# Patient Record
Sex: FEMALE | Race: WHITE | NOT HISPANIC OR LATINO | Employment: PART TIME | URBAN - NONMETROPOLITAN AREA
[De-identification: names, ages, dates, MRNs, and addresses within clinical notes are randomized per-mention and may not be internally consistent; named-entity substitution may affect disease eponyms.]

---
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
## 2018-01-02 ENCOUNTER — HOSPITAL ENCOUNTER (OUTPATIENT)
Dept: ULTRASOUND IMAGING | Facility: CLINIC | Age: 18
Discharge: HOME | End: 2018-01-02
Attending: NURSE PRACTITIONER | Admitting: NURSE PRACTITIONER
Payer: COMMERCIAL

## 2018-01-02 DIAGNOSIS — N92.6 IRREGULAR MENSES: ICD-10-CM

## 2018-01-02 PROCEDURE — 76856 US EXAM PELVIC COMPLETE: CPT

## 2018-01-02 NOTE — LETTER
January 3, 2018      Batoolcurtis Louis  [address] [address] [address]        Dear ,    We are writing to inform you of your test results.    Your test results fall within the expected range(s).    Resulted Orders   US Pelvis Complete without Transvaginal    Narrative    PELVIC ULTRASOUND TRANSABDOMINAL IMAGING 1/2/2018 10:48 AM    HISTORY: Irregular menses.    COMPARISON: None.    TECHNIQUE: Transabdominal imaging was performed.    FINDING: The uterus measures 7.8 x 5.2 x 3.1 cm. It demonstrates  normal echogenicity with no myometrial abnormality seen. The  endometrium is normal measuring 0.6 cm. The right ovary is normal. The  left ovary is normal. Normal blood flow is seen in both ovaries. No  adnexal pathology is seen. There is no free fluid in the cul-de-sac.      Impression    IMPRESSION: Unremarkable pelvic ultrasound.    ROBSON BUENO MD       If you have any questions or concerns, please call the clinic at the number listed above.       Sincerely,      [facility] ULTRASOUND ROOM 2

## 2018-01-03 NOTE — PROGRESS NOTES
Letter sent to patient informing of NL pelvic u/s.  ................Wilbert Qureshi LPN,   January 3, 2018,      1:10 PM,   [facility]

## 2018-01-05 ENCOUNTER — OFFICE VISIT (OUTPATIENT)
Dept: FAMILY MEDICINE | Facility: OTHER | Age: 18
End: 2018-01-05
Payer: COMMERCIAL

## 2018-01-05 VITALS
DIASTOLIC BLOOD PRESSURE: 78 MMHG | SYSTOLIC BLOOD PRESSURE: 108 MMHG | RESPIRATION RATE: 14 BRPM | WEIGHT: 218.7 LBS | HEART RATE: 114 BPM | HEIGHT: 69 IN | BODY MASS INDEX: 32.39 KG/M2 | OXYGEN SATURATION: 97 % | TEMPERATURE: 97.7 F

## 2018-01-05 DIAGNOSIS — N91.1 SECONDARY AMENORRHEA: Primary | ICD-10-CM

## 2018-01-05 DIAGNOSIS — R19.7 DIARRHEA, UNSPECIFIED TYPE: ICD-10-CM

## 2018-01-05 LAB
B-HCG SERPL-ACNC: <1 IU/L (ref 0–5)
TSH SERPL DL<=0.005 MIU/L-ACNC: 1.37 MU/L (ref 0.4–4)

## 2018-01-05 PROCEDURE — 84702 CHORIONIC GONADOTROPIN TEST: CPT | Performed by: NURSE PRACTITIONER

## 2018-01-05 PROCEDURE — 83001 ASSAY OF GONADOTROPIN (FSH): CPT | Performed by: NURSE PRACTITIONER

## 2018-01-05 PROCEDURE — 99214 OFFICE O/P EST MOD 30 MIN: CPT | Performed by: NURSE PRACTITIONER

## 2018-01-05 PROCEDURE — 84443 ASSAY THYROID STIM HORMONE: CPT | Performed by: NURSE PRACTITIONER

## 2018-01-05 PROCEDURE — 84403 ASSAY OF TOTAL TESTOSTERONE: CPT | Performed by: NURSE PRACTITIONER

## 2018-01-05 PROCEDURE — 84146 ASSAY OF PROLACTIN: CPT | Performed by: NURSE PRACTITIONER

## 2018-01-05 PROCEDURE — 36415 COLL VENOUS BLD VENIPUNCTURE: CPT | Performed by: NURSE PRACTITIONER

## 2018-01-05 PROCEDURE — 82670 ASSAY OF TOTAL ESTRADIOL: CPT | Performed by: NURSE PRACTITIONER

## 2018-01-05 ASSESSMENT — PAIN SCALES - GENERAL: PAINLEVEL: NO PAIN (0)

## 2018-01-05 NOTE — PATIENT INSTRUCTIONS
Labs will be done today.  I will call or send a letter with results within the next 1-2 weeks.      Bring back the stool samples.

## 2018-01-05 NOTE — NURSING NOTE
"Chief Complaint   Patient presents with     Results       Initial /78 (BP Location: Left arm, Patient Position: Chair, Cuff Size: Adult Large)  Pulse 114  Temp 97.7  F (36.5  C) (Tympanic)  Resp 14  Ht 5' 9\" (1.753 m)  Wt 218 lb 11.2 oz (99.2 kg)  LMP 10/26/2017  SpO2 97%  BMI 32.3 kg/m2 Estimated body mass index is 32.3 kg/(m^2) as calculated from the following:    Height as of this encounter: 5' 9\" (1.753 m).    Weight as of this encounter: 218 lb 11.2 oz (99.2 kg).  Medication Reconciliation: complete    "

## 2018-01-05 NOTE — PROGRESS NOTES
SUBJECTIVE:   Batool Louis is a 17 year old female who presents to clinic today for the following health issues:      Follow up ultrasound results.     Secondary amenorrhea - Normal, monthly menses until last June.  She has only had two menses since that time, both very light, LMP October of last year.  She had a pelvic ultrasound done 1/2/2018 that was normal.  No new stress.  She has never been sexually active.      No abdominal pain/nausea.  Is also having diarrhea since September.  She was on a road trip for 3 months in [place].  Mostly made her own meals, but occasionally ate at restaurants.  Developed diarrhea during the trip and it has persisted.  Her travel  did not get ill.  No fevers/chills or vomiting.  No blood in her stools.      Problem list and histories reviewed & adjusted, as indicated.  Additional history: none    There is no problem list on file for this patient.    History reviewed. No pertinent surgical history.    Social History   Substance Use Topics     Smoking status: Never Smoker     Smokeless tobacco: Never Used     Alcohol use No     History reviewed. No pertinent family history.      Current Outpatient Prescriptions   Medication Sig Dispense Refill     cetirizine (ZYRTEC) 10 MG tablet Take 10 mg by mouth Reported on 3/7/2017       Cholecalciferol (D 5000) 5000 UNITS TABS Reported on 3/7/2017       ibuprofen (ADVIL/MOTRIN) 200 MG tablet Take 200 mg by mouth Reported on 3/7/2017       Multiple Vitamin (MULTI-VITAMINS) TABS Take 1 tablet by mouth       propranolol (INDERAL) 20 MG tablet Take 20 mg by mouth Reported on 3/7/2017       Allergies   Allergen Reactions     Sulfamethoxazole-Trimethoprim Hives     BP Readings from Last 3 Encounters:   01/05/18 108/78   12/13/17 110/70    Wt Readings from Last 3 Encounters:   01/05/18 218 lb 11.2 oz (99.2 kg) (99 %)*   12/13/17 217 lb (98.4 kg) (99 %)*   03/16/17 210 lb (95.3 kg) (98 %)*     * Growth percentiles are based on CDC  "2-20 Years data.                        Reviewed and updated as needed this visit by clinical staffTobacco  Allergies  Meds  Med Hx  Surg Hx  Fam Hx  Soc Hx      Reviewed and updated as needed this visit by Provider         ROS:  Constitutional, HEENT, cardiovascular, pulmonary, gi and gu systems are negative, except as otherwise noted.      OBJECTIVE:   /78 (BP Location: Left arm, Patient Position: Chair, Cuff Size: Adult Large)  Pulse 114  Temp 97.7  F (36.5  C) (Tympanic)  Resp 14  Ht 5' 9\" (1.753 m)  Wt 218 lb 11.2 oz (99.2 kg)  LMP 10/26/2017  SpO2 97%  BMI 32.3 kg/m2  Body mass index is 32.3 kg/(m^2).  GENERAL: alert, no distress and obese  NECK: no adenopathy, no asymmetry, masses, or scars and thyroid normal to palpation  RESP: lungs clear to auscultation - no rales, rhonchi or wheezes  CV: regular rate and rhythm, normal S1 S2, no S3 or S4, no murmur, click or rub, no peripheral edema and peripheral pulses strong  ABDOMEN: soft, nontender, no hepatosplenomegaly, no masses and bowel sounds normal  MS: no gross musculoskeletal defects noted, no edema  SKIN: no suspicious lesions or rashes, has mild upper lip hair    Diagnostic Test Results:  Pending     ASSESSMENT/PLAN:         1. Secondary amenorrhea  Will check labs.  She has some mild hirsutism.   - Testosterone total  - TSH with free T4 reflex  - Follicle stimulating hormone  - Estradiol  - Prolactin  - HCG quantitative pregnancy    2. Diarrhea, unspecified type  Will check for infectious etiology.  She is currently working on trials off certain food groups.     - Enteric Bacteria and Virus Panel by [facility] Stool; Future  - Ova and Parasite Exam Routine; Future  - Giardia antigen; Future  - Clostridium difficile Toxin B PCR; Future    See Patient Instructions    RONNY Vaz [facility]    "

## 2018-01-05 NOTE — MR AVS SNAPSHOT
After Visit Summary   1/5/2018    Batool Louis    MRN: [id number]           Patient Information     Date Of Birth          2000        Visit Information        Provider Department      1/5/2018 4:00 PM Mary Alice Reagan APRN CNP [facility]        Today's Diagnoses     Secondary amenorrhea    -  1    Diarrhea, unspecified type          Care Instructions    Labs will be done today.  I will call or send a letter with results within the next 1-2 weeks.      Bring back the stool samples.               Follow-ups after your visit        Your next 10 appointments already scheduled     Feb 05, 2018 10:30 AM CST   New Visit with [facility]a [facility] ([facility])    [address] [facility] [address]   [phone]            Feb 12, 2018 10:30 AM CST   Return Visit with Celsa [facility] ([facility])    [address] [address] [facility] [address]   [phone]              Future tests that were ordered for you today     Open Future Orders        Priority Expected Expires Ordered    Enteric Bacteria and Virus Panel by [facility] Stool Routine  1/5/2019 1/5/2018    Ova and Parasite Exam Routine Routine  1/5/2019 1/5/2018    Giardia antigen Routine  1/5/2019 1/5/2018    Clostridium difficile Toxin B PCR Routine  2/4/2018 1/5/2018            Who to contact     If you have questions or need follow up information about today's clinic visit or your schedule please contact [facility] directly at [phone].  Normal or non-critical lab and imaging results will be communicated to you by MyChart, letter or phone within 4 business days after the clinic has received the results. If you do not hear from us within 7 days, please contact the clinic through MyChart or phone. If you have a critical or abnormal lab result, we will notify you by phone as soon as possible.  Submit  "refill requests through [employer] or call your pharmacy and they will forward the refill request to us. Please allow 3 business days for your refill to be completed.          Additional Information About Your Visit        "[employer] Information     [employer] lets you send messages to your doctor, view your test results, renew your prescriptions, schedule appointments and more. To sign up, go to [email or web address] . Click on \"Log in\" on the left side of the screen, which will take you to the Welcome page. Then click on \"Sign up Now\" on the right side of the page.     You will be asked to enter the access code listed below, as well as some personal information. Please follow the directions to create your username and password.     Your access code is: [email or web address]  Expires: 3/13/2018 10:12 AM     Your access code will  in 90 days. If you need help or a new code, please call your [facility] clinic or [phone].        Care EveryWhere ID     This is your Care EveryWhere ID. This could be used by other organizations to access your [facility] medical records  [id number]        Your Vitals Were     Pulse Temperature Respirations Height Last Period Pulse Oximetry    114 97.7  F (36.5  C) (Tympanic) 14 5' 9\" (1.753 m) 10/26/2017 97%    BMI (Body Mass Index)                   32.3 kg/m2            Blood Pressure from Last 3 Encounters:   18 108/78   17 110/70    Weight from Last 3 Encounters:   18 218 lb 11.2 oz (99.2 kg) (99 %)*   17 217 lb (98.4 kg) (99 %)*   17 210 lb (95.3 kg) (98 %)*     * Growth percentiles are based on CDC 2-20 Years data.              We Performed the Following     Estradiol     Follicle stimulating hormone     HCG quantitative pregnancy     Prolactin     Testosterone total     TSH with free T4 reflex        Primary Care Provider Office Phone # Fax #    RONNY Vaz [phone]       [address] [facility] [address]        Equal Access to " Services     [facility]: Hadii aad ku hadkyawmisha Tillman, waaxda luqadaha, qaybta kaalmablair alvarado, romeo vallejo. So [facility] [phone].    ATENCIÓN: Si habla español, tiene a david disposición servicios gratuitos de asistencia lingüística. Llame al [phone].    We comply with applicable federal civil rights laws and [place] laws. We do not discriminate on the basis of race, color, national origin, age, disability, sex, sexual orientation, or gender identity.            Thank you!     Thank you for choosing [facility]  for your care. Our goal is always to provide you with excellent care. Hearing back from our patients is one way we can continue to improve our services. Please take a few minutes to complete the written survey that you may receive in the mail after your visit with us. Thank you!             Your Updated Medication List - Protect others around you: Learn how to safely use, store and throw away your medicines at www.disposemymeds.org.          This list is accurate as of: 1/5/18  4:18 PM.  Always use your most recent med list.                   Brand Name Dispense Instructions for use Diagnosis    cetirizine 10 MG tablet    zyrTEC     Take 10 mg by mouth Reported on 3/7/2017        D 5000 5000 UNITS Tabs   Generic drug:  Cholecalciferol      Reported on 3/7/2017        ibuprofen 200 MG tablet    ADVIL/MOTRIN     Take 200 mg by mouth Reported on 3/7/2017        MULTI-VITAMINS Tabs      Take 1 tablet by mouth        propranolol 20 MG tablet    INDERAL     Take 20 mg by mouth Reported on 3/7/2017

## 2018-01-06 ENCOUNTER — HEALTH MAINTENANCE LETTER (OUTPATIENT)
Age: 18
End: 2018-01-06

## 2018-01-06 LAB
ESTRADIOL SERPL-MCNC: 33 PG/ML
FSH SERPL-ACNC: 6.9 IU/L
PROLACTIN SERPL-MCNC: 12 UG/L (ref 3–27)

## 2018-01-09 DIAGNOSIS — N91.1 SECONDARY AMENORRHEA: Primary | ICD-10-CM

## 2018-01-09 LAB — TESTOST SERPL-MCNC: 39 NG/DL (ref 8–60)

## 2018-01-10 ENCOUNTER — TELEPHONE (OUTPATIENT)
Dept: FAMILY MEDICINE | Facility: OTHER | Age: 18
End: 2018-01-10

## 2018-01-10 NOTE — TELEPHONE ENCOUNTER
Patient was given her results and told she has a referral to see an ob/gyn. Patient declined to schedule at this time, states she will be calling back to set up her appointment.

## 2018-01-10 NOTE — TELEPHONE ENCOUNTER
----- Message from RONNY Vaz CNP sent at 1/9/2018  9:33 PM CST -----  Please notify patient, call or letter    All your labs are normal.  They do not explain your symptoms.  I would like you to see the OB/GYN doctor for further evaluation.  I have put in a referral for this.     Electronically signed by Mary Alice Reagan CNP.

## 2018-01-18 ENCOUNTER — OFFICE VISIT (OUTPATIENT)
Dept: OBGYN | Facility: OTHER | Age: 18
End: 2018-01-18
Payer: COMMERCIAL

## 2018-01-18 VITALS
HEART RATE: 60 BPM | DIASTOLIC BLOOD PRESSURE: 80 MMHG | BODY MASS INDEX: 33.15 KG/M2 | SYSTOLIC BLOOD PRESSURE: 116 MMHG | WEIGHT: 224.5 LBS

## 2018-01-18 DIAGNOSIS — N91.1 SECONDARY AMENORRHEA: Primary | ICD-10-CM

## 2018-01-18 PROCEDURE — 99203 OFFICE O/P NEW LOW 30 MIN: CPT | Performed by: OBSTETRICS & GYNECOLOGY

## 2018-01-18 RX ORDER — MEDROXYPROGESTERONE ACETATE 10 MG
10 TABLET ORAL DAILY
Qty: 10 TABLET | Refills: 0 | Status: SHIPPED | OUTPATIENT
Start: 2018-01-18 | End: 2020-03-12

## 2018-01-18 NOTE — PATIENT INSTRUCTIONS
You will take Provera (medroxyprogesterone acetate) 10 mg daily for 10 days. A period should start within the next 5 days after stopping the Provera. If you do not have a period, please call my office and let me know. Your period may be heavier than usual. You may try Aleve (Naproxen) over the counter, 2 tablets twice daily for 7 days during your cycle. Alternatively you may try ibuprofen. Please review the precautions on the bottle and stop the medicine if it is hard on your stomach. If you have any questions or concerns, please let us know.

## 2018-01-18 NOTE — MR AVS SNAPSHOT
After Visit Summary   1/18/2018    Batool Louis    MRN: [id number]           Patient Information     Date Of Birth          2000        Visit Information        Provider Department      1/18/2018 8:30 AM Adelaide Ace MD [facility]        Today's Diagnoses     Secondary amenorrhea    -  1      Care Instructions    You will take Provera (medroxyprogesterone acetate) 10 mg daily for 10 days. A period should start within the next 5 days after stopping the Provera. If you do not have a period, please call my office and let me know. Your period may be heavier than usual. You may try Aleve (Naproxen) over the counter, 2 tablets twice daily for 7 days during your cycle. Alternatively you may try ibuprofen. Please review the precautions on the bottle and stop the medicine if it is hard on your stomach. If you have any questions or concerns, please let us know.           Follow-ups after your visit        Your next 10 appointments already scheduled     Feb 05, 2018 10:30 AM CST   New Visit with Celsa Linda LPC   [facility]    [address] [facility] [address] [phone]            Feb 12, 2018 10:30 AM CST   Return Visit with Celsa Linda LPC   [facility]    [address] [facility] [address] [phone]              Who to contact     If you have questions or need follow up information about today's clinic visit or your schedule please contact [facility] directly at [phone].  Normal or non-critical lab and imaging results will be communicated to you by MyChart, letter or phone within 4 business days after the clinic has received the results. If you do not hear from us within 7 days, please contact the clinic through MyChart or phone. If you have a critical or abnormal lab result, we will notify you by phone as soon as  "possible.  Submit refill requests through Rapid7 or call your pharmacy and they will forward the refill request to us. Please allow 3 business days for your refill to be completed.          Additional Information About Your Visit        [employer] Software Information     Rapid7 lets you send messages to your doctor, view your test results, renew your prescriptions, schedule appointments and more. To sign up, go to [email or web address] . Click on \"Log in\" on the left side of the screen, which will take you to the Welcome page. Then click on \"Sign up Now\" on the right side of the page.     You will be asked to enter the access code listed below, as well as some personal information. Please follow the directions to create your username and password.     Your access code is: [email or web address]  Expires: 3/13/2018 10:12 AM     Your access code will  in 90 days. If you need help or a new code, please call your [facility] clinic or [phone].        Care EveryWhere ID     This is your Care EveryWhere ID. This could be used by other organizations to access your [facility] medical records  [id number]        Your Vitals Were     Pulse Last Period BMI (Body Mass Index)             60 10/26/2017 33.15 kg/m2          Blood Pressure from Last 3 Encounters:   18 116/80   18 108/78   17 110/70    Weight from Last 3 Encounters:   18 224 lb 8 oz (101.8 kg) (99 %)*   18 218 lb 11.2 oz (99.2 kg) (99 %)*   17 217 lb (98.4 kg) (99 %)*     * Growth percentiles are based on [facility] 2-20 Years data.              Today, you had the following     No orders found for display         Today's Medication Changes          These changes are accurate as of: 18  8:58 AM.  If you have any questions, ask your nurse or doctor.               Start taking these medicines.        Dose/Directions    medroxyPROGESTERone 10 MG tablet   Commonly known as:  PROVERA   Used for:  Secondary amenorrhea   Started by:  Adelaide Ace " MD BHARTI        Dose:  10 mg   Take 1 tablet (10 mg) by mouth daily for 10 days   Quantity:  10 tablet   Refills:  0            Where to get your medicines      These medications were sent to [id number] - [place], [place] - [address] [place] [address]  [address] [place] [id number]    Hours:  M-F 8:30-6:30; Sat 9-4; closed Sunday Phone:  [phone]     medroxyPROGESTERone 10 MG tablet                Primary Care Provider Office Phone # Fax #    Mary Alice Reagan, APRN [phone]       [address] [facility] [address]        Equal Access to Services     MICHELLE AGUDELO : Hadii geni ku hadasho Soaidaali, waaxda luqadaha, qaybta kaalmada adeegyada, waxsusan valenzuela . So [facility] [phone].    ATENCIÓN: Si habla español, tiene a david disposición servicios gratuitos de asistencia lingüística. Llame al [phone].    We comply with applicable federal civil rights laws and [place] laws. We do not discriminate on the basis of race, color, national origin, age, disability, sex, sexual orientation, or gender identity.            Thank you!     Thank you for choosing [facility]  for your care. Our goal is always to provide you with excellent care. Hearing back from our patients is one way we can continue to improve our services. Please take a few minutes to complete the written survey that you may receive in the mail after your visit with us. Thank you!             Your Updated Medication List - Protect others around you: Learn how to safely use, store and throw away your medicines at www.disposemymeds.org.          This list is accurate as of: 1/18/18  8:58 AM.  Always use your most recent med list.                   Brand Name Dispense Instructions for use Diagnosis    cetirizine 10 MG tablet    zyrTEC     Take 10 mg by mouth Reported on 3/7/2017        D 5000 5000 UNITS Tabs   Generic drug:  Cholecalciferol      Reported on 3/7/2017        ibuprofen 200 MG tablet     ADVIL/MOTRIN     Take 200 mg by mouth Reported on 3/7/2017        medroxyPROGESTERone 10 MG tablet    PROVERA    10 tablet    Take 1 tablet (10 mg) by mouth daily for 10 days    Secondary amenorrhea       MULTI-VITAMINS Tabs      Take 1 tablet by mouth        propranolol 20 MG tablet    INDERAL     Take 20 mg by mouth Reported on 3/7/2017

## 2018-01-18 NOTE — NURSING NOTE
"Chief Complaint   Patient presents with     Abnormal Uterine Bleeding     consult irregular periods       Initial /80 (BP Location: Left arm, Patient Position: Chair, Cuff Size: Adult Regular)  Pulse 60  Wt 224 lb 8 oz (101.8 kg)  LMP 10/26/2017  BMI 33.15 kg/m2 Estimated body mass index is 33.15 kg/(m^2) as calculated from the following:    Height as of 1/5/18: 5' 9\" (1.753 m).    Weight as of this encounter: 224 lb 8 oz (101.8 kg).  BP completed using cuff size: regular    No obstetric history on file.    The following HM Due: NONE      The following patient reported/Care Every where data was sent to:  P ABSTRACT QUALITY INITIATIVES [[id number]]       N/a    Arelis Seay CMA  January 18, 2018           "

## 2018-01-18 NOTE — PROGRESS NOTES
OB/GYN New Consult  1/18/2018      NAME:  Batool Louis  PCP:  Mary Alice Reagan  MRN:  [id number]    Reason for Consult:  Irregular bleeding  Referring Provider: Mary Alice Reagan    Impression / Plan     17 year old G0 with:      ICD-10-CM    1. Secondary amenorrhea N91.1 medroxyPROGESTERone (PROVERA) 10 MG tablet       Reviewed history, labs, and imaging.  Discussed possible etiologies for missed periods.  Labs and US are normal.  E2 is on the low side of normal.   We will plan progesterone withdrawal. If no bleed, then consider E2/P withdrawal.  Instructions and precautions given. Patient and her mother are comfortable with plan.     Chief Complaint     Chief Complaint   Patient presents with     Abnormal Uterine Bleeding     consult irregular periods       HPI     Batool Louis is a 17 year old female who is seen for irregular bleeding.     Patient saw Mary Alice Reagan on 1/5/2018 for secondary amenorrhea. Patient was having normal, monthly menses until June.  Labs and ultrasound were done. See below.  She was referred here for further evaluation and management.    Patient's last menstrual period was 10/26/2017.   Missed July.  Light in August, 3 days.  Light in October, 3-4 days.  No bleeding since.      Menarche age 12.  She was regular fairly soon thereafter.  Monthly period.  Last 5-6 days.  Moderate flow, heavy for one day.  No clots.  No pain.  Some cramps.    She has never used contraception.      Patient is not sexually active.      She gained about 30 lbs in the year before her periods changes.    Patient denies  dietary habits, excessive exercise. No nipple discharge.  No headache or vision changes.  No hot flashes  No problems with acne, abnormal hair growth.      No recent uterine surgery  No significant illnesses.  She is getting a work up for abdominal pain, concerns.        Problem List     There are no active problems to display for this patient.      Medications     Current Outpatient  Prescriptions   Medication     cetirizine (ZYRTEC) 10 MG tablet     Cholecalciferol (D 5000) 5000 UNITS TABS     ibuprofen (ADVIL/MOTRIN) 200 MG tablet     Multiple Vitamin (MULTI-VITAMINS) TABS     propranolol (INDERAL) 20 MG tablet     No current facility-administered medications for this visit.         Allergies     Allergies   Allergen Reactions     Sulfamethoxazole-Trimethoprim Hives       Past Medical/Surgical History     History reviewed. No pertinent past medical history.    History reviewed. No pertinent surgical history.     Social History     Social History     Social History     Marital status: Single     Spouse name: N/A     Number of children: N/A     Years of education: N/A     Occupational History     Not on file.     Social History Main Topics     Smoking status: Never Smoker     Smokeless tobacco: Never Used     Alcohol use No     Drug use: No     Sexual activity: No     Other Topics Concern     Not on file     Social History Narrative       Family History      History reviewed. No pertinent family history.    ROS     A 10 organ review of systems was asked and the pertinent positives and negatives are listed in the HPI. All other organ systems can be considered negative.     Physical Exam   Vitals: /80 (BP Location: Left arm, Patient Position: Chair, Cuff Size: Adult Regular)  Pulse 60  Wt 224 lb 8 oz (101.8 kg)  LMP 10/26/2017  BMI 33.15 kg/m2    General: Comfortable, no obvious distress, obese body habitus  Psych: Alert and orientated x 3. Appropriate affect, good insight.   : deferred  Extremities: No peripheral edema, nontender       Labs/Imaging       Labs were reviewed in Epic     Component      Latest Ref Rng & Units 1/5/2018   Testosterone Total      8 - 60 ng/dL 39   TSH      0.40 - 4.00 mU/L 1.37   FSH      IU/L 6.9   Estradiol      pg/mL 33   Prolactin      3 - 27 ug/L 12   HCG Quantitative Serum      0 - 5 IU/L <1       Imaging was reviewed in Epic.     Ultrasound  1/2/2018    Uterus 7.8 x 5.2 x 3.1 cm    Endometrium 6 mm    Normal ovaries, no free fluid    30 minutes was spent with patient, more than 50% counseling and coordinating care    Adelaide Ace MD

## 2018-02-01 ENCOUNTER — TELEPHONE (OUTPATIENT)
Dept: OBGYN | Facility: OTHER | Age: 18
End: 2018-02-01

## 2018-02-01 NOTE — TELEPHONE ENCOUNTER
Reason for Call:  Other prescription    Detailed comments: patient is taking a medication and she started her period and the directions state that she shouldn't take it while having period.  She is wondering if she should continue to take medication.  Please advise    Phone Number Patient can be reached at: Home number on file [phone] (home)    Best Time: mornings or mid days    Can we leave a detailed message on this number? YES    Call taken on 2/1/2018 at 3:40 PM by Sarah Lobato

## 2018-02-01 NOTE — TELEPHONE ENCOUNTER
MM Please review and advise if you would like the patient to start the Progesterone prescription at this time. Originally prescribed 2018, picked up a few days ago has not started. 2018 had light spotting for 1 day.     Batool Louis is a 17 year old female who calls with concerns of continuing progesterone since she spotted for 1 day.    NURSING ASSESSMENT:  Description:  Prescribed Progesterone 2018 to take for 10 days to encourage a period due to not having a period for 4 months. And once she picked up the medication (2018) had a period for 1 day that was light. Has not had the medication at this time.   Onset/duration:  2018  Precip. factors:  Secondary amenorrhea  Associated symptoms:  none  Improves/worsens symptoms:  Same  Pain scale (0-10)   0/10  LMP/preg/breast feedin2018  Last exam/Treatment:  2018  Allergies:   Allergies   Allergen Reactions     Sulfamethoxazole-Trimethoprim Hives     NURSING PLAN: Routed to provider Yes    RECOMMENDED DISPOSITION:  TBD based on providers recommendations  Will comply with recommendation: Yes  If further questions/concerns or if symptoms do not improve, worsen or new symptoms develop, call your PCP or [facility] as soon as possible.    NOTES:  Disposition was determined by the first positive assessment question, therefore all previous assessment questions were negative    Guideline used:  Nursing Judgment  Routing to MM     Mary Samaniego, RN, BSN

## 2018-02-02 NOTE — TELEPHONE ENCOUNTER
Contacted patient and relayed Dr Jo's message below.  She stated she only had spotting for one day so she will take the progesterone as advised.

## 2018-02-02 NOTE — TELEPHONE ENCOUNTER
If she only had spotting for the one day, she can still take the progesterone.  If she has a normal period, she can hold off on the progesterone.

## 2018-02-05 ENCOUNTER — OFFICE VISIT (OUTPATIENT)
Dept: PSYCHOLOGY | Facility: CLINIC | Age: 18
End: 2018-02-05
Payer: COMMERCIAL

## 2018-02-05 DIAGNOSIS — F43.22 ADJUSTMENT DISORDER WITH ANXIETY: Primary | ICD-10-CM

## 2018-02-05 PROCEDURE — 90791 PSYCH DIAGNOSTIC EVALUATION: CPT | Performed by: COUNSELOR

## 2018-02-05 ASSESSMENT — ANXIETY QUESTIONNAIRES
2. NOT BEING ABLE TO STOP OR CONTROL WORRYING: SEVERAL DAYS
7. FEELING AFRAID AS IF SOMETHING AWFUL MIGHT HAPPEN: NOT AT ALL
6. BECOMING EASILY ANNOYED OR IRRITABLE: MORE THAN HALF THE DAYS
1. FEELING NERVOUS, ANXIOUS, OR ON EDGE: SEVERAL DAYS
3. WORRYING TOO MUCH ABOUT DIFFERENT THINGS: SEVERAL DAYS
GAD7 TOTAL SCORE: 6
IF YOU CHECKED OFF ANY PROBLEMS ON THIS QUESTIONNAIRE, HOW DIFFICULT HAVE THESE PROBLEMS MADE IT FOR YOU TO DO YOUR WORK, TAKE CARE OF THINGS AT HOME, OR GET ALONG WITH OTHER PEOPLE: NOT DIFFICULT AT ALL
5. BEING SO RESTLESS THAT IT IS HARD TO SIT STILL: NOT AT ALL

## 2018-02-05 ASSESSMENT — PATIENT HEALTH QUESTIONNAIRE - PHQ9: 5. POOR APPETITE OR OVEREATING: SEVERAL DAYS

## 2018-02-05 NOTE — MR AVS SNAPSHOT
"                  MRN:[id number]                      After Visit Summary   2018    Batool oLuis    MRN: [id number]           Visit Information        Provider Department      2018 10:30 AM Celsa Linda, [facility] Generic      Your next 10 appointments already scheduled     2018 10:30 AM CST   Return Visit with Celsa Linda, [facility]    [address] [facility] [address]   [phone]            Mar 01, 2018  7:00 AM CST   Return Visit with Celsa Linda [facility]    [address] [facility] [address]   [phone]            Mar 12, 2018  9:30 AM CDT   Return Visit with Celsa Linda [facility]    [address] [facility] [address]   [phone]            Mar 26, 2018  9:30 AM CDT   Return Visit with Celsa Linda, [facility] ([facility])    [address] [facility] [address]   [phone]              MyChart Information     VisConPro lets you send messages to your doctor, view your test results, renew your prescriptions, schedule appointments and more. To sign up, go to [email or web address] . Click on \"Log in\" on the left side of the screen, which will take you to the Welcome page. Then click on \"Sign up Now\" on the right side of the page.     You will be asked to enter the access code listed below, as well as some personal information. Please follow the directions to create your username and password.     Your access code is: [email or web address]  Expires: 3/13/2018 10:12 AM     Your access code will  in 90 days. If you need help or a new code, please call your [place] clinic or [phone].        Care EveryWhere ID     This is your Care EveryWhere ID. This could be used by " other organizations to access your [place] medical records  [id number]        Equal Access to Services     MICHELLE AGUDELO : Maggie Tillman, dilcia palma, romeo patterson. [facility] [phone].    ATENCIÓN: Si habla español, tiene a david disposición servicios gratuitos de asistencia lingüística. Llame al [phone].    We comply with applicable federal civil rights laws and [place] laws. We do not discriminate on the basis of race, color, national origin, age, disability, sex, sexual orientation, or gender identity.
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
## 2018-02-05 NOTE — Clinical Note
Hakeem Amador, I got to see your patient, Batool, today.  We will be working on anxiety and coping skills in therapy. Please let me know if you have any questions in the future! Thanks, Celsa Linda MA, [facility]

## 2018-02-06 ASSESSMENT — PATIENT HEALTH QUESTIONNAIRE - PHQ9: SUM OF ALL RESPONSES TO PHQ QUESTIONS 1-9: 6

## 2018-02-06 ASSESSMENT — ANXIETY QUESTIONNAIRES: GAD7 TOTAL SCORE: 6

## 2018-02-12 ENCOUNTER — OFFICE VISIT (OUTPATIENT)
Dept: PSYCHOLOGY | Facility: CLINIC | Age: 18
End: 2018-02-12
Payer: COMMERCIAL

## 2018-02-12 DIAGNOSIS — F43.22 ADJUSTMENT DISORDER WITH ANXIETY: Primary | ICD-10-CM

## 2018-02-12 PROCEDURE — 90837 PSYTX W PT 60 MINUTES: CPT | Performed by: COUNSELOR
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
## 2018-02-12 NOTE — MR AVS SNAPSHOT
"                  MRN:[id number]                      After Visit Summary   2018    Batool Louis    MRN: [id number]           Visit Information        Provider Department      2018 10:30 AM Celsa Linda, [facility] Generic      Your next 10 appointments already scheduled     Mar 01, 2018  7:00 AM CST   Return Visit with Celsa Sy English [facility]    [address] [facility] [address]   [phone]            Mar 12, 2018  9:30 AM CDT   Return Visit with Celsa Linda [facility]    [address] [facility] [address]   [phone]            Mar 26, 2018  9:30 AM CDT   Return Visit with Celsa Sy English [facility]    [address] [facility] [address]   [phone]              MyChart Information     [employer] lets you send messages to your doctor, view your test results, renew your prescriptions, schedule appointments and more. To sign up, go to [email or web address] . Click on \"Log in\" on the left side of the screen, which will take you to the Welcome page. Then click on \"Sign up Now\" on the right side of the page.     You will be asked to enter the access code listed below, as well as some personal information. Please follow the directions to create your username and password.     Your access code is: [email or web address]  Expires: 3/13/2018 10:12 AM     Your access code will  in 90 days. If you need help or a new code, please call your [facility] clinic or [phone].        Care EveryWhere ID     This is your Care EveryWhere ID. This could be used by other organizations to access your [facility] medical records  [id number]        Equal Access to Services     MICHELLE AGUDELO AH: Maggie Tillman, dilcia palma, dagmar alvarado, " romeo burroughs'aan ah. So [facility] [phone].    ATENCIÓN: Si habla español, tiene a david disposición servicios gratuitos de asistencia lingüística. Llame al [phone].    We comply with applicable federal civil rights laws and [place] laws. We do not discriminate on the basis of race, color, national origin, age, disability, sex, sexual orientation, or gender identity.

## 2018-03-01 ENCOUNTER — OFFICE VISIT (OUTPATIENT)
Dept: PSYCHOLOGY | Facility: CLINIC | Age: 18
End: 2018-03-01
Payer: COMMERCIAL

## 2018-03-01 DIAGNOSIS — F43.22 ADJUSTMENT DISORDER WITH ANXIETY: Primary | ICD-10-CM

## 2018-03-01 PROCEDURE — 90834 PSYTX W PT 45 MINUTES: CPT | Performed by: COUNSELOR

## 2018-03-01 ASSESSMENT — PATIENT HEALTH QUESTIONNAIRE - PHQ9: 5. POOR APPETITE OR OVEREATING: SEVERAL DAYS

## 2018-03-01 ASSESSMENT — ANXIETY QUESTIONNAIRES
5. BEING SO RESTLESS THAT IT IS HARD TO SIT STILL: NOT AT ALL
1. FEELING NERVOUS, ANXIOUS, OR ON EDGE: SEVERAL DAYS
3. WORRYING TOO MUCH ABOUT DIFFERENT THINGS: SEVERAL DAYS
GAD7 TOTAL SCORE: 5
7. FEELING AFRAID AS IF SOMETHING AWFUL MIGHT HAPPEN: NOT AT ALL
IF YOU CHECKED OFF ANY PROBLEMS ON THIS QUESTIONNAIRE, HOW DIFFICULT HAVE THESE PROBLEMS MADE IT FOR YOU TO DO YOUR WORK, TAKE CARE OF THINGS AT HOME, OR GET ALONG WITH OTHER PEOPLE: SOMEWHAT DIFFICULT
2. NOT BEING ABLE TO STOP OR CONTROL WORRYING: SEVERAL DAYS
6. BECOMING EASILY ANNOYED OR IRRITABLE: SEVERAL DAYS

## 2018-03-01 NOTE — MR AVS SNAPSHOT
"                  MRN:[id number]                      After Visit Summary   3/1/2018    Batool Louis    MRN: [id number]           Visit Information        Provider Department      3/1/2018 7:00 AM Celsa Linda LPC [facility] Generic      Your next 10 appointments already scheduled     Mar 12, 2018  9:30 AM CDT   Return Visit with Celsa Linda [facility] ([facility])    [address] [facility] [address]   [phone]            Mar 26, 2018  9:30 AM CDT   Return Visit with Celsa Linda ALBERTO   [facility] ([facility])    [address] [facility] [address] [phone]              MyChart Information     Rankerhart lets you send messages to your doctor, view your test results, renew your prescriptions, schedule appointments and more. To sign up, go to [email or web address] . Click on \"Log in\" on the left side of the screen, which will take you to the Welcome page. Then click on \"Sign up Now\" on the right side of the page.     You will be asked to enter the access code listed below, as well as some personal information. Please follow the directions to create your username and password.     Your access code is: [email or web address]  Expires: 3/13/2018 10:12 AM     Your access code will  in 90 days. If you need help or a new code, please call your [place] clinic or [phone].        Care EveryWhere ID     This is your Care EveryWhere ID. This could be used by other organizations to access your [place] medical records  [id number]        Equal Access to Services     [facility] AGUDELO : Hadii geni Tillman, wafrederickda luhenriqueadaha, qaybta kaalmada christiano, romeo vallejo. So [facility] [phone].    ATENCIÓN: Si habla español, tiene a david disposición servicios gratuitos de asistencia lingüística. Llame al [phone].    We comply with " applicable federal civil rights laws and [place] laws. We do not discriminate on the basis of race, color, national origin, age, disability, sex, sexual orientation, or gender identity.
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
## 2018-03-02 ASSESSMENT — PATIENT HEALTH QUESTIONNAIRE - PHQ9: SUM OF ALL RESPONSES TO PHQ QUESTIONS 1-9: 3

## 2018-03-02 ASSESSMENT — ANXIETY QUESTIONNAIRES: GAD7 TOTAL SCORE: 5

## 2018-03-26 ENCOUNTER — OFFICE VISIT (OUTPATIENT)
Dept: PSYCHOLOGY | Facility: CLINIC | Age: 18
End: 2018-03-26
Payer: COMMERCIAL

## 2018-03-26 DIAGNOSIS — F43.22 ADJUSTMENT DISORDER WITH ANXIETY: Primary | ICD-10-CM

## 2018-03-26 PROCEDURE — 90834 PSYTX W PT 45 MINUTES: CPT | Performed by: COUNSELOR

## 2018-03-26 NOTE — MR AVS SNAPSHOT
"                  MRN:[id number]                      After Visit Summary   3/26/2018    Batool Louis    MRN: [id number]           Visit Information        Provider Department      3/26/2018 9:30 AM Celsa Linda LPC [facility] Generic      Your next 10 appointments already scheduled     2018 10:00 AM CDT   Return Visit with Celsa Linda LPC   [facility] ([facility])    [address]  [facility] [address]   [phone]            2018 10:00 AM CDT   Return Visit with Celsa Linda LPC   [facility] ([facility])    [address]  [facility] [address]   [phone]            May 07, 2018 10:30 AM CDT   Return Visit with Celsa Linda LPC   [facility] ([facility])    [address]  [facility] [address]   [phone]            May 21, 2018 10:30 AM CDT   Return Visit with Celsa Linda LPC   [facility] ([facility])    [address] [facility] [address]   [phone]              MyChart Information     [employer] lets you send messages to your doctor, view your test results, renew your prescriptions, schedule appointments and more. To sign up, go to [email or web address] . Click on \"Log in\" on the left side of the screen, which will take you to the Welcome page. Then click on \"Sign up Now\" on the right side of the page.     You will be asked to enter the access code listed below, as well as some personal information. Please follow the directions to create your username and password.     Your access code is: [email or web address]  Expires: 2018 11:22 AM     Your access code will  in 90 days. If you need help or a new code, please call your [place] clinic or [phone].        Care EveryWhere ID     This is your Care EveryWhere ID. This " could be used by other organizations to access your [place] medical records  [id number]        Equal Access to Services     MICHELLE AGUDELO : Maggie Tillman, dilcia palma, dagmar alvarado, romeo vallejo. So [facility] [phone].    ATENCIÓN: Si habla español, tiene a david disposición servicios gratuitos de asistencia lingüística. Llame al [phone].    We comply with applicable federal civil rights laws and [place] laws. We do not discriminate on the basis of race, color, national origin, age, disability, sex, sexual orientation, or gender identity.
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
## 2018-04-04 ENCOUNTER — OFFICE VISIT (OUTPATIENT)
Dept: PSYCHOLOGY | Facility: CLINIC | Age: 18
End: 2018-04-04
Payer: COMMERCIAL

## 2018-04-04 DIAGNOSIS — F43.22 ADJUSTMENT DISORDER WITH ANXIETY: Primary | ICD-10-CM

## 2018-04-04 PROCEDURE — 90837 PSYTX W PT 60 MINUTES: CPT | Performed by: COUNSELOR

## 2018-04-04 NOTE — MR AVS SNAPSHOT
MRN:[id number]                      After Visit Summary   4/4/2018    Batool Louis    MRN: [id number]           Visit Information        Provider Department      4/4/2018 10:00 AM Celsa Linda LPC [facility] Generic      Your next 10 appointments already scheduled     Apr 18, 2018 10:00 AM CDT   Return Visit with Celsa Linda [facility] ([facility])    [address]  [facility] [address]   [phone]            May 07, 2018 10:30 AM CDT   Return Visit with Celsa Linda [facility] ([facility] [place])    [address]  [place] [address]   [phone]            May 21, 2018 10:30 AM CDT   Return Visit with Celsa Linda LPC   [facility] ([facility] [place])    [address]  [place] [address]   [phone]            Jun 11, 2018  9:30 AM CDT   Return Visit with Celsa Linda LPC   [facility] ([facility] [place])    [address]  [place] [address]   [phone]            Jun 25, 2018  9:30 AM CDT   Return Visit with Celsa Linda LPC   [facility] [place] ([facility] [place])    [address]  [place] [address]   [phone]            Jul 16, 2018  9:30 AM CDT   Return Visit with Celsa Linda LPC   [facility] [place] ([facility] [place])    [address]  [place] [address]   [phone]            Aug 13, 2018  9:30 AM CDT   Return Visit with Celsa Linda LPC   [facility] [place] ([facility] [place])    [address]  [place] [address]   [phone]            Aug 27, 2018  9:30 AM CDT   Return Visit with Celsa Linda LPC   [facility] ([facility] [place])    [address]  [facility]  "[id number] [phone]              SetMeUp Information     SetMeUp lets you send messages to your doctor, view your test results, renew your prescriptions, schedule appointments and more. To sign up, go to [email or web address] . Click on \"Log in\" on the left side of the screen, which will take you to the Welcome page. Then click on \"Sign up Now\" on the right side of the page.     You will be asked to enter the access code listed below, as well as some personal information. Please follow the directions to create your username and password.     Your access code is: [email or web address]  Expires: 2018 11:22 AM     Your access code will  in 90 days. If you need help or a new code, please call your [place] clinic or [phone].        Care EveryWhere ID     This is your Care EveryWhere ID. This could be used by other organizations to access your [place] medical records  [id number]        Equal Access to Services     [facility] : Hadii geni palmao Soconor, waaxda luqadaha, qaybta kaalmada adejohn, romeo valenzuela . So [facility] [phone].    ATENCIÓN: Si habla español, tiene a david disposición servicios gratuitos de asistencia lingüística. Llame al [phone].    We comply with applicable federal civil rights laws and [place] laws. We do not discriminate on the basis of race, color, national origin, age, disability, sex, sexual orientation, or gender identity.            "

## 2018-04-18 ENCOUNTER — OFFICE VISIT (OUTPATIENT)
Dept: PSYCHOLOGY | Facility: CLINIC | Age: 18
End: 2018-04-18
Payer: COMMERCIAL

## 2018-04-18 DIAGNOSIS — F43.22 ADJUSTMENT DISORDER WITH ANXIETY: Primary | ICD-10-CM

## 2018-04-18 PROCEDURE — 90837 PSYTX W PT 60 MINUTES: CPT | Performed by: COUNSELOR

## 2018-04-18 NOTE — MR AVS SNAPSHOT
MRN:[id number]                      After Visit Summary   4/18/2018    Batool Louis    MRN: [id number]           Visit Information        Provider Department      4/18/2018 10:00 AM Celsa Linda, [facility] Generic      Your next 10 appointments already scheduled     May 07, 2018 10:30 AM CDT   Return Visit with Celsa Linda [facility] ([facility] [place])    [address]  [place] [address]   [phone]            May 21, 2018 10:30 AM CDT   Return Visit with Celsa Linda [facility] ([facility] [place])    [address]  [place] [address]   [phone]            Jun 11, 2018  9:30 AM CDT   Return Visit with Celsa Linda [facility] ([facility] [place])    [address]  [place] [address]   [phone]            Jun 25, 2018  9:30 AM CDT   Return Visit with Celsa Linda [facility] ([facility] [place])    [address]  [place] [address]   [phone]            Jul 16, 2018  9:30 AM CDT   Return Visit with Celsa Linda [facility] [place] ([facility] [place])    [address]  [place] [address]   [phone]            Aug 13, 2018  9:30 AM CDT   Return Visit with Celsa Linda [facility] ([facility] [place])    [address]  [place] [address]   [phone]            Aug 27, 2018  9:30 AM CDT   Return Visit with Celsa Linda [facility] ([facility] [place])    [address]  [place] [address]   [phone]              MyChart Information     MyChart lets you send messages to your doctor, view your test results, renew your prescriptions, schedule appointments and more. To sign up, go to  [email or web address] . Click on \"Log in\" on the left side of the screen, which will take you to the Welcome page. Then click on \"Sign up Now\" on the right side of the page.     You will be asked to enter the access code listed below, as well as some personal information. Please follow the directions to create your username and password.     Your access code is: [email or web address]  Expires: 2018 11:22 AM     Your access code will  in 90 days. If you need help or a new code, please call your [place] clinic or [phone].        Care EveryWhere ID     This is your Care EveryWhere ID. This could be used by other organizations to access your [place] medical records  [id number]        Equal Access to Services     MICHELLE AGUDELO : Maggie Tillman, dilcia palma, dagmar alvarado, romeo vallejo. So [facility] [phone].    ATENCIÓN: Si habla español, tiene a david disposición servicios gratuitos de asistencia lingüística. Llame al [phone].    We comply with applicable federal civil rights laws and [place] laws. We do not discriminate on the basis of race, color, national origin, age, disability, sex, sexual orientation, or gender identity.            "
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
## 2018-05-11 ENCOUNTER — TELEPHONE (OUTPATIENT)
Dept: FAMILY MEDICINE | Facility: OTHER | Age: 18
End: 2018-05-11

## 2018-05-11 ENCOUNTER — OFFICE VISIT (OUTPATIENT)
Dept: FAMILY MEDICINE | Facility: OTHER | Age: 18
End: 2018-05-11
Payer: COMMERCIAL

## 2018-05-11 VITALS
TEMPERATURE: 99 F | RESPIRATION RATE: 20 BRPM | BODY MASS INDEX: 30.85 KG/M2 | HEART RATE: 76 BPM | WEIGHT: 208.9 LBS | OXYGEN SATURATION: 96 % | SYSTOLIC BLOOD PRESSURE: 110 MMHG | DIASTOLIC BLOOD PRESSURE: 60 MMHG

## 2018-05-11 DIAGNOSIS — R07.0 THROAT PAIN: Primary | ICD-10-CM

## 2018-05-11 LAB
DEPRECATED S PYO AG THROAT QL EIA: NORMAL
HETEROPH AB SER QL: NEGATIVE
SPECIMEN SOURCE: NORMAL

## 2018-05-11 PROCEDURE — 87880 STREP A ASSAY W/OPTIC: CPT | Performed by: PHYSICIAN ASSISTANT

## 2018-05-11 PROCEDURE — 86308 HETEROPHILE ANTIBODY SCREEN: CPT | Performed by: PHYSICIAN ASSISTANT

## 2018-05-11 PROCEDURE — 87081 CULTURE SCREEN ONLY: CPT | Performed by: PHYSICIAN ASSISTANT

## 2018-05-11 PROCEDURE — 36415 COLL VENOUS BLD VENIPUNCTURE: CPT | Performed by: PHYSICIAN ASSISTANT

## 2018-05-11 PROCEDURE — 99213 OFFICE O/P EST LOW 20 MIN: CPT | Performed by: PHYSICIAN ASSISTANT

## 2018-05-11 RX ORDER — ACETAMINOPHEN AND CODEINE PHOSPHATE 120; 12 MG/5ML; MG/5ML
5 SOLUTION ORAL EVERY 6 HOURS PRN
Qty: 90 ML | Refills: 0 | Status: SHIPPED | OUTPATIENT
Start: 2018-05-11 | End: 2018-08-27

## 2018-05-11 ASSESSMENT — PAIN SCALES - GENERAL: PAINLEVEL: SEVERE PAIN (7)

## 2018-05-11 NOTE — TELEPHONE ENCOUNTER
----- Message from Vipul Haley PA-C sent at 5/11/2018  1:49 PM CDT -----  Please patient and inform her that the initial monospot test returned negative. She should continue with supportive therapy:  1. Tylenol or ibuprofen every 4-6 hours as needed for pain.  2. If she chooses to use dayquil she should not use tylenol as well as this medication is included in the dayquil.  3. Warm fluids to help reduce throat discomfort. Monitor for signs of worsening symptoms such as difficulty swallowing, drooling, increased pain in throat or sensation of something stuck in throat.   4. Increase rest and water intake as well as maintain adequate food intake.       The monospot test is not very sensitive early on in mono infections. If she continues to have symptoms after 2 weeks she should return for a lab only repeat monospot.      Vipul Haley PA-C

## 2018-05-11 NOTE — TELEPHONE ENCOUNTER
I called the patient and informed her of the following per Zaid Haley PA-C: the initial monospot test returned negative. She should continue with supportive therapy:   1. Tylenol or ibuprofen every 4-6 hours as needed for pain.   2. If she chooses to use dayquil she should not use tylenol as well as this medication is included in the dayquil.   3. Warm fluids to help reduce throat discomfort. Monitor for signs of worsening symptoms such as difficulty swallowing, drooling, increased pain in throat or sensation of something stuck in throat.   4. Increase rest and water intake as well as maintain adequate food intake.       The monospot test is not very sensitive early on in mono infections. If she continues to have symptoms after 2 weeks she should return for a lab only repeat monospot.

## 2018-05-11 NOTE — PATIENT INSTRUCTIONS
Self-Care for Sore Throats    Sore throats happen for many reasons, such as colds, allergies, and infections caused by viruses or bacteria. In any case, your throat becomes red and sore. Your goal for self-care is to reduce your discomfort while giving your throat a chance to heal.  Moisten and soothe your throat  Tips include the following:    Try a sip of water first thing after waking up.    Keep your throat moist by drinking 6 or more glasses of clear liquids every day.    Run a cool-air humidifier in your room overnight.    Avoid cigarette smoke.     Suck on throat lozenges, cough drops, hard candy, ice chips, or frozen fruit-juice bars. Use the sugar-free versions if your diet or medical condition requires them.  Gargle to ease irritation  Gargling every hour or 2 can ease irritation. Try gargling with 1 of these solutions:    1/4 teaspoon of salt in 1/2 cup of warm water    An over-the-counter anesthetic gargle  Use medicine for more relief  Over-the-counter medicine can reduce sore throat symptoms. Ask your pharmacist if you have questions about which medicine to use:    Ease pain with anesthetic sprays. Aspirin or an aspirin substitute also helps. Remember, never give aspirin to anyone 18 or younger, or if you are already taking blood thinners.     For sore throats caused by allergies, try antihistamines to block the allergic reaction.    Remember: unless a sore throat is caused by a bacterial infection, antibiotics won t help you.  Prevent future sore throats  Prevention tips include the following:    Stop smoking or reduce contact with secondhand smoke. Smoke irritates the tender throat lining.    Limit contact with pets and with allergy-causing substances, such as pollen and mold.    When you re around someone with a sore throat or cold, wash your hands often to keep viruses or bacteria from spreading.    Don t strain your vocal cords.  Call your healthcare provider  Contact your healthcare provider if  you have:    A temperature over 101 F (38.3 C)    White spots on the throat    Great difficulty swallowing    Trouble breathing    A skin rash    Recent exposure to someone else with strep bacteria    Severe hoarseness and swollen glands in the neck or jaw   Date Last Reviewed: 8/1/2016 2000-2017 [employer]. [address] [place], [place] [id number]. All rights reserved. This information is not intended as a substitute for professional medical care. Always follow your healthcare professional's instructions.

## 2018-05-11 NOTE — PROGRESS NOTES
SUBJECTIVE:   Batool Louis is a 17 year old female who presents to clinic today for the following health issues:      Acute Illness   Acute illness concerns: sore throat  Onset: 6 days    Fever: YES    Chills/Sweats: YES    Headache (location?): YES    Sinus Pressure:no    Conjunctivitis:  no    Ear Pain: YES: bilateral    Rhinorrhea: no     Congestion: no    Sore Throat: YES     Cough: no    Wheeze: no    Decreased Appetite: YES    Nausea: YES    Vomiting: YES    Diarrhea:  YES    Dysuria/Freq.: no    Fatigue/Achiness: YES    Sick/Strep Exposure: YES     Therapies Tried and outcome: Dayquil; slight relief,Nyquil,slight relief, Allergy medication, no relief,Tylenol;slight relief     Patient is pleasant 17 year old female who is in clinic today with 6 days of URI symptoms. 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 Over this past week she has developed sore throat, bilateral ear pain, frontal headache, upset stomach and feeling generalized achiness. She has been using tylenol, Dayquil and Nyquil with some moderate relief, but feels that her symptoms are getting worse. Last night she was unable to maintain sleep despite nyquil use and today she has only been able to drink tea due to the pain in her throat. She has a mild fever today which is consistent with self reported mild fever over the past week. Denies globus sensation in throat, drainage, cough, SOB, changes in vision or hearing, weakness or drooling.       Problem list and histories reviewed & adjusted, as indicated.  Additional history: as documented    There is no problem list on file for this patient.    History reviewed. No pertinent surgical history.    Social History   Substance Use Topics     Smoking status: Never Smoker     Smokeless tobacco: Never Used     Alcohol use No     History reviewed. No pertinent family history.      Current  Outpatient Prescriptions   Medication Sig Dispense Refill     acetaminophen-codeine 120-12 MG/5ML solution Take 5 mLs by mouth every 6 hours as needed for moderate pain 90 mL 0     Cholecalciferol (D 5000) 5000 UNITS TABS Reported on 3/7/2017       cetirizine (ZYRTEC) 10 MG tablet Take 10 mg by mouth Reported on 3/7/2017       ibuprofen (ADVIL/MOTRIN) 200 MG tablet Take 200 mg by mouth Reported on 3/7/2017       Multiple Vitamin (MULTI-VITAMINS) TABS Take 1 tablet by mouth       propranolol (INDERAL) 20 MG tablet Take 20 mg by mouth Reported on 3/7/2017       Allergies   Allergen Reactions     Sulfamethoxazole-Trimethoprim Hives       Reviewed and updated as needed this visit by clinical staff  Tobacco  Allergies  Meds  Med Hx  Surg Hx  Fam Hx  Soc Hx      Reviewed and updated as needed this visit by Provider         ROS:  Constitutional, HEENT, cardiovascular, pulmonary, gi and gu systems are negative, except as otherwise noted.    OBJECTIVE:     /60 (BP Location: Left arm, Patient Position: Chair, Cuff Size: Adult Regular)  Pulse 76  Temp 99  F (37.2  C) (Oral)  Resp 20  Wt 208 lb 14.4 oz (94.8 kg)  SpO2 96%  BMI 30.85 kg/m2  Body mass index is 30.85 kg/(m^2).  GENERAL: healthy, alert and no distress  EYES: Eyes grossly normal to inspection, PERRL and conjunctivae and sclerae normal  HENT: ear canals and TM's normal, nose and mouth without ulcers or lesions, no facial tenderness  NECK: no adenopathy, no asymmetry, masses, or scars and thyroid normal to palpation. Submandibular glands mildly swollen.   RESP: lungs clear to auscultation - no rales, rhonchi or wheezes  CV: regular rate and rhythm, normal S1 S2, no S3 or S4, no murmur, click or rub, no peripheral edema and peripheral pulses strong  ABDOMEN: no masses and bowel sounds normal  NEURO: Normal strength and tone, mentation intact and speech normal  PSYCH: mentation appears normal, affect normal/bright    Diagnostic Test Results:  Results  for orders placed or performed in visit on 05/11/18 (from the past 24 hour(s))   Strep, Rapid Screen   Result Value Ref Range    Specimen Description Throat     Rapid Strep A Screen       NEGATIVE: No Group A streptococcal antigen detected by immunoassay, await culture report.   Mononucleosis screen   Result Value Ref Range    Mononucleosis Screen Negative NEG^Negative       ASSESSMENT/PLAN:     1. Throat pain  Patient has 6 day history of URI symptoms. Labs were negative for strep and mononucleosis. Discussed supportive cares and provided patient with educational materials.   - Strep, Rapid Screen  - Beta strep group A culture  - Mononucleosis screen  - acetaminophen-codeine 120-12 MG/5ML solution; Take 5 mLs by mouth every 6 hours as needed for moderate pain  Dispense: 90 mL; Refill: 0    Follow up with clinic as needed or sooner if conditions change, worsen or fail to improve as expected.      Vipul Haley PA-C  [facility]

## 2018-05-11 NOTE — MR AVS SNAPSHOT
After Visit Summary   5/11/2018    Batool Louis    MRN: [id number]           Patient Information     Date Of Birth          2000        Visit Information        Provider Department      5/11/2018 1:00 PM Vipul Haley PA-C [facility]        Today's Diagnoses     Throat pain    -  1      Care Instructions      Self-Care for Sore Throats    Sore throats happen for many reasons, such as colds, allergies, and infections caused by viruses or bacteria. In any case, your throat becomes red and sore. Your goal for self-care is to reduce your discomfort while giving your throat a chance to heal.  Moisten and soothe your throat  Tips include the following:    Try a sip of water first thing after waking up.    Keep your throat moist by drinking 6 or more glasses of clear liquids every day.    Run a cool-air humidifier in your room overnight.    Avoid cigarette smoke.     Suck on throat lozenges, cough drops, hard candy, ice chips, or frozen fruit-juice bars. Use the sugar-free versions if your diet or medical condition requires them.  Gargle to ease irritation  Gargling every hour or 2 can ease irritation. Try gargling with 1 of these solutions:    1/4 teaspoon of salt in 1/2 cup of warm water    An over-the-counter anesthetic gargle  Use medicine for more relief  Over-the-counter medicine can reduce sore throat symptoms. Ask your pharmacist if you have questions about which medicine to use:    Ease pain with anesthetic sprays. Aspirin or an aspirin substitute also helps. Remember, never give aspirin to anyone 18 or younger, or if you are already taking blood thinners.     For sore throats caused by allergies, try antihistamines to block the allergic reaction.    Remember: unless a sore throat is caused by a bacterial infection, antibiotics won t help you.  Prevent future sore throats  Prevention tips include the following:    Stop smoking or reduce contact with secondhand smoke.  Smoke irritates the tender throat lining.    Limit contact with pets and with allergy-causing substances, such as pollen and mold.    When you re around someone with a sore throat or cold, wash your hands often to keep viruses or bacteria from spreading.    Don t strain your vocal cords.  Call your healthcare provider  Contact your healthcare provider if you have:    A temperature over 101 F (38.3 C)    White spots on the throat    Great difficulty swallowing    Trouble breathing    A skin rash    Recent exposure to someone else with strep bacteria    Severe hoarseness and swollen glands in the neck or jaw   Date Last Reviewed: 8/1/2016 2000-2017 [employer]. [address] [place] [address]. All rights reserved. This information is not intended as a substitute for professional medical care. Always follow your healthcare professional's instructions.                Follow-ups after your visit        Your next 10 appointments already scheduled     May 21, 2018 10:30 AM CDT   Return Visit with Celsa Linda [facility] ([facility]    [address] [address]   [phone]            Jun 11, 2018  9:30 AM CDT   Return Visit with Celsa Linda [facility] ([facility])    [address] [address]   [phone]            Jun 25, 2018  9:30 AM CDT   Return Visit with Celsa Linda LPC   [facility] ([facility])    [address] [address]   [phone]            Jul 16, 2018  9:30 AM CDT   Return Visit with Celsa Linda [facility] ([facility])    [address] [address]   [phone]            Aug 13, 2018  9:30 AM CDT   Return Visit with Celsa Linda [facility] ([facility])    [address] [address]   [phone]            Aug 27, 2018  9:30 AM CDT   Return Visit with Celsa Linda LPC   [facility] ([facility])    [address] [place] [address] [phone]              Who to contact     If you have questions or need follow up information about today's clinic visit or your schedule please contact [facility] directly at [phone].  Normal or non-critical lab and imaging results will be communicated to you by MyChart, letter or phone within 4 business days after the clinic has received the results. If you do not hear from us within 7 days, please contact the clinic through [employer] or phone. If you have a critical or abnormal lab result, we will notify you by phone as soon as possible.  Submit refill requests through [employer] or call your pharmacy and they will forward the refill request to us. Please allow 3 business days for your refill to be completed.          Additional Information About Your Visit        [employer] Information     [employer] lets you send messages to your doctor, view your test results, renew your prescriptions, schedule appointments and more. To sign up, go to [email or web address] . Click on \"Log in\" on the left side of the screen, which will take you to the Welcome page. Then click on \"Sign up Now\" on the right side of the page.     You will be asked to enter the access code listed below, as well as some personal information. Please follow the directions to create your username and password.     Your access code is: [email or web address]  Expires: 2018 11:22 AM     Your access code will  in 90 days. If you need help or a new code, please call your [place] clinic or [phone].        Care EveryWhere ID     This is your Care EveryWhere ID. This could be used by other organizations to access your [place] medical records  [id number]        Your Vitals Were     Pulse Temperature " Respirations Pulse Oximetry BMI (Body Mass Index)       76 99  F (37.2  C) (Oral) 20 96% 30.85 kg/m2        Blood Pressure from Last 3 Encounters:   05/11/18 110/60   01/18/18 116/80   01/05/18 108/78    Weight from Last 3 Encounters:   05/11/18 208 lb 14.4 oz (94.8 kg) (98 %)*   01/18/18 224 lb 8 oz (101.8 kg) (99 %)*   01/05/18 218 lb 11.2 oz (99.2 kg) (99 %)*     * Growth percentiles are based on [facility] 2-20 Years data.              We Performed the Following     Beta strep group A culture     Mononucleosis screen     Strep, Rapid Screen          Today's Medication Changes          These changes are accurate as of 5/11/18  1:28 PM.  If you have any questions, ask your nurse or doctor.               Start taking these medicines.        Dose/Directions    acetaminophen-codeine 120-12 MG/5ML solution   Used for:  Throat pain   Started by:  Vipul Haley PA-C        Dose:  5 mL   Take 5 mLs by mouth every 6 hours as needed for moderate pain   Quantity:  90 mL   Refills:  0            Where to get your medicines      Some of these will need a paper prescription and others can be bought over the counter.  Ask your nurse if you have questions.     Bring a paper prescription for each of these medications     acetaminophen-codeine 120-12 MG/5ML solution               Information about OPIOIDS     PRESCRIPTION OPIOIDS: WHAT YOU NEED TO KNOW   You have a prescription for an opioid (narcotic) pain medicine. Opioids can cause addiction. If you have a history of chemical dependency of any type, you are at a higher risk of becoming addicted to opioids. Only take this medicine after all other options have been tried. Take it for as short a time and as few doses as possible.     Do not:    Drive. If you drive while taking these medicines, you could be arrested for driving under the influence (DUI).    Operate heavy machinery    Do any other dangerous activities while taking these medicines.     Drink any alcohol while taking  these medicines.      Take with any other medicines that contain acetaminophen. Read all labels carefully. Look for the word  acetaminophen  or  Tylenol.  Ask your pharmacist if you have questions or are unsure.    Store your pills in a secure place, locked if possible. We will not replace any lost or stolen medicine. If you don t finish your medicine, please throw away (dispose) as directed by your pharmacist. The [place] Pollution Control Agency has more information about safe disposal: https://www.pca.[email or web address].[place].us/living-green/managing-unwanted-medications    All opioids tend to cause constipation. Drink plenty of water and eat foods that have a lot of fiber, such as fruits, vegetables, prune juice, apple juice and high-fiber cereal. Take a laxative (Miralax, milk of magnesia, Colace, Senna) if you don t move your bowels at least every other day.          Primary Care Provider Office Phone # Fax #    RONNY Vaz [phone]       [address] [facility] [address]        Equal Access to Services     MICHELLE AGUDELO : Hadii geni ku hadasho Soomaali, waaxda luqadaha, qaybta kaalmada adeegyablair, romeo valenzuela . So [facility] [phone].    ATENCIÓN: Si habla español, tiene a david disposición servicios gratuitos de asistencia lingüística. Llame al [phone].    We comply with applicable federal civil rights laws and [place] laws. We do not discriminate on the basis of race, color, national origin, age, disability, sex, sexual orientation, or gender identity.            Thank you!     Thank you for choosing [facility]  for your care. Our goal is always to provide you with excellent care. Hearing back from our patients is one way we can continue to improve our services. Please take a few minutes to complete the written survey that you may receive in the mail after your visit with us. Thank you!             Your Updated Medication List - Protect others around  you: Learn how to safely use, store and throw away your medicines at www.disposemymeds.org.          This list is accurate as of 5/11/18  1:28 PM.  Always use your most recent med list.                   Brand Name Dispense Instructions for use Diagnosis    acetaminophen-codeine 120-12 MG/5ML solution     90 mL    Take 5 mLs by mouth every 6 hours as needed for moderate pain    Throat pain       cetirizine 10 MG tablet    zyrTEC     Take 10 mg by mouth Reported on 3/7/2017        D 5000 5000 units Tabs   Generic drug:  Cholecalciferol      Reported on 3/7/2017        ibuprofen 200 MG tablet    ADVIL/MOTRIN     Take 200 mg by mouth Reported on 3/7/2017        MULTI-VITAMINS Tabs      Take 1 tablet by mouth        propranolol 20 MG tablet    INDERAL     Take 20 mg by mouth Reported on 3/7/2017

## 2018-05-13 LAB
BACTERIA SPEC CULT: NORMAL
SPECIMEN SOURCE: NORMAL

## 2018-05-21 ENCOUNTER — OFFICE VISIT (OUTPATIENT)
Dept: PSYCHOLOGY | Facility: CLINIC | Age: 18
End: 2018-05-21
Payer: COMMERCIAL

## 2018-05-21 DIAGNOSIS — F43.22 ADJUSTMENT DISORDER WITH ANXIETY: Primary | ICD-10-CM

## 2018-05-21 PROCEDURE — 90837 PSYTX W PT 60 MINUTES: CPT | Performed by: COUNSELOR
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
## 2018-05-21 NOTE — MR AVS SNAPSHOT
MRN:[id number]                      After Visit Summary   5/21/2018    Batool Louis    MRN: [id number]           Visit Information        Provider Department      5/21/2018 10:30 AM Celsa Linda, [facility]      Your next 10 appointments already scheduled     Jun 11, 2018  9:30 AM CDT   Return Visit with Celsa Linda [facility] ([facility])    [address]  [facility] [address]   [phone]            Jun 25, 2018  9:30 AM CDT   Return Visit with Celsa Linda [facility] ([facility])    [address]  [place] [address]   [phone]            Jul 16, 2018  9:30 AM CDT   Return Visit with Celsa Linda [facility] ([facility])    [address]  [facility] [address]   [phone]            Aug 06, 2018 10:30 AM CDT   Return Visit with Celsa Linda [facility] ([facility])    [address]  [place] [address]   [phone]            Aug 13, 2018  9:30 AM CDT   Return Visit with Celsa Linda [facility] ([facility] [place])    [address]  [facility] [address]   [phone]            Aug 20, 2018 10:30 AM CDT   Return Visit with Celsa Linda [facility] ([facility] [place])    [address]  [facility] [address]   [phone]            Aug 27, 2018  9:30 AM CDT   Return Visit with Celsa Linda [facility] ([facility] [place])    [address]  [facility] [address]   [phone]              MyChart Information     MyChart lets you send messages to your doctor, view your test results, renew your prescriptions, schedule appointments and more. To sign up, go to  "[email or web address] . Click on \"Log in\" on the left side of the screen, which will take you to the Welcome page. Then click on \"Sign up Now\" on the right side of the page.     You will be asked to enter the access code listed below, as well as some personal information. Please follow the directions to create your username and password.     Your access code is: [email or web address]  Expires: 2018 11:22 AM     Your access code will  in 90 days. If you need help or a new code, please call your [place] clinic or [phone].        Care EveryWhere ID     This is your Care EveryWhere ID. This could be used by other organizations to access your [place] medical records  [id number]        Equal Access to Services     MICHELLE AGUDELO : Maggie Tillman, dilcia palma, dagmar alvarado, romeo vallejo. So [facility] [phone].    ATENCIÓN: Si habla español, tiene a david disposición servicios gratuitos de asistencia lingüística. Llame al [phone].    We comply with applicable federal civil rights laws and [place] laws. We do not discriminate on the basis of race, color, national origin, age, disability, sex, sexual orientation, or gender identity.            "

## 2018-06-07 ENCOUNTER — OFFICE VISIT (OUTPATIENT)
Dept: FAMILY MEDICINE | Facility: CLINIC | Age: 18
End: 2018-06-07
Payer: COMMERCIAL

## 2018-06-07 VITALS
TEMPERATURE: 97.6 F | WEIGHT: 208.38 LBS | DIASTOLIC BLOOD PRESSURE: 82 MMHG | RESPIRATION RATE: 16 BRPM | HEART RATE: 129 BPM | BODY MASS INDEX: 30.77 KG/M2 | OXYGEN SATURATION: 100 % | SYSTOLIC BLOOD PRESSURE: 118 MMHG

## 2018-06-07 DIAGNOSIS — R07.0 THROAT PAIN: ICD-10-CM

## 2018-06-07 DIAGNOSIS — J02.0 STREP THROAT: ICD-10-CM

## 2018-06-07 LAB
DEPRECATED S PYO AG THROAT QL EIA: ABNORMAL
SPECIMEN SOURCE: ABNORMAL

## 2018-06-07 PROCEDURE — 99213 OFFICE O/P EST LOW 20 MIN: CPT | Performed by: FAMILY MEDICINE

## 2018-06-07 PROCEDURE — 87880 STREP A ASSAY W/OPTIC: CPT | Performed by: FAMILY MEDICINE

## 2018-06-07 RX ORDER — PENICILLIN V POTASSIUM 500 MG/1
500 TABLET, FILM COATED ORAL 2 TIMES DAILY
Qty: 20 TABLET | Refills: 0 | Status: SHIPPED | OUTPATIENT
Start: 2018-06-07 | End: 2018-08-27

## 2018-06-07 ASSESSMENT — PAIN SCALES - GENERAL: PAINLEVEL: SEVERE PAIN (6)

## 2018-06-07 NOTE — PROGRESS NOTES
SUBJECTIVE:   Batool Louis is a 17 year old female who presents to clinic today for the following health issues:      RESPIRATORY SYMPTOMS      Duration: 3 days     Description  sore throat, fever, chills, ear pain right, nausea, stomach ache and diarrhea.  Hurts to swallow    Severity: moderate    Accompanying signs and symptoms: None    History (predisposing factors):  strep exposure    Precipitating or alleviating factors: None    Therapies tried and outcome:  rest and fluids oral decongestant acetaminophen      PROBLEMS TO ADD ON...  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  He just finished his last dose of antibiotics yesterday so was probably contagious about 7-10 days ago.  She started developing sore throat and fever 2-3 days ago and had fever up to 102.  This morning her fever broke but she is complaining of right ear pain nausea and had some diarrhea and vomiting this morning.  She feels miserable.  She has not had any cough congestion or runny nose.    Problem list and histories reviewed & adjusted, as indicated.  Additional history: as documented    There is no problem list on file for this patient.    No past surgical history on file.    Social History   Substance Use Topics     Smoking status: Never Smoker     Smokeless tobacco: Never Used     Alcohol use No     No family history on file.      Allergies   Allergen Reactions     Sulfamethoxazole-Trimethoprim Hives     Recent Labs   Lab Test  01/05/18   1618   TSH  1.37      BP Readings from Last 3 Encounters:   06/07/18 118/82   05/11/18 110/60   01/18/18 116/80    Wt Readings from Last 3 Encounters:   06/07/18 208 lb 6 oz (94.5 kg) (98 %)*   05/11/18 208 lb 14.4 oz (94.8 kg) (98 %)*   01/18/18 224 lb 8 oz (101.8 kg) (99 %)*     * Growth percentiles are based on CDC 2-20 Years data.                    Reviewed and updated as needed this visit by clinical staff  Allergies  Meds       Reviewed and updated as needed this  visit by Provider         ROS:  Constitutional, HEENT, cardiovascular, pulmonary, gi and gu systems are negative, except as otherwise noted.    OBJECTIVE:     /82 (BP Location: Right arm, Patient Position: Sitting, Cuff Size: Adult Regular)  Pulse 129  Temp 97.6  F (36.4  C) (Temporal)  Resp 16  Wt 208 lb 6 oz (94.5 kg)  SpO2 100%  Breastfeeding? No  BMI 30.77 kg/m2  Body mass index is 30.77 kg/(m^2).  GENERAL: healthy, alert and no distress  EYES: Eyes grossly normal to inspection, PERRL and conjunctivae and sclerae normal  HENT: normal cephalic/atraumatic, ear canals and TM's normal and there is no discharge from her nose.  Her oropharynx is bright red with beefy red tonsils and 3+ tonsillar hypertrophy without exudate.  NECK:  1-2 cm submandibular adenopathy that is tender bilaterally  RESP: lungs clear to auscultation - no rales, rhonchi or wheezes  CV: regular rate and rhythm, normal S1 S2, no S3 or S4, no murmur, click or rub, no peripheral edema and peripheral pulses strong  ABDOMEN: soft, nontender, no hepatosplenomegaly, no masses and bowel sounds normal    Diagnostic Test Results:  Results for orders placed or performed in visit on 06/07/18 (from the past 24 hour(s))   Strep, Rapid Screen   Result Value Ref Range    Specimen Description Throat     Rapid Strep A Screen (A)      POSITIVE: Group A Streptococcal antigen detected by immunoassay.       ASSESSMENT/PLAN:   (J02.0) Strep throat  (R07.0) Throat pain  Comment: Classic symptoms and presentation for strep pharyngitis.  The lab had not run her strep test yet so I treated her as a presumed strep pharyngitis.  Her rapid strep test did come back positive eventually.  Plan: Strep, Rapid Screen, penicillin V potassium         (VEETID) 500 MG tablet        I started on Pen-Vee K 500 mg twice daily for 10 days.  She is aware that she will be contagious until on antibiotics for 24 hours and will avoid sharing anything that she puts in her mouth  "with others to avoid spreading this to somebody else.  I did warn her that strep intubation is 7-10 days of other people around her start developing sore throat they need to be tested themselves.  She can use ibuprofen 1600 mg p.o. 3 times daily with food for throat discomfort.  She will follow-up if not improving.    BP Screening:   Last 3 BP Readings:    BP Readings from Last 3 Encounters:   06/07/18 118/82   05/11/18 110/60   01/18/18 116/80       The following was recommended to the patient:  Re-screen BP within a year and recommended lifestyle modifications  Tobacco Cessation:   reports that she has never smoked. She has never used smokeless tobacco.      BMI:   Estimated body mass index is 30.77 kg/(m^2) as calculated from the following:    Height as of 1/5/18: 5' 9\" (1.753 m).    Weight as of this encounter: 208 lb 6 oz (94.5 kg).   Weight management plan: Not discussed today     Electronically signed by:  Harry Lu M.D.  6/7/2018    "

## 2018-06-07 NOTE — MR AVS SNAPSHOT
After Visit Summary   6/7/2018    Batool Louis    MRN: [id number]           Patient Information     Date Of Birth          2000        Visit Information        Provider Department      6/7/2018 11:50 AM Harry Lu MD [facility]        Today's Diagnoses     Strep throat        Throat pain           Follow-ups after your visit        Your next 10 appointments already scheduled     Jun 11, 2018  9:30 AM CDT   Return Visit with Celsa Linda [facility] ([facility])    [address]  [place] [address]   [phone]            Jun 25, 2018  9:30 AM CDT   Return Visit with Celsa Linda [facility] ([facility] [place])    [address]  [place] [address]   [phone]            Jul 16, 2018  9:30 AM CDT   Return Visit with Celsa Linda [facility] ([facility] [place])    [address]  [place] [address]   [phone]            Aug 06, 2018 10:30 AM CDT   Return Visit with Celsa Linda [facility] [place] ([facility] [place])    [address]  [place] [address]   [phone]            Aug 13, 2018  9:30 AM CDT   Return Visit with Celsa Linda [facility] [place] ([facility] [place])    [address]  [place] [address]   [phone]            Aug 20, 2018 10:30 AM CDT   Return Visit with Celsa Linda [facility] ([facility] [place])    [address]  [place] [address]   [phone]            Aug 27, 2018  9:30 AM CDT   Return Visit with Celsa Linda [facility] ([facility] [place])    [address]  [place] [address] [phone]              Who to contact     If you have questions or need follow up information about today's  clinic visit or your schedule please contact [facility] directly at [phone].  Normal or non-critical lab and imaging results will be communicated to you by MyChart, letter or phone within 4 business days after the clinic has received the results. If you do not hear from us within 7 days, please contact the clinic through MyChart or phone. If you have a critical or abnormal lab result, we will notify you by phone as soon as possible.  Submit refill requests through [employer] or call your pharmacy and they will forward the refill request to us. Please allow 3 business days for your refill to be completed.          Additional Information About Your Visit        Care EveryWhere ID     This is your Care EveryWhere ID. This could be used by other organizations to access your [facility] medical records  [id number]        Your Vitals Were     Pulse Temperature Respirations Pulse Oximetry Breastfeeding? BMI (Body Mass Index)    129 97.6  F (36.4  C) (Temporal) 16 100% No 30.77 kg/m2       Blood Pressure from Last 3 Encounters:   06/07/18 118/82   05/11/18 110/60   01/18/18 116/80    Weight from Last 3 Encounters:   06/07/18 208 lb 6 oz (94.5 kg) (98 %)*   05/11/18 208 lb 14.4 oz (94.8 kg) (98 %)*   01/18/18 224 lb 8 oz (101.8 kg) (99 %)*     * Growth percentiles are based on CDC 2-20 Years data.              We Performed the Following     Strep, Rapid Screen          Today's Medication Changes          These changes are accurate as of 6/7/18  2:14 PM.  If you have any questions, ask your nurse or doctor.               Start taking these medicines.        Dose/Directions    penicillin V potassium 500 MG tablet   Commonly known as:  VEETID   Used for:  Throat pain, Strep throat        Dose:  500 mg   Take 1 tablet (500 mg) by mouth 2 times daily   Quantity:  20 tablet   Refills:  0            Where to get your medicines      These medications were sent to [id number] - [address]   [address] [facility] [address]    Hours:  M-F 8:30-6:30; Sat 9-4; closed Sunday Phone:  [phone]     penicillin V potassium 500 MG tablet                Primary Care Provider Office Phone # Fax #    RONNY Vaz [phone]       [address] [facility], located within [facility] [address]        Equal Access to Services     MICHELLE AGUDELO AH: Hadii aad ku hadasho Soomaali, waaxda luqadaha, qaybta kaalmada adeegyada, waxay idiin hayaan adeeg kharash la'aan ah. So [facility] [phone].    ATENCIÓN: Si diana webb, tiene a david disposición servicios gratuitos de asistencia lingüística. Llame al [phone].    We comply with applicable federal civil rights laws and [place] laws. We do not discriminate on the basis of race, color, national origin, age, disability, sex, sexual orientation, or gender identity.            Thank you!     Thank you for choosing [facility]  for your care. Our goal is always to provide you with excellent care. Hearing back from our patients is one way we can continue to improve our services. Please take a few minutes to complete the written survey that you may receive in the mail after your visit with us. Thank you!             Your Updated Medication List - Protect others around you: Learn how to safely use, store and throw away your medicines at www.disposemymeds.org.          This list is accurate as of 6/7/18  2:14 PM.  Always use your most recent med list.                   Brand Name Dispense Instructions for use Diagnosis    acetaminophen-codeine 120-12 MG/5ML solution     90 mL    Take 5 mLs by mouth every 6 hours as needed for moderate pain    Throat pain       cetirizine 10 MG tablet    zyrTEC     Take 10 mg by mouth Reported on 3/7/2017        D 5000 5000 units Tabs   Generic drug:  Cholecalciferol      Reported on 3/7/2017        ibuprofen 200 MG tablet    ADVIL/MOTRIN     Take 200 mg by mouth Reported on 3/7/2017        MULTI-VITAMINS Tabs      Take 1 tablet by  mouth        penicillin V potassium 500 MG tablet    VEETID    20 tablet    Take 1 tablet (500 mg) by mouth 2 times daily    Throat pain, Strep throat       propranolol 20 MG tablet    INDERAL     Take 20 mg by mouth Reported on 3/7/2017

## 2018-06-11 ENCOUNTER — OFFICE VISIT (OUTPATIENT)
Dept: PSYCHOLOGY | Facility: CLINIC | Age: 18
End: 2018-06-11
Payer: COMMERCIAL

## 2018-06-11 DIAGNOSIS — F43.22 ADJUSTMENT DISORDER WITH ANXIETY: Primary | ICD-10-CM

## 2018-06-11 PROCEDURE — 90837 PSYTX W PT 60 MINUTES: CPT | Performed by: COUNSELOR

## 2018-06-11 ASSESSMENT — ANXIETY QUESTIONNAIRES
5. BEING SO RESTLESS THAT IT IS HARD TO SIT STILL: NOT AT ALL
7. FEELING AFRAID AS IF SOMETHING AWFUL MIGHT HAPPEN: NOT AT ALL
3. WORRYING TOO MUCH ABOUT DIFFERENT THINGS: NOT AT ALL
6. BECOMING EASILY ANNOYED OR IRRITABLE: SEVERAL DAYS
1. FEELING NERVOUS, ANXIOUS, OR ON EDGE: SEVERAL DAYS
2. NOT BEING ABLE TO STOP OR CONTROL WORRYING: SEVERAL DAYS
IF YOU CHECKED OFF ANY PROBLEMS ON THIS QUESTIONNAIRE, HOW DIFFICULT HAVE THESE PROBLEMS MADE IT FOR YOU TO DO YOUR WORK, TAKE CARE OF THINGS AT HOME, OR GET ALONG WITH OTHER PEOPLE: SOMEWHAT DIFFICULT
GAD7 TOTAL SCORE: 4

## 2018-06-11 ASSESSMENT — PATIENT HEALTH QUESTIONNAIRE - PHQ9: 5. POOR APPETITE OR OVEREATING: SEVERAL DAYS

## 2018-06-11 NOTE — MR AVS SNAPSHOT
MRN:[id number]                      After Visit Summary   6/11/2018    Batool Louis    MRN: [id number]           Visit Information        Provider Department      6/11/2018 9:30 AM Celsa Linda, [facility] Generic      Your next 10 appointments already scheduled     Jun 25, 2018  9:30 AM CDT   Return Visit with Celsa Linda [facility] ([facility])    [address]  [facility] [address]   [phone]            Jul 16, 2018  9:30 AM CDT   Return Visit with Celsa Linda [facility] ([facility])    [address]  [facility] [address]   [phone]            Aug 06, 2018 10:30 AM CDT   Return Visit with Celsa Linda [facility] ([facility])    [address]  [facility] [address]   [phone]            Aug 13, 2018  9:30 AM CDT   Return Visit with Celsa Linda [facility] ([facility])    [address]  [facility] [address]   [phone]            Aug 20, 2018 10:30 AM CDT   Return Visit with Celsa Linda [facility] ([facility])    [address]  [facility] [address]   [phone]            Aug 27, 2018  9:30 AM CDT   Return Visit with Celsa Linda [facility] ([facility])    [address]  [facility] [address]   [phone]              Care EveryWhere ID     This is your Care EveryWhere ID. This could be used by other organizations to access your [facility] medical records  [id number]        Equal Access to Services     MICHELLE AGUDELO AH: Maggie hagen Soconor, waaxda luqadaha, qaybta kaalmada adejohn, romeo vallejo. [facility] [phone].    ATENCIÓN: Si blake reyes  david disposición servicios gratuitos de asistencia lingüística. Llame al [phone].    We comply with applicable federal civil rights laws and [place] laws. We do not discriminate on the basis of race, color, national origin, age, disability, sex, sexual orientation, or gender identity.
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
## 2018-06-12 ASSESSMENT — ANXIETY QUESTIONNAIRES: GAD7 TOTAL SCORE: 4

## 2018-06-12 ASSESSMENT — PATIENT HEALTH QUESTIONNAIRE - PHQ9: SUM OF ALL RESPONSES TO PHQ QUESTIONS 1-9: 9

## 2018-06-25 ENCOUNTER — OFFICE VISIT (OUTPATIENT)
Dept: PSYCHOLOGY | Facility: CLINIC | Age: 18
End: 2018-06-25
Payer: COMMERCIAL

## 2018-06-25 DIAGNOSIS — F43.22 ADJUSTMENT DISORDER WITH ANXIETY: Primary | ICD-10-CM

## 2018-06-25 PROCEDURE — 90834 PSYTX W PT 45 MINUTES: CPT | Performed by: COUNSELOR

## 2018-06-25 NOTE — MR AVS SNAPSHOT
MRN:[id number]                      After Visit Summary   6/25/2018    Batool Louis    MRN: [id number]           Visit Information        Provider Department      6/25/2018 9:30 AM Celsa Linda [facility] Generic      Your next 10 appointments already scheduled     Jul 16, 2018  9:30 AM CDT   Return Visit with Celsa Linda [facility] ([facility])    [address]  [facility] [address]   [phone]            Aug 13, 2018  9:30 AM CDT   Return Visit with Celsa Linda [facility] ([facility])    [address]  [facility] [address]   [phone]            Aug 20, 2018 10:30 AM CDT   Return Visit with Celsa Linda [facility] ([facility])    [address] [facility] [address]   [phone]            Aug 27, 2018  9:30 AM CDT   Return Visit with Celsa Linda [facility] ([facility])    [address]  [facility] [address]   [phone]              Care EveryWhere ID     This is your Care EveryWhere ID. This could be used by other organizations to access your [facility] medical records  [id number]        Equal Access to Services     MICHELLE AGUDELO AH: Hadii geni nazario hadkyawo Soconor, waaxda luqadaha, qaybta kaalmada christiano, waxsusan niharika valenzuela . So [facility] [place] [facility] [phone].    ATENCIÓN: Si habla español, tiene a david disposición servicios gratuitos de asistencia lingüística. Malissa al [phone].    We comply with applicable federal civil rights laws and [place] laws. We do not discriminate on the basis of race, color, national origin, age, disability, sex, sexual orientation, or gender identity.
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
## 2018-07-16 ENCOUNTER — OFFICE VISIT (OUTPATIENT)
Dept: PSYCHOLOGY | Facility: CLINIC | Age: 18
End: 2018-07-16
Payer: COMMERCIAL

## 2018-07-16 DIAGNOSIS — F43.22 ADJUSTMENT DISORDER WITH ANXIETY: Primary | ICD-10-CM

## 2018-07-16 PROCEDURE — 90832 PSYTX W PT 30 MINUTES: CPT | Performed by: COUNSELOR

## 2018-07-16 NOTE — MR AVS SNAPSHOT
MRN:[id number]                      After Visit Summary   7/16/2018    Batool Louis    MRN: [id number]           Visit Information        Provider Department      7/16/2018 9:30 AM Celsa Linda, [facility] Generic      Your next 10 appointments already scheduled     Aug 13, 2018  9:30 AM CDT   Return Visit with Celsa Linda [facility] ([facility])    [address]  [facility] [address]   [phone]            Aug 20, 2018 10:30 AM CDT   Return Visit with Celsa Kerrieadele Linda [facility] ([facility])    [address]  [facility] [address]   [phone]            Aug 27, 2018  9:30 AM CDT   Return Visit with Celsa Linda [facility] ([facility])    [address] [facility] [address]   [phone]              Care EveryWhere ID     This is your Care EveryWhere ID. This could be used by other organizations to access your [facility] medical records  [id number]        Equal Access to Services     MICHELLE AGUDELO AH: Hadii geni nazario hadkyawo Soconor, waaxda luqadaha, qaybta kaalmada adeegyada, romeo vallejo. So [facility] [phone].    ATENCIÓN: Si habla español, tiene a david disposición servicios gratdivinaos de asistencia lingüística. Malissa al [phone].    We comply with applicable federal civil rights laws and [place] laws. We do not discriminate on the basis of race, color, national origin, age, disability, sex, sexual orientation, or gender identity.
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
## 2018-08-13 ENCOUNTER — OFFICE VISIT (OUTPATIENT)
Dept: PSYCHOLOGY | Facility: CLINIC | Age: 18
End: 2018-08-13
Payer: COMMERCIAL

## 2018-08-13 DIAGNOSIS — F43.22 ADJUSTMENT DISORDER WITH ANXIETY: Primary | ICD-10-CM

## 2018-08-13 PROCEDURE — 90837 PSYTX W PT 60 MINUTES: CPT | Performed by: COUNSELOR

## 2018-08-13 ASSESSMENT — PATIENT HEALTH QUESTIONNAIRE - PHQ9
10. IF YOU CHECKED OFF ANY PROBLEMS, HOW DIFFICULT HAVE THESE PROBLEMS MADE IT FOR YOU TO DO YOUR WORK, TAKE CARE OF THINGS AT HOME, OR GET ALONG WITH OTHER PEOPLE: SOMEWHAT DIFFICULT
SUM OF ALL RESPONSES TO PHQ QUESTIONS 1-9: 7
SUM OF ALL RESPONSES TO PHQ QUESTIONS 1-9: 7

## 2018-08-13 ASSESSMENT — ANXIETY QUESTIONNAIRES
6. BECOMING EASILY ANNOYED OR IRRITABLE: SEVERAL DAYS
GAD7 TOTAL SCORE: 6
GAD7 TOTAL SCORE: 6
1. FEELING NERVOUS, ANXIOUS, OR ON EDGE: SEVERAL DAYS
7. FEELING AFRAID AS IF SOMETHING AWFUL MIGHT HAPPEN: SEVERAL DAYS
2. NOT BEING ABLE TO STOP OR CONTROL WORRYING: SEVERAL DAYS
7. FEELING AFRAID AS IF SOMETHING AWFUL MIGHT HAPPEN: SEVERAL DAYS
3. WORRYING TOO MUCH ABOUT DIFFERENT THINGS: SEVERAL DAYS
GAD7 TOTAL SCORE: 6
4. TROUBLE RELAXING: SEVERAL DAYS
5. BEING SO RESTLESS THAT IT IS HARD TO SIT STILL: NOT AT ALL

## 2018-08-13 NOTE — MR AVS SNAPSHOT
MRN:[id number]                      After Visit Summary   8/13/2018    Batool Louis    MRN: [id number]           Visit Information        Provider Department      8/13/2018 9:30 AM Celsa Linda LPC [facility] Generic      Your next 10 appointments already scheduled     Aug 20, 2018 10:30 AM CDT   Return Visit with Celsa Sy English [facility] ([facility])    [address]  [facility] [address]   [phone]            Aug 27, 2018  9:30 AM CDT   Return Visit with Celsa Sy  [facility] ([facility])    [address]  [facility] [address] [phone]              Care EveryWhere ID     This is your Care EveryWhere ID. This could be used by other organizations to access your [facility] medical records  [id number]        Equal Access to Services     MICHELLE AGUDELO AH: Hadii geni palmao Soconor, waaxda luqadaha, qaybta kaalmada adeegyada, romeo vallejo. So [facility] [phone].    ATENCIÓN: Si habla español, tiene a david disposición servicios gratuitos de asistencia lingüística. Llame al [phone].    We comply with applicable federal civil rights laws and [place] laws. We do not discriminate on the basis of race, color, national origin, age, disability, sex, sexual orientation, or gender identity.
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
## 2018-08-14 ASSESSMENT — PATIENT HEALTH QUESTIONNAIRE - PHQ9: SUM OF ALL RESPONSES TO PHQ QUESTIONS 1-9: 7

## 2018-08-14 ASSESSMENT — ANXIETY QUESTIONNAIRES: GAD7 TOTAL SCORE: 6

## 2018-08-20 ENCOUNTER — OFFICE VISIT (OUTPATIENT)
Dept: PSYCHOLOGY | Facility: CLINIC | Age: 18
End: 2018-08-20
Payer: COMMERCIAL

## 2018-08-20 DIAGNOSIS — F43.22 ADJUSTMENT DISORDER WITH ANXIETY: Primary | ICD-10-CM

## 2018-08-20 PROCEDURE — 90837 PSYTX W PT 60 MINUTES: CPT | Performed by: COUNSELOR

## 2018-08-20 NOTE — MR AVS SNAPSHOT
MRN:[id number]                      After Visit Summary   8/20/2018    Batool Louis    MRN: [id number]           Visit Information        Provider Department      8/20/2018 10:30 AM Celsa Linda LPC [facility] Generic      Your next 10 appointments already scheduled     Aug 27, 2018  9:30 AM CDT   Return Visit with Celsa Linda LPC   [facility] ([facility])    [address]  [place] [address] [phone]            Aug 27, 2018 11:20 AM CDT   Office Visit with RONNY Vaz CNP   [facility]    [address] [facility] [address] [phone]           Bring a current list of meds and any records pertaining to this visit. For Physicals, please bring immunization records and any forms needing to be filled out. Please arrive 10 minutes early to complete paperwork.              Care EveryWhere ID     This is your Care EveryWhere ID. This could be used by other organizations to access your [facility] medical records  [id number]        Equal Access to Services     MICHELLE AGUDELO AH: Hadjorge Tillman, wabridgett palma, qaamrik alvarado, romeo vallejo. So [facility] [phone].    ATENCIÓN: Si habla español, tiene a david disposición servicios gratdivinaos de asistencia lingüística. Malissa al [phone].    We comply with applicable federal civil rights laws and [place] laws. We do not discriminate on the basis of race, color, national origin, age, disability, sex, sexual orientation, or gender identity.
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
## 2018-08-27 ENCOUNTER — OFFICE VISIT (OUTPATIENT)
Dept: PSYCHOLOGY | Facility: CLINIC | Age: 18
End: 2018-08-27
Payer: COMMERCIAL

## 2018-08-27 ENCOUNTER — OFFICE VISIT (OUTPATIENT)
Dept: FAMILY MEDICINE | Facility: OTHER | Age: 18
End: 2018-08-27
Payer: COMMERCIAL

## 2018-08-27 VITALS
OXYGEN SATURATION: 100 % | HEART RATE: 85 BPM | RESPIRATION RATE: 20 BRPM | TEMPERATURE: 98 F | WEIGHT: 219 LBS | BODY MASS INDEX: 32.34 KG/M2 | SYSTOLIC BLOOD PRESSURE: 114 MMHG | DIASTOLIC BLOOD PRESSURE: 70 MMHG

## 2018-08-27 DIAGNOSIS — R06.83 SNORING: ICD-10-CM

## 2018-08-27 DIAGNOSIS — N92.6 IRREGULAR MENSES: Primary | ICD-10-CM

## 2018-08-27 DIAGNOSIS — J35.8 TONSIL STONE: ICD-10-CM

## 2018-08-27 DIAGNOSIS — F43.22 ADJUSTMENT DISORDER WITH ANXIETY: Primary | ICD-10-CM

## 2018-08-27 DIAGNOSIS — E55.9 VITAMIN D DEFICIENCY DISEASE: ICD-10-CM

## 2018-08-27 DIAGNOSIS — J35.8 TONSILLAR MASS: ICD-10-CM

## 2018-08-27 DIAGNOSIS — R19.7 DIARRHEA, UNSPECIFIED TYPE: ICD-10-CM

## 2018-08-27 LAB
C DIFF TOX B STL QL: NEGATIVE
SPECIMEN SOURCE: NORMAL

## 2018-08-27 PROCEDURE — 99214 OFFICE O/P EST MOD 30 MIN: CPT | Performed by: NURSE PRACTITIONER

## 2018-08-27 PROCEDURE — 87506 IADNA-DNA/RNA PROBE TQ 6-11: CPT | Performed by: NURSE PRACTITIONER

## 2018-08-27 PROCEDURE — 87329 GIARDIA AG IA: CPT | Performed by: NURSE PRACTITIONER

## 2018-08-27 PROCEDURE — 82306 VITAMIN D 25 HYDROXY: CPT | Performed by: NURSE PRACTITIONER

## 2018-08-27 PROCEDURE — 87493 C DIFF AMPLIFIED PROBE: CPT | Performed by: NURSE PRACTITIONER

## 2018-08-27 PROCEDURE — 87209 SMEAR COMPLEX STAIN: CPT | Performed by: NURSE PRACTITIONER

## 2018-08-27 PROCEDURE — 36415 COLL VENOUS BLD VENIPUNCTURE: CPT | Performed by: NURSE PRACTITIONER

## 2018-08-27 PROCEDURE — 87177 OVA AND PARASITES SMEARS: CPT | Performed by: NURSE PRACTITIONER

## 2018-08-27 PROCEDURE — 90837 PSYTX W PT 60 MINUTES: CPT | Performed by: COUNSELOR

## 2018-08-27 RX ORDER — NORGESTIMATE AND ETHINYL ESTRADIOL 0.25-0.035
1 KIT ORAL DAILY
Qty: 84 TABLET | Refills: 0 | Status: SHIPPED | OUTPATIENT
Start: 2018-08-27 | End: 2019-04-03

## 2018-08-27 NOTE — PATIENT INSTRUCTIONS
Labs will be done today.   For normal results, you will receive a letter with the results in about 2 weeks.  If anything is abnormal or unexpected, someone from the clinic will call you.      Start the birth control pills either today or tomorrow.     See the ENT doctor, Dr. Bragg in [place].

## 2018-08-27 NOTE — MR AVS SNAPSHOT
After Visit Summary   8/27/2018    Batool Louis    MRN: [id number]           Patient Information     Date Of Birth          2000        Visit Information        Provider Department      8/27/2018 11:20 AM Mary Alice Reagan APRN CNP [facility]        Today's Diagnoses     Snoring    -  1    Tonsil stone        Tonsillar mass        Vitamin D deficiency disease        Irregular menses          Care Instructions    Labs will be done today.   For normal results, you will receive a letter with the results in about 2 weeks.  If anything is abnormal or unexpected, someone from the clinic will call you.      Start the birth control pills either today or tomorrow.     See the ENT doctor, Dr. Bragg in [place].               Follow-ups after your visit        Additional Services     OTOLARYNGOLOGY REFERRAL       Your provider has referred you to: [facility] [facility] [phone]   [email or web address]    Please be aware that coverage of these services is subject to the terms and limitations of your health insurance plan.  Call member services at your health plan with any benefit or coverage questions.      Please bring the following with you to your appointment:    (1) Any X-Rays, CTs or MRIs which have been performed.  Contact the facility where they were done to arrange for  prior to your scheduled appointment.   (2) List of current medications  (3) This referral request   (4) Any documents/labs given to you for this referral                  Who to contact     If you have questions or need follow up information about today's clinic visit or your schedule please contact [facility] directly at [phone].  Normal or non-critical lab and imaging results will be communicated to you by MyChart, letter or phone within 4 business days after the clinic has received the results. If you do not hear from us  "within 7 days, please contact the clinic through [employer] or phone. If you have a critical or abnormal lab result, we will notify you by phone as soon as possible.  Submit refill requests through [employer] or call your pharmacy and they will forward the refill request to us. Please allow 3 business days for your refill to be completed.          Additional Information About Your Visit        [employer] Information     [employer] lets you send messages to your doctor, view your test results, renew your prescriptions, schedule appointments and more. To sign up, go to [email or web address] . Click on \"Log in\" on the left side of the screen, which will take you to the Welcome page. Then click on \"Sign up Now\" on the right side of the page.     You will be asked to enter the access code listed below, as well as some personal information. Please follow the directions to create your username and password.     Your access code is: [email or web address]  Expires: 2018 11:35 AM     Your access code will  in 90 days. If you need help or a new code, please call your [place] clinic or [phone].        Care EveryWhere ID     This is your Care EveryWhere ID. This could be used by other organizations to access your [place] medical records  [id number]        Your Vitals Were     Pulse Temperature Respirations Last Period Pulse Oximetry Breastfeeding?    85 98  F (36.7  C) (Tympanic) 20 2018 100% No    BMI (Body Mass Index)                   32.34 kg/m2            Blood Pressure from Last 3 Encounters:   18 114/70   18 118/82   18 110/60    Weight from Last 3 Encounters:   18 219 lb (99.3 kg)   18 208 lb 6 oz (94.5 kg) (98 %)*   18 208 lb 14.4 oz (94.8 kg) (98 %)*     * Growth percentiles are based on CDC 2-20 Years data.              We Performed the Following     OTOLARYNGOLOGY REFERRAL     Vitamin D Deficiency          Today's Medication Changes          These changes are accurate as of " 8/27/18 11:53 AM.  If you have any questions, ask your nurse or doctor.               Start taking these medicines.        Dose/Directions    norgestimate-ethinyl estradiol 0.25-35 MG-MCG per tablet   Commonly known as:  ORTHO-CYCLEN, SPRINTEC   Used for:  Irregular menses   Started by:  Mary Alice Reagan APRN CNP        Dose:  1 tablet   Take 1 tablet by mouth daily   Quantity:  84 tablet   Refills:  0            Where to get your medicines      These medications were sent to [id number] - [place], [place] - [address] [place] [address]  [address] [place] [id number]    Hours:  M-F 8:30-6:30; Sat 9-4; closed Sunday Phone:  [phone]     norgestimate-ethinyl estradiol 0.25-35 MG-MCG per tablet                Primary Care Provider Office Phone # Fax #    RONNY Vaz [phone]       [address] [facility] [address]        Equal Access to Services     MERCY AGUDELO : Hadii aad ku hadasho Soomaali, waaxda luqadaha, qaybta kaalmada adeegyada, waxay idiin hayaabrittany valenzuela . So [facility] [phone].    ATENCIÓN: Si habla español, tiene a david disposición servicios gratuitos de asistencia lingüística. Ll[facility] [phone].    We comply with applicable federal civil rights laws and [place] laws. We do not discriminate on the basis of race, color, national origin, age, disability, sex, sexual orientation, or gender identity.            Thank you!     Thank you for choosing [facility]  for your care. Our goal is always to provide you with excellent care. Hearing back from our patients is one way we can continue to improve our services. Please take a few minutes to complete the written survey that you may receive in the mail after your visit with us. Thank you!             Your Updated Medication List - Protect others around you: Learn how to safely use, store and throw away your medicines at www.disposemymeds.org.          This list is accurate as of 8/27/18 11:53 AM.  Always  use your most recent med list.                   Brand Name Dispense Instructions for use Diagnosis    cetirizine 10 MG tablet    zyrTEC     Take 10 mg by mouth Reported on 3/7/2017        D 5000 5000 units Tabs   Generic drug:  Cholecalciferol      Reported on 3/7/2017        ibuprofen 200 MG tablet    ADVIL/MOTRIN     Take 200 mg by mouth Reported on 3/7/2017        MULTI-VITAMINS Tabs      Take 1 tablet by mouth        norgestimate-ethinyl estradiol 0.25-35 MG-MCG per tablet    ORTHO-CYCLEN, SPRINTEC    84 tablet    Take 1 tablet by mouth daily    Irregular menses

## 2018-08-27 NOTE — MR AVS SNAPSHOT
"                  MRN:[id number]                      After Visit Summary   2018    Batool Louis    MRN: [id number]           Visit Information        Provider Department      2018 9:30 AM Celsa Linda LPC [facility]      MyChart Information     MyChart lets you send messages to your doctor, view your test results, renew your prescriptions, schedule appointments and more. To sign up, go to [email or web address] . Click on \"Log in\" on the left side of the screen, which will take you to the Welcome page. Then click on \"Sign up Now\" on the right side of the page.     You will be asked to enter the access code listed below, as well as some personal information. Please follow the directions to create your username and password.     Your access code is: [email or web address]  Expires: 2018 11:35 AM     Your access code will  in 90 days. If you need help or a new code, please call your [place] clinic or [phone].        Care EveryWhere ID     This is your Care EveryWhere ID. This could be used by other organizations to access your [place] medical records  [id number]        Equal Access to Services     MICHELLE AGUDELO AH: Maggie palmao Soconor, waaxda luqadaha, qaybta kaalmada adeegyada, romeo vallejo. So [facility] [phone].    ATENCIÓN: Si habla español, tiene a david disposición servicios gratuitos de asistencia lingüística. Llame al [phone].    We comply with applicable federal civil rights laws and [place] laws. We do not discriminate on the basis of race, color, national origin, age, disability, sex, sexual orientation, or gender identity.            "
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
## 2018-08-27 NOTE — PROGRESS NOTES
SUBJECTIVE:   Batool Louis is a 18 year old female who presents to clinic today for the following health issues:      SNORING  -hx of tonsill stones, snoring and tonsillar polyps  -Repeated infections and tonsillitis.   Saw ENT last in 2015 through [facility].  They offered tonsillectomy, but at that time she declined.  She would like to pursue that now.     MENSES  -irregular in past  Saw OB/GYN, had labs and US done.  Diagnosed with secondary amenorrhea and put on Provera, which did restart her menses.  They are now every 28-75 days and very irregular.  Would like something that will regulate them better.      LAB  -wants Vitamin D checked, has been low in the past.  Last checked through [facility] in 2015.          Problem list and histories reviewed & adjusted, as indicated.  Additional history: as documented    Patient Active Problem List   Diagnosis     Strep throat     History reviewed. No pertinent surgical history.    Social History   Substance Use Topics     Smoking status: Never Smoker     Smokeless tobacco: Never Used     Alcohol use No     History reviewed. No pertinent family history.      Current Outpatient Prescriptions   Medication Sig Dispense Refill     cetirizine (ZYRTEC) 10 MG tablet Take 10 mg by mouth Reported on 3/7/2017       Cholecalciferol (D 5000) 5000 UNITS TABS Reported on 3/7/2017       ibuprofen (ADVIL/MOTRIN) 200 MG tablet Take 200 mg by mouth Reported on 3/7/2017       Multiple Vitamin (MULTI-VITAMINS) TABS Take 1 tablet by mouth       norgestimate-ethinyl estradiol (ORTHO-CYCLEN, SPRINTEC) 0.25-35 MG-MCG per tablet Take 1 tablet by mouth daily 84 tablet 0     Allergies   Allergen Reactions     Sulfamethoxazole-Trimethoprim Hives     BP Readings from Last 3 Encounters:   08/27/18 114/70   06/07/18 118/82   05/11/18 110/60    Wt Readings from Last 3 Encounters:   08/27/18 219 lb (99.3 kg)   06/07/18 208 lb 6 oz (94.5 kg) (98 %)*   05/11/18 208 lb 14.4 oz (94.8 kg) (98 %)*     *  Growth percentiles are based on CDC 2-20 Years data.                    Reviewed and updated as needed this visit by clinical staff  Tobacco  Allergies  Meds  Med Hx  Surg Hx  Fam Hx  Soc Hx      Reviewed and updated as needed this visit by Provider         ROS:  Constitutional, HEENT, cardiovascular, pulmonary, gi and gu systems are negative, except as otherwise noted.    OBJECTIVE:     /70  Pulse 85  Temp 98  F (36.7  C) (Tympanic)  Resp 20  Wt 219 lb (99.3 kg)  LMP 08/23/2018  SpO2 100%  Breastfeeding? No  BMI 32.34 kg/m2  Body mass index is 32.34 kg/(m^2).  GENERAL: alert, no distress and obese  HENT: ear canals and TM's normal, nose and mouth without ulcers or lesions  NECK: no adenopathy, no asymmetry, masses, or scars and thyroid normal to palpation  RESP: lungs clear to auscultation - no rales, rhonchi or wheezes  CV: regular rate and rhythm, normal S1 S2, no S3 or S4, no murmur, click or rub, no peripheral edema and peripheral pulses strong  ABDOMEN: soft, nontender, no hepatosplenomegaly, no masses and bowel sounds normal  MS: no gross musculoskeletal defects noted, no edema    Diagnostic Test Results:  Pending     ASSESSMENT/PLAN:         1. Irregular menses  Discussed trial of COCs to regulate cycle.  No personal or family history of clotting disorders or migraines with aura.  She does not smoke.  Risks/benefits discussed.  She verbalized understanding and wishes to start these today.  She is not sexually active.   - norgestimate-ethinyl estradiol (ORTHO-CYCLEN, SPRINTEC) 0.25-35 MG-MCG per tablet; Take 1 tablet by mouth daily  Dispense: 84 tablet; Refill: 0    2. Snoring  Will refer back to ENT to discuss possible surgical intervention.   - OTOLARYNGOLOGY REFERRAL    3. Tonsil stone  - OTOLARYNGOLOGY REFERRAL    4. Tonsillar mass  - OTOLARYNGOLOGY REFERRAL    5. Vitamin D deficiency disease  Will recheck today.   - Vitamin D Deficiency    See Patient Instructions    Mary Alice Reagan,  APRN CNP  [facility]

## 2018-08-27 NOTE — LETTER
August 29, 2018      Batool Louis  [address] [place] [address]        Dear MsJacquelineLisandrobret,    We are writing to inform you of your test results.    Your test results fall within the expected range(s).    Resulted Orders   Vitamin D Deficiency   Result Value Ref Range    Vitamin D Deficiency screening 26 20 - 75 ug/L      Comment:      Season, race, dietary intake, and treatment affect the concentration of   25-hydroxy-Vitamin D. Values may decrease during winter months and increase   during summer months. Values 20-29 ug/L may indicate Vitamin D insufficiency   and values <20 ug/L may indicate Vitamin D deficiency.  Vitamin D determination is routinely performed by an immunoassay specific for   25 hydroxyvitamin D3.  If an individual is on vitamin D2 (ergocalciferol)   supplementation, please specify 25 OH vitamin D2 and D3 level determination by   LCMSMS test [id number].     Enteric Bacteria and Virus Panel by TONEY Stool   Result Value Ref Range    Campylobacter group by TONEY Not Detected NDET^Not Detected    Salmonella species by TONEY Not Detected NDET^Not Detected    Shigella species by TONEY Not Detected NDET^Not Detected    Vibrio group by TONEY Not Detected NDET^Not Detected    Rotavirus A by TONEY Not Detected NDET^Not Detected    Shiga toxin 1 gene by TONEY Not Detected NDET^Not Detected    Shiga toxin 2 gene by TONEY Not Detected NDET^Not Detected    Norovirus I and II by TONEY Not Detected NDET^Not Detected    Yersinia enterocolitica by TONEY Not Detected NDET^Not Detected    Enteric pathogen comment       Testing performed by multiplexed, qualitative PCR using the [employer] Enteric   Pathogens Nucleic Acid Test. Results should not be used as the sole basis for diagnosis,   treatment, or other patient management decisions.        Comment:      Positive results do not rule out co-infection with other organisms that are   not detected by this test, and may not be the sole or definitive cause  of   patient illness.   Negative results in the setting of clinical illness compatible with   gastroenteritis may be due to infection by pathogens that are not detected by   this test or non-infectious causes such as ulcerative colitis, irritable bowel   syndrome, or Crohn's disease.   Note: Shiga toxin producing E. coli (STEC) typically harbor one or both genes   that encode for Shiga toxins 1 and 2.     Ova and Parasite Exam Routine   Result Value Ref Range    Specimen Description Feces     Ova and Parasite Exam Routine parasitology exam negative     Ova and Parasite Exam       Cryptosporidium, Cyclospora, and Microsporidia are not readily detected by this method. A   single negative specimen does not rule out parasitic infection.     Giardia antigen   Result Value Ref Range    Specimen Description Feces     Giardia Antigen Test       Negative for Giardia lamblia specific antigen by immunoassay.   Clostridium difficile Toxin B PCR   Result Value Ref Range    Specimen Description Feces     C Diff Toxin B PCR Negative NEG^Negative      Comment:      Negative: Clostridium difficile target DNA sequences NOT detected, presumed   negative for Clostridium difficile toxin B or the number of bacteria present   may be below the limit of detection for the test.  FDA approved assay performed using [employer] GeneXpert real-time PCR.  A negative result does not exclude actual disease due to Clostridium difficile   and may be due to improper collection, handling and storage of the specimen   or the number of organisms in the specimen is below the detection limit of the   assay.         If you have any questions or concerns, please call the clinic at the number listed above.       Sincerely,        RONNY Vaz CNP

## 2018-08-28 LAB
C COLI+JEJUNI+LARI FUSA STL QL NAA+PROBE: NOT DETECTED
DEPRECATED CALCIDIOL+CALCIFEROL SERPL-MC: 26 UG/L (ref 20–75)
EC STX1 GENE STL QL NAA+PROBE: NOT DETECTED
EC STX2 GENE STL QL NAA+PROBE: NOT DETECTED
ENTERIC PATHOGEN COMMENT: NORMAL
G LAMBLIA AG STL QL IA: NORMAL
NOROV GI+II ORF1-ORF2 JNC STL QL NAA+PR: NOT DETECTED
O+P STL MICRO: NORMAL
O+P STL MICRO: NORMAL
RVA NSP5 STL QL NAA+PROBE: NOT DETECTED
SALMONELLA SP RPOD STL QL NAA+PROBE: NOT DETECTED
SHIGELLA SP+EIEC IPAH STL QL NAA+PROBE: NOT DETECTED
SPECIMEN SOURCE: NORMAL
SPECIMEN SOURCE: NORMAL
V CHOL+PARA RFBL+TRKH+TNAA STL QL NAA+PR: NOT DETECTED
Y ENTERO RECN STL QL NAA+PROBE: NOT DETECTED

## 2018-08-29 NOTE — PROGRESS NOTES
Letter sent to patient informing of labs.  ................Wilbert Qureshi LPN,   August 29, 2018,      11:55 AM,   [facility]

## 2019-04-03 ENCOUNTER — OFFICE VISIT (OUTPATIENT)
Dept: FAMILY MEDICINE | Facility: OTHER | Age: 19
End: 2019-04-03
Payer: COMMERCIAL

## 2019-04-03 ENCOUNTER — TELEPHONE (OUTPATIENT)
Dept: FAMILY MEDICINE | Facility: OTHER | Age: 19
End: 2019-04-03

## 2019-04-03 VITALS
SYSTOLIC BLOOD PRESSURE: 110 MMHG | HEIGHT: 69 IN | RESPIRATION RATE: 18 BRPM | WEIGHT: 229.19 LBS | OXYGEN SATURATION: 98 % | BODY MASS INDEX: 33.95 KG/M2 | HEART RATE: 110 BPM | DIASTOLIC BLOOD PRESSURE: 80 MMHG | TEMPERATURE: 96.8 F

## 2019-04-03 DIAGNOSIS — N93.9 ABNORMAL UTERINE BLEEDING (AUB): ICD-10-CM

## 2019-04-03 DIAGNOSIS — E66.09 CLASS 1 OBESITY DUE TO EXCESS CALORIES WITHOUT SERIOUS COMORBIDITY WITH BODY MASS INDEX (BMI) OF 33.0 TO 33.9 IN ADULT: ICD-10-CM

## 2019-04-03 DIAGNOSIS — F41.1 GENERALIZED ANXIETY DISORDER: ICD-10-CM

## 2019-04-03 DIAGNOSIS — J35.8 CALCULUS OF TONSIL: ICD-10-CM

## 2019-04-03 DIAGNOSIS — E66.811 CLASS 1 OBESITY DUE TO EXCESS CALORIES WITHOUT SERIOUS COMORBIDITY WITH BODY MASS INDEX (BMI) OF 33.0 TO 33.9 IN ADULT: ICD-10-CM

## 2019-04-03 DIAGNOSIS — R11.15 NON-INTRACTABLE CYCLICAL VOMITING WITH NAUSEA: Primary | ICD-10-CM

## 2019-04-03 PROBLEM — J02.0 STREP THROAT: Status: RESOLVED | Noted: 2018-06-07 | Resolved: 2019-04-03

## 2019-04-03 LAB
ALBUMIN SERPL-MCNC: 4.1 G/DL (ref 3.4–5)
ALP SERPL-CCNC: 82 U/L (ref 40–150)
ALT SERPL W P-5'-P-CCNC: 35 U/L (ref 0–50)
ANION GAP SERPL CALCULATED.3IONS-SCNC: 6 MMOL/L (ref 3–14)
AST SERPL W P-5'-P-CCNC: 18 U/L (ref 0–45)
BASOPHILS # BLD AUTO: 0 10E9/L (ref 0–0.2)
BASOPHILS NFR BLD AUTO: 0.3 %
BILIRUB SERPL-MCNC: 0.4 MG/DL (ref 0.2–1.3)
BUN SERPL-MCNC: 14 MG/DL (ref 7–30)
CALCIUM SERPL-MCNC: 9 MG/DL (ref 8.5–10.1)
CHLORIDE SERPL-SCNC: 108 MMOL/L (ref 94–109)
CO2 SERPL-SCNC: 27 MMOL/L (ref 20–32)
CREAT SERPL-MCNC: 0.74 MG/DL (ref 0.52–1.04)
DIFFERENTIAL METHOD BLD: NORMAL
EOSINOPHIL # BLD AUTO: 0.2 10E9/L (ref 0–0.7)
EOSINOPHIL NFR BLD AUTO: 2.2 %
ERYTHROCYTE [DISTWIDTH] IN BLOOD BY AUTOMATED COUNT: 13 % (ref 10–15)
GFR SERPL CREATININE-BSD FRML MDRD: >90 ML/MIN/{1.73_M2}
GLUCOSE SERPL-MCNC: 88 MG/DL (ref 70–99)
HCT VFR BLD AUTO: 40.1 % (ref 35–47)
HGB BLD-MCNC: 13 G/DL (ref 11.7–15.7)
LYMPHOCYTES # BLD AUTO: 3.2 10E9/L (ref 0.8–5.3)
LYMPHOCYTES NFR BLD AUTO: 46.1 %
MCH RBC QN AUTO: 27.9 PG (ref 26.5–33)
MCHC RBC AUTO-ENTMCNC: 32.4 G/DL (ref 31.5–36.5)
MCV RBC AUTO: 86 FL (ref 78–100)
MONOCYTES # BLD AUTO: 0.5 10E9/L (ref 0–1.3)
MONOCYTES NFR BLD AUTO: 7.3 %
NEUTROPHILS # BLD AUTO: 3 10E9/L (ref 1.6–8.3)
NEUTROPHILS NFR BLD AUTO: 44.1 %
PLATELET # BLD AUTO: 278 10E9/L (ref 150–450)
POTASSIUM SERPL-SCNC: 4.1 MMOL/L (ref 3.4–5.3)
PROT SERPL-MCNC: 7.8 G/DL (ref 6.8–8.8)
RBC # BLD AUTO: 4.66 10E12/L (ref 3.8–5.2)
SODIUM SERPL-SCNC: 141 MMOL/L (ref 133–144)
TSH SERPL DL<=0.005 MIU/L-ACNC: 1.79 MU/L (ref 0.4–4)
WBC # BLD AUTO: 6.8 10E9/L (ref 4–11)

## 2019-04-03 PROCEDURE — 36415 COLL VENOUS BLD VENIPUNCTURE: CPT | Performed by: FAMILY MEDICINE

## 2019-04-03 PROCEDURE — 80053 COMPREHEN METABOLIC PANEL: CPT | Performed by: FAMILY MEDICINE

## 2019-04-03 PROCEDURE — 85025 COMPLETE CBC W/AUTO DIFF WBC: CPT | Performed by: FAMILY MEDICINE

## 2019-04-03 PROCEDURE — 99214 OFFICE O/P EST MOD 30 MIN: CPT | Performed by: FAMILY MEDICINE

## 2019-04-03 PROCEDURE — 84443 ASSAY THYROID STIM HORMONE: CPT | Performed by: FAMILY MEDICINE

## 2019-04-03 ASSESSMENT — PAIN SCALES - GENERAL: PAINLEVEL: NO PAIN (0)

## 2019-04-03 ASSESSMENT — MIFFLIN-ST. JEOR: SCORE: 1873.97
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
## 2019-04-03 NOTE — TELEPHONE ENCOUNTER
Prior Authorization Retail Medication Request    Medication/Dose: raNITIdine HCl 150MG tablets  // Key: [id number] - Rx #: [id number]    ICD code (if different than what is on RX):    Previously Tried and Failed:    Rationale:      Insurance Name: [employer]      Insurance ID:  [id number]  Group: [id number]    Pharmacy Information (if different than what is on RX)  Name:    Phone:

## 2019-04-03 NOTE — LETTER
[place] [address] [facility] [address] [phone]        April 3, 2019    Regarding:  Batool Louis  [address] [place] [address]              To Whom It May Concern;    Batool was seen in my office on 04/03/2019. Please excuse her from school from 04/02-04/03/2019. Patient may return on 04/04/2019.     If you have any questions or concerns please call the clinic at [phone].           Sincerely,        Nancy Mcdowell Mai, MD

## 2019-04-09 NOTE — TELEPHONE ENCOUNTER
PA Initiation    Medication: raNITIdine HCl 150MG tablets - INITIATED  Insurance Company: [employer] [place] - Phone [phone] Fax [phone]  [id number] [place] [facility] [address] [place] [address]  Filling Pharmacy Phone: [phone]  Filling Pharmacy Fax:    Start Date: 4/9/2019

## 2019-04-11 NOTE — TELEPHONE ENCOUNTER
Spoke with Talha from [facility] who stated the pharmacy is trying to run the ranitidine for tablets but the Rx in the patients file is for capsules. Can I get a verification of which is requiring the prior authorization and needing to be dispensed to the patient?

## 2019-04-12 NOTE — TELEPHONE ENCOUNTER
PA Initiation - RESUBMITTED    Medication: raNITIdine HCl 150MG tablets - INITIATED  Insurance Company: [employer] [place] - Phone [phone] Fax [phone]  Pharmacy Filling the Rx: [id number] - [place] - [address] [place] [address]  Filling Pharmacy Phone: [phone]  Filling Pharmacy Fax:    Start Date: 4/9/2019

## 2019-04-12 NOTE — TELEPHONE ENCOUNTER
She can have either one.  If one is easier to get covered, do that one.     Electronically signed by Mary Alice Reagan CNP.

## 2019-04-15 ENCOUNTER — TELEPHONE (OUTPATIENT)
Dept: FAMILY MEDICINE | Facility: OTHER | Age: 19
End: 2019-04-15

## 2019-04-15 DIAGNOSIS — J35.8 TONSIL STONE: Primary | ICD-10-CM

## 2019-04-15 NOTE — TELEPHONE ENCOUNTER
Nancy Donnelly MD P [facility]             Please refer her to ENT for tonsillar stone.

## 2019-04-16 NOTE — TELEPHONE ENCOUNTER
Spoke with insurance who stated a determination will be made by the end of the business day tomorrow 04/17/2019.

## 2019-04-17 NOTE — TELEPHONE ENCOUNTER
BOTH TABLET AND CAPSULE DENIED    PRIOR AUTHORIZATION DENIED    Medication: raNITIdine HCl 150MG tablets - DENIED    Denial Date: 4/17/2019    Denial Rational: Patient does not have an FDA approved condition for this drug.      Appeal Information:

## 2019-09-17 ENCOUNTER — OFFICE VISIT (OUTPATIENT)
Dept: OBGYN | Facility: CLINIC | Age: 19
End: 2019-09-17
Payer: COMMERCIAL

## 2019-09-17 VITALS
SYSTOLIC BLOOD PRESSURE: 130 MMHG | DIASTOLIC BLOOD PRESSURE: 70 MMHG | BODY MASS INDEX: 31.85 KG/M2 | HEART RATE: 80 BPM | WEIGHT: 215.7 LBS

## 2019-09-17 DIAGNOSIS — L29.3 ITCHY SKIN OF ANUS AND GENITALS: Primary | ICD-10-CM

## 2019-09-17 DIAGNOSIS — Z12.4 CERVICAL CANCER SCREENING: ICD-10-CM

## 2019-09-17 LAB
SPECIMEN SOURCE: ABNORMAL
WET PREP SPEC: ABNORMAL

## 2019-09-17 PROCEDURE — 99214 OFFICE O/P EST MOD 30 MIN: CPT | Performed by: OBSTETRICS & GYNECOLOGY

## 2019-09-17 PROCEDURE — 87210 SMEAR WET MOUNT SALINE/INK: CPT | Performed by: OBSTETRICS & GYNECOLOGY

## 2019-09-17 RX ORDER — FLUCONAZOLE 150 MG/1
150 TABLET ORAL ONCE
Qty: 1 TABLET | Refills: 0 | Status: SHIPPED | OUTPATIENT
Start: 2019-09-17 | End: 2020-03-12

## 2019-09-17 RX ORDER — TRIAMCINOLONE ACETONIDE 1 MG/G
CREAM TOPICAL 2 TIMES DAILY
Qty: 30 G | Refills: 3 | Status: SHIPPED | OUTPATIENT
Start: 2019-09-17 | End: 2020-06-22

## 2019-09-17 SDOH — HEALTH STABILITY: MENTAL HEALTH: HOW MANY STANDARD DRINKS CONTAINING ALCOHOL DO YOU HAVE ON A TYPICAL DAY?: 1 OR 2

## 2019-09-17 SDOH — HEALTH STABILITY: PHYSICAL HEALTH: ON AVERAGE, HOW MANY MINUTES DO YOU ENGAGE IN EXERCISE AT THIS LEVEL?: 30 MIN

## 2019-09-17 SDOH — HEALTH STABILITY: PHYSICAL HEALTH: ON AVERAGE, HOW MANY DAYS PER WEEK DO YOU ENGAGE IN MODERATE TO STRENUOUS EXERCISE (LIKE A BRISK WALK)?: 7 DAYS

## 2019-09-17 SDOH — HEALTH STABILITY: MENTAL HEALTH: HOW OFTEN DO YOU HAVE 6 OR MORE DRINKS ON ONE OCCASION?: NEVER

## 2019-09-17 SDOH — HEALTH STABILITY: MENTAL HEALTH: HOW OFTEN DO YOU HAVE A DRINK CONTAINING ALCOHOL?: MONTHLY OR LESS

## 2019-09-17 NOTE — PROGRESS NOTES
HPI:   Batool is a 19 year old who presents for her annual exam and to establish care.   Pt also reporting vaginal discomfort for approximately 1 month.  She reports it is external vaginal itching.  She tried monistat 7 day about 2 weeks ago which she thinks helped a little never went away. She does not feel internal vaginal itching.  She also has been feeling faint intermittently and wonders if it is related to her vaginal symptoms.  Pt is a .  She had had 2 sexual partners, last being a female which ended approx 1.5 months ago.   She has not used any form of birth control.  She reports she was given some in 2018 but never started it.  Menarche started at age 12.  Regular periods until appox 16.  Since then her menses are every 35-60 days, lasting about 5-6 days, 2-3 pads per day.  Some mild cramping.  LMP: 19.   Pt denies pelvic pain, other vaginal symptoms.  BM normal, no urinary symptoms.  ROS:   Rest of review of systems negative. See HPI.    Allergies:  Allergies   Allergen Reactions     Sulfamethoxazole-Trimethoprim Hives     Medications  Current Outpatient Medications   Medication     cetirizine (ZYRTEC) 10 MG tablet     Homeopathic Products (ZICAM ALLERGY RELIEF NA)     Cholecalciferol (D 5000) 5000 UNITS TABS     ranitidine (ZANTAC) 150 MG capsule     sertraline (ZOLOFT) 50 MG tablet     Family History  Family History   Problem Relation Age of Onset     Anemia Mother         unknown reason     Asthma Sister      Back Pain Maternal Grandmother      Alzheimer Disease Maternal Grandfather      Skin Cancer Paternal Great-Grandfather      Social History  Social History     Socioeconomic History     Marital status: Single     Spouse name: Not on file     Number of children: Not on file     Years of education: Not on file     Highest education level: Not on file   Occupational History     Occupation: student     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     Occupation:    Social Needs      Financial resource strain: Not on file     Food insecurity:     Worry: Not on file     Inability: Not on file     Transportation needs:     Medical: Not on file     Non-medical: Not on file   Tobacco Use     Smoking status: Never Smoker     Smokeless tobacco: Never Used   Substance and Sexual Activity     Alcohol use: Yes     Frequency: Monthly or less     Drinks per session: 1 or 2     Binge frequency: Never     Drug use: No     Sexual activity: Not Currently     Partners: Male     Birth control/protection: Condom     Comment: has had both sex partners, used condoms with last partner   Lifestyle     Physical activity:     Days per week: 7 days     Minutes per session: 30 min     Physical Exam:  VSS- 130/70, HR 80, weight 97.8 kg. BMI 31.85  General:  Batool is a alert and oriented, appearing stated age and in no apparent distress. Noted male pattern hair growth  CV: RRR, 2+ peripheral pulses  Resp: CTAB, good effort without distress  Abdoment:  Soft, obese, non tender, no masses, no guarding. No hepatosplenomegaly.   Perineum: no lesions, normal appearing external genitalia. No tenderness.  Vaginal: normal vaginal mucosa, no lesions, no discharge, normal cervix   MSK: normal gait, symmetric movements UE & LE  Lower extremities:  Non-tender, no edema.     Assessment and Plan:    Batool is a 19 year old who presents for her annual exam. .    1. Vaginal itching  - Normal appearing external genitalia, she described an itch scratch cycle mostly at night  - Wet prep collected and patient will be updated with results.  -Topical steroid cream advised for skin due to continued nighttime itching.     2. Irregular menses, suspect PCOS given AUB, male pattern hair, and obesity  -Educated on menstrual suppression option and patient would like to proceed with Merena IUD but would like to set up an appt in a couple weeks as she has a play she is in and would like to wait till after that time.   -Educated on possible  PCOS- weight loss counseling given   -Non-smoker, no migraine with aura, no heart disease or blood blot history.     3. Preventative Care  -Educated on Pap smear every 3 years now that she is 19.  She will do that when she comes back for her IUD placement.   STD testing- pt reported that she had this done in August and it was negative.  Advised that recommendation is yearly.     4. Faint feeling  Advised to follow up with her PCP.  Take slow movements when moving from lying to sitting to standing.  Encouraged to drink water.     Tez Mahajan MD,MD  OB/GYN  September 17, 2019, 3:47 PM

## 2019-09-17 NOTE — NURSING NOTE
"Chief Complaint   Patient presents with     Vaginal Problem       Initial /70 (BP Location: Right arm, Patient Position: Chair, Cuff Size: Adult Large)   Pulse 80   Wt 97.8 kg (215 lb 11.2 oz)   LMP 2019   BMI 31.85 kg/m   Estimated body mass index is 31.85 kg/m  as calculated from the following:    Height as of 4/3/19: 1.753 m (5' 9\").    Weight as of this encounter: 97.8 kg (215 lb 11.2 oz).  BP completed using cuff size: large        The following HM Due: pap smear  Chalmydinicole (13-24)      The following patient reported/Care Every where data was sent to:  P ABSTRACT QUALITY INITIATIVES [55303]       Arelis Seay [facility]  2019           "

## 2019-11-13 ENCOUNTER — TELEPHONE (OUTPATIENT)
Dept: OTOLARYNGOLOGY | Facility: CLINIC | Age: 19
End: 2019-11-13

## 2019-11-13 NOTE — TELEPHONE ENCOUNTER
Patient's mother called and left a VM.    She stated that her daughter is currently taking voice lessons with Easton Villatoro and Barbie who suggested that she is scoped and evaluated in our clinic.    Stated that best times are Thursday and Friday between 11 and 2, but patient would likely be happy with next available.    Stated she can be reached at [phone].

## 2019-11-14 NOTE — TELEPHONE ENCOUNTER
*PATIENT CAN BE SCHEDULED FOR NEXT AVAILABLE [facility] NEW SLP VOICE APPOINTMENT*    Called patient back (mother is not in chart) and left a VM with scheduling instructions, and the ENT call center number for patient to call back and schedule voice therapy appointment.

## 2019-11-15 ENCOUNTER — PRE VISIT (OUTPATIENT)
Dept: OTOLARYNGOLOGY | Facility: CLINIC | Age: 19
End: 2019-11-15

## 2019-11-15 NOTE — TELEPHONE ENCOUNTER
FUTURE VISIT INFORMATION      FUTURE VISIT INFORMATION:    Date: 1/9/20    Time: 3:00pm    [place] [facility] [place]FORMATION:    Reason for visit/diagnosis taking voice lessons with Easton Villatoro and Barbie who suggested that she is scoped and evaluated in our clinic    RECORDS REQUESTED FROM:       No recs to collect

## 2019-11-25 ENCOUNTER — TELEPHONE (OUTPATIENT)
Dept: OTOLARYNGOLOGY | Facility: CLINIC | Age: 19
End: 2019-11-25

## 2019-11-25 NOTE — TELEPHONE ENCOUNTER
LVM letting pt know that 1/9 appt was erroneously scheduled. 2hr appt was scheduled in a 1hr slot. Appt was rsc to 1/21. Left call center number for scheduling concerns.

## 2019-12-12 ENCOUNTER — TELEPHONE (OUTPATIENT)
Dept: FAMILY MEDICINE | Facility: CLINIC | Age: 19
End: 2019-12-12

## 2019-12-12 NOTE — LETTER
[address] [address]  [phone]        December 12, 2019    Batool Louis  [address] [place] [address]          Dear Batool,    We were looking through you chart and noticed that you haven't been seen in a while, we would like to schedule an office visit for an annual physical when you have time. Please give the clinic a call at [phone] and schedule at your earliest convienence.  Thank you so much, and we look forward to seeing you soon.             Sincerely,        Nancy Donnelly MD

## 2019-12-12 NOTE — TELEPHONE ENCOUNTER
Panel Management Review      Patient has the following on her problem list: None      Composite cancer screening  Chart review shows that this patient is due/due soon for the following None  Summary:    Patient is due/failing the following:   PAP and PHYSICAL    Action needed:   Patient needs office visit for physical.    Type of outreach:    Sent letter.    Questions for provider review:    None                                                                                                                                    Digna Porter CMA      Chart routed to Care Team .

## 2020-01-20 NOTE — PROGRESS NOTES
"[facility]  Evaluation report    Clinician: SANDIP Logan (kendal), M.A., CFY-SLP  Referring provider:  Self-referred  Patient: Batool Louis  Date of Visit: 1/21/2020    HISTORY  Chief complaint: Batool Louis is a 19 year old female presenting today for evaluation of voice and throat discomfort.    Onset: Gradually beginning in July 2019   Inciting incident: None identified; questions whether increased vocal demand played a role  Course: Worsening  Salient history: Ms. 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 She studies with Easton Villatoro. She reports voice difficulties began as early as 7th grade recalling difficulty navigating through her passaggio. She had not taken voice lessons prior to beginning collegiate study this past fall. Difficulties then worsened this past July. She was playing Cheryle in a production of \"[employer]\" and was also routinely performing with a cover band. She reports her voice became very hoarse during this period of increased vocal demand. 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 Her  suggested she establish care with our clinic for further evaluation and treatment. She is accompanied today by her mother and sister.     CURRENT SYMPTOMS INCLUDE  VOICE    Increased effort to talk/sing    Poor voice quality    Voice tires easily    Reduced loudness    Voice is never normal    Raspy/Scratchy/Gravelly voice    Worse with use    Monona with stress    Voice is inconsistent    Issues in passaggio    In school for music and teaches drama classes; extraordinary vocal demand    THROAT/THROAT CLEARING    Throat irritation    Pain/ache in throat    Sore throat after long days of teaching    Mucus in throat    Frequent throat clearing     her throat clearing triggers include:  o Voice quality    Patient denies significant dyspnea, dysphagia and cough.     OTHER PERTINENT HISTORY    Otherwise unremarkable. " "    Past Medical History:   Diagnosis Date     Amenorrhea, secondary      Past Surgical History:   Procedure Laterality Date     NO HISTORY OF SURGERY         OBJECTIVE  PATIENT REPORTED MEASURES    Effort to talk: 4 / 10 (0-10 in which 10 represents maximal effort)    Effort to sin / 10 (0-10 in which 10 represents maximal effort)    Voice quality: 3 / 10 (0-10 in which 10 represents best possible voice)     Patient Supplied Answers To VHI Questionnaire  Voice Handicap Index (VHI-10) 2020   My voice makes it difficult for people to hear me 1   People have difficulty understanding me in a noisy room 0   My voice difficulties restrict my personal and social life.  3   I feel left out of conversations because of my voice 1   My voice problem causes me to lose income 2   I feel as though I have to strain to produce voice 2   The clarity of my voice is unpredictable 2   My voice problem upsets me 4   My voice makes me feel handicapped 4   People ask, \"What's wrong with your voice?\" 3   VHI-10 22     Patient Specific Goal Metrics:  Dysponia SLP Goals 2020   How much does your voice problem bother you? Very Much       PERCEPTUAL EVALUATION (CPT 66604)  POSTURE / TENSION:     upper body    neck and shoulders    BREATHING:     inspirations are inadequate in volume and frequency    shallow    phonation is not coordinated with respiration    LARYNGEAL PALPATION:     reduced thyrohyoid space    additional closure of the thyrohyoid space on phonation    no significant tenderness    VOICE:    Roughness: Mild Consistent    Breathiness: WNL    Strain: Minimal Consistent    Loudness    Conversational speech:  WNL    Projected speech:  WNL    Pitch:    Conversational speech:  WNL    Pitch glide: Good access to upper register; some pitch instability in passaggio area    Resonance:    Conversational speech:  laryngeal pharyngeal resonance; frequent use of glottal marie    Singing vs. Speech: Better coordination of " respiration and phonation observed in singing    CAPE-V Overall Severity:  9/100    COUGH/THROAT CLEARING:    Occasional    Dry      LARYNGEAL EXAMINATION  Procedure: Flexible endoscopy with chip-tip technology with stroboscopy, left nostril; topical anesthesia with 3% Lidocaine and 0.25% phenylephrine was applied.   Performed by: Nadiya Tate MA, CFY-SLP   The laryngeal and pharyngeal structures were evaluated for gross appearance, mobility, function, and focal lesions / abnormalities of the associated mucosa.  Stroboscopy was warranted to evaluate closure, symmetry, and vibratory characteristics of the vocal folds.  All findings were within normal limits with the exception of the following salient features:     Frequent collection of thickened secretions on the true vocal folds that clear easily, but reform quickly    Subtle swelling and thickening in the mid-membranous portion of the true vocal folds bilaterally     Mild anterior-posterior supraglottic constriction with connected speech    Non-optimal coordination of respiration and phonation    THERAPY PROBES: Improvement was elicited with coordination of respiration and phonation    The addition of stroboscopy allowed evaluation of the mucosal wave:    Amplitude: right: minimally to mildly decreased; left: mildly decreased.     Symmetry: good symmetry.    Closure pattern: normal.     Closure plane: at glottic level.     Phase distribution: normal.       Subtle swelling and thickening in the mid-membranous portion of the true vocal folds bilaterally. Patent airway.     The laryngeal exam was reviewed with Ms. Louis, and I provided pertinent explanations, as well as written and oral information.    ASSESSMENT / PLAN  IMPRESSIONS: Batool Louis is presenting today with R49.0 (Dysphonia) in the context of subtle mid-membranous swelling and stiffness of the true vocal folds bilaterally.  Laryngeal examination reveals frequent collection of thickened  secretions on the true vocal folds that clear easily, but reform quickly. Subtle swelling and thickening is observed in the mid-membranous portion of the true vocal folds bilaterally. Functionally, mild anterior-posterior supraglottic constriction and non-optimal coordination of respiration and phonation is observed with connected speech. Coordination is improved in singing. Stroboscopy reveals a mildly decreased mucosal wave and mild stiffness; this correlates with perceptual findings of consistent, mild roughness. Current voice quality is likely due to a combination of subtle swelling, stiffness and non-optimal coordination of respiration and phonation.     STIMULABILITY: results of therapy probes during perceptual and laryngeal evaluation demonstrate improvement with coordination of respiration and phonation     RECOMMENDATIONS:     A course of speech therapy is recommended to optimize vocal technique, improve voice quality, promote reduced discomfort, effort and fatigue and help reduce mucosal irritation.    She demonstrates a Good prognosis for improvement given adherence to therapeutic recommendations.     Positive indicators: positive response to therapy probes diagnosis is known to respond to treatment high level of comittment good awareness of patterns of use    Negative indicators: None    DURATION / FREQUENCY: 4 one-hour sessions with 3-4 weeks in between. A total of 5-6 sessions may be necessary.     GOALS:  Patient goal:     To improve and maintain a healthy voice quality    To understand the problem and fix it as much as possible    To have a normal and acceptable voice quality    Short-term goal(s): Within the first 4 sessions, Ms. Louis:  1. will be able to independently list key factors in maintenance of good vocal hygiene with 80% accuracy, and report on their use outside the therapy room.  2. will utilize silent inhalation with good low-respiratory engagement 75% of the time during therapy  tasks with minimal clinician support  3. will demonstrate semi-occluded vocal tract (SOVT) exercises with at least 80% accuracy with no clinician support  4. will demonstrate the ability to alternate between target and habitual voice quality given clinician cue 75% of the time during therapy tasks  5. will initiate Resonant Voice Therapy (RVT)    Long-term goal(s): In 6 months, Ms. Louis will:  1. In a singing task, demonstrate ability to vocalize five times in 10 minutes, with breathiness, delayed onset, and strain that does not exceed a level of 3 out of 10, 80% of the time, by therapist judgment  2. Report resolution of symptoms, and use of optimal voice quality and comfort to meet personal, social, and professional needs, 90% of the time during a typical week of vocal activities      This treatment plan was developed with the patient who agreed with the recommendations.  _______________________________________________________________________  THERAPY NOTE (CPT 02298)  Date of Service: 1/21/2020    SUBJECTIVE / OBJECTIVE:  Please refer to my evaluation report from today's encounter for full details regarding subjective data, patient reported measures, and diagnostic findings.    THERAPEUTIC ACTIVITIES  Counseling and Education    Asked many questions about the nature of her symptoms, and I answered all of these thoroughly.    Exercises and techniques for optimal vocal hygiene including:    Systemic hydration, including strategies for increasing daily water intake    Topical hydration - Gargling (saline and plain water), saline nasal irrigation, humidification, steam, guaifenesin to reduce the thickened secretions / laryngeal irritation.    Environmental barriers to healthy voicing - noise, inhaled irritants, room acoustics    Awareness and reduction of phonotraumatic behaviors    Moderating voice use    Substituting non-voice alternative behaviors    Avoiding cough and throat clearing    Semi-Occluded Vocal  Tract (SOVT) exercises instructed to reduce laryngeal tension, promote vocal fold pliability, and coordinate respiration and phonation    Straw phonation with water resistance was found to be most facilitating     Sustained phonation, and voice vs. voiceless productions used to promote easy voicing and raise awareness of laryngeal tension    Ascending and descending glides utilized to promote vocal fold pliability    Instructed on the benefits of using these exercises for improved coordination of breath flow with phonation and tissue mobilization.    Instructed on the importance of using these exercises as a warm-up / cool down,  and to re-calibrate the voice throughout the day.    75% accuracy with mild to moderate clinician support      Concepts of an optimal regimen for practice were instructed.  o She should use an interval schedule of practice, with brief periods of practice frequently throughout each day  o Gisela concepts of volitional practice to facilitate motor learning.    A revised regimen for home practice was instructed.    I provided handouts of today's therapeutic activities to facilitate practice.    ASSESSMENT/PLAN  PROGRESS TOWARD LONG TERM GOALS:   Minimal at this point, as this is first session, but good learning today    IMPRESSIONS: R49.0 (Dysphonia) in the context of subtle mid-membranous swelling and stiffness of the true vocal folds bilaterally. Ms. Louis demonstrated good learning today and stated she found today's session helpful. We outlined a regimen for practice and plan to complete monthly voice therapy sessions. I provided her with pictures of her laryngeal examination to take to her .    PLAN: I will see Ms. Louis in 3 weeks, at which time we will discuss respiratory mechanics and begin flow phonation.       TOTAL SERVICE TIME: 120 minutes  EVALUATION OF VOICE AND RESONANCE (39124)  TREATMENT (94990)  ENDOSCOPIC LARYNGEAL EXAMINATION WITH STROBOSCOPY  (41648)  NO CHARGE FACILITY FEE (35207)    SANDIP Logan (music), M.A., CFY-SLP  Speech Language Pathology Clinical Fellow  [facility] Trained Vocologist   [facility]   [phone]  [email or web address]    ---I attest that the services performed were provided by a clinical fellow with my indirect supervision. ---    Sanaz King M.M. (voice) MIRIS., CCC/SLP  Speech-Language Pathologist  [facility] Trained Vocologist  [facility]  [phone]  [email or web address]  Prounouns: she/her

## 2020-01-21 ENCOUNTER — OFFICE VISIT (OUTPATIENT)
Dept: OTOLARYNGOLOGY | Facility: CLINIC | Age: 20
End: 2020-01-21
Payer: COMMERCIAL

## 2020-01-21 DIAGNOSIS — R49.0 DYSPHONIA: Primary | ICD-10-CM

## 2020-01-21 NOTE — PROGRESS NOTES
"After Visit Summary    Patient: Batool Louis  Date of Visit: 1/21/2020    Today on your laryngeal examination with stroboscopy, we saw that overall the structures are healthy. There is slight swelling in the mid-membranous portion of the true vocal folds bilaterally. This is accompanied by slight stiffness observed on stroboscopy. There is a mild collection of thickened mucus on the true vocal folds that clears easily, but reforms quickly. No nodules, polyps, or other structural concerns.                     Hygiene:     Systemic Hydration: internal hydration of the entire body  o Keep sipping water throughout the day  o Make sure you are having the same amount of water as you do dehydrating beverages      Topical Hydration: hydration for the surface mucosa of the larynx and vocal folds.    Please follow strategies learned on the \"Tips for Topical Hydration\" handout      Nasal Irrigation (Jeovanny Med Sinus Rinse Bottle)/Nasal Spray  o Sinus rinse bottle (nasal irrigation) - Morning   o Saline spray: 3 puffs in each nostril; sniff and then blow your nose      Gargling  o 2x/day (AM/PM and as needed throughout the day)  o Gargle with a  Voice  o Gargle with a voice and tilt head side to side  o Gargle alternating with [place]      Voice    Semi-occluded vocal tract exercise:   o Bubbles (straw in 1 to 1.5  of water) 5x/day for 2-3 minutes:  o 3x: blow 10-15 seconds with no voice and keep bubbles consistent. Paying attention to how relaxed/open/easy it feels in your throat.  o 3x: blow bubbles and add a sustained  who  or an  oo  (G3 ) think about your voice as a laser pointer sending it out to where your lips touch the straw  o Work with the bubbles in intervals   o 3x: blow bubbles and glide from high to low  o 3x: blow bubbles and vary  who  gliding up and down             Up and down like a sine wave    These exercises are great for:    *Instructed on the importance of using these exercises as a warm-up / " cool down,  and to re-calibrate the voice throughout the day.    *tissue mobilization exercise - Improving the condition and pliability of the vocal folds.    *Abdominal breathing and applying optimal breath flow to speech/singing.      SANDIP Logan (music), M.A., CFY-SLP  Speech Language Pathology Clinical Fellow  [facility] Trained Vocologist   [facility]   [phone]  [email or web address]    ---I attest that the services performed were provided by a clinical fellow with my indirect supervision. ---    Sanaz King M.M. (voice), MJacquelineA., CCC/SLP  Speech-Language Pathologist  [facility] Trained Vocologist  [facility]  [phone]  [email or web address]  Prounouns: she/her

## 2020-01-21 NOTE — LETTER
"1/21/2020       RE: Batool Louis  [address] [place] [address]     Dear Colleague,    Thank you for referring your patient, Batool Louis, to the [facility] VOICE at [facility]. Please see a copy of my visit note below.    [facility] VOICE CLINIC  Evaluation report    Clinician: SANDIP Logan (kendal), M.A., CFY-SLP  Referring provider:  Self-referred  Patient: Batool Louis  Date of Visit: 1/21/2020    HISTORY  Chief complaint: Batool Louis is a 19 year old female presenting today for evaluation of voice and throat discomfort.    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 She studies with Easton Villatoro. She reports voice difficulties began as early as 7th grade recalling difficulty navigating through her passaggio. She had not taken voice lessons prior to beginning collegiate study this past fall. Difficulties then worsened this past July. She was playing [place] in a production of \"[employer]\" and was also routinely performing with a cover band. She reports her voice became very hoarse during this period of increased vocal demand. 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 Her  suggested she establish care with our clinic for further evaluation and treatment. She is accompanied today by her mother and sister.     CURRENT SYMPTOMS INCLUDE  VOICE    Increased effort to talk/sing    Poor voice quality    Voice tires easily    Reduced loudness    Voice is never normal    Raspy/Scratchy/Gravelly voice    Worse with use    Marion with stress    Voice is inconsistent    Issues in passaggio    In school for music and teaches drama classes; extraordinary vocal demand    THROAT/THROAT CLEARING    Throat " "irritation    Pain/ache in throat    Sore throat after long days of teaching    Mucus in throat    Frequent throat clearing     her throat clearing triggers include:  o Voice quality    Patient denies significant dyspnea, dysphagia and cough.     OTHER PERTINENT HISTORY    Otherwise unremarkable.     Past Medical History:   Diagnosis Date     Amenorrhea, secondary      Past Surgical History:   Procedure Laterality Date     NO HISTORY OF SURGERY         OBJECTIVE  PATIENT REPORTED MEASURES    Effort to talk: 4 / 10 (0-10 in which 10 represents maximal effort)    Effort to sin / 10 (0-10 in which 10 represents maximal effort)    Voice quality: 3 / 10 (0-10 in which 10 represents best possible voice)     Patient Supplied Answers To VHI Questionnaire  Voice Handicap Index (VHI-10) 2020   My voice makes it difficult for people to hear me 1   People have difficulty understanding me in a noisy room 0   My voice difficulties restrict my personal and social life.  3   I feel left out of conversations because of my voice 1   My voice problem causes me to lose income 2   I feel as though I have to strain to produce voice 2   The clarity of my voice is unpredictable 2   My voice problem upsets me 4   My voice makes me feel handicapped 4   People ask, \"What's wrong with your voice?\" 3   VHI-10 22     Patient Specific Goal Metrics:  Dysponia SLP Goals 2020   How much does your voice problem bother you? Very Much       PERCEPTUAL EVALUATION (CPT 59995)  POSTURE / TENSION:     upper body    neck and shoulders    BREATHING:     inspirations are inadequate in volume and frequency    shallow    phonation is not coordinated with respiration    LARYNGEAL PALPATION:     reduced thyrohyoid space    additional closure of the thyrohyoid space on phonation    no significant tenderness    VOICE:    Roughness: Mild Consistent    Breathiness: WNL    Strain: Minimal Consistent    Loudness    Conversational speech:  WNL    Projected " speech:  WNL    Pitch:    Conversational speech:  WNL    Pitch glide: Good access to upper register; some pitch instability in passaggio area    Resonance:    Conversational speech:  laryngeal pharyngeal resonance; frequent use of glottal marie    Singing vs. Speech: Better coordination of respiration and phonation observed in singing    CAPE-V Overall Severity:  9/100    COUGH/THROAT CLEARING:    Occasional    Dry      LARYNGEAL EXAMINATION  Procedure: Flexible endoscopy with chip-tip technology with stroboscopy, left nostril; topical anesthesia with 3% Lidocaine and 0.25% phenylephrine was applied.   Performed by: Nadiya Tate MA, CFY-SLP   The laryngeal and pharyngeal structures were evaluated for gross appearance, mobility, function, and focal lesions / abnormalities of the associated mucosa.  Stroboscopy was warranted to evaluate closure, symmetry, and vibratory characteristics of the vocal folds.  All findings were within normal limits with the exception of the following salient features:     Frequent collection of thickened secretions on the true vocal folds that clear easily, but reform quickly    Subtle swelling and thickening in the mid-membranous portion of the true vocal folds bilaterally     Mild anterior-posterior supraglottic constriction with connected speech    Non-optimal coordination of respiration and phonation    THERAPY PROBES: Improvement was elicited with coordination of respiration and phonation    The addition of stroboscopy allowed evaluation of the mucosal wave:    Amplitude: right: minimally to mildly decreased; left: mildly decreased.     Symmetry: good symmetry.    Closure pattern: normal.     Closure plane: at glottic level.     Phase distribution: normal.       Subtle swelling and thickening in the mid-membranous portion of the true vocal folds bilaterally. Patent airway.     The laryngeal exam was reviewed with Ms. Louis, and I provided pertinent explanations, as well as  written and oral information.    ASSESSMENT / PLAN  IMPRESSIONS: Batool Louis is presenting today with R49.0 (Dysphonia) in the context of subtle mid-membranous swelling and stiffness of the true vocal folds bilaterally.  Laryngeal examination reveals frequent collection of thickened secretions on the true vocal folds that clear easily, but reform quickly. Subtle swelling and thickening is observed in the mid-membranous portion of the true vocal folds bilaterally. Functionally, mild anterior-posterior supraglottic constriction and non-optimal coordination of respiration and phonation is observed with connected speech. Coordination is improved in singing. Stroboscopy reveals a mildly decreased mucosal wave and mild stiffness; this correlates with perceptual findings of consistent, mild roughness. Current voice quality is likely due to a combination of subtle swelling, stiffness and non-optimal coordination of respiration and phonation.     STIMULABILITY: results of therapy probes during perceptual and laryngeal evaluation demonstrate improvement with coordination of respiration and phonation     RECOMMENDATIONS:     A course of speech therapy is recommended to optimize vocal technique, improve voice quality, promote reduced discomfort, effort and fatigue and help reduce mucosal irritation.    She demonstrates a Good prognosis for improvement given adherence to therapeutic recommendations.     Positive indicators: positive response to therapy probes diagnosis is known to respond to treatment high level of comittment good awareness of patterns of use    Negative indicators: None    DURATION / FREQUENCY: 4 one-hour sessions with 3-4 weeks in between. A total of 5-6 sessions may be necessary.     GOALS:  Patient goal:     To improve and maintain a healthy voice quality    To understand the problem and fix it as much as possible    To have a normal and acceptable voice quality    Short-term goal(s): Within the  first 4 sessions, Ms. Louis:  1. will be able to independently list key factors in maintenance of good vocal hygiene with 80% accuracy, and report on their use outside the therapy room.  2. will utilize silent inhalation with good low-respiratory engagement 75% of the time during therapy tasks with minimal clinician support  3. will demonstrate semi-occluded vocal tract (SOVT) exercises with at least 80% accuracy with no clinician support  4. will demonstrate the ability to alternate between target and habitual voice quality given clinician cue 75% of the time during therapy tasks  5. will initiate Resonant Voice Therapy (RVT)    Long-term goal(s): In 6 months, Ms. Louis will:  1. In a singing task, demonstrate ability to vocalize five times in 10 minutes, with breathiness, delayed onset, and strain that does not exceed a level of 3 out of 10, 80% of the time, by therapist judgment  2. Report resolution of symptoms, and use of optimal voice quality and comfort to meet personal, social, and professional needs, 90% of the time during a typical week of vocal activities      This treatment plan was developed with the patient who agreed with the recommendations.  _______________________________________________________________________  THERAPY NOTE (CPT 57819)  Date of Service: 1/21/2020    SUBJECTIVE / OBJECTIVE:  Please refer to my evaluation report from today's encounter for full details regarding subjective data, patient reported measures, and diagnostic findings.    THERAPEUTIC ACTIVITIES  Counseling and Education    Asked many questions about the nature of her symptoms, and I answered all of these thoroughly.    Exercises and techniques for optimal vocal hygiene including:    Systemic hydration, including strategies for increasing daily water intake    Topical hydration - Gargling (saline and plain water), saline nasal irrigation, humidification, steam, guaifenesin to reduce the thickened secretions /  laryngeal irritation.    Environmental barriers to healthy voicing - noise, inhaled irritants, room acoustics    Awareness and reduction of phonotraumatic behaviors    Moderating voice use    Substituting non-voice alternative behaviors    Avoiding cough and throat clearing    Semi-Occluded Vocal Tract (SOVT) exercises instructed to reduce laryngeal tension, promote vocal fold pliability, and coordinate respiration and phonation    Straw phonation with water resistance was found to be most facilitating     Sustained phonation, and voice vs. voiceless productions used to promote easy voicing and raise awareness of laryngeal tension    Ascending and descending glides utilized to promote vocal fold pliability    Instructed on the benefits of using these exercises for improved coordination of breath flow with phonation and tissue mobilization.    Instructed on the importance of using these exercises as a warm-up / cool down,  and to re-calibrate the voice throughout the day.    75% accuracy with mild to moderate clinician support      Concepts of an optimal regimen for practice were instructed.  o She should use an interval schedule of practice, with brief periods of practice frequently throughout each day  o [place] concepts of volitional practice to facilitate motor learning.    A revised regimen for home practice was instructed.    I provided handouts of today's therapeutic activities to facilitate practice.    ASSESSMENT/PLAN  PROGRESS TOWARD LONG TERM GOALS:   Minimal at this point, as this is first session, but good learning today    IMPRESSIONS: R49.0 (Dysphonia) in the context of subtle mid-membranous swelling and stiffness of the true vocal folds bilaterally. Ms. Louis demonstrated good learning today and stated she found today's session helpful. We outlined a regimen for practice and plan to complete monthly voice therapy sessions. I provided her with pictures of her laryngeal examination to take to her  ".    PLAN: I will see Ms. Louis in 3 weeks, at which time we will discuss respiratory mechanics and begin flow phonation.       TOTAL SERVICE TIME: 120 minutes  EVALUATION OF VOICE AND RESONANCE (17556)  TREATMENT (91015)  ENDOSCOPIC LARYNGEAL EXAMINATION WITH STROBOSCOPY (37872)  NO CHARGE FACILITY FEE (02596)    SANDIP Logan ([place]), M.A., CFY-SLP  Speech Language Pathology Clinical Fellow  [place] Trained Vocologist   [facility]   [phone]  [email or web address]      After Visit Summary    Patient: Batool Louis  Date of Visit: 1/21/2020    Today on your laryngeal examination with stroboscopy, we saw that overall the structures are healthy. There is slight swelling in the mid-membranous portion of the true vocal folds bilaterally. This is accompanied by slight stiffness observed on stroboscopy. There is a mild collection of thickened mucus on the true vocal folds that clears easily, but reforms quickly. No nodules, polyps, or other structural concerns.                     Hygiene:     Systemic Hydration: internal hydration of the entire body  o Keep sipping water throughout the day  o Make sure you are having the same amount of water as you do dehydrating beverages      Topical Hydration: hydration for the surface mucosa of the larynx and vocal folds.    Please follow strategies learned on the \"Tips for Topical Hydration\" handout      Nasal Irrigation (Jeovanny Med Sinus Rinse Bottle)/Nasal Spray  o Sinus rinse bottle (nasal irrigation) - Morning   o Saline spray: 3 puffs in each nostril; sniff and then blow your nose      Gargling  o 2x/day (AM/PM and as needed throughout the day)  o Gargle with a  Voice  o Gargle with a voice and tilt head side to side  o Gargle alternating with [place]      Voice    Semi-occluded vocal tract exercise:   o Bubbles (straw in 1 to 1.5  of water) 5x/day for 2-3 minutes:  o 3x: blow 10-15 seconds with no voice and keep bubbles " consistent. Paying attention to how relaxed/open/easy it feels in your throat.  o 3x: blow bubbles and add a sustained  who  or an  oo  (G3 ) think about your voice as a laser pointer sending it out to where your lips touch the straw  o Work with the bubbles in intervals   o 3x: blow bubbles and glide from high to low  o 3x: blow bubbles and vary  who  gliding up and down             Up and down like a sine wave    These exercises are great for:    *Instructed on the importance of using these exercises as a warm-up / cool down,  and to re-calibrate the voice throughout the day.    *tissue mobilization exercise - Improving the condition and pliability of the vocal folds.    *Abdominal breathing and applying optimal breath flow to speech/singing.    SANDIP Logan (music), M.A., CFY-SLP  Speech Language Pathology Clinical Fellow  [facility] Trained Vocologist   [facility]   [phone]  [email or web address]

## 2020-01-22 ENCOUNTER — TELEPHONE (OUTPATIENT)
Dept: OTOLARYNGOLOGY | Facility: CLINIC | Age: 20
End: 2020-01-22

## 2020-01-22 NOTE — TELEPHONE ENCOUNTER
Called patient's mother and left a VM confirming Return SLP voice appointments w/ Nadiya Tate on...     Tuesday Feb.11 at 11am   Thursday Mar. 19 at 1pm   Tuesday April 21 at 10am     Provided ENT call center number to call back with questions.

## 2020-02-10 NOTE — PROGRESS NOTES
"[facility] VOICE CLINIC  THERAPY NOTE (CPT 04533)    Patient: Batool Louis  Date of Service: 2/11/2020  Referring physician: Self referred  Impressions from most recent evaluation:(1/21/2020)  \"IMPRESSIONS: Batool Louis is presenting today with R49.0 (Dysphonia) in the context of subtle mid-membranous swelling and stiffness of the true vocal folds bilaterally.  Laryngeal examination reveals frequent collection of thickened secretions on the true vocal folds that clear easily, but reform quickly. Subtle swelling and thickening is observed in the mid-membranous portion of the true vocal folds bilaterally. Functionally, mild anterior-posterior supraglottic constriction and non-optimal coordination of respiration and phonation is observed with connected speech. Coordination is improved in singing. Stroboscopy reveals a mildly decreased mucosal wave and mild stiffness; this correlates with perceptual findings of consistent, mild roughness. Current voice quality is likely due to a combination of subtle swelling, stiffness and non-optimal coordination of respiration and phonation. \"    SUBJECTIVE:  Since the patient's last session, they report the following:     Overall symptoms are a little better    SOVTs are helpful as a warm up and a cool down     Experiences what she describes as \"mini throw ups\" and inquires about a GI consult    Asks for clarifications about alternatives to throat clearing    OBJECTIVE:  PATIENT REPORTED MEASURES:  Patient Supplied Answers To SLP QOL Questionnaire  Therapy Quality of Life 2/11/2020   Since my l ast session, I used the speech therapy exercises and strategies as recommended by my speech pathologist. Agree   I feel that using my therapy techniques has become a habit Agree   I feel confident in my ability to manage my current and future symptoms. Neither agree nor disagree   Since my last session I feel my symptoms have --------. Improved   Overall, since starting therapy I " "feel my symptoms are --------. Better   Overall, how much better? A little better     Patient Specific Goal Metrics:  Dysponia SLP Goals 1/21/2020 2/11/2020   How much does your voice problem bother you? Very Much Somewhat       THERAPEUTIC ACTIVITIES    Counseling and Education:    Asked many questions about the nature of her symptoms, and I answered all of these thoroughly.    Exercises in techniques for improved airflow during phonation    Progressed to easy onset/ flow, and blending phrases    Progress to a counting exercise with initial fricative consonants.     Instructed with a descending 5th; this was helpful.    [place] techniques to reduce glottal monroy and improve breath flow; negative practice improved awareness today.    Conversational Training Therapy     Clear Speech principles targeted    Patient was able to demonstrate \"clear speech\" with mild to moderate clinician support    Negative practice targeting awareness of forward locus of resonance, through alternation between target voice quality and habitual voice quality    Patient was able to articulate the differences between two voice qualities, ultimately labeling them to promote patient ownership over therapy targets. Patient referred to them as:    Target voice - Breath Motivated Voice    Feels - better    Sounds - clearer, slightly higher    Habitual voice - [place] Voice    Feels - normal, familiar    Sounds - low     Patient was able to alternate effectively between these two voices with 75% accuracy and mild to moderate clinician support.    Chronic cough / throat clearing reduction therapy    Suppression and substitution strategies were instructed including    Swallowing substitution techniques    Breathing suppression techniques to reduce laryngeal tension    Low impact glottic coup and soft cough    Techniques to raise awareness of habitual throat clearing    Additionally she was instructed to keep a log of what circumstances are eliciting cough " "/ throat clear    Manual laryngeal massage was performed:    Significant tenderness of the thyrohyoid space with corresponding reduction of space     Thyrohyoid space and base of tongue were targeted with gentle circular massage    Gentle lateralization of the larynx and sternocleidomastoid massage was also performed.    Patient was trained to focus on intentional relaxation of jaw and tongue in addition to area of massage during these maneuvers.    Comfortably quiet forward resonant /m/ on descending glides was utilized throughout massage    Self-massage was instructed and patient was able to demonstrate this with acceptable accuracy  Exercises and techniques for optimal vocal hygiene including:    Management of Laryngopharyngeal Reflux disease (LPRD)    Dietary alterations which may reduce liklihood of reflux events    Avoiding eating or drinking within 2-3 hours of bedtime    Raising the headboard of the bed by 3-4 inches    Avoiding eating and drinking immediately prior to rigorous activity        A regimen for home practice was instructed.    I provided handouts of today's therapeutic activities to facilitate practice.    ASSESSMENT/PLAN  PROGRESS TOWARD LONG TERM GOALS:   Adequate progress; too early for objective measures    IMPRESSIONS: R49.0 (Dysphonia) in the context of subtle mid-membranous swelling and stiffness of the true vocal folds bilaterally. Ms. Louis demonstrated good progress today. She reported feeling many \"mini throw ups\" that make her want to throat clear. We discuss LPRD and lifestyle changes that can be made to manage this. I suggested she reach out to her PCP about seeing a GI specialist.     PLAN: I will see Ms. Louis in 1 month at which point we will continue working with CTT and flow phonation concepts.   For practice goals see AVS.       TOTAL SERVICE TIME: 60 minutes   TREATMENT (58784): 60 minutes  NO CHARGE FACILITY FEE (22562)      SANDIP Logan (music), M.A., " ISAI-SLP  Speech Language Pathology Clinical Fellow  [facility] Trained Vocologist   [facility]   [phone]  [email or web address]    ---I attest that the services performed were provided by a clinical fellow with my indirect supervision. ---    Sanaz King M.M. (voice), M.A., CCC/SLP  Speech-Language Pathologist  [facility] Trained Vocologist  [facility]  [phone]  [email or web address]  Prounouns: she/her

## 2020-02-11 ENCOUNTER — OFFICE VISIT (OUTPATIENT)
Dept: OTOLARYNGOLOGY | Facility: CLINIC | Age: 20
End: 2020-02-11
Payer: COMMERCIAL

## 2020-02-11 DIAGNOSIS — R49.0 DYSPHONIA: Primary | ICD-10-CM

## 2020-02-11 NOTE — LETTER
"2/11/2020       RE: Batool Louis  [address] [place] [address]     Dear Colleague,    Thank you for referring your patient, Batool Louis, to the [facility] at [facility]. Please see a copy of my visit note below.    [facility] VOICE CLINIC  THERAPY NOTE (CPT 50169)    Patient: Batool Louis  Date of Service: 2/11/2020  Referring physician: Self referred  Impressions from most recent evaluation:(1/21/2020)  \"IMPRESSIONS: Batool Louis is presenting today with R49.0 (Dysphonia) in the context of subtle mid-membranous swelling and stiffness of the true vocal folds bilaterally.  Laryngeal examination reveals frequent collection of thickened secretions on the true vocal folds that clear easily, but reform quickly. Subtle swelling and thickening is observed in the mid-membranous portion of the true vocal folds bilaterally. Functionally, mild anterior-posterior supraglottic constriction and non-optimal coordination of respiration and phonation is observed with connected speech. Coordination is improved in singing. Stroboscopy reveals a mildly decreased mucosal wave and mild stiffness; this correlates with perceptual findings of consistent, mild roughness. Current voice quality is likely due to a combination of subtle swelling, stiffness and non-optimal coordination of respiration and phonation. \"    SUBJECTIVE:  Since the patient's last session, they report the following:     Overall symptoms are a little better    SOVTs are helpful as a warm up and a cool down     Experiences what she describes as \"mini throw ups\" and inquires about a GI consult    Asks for clarifications about alternatives to throat clearing    OBJECTIVE:  PATIENT REPORTED MEASURES:  Patient Supplied Answers To SLP QOL Questionnaire  Therapy Quality of Life 2/11/2020   Since my l ast session, I used the speech therapy exercises and strategies as recommended by my speech " "pathologist. Agree   I feel that using my therapy techniques has become a habit Agree   I feel confident in my ability to manage my current and future symptoms. Neither agree nor disagree   Since my last session I feel my symptoms have --------. Improved   Overall, since starting therapy I feel my symptoms are --------. Better   Overall, how much better? A little better     Patient Specific Goal Metrics:  Dysponia SLP Goals 1/21/2020 2/11/2020   How much does your voice problem bother you? Very Much Somewhat       THERAPEUTIC ACTIVITIES    Counseling and Education:    Asked many questions about the nature of her symptoms, and I answered all of these thoroughly.    Exercises in techniques for improved airflow during phonation    Progressed to easy onset/ flow, and blending phrases    Progress to a counting exercise with initial fricative consonants.     Instructed with a descending 5th; this was helpful.    [place] techniques to reduce glottal monroy and improve breath flow; negative practice improved awareness today.    Conversational Training Therapy     Clear Speech principles targeted    Patient was able to demonstrate \"clear speech\" with mild to moderate clinician support    Negative practice targeting awareness of forward locus of resonance, through alternation between target voice quality and habitual voice quality    Patient was able to articulate the differences between two voice qualities, ultimately labeling them to promote patient ownership over therapy targets. Patient referred to them as:    Target voice - Breath Motivated Voice    Feels - better    Sounds - clearer, slightly higher    Habitual voice - Monroy Voice    Feels - normal, familiar    Sounds - low     Patient was able to alternate effectively between these two voices with 75% accuracy and mild to moderate clinician support.    Chronic cough / throat clearing reduction therapy    Suppression and substitution strategies were instructed " "including    Swallowing substitution techniques    Breathing suppression techniques to reduce laryngeal tension    Low impact glottic coup and soft cough    Techniques to raise awareness of habitual throat clearing    Additionally she was instructed to keep a log of what circumstances are eliciting cough / throat clear    Manual laryngeal massage was performed:    Significant tenderness of the thyrohyoid space with corresponding reduction of space     Thyrohyoid space and base of tongue were targeted with gentle circular massage    Gentle lateralization of the larynx and sternocleidomastoid massage was also performed.    Patient was trained to focus on intentional relaxation of jaw and tongue in addition to area of massage during these maneuvers.    Comfortably quiet forward resonant /m/ on descending glides was utilized throughout massage    Self-massage was instructed and patient was able to demonstrate this with acceptable accuracy  Exercises and techniques for optimal vocal hygiene including:    Management of Laryngopharyngeal Reflux disease (LPRD)    Dietary alterations which may reduce liklihood of reflux events    Avoiding eating or drinking within 2-3 hours of bedtime    Raising the headboard of the bed by 3-4 inches    Avoiding eating and drinking immediately prior to rigorous activity        A regimen for home practice was instructed.    I provided handouts of today's therapeutic activities to facilitate practice.    ASSESSMENT/PLAN  PROGRESS TOWARD LONG TERM GOALS:   Adequate progress; too early for objective measures    IMPRESSIONS: R49.0 (Dysphonia) in the context of subtle mid-membranous swelling and stiffness of the true vocal folds bilaterally. Ms. Louis demonstrated good progress today. She reported feeling many \"mini throw ups\" that make her want to throat clear. We discuss LPRD and lifestyle changes that can be made to manage this. I suggested she reach out to her PCP about seeing a GI " "specialist.     PLAN: I will see Ms. Louis in 1 month at which point we will continue working with CTT and flow phonation concepts.   For practice goals see AVS.       TOTAL SERVICE TIME: 60 minutes   TREATMENT (31048): 60 minutes  NO CHARGE FACILITY FEE (94135)      SANDIP Logan (music), M.A., CFY-SLP  Speech Language Pathology Clinical Fellow  [facility] Trained Vocologist   [facility]   [phone]  [email or web address]      After Visit Summary    Patient: Batool Louis  Date of Visit: 2/11/2020  Hygiene:     Systemic Hydration: internal hydration of the entire body  ? Keep sipping water throughout the day  ? Make sure you are having the same amount of water as you do dehydrating beverages       Topical Hydration: hydration for the surface mucosa of the larynx and vocal folds.    Please follow strategies learned on the \"Tips for Topical Hydration\" handout       Nasal Irrigation (Jeovanny Med Sinus Rinse Bottle)/Nasal Spray  ? Sinus rinse bottle (nasal irrigation) - Morning   ? Saline spray: 3 puffs in each nostril; sniff and then blow your nose       Gargling  ? 2x/day (AM/PM and as needed throughout the day)  ? Gargle with a  Voice  ? Gargle with a voice and tilt head side to side  ? Gargle alternating with [place]        Voice    Semi-occluded vocal tract exercise:   ? Bubbles (straw in 1 to 1.5  of water) 5x/day for 2-3 minutes:  ? 3x: blow 10-15 seconds with no voice and keep bubbles consistent. Paying attention to how relaxed/open/easy it feels in your throat.  ? 3x: blow bubbles and add a sustained  who  or an  oo  (G3 ) think about your voice as a laser pointer sending it out to where your lips touch the straw  ? Work with the bubbles in intervals   ? 3x: blow bubbles and glide from high to low  ? 3x: blow bubbles and vary  who  gliding up and down             Up and down like a sine wave     These exercises are great for:                                *Instructed on the " "importance of using these exercises as a warm-up / cool down,  and to re-calibrate the voice throughout the day.                                *tissue mobilization exercise - Improving the condition and pliability of the vocal folds.                                *Abdominal breathing and applying optimal breath flow to speech/singing.       Massage: as needed throughout the day. Recommend doing in full 1x/day and following up with a warm compress     Voice:  Non Optimal \"Monroy voice\"  Optimal: \"Breath Motivated\"    Think about practicing with these two voices with negative practice (doing it the wrong way then the right way)  -Focusing on how these two voices SOUND and FEEl different  -\"I take a breath and then I speak on that air\"      SANDIP Logan (kendal), M.A., CFY-SLP  Speech Language Pathology Clinical Fellow  [facility] Trained Vocologist   [facility]   [phone]  [email or web address]        Again, thank you for allowing me to participate in the care of your patient.      Sincerely,    Nadiya Tate SLP      "

## 2020-02-11 NOTE — PROGRESS NOTES
"After Visit Summary    Patient: Batool Louis  Date of Visit: 2/11/2020  Hygiene:     Systemic Hydration: internal hydration of the entire body  ? Keep sipping water throughout the day  ? Make sure you are having the same amount of water as you do dehydrating beverages       Topical Hydration: hydration for the surface mucosa of the larynx and vocal folds.    Please follow strategies learned on the \"Tips for Topical Hydration\" handout       Nasal Irrigation (Jeovanny Med Sinus Rinse Bottle)/Nasal Spray  ? Sinus rinse bottle (nasal irrigation) - Morning   ? Saline spray: 3 puffs in each nostril; sniff and then blow your nose       Gargling  ? 2x/day (AM/PM and as needed throughout the day)  ? Gargle with a  Voice  ? Gargle with a voice and tilt head side to side  ? Gargle alternating with [place]        Voice    Semi-occluded vocal tract exercise:   ? Bubbles (straw in 1 to 1.5  of water) 5x/day for 2-3 minutes:  ? 3x: blow 10-15 seconds with no voice and keep bubbles consistent. Paying attention to how relaxed/open/easy it feels in your throat.  ? 3x: blow bubbles and add a sustained  who  or an  oo  (G3 ) think about your voice as a laser pointer sending it out to where your lips touch the straw  ? Work with the bubbles in intervals   ? 3x: blow bubbles and glide from high to low  ? 3x: blow bubbles and vary  who  gliding up and down             Up and down like a sine wave     These exercises are great for:                                *Instructed on the importance of using these exercises as a warm-up / cool down,  and to re-calibrate the voice throughout the day.                                *tissue mobilization exercise - Improving the condition and pliability of the vocal folds.                                *Abdominal breathing and applying optimal breath flow to speech/singing.       Massage: as needed throughout the day. Recommend doing in full 1x/day and following up with a warm compress " "    Voice:  Non Optimal \"Monroy voice\"  Optimal: \"Breath Motivated\"    Think about practicing with these two voices with negative practice (doing it the wrong way then the right way)  -Focusing on how these two voices SOUND and FEEl different  -\"I take a breath and then I speak on that air\"      SANDIP Logan (kendal), M.A., CFY-SLP  Speech Language Pathology Clinical Fellow  [facility] Trained Vocologist   [facility] Voice [facility]   [phone]  [email or web address]      "

## 2020-02-27 NOTE — TELEPHONE ENCOUNTER
FUTURE VISIT INFORMATION      FUTURE VISIT INFORMATION:    Date: 3/19/20    Time: 11 AM    Location: [facility]  REFERRAL INFORMATION:    Referring provider:  Dr. Nancy Donnelly    Referring providers clinic:  [facility]    Reason for visit/diagnosis: Tonsil Stones    RECORDS REQUESTED FROM:       Clinic name Comments Records Status Imaging Status   [facility] 4/3/19 - OV with Dr. Donnelly (4/15/19 telephone referral)  12/13/17, 8/27/18 - OV with Mary Alice Reagan NP [facility] 2/11/20 - SPEECH OV with Nadiya Thomas    [facility] 6/7/18 - OV with Dr. Tabitha Thomas

## 2020-03-12 ENCOUNTER — OFFICE VISIT (OUTPATIENT)
Dept: FAMILY MEDICINE | Facility: OTHER | Age: 20
End: 2020-03-12
Payer: COMMERCIAL

## 2020-03-12 VITALS
OXYGEN SATURATION: 97 % | RESPIRATION RATE: 16 BRPM | SYSTOLIC BLOOD PRESSURE: 128 MMHG | HEART RATE: 90 BPM | WEIGHT: 230 LBS | HEIGHT: 69 IN | BODY MASS INDEX: 34.07 KG/M2 | TEMPERATURE: 98.6 F | DIASTOLIC BLOOD PRESSURE: 76 MMHG

## 2020-03-12 DIAGNOSIS — Z23 NEED FOR PROPHYLACTIC VACCINATION AND INOCULATION AGAINST INFLUENZA: ICD-10-CM

## 2020-03-12 DIAGNOSIS — N89.8 VAGINAL ITCHING: ICD-10-CM

## 2020-03-12 DIAGNOSIS — N91.1 SECONDARY AMENORRHEA: ICD-10-CM

## 2020-03-12 DIAGNOSIS — F41.9 ANXIETY: ICD-10-CM

## 2020-03-12 DIAGNOSIS — R11.0 NAUSEA: Primary | ICD-10-CM

## 2020-03-12 LAB
SPECIMEN SOURCE: NORMAL
WET PREP SPEC: NORMAL

## 2020-03-12 PROCEDURE — 99214 OFFICE O/P EST MOD 30 MIN: CPT | Mod: 25 | Performed by: NURSE PRACTITIONER

## 2020-03-12 PROCEDURE — 90686 IIV4 VACC NO PRSV 0.5 ML IM: CPT | Performed by: NURSE PRACTITIONER

## 2020-03-12 PROCEDURE — 90471 IMMUNIZATION ADMIN: CPT | Performed by: NURSE PRACTITIONER

## 2020-03-12 PROCEDURE — 87210 SMEAR WET MOUNT SALINE/INK: CPT | Performed by: NURSE PRACTITIONER

## 2020-03-12 RX ORDER — MICONAZOLE NITRATE 2 %
1 CREAM WITH APPLICATOR VAGINAL AT BEDTIME
Qty: 45 G | Refills: 0 | Status: SHIPPED | OUTPATIENT
Start: 2020-03-12 | End: 2020-04-30

## 2020-03-12 RX ORDER — MEDROXYPROGESTERONE ACETATE 10 MG
10 TABLET ORAL DAILY
Qty: 10 TABLET | Refills: 0 | Status: SHIPPED | OUTPATIENT
Start: 2020-03-12 | End: 2020-04-30

## 2020-03-12 ASSESSMENT — ANXIETY QUESTIONNAIRES
1. FEELING NERVOUS, ANXIOUS, OR ON EDGE: MORE THAN HALF THE DAYS
IF YOU CHECKED OFF ANY PROBLEMS ON THIS QUESTIONNAIRE, HOW DIFFICULT HAVE THESE PROBLEMS MADE IT FOR YOU TO DO YOUR WORK, TAKE CARE OF THINGS AT HOME, OR GET ALONG WITH OTHER PEOPLE: SOMEWHAT DIFFICULT
2. NOT BEING ABLE TO STOP OR CONTROL WORRYING: SEVERAL DAYS
7. FEELING AFRAID AS IF SOMETHING AWFUL MIGHT HAPPEN: SEVERAL DAYS
3. WORRYING TOO MUCH ABOUT DIFFERENT THINGS: SEVERAL DAYS
GAD7 TOTAL SCORE: 7
5. BEING SO RESTLESS THAT IT IS HARD TO SIT STILL: NOT AT ALL
6. BECOMING EASILY ANNOYED OR IRRITABLE: SEVERAL DAYS

## 2020-03-12 ASSESSMENT — PAIN SCALES - GENERAL: PAINLEVEL: NO PAIN (0)

## 2020-03-12 ASSESSMENT — PATIENT HEALTH QUESTIONNAIRE - PHQ9
SUM OF ALL RESPONSES TO PHQ QUESTIONS 1-9: 10
5. POOR APPETITE OR OVEREATING: SEVERAL DAYS

## 2020-03-12 ASSESSMENT — MIFFLIN-ST. JEOR: SCORE: 1872.65

## 2020-03-12 NOTE — PROGRESS NOTES
"Subjective     Batool Louis is a 19 year old female who presents to clinic today for the following health issues:  Chief Complaint   Patient presents with     Vaginal Problem     Cough     with vomiting off and on- Wondering if she has GERD       HPI       Vaginal Symptoms  Onset:     Description:  Vaginal Discharge: none   Itching (Pruritis): YES  Burning sensation:  no   Odor: YES    Accompanying Signs & Symptoms:  Pain with Urination: no   Abdominal Pain: no   Fever: no     History:   Sexually active: no   New Partner: no   Possibility of Pregnancy:  No    Precipitating factors:   Recent Antibiotic Use: no     Alleviating factors:  NA     Vaginal symptoms x 4 weeks.   Itching.  No STD concerns.  Is not currently sexually active and negative testing last summer.  Did start a new body wash.      Gastrointestinal symptoms      Duration: 1 year     Description:           Nausea and vomiting.  Intermittent     Intensity:  moderate    Accompanying signs and symptoms:  none    History  Previous {similar problem: YES  Previous evaluation:  She was seen for this in clinic 1 year ago.  Labs were unremarkable, she was started on Ranitidine.  She can't recall if this helped or not. She isn't sure why she stopped this.      Aggravating factors: certain foods - spicy foods, citrus makes this worse     Alleviating factors: nothing    Other Therapies tried: None     No pain or reflux symptoms.  No bleeding.  Doesn't seem to notice if certain foods make her nausea worse, but does report \"upset stomach\" with spicy foods and citrus.  No fevers/chills.  She is seeing a speech language pathologist for chronic hoarseness and they suggested she may have GERD.       She would also like a refill of her Povera.  This was prescribed by OB/GYN last year for secondary amenorrhea.  She was instructed to take if she failed to get her menses for 90 days or longer.  That has only happened once since last year, but she needs a refill " "now.      Also requesting a new referral for counseling due to increased anxiety symptoms.     Review of Systems   ROS COMP: Constitutional, HEENT, cardiovascular, pulmonary, gi and gu systems are negative, except as otherwise noted.      Objective    /76 (BP Location: Left arm, Patient Position: Sitting, Cuff Size: Adult Regular)   Pulse 90   Temp 98.6  F (37  C) (Temporal)   Resp 16   Ht 1.753 m (5' 9\")   Wt 104.3 kg (230 lb)   LMP 12/23/2019   SpO2 97%   Breastfeeding No   BMI 33.97 kg/m    Body mass index is 33.97 kg/m .  Physical Exam   GENERAL: alert, no distress and obese  NECK: no adenopathy, no asymmetry, masses, or scars   RESP: lungs clear to auscultation - no rales, rhonchi or wheezes  CV: regular rate and rhythm, normal S1 S2, no S3 or S4, no murmur, click or rub,   ABDOMEN: soft, nontender, no hepatosplenomegaly, no masses and bowel sounds normal  MS: no gross musculoskeletal defects noted, no edema  PSYCH: mentation appears normal, anxious, judgement and insight intact and appearance well groomed    Diagnostic Test Results:  Office Visit on 03/12/2020   Component Date Value Ref Range Status     Specimen Description 03/12/2020 Vagina   Final     Wet Prep 03/12/2020 No Trichomonas seen   Final     Wet Prep 03/12/2020 No clue cells seen   Final     Wet Prep 03/12/2020 No yeast seen   Final     Wet Prep 03/12/2020    Final                    Value:WBC'S seen  Few             Assessment & Plan     1. Nausea  Will try Omeprazole.  If symptoms fail to improve, may need further evaluation with upper endoscopy.  Follow up in 2-3 months for recheck.   - omeprazole (PRILOSEC) 20 MG DR capsule; Take 1 capsule (20 mg) by mouth daily  Dispense: 30 capsule; Refill: 1    2. Vaginal itching  I advised she stop using the new body wash.  Try of yeast medication.  If symptoms fail to resolve, will need further evaluation.   - Wet prep  - miconazole (MICATIN) 2 % cream; Place 1 applicator vaginally At " Bedtime for 7 days  Dispense: 45 g; Refill: 0    3. Secondary amenorrhea  - medroxyPROGESTERone (PROVERA) 10 MG tablet; Take 1 tablet (10 mg) by mouth daily  Dispense: 10 tablet; Refill: 0    4. Anxiety  - MENTAL HEALTH REFERRAL  - Adult; Outpatient Treatment; Individual/Couples/Family/Group Therapy/Health Psychology; [facility]: [facility] [phone]; We will contact you to schedule the appointment or please call with any questions    5. Need for prophylactic vaccination and inoculation against influenza  - INFLUENZA VACCINE IM > 6 MONTHS VALENT IIV4 [78438]       See Patient Instructions    Return in about 4 weeks (around 4/9/2020) for Recheck only if not improving.    RONNY Vaz [facility]

## 2020-03-12 NOTE — NURSING NOTE
Health Maintenance Due   Topic Date Due     CHLAMYDIA SCREENING  1998     HIV SCREENING  08/04/2013     PREVENTIVE CARE VISIT  12/13/2018     PAP  08/04/2019     INFLUENZA VACCINE (1) 09/01/2019     PHQ-2  01/01/2020      Health Maintenance reviewed at today's visit patient asked to schedule/complete:   Routine Health Visit:  Patient agrees to schedule  Cervical Cancer:  Patient agrees to schedule  Immunizations:  Patient agrees to schedule     Tiana Velazquez LPN........3/12/2020 2:30 PM

## 2020-03-12 NOTE — PATIENT INSTRUCTIONS
Take Omeprazole once a day on an empty stomach.  Take for 4-6 weeks and then wean off this if you can.     You are due for a pap smear.      I refilled the Provera.    Use the vaginal ointment for your itching and see if that helps.

## 2020-03-13 ASSESSMENT — ANXIETY QUESTIONNAIRES: GAD7 TOTAL SCORE: 7

## 2020-03-19 ENCOUNTER — VIRTUAL VISIT (OUTPATIENT)
Dept: OTOLARYNGOLOGY | Facility: CLINIC | Age: 20
End: 2020-03-19
Payer: COMMERCIAL

## 2020-03-19 ENCOUNTER — PRE VISIT (OUTPATIENT)
Dept: OTOLARYNGOLOGY | Facility: CLINIC | Age: 20
End: 2020-03-19

## 2020-03-19 DIAGNOSIS — R49.0 DYSPHONIA: Primary | ICD-10-CM

## 2020-03-19 NOTE — PROGRESS NOTES
"After Visit Summary    Patient: Batool Louis  Date of Visit: 3/19/2020    Hygiene:     Gargling  o Continuing gargling 2x/day (once in the AM and once in PM)  o Using salt water solution for gargling (spit it don't swallow)      Relieving Extrinsic Muscle Discomfort:      Massage  o Please follow instructions as learned today and provided on handout  o As needed       Voice     Semi-occluded vocal tract exercise:   ? Bubbles (straw in 1 to 1.5  of water) 5x/day for 2-3 minutes:  ? First thing in AM and then every ~2 hours for just a few minutes  ? 3x: blow 10-15 seconds with no voice and keep bubbles consistent. Paying attention to how relaxed/open/easy it feels in your throat.  ? 3x: blow bubbles and add a sustained  who  or an  oo  (G3 ) think about your voice as a laser pointer sending it out to where your lips touch the straw  ? Work with the bubbles in intervals   ? 3x: blow bubbles and glide from high to low  ? 3x: blow bubbles and vary  who  gliding up and down             Up and down like a sine wave  -Can practice rep with bubbling. Bubble a phrase then sing a phrase. Try to keep feeling of ease in both.      These exercises are great for:                              *Instructed on the importance of using these exercises as a warm-up / cool down,  and to re-calibrate the voice throughout the day.                                *tissue mobilization exercise - Improving the condition and pliability of the vocal folds.                                *Abdominal breathing and applying optimal breath flow to speech/singing.     Voice:  Non Optimal \"Mon[place] voice\"  Optimal: \"Breath Motivated\"     Think about practicing with these two voices with negative practice (doing it the wrong way then the right way)  -Focusing on how these two voices SOUND and FEEl different  -\"I take a breath and then I speak on that air\"      SANDIP Logan (music), M.A., CFY-SLP  Speech Language Pathology Clinical Fellow  [facility] " Trained Vocologist   [facility] Voice [facility]   [phone]  [email or web address]

## 2020-03-19 NOTE — PROGRESS NOTES
"[facility] VOICE CLINIC  TELEPHONE VISIT ([id number])    Patient: Batool Louis  Date of Service: 3/19/2020  Referring physician: Self referred  Impressions from most recent evaluation:(1/21/2020)  \"IMPRESSIONS: Batool Louis is presenting today with R49.0 (Dysphonia) in the context of subtle mid-membranous swelling and stiffness of the true vocal folds bilaterally.  Laryngeal examination reveals frequent collection of thickened secretions on the true vocal folds that clear easily, but reform quickly. Subtle swelling and thickening is observed in the mid-membranous portion of the true vocal folds bilaterally. Functionally, mild anterior-posterior supraglottic constriction and non-optimal coordination of respiration and phonation is observed with connected speech. Coordination is improved in singing. Stroboscopy reveals a mildly decreased mucosal wave and mild stiffness; this correlates with perceptual findings of consistent, mild roughness. Current voice quality is likely due to a combination of subtle swelling, stiffness and non-optimal coordination of respiration and phonation. \"    Today's return visit was conducted via telephone given COVID-19 concerns. I only spoke with Ms. Louis on the phone today. Phone call contact time: 13:00-13:25.       SUBJECTIVE:  Since the patient's last session, they report the following:     Overall symptoms are about the same    Had an URI twice in the last few weeks    After doing SOVT exercises she has more of an awareness of how her voice feels     Warming up before voice lessons has helped immensely     THERAPEUTIC ACTIVITIES    Counseling and Education:    Asked many questions about the nature of her symptoms, and I answered all of these thoroughly.    Asked to send another electronic version of massage handout. I emailed this to her per her request.     Reviewed previous exercises:  Semi-Occluded Vocal Tract (SOVT) exercises instructed to reduce laryngeal tension, " "promote vocal fold pliability, and coordinate respiration and phonation    Straw phonation with water resistance was found to be most facilitating     Sustained phonation, and voice vs. voiceless productions used to promote easy voicing and raise awareness of laryngeal tension    Ascending and descending glides utilized to promote vocal fold pliability    Instructed on the benefits of using these exercises for improved coordination of breath flow with phonation and tissue mobilization.    Instructed on the importance of using these exercises as a warm-up / cool down,  and to re-calibrate the voice throughout the day.      Conversational Training Therapy   ? Clear Speech principles targeted    Patient was able to demonstrate \"clear speech\" with mild to moderate clinician support  ? Negative practice targeting awareness of forward locus of resonance, through alternation between target voice quality and habitual voice quality    Patient was able to articulate the differences between two voice qualities, ultimately labeling them to promote patient ownership over therapy targets. Patient referred to them as:    Target voice - Breath Motivated Voice    Feels - better    Sounds - clearer, slightly higher    Habitual voice - [place]roy Voice    Feels - normal, familiar    Sounds - low     Patient was able to alternate effectively between these two voices with 80% accuracy and mild to moderate clinician support in sentences of increasing length.           A regimen for home practice was instructed.    I provided handouts (via email) of today's therapeutic activities to facilitate practice.    ASSESSMENT/PLAN  PROGRESS TOWARD LONG TERM GOALS:   Adequate but incomplete progress; please see above    IMPRESSIONS: R49.0 (Dysphonia) in the context of subtle mid-membranous swelling and stiffness of the true vocal folds bilaterally. Ms. Louis reported increased awareness since her last sessions. Stated she notices when she slips into " "the \"marie voice\" and is able to recalibrate to the \"breath motivated\" voice. A plan for continued independent practice was created.     PLAN: I will see Ms. Louis in 4 weeks, at which point we will reassess her progress.   For practice goals see AVS.       TOTAL SERVICE TIME: 25 minutes  TELEPHONE ([id number])  Phone Call Contact Time: 13:00-13:25  NO CHARGE FACILITY FEE ([id number])      SANDIP Logan (music), M.A., CFY-SLP  Speech Language Pathology Clinical Fellow  [facility] Trained Vocologist   [facility]   [phone]  [email or web address]    ---I attest that the services performed were provided by a clinical fellow with my indirect supervision. ---    Sanaz King M.M. (voice) MJacquelineA., CCC/SLP  Speech-Language Pathologist  [facility] Trained Vocologist  [facility]  [phone]  [email or web address]  Prounouns: she/her      "

## 2020-03-19 NOTE — PROGRESS NOTES
"After Visit Summary    Patient:   Date of Visit:     ***Hygiene:     Systemic Hydration: internal hydration of the entire body  o ***  o       Topical Hydration: hydration for the surface mucosa of the larynx and vocal folds.    Please follow strategies learned on the \"Tips for Topical Hydration\" handout  o ***  o     Nasal Irrigation/Nasal Spray  o ***  o Sinus rinse bottle (nasal irrigation) - Morning and night  o Saline spray: 3 puffs in each nostril; sniff and then blow your nose    Gargling  o ***  o       Reflux  o ***      ***Relieving Extrinsic Muscle Discomfort:    Stretches  o Please follow instructions as learned today and provided on handout  o ***  o     Massage  o Please follow instructions as learned today and provided on handout  o ***  o Base of tongue stretches: try covering with your hands to practice outside of home.    ***Throat irritation/Cough and Throat clearing suppression:   Trigger: ***       Sip of water     Gargle  o With a voice  o Tilt your head side to side  o Derma chirp -  kakakaaa kakakaaa     Swallow    Hum + swallow    Breathe in through rounded lips + out with a repeated  sh   + swallow    Soft throat clear,  kakaka , or \"eh, eh, eh\"  +  swallow    Suck on a lozenge with Pectin (avoid mint or menthol) or a sugar-free candy, gum, \"wet\" snacks (apples, pineapple, grapes, etc).  Also consider Xylitol products, like the Spry brand of lozenges, gum, spray    Puppy Sniffs - 2-3 small quick sniffs through the nose and exhale with  sh     massage    Wait \"urge surf\"    ***Breathing:    In the morning and evening (twice daily) for 2-5 minutes:   o Breathe while lying on your back with your face and knees up. Hands on tummy and chest.  Take a breath in with rounded lips and exhale with a  ***    o Inhale  = Inflate; exhale = deflate  o 3x each: try breathin in/8 out, 5/10, 3/4  o Throughout the day (2-3x/day for just a couple minutes) check breathing while keeping shoulders relaxed " "(riding to and from school, etc.)  o Pulsed breathing Exercise (2-3x/day):   3x each  - Sh, Sh, Sh...  - S, S, S...  - F, F, F.....  - Now, all three combined Sh, Sh, Sh, S, S, S, F, F, F (tummy IN for each sound)  o ***  Rescue Breathing Techniques:  1. Breathing in through rounded lips and out with a \"sh\"  2. Breathing in through the nose and out with \"sh\"  3. Repeated sniffs/inhales through rounded lips and out with a \"sh\"       Breathing Tips:  ? ***  ? Tongue behind the lower teeth  ? Tongue up when exposed to cold air  ? Keep shoulders down and chest relaxed  ? Don t overextend your neck    Lead from your chest    Keeping head upright   ? Feel the weight in your elbows  ? Keep breathing when you stop an activity    Finding recovery pose    Hands behind the back    Hands on the knees standing    Elbows on the knees seated  ? Thera-band use during practices  ? Match your breathing rate with the rate of step   ? Inhale/ hold 1-2 seconds/ exhale  ? Breathe through turns   ? ***     ***Voice (2-3x/day unless otherwise noted):    Semi-occluded vocal tract exercise:   o Bubbles (straw in 1 to 1.5  of water) ***x/day for 30-60 seconds:  o ***x: blow 10-15 seconds with no voice and keep bubbles consistent.  o 3x: blow bubbles and add a sustained  who  or an  oo  (*** )  o 3x: blow bubbles and vary  who  gliding up and down             Up and down like a sine wave  o 3x: blow bubbles on a sustained/ varied pitch soft to loud to soft (messa di voce)    o 1-2x: Happy birthday bubbles (keep connected)  These exercises are great for:    *Instructed on the importance of using these exercises as a warm-up / cool down,  and to re-calibrate the voice throughout the day.    *tissue mobilization exercise - Improving the condition and pliability of the vocal folds.    *Abdominal breathing and applying optimal breath flow to speech/singing.     To improve coordination between breath flow and sound production:     u  words (2-3 x " per day)  o 5 words with use of arm or finger  o Breathe first and add a  yawn & sigh  shape to sound  o ***     Spacious speech phrases  (2-3x per day)  o Second column - H+ vowel combinations   o First column  o ***    H phrases  o Lots of breath in the  h   o Use arms to help with  yawn &sigh *** rainbow  shape to sound  o ***    Forrest Phrases  o ***    Blending Phrases  o ***    To improve resonance/ forward focus    N+ vowels     n  words  o hold onto the  n  at the beginning of the word  o 5-1  o ***  o ***     n  sentences  o ***  o ***    M+ vowels     m  words  o ***     m  sentences  o ***    Kenia mariner Passage  o ***    Mobile Passage  o Take breaths at punctuation  o Use an arc pattern  o ***    Grandfather Passage  o ***    Instructions for Life  o ***    I know an old lady  o ***    SANDIP Logan (music) MIRIS., CFY-SLP  Speech Language Pathology Clinical Fellow  [facility] Trained Vocologist   [facility]   [phone]  [email or web address]

## 2020-03-23 ENCOUNTER — MYC MEDICAL ADVICE (OUTPATIENT)
Dept: FAMILY MEDICINE | Facility: OTHER | Age: 20
End: 2020-03-23

## 2020-04-15 ENCOUNTER — TELEPHONE (OUTPATIENT)
Dept: OTOLARYNGOLOGY | Facility: CLINIC | Age: 20
End: 2020-04-15

## 2020-04-15 NOTE — TELEPHONE ENCOUNTER
Called patient and left a VM. Informed them that 4 /21 appointment with Dr. Lima will be a video visit, due to COVID-19. Explained that they can expect a call from clinic prior to appointment to help get technology set up.     Provided ENT call center number for patient to call back if questions arise.

## 2020-04-17 ENCOUNTER — TELEPHONE (OUTPATIENT)
Dept: OTOLARYNGOLOGY | Facility: CLINIC | Age: 20
End: 2020-04-17

## 2020-04-17 NOTE — TELEPHONE ENCOUNTER
LVM regarding patient's appointment on 4/21/20 at 1:00pm. Requested a call back from patient to discuss set up for virtual visit. If patient returns call, please verify when they will be available to go through this process.    Ina Lundberg EMT

## 2020-04-20 ENCOUNTER — MYC MEDICAL ADVICE (OUTPATIENT)
Dept: OTOLARYNGOLOGY | Facility: CLINIC | Age: 20
End: 2020-04-20

## 2020-04-20 NOTE — TELEPHONE ENCOUNTER
LVM regarding patient's appointment on 4/21/20 at 1:00pm. Requested a call back from patient to discuss set up for virtual visit. If patient returns call, please verify when they will be available to go through this process. Will also send [employer] message.    Ina Lundberg, EMT

## 2020-04-21 ENCOUNTER — VIRTUAL VISIT (OUTPATIENT)
Dept: OTOLARYNGOLOGY | Facility: CLINIC | Age: 20
End: 2020-04-21
Payer: COMMERCIAL

## 2020-04-21 VITALS — BODY MASS INDEX: 35.55 KG/M2 | HEIGHT: 69 IN | WEIGHT: 240 LBS

## 2020-04-21 DIAGNOSIS — J35.8 TONSIL STONE: ICD-10-CM

## 2020-04-21 DIAGNOSIS — R49.0 DYSPHONIA: Primary | ICD-10-CM

## 2020-04-21 DIAGNOSIS — J35.01 CHRONIC TONSILLITIS: Primary | ICD-10-CM

## 2020-04-21 ASSESSMENT — PAIN SCALES - GENERAL: PAINLEVEL: NO PAIN (0)

## 2020-04-21 ASSESSMENT — MIFFLIN-ST. JEOR: SCORE: 1918.01

## 2020-04-21 NOTE — PROGRESS NOTES
"Batool Louis is a 19 year old female who is being evaluated via a billable telephone visit.      The patient has been notified and verbally consented to the following:     \"This telephone visit will be conducted between you and your provider.\"     \"Patient has opted to conduct today's telephone visit vs an in-person appointment, and is not able to attend due to possible exposure to COVID-19\"    If during the course of the call the provider feels a telephone visit is not appropriate, you will not be charged for this service.\"     Call initiated at: 10:05am        [facility] VOICE CLINIC  THERAPY NOTE ([id number])    Patient: Batool Louis  Date of Service: 4/21/2020  Referring physician: Self referred  Impressions from most recent evaluation:(1/21/2020)  \"IMPRESSIONS: Batool Louis is presenting today with R49.0 (Dysphonia) in the context of subtle mid-membranous swelling and stiffness of the true vocal folds bilaterally.  Laryngeal examination reveals frequent collection of thickened secretions on the true vocal folds that clear easily, but reform quickly. Subtle swelling and thickening is observed in the mid-membranous portion of the true vocal folds bilaterally. Functionally, mild anterior-posterior supraglottic constriction and non-optimal coordination of respiration and phonation is observed with connected speech. Coordination is improved in singing. Stroboscopy reveals a mildly decreased mucosal wave and mild stiffness; this correlates with perceptual findings of consistent, mild roughness. Current voice quality is likely due to a combination of subtle swelling, stiffness and non-optimal coordination of respiration and phonation. \"      SUBJECTIVE:  Since the patient's last session, they report the following:   Overall symptoms are better  Moved to a new house and allergies acting up; this making the voice/throat feel scratchy and dry  Not working as much  Still experiencing vocal fatigue when " "singing in head voice  Difficulty transitioning from chest to head voice; working a little bit on this with       OBJECTIVE:  PATIENT REPORTED MEASURES:  Patient Supplied Answers To Last 2 VHI Questionnaires  Voice Handicap Index (VHI-10) 1/21/2020 4/21/2020   My voice makes it difficult for people to hear me 1 0   People have difficulty understanding me in a noisy room 0 0   My voice difficulties restrict my personal and social life.  3 0   I feel left out of conversations because of my voice 1 0   My voice problem causes me to lose income 2 0   I feel as though I have to strain to produce voice 2 3   The clarity of my voice is unpredictable 2 3   My voice problem upsets me 4 3   My voice makes me feel handicapped 4 2   People ask, \"What's wrong with your voice?\" 3 3   VHI-10 22 14     Effort to Produce Singing Voice (10 most effortful)  Chest Voice- 0  Head Voice-8 or 9     THERAPEUTIC ACTIVITIES    Counseling and Education:    Asked many questions about the nature of her symptoms, and I answered all of these thoroughly.      Exercises and techniques for optimal vocal hygiene including:    Systemic hydration, including strategies for increasing daily water intake    Topical hydration - Gargling (saline and plain water), saline nasal irrigation, humidification, steam, guaifenesin to reduce the thickened secretions / laryngeal irritation.    Environmental barriers to healthy voicing - noise, inhaled irritants, room acoustics    Use of electronic amplification to reduce vocal fold impact    Awareness and reduction of phonotraumatic behaviors    Moderating voice use    Substituting non-voice alternative behaviors    Avoiding cough and throat clearing      A regimen for home practice was instructed.  I provided handouts (via email) of today's therapeutic activities to facilitate practice.      ASSESSMENT/PLAN  PROGRESS TOWARD LONG TERM GOALS:   Adequate but incomplete progress; please see " above    IMPRESSIONS: R49.0 (Dysphonia) in the context of subtle mid-membranous swelling and stiffness of the true vocal folds bilaterally.    PLAN: I will see Ms. Louis in 2 weeks, at which point we will complete exercises for the singing voice, specifically those working on finding a balance between the CT and TA muscles.   For practice goals see AVS.       TOTAL SERVICE TIME: 38 minutes  Call Initiated at: 10:05am  Call Ended at: 10:43am    CPT Billing Codes:   TREATMENT (44018)  NO CHARGE FACILITY FEE ([id number])        SANDIP Logan (music), M.A., CFY-SLP  Speech Language Pathology Clinical Fellow  [facility] Trained Vocologist   [facility]   [phone]  [email or web address]    ---I attest that the services performed were provided by a clinical fellow with my indirect supervision. ---    Sanaz King M.M. (voice) MIRIS., CCC/SLP  Speech-Language Pathologist  [facility] Trained Vocologist  [facility]  [phone]  [email or web address]  Prounouns: she/her      *This report was created in part through the use of computerized dictation software, and though reviewed following completion, some typographic errors may persist.  If there is confusion regarding any of this notes contents, please contact me for clarification.

## 2020-04-21 NOTE — TELEPHONE ENCOUNTER
[facility] Call Center    Phone Message    May a detailed message be left on voicemail: no     Reason for Call: Other: Patients mother left a separate number for contact, #[phone] sisters number. She stated the patietns current phone is having some tech issues. Patients # [id number]. Please follow up if needed, Thanks     Action Taken: Message routed to:  [facility] ENT    Travel Screening: Not Applicable

## 2020-04-21 NOTE — PROGRESS NOTES
"Batool Louis is a 19 year old female who is being evaluated via a billable video visit.      The patient has been notified of following:     \"This video visit will be conducted via a call between you and your physician/provider. We have found that certain health care needs can be provided without the need for an in-person physical exam.  This service lets us provide the care you need with a video conversation.  If a prescription is necessary we can send it directly to your pharmacy.  If lab work is needed we can place an order for that and you can then stop by our lab to have the test done at a later time.    Video visits are billed at different rates depending on your insurance coverage.  Please reach out to your insurance provider with any questions.    If during the course of the call the physician/provider feels a video visit is not appropriate, you will not be charged for this service.\"    Patient has given verbal consent for Video visit? Yes    How would you like to obtain your AVS? Todd    Patient would like the video invitation sent by: Send to e-mail at: [email or web address] or [phone]    Will anyone else be joining your video visit? No      Video Start Time: 1:05 pm      Video-Visit Details    Type of service:  Video Visit    Video End Time (time video stopped): 1:35 pm    Originating Location (pt. Location): Home    Distant Location (provider location):  [facility]     Mode of Communication:  Video Conference via Doximity Video Call    Selam Lima MD    Otolaryngology Adult Consultation    Patient: Batool Louis  : 2000    HPI:  Batool Louis is a 19 year old female seen today in the Otolaryngology Clinic for tonsils stones and tonsillar hypertrophy.  Patient reports that she has had issues with tonsil stones since she was 4 years old.  She has seen ENT before when she was younger.  The ENT did clean out quite a few tonsil stones.  They discussed " tonsillectomy at that time as well but patient and her parents decided not to proceed.  Since then patient reports that she has been self-cleaning her tonsils.  She typically cleans about once a month.  Tonsil stones do cause her significant halitosis.  They are quite irritating.  Additionally she is wondering if they are contributing to her snoring.  She does think she might have sleep apnea.  Both of her parents have sleep apnea and she is been told that she stops breathing in her sleep.  She has never had a sleep study.      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  She does have questions about tonsillectomy and affecct that has on voice.    Medications:  Current Outpatient Rx   Medication Sig Dispense Refill     cetirizine (ZYRTEC) 10 MG tablet Take 10 mg by mouth Reported on 3/7/2017       Cholecalciferol (D 5000) 5000 UNITS TABS Reported on 3/7/2017       Homeopathic Products (ZICAM ALLERGY RELIEF NA)        omeprazole (PRILOSEC) 20 MG DR capsule Take 1 capsule (20 mg) by mouth daily 30 capsule 1     medroxyPROGESTERone (PROVERA) 10 MG tablet Take 1 tablet (10 mg) by mouth daily 10 tablet 0     ranitidine (ZANTAC) 150 MG capsule Take 1 capsule (150 mg) by mouth 2 times daily (Patient not taking: Reported on 3/12/2020) 30 capsule 0     sertraline (ZOLOFT) 50 MG tablet Take 1 tablet (50 mg) by mouth daily (Patient not taking: Reported on 3/12/2020) 30 tablet 0     triamcinolone (KENALOG) 0.1 % external cream Apply topically 2 times daily External genitalia (Patient not taking: Reported on 3/12/2020) 30 g 3       Allergies: Sulfamethoxazole-trimethoprim     PMH:  Past Medical History:   Diagnosis Date     Amenorrhea, secondary        PSH:  Past Surgical History:   Procedure Laterality Date     NO HISTORY OF SURGERY         FH:  Family History   Problem Relation Age of Onset     Anemia Mother         unknown reason     Asthma Sister      Back Pain Maternal Grandmother      Alzheimer Disease Maternal  Grandfather      Skin Cancer Paternal Great-Grandfather         SH:  Social History     Tobacco Use     Smoking status: Never Smoker     Smokeless tobacco: Never Used   Substance Use Topics     Alcohol use: Yes     Frequency: Monthly or less     Drinks per session: 1 or 2     Binge frequency: Never     Drug use: No       Review of Systems  [facility] ROS 4/21/2020   Constitutional Problems with sleep   Neurology Headache   Psychology Frequently feeling anxious   Ears, Nose, Throat Nasal congestion or drainage, Sore throat, Hoarseness   Gastrointestinal/Genitourinary Constipation, Diarrhea   Allergy/Immunology Allergies or hay fever       Physical Exam:    GEN:  The patient is alert, oriented and in no acute distress.  HEAD:  Head, face scalp is grossly normal.  ORAL:  Oral cavity shows healthy mucosa with out ulceration, masses or other lesions                involving the tongue, palate, buccal mucosa, floor of mouth or gingiva.  Tonsils appear to be 2-1/2-3+.  They are quite cryptic.    Assessment/Plan: Patient presents with chronic tonsil stones as well as tonsillar hypertrophy.  We discussed the pathophysiology of tonsil stones.  I discussed with her options for management of the tonsil stones.  This includes observation and self-cleaning versus tonsillectomy.  Pros and cons of each option were discussed with the patient.Risks of surgery were discussed with the patient ,which include sever pain, bleeding 7-10 days after surgery (~8% risk), tongue numbness, tongue swelling, taste change, VPI, and nasopharyngeal stenosis. Expected post-operative recovery was also discussed and includes soft diet, pain control, and time off of work (7-14 days).    I discussed with the patient that there may be some slight voice/resonance change with a tonsillectomy in terms of proceeding.  Surgery is not being done on her vocal cords so I do not expect that there would be a significant change.    Her other complaint today is possible  obstructive sleep apnea.  Typically in order to evaluate this I would recommend a sleep study.  If we decide to proceed with tonsillectomy and the patient is still snoring significantly afterwards and I would want to set her up for a sleep study for formal evaluation.    I am going to go ahead and place surgical orders for tonsillectomy and possible adenoidectomy.  Patient is going to talk to her parents and make her decision.  At this time she is leaning towards tonsillectomy.  She understands that at this time we are not scheduling elective procedures but will give her a call once we are able to.

## 2020-04-30 ENCOUNTER — VIRTUAL VISIT (OUTPATIENT)
Dept: FAMILY MEDICINE | Facility: CLINIC | Age: 20
End: 2020-04-30
Payer: COMMERCIAL

## 2020-04-30 ENCOUNTER — MYC MEDICAL ADVICE (OUTPATIENT)
Dept: FAMILY MEDICINE | Facility: OTHER | Age: 20
End: 2020-04-30

## 2020-04-30 DIAGNOSIS — R11.0 NAUSEA: ICD-10-CM

## 2020-04-30 DIAGNOSIS — R21 RASH AND NONSPECIFIC SKIN ERUPTION: Primary | ICD-10-CM

## 2020-04-30 PROCEDURE — 99213 OFFICE O/P EST LOW 20 MIN: CPT | Mod: 95 | Performed by: NURSE PRACTITIONER

## 2020-04-30 RX ORDER — MUPIROCIN 20 MG/G
OINTMENT TOPICAL 2 TIMES DAILY
Qty: 15 G | Refills: 0 | Status: SHIPPED | OUTPATIENT
Start: 2020-04-30 | End: 2020-06-22

## 2020-04-30 RX ORDER — TRIAMCINOLONE ACETONIDE 1 MG/G
OINTMENT TOPICAL 2 TIMES DAILY
Qty: 15 G | Refills: 0 | Status: SHIPPED | OUTPATIENT
Start: 2020-04-30 | End: 2020-06-22

## 2020-04-30 ASSESSMENT — PATIENT HEALTH QUESTIONNAIRE - PHQ9: SUM OF ALL RESPONSES TO PHQ QUESTIONS 1-9: 6

## 2020-04-30 ASSESSMENT — PAIN SCALES - GENERAL: PAINLEVEL: NO PAIN (0)

## 2020-04-30 NOTE — PROGRESS NOTES
"Batool Louis is a 19 year old female who is being evaluated via a billable telephone visit.      The patient has been notified of following:     \"This telephone visit will be conducted via a call between you and your physician/provider. We have found that certain health care needs can be provided without the need for a physical exam.  This service lets us provide the care you need with a short phone conversation.  If a prescription is necessary we can send it directly to your pharmacy.  If lab work is needed we can place an order for that and you can then stop by our lab to have the test done at a later time.    Telephone visits are billed at different rates depending on your insurance coverage. During this emergency period, for some insurers they may be billed the same as an in-person visit.  Please reach out to your insurance provider with any questions.    If during the course of the call the physician/provider feels a telephone visit is not appropriate, you will not be charged for this service.\"    Patient has given verbal consent for Telephone visit?  Yes    How would you like to obtain your AVS? Mychart    Subjective     Batool Louis is a 19 year old female who presents to clinic today for the following health issues:    HPI    Chief Complaint   Patient presents with     Mass     Lump in left groin/vaginal area      Recheck Medication     Omeprazole       Concern - Lump in groin area   Onset: About a month     Description:   Lump under skin in left groin vaginal area     Intensity: mild    Progression of Symptoms:  Worsening: Increased in size and skin is slightly red     Accompanying Signs & Symptoms:  Denied pain     Previous history of similar problem:   No     Precipitating factors:   Worsened by: NA     Alleviating factors:  Improved by: NA     Therapies Tried and outcome: None     Lump in the left groin/upper thigh area.  It has been there about a month.  She can't really say if it is " getting worse at all, but it is not improving.  Slightly red, but not significant.  No pain or drainage.  No fevers and she has not been ill recently.      Needs a refill of her Omeprazole.  Takes this for nausea associated with reflux.  It works well.            Review of Systems   ROS COMP: Constitutional, HEENT, cardiovascular, pulmonary, gi and gu systems are negative, except as otherwise noted.       Objective   Reported vitals:  There were no vitals taken for this visit.   healthy, alert and no distress  PSYCH: Alert and oriented times 3; coherent speech, normal   rate and volume, able to articulate logical thoughts, able   to abstract reason, no tangential thoughts, no hallucinations   or delusions  Her affect is normal  RESP: No cough, no audible wheezing, able to talk in full sentences  Remainder of exam unable to be completed due to telephone visits    Diagnostic Test Results:  none         Assessment/Plan:  1. Rash and nonspecific skin eruption  Unclear etiology without an in clinic examination. These are being limited due to COVID.  It doesn't sound like an abscess based on the description she is giving.  It is not painful.  Will try a topical antibiotic and a topical steroid and reassess in 2 weeks.  If still not improving, may need to come into clinic for further evaluation.   - mupirocin (BACTROBAN) 2 % external ointment; Apply topically 2 times daily  Dispense: 15 g; Refill: 0  - triamcinolone (KENALOG) 0.1 % external ointment; Apply topically 2 times daily  Dispense: 15 g; Refill: 0    2. Nausea  Improved on Omeprazole.  Will continue.   - omeprazole (PRILOSEC) 20 MG DR capsule; Take 1 capsule (20 mg) by mouth daily  Dispense: 90 capsule; Refill: 1    No follow-ups on file.      Phone call duration:  11 minutes    RONNY Vaz CNP

## 2020-04-30 NOTE — NURSING NOTE
Health Maintenance Due   Topic Date Due     CHLAMYDIA SCREENING  1998     HIV SCREENING  08/04/2013     PREVENTIVE CARE VISIT  12/13/2018     PAP  08/04/2019      Tiana Velazquez LPN........4/30/2020 11:49 AM

## 2020-05-05 NOTE — PROGRESS NOTES
"Batool Louis is a 19 year old female who is being evaluated via a billable video  visit.      The patient has been notified and verbally consented to the following:     \"This video  visit will be conducted between you and your provider.\"     \"Patient has opted to conduct today's video visit vs an in-person appointment, and is not able to attend due to possible exposure to COVID-19\"    If during the course of the call the provider feels a video visit is not appropriate, you will not be charged for this service.\"     Call initiated at: 3:02pm  Type of Video Platform Used: [facility] VOICE CLINIC  THERAPY NOTE (25911)    Patient: Batool Louis  Date of Service: 5/7/2020  Referring physician: Self referred  Impressions from most recent evaluation:(1/21/2020)  \"IMPRESSIONS: Batool Louis is presenting today with R49.0 (Dysphonia) in the context of subtle mid-membranous swelling and stiffness of the true vocal folds bilaterally.  Laryngeal examination reveals frequent collection of thickened secretions on the true vocal folds that clear easily, but reform quickly. Subtle swelling and thickening is observed in the mid-membranous portion of the true vocal folds bilaterally. Functionally, mild anterior-posterior supraglottic constriction and non-optimal coordination of respiration and phonation is observed with connected speech. Coordination is improved in singing. Stroboscopy reveals a mildly decreased mucosal wave and mild stiffness; this correlates with perceptual findings of consistent, mild roughness. Current voice quality is likely due to a combination of subtle swelling, stiffness and non-optimal coordination of respiration and phonation. \"      SUBJECTIVE:  Since the patient's last session, they report the following:     Overall symptoms are better    Steam and gargling is helpful    Setting phone alarms for frequent gargling is helpful for consistent practice    Negative practice " "is very helpful; she finds this helpful to increase awareness      OBJECTIVE:  PATIENT REPORTED MEASURES:  Patient Specific Goal Metrics:  Dysponia SLP Goals 1/21/2020 2/11/2020 5/7/2020   How much does your voice problem bother you? Very Much Somewhat A little bit  The patient rates this in terms of her speaking voice. Her singing voice (specifically the transition between her lower and upper ranges) bothers her very much.       THERAPEUTIC ACTIVITIES    Counseling and Education:    Asked many questions about the nature of her symptoms, and I answered all of these thoroughly.    Exercises to maintain the improved airflow and forward focus in singing  o did a variety of glides, scales, and arpeggio patterns on a variety of neutral syllables including:  - 1-5-1  - 1-3-5-8-5-3-1  - 1-3-1-5-1  o Alternating with SOVT exercises was facilitating today to improve safe, easy access in the upper register, as well as  promote stability in the middle register and reduce hyperfunction and minimize passaggio breaks  o Used SOVT exercises (in the form of a lip trill) in increasing intervals, beginning with a 2nd and expanding to an octave.   o Patient benefited from using a \"baseball arm motion\" during the exercise as a kinesthetic cue to keep breath energy throughout interval  o Educated the patient on the physiologic purpose of the CT and TA muscles and how these muscles help with changing pitch   o \"who\" and \"whoop\" (/wh/ glides), were utilized to help improve balance between CT and TA muscles with 1-8-1, 1-3-5-8-5-3-1      A regimen for home practice was instructed.  I provided handouts (via email) of today's therapeutic activities to facilitate practice.      ASSESSMENT/PLAN  PROGRESS TOWARD LONG TERM GOALS:   Adequate progress; please see above    IMPRESSIONS: R49.0 (Dysphonia) in the context of subtle mid-membranous swelling and stiffness of the true vocal folds bilaterally. Ms. Louis demonstrated good learning " today. She did have difficulty finding a balance between the CT and TA muscles, which resulted in frequent pitch breaks in the area between A4-D5.     PLAN: I will see Ms. Louis in 3 weeks, at which point we will continue with exercises to help improve balance between the CT and TA muscle.   For practice goals see AVS.       TOTAL SERVICE TIME: 42 minutes  Call Initiated at: 3:02pm  Call Ended at: 3:44pm    CPT Billing Codes:   TREATMENT (26729)  NO CHARGE FACILITY FEE (20969)      SANDIP Logan (music), M.A., CFY-SLP  Speech Language Pathology Clinical Fellow  [facility] Trained Vocologist   [facility]   [phone]  [email or web address]    ---I attest that the services performed were provided by a clinical fellow with my indirect supervision. ---    Sanaz King M.M. (voice), M.A., CCC/SLP  Speech-Language Pathologist  [place] Trained Vocologist  [facility]  [phone]  [email or web address]  Prounouns: she/her      *This report was created in part through the use of computerized dictation software, and though reviewed following completion, some typographic errors may persist.  If there is confusion regarding any of this notes contents, please contact me for clarification.

## 2020-05-07 ENCOUNTER — VIRTUAL VISIT (OUTPATIENT)
Dept: OTOLARYNGOLOGY | Facility: CLINIC | Age: 20
End: 2020-05-07
Payer: COMMERCIAL

## 2020-05-07 DIAGNOSIS — R49.0 DYSPHONIA: Primary | ICD-10-CM

## 2020-05-13 ENCOUNTER — HOSPITAL ENCOUNTER (OUTPATIENT)
Facility: AMBULATORY SURGERY CENTER | Age: 20
End: 2020-05-13
Attending: OTOLARYNGOLOGY
Payer: COMMERCIAL

## 2020-05-13 ENCOUNTER — TELEPHONE (OUTPATIENT)
Dept: OTOLARYNGOLOGY | Facility: CLINIC | Age: 20
End: 2020-05-13

## 2020-05-13 DIAGNOSIS — J35.01 CHRONIC TONSILLITIS: ICD-10-CM

## 2020-05-13 DIAGNOSIS — Z11.59 ENCOUNTER FOR SCREENING FOR OTHER VIRAL DISEASES: Primary | ICD-10-CM

## 2020-05-13 NOTE — TELEPHONE ENCOUNTER
Talked with Dodie and she stated that she has not yet talked with her parent regarding moving forward with the tonsillectomy. Patient would like to discuss with patients and then call back. Asked patient to contact us once she is ready to move forward.      Marielena Lundberg   Perioperative Coordinator  Department of Otolaryngology    Office: [phone]

## 2020-05-13 NOTE — TELEPHONE ENCOUNTER
Called patient to schedule surgery with Dr. Lima.   Date of Surgery: 06/03/2020  Location: [facility] OR     PAC or PCP:  PCP - [facility]    Post op: 3 weeks post op     Imaging: N/A     Surgery packet given: Mailed out 5/13/2020    Surgery teaching completed: CHRIS Kingston will complete     Sent to Prior Authorization Team: 04/22/2020      Patient verbalized understanding to the below...   You will be receiving a phone call to set up COVID-19 curbside testing at a [facility] location.  Pre-op testing is required and needs to be completed 48 - 72 hours prior to surgery.  If this is not completed, your surgery will be canceled.  After testing is completed, you are required to isolate yourself until the morning of surgery.  If you have questions about testing, please contact your RN or surgery coordinator.       Marielena Lundberg   Perioperative Coordinator   Department of Otolaryngology  P: [phone]

## 2020-05-14 ENCOUNTER — MYC MEDICAL ADVICE (OUTPATIENT)
Dept: FAMILY MEDICINE | Facility: OTHER | Age: 20
End: 2020-05-14

## 2020-05-15 NOTE — TELEPHONE ENCOUNTER
Reason for Call:  Other appointment    Detailed comments: please advise if appointment should be a face to face.  Was seen 4/30/2020.  Tried medication for a lump in her groin, and there has been no change.     Phone Number Patient can be reached at: Cell number on file:    Telephone Information:   Mobile [phone]       Best Time: as soon as possible,    Can we leave a detailed message on this number? YES    Call taken on 5/15/2020 at 8:40 AM by Vane Bryan

## 2020-05-15 NOTE — TELEPHONE ENCOUNTER
Sent a [employer] msg informing to set up a face to face appt.  ................Wilbert Qureshi LPN,   May 15, 2020,      4:54 PM,   [facility]

## 2020-05-19 DIAGNOSIS — R06.83 SNORING: Primary | ICD-10-CM

## 2020-05-26 ENCOUNTER — VIRTUAL VISIT (OUTPATIENT)
Dept: PSYCHOLOGY | Facility: CLINIC | Age: 20
End: 2020-05-26
Payer: COMMERCIAL

## 2020-05-26 DIAGNOSIS — F41.1 GENERALIZED ANXIETY DISORDER: Primary | ICD-10-CM

## 2020-05-26 PROCEDURE — 90791 PSYCH DIAGNOSTIC EVALUATION: CPT | Mod: 95 | Performed by: COUNSELOR
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
## 2020-05-29 ENCOUNTER — VIRTUAL VISIT (OUTPATIENT)
Dept: OTOLARYNGOLOGY | Facility: CLINIC | Age: 20
End: 2020-05-29
Payer: COMMERCIAL

## 2020-05-29 DIAGNOSIS — R49.0 DYSPHONIA: Primary | ICD-10-CM

## 2020-05-29 NOTE — LETTER
"5/29/2020       RE: Batool Louis  [address] [place] [address]     Dear Colleague,    Thank you for referring your patient, Batool Louis, to the [facility] VOICE at [facility]. Please see a copy of my visit note below.    Batool Louis is a 19 year old female who is being evaluated via a billable video  visit.      The patient has been notified and verbally consented to the following:     \"This video  visit will be conducted between you and your provider.\"     \"Patient has opted to conduct today's video visit vs an in-person appointment, and is not able to attend due to possible exposure to COVID-19\"    If during the course of the call the provider feels a video visit is not appropriate, you will not be charged for this service.\"     Call initiated at: 2:00pm  Type of Video Platform Used: [facility] VOICE CLINIC  THERAPY NOTE ([id number])    Patient: Batool Louis  Date of Service: 5/29/2020  Referring physician: Self referred  Impressions from most recent evaluation:(1/21/2020)  \"IMPRESSIONS: Batool Louis is presenting today with R49.0 (Dysphonia) in the context of subtle mid-membranous swelling and stiffness of the true vocal folds bilaterally.  Laryngeal examination reveals frequent collection of thickened secretions on the true vocal folds that clear easily, but reform quickly. Subtle swelling and thickening is observed in the mid-membranous portion of the true vocal folds bilaterally. Functionally, mild anterior-posterior supraglottic constriction and non-optimal coordination of respiration and phonation is observed with connected speech. Coordination is improved in singing. Stroboscopy reveals a mildly decreased mucosal wave and mild stiffness; this correlates with perceptual findings of consistent, mild roughness. Current voice quality is likely due to a combination of subtle swelling, stiffness and non-optimal " "coordination of respiration and phonation. \"      SUBJECTIVE:  Since the patient's last session, they report the following:     Overall symptoms are a little better    Feeling and hearing more stability in her first passaggio    Unfortunately, her keyboard broke so she has not been able to practice with this    Occasional feeling of soreness that she describes as a muscular soreness when practicing a lot     Has decided not to have the tonsillectomy at this time    OBJECTIVE:  PATIENT REPORTED MEASURES:  Voice Quality:    Intermittent, minimal to mild roughness    Intermittent to frequent pitch breaks and instability in her first passaggio around Ab4-C5    Patient Specific Goal Metrics:  Dysponia SLP Goals 2020   How much does your voice problem bother you? Very Much Somewhat A little bit       THERAPEUTIC ACTIVITIES    Counseling and Education:    Asked many questions about the nature of her symptoms, and I answered all of these thoroughly.      Manual laryngeal massage was performed:    Significant tenderness of the thyrohyoid space with corresponding reduction of space     Thyrohyoid space and base of tongue were targeted with gentle circular massage    Gentle lateralization of the larynx and sternocleidomastoid massage was also performed.    Patient was trained to focus on intentional relaxation of jaw and tongue in addition to area of massage during these maneuvers.    Comfortably quiet forward resonant /m/ on descending glides was utilized throughout massage    Self-massage was instructed and patient was able to demonstrate this with acceptable accuracy    Exercises to maintain the improved airflow and forward focus in singing  ? did a variety of glides, scales, and arpeggio patterns on a variety of neutral syllables includin-5-1    1-3-5-8-5-3-1    1-3-1-5-1  ? Alternating with SOVT exercises was facilitating today to improve safe, easy access in the upper register, as well as  " "promote stability in the middle register and reduce hyperfunction and minimize passaggio breaks  ? Used SOVT exercises (in the form of a lip trill) in with an interval of a 3rd moving into and out of her passaggio  ? Patient benefited from using a \"baseball arm motion\" during the exercise as a kinesthetic cue to keep breath energy throughout interval  ? She also benefited from thinking about the sound being more \"focused\" as she ascending into her first passaggio  ? Educated the patient on the physiologic purpose of the CT and TA muscles and how these muscles help with changing pitch   ? \"who\" and \"whoop\" (/wh/ glides), were utilized to help improve balance between CT and TA muscles with 1-8-1, 1-3-5-8-5-3-1      A regimen for home practice was instructed.  I provided handouts (via email) of today's therapeutic activities to facilitate practice.      ASSESSMENT/PLAN  PROGRESS TOWARD LONG TERM GOALS:   Adequate progress; too early for objective measures    IMPRESSIONS: R49.0 (Dysphonia) in the context of subtle mid-membranous swelling and stiffness of the true vocal folds bilaterally. Ms. Louis demonstrated good learning. She was able to intermittently obtain a better balance in her singing voice, however, her awareness was modest. A plan for independent practice was created.     PLAN: I will see Ms. Louis in 2-3 weeks, at which point we will continue with exercises to help improve balance between the CT and TA muscles in her first passaggio of her singing voice.   For practice goals see AVS.       TOTAL SERVICE TIME: 45 minutes  Call Initiated at: 2:00pm  Call Ended at: 2:45pm    CPT Billing Codes:   TREATMENT (92948)  NO CHARGE FACILITY FEE ([id number])     SANDIP Logan (music), M.A., CCC-SLP  Speech Language Pathology Clinical Fellow  [facility] Trained Vocologist   [facility]   [phone]  [email or web address]      *This report was created in part through the use of computerized " dictation software, and though reviewed following completion, some typographic errors may persist.  If there is confusion regarding any of this notes contents, please contact me for clarification.            Again, thank you for allowing me to participate in the care of your patient.      Sincerely,    Nadiya Tate, SLP

## 2020-05-29 NOTE — PROGRESS NOTES
"Batool Louis is a 19 year old female who is being evaluated via a billable video  visit.      The patient has been notified and verbally consented to the following:     \"This video  visit will be conducted between you and your provider.\"     \"Patient has opted to conduct today's video visit vs an in-person appointment, and is not able to attend due to possible exposure to COVID-19\"    If during the course of the call the provider feels a video visit is not appropriate, you will not be charged for this service.\"     Call initiated at: 2:00pm  Type of Video Platform Used: [facility] VOICE CLINIC  THERAPY NOTE (52129)    Patient: Batool Louis  Date of Service: 5/29/2020  Referring physician: Self referred  Impressions from most recent evaluation:(1/21/2020)  \"IMPRESSIONS: Batool Louis is presenting today with R49.0 (Dysphonia) in the context of subtle mid-membranous swelling and stiffness of the true vocal folds bilaterally.  Laryngeal examination reveals frequent collection of thickened secretions on the true vocal folds that clear easily, but reform quickly. Subtle swelling and thickening is observed in the mid-membranous portion of the true vocal folds bilaterally. Functionally, mild anterior-posterior supraglottic constriction and non-optimal coordination of respiration and phonation is observed with connected speech. Coordination is improved in singing. Stroboscopy reveals a mildly decreased mucosal wave and mild stiffness; this correlates with perceptual findings of consistent, mild roughness. Current voice quality is likely due to a combination of subtle swelling, stiffness and non-optimal coordination of respiration and phonation. \"      SUBJECTIVE:  Since the patient's last session, they report the following:     Overall symptoms are a little better    Feeling and hearing more stability in her first passaggio    Unfortunately, her keyboard broke so she has not been able to " "practice with this    Occasional feeling of soreness that she describes as a muscular soreness when practicing a lot     Has decided not to have the tonsillectomy at this time    OBJECTIVE:  PATIENT REPORTED MEASURES:  Voice Quality:    Intermittent, minimal to mild roughness    Intermittent to frequent pitch breaks and instability in her first passaggio around Ab4-C5    Patient Specific Goal Metrics:  Dysponia SLP Goals 2020   How much does your voice problem bother you? Very Much Somewhat A little bit       THERAPEUTIC ACTIVITIES    Counseling and Education:    Asked many questions about the nature of her symptoms, and I answered all of these thoroughly.      Manual laryngeal massage was performed:    Significant tenderness of the thyrohyoid space with corresponding reduction of space     Thyrohyoid space and base of tongue were targeted with gentle circular massage    Gentle lateralization of the larynx and sternocleidomastoid massage was also performed.    Patient was trained to focus on intentional relaxation of jaw and tongue in addition to area of massage during these maneuvers.    Comfortably quiet forward resonant /m/ on descending glides was utilized throughout massage    Self-massage was instructed and patient was able to demonstrate this with acceptable accuracy    Exercises to maintain the improved airflow and forward focus in singing  ? did a variety of glides, scales, and arpeggio patterns on a variety of neutral syllables includin-5-1    1-3-5-8-5-3-1    1-3-1-5-1  ? Alternating with SOVT exercises was facilitating today to improve safe, easy access in the upper register, as well as  promote stability in the middle register and reduce hyperfunction and minimize passaggio breaks  ? Used SOVT exercises (in the form of a lip trill) in with an interval of a 3rd moving into and out of her passaggio  ? Patient benefited from using a \"baseball arm motion\" during the exercise " "as a kinesthetic cue to keep breath energy throughout interval  ? She also benefited from thinking about the sound being more \"focused\" as she ascending into her first passaggio  ? Educated the patient on the physiologic purpose of the CT and TA muscles and how these muscles help with changing pitch   ? \"who\" and \"whoop\" (/wh/ glides), were utilized to help improve balance between CT and TA muscles with 1-8-1, 1-3-5-8-5-3-1      A regimen for home practice was instructed.  I provided handouts (via email) of today's therapeutic activities to facilitate practice.      ASSESSMENT/PLAN  PROGRESS TOWARD LONG TERM GOALS:   Adequate progress; too early for objective measures    IMPRESSIONS: R49.0 (Dysphonia) in the context of subtle mid-membranous swelling and stiffness of the true vocal folds bilaterally. Ms. Louis demonstrated good learning. She was able to intermittently obtain a better balance in her singing voice, however, her awareness was modest. A plan for independent practice was created.     PLAN: I will see Ms. Louis in 2-3 weeks, at which point we will continue with exercises to help improve balance between the CT and TA muscles in her first passaggio of her singing voice.   For practice goals see AVS.       TOTAL SERVICE TIME: 45 minutes  Call Initiated at: 2:00pm  Call Ended at: 2:45pm    CPT Billing Codes:   TREATMENT (56682)  NO CHARGE FACILITY FEE (49207)     SANDIP Logan (music), M.A., CCC-SLP  Speech Language Pathology Clinical Fellow  [facility] Trained Vocologist   [facility]   [phone]  [email or web address]    ---I attest that the services performed were provided by a clinical fellow with my indirect supervision. ---    Sanaz King M.M. (voice), MIRIS., CCC/SLP  Speech-Language Pathologist  [facility] Trained Vocologist  [facility]  [phone]  [email or web address]  Prounouns: she/her    *This report was created in part through the use of computerized " dictation software, and though reviewed following completion, some typographic errors may persist.  If there is confusion regarding any of this notes contents, please contact me for clarification.

## 2020-06-03 ENCOUNTER — TELEPHONE (OUTPATIENT)
Dept: SLEEP MEDICINE | Facility: CLINIC | Age: 20
End: 2020-06-03

## 2020-06-03 NOTE — TELEPHONE ENCOUNTER
Left a voice mail to schedule a video visit consult. The order is in the patient's account. Please schedule a VIDEO VISIT.    Alisha Delgadillo

## 2020-06-12 ENCOUNTER — VIRTUAL VISIT (OUTPATIENT)
Dept: OTOLARYNGOLOGY | Facility: CLINIC | Age: 20
End: 2020-06-12
Payer: COMMERCIAL

## 2020-06-12 ENCOUNTER — VIRTUAL VISIT (OUTPATIENT)
Dept: PSYCHOLOGY | Facility: CLINIC | Age: 20
End: 2020-06-12
Payer: COMMERCIAL

## 2020-06-12 DIAGNOSIS — F41.1 GENERALIZED ANXIETY DISORDER: Primary | ICD-10-CM

## 2020-06-12 DIAGNOSIS — R49.0 DYSPHONIA: Primary | ICD-10-CM

## 2020-06-12 PROCEDURE — 90837 PSYTX W PT 60 MINUTES: CPT | Mod: 95 | Performed by: COUNSELOR

## 2020-06-12 NOTE — PROGRESS NOTES
Progress Note    Client Name: Batool Louis  Date: 6/12/20         Service Type: Individual      Session Start Time: 9:30am   Session End Time:  10:30am      Session Length:   60mins     Session #: 13     Attendees: Client    Treatment Plan Last Reviewed:  6/12/20  PHQ-9 / SHELBY-7 : see chart     DATA      Progress Since Last Session (Related to Symptoms / Goals / Homework):   Symptoms: somewhat improved    Homework: Completed in session      Episode of Care Goals: Satisfactory progress - PREPARATION (Decided to change - considering how); Intervened by negotiating a change plan and determining options / strategies for behavior change, identifying triggers, exploring social supports, and working towards setting a date to begin behavior change     Current / Ongoing Stressors and Concerns:   Dodie stated she tried worry time and struggled because she found out her paternal grandpa has three types of cancer this week. Dodie talked about how she's been struggling with if she should tell her grandpa about her sexual orientation or not. She talked about how her immediate family members reacted when she told them. She also gave examples of certain family members reactions to things from the news regarding sexual identity or orientation stating her extended family members are mostly conservative.      Treatment Objective(s) Addressed in This Session:   use at least 2 coping skills for anxiety management in the next 2 weeks  identify 5 traits or charcteristics you like about yourself       Intervention:   Processed through events from the week. Educated client about healthy boundaries, communication skills, and continued to affirm the client's ability to be as skillful as she can even if her family doesn't respond the way she hopes they will.           ASSESSMENT: Current Emotional / Mental Status (status of significant symptoms):   Risk status (Self / Other harm or suicidal  ideation)   Client denies current fears or concerns for personal safety.   Client denies current or recent suicidal ideation or behaviors.   Client denies current or recent homicidal ideation or behaviors.   Client denies current or recent self injurious behavior or ideation.   Client denies other safety concerns.   A safety and risk management plan has not been developed at this time, however client was given the after-hours number / 911 should there be a change in any of these risk factors.     Appearance:   Appropriate    Eye Contact:   Good    Psychomotor Behavior: Normal    Attitude:   Cooperative    Orientation:   All   Speech    Rate / Production: Normal     Volume:  Normal    Mood:    Normal   Affect:    Appropriate    Thought Content:  Clear    Thought Form:  Coherent  Logical    Insight:    Good      Medication Review:   No changes to current psychiatric medication(s)     Medication Compliance:   Yes     Changes in Health Issues:   None reported     Chemical Use Review:   Substance Use: Chemical use reviewed, no active concerns identified      Tobacco Use: No current tobacco use.       Collateral Reports Completed:   Not Applicable    PLAN: (Client Tasks / Therapist Tasks / Other)  Client to continue to work on challenging negative thoughts and beliefs.         Celsa Linda, [facility]                                                         ________________________________________________________________________    Treatment Plan    Client's Name: Batool Louis  YOB: 2000    Date: 6/12/20    DSM-V Diagnoses: Adjustment Disorders  309.28 (F43.23) With mixed anxiety and depressed mood  Psychosocial / Contextual Factors: COVID, living back at home, deaths of loved ones within the past few years. Sexual identity issues.     WHODAS: 12    Referral / Collaboration:  Referral to another professional/service is not indicated at this time.    Anticipated number of session or this episode of  "care: 5+      MeasurableTreatment Goal(s) related to diagnosis / functional impairment(s)  Goal 1: Client will increase emotion regulation skills/decrease panic attacks    Objective #A (Client Action)    Client will identify 2-4 fears / thoughts that contribute to feeling anxious  use positive self-affirmations daily.  Status: New - Date: 6/12/20      Intervention(s)  Therapist will teach emotional regulation skills. distress tolerance skills, interpersonal effectiveness skills, emotion regluation skills, mindfulness skills, radical acceptance. Therapist will teach client how to ID body cues for anxiety, anxiety reduction techniques, how to ID triggers for depression and anxiety- decrease reactivity/eliminate, lifestyle changes to reduce depression and anxiety, communication skills, explore cognitive beliefs and help client to develop healthy cognitive patterns and beliefs.      Objective #B  Client will use thought-stopping strategy daily to reduce intrusive thoughts.  Status: New - Date: 6/12/20      Intervention(s)  Therapist will teach emotional regulation skills. distress tolerance skills, interpersonal effectiveness skills, emotion regluation skills, mindfulness skills, radical acceptance. Therapist will teach client how to ID body cues for anxiety, anxiety reduction techniques, how to ID triggers for depression and anxiety- decrease reactivity/eliminate, lifestyle changes to reduce depression and anxiety, communication skills, explore cognitive beliefs and help client to develop healthy cognitive patterns and beliefs.    Objective #C (Client Action)    Status: New - Date: 6/12/20     Client will use \"worry time\" each day for 15 minutes of scheduled worry and then defer obsessive or anxious thinking until the next structured worry time.    Intervention(s)  Therapist will teach emotional regulation skills. work through trauma using somatic experiencing techniques to discharge the anxiety.    Goal 2: Client will " work on increasing self esteem and self worth     Objective #A (Client Action)    Status: New - Date: 6/12/20     Client will identify two areas of life that you would like to have improved functioning.    Intervention(s)  Therapist will teach emotional regulation skills. work through trauma using somatic experiencing techniques to discharge the anxiety.      Client has reviewed and agreed to the above plan.      Celsa Linda, [facility]

## 2020-06-12 NOTE — PROGRESS NOTES
"Batool Louis is a 19 year old female who is being evaluated via a billable video  visit.      The patient has been notified and verbally consented to the following:     \"This video  visit will be conducted between you and your provider.\"     \"Patient has opted to conduct today's video visit vs an in-person appointment, and is not able to attend due to possible exposure to COVID-19\"    If during the course of the call the provider feels a video visit is not appropriate, you will not be charged for this service.\"     Call initiated at: 2:00pm  Type of Video Platform Used: [facility] VOICE CLINIC  THERAPY NOTE (CPT 89893)    Patient: Batool Louis  Date of Service: 6/12/2020  Referring physician: Self referred  Impressions from most recent evaluation:(1/21/2020)  \"IMPRESSIONS: Batool Louis is presenting today with R49.0 (Dysphonia) in the context of subtle mid-membranous swelling and stiffness of the true vocal folds bilaterally.  Laryngeal examination reveals frequent collection of thickened secretions on the true vocal folds that clear easily, but reform quickly. Subtle swelling and thickening is observed in the mid-membranous portion of the true vocal folds bilaterally. Functionally, mild anterior-posterior supraglottic constriction and non-optimal coordination of respiration and phonation is observed with connected speech. Coordination is improved in singing. Stroboscopy reveals a mildly decreased mucosal wave and mild stiffness; this correlates with perceptual findings of consistent, mild roughness. Current voice quality is likely due to a combination of subtle swelling, stiffness and non-optimal coordination of respiration and phonation. \"      SUBJECTIVE:  Since the patient's last session, they report the following:     Overall symptoms are better     Allergies have significantly improved     Got a new  and will be starting lessons soon    Finding more balance " "and less pitch instability while practicing in her first passaggio      OBJECTIVE:  PATIENT REPORTED MEASURES:  Singing Voice Quality: 7/10 (10 as the best)      THERAPEUTIC ACTIVITIES    Counseling and Education:    Asked many questions about the nature of her symptoms, and I answered all of these thoroughly.    Exercises to maintain the improved airflow and forward focus in singing  ? did a variety of glides, scales, and arpeggio patterns on a variety of neutral syllables includin-5-1    1-3-5-8-5-3-1    1-3-1-5-1  ? Alternating with SOVT exercises was facilitating today to improve safe, easy access in the upper register, as well as  promote stability in the middle register and reduce hyperfunction and minimize passaggio breaks  ? Used SOVT exercises (in the form of a lip trill) in with an interval of a 3rd and 5th moving into and out of her passaggio  ? Patient benefited from using a \"baseball arm motion\" during the exercise as a kinesthetic cue to keep breath energy throughout interval  ? She also benefited from thinking about the sound being more \"focused\" as she ascending into her first passaggio  ? Educated the patient on the physiologic purpose of the CT and TA muscles and how these muscles help with changing pitch   ? \"who\" and \"whoop\" (/wh/ glides), were utilized to help improve balance between CT and TA muscles with 1-8-1, 1-3-5-8-5-3-1  ? \"bitty bitty bitty\" exercises on 1-3-5-3-1 were also utilized to help improve balance between CT and TA muscles  ? She was able to vocalize up to a Ab4 with no presence of pitch instability today    A regimen for home practice was instructed.  I provided handouts (via email) of today's therapeutic activities to facilitate practice.      ASSESSMENT/PLAN  PROGRESS TOWARD LONG TERM GOALS:   Adequate progress; please see above    IMPRESSIONS: R49.0 (Dysphonia) in the context of subtle mid-membranous swelling and stiffness of the true vocal folds bilaterally. Ms. " Heriberto demonstrated excellent progress today. She reported thinking about the sound as being more focused or narrow as she ascending in pitch was helpful.     PLAN: I will see Ms. Louis in 4 weeks, at which point we will continue working with singing exercises to work on her first passaggio and finding better balance between the CT and TA muscles.   For practice goals see AVS.       TOTAL SERVICE TIME: 50 minutes  Call Initiated at: 2:00pm  Call Ended at: 2:50pm    CPT Billing Codes:   TREATMENT (78615)  NO CHARGE FACILITY FEE (70541)      SANDIP Logan (music), M.A., CCC-SLP  Speech Language Pathology Clinical Fellow  [facility] Trained Vocologist   [facility]   [phone]  [email or web address]    ---I attest that the services performed were provided by a clinical fellow with my indirect supervision. ---    Sanaz King M.M. (voice), M.A., CCC/SLP  Speech-Language Pathologist  [facility] Trained Vocologist  [facility]  [phone]  [email or web address]  Prounouns: she/her      *This report was created in part through the use of computerized dictation software, and though reviewed following completion, some typographic errors may persist.  If there is confusion regarding any of this notes contents, please contact me for clarification.

## 2020-06-12 NOTE — LETTER
"6/12/2020       RE: Batool Louis  [id number] [place] [address]     Dear Colleague,    Thank you for referring your patient, Batool Louis, to the [facility] VOICE at [facility]. Please see a copy of my visit note below.    Batool Louis is a 19 year old female who is being evaluated via a billable video  visit.      The patient has been notified and verbally consented to the following:     \"This video  visit will be conducted between you and your provider.\"     \"Patient has opted to conduct today's video visit vs an in-person appointment, and is not able to attend due to possible exposure to COVID-19\"    If during the course of the call the provider feels a video visit is not appropriate, you will not be charged for this service.\"     Call initiated at: 2:00pm  Type of Video Platform Used: [facility] VOICE CLINIC  THERAPY NOTE (CPT 07146)    Patient: Batool Louis  Date of Service: 6/12/2020  Referring physician: Self referred  Impressions from most recent evaluation:(1/21/2020)  \"IMPRESSIONS: Batool Louis is presenting today with R49.0 (Dysphonia) in the context of subtle mid-membranous swelling and stiffness of the true vocal folds bilaterally.  Laryngeal examination reveals frequent collection of thickened secretions on the true vocal folds that clear easily, but reform quickly. Subtle swelling and thickening is observed in the mid-membranous portion of the true vocal folds bilaterally. Functionally, mild anterior-posterior supraglottic constriction and non-optimal coordination of respiration and phonation is observed with connected speech. Coordination is improved in singing. Stroboscopy reveals a mildly decreased mucosal wave and mild stiffness; this correlates with perceptual findings of consistent, mild roughness. Current voice quality is likely due to a combination of subtle swelling, stiffness and " "non-optimal coordination of respiration and phonation. \"      SUBJECTIVE:  Since the patient's last session, they report the following:     Overall symptoms are better     Allergies have significantly improved     Got a new  and will be starting lessons soon    Finding more balance and less pitch instability while practicing in her first passaggio      OBJECTIVE:  PATIENT REPORTED MEASURES:  Singing Voice Quality: 7/10 (10 as the best)      THERAPEUTIC ACTIVITIES    Counseling and Education:    Asked many questions about the nature of her symptoms, and I answered all of these thoroughly.    Exercises to maintain the improved airflow and forward focus in singing  ? did a variety of glides, scales, and arpeggio patterns on a variety of neutral syllables includin-5-1    1-3-5-8-5-3-1    1-3-1-5-1  ? Alternating with SOVT exercises was facilitating today to improve safe, easy access in the upper register, as well as  promote stability in the middle register and reduce hyperfunction and minimize passaggio breaks  ? Used SOVT exercises (in the form of a lip trill) in with an interval of a 3rd and 5th moving into and out of her passaggio  ? Patient benefited from using a \"baseball arm motion\" during the exercise as a kinesthetic cue to keep breath energy throughout interval  ? She also benefited from thinking about the sound being more \"focused\" as she ascending into her first passaggio  ? Educated the patient on the physiologic purpose of the CT and TA muscles and how these muscles help with changing pitch   ? \"who\" and \"whoop\" (/wh/ glides), were utilized to help improve balance between CT and TA muscles with 1-8-1, 1-3-5-8-5-3-1  ? \"bitty bitty bitty\" exercises on -3-5-3-1 were also utilized to help improve balance between CT and TA muscles  ? She was able to vocalize up to a Ab4 with no presence of pitch instability today    A regimen for home practice was instructed.  I provided handouts (via " email) of today's therapeutic activities to facilitate practice.      ASSESSMENT/PLAN  PROGRESS TOWARD LONG TERM GOALS:   Adequate progress; please see above    IMPRESSIONS: R49.0 (Dysphonia) in the context of subtle mid-membranous swelling and stiffness of the true vocal folds bilaterally. Ms. Louis demonstrated excellent progress today. She reported thinking about the sound as being more focused or narrow as she ascending in pitch was helpful.     PLAN: I will see Ms. Louis in 4 weeks, at which point we will continue working with singing exercises to work on her first passaggio and finding better balance between the CT and TA muscles.   For practice goals see AVS.       TOTAL SERVICE TIME: 50 minutes  Call Initiated at: 2:00pm  Call Ended at: 2:50pm    CPT Billing Codes:   TREATMENT (58491)  NO CHARGE FACILITY FEE (54957)      SANDIP Logan (kendal), M.A., CCC-SLP  Speech Language Pathology Clinical Fellow  [place] Trained Vocologist   [facility]   [phone]  [email or web address]      *This report was created in part through the use of computerized dictation software, and though reviewed following completion, some typographic errors may persist.  If there is confusion regarding any of this notes contents, please contact me for clarification.              Again, thank you for allowing me to participate in the care of your patient.      Sincerely,    NILTON Logan

## 2020-06-22 ENCOUNTER — OFFICE VISIT (OUTPATIENT)
Dept: OBGYN | Facility: CLINIC | Age: 20
End: 2020-06-22
Payer: COMMERCIAL

## 2020-06-22 VITALS
BODY MASS INDEX: 35.72 KG/M2 | SYSTOLIC BLOOD PRESSURE: 127 MMHG | WEIGHT: 241.9 LBS | DIASTOLIC BLOOD PRESSURE: 86 MMHG | HEART RATE: 85 BPM

## 2020-06-22 DIAGNOSIS — L29.2 VULVAR PRURITUS: Primary | ICD-10-CM

## 2020-06-22 DIAGNOSIS — Z12.4 ENCOUNTER FOR SCREENING FOR CERVICAL CANCER: ICD-10-CM

## 2020-06-22 LAB
SPECIMEN SOURCE: NORMAL
WET PREP SPEC: NORMAL

## 2020-06-22 PROCEDURE — G0145 SCR C/V CYTO,THINLAYER,RESCR: HCPCS | Performed by: OBSTETRICS & GYNECOLOGY

## 2020-06-22 PROCEDURE — 99214 OFFICE O/P EST MOD 30 MIN: CPT | Performed by: OBSTETRICS & GYNECOLOGY

## 2020-06-22 PROCEDURE — 87210 SMEAR WET MOUNT SALINE/INK: CPT | Performed by: OBSTETRICS & GYNECOLOGY

## 2020-06-22 RX ORDER — FLUCONAZOLE 150 MG/1
150 TABLET ORAL ONCE
Qty: 1 TABLET | Refills: 1 | Status: SHIPPED | OUTPATIENT
Start: 2020-06-22 | End: 2020-07-10

## 2020-06-22 NOTE — NURSING NOTE
"Chief Complaint   Patient presents with     Vaginal Problem       Initial /86 (BP Location: Right arm, Cuff Size: Adult Large)   Pulse 85   Wt 109.7 kg (241 lb 14.4 oz)   LMP 2020   BMI 35.72 kg/m   Estimated body mass index is 35.72 kg/m  as calculated from the following:    Height as of 20: 1.753 m (5' 9\").    Weight as of this encounter: 109.7 kg (241 lb 14.4 oz).  BP completed using cuff size: regular        The following HM Due: pap smear  [place]al[place] (13-24)      The following patient reported/Care Every where data was sent to:  P ABSTRACT QUALITY INITIATIVES [[id number]]       Adelaide Le MA on 2020 at 11:30 AM           "

## 2020-06-22 NOTE — PROGRESS NOTES
OB/GYN      NAME:  Batool Louis  PCP:  Sandovalleigh annMary Alice  MRN:  [id number]    Impression / Plan     19 year old  with:      ICD-10-CM    1. Vulvar pruritus  L29.2 Wet prep     fluconazole (DIFLUCAN) 150 MG tablet   2. Encounter for screening for cervical cancer   Z12.4 Pap imaged thin layer screen only - recommended age 21 - 24 years         Discussed symptoms.  Wet prep done.  Clinically she may have a slight yeast infection but findings are non specific.    If wet prep negative, then she can use the one diflucan, but may need to restart the steroid ointment as prescribed by Dr. Mahajan and consider a night time benadryl or other antihistamine.      Discussed options contraception due to PCOS and oligomenorrhea.  She would like to try the IUD.  Plan to return for Kyle[facility] placement.  Will do Pap at that time.     Chief Complaint     Chief Complaint   Patient presents with     Vaginal Problem       HPI     Batool Louis is a  19 year old female who is seen for possible yeast infection.  I last saw the patient in 2018 for secondary amenorrhea.      Itching externally on the vulva.    Symptoms have been going on for a couple of months. She was tested and negative for yeast when the symptoms started.  She has no abnormal discharge and feels a little dryer than usual.     She has used OTC yeast medications.  They helped for a day or so but never made the symptoms go away.      She last used the yeast medication 4-5 days ago.  No creams since then.        Patient's last menstrual period was 2020.    She continues to have irregular periods. Labs and US were normal in 2018.  E2 was on the low side of normal.  She was given a progesterone withdrawal test and was able to menstruate.   She has had 9 periods in the last year, but some were only a couple of days and light.  Vary in length and flow.      Pimples on her mons that she would like me to look at.      Dr. Mahajan's note from 2020  reviewed.  He recommended topical steroid cream due to nighttime itching. He also suspected PCOS due to abnormal uterine bleeding, male pattern hair, and obesity.   She states he recommended the IUD.      Lump in groin area - this has essentially resolved but would like me to take a look at it.      Has not been sexually active since last summer.  Last partner was female. She thinks her last chlamydia screen was August, but not sure. I do not see one in her records.       Problem List     Patient Active Problem List    Diagnosis Date Noted     Chronic tonsillitis 05/13/2020     Priority: Medium     Added automatically from request for surgery [id number]       Allergic rhinitis 01/10/2012     Priority: Medium       Medications     Current Outpatient Medications   Medication     cetirizine (ZYRTEC) 10 MG tablet     Cholecalciferol (D 5000) 5000 UNITS TABS     Homeopathic Products (ZICAM ALLERGY RELIEF NA)     No current facility-administered medications for this visit.         Allergies     Allergies   Allergen Reactions     Sulfamethoxazole-Trimethoprim Hives       ROS     A 10 organ review of systems was asked and the pertinent positives and negatives are listed in the HPI. All other organ systems can be considered negative.     Physical Exam   Vitals: /86 (BP Location: Right arm, Cuff Size: Adult Large)   Pulse 85   Wt 109.7 kg (241 lb 14.4 oz)   LMP 05/11/2020   BMI 35.72 kg/m      General: Comfortable, no obvious distress, obese body habitus  Skin: No rashes, lesions, or subcutaneous nodules. Normal skin temperature.   Psych: Alert and orientated x 3. Appropriate affect, good insight.   : Normal female external genitalia. Some white discharge adherent to external labia bilaterally, between the minor and majum.  No lesions.  Urethral meatus normal.  Speculum exam reveals a normal vaginal vault, normal cervix.  Small amount of white discharge.  Bimanual exam reveals a normal, mobile, nontender uterus.  No  cervical motion tenderness.  Adnexa nontender with no palpable masses.     Extremities: No peripheral edema, nontender       Labs/Imaging       Labs were reviewed in Epic     Imaging was reviewed in Epic.       25 minutes was spent with patient, more than 50% counseling and coordinating care as noted above in the A/P    Nursing notes read and reviewed    Adelaide Ace MD

## 2020-06-23 ENCOUNTER — VIRTUAL VISIT (OUTPATIENT)
Dept: PSYCHOLOGY | Facility: CLINIC | Age: 20
End: 2020-06-23
Payer: COMMERCIAL

## 2020-06-23 DIAGNOSIS — F41.1 GENERALIZED ANXIETY DISORDER: Primary | ICD-10-CM

## 2020-06-23 PROCEDURE — 90837 PSYTX W PT 60 MINUTES: CPT | Mod: 95 | Performed by: COUNSELOR

## 2020-06-23 NOTE — PROGRESS NOTES
"                                             Progress Note    Client Name: Batool Louis  Date: 6/23/20         Service Type: Individual      Session Start Time: 2:05pm   Session End Time:  3:00pm      Session Length:   55 mins     Session #: 14     Attendees: Client    Treatment Plan Last Reviewed:  6/12/20  PHQ-9 / SHELBY-7 : see chart     DATA      Progress Since Last Session (Related to Symptoms / Goals / Homework):   Symptoms: somewhat improved    Homework: Completed in session      Episode of Care Goals: Satisfactory progress - PREPARATION (Decided to change - considering how); Intervened by negotiating a change plan and determining options / strategies for behavior change, identifying triggers, exploring social supports, and working towards setting a date to begin behavior change     Current / Ongoing Stressors and Concerns:   Dodie stated she is up at her cabin. It was a little bit of a rough time just before they went up. They are up there to help her grandpa and grandma with the doctors appointments. The family will have the talk about how many months about that he has to live on Thursday.  She stated there was a time when the family had mentioned politics and then later on got into a big argument. Ddoie said her family often will say things like \"read the room!\" or comment that she's being offensive to others.        Treatment Objective(s) Addressed in This Session:   use at least 2 coping skills for anxiety management in the next 2 weeks  identify 5 traits or charcteristics you like about yourself       Intervention:   Talked with client about interpersonal effectiveness skills and discussed parts of the conversations in depth. Talked about how what her family members say is not true and discussed  that everyone has a choice on how they are going to interpret things and what they will do with their emotions.  Reinforced for the client that she didn't do anything wrong and processed if she might talk " with her family members about how their attacks make her feel.           ASSESSMENT: Current Emotional / Mental Status (status of significant symptoms):   Risk status (Self / Other harm or suicidal ideation)   Client denies current fears or concerns for personal safety.   Client denies current or recent suicidal ideation or behaviors.   Client denies current or recent homicidal ideation or behaviors.   Client denies current or recent self injurious behavior or ideation.   Client denies other safety concerns.   A safety and risk management plan has not been developed at this time, however client was given the after-hours number / 911 should there be a change in any of these risk factors.     Appearance:   Appropriate    Eye Contact:   Good    Psychomotor Behavior: Normal    Attitude:   Cooperative    Orientation:   All   Speech    Rate / Production: Normal     Volume:  Normal    Mood:    Normal Sad    Affect:    Appropriate    Thought Content:  Clear    Thought Form:  Coherent  Logical    Insight:    Good      Medication Review:   No changes to current psychiatric medication(s)     Medication Compliance:   Yes     Changes in Health Issues:   None reported     Chemical Use Review:   Substance Use: Chemical use reviewed, no active concerns identified      Tobacco Use: No current tobacco use.       Collateral Reports Completed:   Not Applicable    PLAN: (Client Tasks / Therapist Tasks / Other)  Client to continue to work on challenging negative thoughts and beliefs.         Celsa Linda, [facility]                                                         ________________________________________________________________________    Treatment Plan    Client's Name: Batool Louis  YOB: 2000    Date: 6/12/20    DSM-V Diagnoses: Adjustment Disorders  309.28 (F43.23) With mixed anxiety and depressed mood  Psychosocial / Contextual Factors: COVID, living back at home, deaths of loved ones within the past  "few years. Sexual identity issues.     WHODAS: 12    Referral / Collaboration:  Referral to another professional/service is not indicated at this time.    Anticipated number of session or this episode of care: 5+      MeasurableTreatment Goal(s) related to diagnosis / functional impairment(s)  Goal 1: Client will increase emotion regulation skills/decrease panic attacks    Objective #A (Client Action)    Client will identify 2-4 fears / thoughts that contribute to feeling anxious  use positive self-affirmations daily.  Status: New - Date: 6/12/20      Intervention(s)  Therapist will teach emotional regulation skills. distress tolerance skills, interpersonal effectiveness skills, emotion regluation skills, mindfulness skills, radical acceptance. Therapist will teach client how to ID body cues for anxiety, anxiety reduction techniques, how to ID triggers for depression and anxiety- decrease reactivity/eliminate, lifestyle changes to reduce depression and anxiety, communication skills, explore cognitive beliefs and help client to develop healthy cognitive patterns and beliefs.      Objective #B  Client will use thought-stopping strategy daily to reduce intrusive thoughts.  Status: New - Date: 6/12/20      Intervention(s)  Therapist will teach emotional regulation skills. distress tolerance skills, interpersonal effectiveness skills, emotion regluation skills, mindfulness skills, radical acceptance. Therapist will teach client how to ID body cues for anxiety, anxiety reduction techniques, how to ID triggers for depression and anxiety- decrease reactivity/eliminate, lifestyle changes to reduce depression and anxiety, communication skills, explore cognitive beliefs and help client to develop healthy cognitive patterns and beliefs.    Objective #C (Client Action)    Status: New - Date: 6/12/20     Client will use \"worry time\" each day for 15 minutes of scheduled worry and then defer obsessive or anxious thinking until the " next structured worry time.    Intervention(s)  Therapist will teach emotional regulation skills. work through trauma using somatic experiencing techniques to discharge the anxiety.    Goal 2: Client will work on increasing self esteem and self worth     Objective #A (Client Action)    Status: New - Date: 6/12/20     Client will identify two areas of life that you would like to have improved functioning.    Intervention(s)  Therapist will teach emotional regulation skills. work through trauma using somatic experiencing techniques to discharge the anxiety.      Client has reviewed and agreed to the above plan.      Celsa Linda [facility]

## 2020-06-24 LAB
COPATH REPORT: NORMAL
PAP: NORMAL

## 2020-07-05 ENCOUNTER — MYC MEDICAL ADVICE (OUTPATIENT)
Dept: FAMILY MEDICINE | Facility: CLINIC | Age: 20
End: 2020-07-05

## 2020-07-10 ENCOUNTER — OFFICE VISIT (OUTPATIENT)
Dept: FAMILY MEDICINE | Facility: CLINIC | Age: 20
End: 2020-07-10
Payer: COMMERCIAL

## 2020-07-10 ENCOUNTER — VIRTUAL VISIT (OUTPATIENT)
Dept: SLEEP MEDICINE | Facility: CLINIC | Age: 20
End: 2020-07-10
Payer: COMMERCIAL

## 2020-07-10 VITALS
HEART RATE: 99 BPM | SYSTOLIC BLOOD PRESSURE: 116 MMHG | DIASTOLIC BLOOD PRESSURE: 80 MMHG | OXYGEN SATURATION: 98 % | RESPIRATION RATE: 14 BRPM | HEIGHT: 69 IN | TEMPERATURE: 98.1 F | WEIGHT: 243.38 LBS | BODY MASS INDEX: 36.05 KG/M2

## 2020-07-10 VITALS — WEIGHT: 243 LBS | HEIGHT: 69 IN | BODY MASS INDEX: 35.99 KG/M2

## 2020-07-10 DIAGNOSIS — N88.8 NABOTHIAN CYST: ICD-10-CM

## 2020-07-10 DIAGNOSIS — R06.83 SNORING: ICD-10-CM

## 2020-07-10 DIAGNOSIS — G47.9 SLEEP DISTURBANCE: Primary | ICD-10-CM

## 2020-07-10 DIAGNOSIS — Z00.00 ROUTINE GENERAL MEDICAL EXAMINATION AT A HEALTH CARE FACILITY: Primary | ICD-10-CM

## 2020-07-10 DIAGNOSIS — K62.5 RECTAL BLEEDING: ICD-10-CM

## 2020-07-10 DIAGNOSIS — Z13.1 SCREENING FOR DIABETES MELLITUS: ICD-10-CM

## 2020-07-10 DIAGNOSIS — N92.6 IRREGULAR MENSES: ICD-10-CM

## 2020-07-10 DIAGNOSIS — Z23 NEED FOR VACCINATION: ICD-10-CM

## 2020-07-10 DIAGNOSIS — Z11.3 SCREENING FOR STDS (SEXUALLY TRANSMITTED DISEASES): ICD-10-CM

## 2020-07-10 DIAGNOSIS — Z13.6 CARDIOVASCULAR SCREENING; LDL GOAL LESS THAN 160: ICD-10-CM

## 2020-07-10 LAB
CHOLEST SERPL-MCNC: 196 MG/DL
ESTRADIOL SERPL-MCNC: 40 PG/ML
FSH SERPL-ACNC: 5.6 IU/L
GLUCOSE SERPL-MCNC: 92 MG/DL (ref 70–99)
HDLC SERPL-MCNC: 39 MG/DL
LDLC SERPL CALC-MCNC: 135 MG/DL
NONHDLC SERPL-MCNC: 157 MG/DL
PROLACTIN SERPL-MCNC: 11 UG/L (ref 3–27)
TRIGL SERPL-MCNC: 111 MG/DL
TSH SERPL DL<=0.005 MIU/L-ACNC: 2.37 MU/L (ref 0.4–4)

## 2020-07-10 PROCEDURE — 83001 ASSAY OF GONADOTROPIN (FSH): CPT | Performed by: NURSE PRACTITIONER

## 2020-07-10 PROCEDURE — 84443 ASSAY THYROID STIM HORMONE: CPT | Performed by: NURSE PRACTITIONER

## 2020-07-10 PROCEDURE — 82670 ASSAY OF TOTAL ESTRADIOL: CPT | Performed by: NURSE PRACTITIONER

## 2020-07-10 PROCEDURE — 99395 PREV VISIT EST AGE 18-39: CPT | Mod: 25 | Performed by: NURSE PRACTITIONER

## 2020-07-10 PROCEDURE — 87389 HIV-1 AG W/HIV-1&-2 AB AG IA: CPT | Performed by: NURSE PRACTITIONER

## 2020-07-10 PROCEDURE — 90715 TDAP VACCINE 7 YRS/> IM: CPT | Performed by: NURSE PRACTITIONER

## 2020-07-10 PROCEDURE — 87491 CHLMYD TRACH DNA AMP PROBE: CPT | Performed by: NURSE PRACTITIONER

## 2020-07-10 PROCEDURE — 87591 N.GONORRHOEAE DNA AMP PROB: CPT | Performed by: NURSE PRACTITIONER

## 2020-07-10 PROCEDURE — 84146 ASSAY OF PROLACTIN: CPT | Performed by: NURSE PRACTITIONER

## 2020-07-10 PROCEDURE — 82947 ASSAY GLUCOSE BLOOD QUANT: CPT | Performed by: NURSE PRACTITIONER

## 2020-07-10 PROCEDURE — 36415 COLL VENOUS BLD VENIPUNCTURE: CPT | Performed by: NURSE PRACTITIONER

## 2020-07-10 PROCEDURE — 99214 OFFICE O/P EST MOD 30 MIN: CPT | Mod: 25 | Performed by: NURSE PRACTITIONER

## 2020-07-10 PROCEDURE — 80061 LIPID PANEL: CPT | Performed by: NURSE PRACTITIONER

## 2020-07-10 PROCEDURE — 84403 ASSAY OF TOTAL TESTOSTERONE: CPT | Performed by: NURSE PRACTITIONER

## 2020-07-10 PROCEDURE — 90471 IMMUNIZATION ADMIN: CPT | Performed by: NURSE PRACTITIONER

## 2020-07-10 PROCEDURE — 99204 OFFICE O/P NEW MOD 45 MIN: CPT | Mod: 95 | Performed by: INTERNAL MEDICINE

## 2020-07-10 SDOH — HEALTH STABILITY: MENTAL HEALTH: HOW OFTEN DO YOU HAVE 6 OR MORE DRINKS ON ONE OCCASION?: MONTHLY

## 2020-07-10 ASSESSMENT — PATIENT HEALTH QUESTIONNAIRE - PHQ9: SUM OF ALL RESPONSES TO PHQ QUESTIONS 1-9: 14

## 2020-07-10 ASSESSMENT — PAIN SCALES - GENERAL: PAINLEVEL: NO PAIN (0)

## 2020-07-10 ASSESSMENT — MIFFLIN-ST. JEOR
SCORE: 1929.35
SCORE: 1927.65

## 2020-07-10 NOTE — PROGRESS NOTES
"Btaool Louis is a 19 year old female who is being evaluated via a billable video visit.      The patient has been notified of following:     \"This video visit will be conducted via a call between you and your physician/provider. We have found that certain health care needs can be provided without the need for an in-person physical exam.  This service lets us provide the care you need with a video conversation.  If a prescription is necessary we can send it directly to your pharmacy.  If lab work is needed we can place an order for that and you can then stop by our lab to have the test done at a later time.    Video visits are billed at different rates depending on your insurance coverage.  Please reach out to your insurance provider with any questions.    If during the course of the call the physician/provider feels a video visit is not appropriate, you will not be charged for this service.\"    Patient has given verbal consent for Video visit? Yes  How would you like to obtain your AVS? MyChart  Will anyone else be joining your video visit? No      MALKA RAMIRES MA    Video-Visit Details    Type of service:  Video Visit    Video Start Time: 2:12 PM  Video End Time: 2:52 PM    Originating Location (pt. Location): Home    Distant Location (provider location):  [place] [facility]     Platform used for Video Visit: Tim Snider MD  [facility]   Floor [address], [address]   [address] [place], [place] [address]. [place] [address]   Appointments: [phone]      Sleep Consultation Note:    Date on this visit: 7/10/2020    Batool Louis is sent by Selam Lima for a sleep consultation regarding evaluation for possible sleep apnea    Primary Physician: Mary Alice Reagan     Chief complaint: snoring    Batool Louis 19 year old with PMH of chronic tonsillitis who presents for virtual visit today to " obtain evaluation for possible sleep apnea.    She was recently seen by sleep ENTsurgeon,   for chronic tonsil stones as well as tonsillar hypertrophy.  According to the visit notes, during the visit, she reported snoring.  She thought she might have sleep apnea.  Both of her parents have sleep apnea(both use CPAP) and she has been told that she stops breathing in her sleep.  She has never had a sleep study.      Sleep Disordered Breathing  Batool reports snoring(sleeps better and does not snore as much when she sleeps on stomach), snort arousals,  witnessed apneas, occasional awakenings due to gasping for air, occasional dry mouth.  She denies morning headaches.    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  During workdays, Batool would go to bed at 11 PM and  fall  asleep between 12 [place]-2AM and was waking up at 7 AM.  Since pandemic,  her sleep schedule  changed :bed time 2-3 AM  and wake  up between 9 AM-noon.  It usually takes few hours to fall asleep.  She has been under stress since the pandemic.  On weekends, She falls asleep at 2-3AM and gets up noon-1PM.  She wakes up 4-5 times throughout the night.  Night time awakenings occurs due to need to change position or thinking.  She does not usually leave her bed.  After awakening, She is able to fall back asleep in 10-20 minutes.  Patient is a night person.  She reports non-refreshing sleep,fatigue and  daytime sleepiness(though this does not correlate with the ESS that she endorses at 2)  Patient  denies drowsiness while driving   Batool doesn't nap.  Patient does use electronics in bed.     SLEEP SCALES:  Patient's [place] Sleepiness score 2/24   Insomnia severity index:16    Restless Legs symptoms  Batool does not complain of restlessness feelings in the legs.    Sleep Behaviors  She denies any cataplexy, sleep paralysis, sleep hallucinations.  She  denies sleep walking,  sleep eating.  Occasionally sleep talks,murmurs  She does not complain acting out dreams.      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 She drinks herbal tea in the morning,  not sure if it is caffeinated.  She drinks alcohol very very sparingly.  Does not use alcohol as a sleep aid.  Patient is a never smoker.  Patient does not use drugs.    Allergies:    Allergies   Allergen Reactions     Sulfamethoxazole-Trimethoprim Hives       Medications:    Current Outpatient Medications   Medication Sig Dispense Refill     cetirizine (ZYRTEC) 10 MG tablet Take 10 mg by mouth Reported on 3/7/2017       Cholecalciferol (D 5000) 5000 UNITS TABS Reported on 3/7/2017       Homeopathic Products (ZICAM ALLERGY RELIEF NA)          Problem List:  Patient Active Problem List    Diagnosis Date Noted     Chronic tonsillitis 05/13/2020     Priority: Medium     Added automatically from request for surgery [id number]       Allergic rhinitis 01/10/2012     Priority: Medium        Past Medical/Surgical History:  Past Medical History:   Diagnosis Date     Amenorrhea, secondary      Past Surgical History:   Procedure Laterality Date     NO HISTORY OF SURGERY         Social History:  Social History     Socioeconomic History     Marital status: Single     Spouse name: Not on file     Number of children: Not on file     Years of education: Not on file     Highest education level: Not on file   Occupational History     Occupation: student     Comment: [employer]- theater/music     Occupation:    Social Needs     Financial resource strain: Not on file     Food insecurity     Worry: Not on file     Inability: Not on file     Transportation needs     Medical: Not on file     Non-medical: Not on file   Tobacco Use     Smoking status: Never Smoker     Smokeless tobacco: Never Used   Substance and Sexual Activity     Alcohol use: Yes     Frequency: Monthly or less     Drinks per session: 1 or 2     Binge  frequency: Never     Drug use: No     Sexual activity: Not Currently     Partners: Male     Birth control/protection: Condom     Comment: has had both sex partners, used condoms with last partner   Lifestyle     Physical activity     Days per week: 7 days     Minutes per session: 30 min     Stress: Not on file   Relationships     Social connections     Talks on phone: Not on file     Gets together: Not on file     Attends Denominational service: Not on file     Active member of club or organization: Not on file     Attends meetings of clubs or organizations: Not on file     Relationship status: Not on file     Intimate partner violence     Fear of current or ex partner: Not on file     Emotionally abused: Not on file     Physically abused: Not on file     Forced sexual activity: Not on file   Other Topics Concern     Not on file   Social History Narrative     Not on file       Family History:  Family history pertinent to sleep disorders: Both parents have THOR and use CPAP  Family History   Problem Relation Age of Onset     Anemia Mother         unknown reason     Asthma Sister      Back Pain Maternal Grandmother      Alzheimer Disease Maternal Grandfather      Skin Cancer Paternal Great-Grandfather        Review of Systems:  A complete review of systems reviewed by me is negative with the exeption of what has been mentioned in the history of present illness,  and symptoms listed below, highlighted in red:  CONSTITUTIONAL: weight gain, NEGATIVE for   fever, chills, sweats or night sweats   EYES: NEGATIVE for changes in vision, blind spots, double vision.  ENT: NEGATIVE for ear pain, sore throat, sinus pain, post-nasal drip, runny nose, bloody nose  CARDIAC: NEGATIVE for fast heartbeats or fluttering in chest, chest pain or pressure, breathlessness when lying flat, swollen legs or swollen feet.  NEUROLOGIC: headaches middle of day; NEGATIVE weakness or numbness in the arms or legs.  DERMATOLOGIC: NEGATIVE for rashes, new  "moles or change in mole(s)  PULMONARY: NEGATIVE SOB at rest, SOB with activity, dry cough, productive cough, coughing up blood, wheezing or whistling when breathing.    GASTROINTESTINAL:acid reflux, occasional  loose or watery stools/constipation, occasional blood(due to hemarrhoids) noted. NEGATIVE for nausea or vomitting, , fat or grease in stools, , abdominal pain.  GENITOURINARY: NEGATIVE for pain during urination, blood in urine, urinating more frequently than usual  MUSCULOSKELETAL: NEGATIVE for muscle pain, bone or joint pain, swollen joints.  ENDOCRINE: NEGATIVE for increased thirst or urination, diabetes.  LYMPHATIC: NEGATIVE for swollen lymph nodes, lumps or bumps in the breasts or nipple discharge.  PSYCHE:  depression, anxiety. Sees therapist regularly. She denies thoughts of harming self or others.    Physical Examination:  Vitals:   Ht 1.746 m (5' 8.75\")   Wt 110.2 kg (243 lb)   BMI 36.15 kg/m    General: No apparent distress, appropriately groomed  Head: Normocephalic, atraumatic  Eyes: no icterus, PERRL  Chest: No cough, no audible wheezing, able to talk in full sentences  Skin: no obvious rash  Psych: coherent speech, normal rate and volume, able to articulate logical thoughts, able   to abstract reason, no tangential thoughts, no hallucinations   or delusions. Her affect is normal  Neuro:  Mental status: Alert and  Oriented X 3  Speech: normal   Gait: Normal    No focal neurological deficit    Impression/Report/ Plan:    Snoring, observed apneas,  non restorative sleep, fatigue. Possible Obstructive sleep apnea  STOP BANG score is 4/8. Patient likely has sleep apnea based on clinical history, body habitus, positive family history for THOR.  We discussed HST and supervised polysomnography.  Patient was interested in supervised polysomnography(considering intermediate probability for THOR based on the stop bang score, insomnia, circadian rhythm sleep disorder, orders were generated for PSG)  If the " insurance does not cover the PSG, we will consider HST she was agreeable with the plan.    HST/ Polysomnography reviewed.  Obstructive sleep apnea reviewed.  Complications of untreated sleep apnea were reviewed .   Treatment for THOR have been discussed.       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    We discussed regularizing sleep schedule.  We arrived at a goal bedtime of  2 AM and a goal wake up time of 11 AM.  Once she is able to arrive at this,  she was instructed to slowly and gradually advance her bedtime further since she wants to be waking up at 7 AM as she used to before the pandemic.  We discussed minimizing exposure to bright light past a couple hours before the goal bedtime and to  avoid mind stimulating activities before bed.  Encouraged to practice relaxation techniques before bed. Recommended  Melatonin 1/2 -1 tab of 1 mg strength at 10 PM (since her habitual sleep time is 3 AM).  We also discussed exposure to bright light(natural outdoors) for 30 to 60 minutes soon after awakening to help with sleep phase advancement.  We discussed optimizing sleep hygiene measures.    Patient reports symptoms of anxiety/depression: We discussed the association of THOR with anxiety and depression and also the association of mood disorders and insomnia.  She will continue follow-up with her therapist and follow-up with her primary care provider for optimizing the management of the mood disorders.    Obesity: We discussed weight management with healthy diet, and exercise.    Patient was strongly advised to avoid driving, operating any heavy machinery or other hazardous situations while drowsy or sleepy.  Patient was counseled on the importance of driving while alert, to pull over if drowsy, or nap before getting into the vehicle if sleepy.      Plan is to communicate the results of the sleep study with the patient via  virtual visit in 8 to 10 days after the test is completed.    CC: Selam Lima      The above note was dictated using voice recognition software. Although reviewed after completion, some word and grammatical error may remain . Please contact the author for any clarifications.      Shahab Snider MD  [facility]   Floor 1, [address]   [address] [place] [address]   Appointments: [phone]

## 2020-07-10 NOTE — NURSING NOTE
Prior to injection verified patient identity using patient's name and date of birth.   Patient instructed to remain in clinic for 20 minutes afterwards and to report any adverse reaction to me immediately.  Marjorie Beach, [facility]     Never

## 2020-07-10 NOTE — PATIENT INSTRUCTIONS
I think you have an internal hemorrhoid.  These cause painless rectal bleeding.     Let me know if this gets worse.  Then we can order a colonoscopy to further evaluate this.     Okay to stop Omeprazole if your symptoms have resolved.    Labs will be done today.   For normal results, you will receive a letter with the results in about 2 weeks.  If anything is abnormal or unexpected, someone from the clinic will call you.      I think that lump is a cyst and not harmful.  If it gets larger or starts to look infected, let me know.       Patient Education     Hemorrhoids    Hemorrhoids are swollen and inflamed veins inside the rectum and near the anus. The rectum is the last several inches of the colon. The anus is the passage between the rectum and the outside of the body.  Causes  The veins can become swollen due to increased pressure in them. This is most often caused by:    Chronic constipation or diarrhea    Straining when having a bowel movement    Sitting too long on the toilet    A low-fiber diet    Pregnancy  Symptoms    Bleeding from the rectum (this may be noticeable after bowel movements)    Lump near the anus    Itching around the anus    Pain around the anus  There are different types of hemorrhoids. Depending on the type you have and the severity, you may be able to treat yourself at home. In some cases, a procedure may be the best treatment option. Your healthcare provider can tell you more about this, if needed.  Home care  General care    To get relief from pain or itching, try:  ? Medicines. Your healthcare provider may recommend stool softeners, suppositories, or laxatives to help manage constipation. Use these exactly as directed.  ? Sitz baths. A sitz bath involves sitting in a few inches of warm bath water. Be careful not to make the water so hot that you burn yourself--test it before sitting in it. Soak for about 10 to 15 minutes a few times a day. This may help relieve pain.  ? Topical  products. Your healthcare provider may prescribe or recommend creams, ointments, or pads that can be applied to the hemorrhoid. Use these exactly as directed.  Tips to help prevent hemorrhoids    Eat more fiber. Fiber adds bulk to stool and absorbs water as it moves through your colon. This makes stool softer and easier to pass.  ? Increase the fiber in your diet with more fiber-rich foods. These include fresh fruit, vegetables, and whole grains.  ? Take a fiber supplement or bulking agent, if advised by your healthcare provider. These include products such as psyllium or methylcellulose.    Drink more water. Your healthcare provider may direct you to drink plenty of water. This can help keep stool soft.    Be more active. Frequent exercise aids digestion and helps prevent constipation. It may also help make bowel movements more regular.    Don t strain during bowel movements. This can make hemorrhoids more likely. Also, don t sit on the toilet for long periods of time.  Follow-up care  Follow up with your healthcare provider as advised. If a culture or imaging tests were done, someone will let you know the results when they are ready. This may take a few days or longer. If your healthcare provider recommends a procedure for your hemorrhoids, these options can be discussed. Options may include surgery and outpatient office treatments.  When to seek medical advice  Call your healthcare provider right away if any of these occur:    Increased bleeding from the rectum    Increased pain around the rectum or anus    Weakness or dizziness  Call 911  Call 911 if any of these occur:    Trouble breathing or swallowing    Fainting or loss of consciousness    Unusually fast heart rate    Vomiting blood    Large amounts of blood in stool or black, tarry stools  Date Last Reviewed: 9/1/2017 2000-2019 [employer]. [address] [place] [address], [place] [address]. All rights reserved. This information is not intended as a  substitute for professional medical care. Always follow your healthcare professional's instructions.             Preventive Health Recommendations  Female Ages 21 to 25     Yearly exam:     See your health care provider every year in order to  o Review health changes.   o Discuss preventive care.    o Review your medicines if your doctor has prescribed any.      You should be tested each year for STDs (sexually transmitted diseases).       Talk to your provider about how often you should have cholesterol testing.      Get a Pap test every three years. If you have an abnormal result, your doctor may have you test more often.      If you are at risk for diabetes, you should have a diabetes test (fasting glucose).     Shots:     Get a flu shot each year.     Get a tetanus shot every 10 years.     Consider getting the shot (vaccine) that prevents cervical cancer (Gardasil).    Nutrition:     Eat at least 5 servings of fruits and vegetables each day.    Eat whole-grain bread, whole-wheat pasta and brown rice instead of white grains and rice.    Get adequate Calcium and Vitamin D.     Lifestyle    Exercise at least 150 minutes a week each week (30 minutes a day, 5 days a week). This will help you control your weight and prevent disease.    Limit alcohol to one drink per day.    No smoking.     Wear sunscreen to prevent skin cancer.    See your dentist every six months for an exam and cleaning.

## 2020-07-10 NOTE — PATIENT INSTRUCTIONS
Your BMI is Body mass index is 36.15 kg/m .  Weight management is a personal decision.  If you are interested in exploring weight loss strategies, the following discussion covers the approaches that may be successful. Body mass index (BMI) is one way to tell whether you are at a healthy weight, overweight, or obese. It measures your weight in relation to your height.  A BMI of 18.5 to 24.9 is in the healthy range. A person with a BMI of 25 to 29.9 is considered overweight, and someone with a BMI of 30 or greater is considered obese. More than two-thirds of American adults are considered overweight or obese.  Being overweight or obese increases the risk for further weight gain. Excess weight may lead to heart disease and diabetes.  Creating and following plans for healthy eating and physical activity may help you improve your health.  Weight control is part of healthy lifestyle and includes exercise, emotional health, and healthy eating habits. Careful eating habits lifelong are the mainstay of weight control. Though there are significant health benefits from weight loss, long-term weight loss with diet alone may be very difficult to achieve- studies show long-term success with dietary management in less than 10% of people. Attaining a healthy weight may be especially difficult to achieve in those with severe obesity. In some cases, medications, devices and surgical management might be considered.  What can you do?  If you are overweight or obese and are interested in methods for weight loss, you should discuss this with your provider.     Consider reducing daily calorie intake by 500 calories.     Keep a food journal.     Avoiding skipping meals, consider cutting portions instead.    Diet combined with exercise helps maintain muscle while optimizing fat loss. Strength training is particularly important for building and maintaining muscle mass. Exercise helps reduce stress, increase energy, and improves fitness.  "Increasing exercise without diet control, however, may not burn enough calories to loose weight.       Start walking three days a week 10-20 minutes at a time    Work towards walking thirty minutes five days a week     Eventually, increase the speed of your walking for 1-2 minutes at time    In addition, we recommend that you review healthy lifestyles and methods for weight loss available through the National Institutes of Health patient information sites:  http://win.niddk.nih.gov/publications/index.htm    And look into health and wellness programs that may be available through your health insurance provider, employer, local community center, or huy club.    Weight management plan: Patient was referred to their PCP to discuss a diet and exercise plan.  MY TREATMENT INFORMATION FOR SLEEP APNEA-  Batool Louis    DOCTOR : Shahab Snider MD  SLEEP CENTER :      MY CONTACT NUMBER:     Am I having a sleep study at a sleep center?  Make sure you have an appointment for the study before you leave!    Am I having a home sleep study?  Watch this video:  /drop off device-   Https://www.[employer]ela.com/watch?v=yGGFBdELGhk  Disposable device sent out require phone/computer application-   https://www.[employer]ela.com/watch?v=BCce_vbiwxE  Please verify your insurance coverage with your insurance carrier    Frequently asked questions:  1. What is Obstructive Sleep Apnea (THOR)? THOR is the most common type of sleep apnea. Apnea means, \"without breath.\"  Apnea is most often caused by narrowing or collapse of the upper airway as muscles relax during sleep.   Almost everyone has occasional apneas. Most people with sleep apnea have had brief interruptions at night frequently for many years.  The severity of sleep apnea is related to how frequent and severe the events are.   2. What are the consequences of THOR? Symptoms include: feeling sleepy during the day, snoring loudly, gasping or stopping of " breathing, trouble sleeping, and occasionally morning headaches or heartburn at night.  Sleepiness can be serious and even increase the risk of falling asleep while driving. Other health consequences may include development of high blood pressure and other cardiovascular disease in persons who are susceptible. Untreated THOR  can contribute to heart disease, stroke and diabetes.   3. What are the treatment options? In most situations, sleep apnea is a lifelong disease that must be managed with daily therapy. Medications are not effective for sleep apnea and surgery is generally not considered until other therapies have been tried. Your treatment is your choice . Continuous Positive Airway (CPAP) works right away and is the therapy that is effective in nearly everyone. An oral device to hold your jaw forward is usually the next most reliable option. Other options include postioning devices (to keep you off your back), weight loss, and surgery including a tongue pacing device. There is more detail about some of these options below.    Important tips for using CPAP and similar devices   Know your equipment:  CPAP is continuous positive airway pressure that prevents obstructive sleep apnea by keeping the throat from collapsing while you are sleeping. In most cases, the device is  smart  and can slowly self-adjusts if your throat collapses and keeps a record every day of how well you are treated-this information is available to you and your care team.  BPAP is bilevel positive airway pressure that keeps your throat open and also assists each breath with a pressure boost to maintain adequate breathing.  Special kinds of BPAP are used in patients who have inadequate breathing from lung or heart disease. In most cases, the device is  smart  and can slowly self-adjusts to assist breathing. Like CPAP, the device keeps a record of how well you are treated.  Your mask is your connection to the device. You get to choose what  feels most comfortable and the staff will help to make sure if fits. Here: are some examples of the different masks that are available:       Key points to remember on your journey with sleep apnea:  1. Sleep study.  PAP devices often need to be adjusted during a sleep study to show that they are effective and adjusted right.  2. Good tips to remember: Try wearing just the mask during a quiet time during the day so your body adapts to wearing it. A humidifier is recommended for comfort in most cases to prevent drying of your nose and throat. Allergy medication from your provider may help you if you are having nasal congestion.  3. Getting settled-in. It takes more than one night for most of us to get used to wearing a mask. Try wearing just the mask during a quiet time during the day so your body adapts to wearing it. A humidifier is recommended for comfort in most cases. Our team will work with you carefully on the first day and will be in contact within 4 days and again at 2 and 4 weeks for advice and remote device adjustments. Your therapy is evaluated by the device each day.   4. Use it every night. The more you are able to sleep naturally for 7-8 hours, the more likely you will have good sleep and to prevent health risks or symptoms from sleep apnea. Even if you use it 4 hours it helps. Occasionally all of us are unable to use a medical therapy, in sleep apnea, it is not dangerous to miss one night.   5. Communicate. Call our skilled team on the number provided on the first day if your visit for problems that make it difficult to wear the device. Over 2 out of 3 patients can learn to wear the device long-term with help from our team. Remember to call our team or your sleep providers if you are unable to wear the device as we may have other solutions for those who cannot adapt to mask CPAP therapy. It is recommended that you sleep your sleep provider within the first 3 months and yearly after that if you are not  having problems.   6. Use it for your health. We encourage use of CPAP masks during daytime quiet periods to allow your face and brain to adapt to the sensation of CPAP so that it will be a more natural sensation to awaken to at night or during naps. This can be very useful during the first few weeks or months of adapting to CPAP though it does not help medically to wear CPAP during wakefulness and  should not be used as a strategy just to meet guidelines.  7. Take care of your equipment. Make sure you clean your mask and tubing using directions every day and that your filter and mask are replaced as recommended or if they are not working.     BESIDES CPAP, WHAT OTHER THERAPIES ARE THERE?    Positioning Device  Positioning devices are generally used when sleep apnea is mild and only occurs on your back.This example shows a pillow that straps around the waist. It may be appropriate for those whose sleep study shows milder sleep apnea that occurs primarily when lying flat on one's back. Preliminary studies have shown benefit but effectiveness at home may need to be verified by a home sleep test. These devices are generally not covered by medical insurance.  Examples of devices that maintain sleeping on the back to prevent snoring and mild sleep apnea.    Belt type body positioner  Http://[place] [facility]/    Electronic reminder  Http://nightshifttherapy.com/  Http://www.[employer].com.au/      Oral Appliance  What is oral appliance therapy?  An oral appliance device fits on your teeth at night like a retainer used after having braces. The device is made by a specialized dentist and requires several visits over 1-2 months before a manufactured device is made to fit your teeth and is adjusted to prevent your sleep apnea. Once an oral device is working properly, snoring should be improved. A home sleep test may be recommended at that time if to determine whether the sleep apnea is adequately treated.       Some things to  remember:  -Oral devices are often, but not always, covered by your medical insurance. Be sure to check with your insurance provider.   -If you are referred for oral therapy, you will be given a list of specialized dentists to consider or you may choose to visit the Web site of the American Academy of Dental Sleep Medicine  -Oral devices are less likely to work if you have severe sleep apnea or are extremely overweight.     More detailed information  An oral appliance is a small acrylic device that fits over the upper and lower teeth  (similar to a retainer or a mouth guard). This device slightly moves jaw forward, which moves the base of the tongue forward, opens the airway, improves breathing for effective treat snoring and obstructive sleep apnea in perhaps 7 out of 10 people .  The best working devices are custom-made by a dental device  after a mold is made of the teeth 1, 2, 3.  When is an oral appliance indicated?  Oral appliance therapy is recommended as a first-line treatment for patients with primary snoring, mild sleep apnea, and for patients with moderate sleep apnea who prefer appliance therapy to use of CPAP4, 5. Severity of sleep apnea is determined by sleep testing and is based on the number of respiratory events per hour of sleep.   How successful is oral appliance therapy?  The success rate of oral appliance therapy in patients with mild sleep apnea is 75-80% while in patients with moderate sleep apnea it is 50-70%. The chance of success in patients with severe sleep apnea is 40-50%. The research also shows that oral appliances have a beneficial effect on the cardiovascular health of THOR patients at the same magnitude as CPAP therapy7.  Oral appliances should be a second-line treatment in cases of severe sleep apnea, but if not completely successful then a combination therapy utilizing CPAP plus oral appliance therapy may be effective. Oral appliances tend to be effective in a broad  range of patients although studies show that the patients who have the highest success are females, younger patients, those with milder disease, and less severe obesity. 3, 6.   Finding a dentist that practices dental sleep medicine  Specific training is available through the American Academy of Dental Sleep Medicine for dentists interested in working in the field of sleep. To find a dentist who is educated in the field of sleep and the use of oral appliances, near you, visit the Web site of the American Academy of Dental Sleep Medicine.    References  1. Michaelle et al. Objectively measured vs self-reported compliance during oral appliance therapy for sleep-disordered breathing. Chest 2013; 144(5): 8512-6420.  2. Bryan, et al. Objective measurement of compliance during oral appliance therapy for sleep-disordered breathing. Thorax 2013; 68(1): 91-96.  3. Sourav et al. Mandibular advancement devices in 620 men and women with THOR and snoring: tolerability and predictors of treatment success. Chest 2004; 125: 0351-2209.  4. Miguel, et al. Oral appliances for snoring and THOR: a review. Sleep 2006; 29: 244-262.  5. Marciano et al. Oral appliance treatment for THOR: an update. J Clin Sleep Med 2014; 10(2): 215-227.  6. Xochilt et al. Predictors of OSAH treatment outcome. J Dent Res 2007; 86: 3770-0592.      Weight Loss:    Weight loss is a long-term strategy that may improve sleep apnea in some patients.    Weight management is a personal decision and the decision should be based on your interest and the potential benefits.  If you are interested in exploring weight loss strategies, the following discussion covers the impact on weight loss on sleep apnea and the approaches that may be successful.    Being overweight does not necessarily mean you will have health consequences.  Those who have BMI over 35 or over 27 with existing medical conditions carries greater risk.   Weight loss decreases severity of  sleep apnea in most people with obesity. For those with mild obesity who have developed snoring with weight gain, even 15-30 pound weight loss can improve and occasionally eliminate sleep apnea.  Structured and life-long dietary and health habits are necessary to lose weight and keep healthier weight levels.     Though there may be significant health benefits from weight loss, long-term weight loss is very difficult to achieve- studies show success with dietary management in less than 10% of people. In addition, substantial weight loss may require years of dietary control and may be difficult if patients have severe obesity. In these cases, surgical management may be considered.  Finally, older individuals who have tolerated obesity without health complications may be less likely to benefit from weight loss strategies.        Your BMI is Body mass index is 36.15 kg/m .  Weight management is a personal decision.  If you are interested in exploring weight loss strategies, the following discussion covers the approaches that may be successful. Body mass index (BMI) is one way to tell whether you are at a healthy weight, overweight, or obese. It measures your weight in relation to your height.  A BMI of 18.5 to 24.9 is in the healthy range. A person with a BMI of 25 to 29.9 is considered overweight, and someone with a BMI of 30 or greater is considered obese. More than two-thirds of American adults are considered overweight or obese.  Being overweight or obese increases the risk for further weight gain. Excess weight may lead to heart disease and diabetes.  Creating and following plans for healthy eating and physical activity may help you improve your health.  Weight control is part of healthy lifestyle and includes exercise, emotional health, and healthy eating habits. Careful eating habits lifelong are the mainstay of weight control. Though there are significant health benefits from weight loss, long-term weight loss  with diet alone may be very difficult to achieve- studies show long-term success with dietary management in less than 10% of people. Attaining a healthy weight may be especially difficult to achieve in those with severe obesity. In some cases, medications, devices and surgical management might be considered.  What can you do?  If you are overweight or obese and are interested in methods for weight loss, you should discuss this with your provider.     Consider reducing daily calorie intake by 500 calories.     Keep a food journal.     Avoiding skipping meals, consider cutting portions instead.    Diet combined with exercise helps maintain muscle while optimizing fat loss. Strength training is particularly important for building and maintaining muscle mass. Exercise helps reduce stress, increase energy, and improves fitness. Increasing exercise without diet control, however, may not burn enough calories to loose weight.       Start walking three days a week 10-20 minutes at a time    Work towards walking thirty minutes five days a week     Eventually, increase the speed of your walking for 1-2 minutes at time    In addition, we recommend that you review healthy lifestyles and methods for weight loss available through the National Institutes of Health patient information sites:  http://win.niddk.nih.gov/publications/index.htm    And look into health and wellness programs that may be available through your health insurance provider, employer, local community center, or huy club.      Surgery:    Surgery for obstructive sleep apnea is considered generally only when other therapies fail to work. Surgery may be discussed with you if you are having a difficult time tolerating CPAP and or when there is an abnormal structure that requires surgical correction.  Nose and throat surgeries often enlarge the airway to prevent collapse.  Most of these surgeries create pain for 1-2 weeks and up to half of the most common surgeries  are not effective throughout life.  You should carefully discuss the benefits and drawbacks to surgery with your sleep provider and surgeon to determine if it is the best solution for you.   More information  Surgery for THOR is directed at areas that are responsible for narrowing or complete obstruction of the airway during sleep.  There are a wide range of procedures available to enlarge and/or stabilize the airway to prevent blockage of breathing in the three major areas where it can occur: the palate, tongue, and nasal regions.  Successful surgical treatment depends on the accurate identification of the factors responsible for obstructive sleep apnea in each person.  A personalized approach is required because there is no single treatment that works well for everyone.  Because of anatomic variation, consultation with an examination by a sleep surgeon is a critical first step in determining what surgical options are best for each patient.  In some cases, examination during sedation may be recommended in order to guide the selection of procedures.  Patients will be counseled about risks and benefits as well as the typical recovery course after surgery. Surgery is typically not a cure for a person s THOR.  However, surgery will often significantly improve one s THOR severity (termed  success rate ).  Even in the absence of a cure, surgery will decrease the cardiovascular risk associated with OSA7; improve overall quality of life8 (sleepiness, functionality, sleep quality, etc).      Palate Procedures:  Patients with THOR often have narrowing of their airway in the region of their tonsils and uvula.  The goals of palate procedures are to widen the airway in this region as well as to help the tissues resist collapse.  Modern palate procedure techniques focus on tissue conservation and soft tissue rearrangement, rather than tissue removal.  Often the uvula is preserved in this procedure. Residual sleep apnea is common in  patient after pharyngoplasty with an average reduction in sleep apnea events of 33%2.      Tongue Procedures:  ExamWhile patients are awake, the muscles that surround the throat are active and keep this region open for breathing. These muscles relax during sleep, allowing the tongue and other structures to collapse and block breathing.  There are several different tongue procedures available.  Selection of a tongue base procedure depends on characteristics seen on physical exam.  Generally, procedures are aimed at removing bulky tissues in this area or preventing the back of the tongue from falling back during sleep.  Success rates for tongue surgery range from 50-62%3.    Hypoglossal Nerve Stimulation:  Hypoglossal nerve stimulation has recently received approval from the [place] Food and Drug Administration for the treatment of obstructive sleep apnea.  This is based on research showing that the system was safe and effective in treating sleep apnea6.  Results showed that the median AHI score decreased 68%, from 29.3 to 9.0. This therapy uses an implant system that senses breathing patterns and delivers mild stimulation to airway muscles, which keeps the airway open during sleep.  The system consists of three fully implanted components: a small generator (similar in size to a pacemaker), a breathing sensor, and a stimulation lead.  Using a small handheld remote, a patient turns the therapy on before bed and off upon awakening.    Candidates for this device must be greater than 22 years of age, have moderate to severe THOR (AHI between 20-65), BMI less than 32, have tried CPAP/oral appliance without success, and have appropriate upper airway anatomy (determined by a sleep endoscopy performed by Dr. Lima).    Hypoglossal Nerve Stimulation Pathway:    The sleep surgeon s office will work with the patient through the insurance prior-authorization process (including communications and appeals).    Nasal  Procedures:  Nasal obstruction can interfere with nasal breathing during the day and night.  Studies have shown that relief of nasal obstruction can improve the ability of some patients to tolerate positive airway pressure therapy for obstructive sleep apnea1.  Treatment options include medications such as nasal saline, topical corticosteroid and antihistamine sprays, and oral medications such as antihistamines or decongestants. Non-surgical treatments can include external nasal dilators for selected patients. If these are not successful by themselves, surgery can improve the nasal airway either alone or in combination with these other options.      Combination Procedures:  Combination of surgical procedures and other treatments may be recommended, particularly if patients have more than one area of narrowing or persistent positional disease.  The success rate of combination surgery ranges from 66-80%2,3.    References  1. Quintin JERRY. The Role of the Nose in Snoring and Obstructive Sleep Apnoea: An Update.  Eur Arch Otorhinolaryngol. 2011; 268: 1365-73.  2.  Gricel SM; Steve JA; Marsha JR; Pallanch JF; Ira MB; Henry SG; Jenni MCCONNELL. Surgical modifications of the upper airway for obstructive sleep apnea in adults: a systematic review and meta-analysis. SLEEP 2010;33(10):7754-8874. Ramónrialex MCKEON. Hypopharyngeal surgery in obstructive sleep apnea: an evidence-based medicine review.  Arch Otolaryngol Head Neck Surg. 2006 Feb;132(2):206-13.  3. Fred YH1, Harley Y, Adam FRANCIS. The efficacy of anatomically based multilevel surgery for obstructive sleep apnea. Otolaryngol Head Neck Surg. 2003 Oct;129(4):327-35.  4. Mitchell MCKEON, Goldberg A. Hypopharyngeal Surgery in Obstructive Sleep Apnea: An Evidence-Based Medicine Review. Arch Otolaryngol Head Neck Surg. 2006 Feb;132(2):206-13.  5. Derrick YORK et al. Upper-Airway Stimulation for Obstructive Sleep Apnea.  N Engl J Med. 2014 Jan 9;370(2):139-49.  6. Huy FERNANDEZ et al. Increased  "Incidence of Cardiovascular Disease in Middle-aged Men with Obstructive Sleep Apnea. Am J Respir Crit Care Med; 2002 166: 159-165  7. Argelia MELENDREZ et al. Studying Life Effects and Effectiveness of Palatopharyngoplasty (SLEEP) study: Subjective Outcomes of Isolated Uvulopalatopharyngoplasty. Otolaryngol Head Neck Surg. 2011; 144: 623-631.  STRESS AND SLEEP    Its no surprise that stress can affect sleep. It is also true that if you sleep better you can also handle stress better.  If left untreated, sleep disorders are also associated with increased risk of onset of depression and anxiety and other health risks.    During this disruptive and uncertain time in our lives due the coronavirus (COVID-19), it is especially important to tend to your emotional and physical wellbeing.   Here are a few tips and resources to consider:    1. Stay active    It s pretty well known that exercise is really good for both our physical and mental health. There s heaps of different types of exercise you can do from home, thanks to [employer] and apps.  Do what fits your needs and health circumstances.    2. Practice good sleep habits      Keep a consistent sleep schedule and allow 7-8 hours for sleep.    Only use your bed for sleep and sex.    Wind down before bed.    Avoid using electronics and mobile devices an hour before bedtime or in bed.    Avoid caffeine use within six hours of your bedtime.     Avoid alcohol use within three hours of your bedtime.    2. Take 5 to chill.    When we re stressed about something (such as coronavirus), our thoughts, heart-rate and breathing tend to speed up. Taking even 5 minutes or so to practice mindfulness can help produce a sense of calmness. What the heck is \"mindfulness\" you ask?  Basically it is any activity, be it meditation, sitting in a relaxed position and taking in the experience and sensations of the present moment, or enjoying relaxed breathing.    If you are experiencing insomnia, the free " "[employer]  mobile pat has great relaxation tools in their Tools section including:      Breathing Tool    Progressive Muscle Relaxation    Body Scan    Mindfulness Exercise    Guided Imagery ([place], [place] or [place])    Insight Timer and Calm apps have thousands of free guided meditations and exercise on a variety of topics that may be of interest such as managing stress, sleep, and meditation.    3. Chat with your friends and family.    Even if an in-person meet-up is off the table, try to stay in touch via text, Skype, Messenger, WhatsApp, Status Overloadime, or phone call. Ask them how they re feeling and share your own experience if you feel safe to do so.    5. Take a break from the news    Between the news and social media, we are all feeling saturated by news updates and \"breaking news.\"  It s important to stay informed, but try to limit your media intake to a couple of times a day and use trusted news sources. If you catch yourself turning to social media because you re feeling isolated, take a break and spend time on another activity.    6. Listen to Music    Music can make us feel so much better. If you don't have a music playlist on your phone, consider making one.     7. Watch or read something uplifting    Distraction can be a good thing. Watch something that you find uplifting and allow yourself to zone out from what s going on in the world. [employer] is a great option too.     8. Learn something new    Have you wanted to get into drawing or learning a musical instrument? Now s a great time to make a start. [employer] has great free online tutorials for pretty much everything.    Helpful Advice from the Sleep Foundation    Copy/paste or type into your browser:    www.sleepfoundation.org/sleep-guidelines-covid-19-isolation      Stress Management and Relaxation Books      The Relaxation and Stress Reduction Workbook by Shruthi Atkinson, La Fritz and Ruel Downs (2008)    Stress Management " Workbook: Techniques and Self-Assessment Procedures by nSow Brownlee and Raúl Ackerman (1997)    A Mindfulness-Based Stress Reduction Workbook by Scott Castillo and Sherry Galloway (2010)    The Complete Stress Management Workbook by Ulices Kaba, Antonio Ruffin and Carlitos Wall (1996)      Treatment for Delayed sleep phase involves the following:  Good sleep habits. please do everything they can to develop and maintain good sleep habits and a steady sleep schedule. Habits should include going to bed and waking up at the same times; avoiding caffeinated products (eg, coffees, teas, mukehs, some non-cola pops, energy drinks, chocolates) avoiding other stimulants and products that can disrupt sleep (eg, alcohol, sleeping pills, nicotine); maintaining a cool, quiet and comfortable bedroom; and avoiding activities before bedtime that are stimulating (eg, computer games and television).   Shifting the bedtime schedule / Advancing the internal clock. This method simply moves the bedtime a bit earlier on each successive night until the desired bedtime is reached. For example, setting the bedtime at midnight on one night, 11:45 p.m. on the next night, 11:30 on the following night and so on.  We arrived at a goal bedtime : 2 AM and a goal wake up time of 11 AM.  Once you are able to arrive at this,  you can slowly and gradually advance the schedule further since you wanted to wake up at 7 AM as you used to before the pandemic.  We discussed minimizing exposure to bright light past a couple hours before the goal bedtime and to  avoid mind stimulating activities before bed.    Recommended  Melatonin 1/2 -1 tab of 1 mg strength at 10 PM.  We also discussed exposure to bright light(natural outdoors) for 30 to 60 minutes soon after awakening to help with sleep phase advancement.    [facility]      Treating Insomnia  Good sleeping habits are a key part of treatment. If needed, some medications may help you  sleep better at first. Making healthy lifestyle changes and learning to relax can improve your sleep. Treating insomnia takes commitment, but trust that your efforts will pay off. Talk to your doctor before taking any medication.    Healthy Lifestyle  Your lifestyle affects your health and your sleep. Here are some healthy habits:    Keep a regular sleep schedule. Go to bed and get up at the same time each day.    Exercise regularly. It may help you reduce stress. Avoid strenuous exercise for two to four hours before bedtime.    Avoid or limit naps.    Use your bed only for sleep and sex.    Don t spend too much time in bed trying to fall asleep. If you can t fall asleep, get up and do something until you become tired and drowsy.    Avoid or limit caffeine and nicotine. They can keep you awake at night. Also avoid alcohol. It may help you fall asleep at first, but your sleep will not be restful.    Before Bedtime  To sleep better every night, try these tips:    Have a bedtime routine to let your body and mind know when it s time to sleep.    Going to bed should be relaxing so try to do only relaxing things around bedtime. Sleep will come sooner.    If your worries don t let you sleep, write them down in a diary. Then close it, and go to bed.    Make sure the room is not too hot or too cold. If it s not dark enough, an eye mask can help. If it s noisy, try using earplugs.    Learn to Relax  Stress, anxiety, and body tension may keep you awake at night. To unwind before bedtime, try reading a book, meditation, or yoga. Also, try the following:    Deep breathing. Sit or lie back in a chair. Take a slow, deep breath. Hold it for 5 counts. Then breathe out slowly through your mouth. Keep doing this until you feel relaxed.    Imagery. Think of the last fun trip you took. In your mind, walk through the trip from start to finish. Put as much detail into the memory as you can remember. It will help you relax.    Cognitive  Behavioral Treatment (CBT)  CBT is the most effective treatment for long-term insomnia. It tries to address the underlying causes of your sleep problems, including your habits and how you think about sleep.    Suggested Resources  Insomnia Treatment Books     Overcoming Insomnia by Logan Whitfield and Jeannie Smith (2008)    No More Sleepless Nights by Roni Ayon and Hannah Mathews (1996)    Say Kristan to Insomnia by Guero Salmeron (2009)    The Insomnia Workbook by Maddy Estrada and Anthony Charles (2009)    The Insomnia Answer by Jordan Gaytan and Marciano Navarro (2006)      Stress Management and Relaxation Books    The Relaxation and Stress Reduction Workbook by Shruthi Atkinson, La Fritz and Ruel Downs (2008)    Stress Management Workbook: Techniques and Self-Assessment Procedures by Snow Brownlee and Raúl Ackerman (1997)    A Mindfulness-Based Stress Reduction Workbook by Scott Castillo and Sherry Galloway (2010)    The Complete Stress Management Workbook by Ulices Kaba, Antonio Ruffin and Carlitos Wall (1996)    Assert Yourself by Debbie Morales and Bogdan Morales (1977)    Relaxation Resources for Computer Download   These websites offer resources to help you relax. This list is for information only. [facility] is not responsible for the quality of services or the actions of any person or organization.  Progressive Muscle Relaxation (PMR):     http://www.Telepo/progressive-muscle-relaxation-exercise.html     http://studentsupport.[facility]/counseling/resources/self-help/relaxation-and-stress-management/   Deep Breathing Exercises:    http://www.Telepo/breathing-awareness.html     Meditation:     www.WorkThink    www.theKidzVuzguided-meditation-site.com You may have to pay for some of these resources.    Guided Imagery:    http://www.Telepo/guided-imagery-scripts.html      http://Equ[employer].com/library/segjdyvdkh-erytcj-ksifkvg/     Please continue follow-up with her therapist and discuss with your primary care provider to obtain evaluation and management of the anxiety and depression that you reported.

## 2020-07-10 NOTE — PROGRESS NOTES
SUBJECTIVE:   CC: Batool Louis is an 19 year old woman who presents for preventive health visit.     Healthy Habits:    Do you get at least three servings of calcium containing foods daily (dairy, green leafy vegetables, etc.)? yes    Amount of exercise or daily activities, outside of work: 3 day(s) per week, 45 min per day    Problems taking medications regularly No    Medication side effects: No    Have you had an eye exam in the past two years? yes    Do you see a dentist twice per year? yes    Do you have sleep apnea, excessive snoring or daytime drowsiness?yes- apnea, but she is seeing the sleep dr about this today.       She has the following additional concerns:     1.  Vaginal area lump - intermittent for several months now.  Comes and goes in different locations.  Not painful.  No redness or drainage.    2.  Intermittent painless rectal bleeding - has happened twice, for 1-2 days at a time.  Only with bowel movements.  Both times she has been slightly constipated.  No outer bulges or lumps noted.  No diarrhea, nausea, vomiting or abdominal pain. No weight loss.    3.  Irregular menses - ongoing issue.  Seeing OB/GYN for this.  Question of PCOS, would like her labs rechecked today.       Today's PHQ-2 Score:   PHQ-2 ( 1999 Pfizer) 12/13/2017 12/13/2017   Q1: Little interest or pleasure in doing things 0 0   Q2: Feeling down, depressed or hopeless 0 0   PHQ-2 Score 0 0   Q1: Little interest or pleasure in doing things Not at all -   Q2: Feeling down, depressed or hopeless Not at all -   PHQ-2 Score 0 -       Abuse: Current or Past(Physical, Sexual or Emotional)- No  Do you feel safe in your environment? Yes        Social History     Tobacco Use     Smoking status: Never Smoker     Smokeless tobacco: Never Used   Substance Use Topics     Alcohol use: Yes     Frequency: Monthly or less     Drinks per session: 1 or 2     Binge frequency: Monthly     If you drink alcohol do you typically have >3  "drinks per day or >7 drinks per week? No                     Reviewed orders with patient.  Reviewed health maintenance and updated orders accordingly - Yes  Lab work is in process    Mammogram not appropriate for this patient based on age.    Pertinent mammograms are reviewed under the imaging tab.  History of abnormal Pap smear: NO - age 21-29 PAP every 3 years recommended  PAP / HPV 6/22/2020   PAP NIL     Reviewed and updated as needed this visit by clinical staff  Tobacco  Allergies  Meds  Med Hx  Surg Hx  Fam Hx  Soc Hx        Reviewed and updated as needed this visit by Provider            ROS:  CONSTITUTIONAL: NEGATIVE for fever, chills, change in weight  INTEGUMENTARY/SKIN: as above  EYES: NEGATIVE for vision changes or irritation  ENT: NEGATIVE for ear, mouth and throat problems  RESP: NEGATIVE for significant cough or SOB  BREAST: NEGATIVE for masses, tenderness or discharge  CV: NEGATIVE for chest pain, palpitations or peripheral edema  GI: as above   : NEGATIVE for unusual urinary or vaginal symptoms. Periods are regular.  MUSCULOSKELETAL: NEGATIVE for significant arthralgias or myalgia  NEURO: NEGATIVE for weakness, dizziness or paresthesias  PSYCHIATRIC: NEGATIVE for changes in mood or affect    OBJECTIVE:   /80   Pulse 99   Temp 98.1  F (36.7  C) (Tympanic)   Resp 14   Ht 1.746 m (5' 8.75\")   Wt 110.4 kg (243 lb 6 oz)   LMP 05/11/2020 (Exact Date)   SpO2 98%   BMI 36.20 kg/m    EXAM:  GENERAL: alert, no distress and obese  EYES: Eyes grossly normal to inspection, PERRL and conjunctivae and sclerae normal  HENT: ear canals and TM's normal, nose and mouth without ulcers or lesions  NECK: no adenopathy, no asymmetry, masses, or scars and thyroid normal to palpation  RESP: lungs clear to auscultation - no rales, rhonchi or wheezes  BREAST: normal without masses, tenderness or nipple discharge and no palpable axillary masses or adenopathy  CV: regular rate and rhythm, normal S1 S2, " no S3 or S4, no murmur, click or rub, no peripheral edema and peripheral pulses strong  ABDOMEN: soft, nontender, no hepatosplenomegaly, no masses and bowel sounds normal   (female): normal female external genitalia, normal urethral meatus, vaginal mucosa pink, moist, well rugated, and normal cervix/adnexa/uterus without masses or discharge   (female): normal female external genitalia, normal urethral meatus , vaginal mucosa pink, moist, well rugated and nabothian cyst(s) present left labia.  No erythema or pain.  It is not infected.   RECTAL: normal sphincter tone, no rectal masses  MS: no gross musculoskeletal defects noted, no edema  SKIN: no suspicious lesions or rashes  NEURO: Normal strength and tone, mentation intact and speech normal  PSYCH: mentation appears normal, affect normal/bright    Diagnostic Test Results:  Pending     ASSESSMENT/PLAN:   1. Routine general medical examination at a health care facility    2. Rectal bleeding  Has happened twice, both times during episodes of constipation.  No pain. Normal external exam today.  Likely internal hemorrhoid.  Discussed monitoring this, avoidance of constipation.  If this worsens, consider colonoscopy.      3. Nabothian cyst  Not infected.  I advised she could just monitor this for now as it is not causing her any issues.      4. Irregular menses  Following with OB/GYN.  Will recheck labs today.   - Follicle stimulating hormone  - Prolactin  - Estradiol  - TSH with free T4 reflex  - Testosterone, total    5. Need for vaccination    6. CARDIOVASCULAR SCREENING; LDL GOAL LESS THAN 160  - Lipid panel reflex to direct LDL Fasting    7. Screening for diabetes mellitus  - Glucose    8. Screening for STDs (sexually transmitted diseases)  - NEISSERIA GONORRHOEA PCR  - CHLAMYDIA TRACHOMATIS PCR  - HIV Antigen Antibody Combo    COUNSELING:   Reviewed preventive health counseling, as reflected in patient instructions    Estimated body mass index is 36.2 kg/m  as  "calculated from the following:    Height as of this encounter: 1.746 m (5' 8.75\").    Weight as of this encounter: 110.4 kg (243 lb 6 oz).    Weight management plan: Discussed healthy diet and exercise guidelines     reports that she has never smoked. She has never used smokeless tobacco.      Counseling Resources:  ATP IV Guidelines  Pooled Cohorts Equation Calculator  Breast Cancer Risk Calculator  FRAX Risk Assessment  ICSI Preventive Guidelines  Dietary Guidelines for Americans, 2010  USDA's MyPlate  ASA Prophylaxis  Lung CA Screening    In addition to the preventive visit, 25  minutes of the appointment were spent evaluating and developing a treatment plan for her additional concern(s).          RONNY Vaz [facility]  "

## 2020-07-12 LAB
C TRACH DNA SPEC QL NAA+PROBE: NEGATIVE
N GONORRHOEA DNA SPEC QL NAA+PROBE: NEGATIVE
SPECIMEN SOURCE: NORMAL
SPECIMEN SOURCE: NORMAL

## 2020-07-13 LAB — HIV 1+2 AB+HIV1 P24 AG SERPL QL IA: NONREACTIVE

## 2020-07-14 LAB — TESTOST SERPL-MCNC: 52 NG/DL (ref 8–60)

## 2020-07-20 ENCOUNTER — VIRTUAL VISIT (OUTPATIENT)
Dept: SPEECH THERAPY | Facility: CLINIC | Age: 20
End: 2020-07-20

## 2020-07-20 DIAGNOSIS — R49.0 DYSPHONIA: Primary | ICD-10-CM

## 2020-07-20 NOTE — PROGRESS NOTES
Batool Louis is a 19 year old female who is being seen via a billable video visit.      Patient has given verbal consent for Video visit? Yes    Video Start Time: 10:05am    Telehealth Visit Details    Type of Service:  Telehealth    Video End Time (time video stopped): 10:45am    Originating Location (pt. location): Home    Additional Participants in Telehealth Visit: None    Distant Location (provider location):  [facility]     Mode of Communication (Audio Visual or Audio Only):  Audio Visual via Gabriela Tate, NILTON  July 20, 2020

## 2020-07-21 ENCOUNTER — TELEPHONE (OUTPATIENT)
Dept: SLEEP MEDICINE | Facility: CLINIC | Age: 20
End: 2020-07-21

## 2020-07-21 NOTE — TELEPHONE ENCOUNTER
Pt called and message was left for pt to call back to schedule either PSG or HST (pt was going to call insurance to see which is covered) Pt asked to call back to schedule.    Pt also given price line number.    CARLYN Nick

## 2020-07-22 ENCOUNTER — FCC EXTENDED DOCUMENTATION (OUTPATIENT)
Dept: PSYCHOLOGY | Facility: CLINIC | Age: 20
End: 2020-07-22

## 2020-07-22 NOTE — PROGRESS NOTES
Provider attempted to reach the patient for their appointment. The patient did not answer phone calls and the provider did not have the correct number.  The provider called the mobile number on file and a male voice answered saying he wasn't with Dodie and gave the provider mom's cell phone number, which also was unanswered. The provider LVM after calling the home phone number instructing the client to call intake if/when she got the message to notify the provider if she was still interested in a session today.

## 2020-08-10 ENCOUNTER — VIRTUAL VISIT (OUTPATIENT)
Dept: SPEECH THERAPY | Facility: CLINIC | Age: 20
End: 2020-08-10

## 2020-08-10 DIAGNOSIS — R49.0 DYSPHONIA: Primary | ICD-10-CM

## 2020-08-10 NOTE — PROGRESS NOTES
Batool Louis is a 20 year old female who is being seen via a billable video visit.      Patient has given verbal consent for Video visit? Yes    Video Start Time: 1:15pm    Telehealth Visit Details    Type of Service:  Telehealth    Video End Time (time video stopped): 1:43pm    Originating Location (pt. location): Home    Additional Participants in Telehealth Visit: None    Distant Location (provider location):  [facility]     Mode of Communication (Audio Visual or Audio Only):  Audio Visual via [employer] Video Call    Nadiya Tate, SLP  August 10, 2020

## 2020-08-11 ENCOUNTER — VIRTUAL VISIT (OUTPATIENT)
Dept: PSYCHOLOGY | Facility: CLINIC | Age: 20
End: 2020-08-11
Payer: COMMERCIAL

## 2020-08-11 DIAGNOSIS — F41.1 GENERALIZED ANXIETY DISORDER: Primary | ICD-10-CM

## 2020-08-11 PROCEDURE — 90837 PSYTX W PT 60 MINUTES: CPT | Mod: GT | Performed by: COUNSELOR

## 2020-08-11 NOTE — PROGRESS NOTES
Progress Note    Client Name: Batool Louis  Date: 8/11/20         Service Type: Individual      Session Start Time: 4:00pm   Session End Time: 5:00 pm      Session Length: 60 mins     Session #: 15     Attendees: Client    Treatment Plan Last Reviewed:  6/12/20  PHQ-9 / SHELBY-7 : see chart    Telemedicine Visit: The patient's condition can be safely assessed and treated via synchronous audio and visual telemedicine encounter.      Reason for Telemedicine Visit: Services only offered telehealth    Originating Site (Patient Location): Patient's home    Distant Site (Provider Location): Provider Remote Setting    Consent:  The patient/guardian has verbally consented to: the potential risks and benefits of telemedicine (video visit) versus in person care; bill my insurance or make self-payment for services provided; and responsibility for payment of non-covered services.     Mode of Communication:  Video Conference via [email or web address]    As the provider I attest to compliance with applicable laws and regulations related to telemedicine.     DATA      Progress Since Last Session (Related to Symptoms / Goals / Homework):   Symptoms: somewhat improved    Homework: Completed in session      Episode of Care Goals: Satisfactory progress - PREPARATION (Decided to change - considering how); Intervened by negotiating a change plan and determining options / strategies for behavior change, identifying triggers, exploring social supports, and working towards setting a date to begin behavior change     Current / Ongoing Stressors and Concerns:   Dodie stated her grandpa went into hospice and she was coming from visiting him and requested a phone session instead. The session started with video and transitioned to telephone. 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   She talked about the sleeping  issues she is experiencing currently.      Treatment Objective(s) Addressed in This Session:   use at least 2 coping skills for anxiety management in the next 2 weeks  identify 5 traits or charcteristics you like about yourself       Intervention:   Processed the client's feelings about her grandpa dying.   Talked about the sleeping issues the client is dealing with. She stated she has to sleep with a nightlight because there are times she will become extremely anxious either before bed or she will wake up in the middle of the night in a panic attack. She will become more anxious if she can't see in her room. She stated she took one of her childhood stuffed animals to sleep with which helped to comfort her. Worked with client to be non-judgmental about this and about herself.       ASSESSMENT: Current Emotional / Mental Status (status of significant symptoms):   Risk status (Self / Other harm or suicidal ideation)   Client denies current fears or concerns for personal safety.   Client denies current or recent suicidal ideation or behaviors.   Client denies current or recent homicidal ideation or behaviors.   Client denies current or recent self injurious behavior or ideation.   Client denies other safety concerns.   A safety and risk management plan has not been developed at this time, however client was given the after-hours number / 911 should there be a change in any of these risk factors.     Appearance:   Appropriate    Eye Contact:   Good    Psychomotor Behavior: Normal    Attitude:   Cooperative    Orientation:   All   Speech    Rate / Production: Normal     Volume:  Normal    Mood:    Normal Sad    Affect:    Appropriate    Thought Content:  Clear    Thought Form:  Coherent  Logical    Insight:    Good      Medication Review:   No changes to current psychiatric medication(s)     Medication Compliance:   Yes     Changes in Health Issues:   None reported     Chemical Use Review:   Substance Use: Chemical use  reviewed, no active concerns identified      Tobacco Use: No current tobacco use.       Collateral Reports Completed:   Not Applicable     DSM-V Diagnoses: 300.02 (F41.1) Generalized Anxiety Disorder    PLAN: (Client Tasks / Therapist Tasks / Other)  Client to continue to work on accepting herself and self care.         Celsa Linda, [facility]                                                         ________________________________________________________________________    Treatment Plan    Client's Name: Batool Louis  YOB: 2000    Date: 6/12/20    DSM-V Diagnoses: 300.02 (F41.1) Generalized Anxiety Disorder  Psychosocial / Contextual Factors: COVID, living back at home, deaths of loved ones within the past few years. Sexual identity issues.     WHODAS: 12    Referral / Collaboration:  Referral to another professional/service is not indicated at this time.    Anticipated number of session or this episode of care: 5+      MeasurableTreatment Goal(s) related to diagnosis / functional impairment(s)  Goal 1: Client will increase emotion regulation skills/decrease panic attacks    Objective #A (Client Action)    Client will identify 2-4 fears / thoughts that contribute to feeling anxious  use positive self-affirmations daily.  Status: New - Date: 6/12/20      Intervention(s)  Therapist will teach emotional regulation skills. distress tolerance skills, interpersonal effectiveness skills, emotion regluation skills, mindfulness skills, radical acceptance. Therapist will teach client how to ID body cues for anxiety, anxiety reduction techniques, how to ID triggers for depression and anxiety- decrease reactivity/eliminate, lifestyle changes to reduce depression and anxiety, communication skills, explore cognitive beliefs and help client to develop healthy cognitive patterns and beliefs.      Objective #B  Client will use thought-stopping strategy daily to reduce intrusive thoughts.  Status: New -  "Date: 6/12/20      Intervention(s)  Therapist will teach emotional regulation skills. distress tolerance skills, interpersonal effectiveness skills, emotion regluation skills, mindfulness skills, radical acceptance. Therapist will teach client how to ID body cues for anxiety, anxiety reduction techniques, how to ID triggers for depression and anxiety- decrease reactivity/eliminate, lifestyle changes to reduce depression and anxiety, communication skills, explore cognitive beliefs and help client to develop healthy cognitive patterns and beliefs.    Objective #C (Client Action)    Status: New - Date: 6/12/20     Client will use \"worry time\" each day for 15 minutes of scheduled worry and then defer obsessive or anxious thinking until the next structured worry time.    Intervention(s)  Therapist will teach emotional regulation skills. work through trauma using somatic experiencing techniques to discharge the anxiety.    Goal 2: Client will work on increasing self esteem and self worth     Objective #A (Client Action)    Status: New - Date: 6/12/20     Client will identify two areas of life that you would like to have improved functioning.    Intervention(s)  Therapist will teach emotional regulation skills. work through trauma using somatic experiencing techniques to discharge the anxiety.      Client has reviewed and agreed to the above plan.      Celsa Linda [facility]    "

## 2020-08-12 ENCOUNTER — OFFICE VISIT (OUTPATIENT)
Dept: OBGYN | Facility: CLINIC | Age: 20
End: 2020-08-12
Payer: COMMERCIAL

## 2020-08-12 VITALS
BODY MASS INDEX: 36.44 KG/M2 | SYSTOLIC BLOOD PRESSURE: 116 MMHG | DIASTOLIC BLOOD PRESSURE: 79 MMHG | WEIGHT: 245 LBS | OXYGEN SATURATION: 99 % | HEART RATE: 94 BPM

## 2020-08-12 DIAGNOSIS — Z30.430 ENCOUNTER FOR IUD INSERTION: Primary | ICD-10-CM

## 2020-08-12 DIAGNOSIS — L29.2 VULVAR ITCHING: ICD-10-CM

## 2020-08-12 DIAGNOSIS — N91.5 OLIGOMENORRHEA, UNSPECIFIED TYPE: ICD-10-CM

## 2020-08-12 PROBLEM — Z97.5 IUD (INTRAUTERINE DEVICE) IN PLACE: Status: ACTIVE | Noted: 2020-08-12

## 2020-08-12 LAB
SPECIMEN SOURCE: NORMAL
WET PREP SPEC: NORMAL

## 2020-08-12 PROCEDURE — 87210 SMEAR WET MOUNT SALINE/INK: CPT | Performed by: OBSTETRICS & GYNECOLOGY

## 2020-08-12 PROCEDURE — 99213 OFFICE O/P EST LOW 20 MIN: CPT | Mod: 25 | Performed by: OBSTETRICS & GYNECOLOGY

## 2020-08-12 PROCEDURE — 58300 INSERT INTRAUTERINE DEVICE: CPT | Performed by: OBSTETRICS & GYNECOLOGY

## 2020-08-12 NOTE — PATIENT INSTRUCTIONS
You had a Kyleena intrauterine device (IUD) placed today.  You should abstain from intercourse for one week after insertion.  You should use condoms to prevent sexually transmitted infection, especially in the first 20 days.  The IUD will be effective for 5 years and then will need removal or replacement.    Call if:     You have bad pain in your lower belly     You have excessive bleeding    You cannot feel the string of the IUD or if the string seems shorter than usual    You think your IUD might have moved or fallen out    You had sex with someone who has or might have an STD, or you think you have an STD    You have foul-smelling discharge    You have an unexplained fever    If you think you might be pregnant          Healthy vulval hygiene practices    Avoid  Substitute    Pantyhose  Stockings with a garter belt    Thigh-high or knee-high stockings   Synthetic underwear Cotton underwear or no underwear   Jeans and other tight pants Loose pants, skirts, dresses   Swimsuits, leotards, thongs, lycra garments Loose-fitting cotton garments   Panty liners Tampons or cotton pads   Scented soaps or shampoos Fragrance-free pH neutral soap (eg, Neutrogena fragrance free, pure olive oil soap, Cetaphil gentle skin cleanser or equivalent ingredients)   Bubble bath Tub baths in the morning and at night without additives and at a comfortable temperature   Scented detergents Unscented detergents   Washcloths Use fingertips for washing; pat dry, don't rub dry   Baby wipes or flushable wipes Rinse with water using sports water bottle or perineal irrigation bottle    Feminine sprays, douches, powders These are not necessary products and can be omitted from personal practices   Dyed toilet articles Toilet articles without dyes   Hair dryers to dry vulva skin without contact Dry vulva by gentle patting

## 2020-08-12 NOTE — PROGRESS NOTES
Batool Louis is a  20 year old  Women who presents today requesting placement of a Kyleena iud.    She also complains of itching on the upper labia and vulva.  Prior wet preps were negative for yeast. She was evaluated by Dr. Mahajan in the past, please see his note.     The patient meets and is agreeable to the following conditions:    There is no previous history of pelvic inflammatory disease.    There is no previous history of ectopic pregnancy.    She is willing to check monthly for the IUD string.    She denies the possibility of pregnancy.       We discussed risks, benefits, and alternatives including but not limited to:     Possibility of pregnancy and ectopic pregnancy.    Possibility of pelvic inflammatory disease, particularly in the first 20 days and with new partners.    Risk of uterine perforation or IUD expulsion.    Possibility of difficult removal.    Spotting or heavy bleeding.    Cramping, pain or infection during or after insertion.    The patient was given patient information on the IUD and the patient education brochure from the .  She understands the main indication for removal is abnormal bleeding.  The patient has given consent to proceed with placement of the IUD.  She wishes to proceed.  All questions answered.    PROCEDURE:    Type of IUD: kyleena    She is placed in a dorsal lithotomy potion and a pelvic exam is performed to determine the position of the uterus.  The cervix is identified and cleaned with betadine.  A single tooth tenaculum is applied to the anterior lip of the cervix for stabilization.   The uterus sounded to 7.5 cm. (Target sound depth is 6.5 cm to 8.5 cm.)  The IUD is inserted into the uterus under sterile conditions in the usual fashion.    Lot number [id number] and expiration date 2021.  NDC#  5041-424-01 The IUD string is then cut to 3.5 cm.    The patient tolerated this procedure without immediate complication.  The patient is to return  or call immediately for any unexplained fever, abdominal or pelvic pain, excessive bleeding, possibility of pregnancy, foul-smelling discharge, sense that the IUD has been expelled.  All questions were answered.  Patient is aware that the IUD will be effective for 5 years and then will need removal or replacement.  Barrier methods for the first week advised.    Wet prep also collected.  Vulvar hygiene discussed. She can try benadryl before bed to help break the itch/scratch cycle.  Likely lichen simplex.  May consider triamcinolone ointment as previously prescribed by Dr. Mahajan.      Return to clinic as needed    Adelaide Ace MD

## 2020-08-14 NOTE — TELEPHONE ENCOUNTER
Appt made for 7/10/2020 opening. [employer] message sent to patient to see if this appointment works for her.     Shazia English MA    Please inform patient that all of the labs are normal if he/she does not have [facility] activated. If there are any questions please let me know.

## 2020-08-18 ENCOUNTER — MYC MEDICAL ADVICE (OUTPATIENT)
Dept: OBGYN | Facility: CLINIC | Age: 20
End: 2020-08-18

## 2020-08-19 NOTE — TELEPHONE ENCOUNTER
Pt was last seen in office with Dr. Ace on 8/12/2020 for an IUD. Per OV plan:  Wet prep also collected.  Vulvar hygiene discussed. She can try benadryl before bed to help break the itch/scratch cycle.  Likely lichen simplex.  May consider triamcinolone ointment as previously prescribed by Dr. Mahajan.

## 2020-08-25 ENCOUNTER — VIRTUAL VISIT (OUTPATIENT)
Dept: PSYCHOLOGY | Facility: CLINIC | Age: 20
End: 2020-08-25
Payer: COMMERCIAL

## 2020-08-25 DIAGNOSIS — F41.1 GENERALIZED ANXIETY DISORDER: Primary | ICD-10-CM

## 2020-08-25 PROCEDURE — 90834 PSYTX W PT 45 MINUTES: CPT | Mod: GT | Performed by: COUNSELOR

## 2020-08-25 NOTE — PROGRESS NOTES
Progress Note    Client Name: Batool Louis  Date: 20         Service Type: Individual      Session Start Time: 4:10pm   Session End Time: 5:00 pm      Session Length: 50 mins     Session #: 16     Attendees: Client    Treatment Plan Last Reviewed:  20  PHQ-9 / SHELBY-7 : see chart    Telemedicine Visit: The patient's condition can be safely assessed and treated via synchronous audio and visual telemedicine encounter.      Reason for Telemedicine Visit: Services only offered telehealth    Originating Site (Patient Location): Patient's home    Distant Site (Provider Location): Provider Remote Setting    Consent:  The patient/guardian has verbally consented to: the potential risks and benefits of telemedicine (video visit) versus in person care; bill my insurance or make self-payment for services provided; and responsibility for payment of non-covered services.     Mode of Communication:  Video Conference via [employer]/telephone    As the provider I attest to compliance with applicable laws and regulations related to telemedicine.     DATA      Progress Since Last Session (Related to Symptoms / Goals / Homework):   Symptoms: somewhat improved    Homework: Completed in session      Episode of Care Goals: Satisfactory progress - PREPARATION (Decided to change - considering how); Intervened by negotiating a change plan and determining options / strategies for behavior change, identifying triggers, exploring social supports, and working towards setting a date to begin behavior change     Current / Ongoing Stressors and Concerns:   Dodie stated her grandpa did pass away. She stated she is coping alright with it. She stated her grandma and her dad are struggling. She stated the day he  she had a severe panic attack that lasted a few hours. It was nighttime, she was already tired, there was a thunderstorm, every hour she was waking up and feeling very sick.  She was also having body pains, thought she had a fever, and other physical symptoms. She stated when she woke up the next day she felt fine. Client stated she's worried about her mom and dad. They have not been getting along for the past two years and it's gotten worse since her mom lost her dad and now that he has lost his. Client stated she feels like her dad is being mean lately and that isn't like him.      Treatment Objective(s) Addressed in This Session:   use at least 2 coping skills for anxiety management in the next 2 weeks  identify 5 traits or charcteristics you like about yourself       Intervention:   Processed the client's feelings about her grandpa dying. Normalized how the client is feeling and reminded her there is no right or wrong way to feel in any given situation. Talked with client about how she can separate herself from the issues between her parents but also from trying to be a buffer for her youngest sister. Talked about her definition of sun and self care. Her sisters have a different viewpoint of what fun is. The client stated work for her is very fun and her sisters have told her that she's working and that isn't what fun is. Helped to validate the client's passions and feelings about work.         ASSESSMENT: Current Emotional / Mental Status (status of significant symptoms):   Risk status (Self / Other harm or suicidal ideation)   Client denies current fears or concerns for personal safety.   Client denies current or recent suicidal ideation or behaviors.   Client denies current or recent homicidal ideation or behaviors.   Client denies current or recent self injurious behavior or ideation.   Client denies other safety concerns.   A safety and risk management plan has not been developed at this time, however client was given the after-hours number / 911 should there be a change in any of these risk factors.     Appearance:   Appropriate    Eye Contact:   Good    Psychomotor  Behavior: Normal    Attitude:   Cooperative    Orientation:   All   Speech    Rate / Production: Normal     Volume:  Normal    Mood:    Normal   Affect:    Appropriate    Thought Content:  Clear    Thought Form:  Coherent  Logical    Insight:    Good      Medication Review:   No changes to current psychiatric medication(s)     Medication Compliance:   Yes     Changes in Health Issues:   None reported : client is on birth control again     Chemical Use Review:   Substance Use: Chemical use reviewed, no active concerns identified      Tobacco Use: No current tobacco use.       Collateral Reports Completed:   Not Applicable    Diagnoses: Generalized Anxiety Disorder      PLAN: (Client Tasks / Therapist Tasks / Other)  Client to continue to work on accepting herself and self care.         Celsa Linda, [facility]                                                         ________________________________________________________________________    Treatment Plan    Client's Name: Batool Louis  YOB: 2000    Date: 6/12/20    DSM-V Diagnoses: Adjustment Disorders  309.28 (F43.23) With mixed anxiety and depressed mood  Psychosocial / Contextual Factors: COVID, living back at home, deaths of loved ones within the past few years. Sexual identity issues.     WHODAS: 12    Referral / Collaboration:  Referral to another professional/service is not indicated at this time.    Anticipated number of session or this episode of care: 5+      MeasurableTreatment Goal(s) related to diagnosis / functional impairment(s)  Goal 1: Client will increase emotion regulation skills/decrease panic attacks    Objective #A (Client Action)    Client will identify 2-4 fears / thoughts that contribute to feeling anxious  use positive self-affirmations daily.  Status: New - Date: 6/12/20      Intervention(s)  Therapist will teach emotional regulation skills. distress tolerance skills, interpersonal effectiveness skills, emotion  "regluation skills, mindfulness skills, radical acceptance. Therapist will teach client how to ID body cues for anxiety, anxiety reduction techniques, how to ID triggers for depression and anxiety- decrease reactivity/eliminate, lifestyle changes to reduce depression and anxiety, communication skills, explore cognitive beliefs and help client to develop healthy cognitive patterns and beliefs.      Objective #B  Client will use thought-stopping strategy daily to reduce intrusive thoughts.  Status: New - Date: 6/12/20      Intervention(s)  Therapist will teach emotional regulation skills. distress tolerance skills, interpersonal effectiveness skills, emotion regluation skills, mindfulness skills, radical acceptance. Therapist will teach client how to ID body cues for anxiety, anxiety reduction techniques, how to ID triggers for depression and anxiety- decrease reactivity/eliminate, lifestyle changes to reduce depression and anxiety, communication skills, explore cognitive beliefs and help client to develop healthy cognitive patterns and beliefs.    Objective #C (Client Action)    Status: New - Date: 6/12/20     Client will use \"worry time\" each day for 15 minutes of scheduled worry and then defer obsessive or anxious thinking until the next structured worry time.    Intervention(s)  Therapist will teach emotional regulation skills. work through trauma using somatic experiencing techniques to discharge the anxiety.    Goal 2: Client will work on increasing self esteem and self worth     Objective #A (Client Action)    Status: New - Date: 6/12/20     Client will identify two areas of life that you would like to have improved functioning.    Intervention(s)  Therapist will teach emotional regulation skills. work through trauma using somatic experiencing techniques to discharge the anxiety.      Client has reviewed and agreed to the above plan.      Celsa Linda [facility]    "

## 2020-09-09 ENCOUNTER — VIRTUAL VISIT (OUTPATIENT)
Dept: PSYCHOLOGY | Facility: CLINIC | Age: 20
End: 2020-09-09
Payer: COMMERCIAL

## 2020-09-09 DIAGNOSIS — F41.1 GENERALIZED ANXIETY DISORDER: Primary | ICD-10-CM

## 2020-09-09 PROCEDURE — 90834 PSYTX W PT 45 MINUTES: CPT | Mod: GT | Performed by: COUNSELOR

## 2020-09-23 ENCOUNTER — VIRTUAL VISIT (OUTPATIENT)
Dept: PSYCHOLOGY | Facility: CLINIC | Age: 20
End: 2020-09-23
Payer: COMMERCIAL

## 2020-09-23 DIAGNOSIS — F41.1 GENERALIZED ANXIETY DISORDER: Primary | ICD-10-CM

## 2020-09-23 PROCEDURE — 90837 PSYTX W PT 60 MINUTES: CPT | Mod: GT | Performed by: COUNSELOR

## 2020-09-23 NOTE — PROGRESS NOTES
Progress Note    Client Name: Batool Louis  Date: 9/23/20          Service Type: Individual      Session Start Time: 10:05am   Session End Time: 11:00am      Session Length:    55mins     Session #: 18     Attendees: Client    Treatment Plan Last Reviewed: 9/9/20   PHQ-9 / SHELBY-7 : see chart    Telemedicine Visit: The patient's condition can be safely assessed and treated via synchronous audio and visual telemedicine encounter.      Reason for Telemedicine Visit: Services only offered telehealth    Originating Site (Patient Location): Patient's home    Distant Site (Provider Location): Provider Remote Setting    Consent:  The patient/guardian has verbally consented to: the potential risks and benefits of telemedicine (video visit) versus in person care; bill my insurance or make self-payment for services provided; and responsibility for payment of non-covered services.     Mode of Communication:  Video Conference via [employer]/telephone    As the provider I attest to compliance with applicable laws and regulations related to telemedicine.     DATA      Progress Since Last Session (Related to Symptoms / Goals / Homework):   Symptoms: No change struggling with diet    Homework: Completed in session      Episode of Care Goals: Satisfactory progress - PREPARATION (Decided to change - considering how); Intervened by negotiating a change plan and determining options / strategies for behavior change, identifying triggers, exploring social supports, and working towards setting a date to begin behavior change     Current / Ongoing Stressors and Concerns:   Dodie stated she's starting to struggle some with the diet challenge she's doing with her family. She said her dad is falling off of the challenge and her mom has already stopped.  She stated too she is having some relationship difficulties with her youngest (15) sister and her mom. She said her sister has been just  "mean to her. For example, her sister asked her to drive her to the store. Dodie agreed and even lent her some money. Afterwards, when they got back into the car, her sister began to criticize her car and her. Dodie said she felt attacked and really hurt. She tries to give her sister the benefit of the doubt. Dodie also talked about how she feels like she is annoying or \"too intense\" because her family will say these things to her. They will say \"you're being too intense right now and no one wants to be around you.\"       Treatment Objective(s) Addressed in This Session:   learn & utilize at least 2 assertive communication skills weekly       Intervention:   Worked with client on communication skills and how she can be more assertive with her sister. Explored different communication patterns she's learned in her family and how she can revert to being explosive. Talked about how the client will either become passive during a family argument or she will become aggressive and explosive. She stated she tries to help end the argument or solve the problem. She believes that the arguments' function might be to avoid whatever the thing is that needed to get done (such as working on getting the house organized). Talked about different things she can do to help interrupt these argument patterns to protect herself.       ASSESSMENT: Current Emotional / Mental Status (status of significant symptoms):   Risk status (Self / Other harm or suicidal ideation)   Client denies current fears or concerns for personal safety.   Client denies current or recent suicidal ideation or behaviors.   Client denies current or recent homicidal ideation or behaviors.   Client denies current or recent self injurious behavior or ideation.   Client denies other safety concerns.   A safety and risk management plan has not been developed at this time, however client was given the after-hours number / 911 should there be a change in any of these risk " factors.     Appearance:   Appropriate    Eye Contact:   Good    Psychomotor Behavior: Normal    Attitude:   Cooperative    Orientation:   All   Speech    Rate / Production: Normal     Volume:  Normal    Mood:    Normal Sad    Affect:    Appropriate    Thought Content:  Clear    Thought Form:  Coherent  Logical    Insight:    Good      Medication Review:   No changes to current psychiatric medication(s)     Medication Compliance:   Yes     Changes in Health Issues:   None reported : client is on birth control again     Chemical Use Review:   Substance Use: Chemical use reviewed, no active concerns identified      Tobacco Use: No current tobacco use.       Collateral Reports Completed:   Not Applicable    Diagnoses: Generalized Anxiety Disorder      PLAN: (Client Tasks / Therapist Tasks / Other)  Client will practice self care and working to not participate in the family arguments.          Celsa Linda, [facility]                                                         ________________________________________________________________________    Treatment Plan    Client's Name: Batool Louis  YOB: 2000    Date: 9/9/20     DSM-V Diagnoses: Adjustment Disorders  309.28 (F43.23) With mixed anxiety and depressed mood  Psychosocial / Contextual Factors: COVID, living back at home, deaths of loved ones within the past few years. Sexual identity issues.     WHODAS: 12    Referral / Collaboration:  Referral to another professional/service is not indicated at this time.    Anticipated number of session or this episode of care: 5+      MeasurableTreatment Goal(s) related to diagnosis / functional impairment(s)  Goal 1: Client will increase emotion regulation skills/decrease panic attacks    Objective #A (Client Action)    Client will identify 2-4 fears / thoughts that contribute to feeling anxious  use positive self-affirmations daily.  Status: Continued - Date(s): 9/9/20    "    Intervention(s)  Therapist will teach emotional regulation skills. distress tolerance skills, interpersonal effectiveness skills, emotion regluation skills, mindfulness skills, radical acceptance. Therapist will teach client how to ID body cues for anxiety, anxiety reduction techniques, how to ID triggers for depression and anxiety- decrease reactivity/eliminate, lifestyle changes to reduce depression and anxiety, communication skills, explore cognitive beliefs and help client to develop healthy cognitive patterns and beliefs.      Objective #B  Client will use thought-stopping strategy daily to reduce intrusive thoughts.  Status: Continued - Date(s): 9/9/20       Intervention(s)  Therapist will teach emotional regulation skills. distress tolerance skills, interpersonal effectiveness skills, emotion regluation skills, mindfulness skills, radical acceptance. Therapist will teach client how to ID body cues for anxiety, anxiety reduction techniques, how to ID triggers for depression and anxiety- decrease reactivity/eliminate, lifestyle changes to reduce depression and anxiety, communication skills, explore cognitive beliefs and help client to develop healthy cognitive patterns and beliefs.    Objective #C (Client Action)    Status: New - Date: 9/9/20     Client will use \"worry time\" each day for 15 minutes of scheduled worry and then defer obsessive or anxious thinking until the next structured worry time.    Intervention(s)  Therapist will teach emotional regulation skills. work through trauma using somatic experiencing techniques to discharge the anxiety.    Goal 2: Client will work on increasing self esteem and self worth     Objective #A (Client Action)    Status: Continued - Date(s):9/9/20     Client will identify two areas of life that you would like to have improved functioning.    Intervention(s)  Therapist will teach emotional regulation skills. work through trauma using somatic experiencing techniques to " discharge the anxiety.      Client has reviewed and agreed to the above plan.      Celsa Linda, [facility]

## 2020-10-07 ENCOUNTER — VIRTUAL VISIT (OUTPATIENT)
Dept: PSYCHOLOGY | Facility: OTHER | Age: 20
End: 2020-10-07
Payer: COMMERCIAL

## 2020-10-07 DIAGNOSIS — F41.1 GENERALIZED ANXIETY DISORDER: Primary | ICD-10-CM

## 2020-10-07 PROCEDURE — 90837 PSYTX W PT 60 MINUTES: CPT | Mod: GT | Performed by: COUNSELOR

## 2020-10-07 NOTE — PROGRESS NOTES
Progress Note    Client Name: Batool Louis  Date: 10/7/20          Service Type: Individual      Session Start Time: 10:00am   Session End Time: 11:00am      Session Length:  60  mins     Session #: 19     Attendees: Client    Treatment Plan Last Reviewed: 9/9/20   PHQ-9 / SHELBY-7 : see chart    Telemedicine Visit: The patient's condition can be safely assessed and treated via synchronous audio and visual telemedicine encounter.      Reason for Telemedicine Visit: Services only offered telehealth    Originating Site (Patient Location): Patient's home    Distant Site (Provider Location): Provider Remote Setting    Consent:  The patient/guardian has verbally consented to: the potential risks and benefits of telemedicine (video visit) versus in person care; bill my insurance or make self-payment for services provided; and responsibility for payment of non-covered services.     Mode of Communication:  Video Conference via [employer]/telephone    As the provider I attest to compliance with applicable laws and regulations related to telemedicine.     DATA      Progress Since Last Session (Related to Symptoms / Goals / Homework):   Symptoms: Improving .    Homework: Completed in session      Episode of Care Goals: Satisfactory progress - PREPARATION (Decided to change - considering how); Intervened by negotiating a change plan and determining options / strategies for behavior change, identifying triggers, exploring social supports, and working towards setting a date to begin behavior change     Current / Ongoing Stressors and Concerns:   Dodie stated she's in her 6th week of her diet change. She's noticing she tends to mindlessly eat and is working hard to change this. Client talked about the different things she's learned over this COVID quarantine such as: how she's spent a lot of time hating herself. She's been able to have time to self reflect and said she doesn't  "want to take things for granted anymore.    Client stated she has a date tomorrow and she's very nervous. She's trying not to put too much thought into it.      Treatment Objective(s) Addressed in This Session:   Identify negative self-talk and behaviors: challenge core beliefs, myths, and actions  learn & utilize at least 2 assertive communication skills weekly       Intervention:   Client stated she made a chart to put on her wall of things she can do instead of eating and other coping skills when she is feeling emotionally stressed. or when she's bored. The client stated she's been doing a lot of research on mindful eating as well and this has been helping a lot. She is also working on self affirmations but stated they \"feel fake and not good.\"  Educated the client about the model of emotions and educated her on how to slow herself down to identify feelings in her body.       ASSESSMENT: Current Emotional / Mental Status (status of significant symptoms):   Risk status (Self / Other harm or suicidal ideation)   Client denies current fears or concerns for personal safety.   Client denies current or recent suicidal ideation or behaviors.   Client denies current or recent homicidal ideation or behaviors.   Client denies current or recent self injurious behavior or ideation.   Client denies other safety concerns.   A safety and risk management plan has not been developed at this time, however client was given the after-hours number / 911 should there be a change in any of these risk factors.     Appearance:   Appropriate    Eye Contact:   Good    Psychomotor Behavior: Normal    Attitude:   Cooperative    Orientation:   All   Speech    Rate / Production: Normal     Volume:  Normal    Mood:    Normal   Affect:    Appropriate    Thought Content:  Clear    Thought Form:  Coherent  Logical    Insight:    Good      Medication Review:   No changes to current psychiatric medication(s)     Medication " Compliance:   Yes     Changes in Health Issues:   None reported : client is on birth control again     Chemical Use Review:   Substance Use: Chemical use reviewed, no active concerns identified      Tobacco Use: No current tobacco use.       Collateral Reports Completed:   Not Applicable    Diagnoses: Generalized Anxiety Disorder      PLAN: (Client Tasks / Therapist Tasks / Other)  Client will work on slowing to pay attention to body sensations.         Celsa Linda, [facility]                                                         ________________________________________________________________________    Treatment Plan    Client's Name: Batool Louis  YOB: 2000    Date: 9/9/20     DSM-V Diagnoses: Adjustment Disorders  309.28 (F43.23) With mixed anxiety and depressed mood  Psychosocial / Contextual Factors: COVID, living back at home, deaths of loved ones within the past few years. Sexual identity issues.     WHODAS: 12    Referral / Collaboration:  Referral to another professional/service is not indicated at this time.    Anticipated number of session or this episode of care: 5+      MeasurableTreatment Goal(s) related to diagnosis / functional impairment(s)  Goal 1: Client will increase emotion regulation skills/decrease panic attacks    Objective #A (Client Action)    Client will identify 2-4 fears / thoughts that contribute to feeling anxious  use positive self-affirmations daily.  Status: Continued - Date(s): 9/9/20       Intervention(s)  Therapist will teach emotional regulation skills. distress tolerance skills, interpersonal effectiveness skills, emotion regluation skills, mindfulness skills, radical acceptance. Therapist will teach client how to ID body cues for anxiety, anxiety reduction techniques, how to ID triggers for depression and anxiety- decrease reactivity/eliminate, lifestyle changes to reduce depression and anxiety, communication skills, explore cognitive beliefs and  "help client to develop healthy cognitive patterns and beliefs.      Objective #B  Client will use thought-stopping strategy daily to reduce intrusive thoughts.  Status: Continued - Date(s): 9/9/20       Intervention(s)  Therapist will teach emotional regulation skills. distress tolerance skills, interpersonal effectiveness skills, emotion regluation skills, mindfulness skills, radical acceptance. Therapist will teach client how to ID body cues for anxiety, anxiety reduction techniques, how to ID triggers for depression and anxiety- decrease reactivity/eliminate, lifestyle changes to reduce depression and anxiety, communication skills, explore cognitive beliefs and help client to develop healthy cognitive patterns and beliefs.    Objective #C (Client Action)    Status: New - Date: 9/9/20     Client will use \"worry time\" each day for 15 minutes of scheduled worry and then defer obsessive or anxious thinking until the next structured worry time.    Intervention(s)  Therapist will teach emotional regulation skills. work through trauma using somatic experiencing techniques to discharge the anxiety.    Goal 2: Client will work on increasing self esteem and self worth     Objective #A (Client Action)    Status: Continued - Date(s):9/9/20     Client will identify two areas of life that you would like to have improved functioning.    Intervention(s)  Therapist will teach emotional regulation skills. work through trauma using somatic experiencing techniques to discharge the anxiety.      Client has reviewed and agreed to the above plan.      Celsa Linda [facility]    "

## 2020-10-09 ENCOUNTER — MYC MEDICAL ADVICE (OUTPATIENT)
Dept: OBGYN | Facility: CLINIC | Age: 20
End: 2020-10-09

## 2020-10-12 ENCOUNTER — MYC MEDICAL ADVICE (OUTPATIENT)
Dept: OBGYN | Facility: CLINIC | Age: 20
End: 2020-10-12

## 2020-10-19 ENCOUNTER — OFFICE VISIT (OUTPATIENT)
Dept: OBGYN | Facility: CLINIC | Age: 20
End: 2020-10-19
Payer: COMMERCIAL

## 2020-10-19 VITALS
OXYGEN SATURATION: 97 % | WEIGHT: 231 LBS | DIASTOLIC BLOOD PRESSURE: 71 MMHG | HEART RATE: 77 BPM | SYSTOLIC BLOOD PRESSURE: 120 MMHG | BODY MASS INDEX: 34.36 KG/M2

## 2020-10-19 DIAGNOSIS — R10.2 PELVIC PAIN IN FEMALE: Primary | ICD-10-CM

## 2020-10-19 DIAGNOSIS — Z97.5 IUD (INTRAUTERINE DEVICE) IN PLACE: ICD-10-CM

## 2020-10-19 PROCEDURE — 99213 OFFICE O/P EST LOW 20 MIN: CPT | Performed by: OBSTETRICS & GYNECOLOGY

## 2020-10-19 RX ORDER — LEVONORGESTREL 19.5 MG/1
1 INTRAUTERINE DEVICE INTRAUTERINE CONTINUOUS
COMMUNITY
End: 2024-01-22

## 2020-10-19 NOTE — PROGRESS NOTES
OB/GYN      NAME:  Batool Louis  PCP:  Mary Alice Reagan  MRN:  [id number]    Impression / Plan     20 year old  with:      ICD-10-CM    1. Pelvic pain in female  R10.2 US Pelvic Complete with Transvaginal   2. IUD (intrauterine device) in place  Z97.5 US Pelvic Complete with Transvaginal       Exam findings - normal string length. She can have an ultrasound at any time due to the pain to make sure the IUD is correctly placed and assess for cysts.        Chief Complaint     Chief Complaint   Patient presents with     Abdominal Cramping     Can't feel IUD sting and cramping and spotting since IUD put in      Breast Exam       HPI     Batool Louis is a  20 year old female who is seen for IUD check, cramping, unable to feel strings. She could initially feel the strings.      Is also requesting a breast exam.  No concerns - was told she should get a screening clinical breast exam.       I placed the Kyleena 2020 for management of oligomenorrhea, protection of uterine lining.      She had a long stretch of bleeding initially.  Now it is spotting.  Overall she likes the IUD.  She gets some stabbing twinges of lower abdominal pain. When this happens it lasts about a minute or two.  This occurs once a day, 3-4 times per week.  Not getting worse over the last two weeks.  .    Increased acne, but this is not too bothersome for her.     Patient's last menstrual period was 10/01/2020 (approximate).     Date of Last Pap Smear:   Lab Results   Component Value Date    PAP NIL 2020       Problem List     Patient Active Problem List    Diagnosis Date Noted     IUD (intrauterine device) in place 2020     Priority: Medium     Kyleena placed 2020 for oligomenorrhea.  Due for removal Aug 2025       Chronic tonsillitis 2020     Priority: Medium     Added automatically from request for surgery [id number]       Allergic rhinitis 01/10/2012     Priority: Medium       Medications      Current Outpatient Medications   Medication     cetirizine (ZYRTEC) 10 MG tablet     Cholecalciferol (D 5000) 5000 UNITS TABS     levonorgestrel (KYLEENA) 19.5 MG IUD     Homeopathic Products (ZICAM ALLERGY RELIEF NA)     omeprazole (PRILOSEC) 20 MG DR capsule     Probiotic Product (PROBIOTIC PO)     No current facility-administered medications for this visit.         Allergies     Allergies   Allergen Reactions     Sulfamethoxazole-Trimethoprim Hives       ROS     A 6 organ review of systems was asked and the pertinent positives and negatives are listed in the HPI. All other organ systems can be considered negative.     Physical Exam   Vitals: /71 (BP Location: Right arm, Cuff Size: Adult Large)   Pulse 77   Wt 104.8 kg (231 lb)   LMP 10/01/2020 (Approximate)   SpO2 97%   Breastfeeding No   BMI 34.36 kg/m      General: Comfortable, no obvious distress, obese habitus  Breasts - symmetrical.  No discrete masses, dimpling, or discharge.  No lymphadenopathy.    Psych: Alert and orientated x 3. Appropriate affect, good insight.   : Normal female external genitalia.  No lesions.  Urethral meatus normal.  Speculum exam reveals a normal vaginal vault, normal cervix.  Normal IUD strings.           15 minutes was spent with patient, more than 50% counseling and coordinating care as noted above in the A/P    Nursing notes read and reviewed    Adelaide Ace MD

## 2020-10-29 ENCOUNTER — VIRTUAL VISIT (OUTPATIENT)
Dept: PSYCHOLOGY | Facility: OTHER | Age: 20
End: 2020-10-29
Payer: COMMERCIAL

## 2020-10-29 DIAGNOSIS — F41.1 GENERALIZED ANXIETY DISORDER: Primary | ICD-10-CM

## 2020-10-29 PROCEDURE — 90837 PSYTX W PT 60 MINUTES: CPT | Mod: GT | Performed by: COUNSELOR

## 2020-10-29 NOTE — PROGRESS NOTES
Progress Note    Client Name: Batool Louis  Date: 10/29/20          Service Type: Individual      Session Start Time: 12:00pm   Session End Time: 12:55pm      Session Length:  55  mins     Session #: 20     Attendees: Client    Treatment Plan Last Reviewed: 9/9/20   PHQ-9 / SHELBY-7 : see chart    Telemedicine Visit: The patient's condition can be safely assessed and treated via synchronous audio and visual telemedicine encounter.      Reason for Telemedicine Visit: Services only offered telehealth    Originating Site (Patient Location): Patient's home    Distant Site (Provider Location): Provider Remote Setting    Consent:  The patient/guardian has verbally consented to: the potential risks and benefits of telemedicine (video visit) versus in person care; bill my insurance or make self-payment for services provided; and responsibility for payment of non-covered services.     Mode of Communication:  Video Conference via [employer]/telephone    As the provider I attest to compliance with applicable laws and regulations related to telemedicine.     DATA      Progress Since Last Session (Related to Symptoms / Goals / Homework):   Symptoms: Improving .    Homework: Completed in session      Episode of Care Goals: Satisfactory progress - PREPARATION (Decided to change - considering how); Intervened by negotiating a change plan and determining options / strategies for behavior change, identifying triggers, exploring social supports, and working towards setting a date to begin behavior change     Current / Ongoing Stressors and Concerns:   Dodie stated she's still doing her diet and she's lost 22 pounds. She said she wants to keep going with it even after the competition is done. Her family has had 2 COVID scares in her family which has been very worrisome. She's trying to dicern between COVID symptoms and panic attack symptoms. She stated her sister's best friend's  parents might have COVID and the client teaches dance to them. She's also been having trouble falling asleep and staying asleep, therefore, she started to implement yoga and stretching along with other changes to help calm her body.   She is feeling much better from the new routines she is implementing.   She talked about the date she went on from last session. Dodie stated it went well but they didn't click.       Treatment Objective(s) Addressed in This Session:   use cognitive strategies identified in therapy to challenge anxious thoughts  Implement relaxation techniques     Intervention:   Celebrated with the client on her success around her dieting, her new routines, and how her self confidence is coming back for her.  She talked about how she recently looked in the mirror and was able to say to herself that she looked pretty. This was very impactful for her and emotional.          ASSESSMENT: Current Emotional / Mental Status (status of significant symptoms):   Risk status (Self / Other harm or suicidal ideation)   Client denies current fears or concerns for personal safety.   Client denies current or recent suicidal ideation or behaviors.   Client denies current or recent homicidal ideation or behaviors.   Client denies current or recent self injurious behavior or ideation.   Client denies other safety concerns.   A safety and risk management plan has not been developed at this time, however client was given the after-hours number / 911 should there be a change in any of these risk factors.     Appearance:   Appropriate    Eye Contact:   Good    Psychomotor Behavior: Normal    Attitude:   Cooperative    Orientation:   All   Speech    Rate / Production: Normal     Volume:  Normal    Mood:    Normal hopeful and excited   Affect:    Appropriate    Thought Content:  Clear    Thought Form:  Coherent  Logical    Insight:    Good      Medication Review:   No changes to current psychiatric medication(s)     Medication  Compliance:   Yes     Changes in Health Issues:   None reported : client is on birth control again     Chemical Use Review:   Substance Use: Chemical use reviewed, no active concerns identified      Tobacco Use: No current tobacco use.       Collateral Reports Completed:   Not Applicable    Diagnoses:   Generalized Anxiety Disorder      PLAN: (Client Tasks / Therapist Tasks / Other)  Continue her routines and work on not being judgmental.         Celsa Sy , [facility]                                                         ________________________________________________________________________    Treatment Plan    Client's Name: Batool Louis  YOB: 2000    Date: 9/9/20     DSM-V Diagnoses: Adjustment Disorders  309.28 (F43.23) With mixed anxiety and depressed mood  Psychosocial / Contextual Factors: COVID, living back at home, deaths of loved ones within the past few years. Sexual identity issues.     WHODAS: 12    Referral / Collaboration:  Referral to another professional/service is not indicated at this time.    Anticipated number of session or this episode of care: 5+      MeasurableTreatment Goal(s) related to diagnosis / functional impairment(s)  Goal 1: Client will increase emotion regulation skills/decrease panic attacks    Objective #A (Client Action)    Client will identify 2-4 fears / thoughts that contribute to feeling anxious  use positive self-affirmations daily.  Status: Continued - Date(s): 9/9/20       Intervention(s)  Therapist will teach emotional regulation skills. distress tolerance skills, interpersonal effectiveness skills, emotion regluation skills, mindfulness skills, radical acceptance. Therapist will teach client how to ID body cues for anxiety, anxiety reduction techniques, how to ID triggers for depression and anxiety- decrease reactivity/eliminate, lifestyle changes to reduce depression and anxiety, communication skills, explore cognitive beliefs and help  "client to develop healthy cognitive patterns and beliefs.      Objective #B  Client will use thought-stopping strategy daily to reduce intrusive thoughts.  Status: Continued - Date(s): 9/9/20       Intervention(s)  Therapist will teach emotional regulation skills. distress tolerance skills, interpersonal effectiveness skills, emotion regluation skills, mindfulness skills, radical acceptance. Therapist will teach client how to ID body cues for anxiety, anxiety reduction techniques, how to ID triggers for depression and anxiety- decrease reactivity/eliminate, lifestyle changes to reduce depression and anxiety, communication skills, explore cognitive beliefs and help client to develop healthy cognitive patterns and beliefs.    Objective #C (Client Action)    Status: New - Date: 9/9/20     Client will use \"worry time\" each day for 15 minutes of scheduled worry and then defer obsessive or anxious thinking until the next structured worry time.    Intervention(s)  Therapist will teach emotional regulation skills. work through trauma using somatic experiencing techniques to discharge the anxiety.    Goal 2: Client will work on increasing self esteem and self worth     Objective #A (Client Action)    Status: Continued - Date(s):9/9/20     Client will identify two areas of life that you would like to have improved functioning.    Intervention(s)  Therapist will teach emotional regulation skills. work through trauma using somatic experiencing techniques to discharge the anxiety.      Client has reviewed and agreed to the above plan.      Celsa Linda [facility]    "

## 2020-11-05 ENCOUNTER — ANCILLARY PROCEDURE (OUTPATIENT)
Dept: ULTRASOUND IMAGING | Facility: CLINIC | Age: 20
End: 2020-11-05
Attending: OBSTETRICS & GYNECOLOGY

## 2020-11-05 DIAGNOSIS — Z97.5 IUD (INTRAUTERINE DEVICE) IN PLACE: ICD-10-CM

## 2020-11-05 DIAGNOSIS — R10.2 PELVIC PAIN IN FEMALE: ICD-10-CM

## 2020-11-11 ENCOUNTER — VIRTUAL VISIT (OUTPATIENT)
Dept: PSYCHOLOGY | Facility: OTHER | Age: 20
End: 2020-11-11
Payer: COMMERCIAL

## 2020-11-11 DIAGNOSIS — F41.1 GENERALIZED ANXIETY DISORDER: Primary | ICD-10-CM

## 2020-11-11 PROCEDURE — 90834 PSYTX W PT 45 MINUTES: CPT | Mod: GT | Performed by: COUNSELOR

## 2020-11-11 NOTE — PROGRESS NOTES
Progress Note    Client Name: Batool Louis  Date: 11/11/20          Service Type: Individual      Session Start Time: 1:00pm   Session End Time: 1:50pm      Session Length:   50 mins     Session #: 21     Attendees: Client    Treatment Plan Last Reviewed: 9/9/20   PHQ-9 / SHELBY-7 : see chart    Telemedicine Visit: The patient's condition can be safely assessed and treated via synchronous audio and visual telemedicine encounter.      Reason for Telemedicine Visit: Services only offered telehealth    Originating Site (Patient Location): Patient's home    Distant Site (Provider Location): Provider Remote Setting    Consent:  The patient/guardian has verbally consented to: the potential risks and benefits of telemedicine (video visit) versus in person care; bill my insurance or make self-payment for services provided; and responsibility for payment of non-covered services.     Mode of Communication:  Video Conference via [employer]    As the provider I attest to compliance with applicable laws and regulations related to telemedicine.     DATA      Progress Since Last Session (Related to Symptoms / Goals / Homework):   Symptoms: Improving .    Homework: Completed in session      Episode of Care Goals: Satisfactory progress - PREPARATION (Decided to change - considering how); Intervened by negotiating a change plan and determining options / strategies for behavior change, identifying triggers, exploring social supports, and working towards setting a date to begin behavior change     Current / Ongoing Stressors and Concerns:   Dodie stated her neighbors have gotten COVID and are recovering well. The client stated the last session her mom had been living at her grandmother's house for a week. She thought it was because mom was helping her move out. Now, the client said her mom is still there. She's come back to the house to grab some things and goes back to grandmas.   "There is something going on with mom and dad.      Treatment Objective(s) Addressed in This Session:   use cognitive strategies identified in therapy to challenge anxious thoughts  Implement relaxation techniques     Intervention:   Processed the client's feelings about her mom and dad's relationship. Discussed how she can be more effective with communication with family members about her feelings when they say \"you're just like your dad (or mom).\" Worked on reframing some of the negative things she's been told by family members. Discussed boundaries she needs to have with family members when they might come to her to talk about other people.          ASSESSMENT: Current Emotional / Mental Status (status of significant symptoms):   Risk status (Self / Other harm or suicidal ideation)   Client denies current fears or concerns for personal safety.   Client denies current or recent suicidal ideation or behaviors.   Client denies current or recent homicidal ideation or behaviors.   Client denies current or recent self injurious behavior or ideation.   Client denies other safety concerns.   A safety and risk management plan has not been developed at this time, however client was given the after-hours number / 911 should there be a change in any of these risk factors.     Appearance:   Appropriate    Eye Contact:   Good    Psychomotor Behavior: Normal    Attitude:   Cooperative    Orientation:   All   Speech    Rate / Production: Normal     Volume:  Normal    Mood:    Normal    Affect:    Appropriate    Thought Content:  Clear    Thought Form:  Coherent  Logical    Insight:    Good      Medication Review:   No changes to current psychiatric medication(s)     Medication Compliance:   Yes     Changes in Health Issues:   None reported : client is on birth control again     Chemical Use Review:   Substance Use: Chemical use reviewed, no active concerns identified      Tobacco Use: No current tobacco use.       Collateral " Reports Completed:   Not Applicable    Diagnoses:   Generalized Anxiety Disorder      PLAN: (Client Tasks / Therapist Tasks / Other)  Continue her routines and work on not being judgmental.         Celsa Sy , [facility]                                                         ________________________________________________________________________    Treatment Plan    Client's Name: Batool Louis  YOB: 2000    Date: 9/9/20     DSM-V Diagnoses: Adjustment Disorders  309.28 (F43.23) With mixed anxiety and depressed mood  Psychosocial / Contextual Factors: COVID, living back at home, deaths of loved ones within the past few years. Sexual identity issues.     WHODAS: 12    Referral / Collaboration:  Referral to another professional/service is not indicated at this time.    Anticipated number of session or this episode of care: 5+      MeasurableTreatment Goal(s) related to diagnosis / functional impairment(s)  Goal 1: Client will increase emotion regulation skills/decrease panic attacks    Objective #A (Client Action)    Client will identify 2-4 fears / thoughts that contribute to feeling anxious  use positive self-affirmations daily.  Status: Continued - Date(s): 9/9/20       Intervention(s)  Therapist will teach emotional regulation skills. distress tolerance skills, interpersonal effectiveness skills, emotion regluation skills, mindfulness skills, radical acceptance. Therapist will teach client how to ID body cues for anxiety, anxiety reduction techniques, how to ID triggers for depression and anxiety- decrease reactivity/eliminate, lifestyle changes to reduce depression and anxiety, communication skills, explore cognitive beliefs and help client to develop healthy cognitive patterns and beliefs.      Objective #B  Client will use thought-stopping strategy daily to reduce intrusive thoughts.  Status: Continued - Date(s): 9/9/20       Intervention(s)  Therapist will teach emotional  "regulation skills. distress tolerance skills, interpersonal effectiveness skills, emotion regluation skills, mindfulness skills, radical acceptance. Therapist will teach client how to ID body cues for anxiety, anxiety reduction techniques, how to ID triggers for depression and anxiety- decrease reactivity/eliminate, lifestyle changes to reduce depression and anxiety, communication skills, explore cognitive beliefs and help client to develop healthy cognitive patterns and beliefs.    Objective #C (Client Action)    Status: New - Date: 9/9/20     Client will use \"worry time\" each day for 15 minutes of scheduled worry and then defer obsessive or anxious thinking until the next structured worry time.    Intervention(s)  Therapist will teach emotional regulation skills. work through trauma using somatic experiencing techniques to discharge the anxiety.    Goal 2: Client will work on increasing self esteem and self worth     Objective #A (Client Action)    Status: Continued - Date(s):9/9/20     Client will identify two areas of life that you would like to have improved functioning.    Intervention(s)  Therapist will teach emotional regulation skills. work through trauma using somatic experiencing techniques to discharge the anxiety.      Client has reviewed and agreed to the above plan.      [facility]elsa Linda [facility]    "

## 2020-11-25 ENCOUNTER — VIRTUAL VISIT (OUTPATIENT)
Dept: PSYCHOLOGY | Facility: OTHER | Age: 20
End: 2020-11-25
Payer: COMMERCIAL

## 2020-11-25 DIAGNOSIS — F41.1 GENERALIZED ANXIETY DISORDER: Primary | ICD-10-CM

## 2020-11-25 PROCEDURE — 90834 PSYTX W PT 45 MINUTES: CPT | Mod: GT | Performed by: COUNSELOR

## 2020-11-25 NOTE — PROGRESS NOTES
Progress Note    Client Name: Batool Louis  Date: 11/25/20          Service Type: Individual      Session Start Time: 9:05am   Session End Time: 9:55am      Session Length:   50  mins     Session #: 22     Attendees: Client    Treatment Plan Last Reviewed: 9/9/20   PHQ-9 / SHELBY-7 : see chart    Telemedicine Visit: The patient's condition can be safely assessed and treated via synchronous audio and visual telemedicine encounter.      Reason for Telemedicine Visit: Services only offered telehealth    Originating Site (Patient Location): Patient's home    Distant Site (Provider Location): Provider Remote Setting    Consent:  The patient/guardian has verbally consented to: the potential risks and benefits of telemedicine (video visit) versus in person care; bill my insurance or make self-payment for services provided; and responsibility for payment of non-covered services.     Mode of Communication:  Video Conference via [employer]/telephone    As the provider I attest to compliance with applicable laws and regulations related to telemedicine.     DATA      Progress Since Last Session (Related to Symptoms / Goals / Homework):   Symptoms: Improving .    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   The client stated she hasn't had  "much conflict with her mom right now and believes this is because she's trying to effectively avoid conflict with her mom. She stated there has been some conflict though with her dad and feels he's said some \"cruel\" things to her.      Treatment Objective(s) Addressed in This Session:   use cognitive strategies identified in therapy to challenge anxious thoughts  Implement relaxation techniques     Intervention:   Worked with the client on confidence building. She would like to ask a lyndon out at work and tell him she likes him. She is very nervous about if she might get made fun of by coworkers if it gets around.     Talked with the client about her views of a \"normal\" relationship progression and how her first relationship wasn't really dating. Worked with the client to dispell what \"normal\" dating or a \"normal\" progression of relationships.   Worked with the client on \"I\" statements she can continue to utilize with her parents.       ASSESSMENT: Current Emotional / Mental Status (status of significant symptoms):   Risk status (Self / Other harm or suicidal ideation)   Client denies current fears or concerns for personal safety.   Client denies current or recent suicidal ideation or behaviors.   Client denies current or recent homicidal ideation or behaviors.   Client denies current or recent self injurious behavior or ideation.   Client denies other safety concerns.   A safety and risk management plan has not been developed at this time, however client was given the after-hours number / 911 should there be a change in any of these risk factors.     Appearance:   Appropriate    Eye Contact:   Good    Psychomotor Behavior: Normal    Attitude:   Cooperative    Orientation:   All   Speech    Rate / Production: Normal     Volume:  Normal    Mood:    Normal    Affect:    Appropriate    Thought Content:  Clear    Thought Form:  Coherent  Logical    Insight:    Good      Medication Review:   No changes to current " psychiatric medication(s)     Medication Compliance:   Yes     Changes in Health Issues:   None reported : client is on birth control again     Chemical Use Review:   Substance Use: Chemical use reviewed, no active concerns identified      Tobacco Use: No current tobacco use.       Collateral Reports Completed:   Not Applicable    Diagnoses:   Generalized Anxiety Disorder      PLAN: (Client Tasks / Therapist Tasks / Other)  Continue her routines and work on I statements.         Celsa Linda, [facility]                                                         ________________________________________________________________________    Treatment Plan    Client's Name: Batool Louis  YOB: 2000    Date: 9/9/20     DSM-V Diagnoses: Adjustment Disorders  309.28 (F43.23) With mixed anxiety and depressed mood  Psychosocial / Contextual Factors: COVID, living back at home, deaths of loved ones within the past few years. Sexual identity issues.     WHODAS: 12    Referral / Collaboration:  Referral to another professional/service is not indicated at this time.    Anticipated number of session or this episode of care: 5+      MeasurableTreatment Goal(s) related to diagnosis / functional impairment(s)  Goal 1: Client will increase emotion regulation skills/decrease panic attacks    Objective #A (Client Action)    Client will identify 2-4 fears / thoughts that contribute to feeling anxious  use positive self-affirmations daily.  Status: Continued - Date(s): 9/9/20       Intervention(s)  Therapist will teach emotional regulation skills. distress tolerance skills, interpersonal effectiveness skills, emotion regluation skills, mindfulness skills, radical acceptance. Therapist will teach client how to ID body cues for anxiety, anxiety reduction techniques, how to ID triggers for depression and anxiety- decrease reactivity/eliminate, lifestyle changes to reduce depression and anxiety, communication skills,  "explore cognitive beliefs and help client to develop healthy cognitive patterns and beliefs.      Objective #B  Client will use thought-stopping strategy daily to reduce intrusive thoughts.  Status: Continued - Date(s): 9/9/20       Intervention(s)  Therapist will teach emotional regulation skills. distress tolerance skills, interpersonal effectiveness skills, emotion regluation skills, mindfulness skills, radical acceptance. Therapist will teach client how to ID body cues for anxiety, anxiety reduction techniques, how to ID triggers for depression and anxiety- decrease reactivity/eliminate, lifestyle changes to reduce depression and anxiety, communication skills, explore cognitive beliefs and help client to develop healthy cognitive patterns and beliefs.    Objective #C (Client Action)    Status: New - Date: 9/9/20     Client will use \"worry time\" each day for 15 minutes of scheduled worry and then defer obsessive or anxious thinking until the next structured worry time.    Intervention(s)  Therapist will teach emotional regulation skills. work through trauma using somatic experiencing techniques to discharge the anxiety.    Goal 2: Client will work on increasing self esteem and self worth     Objective #A (Client Action)    Status: Continued - Date(s):9/9/20     Client will identify two areas of life that you would like to have improved functioning.    Intervention(s)  Therapist will teach emotional regulation skills. work through trauma using somatic experiencing techniques to discharge the anxiety.      Client has reviewed and agreed to the above plan.      Celsa Linda [facility]    "

## 2020-12-05 ENCOUNTER — MYC MEDICAL ADVICE (OUTPATIENT)
Dept: FAMILY MEDICINE | Facility: CLINIC | Age: 20
End: 2020-12-05

## 2020-12-07 NOTE — TELEPHONE ENCOUNTER
Please advise if you would like patient to have a clinic visit or if there is anything she can do to help relieve her symptoms.  Mary Alice Vega, CMA

## 2020-12-07 NOTE — TELEPHONE ENCOUNTER
Called and spoke to the patient. She said she thinks she has hemorrhoids that are acting up. She had issues with this before and now it seems to be acting up again. Has some blood with BMs. Has had small amount of blood twice in the last week. No urgent symptoms at this time. Patient is scheduled for a virtual visit with Mary Alice on Friday.      KELIN Posadas, RN  [facility]

## 2020-12-09 ENCOUNTER — VIRTUAL VISIT (OUTPATIENT)
Dept: PSYCHOLOGY | Facility: OTHER | Age: 20
End: 2020-12-09
Payer: COMMERCIAL

## 2020-12-09 DIAGNOSIS — F41.1 GENERALIZED ANXIETY DISORDER: Primary | ICD-10-CM

## 2020-12-09 PROCEDURE — 90834 PSYTX W PT 45 MINUTES: CPT | Mod: GT | Performed by: COUNSELOR

## 2020-12-09 NOTE — PROGRESS NOTES
Progress Note    Client Name: Batool Louis  Date: 12/9/20          Service Type: Individual      Session Start Time: 1:00pm   Session End Time: 1:50pm      Session Length:  50   mins     Session #: 23     Attendees: Client    Treatment Plan Last Reviewed: 9/9/20   PHQ-9 / SHELBY-7 : see chart    Telemedicine Visit: The patient's condition can be safely assessed and treated via synchronous audio and visual telemedicine encounter.      Reason for Telemedicine Visit: Services only offered telehealth    Originating Site (Patient Location): Patient's home    Distant Site (Provider Location): Provider Remote Setting    Consent:  The patient/guardian has verbally consented to: the potential risks and benefits of telemedicine (video visit) versus in person care; bill my insurance or make self-payment for services provided; and responsibility for payment of non-covered services.     Mode of Communication:  Video Conference via [employer]/telephone    As the provider I attest to compliance with applicable laws and regulations related to telemedicine.     DATA      Progress Since Last Session (Related to Symptoms / Goals / Homework):   Symptoms: Improving .    Homework: Completed in session      Episode of Care Goals: Satisfactory progress - PREPARATION (Decided to change - considering how); Intervened by negotiating a change plan and determining options / strategies for behavior change, identifying triggers, exploring social supports, and working towards setting a date to begin behavior change     Current / Ongoing Stressors and Concerns:   Dodie stated she didn't talk to the lyndon she likes at work yet.  She stated she is scared of the vulnerability.   She stated she feels like she is doing well but is noticing she is slaking a little bit, such as she isn't doing yoga regularly and her nighttime routine is off. She said she doesn't feel as determined now and isn't sure why  entirely. She has been feeling more anxious lately and recognizing more that other people's energy really influences her.      Treatment Objective(s) Addressed in This Session:   use cognitive strategies identified in therapy to challenge anxious thoughts       Intervention:   Talked with the client about how she feels like she is regaining confidence. She is able to notice when she's insecureor uncomfortable.     Suggested the client rehearse what she might like to say to the lyndon she likes, especially to get his phone number.   Helped the client to identify how she is losing steam and how she can get refueled to continue her healthy skills she was implementing. Encouraged the client to surround herself with more positive people ho will lift her up instead of bringing her down.       ASSESSMENT: Current Emotional / Mental Status (status of significant symptoms):   Risk status (Self / Other harm or suicidal ideation)   Client denies current fears or concerns for personal safety.   Client denies current or recent suicidal ideation or behaviors.   Client denies current or recent homicidal ideation or behaviors.   Client denies current or recent self injurious behavior or ideation.   Client denies other safety concerns.   A safety and risk management plan has not been developed at this time, however client was given the after-hours number / 911 should there be a change in any of these risk factors.     Appearance:   Appropriate    Eye Contact:   Good    Psychomotor Behavior: Normal    Attitude:   Cooperative    Orientation:   All   Speech    Rate / Production: Normal     Volume:  Normal    Mood:    Normal    Affect:    Appropriate    Thought Content:  Clear    Thought Form:  Coherent  Logical    Insight:    Good      Medication Review:   No changes to current psychiatric medication(s)     Medication Compliance:   Yes     Changes in Health Issues:   None reported : client is on birth control again     Chemical Use  Review:   Substance Use: Chemical use reviewed, no active concerns identified      Tobacco Use: No current tobacco use.       Collateral Reports Completed:   Not Applicable    Diagnoses:   Generalized Anxiety Disorder      PLAN: (Client Tasks / Therapist Tasks / Other)  Client to work on her self care routines.         Celsa Sy , [facility]                                                         ________________________________________________________________________    Treatment Plan    Client's Name: Batool Louis  YOB: 2000    Date: 9/9/20     DSM-V Diagnoses: Adjustment Disorders  309.28 (F43.23) With mixed anxiety and depressed mood  Psychosocial / Contextual Factors: COVID, living back at home, deaths of loved ones within the past few years. Sexual identity issues.     WHODAS: 12    Referral / Collaboration:  Referral to another professional/service is not indicated at this time.    Anticipated number of session or this episode of care: 5+      MeasurableTreatment Goal(s) related to diagnosis / functional impairment(s)  Goal 1: Client will increase emotion regulation skills/decrease panic attacks    Objective #A (Client Action)    Client will identify 2-4 fears / thoughts that contribute to feeling anxious  use positive self-affirmations daily.  Status: Continued - Date(s): 9/9/20       Intervention(s)  Therapist will teach emotional regulation skills. distress tolerance skills, interpersonal effectiveness skills, emotion regluation skills, mindfulness skills, radical acceptance. Therapist will teach client how to ID body cues for anxiety, anxiety reduction techniques, how to ID triggers for depression and anxiety- decrease reactivity/eliminate, lifestyle changes to reduce depression and anxiety, communication skills, explore cognitive beliefs and help client to develop healthy cognitive patterns and beliefs.      Objective #B  Client will use thought-stopping strategy daily to  "reduce intrusive thoughts.  Status: Continued - Date(s): 9/9/20       Intervention(s)  Therapist will teach emotional regulation skills. distress tolerance skills, interpersonal effectiveness skills, emotion regluation skills, mindfulness skills, radical acceptance. Therapist will teach client how to ID body cues for anxiety, anxiety reduction techniques, how to ID triggers for depression and anxiety- decrease reactivity/eliminate, lifestyle changes to reduce depression and anxiety, communication skills, explore cognitive beliefs and help client to develop healthy cognitive patterns and beliefs.    Objective #C (Client Action)    Status: New - Date: 9/9/20     Client will use \"worry time\" each day for 15 minutes of scheduled worry and then defer obsessive or anxious thinking until the next structured worry time.    Intervention(s)  Therapist will teach emotional regulation skills. work through trauma using somatic experiencing techniques to discharge the anxiety.    Goal 2: Client will work on increasing self esteem and self worth     Objective #A (Client Action)    Status: Continued - Date(s):9/9/20     Client will identify two areas of life that you would like to have improved functioning.    Intervention(s)  Therapist will teach emotional regulation skills. work through trauma using somatic experiencing techniques to discharge the anxiety.      Client has reviewed and agreed to the above plan.      Celsa Linda [facility]    "

## 2020-12-11 ENCOUNTER — VIRTUAL VISIT (OUTPATIENT)
Dept: FAMILY MEDICINE | Facility: CLINIC | Age: 20
End: 2020-12-11
Payer: COMMERCIAL

## 2020-12-11 DIAGNOSIS — K92.1 BLOOD IN STOOL: Primary | ICD-10-CM

## 2020-12-11 PROCEDURE — 99213 OFFICE O/P EST LOW 20 MIN: CPT | Mod: TEL | Performed by: NURSE PRACTITIONER

## 2020-12-11 NOTE — PATIENT INSTRUCTIONS
Continue to monitor this. If it starts again or new symptoms develop, we should consider a colonoscopy.

## 2020-12-11 NOTE — PROGRESS NOTES
"Batool Louis is a 20 year old female who is being evaluated via a billable telephone visit.      The patient has been notified of following:     \"This telephone visit will be conducted via a call between you and your physician/provider. We have found that certain health care needs can be provided without the need for a physical exam.  This service lets us provide the care you need with a short phone conversation.  If a prescription is necessary we can send it directly to your pharmacy.  If lab work is needed we can place an order for that and you can then stop by our lab to have the test done at a later time.    Telephone visits are billed at different rates depending on your insurance coverage. During this emergency period, for some insurers they may be billed the same as an in-person visit.  Please reach out to your insurance provider with any questions.    If during the course of the call the physician/provider feels a telephone visit is not appropriate, you will not be charged for this service.\"    Patient has given verbal consent for Telephone visit?  Yes    What phone number would you like to be contacted at? [phone]    How would you like to obtain your AVS? Todd Snell     Batool Louis is a 20 year old female who presents via phone visit today for the following health issues:    HPI     BLOOD IN STOOL  - x6 months off/on  - some constipation   -no pain    Intermittent symptoms x 6 months  Has tried more fiber and increased exercise and that helped for a while  Now symptoms returned over the past month   Bright red blood with bowel movements x 2 times this month. Last time was on 12/7/2020  No fevers/chills.  Hasn't been ill recently.   No abdominal pain.   Stools have been normal.      Review of Systems   Constitutional, HEENT, cardiovascular, pulmonary, gi and gu systems are negative, except as otherwise noted.       Objective   Vitals - Patient Reported  Weight (Patient " Reported): 94.6 kg (208 lb 9.6 oz)  Pain Score: No Pain (0)        healthy, alert and no distress  PSYCH: Alert and oriented times 3; coherent speech, normal   rate and volume, able to articulate logical thoughts, able   to abstract reason, no tangential thoughts, no hallucinations   or delusions  Her affect is normal  RESP: No cough, no audible wheezing, able to talk in full sentences  Remainder of exam unable to be completed due to telephone visits            Assessment/Plan:    Assessment & Plan     Blood in stool  Improved with increased fiber and diet changes.  Suspect internal hemorrhoid as she has no pain.  Discussed further evaluation with colonoscopy, but I think we can monitor this for a few more weeks first.  She has no family history of colon cancer and would be at low risk given her age.  She will let me know if this recurs or new symptoms develop.           See Patient Instructions    Return in about 4 weeks (around 1/8/2021) for Recheck only if not improving.    RONNY Vaz [facility]    Phone call duration:  8 minutes

## 2021-01-06 ENCOUNTER — VIRTUAL VISIT (OUTPATIENT)
Dept: PSYCHOLOGY | Facility: CLINIC | Age: 21
End: 2021-01-06
Payer: COMMERCIAL

## 2021-01-06 DIAGNOSIS — F41.1 GENERALIZED ANXIETY DISORDER: Primary | ICD-10-CM

## 2021-01-06 PROCEDURE — 90834 PSYTX W PT 45 MINUTES: CPT | Mod: GT | Performed by: COUNSELOR

## 2021-01-06 NOTE — PROGRESS NOTES
Progress Note    Client Name: Batool Louis  Date: 1/6/21          Service Type: Individual      Session Start Time: 1:00pm   Session End Time: 1:50pm      Session Length:  50   mins     Session #: 24     Attendees: Client    Treatment Plan Last Reviewed: 9/9/20   PHQ-9 / SHELBY-7 : see chart    Telemedicine Visit: The patient's condition can be safely assessed and treated via synchronous audio and visual telemedicine encounter.      Reason for Telemedicine Visit: Services only offered telehealth    Originating Site (Patient Location): Patient's home    Distant Site (Provider Location): Provider Remote Setting    Consent:  The patient/guardian has verbally consented to: the potential risks and benefits of telemedicine (video visit) versus in person care; bill my insurance or make self-payment for services provided; and responsibility for payment of non-covered services.     Mode of Communication:  Video Conference via [employer]/telephone    As the provider I attest to compliance with applicable laws and regulations related to telemedicine.     DATA      Progress Since Last Session (Related to Symptoms / Goals / Homework):   Symptoms: Improving .    Homework: Completed in session      Episode of Care Goals: Satisfactory progress - PREPARATION (Decided to change - considering how); Intervened by negotiating a change plan and determining options / strategies for behavior change, identifying triggers, exploring social supports, and working towards setting a date to begin behavior change     Current / Ongoing Stressors and Concerns:   Dodie stated she during Dec. She feels like she fell off the wagon with her routines and weight loss. She has started to get herself back on her goals.  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      Treatment Objective(s) Addressed in This Session:   use cognitive strategies identified in therapy to challenge anxious  thoughts       Intervention:   Processed her thoughts regarding some family interactions from the holidays.  Identified the feelings she was experiencing at the time and helped her to reframe some of the judgmental thoughts around these interactions. Processed interpersonal interactions with her mom and techniques she can utilize to communicate more effectively. 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         ASSESSMENT: Current Emotional / Mental Status (status of significant symptoms):   Risk status (Self / Other harm or suicidal ideation)   Client denies current fears or concerns for personal safety.   Client denies current or recent suicidal ideation or behaviors.   Client denies current or recent homicidal ideation or behaviors.   Client denies current or recent self injurious behavior or ideation.   Client denies other safety concerns.   A safety and risk management plan has not been developed at this time, however client was given the after-hours number / 911 should there be a change in any of these risk factors.     Appearance:   Appropriate    Eye Contact:   Good    Psychomotor Behavior: Normal    Attitude:   Cooperative    Orientation:   All   Speech    Rate / Production: Normal     Volume:  Normal    Mood:    Normal    Affect:    Appropriate    Thought Content:  Clear    Thought Form:  Coherent  Logical    Insight:    Good      Medication Review:   No changes to current psychiatric medication(s)     Medication Compliance:   Yes     Changes in Health Issues:   None reported : client is on birth control again     Chemical Use Review:   Substance Use: Chemical use reviewed, no active concerns identified      Tobacco Use: No current tobacco use.       Collateral Reports Completed:   Not Applicable    Diagnoses:   Generalized Anxiety Disorder      PLAN: (Client Tasks / Therapist Tasks / Other)  Client to  continue to focus on her routines.         Celsa Linda, [facility]                                                         ________________________________________________________________________    Treatment Plan    Client's Name: Batool Louis  YOB: 2000    Date: 1/6/21    DSM-V Diagnoses: Adjustment Disorders  309.28 (F43.23) With mixed anxiety and depressed mood  Psychosocial / Contextual Factors: COVID, living back at home, deaths of loved ones within the past few years. Sexual identity issues.     WHODAS: 12    Referral / Collaboration:  Referral to another professional/service is not indicated at this time.    Anticipated number of session or this episode of care: 5+      MeasurableTreatment Goal(s) related to diagnosis / functional impairment(s)  Goal 1: Client will increase emotion regulation skills/decrease panic attacks    Objective #A (Client Action)    Client will identify 2-4 fears / thoughts that contribute to feeling anxious  use positive self-affirmations daily.  Status: Continued - Date(s): 1/6/21      Intervention(s)  Therapist will teach emotional regulation skills. distress tolerance skills, interpersonal effectiveness skills, emotion regluation skills, mindfulness skills, radical acceptance. Therapist will teach client how to ID body cues for anxiety, anxiety reduction techniques, how to ID triggers for depression and anxiety- decrease reactivity/eliminate, lifestyle changes to reduce depression and anxiety, communication skills, explore cognitive beliefs and help client to develop healthy cognitive patterns and beliefs.      Objective #B  Client will use thought-stopping strategy daily to reduce intrusive thoughts.  Status: Continued - Date(s): 1/6/21       Intervention(s)  Therapist will teach emotional regulation skills. distress tolerance skills, interpersonal effectiveness skills, emotion regluation skills, mindfulness skills, radical acceptance. Therapist will  "teach client how to ID body cues for anxiety, anxiety reduction techniques, how to ID triggers for depression and anxiety- decrease reactivity/eliminate, lifestyle changes to reduce depression and anxiety, communication skills, explore cognitive beliefs and help client to develop healthy cognitive patterns and beliefs.    Objective #C (Client Action)    Status: New - Date: 1/6/21    Client will use \"worry time\" each day for 15 minutes of scheduled worry and then defer obsessive or anxious thinking until the next structured worry time.    Intervention(s)  Therapist will teach emotional regulation skills. work through trauma using somatic experiencing techniques to discharge the anxiety.    Goal 2: Client will work on increasing self esteem and self worth     Objective #A (Client Action)    Status: Continued - Date(s): 1/6/21    Client will identify two areas of life that you would like to have improved functioning.    Intervention(s)  Therapist will teach emotional regulation skills. work through trauma using somatic experiencing techniques to discharge the anxiety.      Client has reviewed and agreed to the above plan.      [facility] Linda [facility]    "

## 2021-01-11 ENCOUNTER — MYC MEDICAL ADVICE (OUTPATIENT)
Dept: FAMILY MEDICINE | Facility: CLINIC | Age: 21
End: 2021-01-11

## 2021-01-15 ENCOUNTER — HEALTH MAINTENANCE LETTER (OUTPATIENT)
Age: 21
End: 2021-01-15

## 2021-01-20 ENCOUNTER — VIRTUAL VISIT (OUTPATIENT)
Dept: PSYCHOLOGY | Facility: CLINIC | Age: 21
End: 2021-01-20
Payer: COMMERCIAL

## 2021-01-20 DIAGNOSIS — F41.1 GENERALIZED ANXIETY DISORDER: Primary | ICD-10-CM

## 2021-01-20 PROCEDURE — 90837 PSYTX W PT 60 MINUTES: CPT | Mod: GT | Performed by: COUNSELOR

## 2021-01-20 NOTE — PROGRESS NOTES
"                                             Progress Note    Client Name: Batool Louis  Date: 1/20/21          Service Type: Individual      Session Start Time: 1:05pm   Session End Time: 2:00pm      Session Length:   55 mins     Session #: 25     Attendees: Client    Treatment Plan Last Reviewed: 1/6/21  PHQ-9 / SHELBY-7 : see chart    Telemedicine Visit: The patient's condition can be safely assessed and treated via synchronous audio and visual telemedicine encounter.      Reason for Telemedicine Visit: Services only offered telehealth    Originating Site (Patient Location): Patient's home    Distant Site (Provider Location): Provider Remote Setting    Consent:  The patient/guardian has verbally consented to: the potential risks and benefits of telemedicine (video visit) versus in person care; bill my insurance or make self-payment for services provided; and responsibility for payment of non-covered services.     Mode of Communication:  Video Conference via [employer]/telephone    As the provider I attest to compliance with applicable laws and regulations related to telemedicine.     DATA      Progress Since Last Session (Related to Symptoms / Goals / Homework):   Symptoms: Improving .    Homework: Completed in session      Episode of Care Goals: Satisfactory progress - PREPARATION (Decided to change - considering how); Intervened by negotiating a change plan and determining options / strategies for behavior change, identifying triggers, exploring social supports, and working towards setting a date to begin behavior change     Current / Ongoing Stressors and Concerns:   Dodie stated she asked for the lyndon's number from work and he gave it to her. She stated she spoke with a handful of people about him and they've said he's \"kind of an asshole.\" She stated she is going to try to get to know him more on her own and form her own opinion of him and if they are a good fit or not.   She also stated she still " feels like she is falling off the wagon with her diet and exercise routines. She said her motivations aren't working anymore. She stated school has started up again and she's been busy with that.      Treatment Objective(s) Addressed in This Session:   use cognitive strategies identified in therapy to challenge anxious thoughts       Intervention:   Processed the client's thoughts around the lyndon she likes and what she can do to balance her friend's opinions and her own. Worked with the client on how to hold herself accountable for her routines and self care. Helped the client to realize the client sometimes lives in the extremes. Talked about strategies to work on getting more balanced.         ASSESSMENT: Current Emotional / Mental Status (status of significant symptoms):   Risk status (Self / Other harm or suicidal ideation)   Client denies current fears or concerns for personal safety.   Client denies current or recent suicidal ideation or behaviors.   Client denies current or recent homicidal ideation or behaviors.   Client denies current or recent self injurious behavior or ideation.   Client denies other safety concerns.   A safety and risk management plan has not been developed at this time, however client was given the after-hours number / 911 should there be a change in any of these risk factors.     Appearance:   Appropriate    Eye Contact:   Good    Psychomotor Behavior: Normal    Attitude:   Cooperative    Orientation:   All   Speech    Rate / Production: Normal     Volume:  Normal    Mood:    Normal    Affect:    Appropriate    Thought Content:  Clear    Thought Form:  Coherent  Logical    Insight:    Good      Medication Review:   No changes to current psychiatric medication(s)     Medication Compliance:   Yes     Changes in Health Issues:   None reported : client is on birth control again     Chemical Use Review:   Substance Use: Chemical use reviewed, no active concerns identified      Tobacco Use:  No current tobacco use.       Collateral Reports Completed:   Not Applicable    Diagnoses:   Generalized Anxiety Disorder      PLAN: (Client Tasks / Therapist Tasks / Other)  Client to find new things that help to inspire her and propel her towards her goals.          Celsa Sy , [facility]                                                         ________________________________________________________________________    Treatment Plan    Client's Name: Batool Louis  YOB: 2000    Date: 1/6/21    DSM-V Diagnoses: Adjustment Disorders  309.28 (F43.23) With mixed anxiety and depressed mood  Psychosocial / Contextual Factors: COVID, living back at home, deaths of loved ones within the past few years. Sexual identity issues.     WHODAS: 12    Referral / Collaboration:  Referral to another professional/service is not indicated at this time.    Anticipated number of session or this episode of care: 5+      MeasurableTreatment Goal(s) related to diagnosis / functional impairment(s)  Goal 1: Client will increase emotion regulation skills/decrease panic attacks    Objective #A (Client Action)    Client will identify 2-4 fears / thoughts that contribute to feeling anxious  use positive self-affirmations daily.  Status: Continued - Date(s): 1/6/21      Intervention(s)  Therapist will teach emotional regulation skills. distress tolerance skills, interpersonal effectiveness skills, emotion regluation skills, mindfulness skills, radical acceptance. Therapist will teach client how to ID body cues for anxiety, anxiety reduction techniques, how to ID triggers for depression and anxiety- decrease reactivity/eliminate, lifestyle changes to reduce depression and anxiety, communication skills, explore cognitive beliefs and help client to develop healthy cognitive patterns and beliefs.      Objective #B  Client will use thought-stopping strategy daily to reduce intrusive thoughts.  Status: Continued - Date(s):  "1/6/21       Intervention(s)  Therapist will teach emotional regulation skills. distress tolerance skills, interpersonal effectiveness skills, emotion regluation skills, mindfulness skills, radical acceptance. Therapist will teach client how to ID body cues for anxiety, anxiety reduction techniques, how to ID triggers for depression and anxiety- decrease reactivity/eliminate, lifestyle changes to reduce depression and anxiety, communication skills, explore cognitive beliefs and help client to develop healthy cognitive patterns and beliefs.    Objective #C (Client Action)    Status: New - Date: 1/6/21    Client will use \"worry time\" each day for 15 minutes of scheduled worry and then defer obsessive or anxious thinking until the next structured worry time.    Intervention(s)  Therapist will teach emotional regulation skills. work through trauma using somatic experiencing techniques to discharge the anxiety.    Goal 2: Client will work on increasing self esteem and self worth     Objective #A (Client Action)    Status: Continued - Date(s): 1/6/21    Client will identify two areas of life that you would like to have improved functioning.    Intervention(s)  Therapist will teach emotional regulation skills. work through trauma using somatic experiencing techniques to discharge the anxiety.      Client has reviewed and agreed to the above plan.      Celsa Linda [facility]    "

## 2021-02-01 ENCOUNTER — HOSPITAL ENCOUNTER (OUTPATIENT)
Dept: GENERAL RADIOLOGY | Facility: CLINIC | Age: 21
Discharge: HOME | End: 2021-02-01
Attending: FAMILY MEDICINE | Admitting: FAMILY MEDICINE
Payer: COMMERCIAL

## 2021-02-01 ENCOUNTER — OFFICE VISIT (OUTPATIENT)
Dept: FAMILY MEDICINE | Facility: CLINIC | Age: 21
End: 2021-02-01
Payer: COMMERCIAL

## 2021-02-01 VITALS
DIASTOLIC BLOOD PRESSURE: 70 MMHG | TEMPERATURE: 97.5 F | HEIGHT: 69 IN | HEART RATE: 88 BPM | SYSTOLIC BLOOD PRESSURE: 112 MMHG | RESPIRATION RATE: 16 BRPM | BODY MASS INDEX: 30.87 KG/M2 | OXYGEN SATURATION: 99 % | WEIGHT: 208.4 LBS

## 2021-02-01 DIAGNOSIS — L85.8 KP (KERATOSIS PILARIS): ICD-10-CM

## 2021-02-01 DIAGNOSIS — M25.562 CHRONIC PAIN OF LEFT KNEE: ICD-10-CM

## 2021-02-01 DIAGNOSIS — L70.0 ACNE VULGARIS: Primary | ICD-10-CM

## 2021-02-01 DIAGNOSIS — G89.29 CHRONIC PAIN OF LEFT KNEE: ICD-10-CM

## 2021-02-01 PROCEDURE — 99214 OFFICE O/P EST MOD 30 MIN: CPT | Performed by: FAMILY MEDICINE

## 2021-02-01 PROCEDURE — 73562 X-RAY EXAM OF KNEE 3: CPT | Mod: LT

## 2021-02-01 RX ORDER — ADAPALENE GEL USP, 0.3% 3 MG/G
GEL TOPICAL AT BEDTIME
Qty: 45 G | Refills: 3 | Status: SHIPPED | OUTPATIENT
Start: 2021-02-01 | End: 2021-02-18

## 2021-02-01 RX ORDER — CLINDAMYCIN PHOSPHATE 10 MG/G
GEL TOPICAL 2 TIMES DAILY
Qty: 60 G | Refills: 1 | Status: SHIPPED | OUTPATIENT
Start: 2021-02-01 | End: 2021-02-18

## 2021-02-01 ASSESSMENT — ANXIETY QUESTIONNAIRES
6. BECOMING EASILY ANNOYED OR IRRITABLE: SEVERAL DAYS
7. FEELING AFRAID AS IF SOMETHING AWFUL MIGHT HAPPEN: SEVERAL DAYS
2. NOT BEING ABLE TO STOP OR CONTROL WORRYING: MORE THAN HALF THE DAYS
7. FEELING AFRAID AS IF SOMETHING AWFUL MIGHT HAPPEN: SEVERAL DAYS
5. BEING SO RESTLESS THAT IT IS HARD TO SIT STILL: NOT AT ALL
GAD7 TOTAL SCORE: 10
1. FEELING NERVOUS, ANXIOUS, OR ON EDGE: MORE THAN HALF THE DAYS
GAD7 TOTAL SCORE: 10
4. TROUBLE RELAXING: MORE THAN HALF THE DAYS
GAD7 TOTAL SCORE: 10
3. WORRYING TOO MUCH ABOUT DIFFERENT THINGS: MORE THAN HALF THE DAYS

## 2021-02-01 ASSESSMENT — PAIN SCALES - GENERAL: PAINLEVEL: SEVERE PAIN (6)

## 2021-02-01 ASSESSMENT — PATIENT HEALTH QUESTIONNAIRE - PHQ9
SUM OF ALL RESPONSES TO PHQ QUESTIONS 1-9: 4
SUM OF ALL RESPONSES TO PHQ QUESTIONS 1-9: 4

## 2021-02-01 ASSESSMENT — MIFFLIN-ST. JEOR: SCORE: 1765.71

## 2021-02-01 NOTE — PROGRESS NOTES
"  Assessment & Plan     Acne vulgaris  Discussed this with her about the nature of the condition.  Skin and facial hygiene discussed.  Start her on Differin and clindamycin ointment/cream for her acne.  Hold off on oral antibiotic for now as there is no active inflamed lesions nor signs of active infection.  She also has keratosis pilaris.  Discussed with her about its nature as well.  Recommended to be generous with moisturizer.  As per her request, she was referred to dermatology for further evaluation and management.    - adapalene (DIFFERIN) 0.3 % external gel; Apply topically At Bedtime  - clindamycin (CLINDAMAX) 1 % external gel; Apply topically 2 times daily    KP (keratosis pilaris)  Please see #1 above for further details.    Chronic pain of left knee  Been having the pain on both knees in the last couple months.  No known triggers and is getting worse overall.  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  X-ray today as expected was normal.  Referral for physical therapy.  Tylenol or Motrin as needed.  Emphasized on the importance of good shoe support as well as stretching before and after dancing lesions.     - XR Knee Left 3 Views; Future       BMI:   Estimated body mass index is 31 kg/m  as calculated from the following:    Height as of this encounter: 1.746 m (5' 8.75\").    Weight as of this encounter: 94.5 kg (208 lb 6.4 oz).   Weight management plan: Discussed healthy diet and exercise guidelines      Return if symptoms worsen or fail to improve.    Nancy Mcdowell Mai, MD  [facility] HUMBERTO Stoll is a 20 year old who presents to clinic today for the following health issues     HPI       Musculoskeletal problem/pain  Onset/Duration: December  Description  Location: knee - Both left is worse  Joint Swelling: no  Redness: no  Pain: YES  Warmth: YES  Intensity:  moderate  Progression of Symptoms:  same  Accompanying signs and symptoms:   Fevers: " "no  Numbness/tingling/weakness: no  History  Trauma to the area: Patient teaches dance  Recent illness:  no  Previous similar problem: no  Previous evaluation:  no  Precipitating or alleviating factors:  Aggravating factors include: Patient teaches dance   Therapies tried and outcome: rest/inactivity    Patient would like a referral to dermatology for acne that has gotten worse since she had the IUD placed and red bumps on arms and legs.       Batool is here today for couple concerns.  First is about the acne that she has been having in the last several months.  Noted soon after starting the Kyleena IUD but not sure if it is caused by wearing mask. Never had it before.  Mainly on her face and her neck.  It is really bothersome to her.  No itching or pain.  OTC creams have not helped.  She also has the red bumps on her arms and thighs - been there for years.  Lotion and OTC creams also have not helped.  Not itchy.  No change in lotion, detergent or shampoo.  She teaches dancing and it is bothersome to her.    Second concern is about the knee pain.  If both knees, worse on the left.  Been there for couple months that is getting worse.  The sore achy pain that comes and go, worse with weightbearing activity.  It is bothersome as is triggered and worsen when she teaches her dancing classes. Also worse with knee bending or long drive; better with straightened of the knee.  No unusual activities or recent history of trauma.  No wobbling or instability.  No redness or swelling.  No hip or back pain.  No other concern.    Review of Systems   Constitutional, HEENT, cardiovascular, pulmonary, gi and gu systems are negative, except as otherwise noted.      Objective    /70   Pulse 88   Temp 97.5  F (36.4  C) (Temporal)   Resp 16   Ht 1.746 m (5' 8.75\")   Wt 94.5 kg (208 lb 6.4 oz)   LMP  (LMP Unknown)   SpO2 99%   BMI 31.00 kg/m    Body mass index is 31 kg/m .  Physical Exam   GENERAL: healthy, alert and no " distress  RESP: lungs clear to auscultation - no rales, rhonchi or wheezes  CV: regular rate and rhythm, no murmur.  MS: no gross musculoskeletal defects noted, no edema.  Walk without limping.  Both legs equally in strength.  Hips and lower back exam was normal.  Ankle exam was also normal.  Knees with no effusion or redness.  No tender with palpation to the knee joints.  Negative anterior/posterior drawer test as well as valgus/varus stress test.  No tender with palpation to the back of the knee.  SKIN: Acne noticed on the face and on upper neck.  No active inflamed lesions, white comedones or blackhead.  Papular rash on the arms and legs consistent with pilaris noted.    Results for orders placed or performed during the hospital encounter of 02/01/21   XR Knee Left 3 Views     Status: None    Narrative    XR KNEE LT 3 VW 2/1/2021 9:35 AM     HISTORY: knee pain, dancer; Chronic pain of left knee; Chronic pain of  left knee    COMPARISON: None.       Impression    IMPRESSION: Normal joint spaces and alignment. No fracture or joint  effusion.    JO ANN CONTE MD               Answers for HPI/ROS submitted by the patient on 2/1/2021   PHQ9 TOTAL SCORE: 4  SHELBY 7 TOTAL SCORE: 10

## 2021-02-02 ASSESSMENT — ANXIETY QUESTIONNAIRES: GAD7 TOTAL SCORE: 10

## 2021-02-03 ENCOUNTER — VIRTUAL VISIT (OUTPATIENT)
Dept: PSYCHOLOGY | Facility: CLINIC | Age: 21
End: 2021-02-03
Payer: COMMERCIAL

## 2021-02-03 DIAGNOSIS — F41.1 GENERALIZED ANXIETY DISORDER: Primary | ICD-10-CM

## 2021-02-03 PROCEDURE — 90834 PSYTX W PT 45 MINUTES: CPT | Mod: TEL | Performed by: COUNSELOR

## 2021-02-03 NOTE — PROGRESS NOTES
"                                             Progress Note    Client Name: Batool Louis  Date: 2/3/21          Service Type: Individual      Session Start Time: 1:05pm   Session End Time: 1:50pm      Session Length:  45  mins     Session #: 26     Attendees: Client    Treatment Plan Last Reviewed: 1/6/21  PHQ-9 / SHELBY-7 : see chart    Telemedicine Visit: The patient's condition can be safely assessed and treated via synchronous audio and visual telemedicine encounter.      Reason for Telemedicine Visit: Services only offered telehealth    Originating Site (Patient Location): Patient's home    Distant Site (Provider Location): Provider Remote Setting    Consent:  The patient/guardian has verbally consented to: the potential risks and benefits of telemedicine (video visit) versus in person care; bill my insurance or make self-payment for services provided; and responsibility for payment of non-covered services.     Mode of Communication:  Video Conference via [employer]/telephone    As the provider I attest to compliance with applicable laws and regulations related to telemedicine.     DATA      Progress Since Last Session (Related to Symptoms / Goals / Homework):   Symptoms: Improving .    Homework: Completed in session      Episode of Care Goals: Satisfactory progress - PREPARATION (Decided to change - considering how); Intervened by negotiating a change plan and determining options / strategies for behavior change, identifying triggers, exploring social supports, and working towards setting a date to begin behavior change     Current / Ongoing Stressors and Concerns:   Dodie stated she's been very stressed. 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 The client stated she feels like she is \"just done with school\" and doesn't know if she should take a break or if she can continue.      Treatment Objective(s) Addressed " in This Session:   use cognitive strategies identified in therapy to challenge anxious thoughts       Intervention:   Worked with the client on slowing down and refocusing her attentions onto things that matter. Helped her think about what is the most effective for her in terms of continuing school or taking a break.   Looked at the emotional, logical, and wise states of mind to help make this decision.       ASSESSMENT: Current Emotional / Mental Status (status of significant symptoms):   Risk status (Self / Other harm or suicidal ideation)   Client denies current fears or concerns for personal safety.   Client denies current or recent suicidal ideation or behaviors.   Client denies current or recent homicidal ideation or behaviors.   Client denies current or recent self injurious behavior or ideation.   Client denies other safety concerns.   A safety and risk management plan has not been developed at this time, however client was given the after-hours number / 911 should there be a change in any of these risk factors.     Appearance:   Appropriate    Eye Contact:   Good    Psychomotor Behavior: Normal    Attitude:   Cooperative    Orientation:   All   Speech    Rate / Production: Normal     Volume:  Normal    Mood:    Normal    Affect:    Appropriate    Thought Content:  Clear    Thought Form:  Coherent  Logical    Insight:    Good      Medication Review:   No changes to current psychiatric medication(s)     Medication Compliance:   Yes     Changes in Health Issues:   None reported : client is on birth control again     Chemical Use Review:   Substance Use: Chemical use reviewed, no active concerns identified      Tobacco Use: No current tobacco use.       Collateral Reports Completed:   Not Applicable    Diagnoses:   Generalized Anxiety Disorder      PLAN: (Client Tasks / Therapist Tasks / Other)  Client to focus on one thing at a time and slow down.           Celsa Linda, [facility]                                                          ________________________________________________________________________    Treatment Plan    Client's Name: Batool Louis  YOB: 2000    Date: 1/6/21    DSM-V Diagnoses: Adjustment Disorders  309.28 (F43.23) With mixed anxiety and depressed mood  Psychosocial / Contextual Factors: COVID, living back at home, deaths of loved ones within the past few years. Sexual identity issues.     WHODAS: 12    Referral / Collaboration:  Referral to another professional/service is not indicated at this time.    Anticipated number of session or this episode of care: 5+      MeasurableTreatment Goal(s) related to diagnosis / functional impairment(s)  Goal 1: Client will increase emotion regulation skills/decrease panic attacks    Objective #A (Client Action)    Client will identify 2-4 fears / thoughts that contribute to feeling anxious  use positive self-affirmations daily.  Status: Continued - Date(s): 1/6/21      Intervention(s)  Therapist will teach emotional regulation skills. distress tolerance skills, interpersonal effectiveness skills, emotion regluation skills, mindfulness skills, radical acceptance. Therapist will teach client how to ID body cues for anxiety, anxiety reduction techniques, how to ID triggers for depression and anxiety- decrease reactivity/eliminate, lifestyle changes to reduce depression and anxiety, communication skills, explore cognitive beliefs and help client to develop healthy cognitive patterns and beliefs.      Objective #B  Client will use thought-stopping strategy daily to reduce intrusive thoughts.  Status: Continued - Date(s): 1/6/21       Intervention(s)  Therapist will teach emotional regulation skills. distress tolerance skills, interpersonal effectiveness skills, emotion regluation skills, mindfulness skills, radical acceptance. Therapist will teach client how to ID body cues for anxiety, anxiety reduction techniques, how to ID triggers for  "depression and anxiety- decrease reactivity/eliminate, lifestyle changes to reduce depression and anxiety, communication skills, explore cognitive beliefs and help client to develop healthy cognitive patterns and beliefs.    Objective #C (Client Action)    Status: New - Date: 1/6/21    Client will use \"worry time\" each day for 15 minutes of scheduled worry and then defer obsessive or anxious thinking until the next structured worry time.    Intervention(s)  Therapist will teach emotional regulation skills. work through trauma using somatic experiencing techniques to discharge the anxiety.    Goal 2: Client will work on increasing self esteem and self worth     Objective #A (Client Action)    Status: Continued - Date(s): 1/6/21    Client will identify two areas of life that you would like to have improved functioning.    Intervention(s)  Therapist will teach emotional regulation skills. work through trauma using somatic experiencing techniques to discharge the anxiety.      Client has reviewed and agreed to the above plan.      Celsa Linda [facility]    "

## 2021-02-11 ENCOUNTER — HOSPITAL ENCOUNTER (OUTPATIENT)
Dept: PHYSICAL THERAPY | Facility: CLINIC | Age: 21
Setting detail: THERAPIES SERIES
End: 2021-02-11
Attending: FAMILY MEDICINE
Payer: COMMERCIAL

## 2021-02-11 DIAGNOSIS — M25.562 CHRONIC PAIN OF LEFT KNEE: ICD-10-CM

## 2021-02-11 DIAGNOSIS — G89.29 CHRONIC PAIN OF LEFT KNEE: ICD-10-CM

## 2021-02-11 PROCEDURE — 97110 THERAPEUTIC EXERCISES: CPT | Mod: GP | Performed by: PHYSICAL THERAPIST

## 2021-02-11 PROCEDURE — 97161 PT EVAL LOW COMPLEX 20 MIN: CPT | Mod: GP | Performed by: PHYSICAL THERAPIST

## 2021-02-12 ENCOUNTER — MYC MEDICAL ADVICE (OUTPATIENT)
Dept: DERMATOLOGY | Facility: CLINIC | Age: 21
End: 2021-02-12

## 2021-02-16 ENCOUNTER — MYC MEDICAL ADVICE (OUTPATIENT)
Dept: DERMATOLOGY | Facility: CLINIC | Age: 21
End: 2021-02-16

## 2021-02-17 ENCOUNTER — VIRTUAL VISIT (OUTPATIENT)
Dept: DERMATOLOGY | Facility: CLINIC | Age: 21
End: 2021-02-17
Payer: COMMERCIAL

## 2021-02-17 DIAGNOSIS — L70.0 ACNE VULGARIS: ICD-10-CM

## 2021-02-17 PROCEDURE — 99204 OFFICE O/P NEW MOD 45 MIN: CPT | Mod: TEL | Performed by: DERMATOLOGY

## 2021-02-17 RX ORDER — SPIRONOLACTONE 50 MG/1
100 TABLET, FILM COATED ORAL DAILY
Qty: 60 TABLET | Refills: 2 | Status: SHIPPED | OUTPATIENT
Start: 2021-02-17 | End: 2021-09-16

## 2021-02-17 NOTE — PATIENT INSTRUCTIONS
[facility] Dermatology Visit    Thank you for allowing us to participate in your care. Your findings, instructions and follow-up plan are as follows:     Acne:     IN THE MORNING:  Wash with a cleanser that contains benzoyl peroxide (I like Neutrogena Clear Pore, Cerave Acne Foaming Cream Cleanser). If your skin is significantly dry, you can use a cream based moisturizer after washing your face. I like either CeraVe or Vanicream cream moisturizers.     AT NIGHT:   Wash face with a gentle cleanser (something that is nonmedicated, --- no benzoyl peroxide or salicylic acid. One that I recommend is Cerave/Cetaphil/Vanicream gentle facial cleanser). Then use a cream based moisturizer as needed for dry skin last, such as the CeraVe or Vanicream as mentioned above.    PILLS:  Start spironolactone 50 mg (1 pill) daily for a week, if tolerated without side effects will then increase to 100mg a day. This can be taken either as 50mg in the morning, 50 mg in the evening or 100mg at one time during per day - whichever you prefer.     Recheck in 3 months.      When should I call my doctor?    If you are worsening or not improving, please, contact us or seek urgent care as noted below.     Who should I call with questions (adults)?    [facility] (adult and pediatric): [phone]     [facility] (adult): [phone]    For urgent needs outside of business hours call the [facility] at [phone] and ask for the dermatology resident on call    If this is a medical emergency and you are unable to reach an ER, Call [phone]      Who should I call with questions (pediatric)?  [facility]- Pediatric Dermatology  Dr. Gi Madera, Dr. Sara Arambula, Dr. Sharron Fletcher, Negin Mai, PA  Dr. Celsa Selby, Dr. Katherine Barrios & Dr. Khoi Al  Non Urgent  Nurse Triage Line; [phone]- Socorro and Analilia PEREZ Care  Coordinators   Kathy (/Complex ) [phone]    If you need a prescription refill, please contact your pharmacy. Refills are approved or denied by our Physicians during normal business hours, Monday through Fridays  Per office policy, refills will not be granted if you have not been seen within the past year (or sooner depending on your child's condition)    Scheduling Information:  Pediatric Appointment Scheduling and Call Center [phone]  Radiology Scheduling- [phone]  Sedation Unit Scheduling- [phone]  [place] Scheduling- [facility] [phone]; Pediatric Dermatology [phone]  Main  Services: [phone]  Faroese: [phone]  Kittitian: [phone]  Hmong/Tamazight/Lao: [phone]  Preadmission Nursing Department Fax Number: [phone] (Fax all pre-operative paperwork to this number)    For urgent matters arising during evenings, weekends, or holidays that cannot wait for normal business hours please call [phone] and ask for the Dermatology Resident On-Call to be paged.

## 2021-02-17 NOTE — LETTER
2/17/2021         RE: Batool Murrayach  [address] [place] [address]        Dear Colleague,    Thank you for referring your patient, Batool Louis, to the [facility]. Please see a copy of my visit note below.    Teledermatology Nurse Call Patients:     Are you  in the [facility] at the time of the encounter? yes    Today's visit will be billed to you and your insurance.    A teledermatology visit is not as thorough as an in-person visit and the quality of the photograph sent may not be of the same quality as that taken by the dermatology clinic.      Patient summary of issue :Acne  Location of problem on body:face and neck  How long has area/symptoms been present:Had acne put in in August, feel acne has gotten much worse.   What makes it better?:No  What makes it worse?:No  Other symptoms include the following:painful and red. Patient states she is getting regularly getting new spots  Which medications have been tried, for how long, and did they make it better or worse (ex. Triamcinolone, used twice daily for 2 weeks, not improved):No,has only used OTC products  The patient has not seen a dermatologist.   The patient hasno past medical history of skin cancer  ROS: The patient is generally feeling well.     Patient using over the counter CereVe gentle skin cleanser, exfoliating washcloth, Eucerin and ponds moisturizers.     Filomena Le LPN        [facility] Dermatology Note  Encounter Date: Feb 17, 2021  Store-and-Forward and Telephone ([phone]). Location of teledermatologist: [facility].  Start time: 8:48. End time: 9:02.    Dermatology Problem List:  1. Acne  - BPO wash morning, gentle cleanser evening  - Spironolactone 100mg daily (started 2/2021)    Social history: Teaches dance, works at a restaurant, and is in school studying theater and holistic health. Has  "IUD.    ____________________________________________    Assessment & Plan:     # Acne vulgaris: Chronic (likely to be present over a year), flared. Likely hormonal given lower face involvement and flare with IUD.   - Reviewed referral note from 2/1/21.  - Prescribed spironolactone 50 mg daily for a week, if tolerated without side effects will then increase to 50 mg BID. Discussed the potential side effects of this medication, including headaches, dizziness with standing, muscle cramps, breast tenderness, spotting in between menstrual cycles, and potential tetragenic effects. Discussed that patient should not get pregnant while on this medication. She has IUD and is not planning for pregnancy in near future.  - AM: BPO wash, PM: gentle cleanser. Hold all other topicals for now.    Procedures Performed:    None    Follow-up: 3 month(s) virtually (telephone with photos), or earlier for new or changing lesions    Staff:     Jennifer Peraza MD    Department of Dermatology  [facility]: Phone: [phone], Fax:[phone]  [facility]: Phone: [phone], Fax: [phone]    ____________________________________________    CC: Derm Problem (Acne)    HPI:  Ms. Batool Louis is a(n) 20 year old female who presents today as a new patient for acne.    Referred to dermatology on 2/1/21 for \"acne vulgaris\" and \"keratosis pilaris\" by Dr. Donnelly. Note reviewed. Acne prescriptions started include: Adapalene 0.3% gel, clindamycin 1% gel. Patient has kyleena IUD. Acne seemed to flare after getting this.    Dodie has not started adapalene or clindamycin due to cost.  Had some acne before IUD, got worse with getting IUD. Gets periods irregularly with the IUD. Has not noticed flares of acne occurring around the time of cycles.   Currently using Cerave gentle cleanser, occasional salicyclic acid wash (makes " skin dry, cannot use regularly). Using differin gel sometimes. Using a [employer] brand lotion and ponds dry skin cream as moisturizers.     Patient is otherwise feeling well, without additional concerns.    Labs:  NA    Physical Exam:  Vitals: LMP  (LMP Unknown)   SKIN: Teledermatology photos were reviewed; image quality and interpretability: acceptable. Image date: 2/16/21 see upload date.  - Comedonal acne scattered on face  - Inflammatory acne and PIH on the lower face including cheeks and chin and neck  - No other lesions of concern on areas examined.     Medications:  Current Outpatient Medications   Medication     adapalene (DIFFERIN) 0.3 % external gel     Cholecalciferol (D 5000) 5000 UNITS TABS     clindamycin (CLINDAMAX) 1 % external gel     Homeopathic Products (ZICAM ALLERGY RELIEF NA)     levonorgestrel (KYLEENA) 19.5 MG IUD     No current facility-administered medications for this visit.       Past Medical/Surgical History:   Patient Active Problem List   Diagnosis     Allergic rhinitis     Chronic tonsillitis     IUD (intrauterine device) in place     Past Medical History:   Diagnosis Date     Amenorrhea, secondary        CC Nancy Mcdowell Mai, MD  [address] [facility] [place] [address] on close of this encounter.        Again, thank you for allowing me to participate in the care of your patient.        Sincerely,        Jennifer Peraza MD

## 2021-02-17 NOTE — PROGRESS NOTES
Teledermatology Nurse Call Patients:     Are you  in the [facility] at the time of the encounter? yes    Today's visit will be billed to you and your insurance.    A teledermatology visit is not as thorough as an in-person visit and the quality of the photograph sent may not be of the same quality as that taken by the dermatology clinic.      Patient summary of issue :Acne  Location of problem on body:face and neck  How long has area/symptoms been present:Had acne put in in August, feel acne has gotten much worse.   What makes it better?:No  What makes it worse?:No  Other symptoms include the following:painful and red. Patient states she is getting regularly getting new spots  Which medications have been tried, for how long, and did they make it better or worse (ex. Triamcinolone, used twice daily for 2 weeks, not improved):No,has only used OTC products  The patient has not seen a dermatologist.   The patient hasno past medical history of skin cancer  ROS: The patient is generally feeling well.     Patient using over the counter CereVe gentle skin cleanser, exfoliating washcloth, Eucerin and ponds moisturizers.     Filomena Le LPN        [facility] Dermatology Note  Encounter Date: Feb 17, 2021  Store-and-Forward and Telephone ([phone]). Location of teledermatologist: [facility].  Start time: 8:48. End time: 9:02.    Dermatology Problem List:  1. Acne  - BPO wash morning, gentle cleanser evening  - Spironolactone 100mg daily (started 2/2021)    Social history: Teaches dance, works at a restaurant, and is in school studying theater and holistic health. Has IUD.    ____________________________________________    Assessment & Plan:     # Acne vulgaris: Chronic (likely to be present over a year), flared. Likely hormonal given lower face involvement and flare with IUD.   - Reviewed referral note from 2/1/21.  - Prescribed spironolactone 50 mg daily for a  "week, if tolerated without side effects will then increase to 50 mg BID. Discussed the potential side effects of this medication, including headaches, dizziness with standing, muscle cramps, breast tenderness, spotting in between menstrual cycles, and potential tetragenic effects. Discussed that patient should not get pregnant while on this medication. She has IUD and is not planning for pregnancy in near future.  - AM: BPO wash, PM: gentle cleanser. Hold all other topicals for now.    Procedures Performed:    None    Follow-up: 3 month(s) virtually (telephone with photos), or earlier for new or changing lesions    Staff:     Jennifer Peraza MD    Department of Dermatology  [facility]: Phone: [phone], Fax:[phone]  [facility]: Phone: [phone], Fax: [phone]    ____________________________________________    CC: Derm Problem (Acne)    HPI:  Ms. Batool Louis is a(n) 20 year old female who presents today as a new patient for acne.    Referred to dermatology on 2/1/21 for \"acne vulgaris\" and \"keratosis pilaris\" by Dr. Donnelly. Note reviewed. Acne prescriptions started include: Adapalene 0.3% gel, clindamycin 1% gel. Patient has kyleena IUD. Acne seemed to flare after getting this.    Dodie has not started adapalene or clindamycin due to cost.  Had some acne before IUD, got worse with getting IUD. Gets periods irregularly with the IUD. Has not noticed flares of acne occurring around the time of cycles.   Currently using Cerave gentle cleanser, occasional salicyclic acid wash (makes skin dry, cannot use regularly). Using differin gel sometimes. Using a [employer] brand lotion and ponds dry skin cream as moisturizers.     Patient is otherwise feeling well, without additional concerns.    Labs:  NA    Physical Exam:  Vitals: LMP  (LMP Unknown)   SKIN: Teledermatology photos were " reviewed; image quality and interpretability: acceptable. Image date: 2/16/21 see upload date.  - Comedonal acne scattered on face  - Inflammatory acne and PIH on the lower face including cheeks and chin and neck  - No other lesions of concern on areas examined.     Medications:  Current Outpatient Medications   Medication     adapalene (DIFFERIN) 0.3 % external gel     Cholecalciferol (D 5000) 5000 UNITS TABS     clindamycin (CLINDAMAX) 1 % external gel     Homeopathic Products (ZICAM ALLERGY RELIEF NA)     levonorgestrel (KYLEENA) 19.5 MG IUD     No current facility-administered medications for this visit.       Past Medical/Surgical History:   Patient Active Problem List   Diagnosis     Allergic rhinitis     Chronic tonsillitis     IUD (intrauterine device) in place     Past Medical History:   Diagnosis Date     Amenorrhea, secondary        CC Nancy Mcdowell Mai, MD  [address] [facility] [place] [address] on close of this encounter.

## 2021-02-18 ENCOUNTER — OFFICE VISIT (OUTPATIENT)
Dept: OBGYN | Facility: OTHER | Age: 21
End: 2021-02-18
Payer: COMMERCIAL

## 2021-02-18 VITALS
SYSTOLIC BLOOD PRESSURE: 118 MMHG | DIASTOLIC BLOOD PRESSURE: 76 MMHG | BODY MASS INDEX: 31.01 KG/M2 | HEART RATE: 84 BPM | OXYGEN SATURATION: 100 % | WEIGHT: 208.5 LBS

## 2021-02-18 DIAGNOSIS — Z30.431 IUD CHECK UP: Primary | ICD-10-CM

## 2021-02-18 PROCEDURE — 99213 OFFICE O/P EST LOW 20 MIN: CPT | Performed by: OBSTETRICS & GYNECOLOGY

## 2021-02-18 NOTE — PROGRESS NOTES
OB/GYN      NAME:  Batool Louis  PCP:  Mary Alice Reagan  MRN:  [id number]    Impression / Plan     20 year old  with:      ICD-10-CM    1. IUD check up  Z30.431        IUD strings normal length on exam.  She just had a period and has some spotting.  She will monitor her symptoms and then reach out if she continues to have brown spotting.  We will consider treatment with doxy x7 days.    She will continue to work with her dermatologist for her acne.    She was congratulated on her weight loss.        Chief Complaint     Chief Complaint   Patient presents with     IUD     Follow Up       HPI     Batool Louis is a  20 year old female who is seen for follow up.      I placed the IUD 2020 for management of oligomenorrhea, protection of the uterine lining.  She was seen for follow-up 10/19/2020.  Normal IUD strings at that time.  She reported some cramping at that visit.  Patient sent a thesixtyone message on 2021 reporting brown chunky discharge.  Also sore nipples.    She had her period last week and symptoms are better.  She has some brown discharge, but feels like it is normal after-period discharge.  No concerns otherwise.    She yesterday for acne concerns.  Acne has gotten much worse.  She has been on spironolactone for a couple weeks.  Acne seemed to flare after the IUD was placed.    Patient's last menstrual period was 2021.     Date of Last Pap Smear:   Lab Results   Component Value Date    PAP NIL 2020         Problem List     Patient Active Problem List    Diagnosis Date Noted     IUD (intrauterine device) in place 2020     Priority: Medium     Kyleena placed 2020 for oligomenorrhea.  Due for removal Aug 2025       Chronic tonsillitis 2020     Priority: Medium     Added automatically from request for surgery [id number]       Allergic rhinitis 01/10/2012     Priority: Medium       Medications     Current Outpatient Medications   Medication      Cholecalciferol (D 5000) 5000 UNITS TABS     Homeopathic Products (ZICAM ALLERGY RELIEF NA)     levonorgestrel (KYLEENA) 19.5 MG IUD     spironolactone (ALDACTONE) 50 MG tablet     No current facility-administered medications for this visit.         Allergies     Allergies   Allergen Reactions     Sulfamethoxazole-Trimethoprim Hives       ROS     A , skin, general organ review of systems was asked and the pertinent positives and negatives are listed in the HPI.     Physical Exam   Vitals: /76 (BP Location: Right arm, Cuff Size: Adult Regular)   Pulse 84   Wt 94.6 kg (208 lb 8 oz)   LMP 02/05/2021   SpO2 100%   BMI 31.01 kg/m      General: Comfortable, no obvious distress  : Normal female external genitalia.  No lesions.  Urethral meatus normal.  Speculum exam reveals a normal vaginal vault, normal cervix.  Normal strings.  Small amount of brownish discharge.     Labs/Imaging       I have personally reviewed the labs/imaging and the findings were:    US 11/5/2020:  IUD appears in appropriate position.  Endometrial stripe 6 mm.      Reviewed derm notes.     18 min spent on the date of the encounter in chart review, patient visit, review of tests, documentation and/or discussion with other providers about the issues documented above.     Adelaide Ace MD

## 2021-02-26 ENCOUNTER — HOSPITAL ENCOUNTER (OUTPATIENT)
Dept: PHYSICAL THERAPY | Facility: CLINIC | Age: 21
Setting detail: THERAPIES SERIES
End: 2021-02-26
Attending: FAMILY MEDICINE
Payer: COMMERCIAL

## 2021-02-26 PROCEDURE — 97110 THERAPEUTIC EXERCISES: CPT | Mod: GP | Performed by: PHYSICAL THERAPIST

## 2021-03-17 ENCOUNTER — VIRTUAL VISIT (OUTPATIENT)
Dept: PSYCHOLOGY | Facility: CLINIC | Age: 21
End: 2021-03-17
Payer: COMMERCIAL

## 2021-03-17 DIAGNOSIS — F41.1 GENERALIZED ANXIETY DISORDER: Primary | ICD-10-CM

## 2021-03-17 PROCEDURE — 90834 PSYTX W PT 45 MINUTES: CPT | Mod: GT | Performed by: COUNSELOR

## 2021-03-17 NOTE — PROGRESS NOTES
Progress Note    Client Name: Batool Louis  Date: 3/17/21          Service Type: Individual      Session Start Time: 12:00pm   Session End Time: 1:00pm      Session Length:    mins     Session #: 27     Attendees: Client    Treatment Plan Last Reviewed: 1/6/21  PHQ-9 / SHELBY-7 : see chart    Telemedicine Visit: The patient's condition can be safely assessed and treated via synchronous audio and visual telemedicine encounter.      Reason for Telemedicine Visit: Services only offered telehealth    Originating Site (Patient Location): Patient's home    Distant Site (Provider Location): Provider Remote Setting    Consent:  The patient/guardian has verbally consented to: the potential risks and benefits of telemedicine (video visit) versus in person care; bill my insurance or make self-payment for services provided; and responsibility for payment of non-covered services.     Mode of Communication:  Video Conference via [employer]/telephone    As the provider I attest to compliance with applicable laws and regulations related to telemedicine.     DATA      Progress Since Last Session (Related to Symptoms / Goals / Homework):   Symptoms: Improving .    Homework: Completed in session      Episode of Care Goals: Satisfactory progress - PREPARATION (Decided to change - considering how); Intervened by negotiating a change plan and determining options / strategies for behavior change, identifying triggers, exploring social supports, and working towards setting a date to begin behavior change     Current / Ongoing Stressors and Concerns:   Dodie stated she's decided she's going to change her focus in school. 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 She also wants to do schooling for audio producing. She stated she doesn't want to keep living at home.      Treatment Objective(s) Addressed in This Session:   use cognitive strategies identified in therapy to  challenge anxious thoughts       Intervention:   Worked with the client on slowing down and refocusing her attentions onto things that matter. Helped her think about what is the most effective for her in terms of continuing school or taking a break.   Looked at the emotional, logical, and wise states of mind to help make this decision.       ASSESSMENT: Current Emotional / Mental Status (status of significant symptoms):   Risk status (Self / Other harm or suicidal ideation)   Client denies current fears or concerns for personal safety.   Client denies current or recent suicidal ideation or behaviors.   Client denies current or recent homicidal ideation or behaviors.   Client denies current or recent self injurious behavior or ideation.   Client denies other safety concerns.   A safety and risk management plan has not been developed at this time, however client was given the after-hours number / 911 should there be a change in any of these risk factors.     Appearance:   Appropriate    Eye Contact:   Good    Psychomotor Behavior: Normal    Attitude:   Cooperative    Orientation:   All   Speech    Rate / Production: Normal     Volume:  Normal    Mood:    Normal    Affect:    Appropriate    Thought Content:  Clear    Thought Form:  Coherent  Logical    Insight:    Good      Medication Review:   No changes to current psychiatric medication(s)     Medication Compliance:   Yes     Changes in Health Issues:   None reported : client is on birth control again     Chemical Use Review:   Substance Use: Chemical use reviewed, no active concerns identified      Tobacco Use: No current tobacco use.       Collateral Reports Completed:   Not Applicable    Diagnoses:   Generalized Anxiety Disorder      PLAN: (Client Tasks / Therapist Tasks / Other)  Client to focus on one thing at a time and slow down.           [facility]                                                          ________________________________________________________________________    Treatment Plan    Client's Name: Batool Louis  YOB: 2000    Date: 1/6/21    DSM-V Diagnoses: Adjustment Disorders  309.28 (F43.23) With mixed anxiety and depressed mood  Psychosocial / Contextual Factors: COVID, living back at home, deaths of loved ones within the past few years. Sexual identity issues.     WHODAS: 12    Referral / Collaboration:  Referral to another professional/service is not indicated at this time.    Anticipated number of session or this episode of care: 5+      MeasurableTreatment Goal(s) related to diagnosis / functional impairment(s)  Goal 1: Client will increase emotion regulation skills/decrease panic attacks    Objective #A (Client Action)    Client will identify 2-4 fears / thoughts that contribute to feeling anxious  use positive self-affirmations daily.  Status: Continued - Date(s): 1/6/21      Intervention(s)  Therapist will teach emotional regulation skills. distress tolerance skills, interpersonal effectiveness skills, emotion regluation skills, mindfulness skills, radical acceptance. Therapist will teach client how to ID body cues for anxiety, anxiety reduction techniques, how to ID triggers for depression and anxiety- decrease reactivity/eliminate, lifestyle changes to reduce depression and anxiety, communication skills, explore cognitive beliefs and help client to develop healthy cognitive patterns and beliefs.      Objective #B  Client will use thought-stopping strategy daily to reduce intrusive thoughts.  Status: Continued - Date(s): 1/6/21       Intervention(s)  Therapist will teach emotional regulation skills. distress tolerance skills, interpersonal effectiveness skills, emotion regluation skills, mindfulness skills, radical acceptance. Therapist will teach client how to ID body cues for anxiety, anxiety reduction techniques, how to ID triggers for depression and anxiety-  "decrease reactivity/eliminate, lifestyle changes to reduce depression and anxiety, communication skills, explore cognitive beliefs and help client to develop healthy cognitive patterns and beliefs.    Objective #C (Client Action)    Status: New - Date: 1/6/21    Client will use \"worry time\" each day for 15 minutes of scheduled worry and then defer obsessive or anxious thinking until the next structured worry time.    Intervention(s)  Therapist will teach emotional regulation skills. work through trauma using somatic experiencing techniques to discharge the anxiety.    Goal 2: Client will work on increasing self esteem and self worth     Objective #A (Client Action)    Status: Continued - Date(s): 1/6/21    Client will identify two areas of life that you would like to have improved functioning.    Intervention(s)  Therapist will teach emotional regulation skills. work through trauma using somatic experiencing techniques to discharge the anxiety.      Client has reviewed and agreed to the above plan.      [facility]    "

## 2021-03-17 NOTE — PROGRESS NOTES
Progress Note    Client Name: Batool Louis  Date: 3/17/21          Service Type: Individual      Session Start Time: 12:00pm   Session End Time: 12:50pm      Session Length:  50  mins     Session #: 27     Attendees: Client    Treatment Plan Last Reviewed: 1/6/21  PHQ-9 / SHELBY-7 : see chart    Telemedicine Visit: The patient's condition can be safely assessed and treated via synchronous audio and visual telemedicine encounter.      Reason for Telemedicine Visit: Services only offered telehealth    Originating Site (Patient Location): Patient's home    Distant Site (Provider Location): Provider Remote Setting    Consent:  The patient/guardian has verbally consented to: the potential risks and benefits of telemedicine (video visit) versus in person care; bill my insurance or make self-payment for services provided; and responsibility for payment of non-covered services.     Mode of Communication:  Video Conference via [employer]/telephone    As the provider I attest to compliance with applicable laws and regulations related to telemedicine.     DATA      Progress Since Last Session (Related to Symptoms / Goals / Homework):   Symptoms: Improving .    Homework: Completed in session      Episode of Care Goals: Satisfactory progress - PREPARATION (Decided to change - considering how); Intervened by negotiating a change plan and determining options / strategies for behavior change, identifying triggers, exploring social supports, and working towards setting a date to begin behavior change     Current / Ongoing Stressors and Concerns:   Dodie stated she's decided she's going to change her focus in school. 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 She also wants to do schooling for audio producing. She stated she doesn't want to keep living at home.  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      Treatment Objective(s) Addressed in This  Session:   use cognitive strategies identified in therapy to challenge anxious thoughts       Intervention:   The client talked about how she's been dealing with the people in the play she's having to do for school right now. She stated she feels like an outsider when she goes to rehearsals because it feels like a lot of the other students and teachers are judging her.    Continued to work with the client on assertive communication skills and reinforced how the client doesn't back away from conflict;she tries to problem solve with others.       ASSESSMENT: Current Emotional / Mental Status (status of significant symptoms):   Risk status (Self / Other harm or suicidal ideation)   Client denies current fears or concerns for personal safety.   Client denies current or recent suicidal ideation or behaviors.   Client denies current or recent homicidal ideation or behaviors.   Client denies current or recent self injurious behavior or ideation.   Client denies other safety concerns.   A safety and risk management plan has not been developed at this time, however client was given the after-hours number / 911 should there be a change in any of these risk factors.     Appearance:   Appropriate    Eye Contact:   Good    Psychomotor Behavior: Normal    Attitude:   Cooperative    Orientation:   All   Speech    Rate / Production: Normal     Volume:  Normal    Mood:    Normal    Affect:    Appropriate    Thought Content:  Clear    Thought Form:  Coherent  Logical    Insight:    Good      Medication Review:   No changes to current psychiatric medication(s)     Medication Compliance:   Yes     Changes in Health Issues:   None reported : client is on birth control again     Chemical Use Review:   Substance Use: Chemical use reviewed, no active concerns identified      Tobacco Use: No current tobacco use.       Collateral Reports Completed:   Not Applicable    Diagnoses:   Generalized Anxiety Disorder      PLAN: (Client Tasks /  Therapist Tasks / Other)  Client to focus on one thing at a time and slow down.     Continue to practice communication skills.       Celsa Sy , [facility]                                                         ________________________________________________________________________    Treatment Plan    Client's Name: Batool Louis  YOB: 2000    Date: 1/6/21    DSM-V Diagnoses: Adjustment Disorders  309.28 (F43.23) With mixed anxiety and depressed mood  Psychosocial / Contextual Factors: COVID, living back at home, deaths of loved ones within the past few years. Sexual identity issues.     WHODAS: 12    Referral / Collaboration:  Referral to another professional/service is not indicated at this time.    Anticipated number of session or this episode of care: 5+      MeasurableTreatment Goal(s) related to diagnosis / functional impairment(s)  Goal 1: Client will increase emotion regulation skills/decrease panic attacks    Objective #A (Client Action)    Client will identify 2-4 fears / thoughts that contribute to feeling anxious  use positive self-affirmations daily.  Status: Continued - Date(s): 1/6/21      Intervention(s)  Therapist will teach emotional regulation skills. distress tolerance skills, interpersonal effectiveness skills, emotion regluation skills, mindfulness skills, radical acceptance. Therapist will teach client how to ID body cues for anxiety, anxiety reduction techniques, how to ID triggers for depression and anxiety- decrease reactivity/eliminate, lifestyle changes to reduce depression and anxiety, communication skills, explore cognitive beliefs and help client to develop healthy cognitive patterns and beliefs.      Objective #B  Client will use thought-stopping strategy daily to reduce intrusive thoughts.  Status: Continued - Date(s): 1/6/21       Intervention(s)  Therapist will teach emotional regulation skills. distress tolerance skills, interpersonal effectiveness  "skills, emotion regluation skills, mindfulness skills, radical acceptance. Therapist will teach client how to ID body cues for anxiety, anxiety reduction techniques, how to ID triggers for depression and anxiety- decrease reactivity/eliminate, lifestyle changes to reduce depression and anxiety, communication skills, explore cognitive beliefs and help client to develop healthy cognitive patterns and beliefs.    Objective #C (Client Action)    Status: New - Date: 1/6/21    Client will use \"worry time\" each day for 15 minutes of scheduled worry and then defer obsessive or anxious thinking until the next structured worry time.    Intervention(s)  Therapist will teach emotional regulation skills. work through trauma using somatic experiencing techniques to discharge the anxiety.    Goal 2: Client will work on increasing self esteem and self worth     Objective #A (Client Action)    Status: Continued - Date(s): 1/6/21    Client will identify two areas of life that you would like to have improved functioning.    Intervention(s)  Therapist will teach emotional regulation skills. work through trauma using somatic experiencing techniques to discharge the anxiety.      Client has reviewed and agreed to the above plan.      [facility]a [facility]    "

## 2021-04-06 ENCOUNTER — VIRTUAL VISIT (OUTPATIENT)
Dept: PSYCHOLOGY | Facility: CLINIC | Age: 21
End: 2021-04-06
Payer: COMMERCIAL

## 2021-04-06 DIAGNOSIS — F41.1 GENERALIZED ANXIETY DISORDER: Primary | ICD-10-CM

## 2021-04-06 PROCEDURE — 90834 PSYTX W PT 45 MINUTES: CPT | Mod: GT | Performed by: COUNSELOR

## 2021-04-06 NOTE — PROGRESS NOTES
Progress Note    Client Name: Batool Louis  Date: 4/6/21          Service Type: Individual      Session Start Time: 9:05am   Session End Time: 9:55am      Session Length:  50  mins     Session #: 28     Attendees: Client    Treatment Plan Last Reviewed: 4/6/21  PHQ-9 / SHELBY-7 : see chart    Telemedicine Visit: The patient's condition can be safely assessed and treated via synchronous audio and visual telemedicine encounter.      Reason for Telemedicine Visit: Services only offered telehealth    Originating Site (Patient Location): Patient's home    Distant Site (Provider Location): Provider Remote Setting    Consent:  The patient/guardian has verbally consented to: the potential risks and benefits of telemedicine (video visit) versus in person care; bill my insurance or make self-payment for services provided; and responsibility for payment of non-covered services.     Mode of Communication:  Video Conference via [employer]/telephone    As the provider I attest to compliance with applicable laws and regulations related to telemedicine.     DATA      Progress Since Last Session (Related to Symptoms / Goals / Homework):   Symptoms: Improving .    Homework: Completed in session      Episode of Care Goals: Satisfactory progress - PREPARATION (Decided to change - considering how); Intervened by negotiating a change plan and determining options / strategies for behavior change, identifying triggers, exploring social supports, and working towards setting a date to begin behavior change     Current / Ongoing Stressors and Concerns:   Dodie stated she had a stressful day at work yesterday and had just woken up for the session right before signing on.   She stated she's struggling with maintaining her diet change and motivation to track her food. She said she's been having days when she will binge on something because she's really stressed.       Treatment Objective(s)  Addressed in This Session:   use cognitive strategies identified in therapy to challenge anxious thoughts       Intervention:   Worked with the client on how to change cognitions related to food and eating behaviors.   Talked about how the client stated she doesn't see the weight that she's lost even though other people are noticing. The client can acknowledge how hard she is on herself and is working on this. Reviewed the things she's done that have worked for her in the past and how she can keep doing these things but also tweeking them a little bit to be more effective now. Talked about the pros and cons list she can do to help her when she has urges to be ineffective.      ASSESSMENT: Current Emotional / Mental Status (status of significant symptoms):   Risk status (Self / Other harm or suicidal ideation)   Client denies current fears or concerns for personal safety.   Client denies current or recent suicidal ideation or behaviors.   Client denies current or recent homicidal ideation or behaviors.   Client denies current or recent self injurious behavior or ideation.   Client denies other safety concerns.   A safety and risk management plan has not been developed at this time, however client was given the after-hours number / 911 should there be a change in any of these risk factors.     Appearance:   Appropriate    Eye Contact:   Good    Psychomotor Behavior: Normal    Attitude:   Cooperative    Orientation:   All   Speech    Rate / Production: Normal     Volume:  Normal    Mood:    Normal    Affect:    Appropriate    Thought Content:  Clear    Thought Form:  Coherent  Logical    Insight:    Good      Medication Review:   No changes to current psychiatric medication(s)     Medication Compliance:   Yes     Changes in Health Issues:   None reported : client is on birth control again     Chemical Use Review:   Substance Use: Chemical use reviewed, no active concerns identified      Tobacco Use: No current tobacco  use.       Collateral Reports Completed:   Not Applicable    Diagnoses:   Generalized Anxiety Disorder      PLAN: (Client Tasks / Therapist Tasks / Other)  Client to focus on one thing at a time and slow down.     Work on changing negative self talk.       Celsa Linda, [facility]                                                         ________________________________________________________________________    Treatment Plan    Client's Name: Batool Louis  YOB: 2000    Date: 4/6/21    DSM-V Diagnoses: Adjustment Disorders  309.28 (F43.23) With mixed anxiety and depressed mood  Psychosocial / Contextual Factors: COVID, living back at home, deaths of loved ones within the past few years. Sexual identity issues.     WHODAS: 12    Referral / Collaboration:  Referral to another professional/service is not indicated at this time.    Anticipated number of session or this episode of care: 5+      MeasurableTreatment Goal(s) related to diagnosis / functional impairment(s)  Goal 1: Client will increase emotion regulation skills/decrease panic attacks    Objective #A (Client Action)    Client will identify 2-4 fears / thoughts that contribute to feeling anxious  use positive self-affirmations daily.  Status: Continued - Date(s): 4/6/21     Intervention(s)  Therapist will teach emotional regulation skills. distress tolerance skills, interpersonal effectiveness skills, emotion regluation skills, mindfulness skills, radical acceptance. Therapist will teach client how to ID body cues for anxiety, anxiety reduction techniques, how to ID triggers for depression and anxiety- decrease reactivity/eliminate, lifestyle changes to reduce depression and anxiety, communication skills, explore cognitive beliefs and help client to develop healthy cognitive patterns and beliefs.      Objective #B  Client will use thought-stopping strategy daily to reduce intrusive thoughts.  Status: Continued - Date(s): 4/6/21  "    Intervention(s)  Therapist will teach emotional regulation skills. distress tolerance skills, interpersonal effectiveness skills, emotion regluation skills, mindfulness skills, radical acceptance. Therapist will teach client how to ID body cues for anxiety, anxiety reduction techniques, how to ID triggers for depression and anxiety- decrease reactivity/eliminate, lifestyle changes to reduce depression and anxiety, communication skills, explore cognitive beliefs and help client to develop healthy cognitive patterns and beliefs.    Objective #C (Client Action)    Status: New - Date: 4/6/21    Client will use \"worry time\" each day for 15 minutes of scheduled worry and then defer obsessive or anxious thinking until the next structured worry time.    Intervention(s)  Therapist will teach emotional regulation skills. work through trauma using somatic experiencing techniques to discharge the anxiety.    Goal 2: Client will work on increasing self esteem and self worth     Objective #A (Client Action)    Status: Continued - Date(s): 4/6/21    Client will identify two areas of life that you would like to have improved functioning.    Intervention(s)  Therapist will teach emotional regulation skills. work through trauma using somatic experiencing techniques to discharge the anxiety.      Client has reviewed and agreed to the above plan.      Celsa Linda, [facility]    "

## 2021-05-04 ENCOUNTER — VIRTUAL VISIT (OUTPATIENT)
Dept: PSYCHOLOGY | Facility: CLINIC | Age: 21
End: 2021-05-04
Payer: COMMERCIAL

## 2021-05-04 DIAGNOSIS — F41.1 GENERALIZED ANXIETY DISORDER: Primary | ICD-10-CM

## 2021-05-04 PROCEDURE — 90837 PSYTX W PT 60 MINUTES: CPT | Mod: GT | Performed by: COUNSELOR

## 2021-05-18 ENCOUNTER — VIRTUAL VISIT (OUTPATIENT)
Dept: PSYCHOLOGY | Facility: CLINIC | Age: 21
End: 2021-05-18
Payer: COMMERCIAL

## 2021-05-18 DIAGNOSIS — F41.1 GENERALIZED ANXIETY DISORDER: Primary | ICD-10-CM

## 2021-05-18 PROCEDURE — 90834 PSYTX W PT 45 MINUTES: CPT | Mod: GT | Performed by: COUNSELOR

## 2021-05-18 NOTE — PROGRESS NOTES
Teledermatology Nurse Call Patients:     Are you  in the [facility] at the time of the encounter? yes    Today's visit will be billed to you and your insurance.    A teledermatology visit is not as thorough as an in-person visit and the quality of the photograph sent may not be of the same quality as that taken by the dermatology clinic.           Patient using spironolactone, has had a few breakouts, possibly hormonal from menses. Cystic and painful.                                                                                                                                                                                                   Filomena Le LPN        [facility] Dermatology Note  Encounter Date: May 19, 2021  [employer] and Telephone ([phone]). Location of teledermatologist: [facility].  Start time: 8:55. End time: 9:08.    Dermatology Problem List:  1. Acne  - BPO wash morning, gentle cleanser evening  - Spironolactone 150mg daily (started 2/2021, increased 5/2021)     Social history: Teaches dance, works at a restaurant, and is in school studying theater and holistic health. Has IUD.    ____________________________________________    Assessment & Plan:     # Acne vulgaris: Chronic (likely to be present over a year), not yet at goal. Likely hormonal given lower face involvement and flare with IUD. Will increase spironolactone and see if we can get to goal. If not, would then consider isotretinoin.  - Increase spironolactone to 50mg in the morning, 100mg in the evening. Discussed the potential side effects of this medication, including headaches, dizziness with standing, muscle cramps, breast tenderness, spotting in between menstrual cycles, and potential tetragenic effects. Discussed that patient should not get pregnant while on this medication. She has IUD and is not planning for pregnancy in near future.  - AM: BPO wash, PM:  gentle cleanser. Acne spot treatments (hydrocolloid) as needed.     Procedures Performed:    None    Follow-up: 3 months phone visit    Staff:     Jennifer Peraza MD    Department of Dermatology  [facility]: Phone: [phone], Fax:[phone]  [facility]: Phone: [phone], Fax: [phone]    ____________________________________________    CC: No chief complaint on file.    HPI:  Ms. Batool Louis is a(n) 20 year old female who presents today as a return patient for acne.    Last visit 2/17/21. At that time, BPO wash once daily and spironolactone orally were initiated.    Today, Dodie notes acne was doing really well, then had flare up with deeper acne on the cheeks when she got her period. No having regular periods due to IUD. Using Cerave Acne Foaming Cream Cleanser. Not too dry.     Patient is otherwise feeling well, without additional skin concerns.    Labs Reviewed:  N/A    Physical Exam:  Vitals: There were no vitals taken for this visit.  SKIN: Teledermatology photos were reviewed; image quality and interpretability: acceptable. Image date: 5/19/21.  - 5 cystic/deep inflammatory lesions on the bilatearl lateral cheeks.  - No other lesions of concern on areas examined.     Medications:  Current Outpatient Medications   Medication     Cholecalciferol (D 5000) 5000 UNITS TABS     Homeopathic Products (ZICAM ALLERGY RELIEF NA)     levonorgestrel (KYLEENA) 19.5 MG IUD     spironolactone (ALDACTONE) 50 MG tablet     No current facility-administered medications for this visit.       Past Medical/Surgical History:   Patient Active Problem List   Diagnosis     Allergic rhinitis     Chronic tonsillitis     IUD (intrauterine device) in place     Past Medical History:   Diagnosis Date     Amenorrhea, secondary        CC No referring provider defined for this encounter. on close of  this encounter.

## 2021-05-18 NOTE — PROGRESS NOTES
Progress Note    Client Name: Batool Louis  Date: 5/18/21         Service Type: Individual      Session Start Time: 9:10am   Session End Time: 10:00am      Session Length:  50  mins     Session #: 30     Attendees: Client    Treatment Plan Last Reviewed: 4/6/21  PHQ-9 / SHELBY-7 : see chart    Telemedicine Visit: The patient's condition can be safely assessed and treated via synchronous audio and visual telemedicine encounter.      Reason for Telemedicine Visit: Services only offered telehealth    Originating Site (Patient Location): Patient's home    Distant Site (Provider Location): Provider Remote Setting    Consent:  The patient/guardian has verbally consented to: the potential risks and benefits of telemedicine (video visit) versus in person care; bill my insurance or make self-payment for services provided; and responsibility for payment of non-covered services.     Mode of Communication:  Video Conference via [employer]/telephone    As the provider I attest to compliance with applicable laws and regulations related to telemedicine.     DATA      Progress Since Last Session (Related to Symptoms / Goals / Homework):   Symptoms: No change .    Homework: Completed in session      Episode of Care Goals: Satisfactory progress - ACTION (Actively working towards change); Intervened by reinforcing change plan / affirming steps taken     Current / Ongoing Stressors and Concerns:   The session started late due to connection issues.   The client stated school is now over and she's focusing on working this summer so she can get out of debt with her parents.      Treatment Objective(s) Addressed in This Session:   use cognitive strategies identified in therapy to challenge anxious thoughts       Intervention:   Suggested the client create a budget to help keep her on track.   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   Worked with the client on the pros and cons of keeping her job and not keeping her job.       ASSESSMENT: Current Emotional / Mental Status (status of significant symptoms):   Risk status (Self / Other harm or suicidal ideation)   Client denies current fears or concerns for personal safety.   Client denies current or recent suicidal ideation or behaviors.   Client denies current or recent homicidal ideation or behaviors.   Client denies current or recent self injurious behavior or ideation.   Client denies other safety concerns.   A safety and risk management plan has not been developed at this time, however client was given the after-hours number / 911 should there be a change in any of these risk factors.     Appearance:   Appropriate    Eye Contact:   Good    Psychomotor Behavior: Normal    Attitude:   Cooperative    Orientation:   All   Speech    Rate / Production: Normal     Volume:  Normal    Mood:    Normal    Affect:    Appropriate    Thought Content:  Clear    Thought Form:  Coherent  Logical    Insight:    Good      Medication Review:   No changes to current psychiatric medication(s)     Medication Compliance:   Yes     Changes in Health Issues:   None reported : client is on birth control again     Chemical Use Review:   Substance Use: Chemical use reviewed, no active concerns identified      Tobacco Use: No current tobacco use.       Collateral Reports Completed:   Not Applicable    Diagnoses:   Generalized Anxiety Disorder      PLAN: (Client Tasks / Therapist Tasks / Other)  Client to think more about the pros and cons list she made and make a decision about her job to be effective with her choice from [employer].      Celsa Linda [facility]                                                         ________________________________________________________________________    Treatment Plan    Client's Name: Batool Louis  YOB: 2000    Date: 4/6/21    DSM-V  Diagnoses: Adjustment Disorders  309.28 (F43.23) With mixed anxiety and depressed mood  Psychosocial / Contextual Factors: COVID, living back at home, deaths of loved ones within the past few years. Sexual identity issues.     WHODAS: 12    Referral / Collaboration:  Referral to another professional/service is not indicated at this time.    Anticipated number of session or this episode of care: 5+      MeasurableTreatment Goal(s) related to diagnosis / functional impairment(s)  Goal 1: Client will increase emotion regulation skills/decrease panic attacks    Objective #A (Client Action)    Client will identify 2-4 fears / thoughts that contribute to feeling anxious  use positive self-affirmations daily.  Status: Continued - Date(s): 4/6/21     Intervention(s)  Therapist will teach emotional regulation skills. distress tolerance skills, interpersonal effectiveness skills, emotion regluation skills, mindfulness skills, radical acceptance. Therapist will teach client how to ID body cues for anxiety, anxiety reduction techniques, how to ID triggers for depression and anxiety- decrease reactivity/eliminate, lifestyle changes to reduce depression and anxiety, communication skills, explore cognitive beliefs and help client to develop healthy cognitive patterns and beliefs.      Objective #B  Client will use thought-stopping strategy daily to reduce intrusive thoughts.  Status: Continued - Date(s): 4/6/21     Intervention(s)  Therapist will teach emotional regulation skills. distress tolerance skills, interpersonal effectiveness skills, emotion regluation skills, mindfulness skills, radical acceptance. Therapist will teach client how to ID body cues for anxiety, anxiety reduction techniques, how to ID triggers for depression and anxiety- decrease reactivity/eliminate, lifestyle changes to reduce depression and anxiety, communication skills, explore cognitive beliefs and help client to develop healthy cognitive patterns and  "beliefs.    Objective #C (Client Action)    Status: New - Date: 4/6/21    Client will use \"worry time\" each day for 15 minutes of scheduled worry and then defer obsessive or anxious thinking until the next structured worry time.    Intervention(s)  Therapist will teach emotional regulation skills. work through trauma using somatic experiencing techniques to discharge the anxiety.    Goal 2: Client will work on increasing self esteem and self worth     Objective #A (Client Action)    Status: Continued - Date(s): 4/6/21    Client will identify two areas of life that you would like to have improved functioning.    Intervention(s)  Therapist will teach emotional regulation skills. work through trauma using somatic experiencing techniques to discharge the anxiety.      Client has reviewed and agreed to the above plan.      [facility]a Linda [facility]    "

## 2021-05-19 ENCOUNTER — VIRTUAL VISIT (OUTPATIENT)
Dept: DERMATOLOGY | Facility: CLINIC | Age: 21
End: 2021-05-19
Payer: COMMERCIAL

## 2021-05-19 DIAGNOSIS — Z79.899 MEDICATION MANAGEMENT: ICD-10-CM

## 2021-05-19 DIAGNOSIS — L70.0 ACNE VULGARIS: Primary | ICD-10-CM

## 2021-05-19 PROCEDURE — 99214 OFFICE O/P EST MOD 30 MIN: CPT | Mod: TEL | Performed by: DERMATOLOGY

## 2021-05-19 RX ORDER — SPIRONOLACTONE 50 MG/1
TABLET, FILM COATED ORAL
Qty: 270 TABLET | Refills: 1 | Status: SHIPPED | OUTPATIENT
Start: 2021-05-19 | End: 2021-06-16

## 2021-05-19 NOTE — LETTER
5/19/2021         RE: Batool Murrayach  [address] [place] [address]        Dear Colleague,    Thank you for referring your patient, Batool Louis, to the [facility]. Please see a copy of my visit note below.    Teledermatology Nurse Call Patients:     Are you  in the [facility] at the time of the encounter? yes    Today's visit will be billed to you and your insurance.    A teledermatology visit is not as thorough as an in-person visit and the quality of the photograph sent may not be of the same quality as that taken by the dermatology clinic.           Patient using spironolactone, has had a few breakouts, possibly hormonal from menses. Cystic and painful.                                                                                                                                                                                                   Filomena Le LPN        [facility] Dermatology Note  Encounter Date: May 19, 2021  [employer] and Telephone ([phone]). Location of teledermatologist: [facility].  Start time: 8:55. End time: 9:08.    Dermatology Problem List:  1. Acne  - BPO wash morning, gentle cleanser evening  - Spironolactone 150mg daily (started 2/2021, increased 5/2021)     Social history: Teaches dance, works at a restaurant, and is in school studying theater and holistic health. Has IUD.    ____________________________________________    Assessment & Plan:     # Acne vulgaris: Chronic (likely to be present over a year), not yet at goal. Likely hormonal given lower face involvement and flare with IUD. Will increase spironolactone and see if we can get to goal. If not, would then consider isotretinoin.  - Increase spironolactone to 50mg in the morning, 100mg in the evening. Discussed the potential side effects of this medication, including headaches, dizziness with  standing, muscle cramps, breast tenderness, spotting in between menstrual cycles, and potential tetragenic effects. Discussed that patient should not get pregnant while on this medication. She has IUD and is not planning for pregnancy in near future.  - AM: BPO wash, PM: gentle cleanser. Acne spot treatments (hydrocolloid) as needed.     Procedures Performed:    None    Follow-up: 3 months phone visit    Staff:     Jennifer Peraza MD    Department of Dermatology  [facility]: Phone: [phone], Fax:[phone]  [facility]: Phone: [phone], Fax: [phone]    ____________________________________________    CC: No chief complaint on file.    HPI:  Ms. Batool Louis is a(n) 20 year old female who presents today as a return patient for acne.    Last visit 2/17/21. At that time, BPO wash once daily and spironolactone orally were initiated.    Today, Dodie notes acne was doing really well, then had flare up with deeper acne on the cheeks when she got her period. No having regular periods due to IUD. Using Cerave Acne Foaming Cream Cleanser. Not too dry.     Patient is otherwise feeling well, without additional skin concerns.    Labs Reviewed:  N/A    Physical Exam:  Vitals: There were no vitals taken for this visit.  SKIN: Teledermatology photos were reviewed; image quality and interpretability: acceptable. Image date: 5/19/21.  - 5 cystic/deep inflammatory lesions on the bilatearl lateral cheeks.  - No other lesions of concern on areas examined.     Medications:  Current Outpatient Medications   Medication     Cholecalciferol (D 5000) 5000 UNITS TABS     Homeopathic Products (ZICAM ALLERGY RELIEF NA)     levonorgestrel (KYLEENA) 19.5 MG IUD     spironolactone (ALDACTONE) 50 MG tablet     No current facility-administered medications for this visit.       Past Medical/Surgical  History:   Patient Active Problem List   Diagnosis     Allergic rhinitis     Chronic tonsillitis     IUD (intrauterine device) in place     Past Medical History:   Diagnosis Date     Amenorrhea, secondary        CC No referring provider defined for this encounter. on close of this encounter.        Again, thank you for allowing me to participate in the care of your patient.        Sincerely,        Jennifer Peraza MD

## 2021-05-19 NOTE — PATIENT INSTRUCTIONS
[facility] Dermatology Visit    Thank you for allowing us to participate in your care. Your findings, instructions and follow-up plan are as follows:     Acne plan:  - Continue Cerave cleanser once daily  - Increase spironolactone to 50mg in the morning and 100mg in the evening  - Try hydrocolloid dressings (acne spot treatments) to see if you like  - Recheck in 3 months    When should I call my doctor?    If you are worsening or not improving, please, contact us or seek urgent care as noted below.     Who should I call with questions (adults)?    [facility] (adult and pediatric): [phone]     [facility] (adult): [phone]    For urgent needs outside of business hours call the [facility] at [phone] and ask for the dermatology resident on call    If this is a medical emergency and you are unable to reach an ER, Call 911      Who should I call with questions (pediatric)?  [facility]- Pediatric Dermatology  Dr. Gi Madera, Dr. Sara Arambula, Dr. Sharron Fletcher, Negin Condenhcarmen, PA  Dr. Celsa Selby, Dr. Katherine Barrios & Dr. Khoi Al  Non Urgent  Nurse Triage Line; [phone]- Socorro and Analilia PEREZ Care Coordinators   Kathy (/Complex ) [phone]    If you need a prescription refill, please contact your pharmacy. Refills are approved or denied by our Physicians during normal business hours, Monday through Fridays  Per office policy, refills will not be granted if you have not been seen within the past year (or sooner depending on your child's condition)    Scheduling Information:  Pediatric Appointment Scheduling and Call Center [phone]  Radiology Scheduling- [phone]  Sedation Unit Scheduling- [phone]  [place] Scheduling- General [phone]; Pediatric Dermatology [phone]  Main  Services: [phone]  Kiswahili:  [phone]  Honduran: [phone]  Baudilio/Turkish/Morales: [phone]  Preadmission Nursing Department Fax Number: [phone] (Fax all pre-operative paperwork to this number)    For urgent matters arising during evenings, weekends, or holidays that cannot wait for normal business hours please call [phone] and ask for the Dermatology Resident On-Call to be paged.

## 2021-06-08 ENCOUNTER — VIRTUAL VISIT (OUTPATIENT)
Dept: PSYCHOLOGY | Facility: CLINIC | Age: 21
End: 2021-06-08
Payer: COMMERCIAL

## 2021-06-08 ENCOUNTER — APPOINTMENT (OUTPATIENT)
Dept: URGENT CARE | Facility: CLINIC | Age: 21
End: 2021-06-08
Payer: COMMERCIAL

## 2021-06-08 DIAGNOSIS — F41.1 GENERALIZED ANXIETY DISORDER: Primary | ICD-10-CM

## 2021-06-08 PROCEDURE — 90834 PSYTX W PT 45 MINUTES: CPT | Mod: GT | Performed by: COUNSELOR

## 2021-06-08 NOTE — PROGRESS NOTES
Progress Note    Client Name: Batool Louis  Date: 6/8/21         Service Type: Individual      Session Start Time: 1:05pm  Session End Time: 1:55pm      Session Length:   50 mins     Session #: 31     Attendees: Client    Treatment Plan Last Reviewed: 4/6/21  PHQ-9 / SHELBY-7 : see chart    Telemedicine Visit: The patient's condition can be safely assessed and treated via synchronous audio and visual telemedicine encounter.      Reason for Telemedicine Visit: Services only offered telehealth    Originating Site (Patient Location): Patient's home    Distant Site (Provider Location): Provider Remote Setting    Consent:  The patient/guardian has verbally consented to: the potential risks and benefits of telemedicine (video visit) versus in person care; bill my insurance or make self-payment for services provided; and responsibility for payment of non-covered services.     Mode of Communication:  Video Conference via [employer]/telephone    As the provider I attest to compliance with applicable laws and regulations related to telemedicine.     DATA      Progress Since Last Session (Related to Symptoms / Goals / Homework):   Symptoms: Improving .    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   The client talked about a workplace confrontation she had to deal with.   She talked about other family stress that happened within the past several weeks.   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      Treatment Objective(s) Addressed in This Session:   use cognitive strategies identified in therapy to challenge anxious  thoughts       Intervention:   Validated the client's feelings around the work issues she's had. Reinorced the client's use of skills and validated her assertiveness.   Reinforced the client for coaching her mom on what she needs to hear when she talks to her mom about issues. Reframed some of the qualities she brought up that she felt are weaknesses. Helped her to see that these are positive and that she will learn to mold them with more experience.       ASSESSMENT: Current Emotional / Mental Status (status of significant symptoms):   Risk status (Self / Other harm or suicidal ideation)   Client denies current fears or concerns for personal safety.   Client denies current or recent suicidal ideation or behaviors.   Client denies current or recent homicidal ideation or behaviors.   Client denies current or recent self injurious behavior or ideation.   Client denies other safety concerns.   A safety and risk management plan has not been developed at this time, however client was given the after-hours number / 911 should there be a change in any of these risk factors.     Appearance:   Appropriate    Eye Contact:   Good    Psychomotor Behavior: Normal    Attitude:   Cooperative    Orientation:   All   Speech    Rate / Production: Normal     Volume:  Normal    Mood:    Normal    Affect:    Appropriate    Thought Content:  Clear    Thought Form:  Coherent  Logical    Insight:    Good      Medication Review:   No changes to current psychiatric medication(s)     Medication Compliance:   Yes     Changes in Health Issues:   None reported : client is on birth control again     Chemical Use Review:   Substance Use: Chemical use reviewed, no active concerns identified      Tobacco Use: No current tobacco use.       Collateral Reports Completed:   Not Applicable    Diagnoses:   Generalized Anxiety Disorder      PLAN: (Client Tasks / Therapist Tasks / Other)  Client to continue to practice mindfulness and being assertive with  others.       Celsa Sy , [facility]                                                         ________________________________________________________________________    Treatment Plan    Client's Name: Batool Louis  YOB: 2000    Date: 4/6/21    DSM-V Diagnoses: Adjustment Disorders  309.28 (F43.23) With mixed anxiety and depressed mood  Psychosocial / Contextual Factors: COVID, living back at home, deaths of loved ones within the past few years. Sexual identity issues.     WHODAS: 12    Referral / Collaboration:  Referral to another professional/service is not indicated at this time.    Anticipated number of session or this episode of care: 5+      MeasurableTreatment Goal(s) related to diagnosis / functional impairment(s)  Goal 1: Client will increase emotion regulation skills/decrease panic attacks    Objective #A (Client Action)    Client will identify 2-4 fears / thoughts that contribute to feeling anxious  use positive self-affirmations daily.  Status: Continued - Date(s): 4/6/21     Intervention(s)  Therapist will teach emotional regulation skills. distress tolerance skills, interpersonal effectiveness skills, emotion regluation skills, mindfulness skills, radical acceptance. Therapist will teach client how to ID body cues for anxiety, anxiety reduction techniques, how to ID triggers for depression and anxiety- decrease reactivity/eliminate, lifestyle changes to reduce depression and anxiety, communication skills, explore cognitive beliefs and help client to develop healthy cognitive patterns and beliefs.      Objective #B  Client will use thought-stopping strategy daily to reduce intrusive thoughts.  Status: Continued - Date(s): 4/6/21     Intervention(s)  Therapist will teach emotional regulation skills. distress tolerance skills, interpersonal effectiveness skills, emotion regluation skills, mindfulness skills, radical acceptance. Therapist will teach client how to ID body cues  "for anxiety, anxiety reduction techniques, how to ID triggers for depression and anxiety- decrease reactivity/eliminate, lifestyle changes to reduce depression and anxiety, communication skills, explore cognitive beliefs and help client to develop healthy cognitive patterns and beliefs.    Objective #C (Client Action)    Status: New - Date: 4/6/21    Client will use \"worry time\" each day for 15 minutes of scheduled worry and then defer obsessive or anxious thinking until the next structured worry time.    Intervention(s)  Therapist will teach emotional regulation skills. work through trauma using somatic experiencing techniques to discharge the anxiety.    Goal 2: Client will work on increasing self esteem and self worth     Objective #A (Client Action)    Status: Continued - Date(s): 4/6/21    Client will identify two areas of life that you would like to have improved functioning.    Intervention(s)  Therapist will teach emotional regulation skills. work through trauma using somatic experiencing techniques to discharge the anxiety.      Client has reviewed and agreed to the above plan.      Celsa Linda [facility]    "

## 2021-06-11 ENCOUNTER — OFFICE VISIT (OUTPATIENT)
Dept: URGENT CARE | Facility: URGENT CARE | Age: 21
End: 2021-06-11
Payer: COMMERCIAL

## 2021-06-11 VITALS
SYSTOLIC BLOOD PRESSURE: 120 MMHG | DIASTOLIC BLOOD PRESSURE: 80 MMHG | OXYGEN SATURATION: 98 % | HEART RATE: 68 BPM | TEMPERATURE: 98.6 F | RESPIRATION RATE: 16 BRPM

## 2021-06-11 DIAGNOSIS — Z20.822 SUSPECTED COVID-19 VIRUS INFECTION: Primary | ICD-10-CM

## 2021-06-11 DIAGNOSIS — H65.01 RIGHT ACUTE SEROUS OTITIS MEDIA, RECURRENCE NOT SPECIFIED: ICD-10-CM

## 2021-06-11 DIAGNOSIS — J30.2 SEASONAL ALLERGIES: ICD-10-CM

## 2021-06-11 DIAGNOSIS — J20.9 ACUTE BRONCHITIS WITH SYMPTOMS > 10 DAYS: ICD-10-CM

## 2021-06-11 LAB
SARS-COV-2 RNA RESP QL NAA+PROBE: NORMAL
SPECIMEN SOURCE: NORMAL

## 2021-06-11 PROCEDURE — U0005 INFEC AGEN DETEC AMPLI PROBE: HCPCS | Performed by: PHYSICIAN ASSISTANT

## 2021-06-11 PROCEDURE — U0003 INFECTIOUS AGENT DETECTION BY NUCLEIC ACID (DNA OR RNA); SEVERE ACUTE RESPIRATORY SYNDROME CORONAVIRUS 2 (SARS-COV-2) (CORONAVIRUS DISEASE [COVID-19]), AMPLIFIED PROBE TECHNIQUE, MAKING USE OF HIGH THROUGHPUT TECHNOLOGIES AS DESCRIBED BY CMS-2020-01-R: HCPCS | Performed by: PHYSICIAN ASSISTANT

## 2021-06-11 PROCEDURE — 99213 OFFICE O/P EST LOW 20 MIN: CPT | Performed by: PHYSICIAN ASSISTANT

## 2021-06-11 RX ORDER — FLUTICASONE PROPIONATE 50 MCG
2 SPRAY, SUSPENSION (ML) NASAL DAILY
Qty: 15.8 ML | Refills: 1 | Status: SHIPPED | OUTPATIENT
Start: 2021-06-11 | End: 2023-07-03

## 2021-06-11 ASSESSMENT — ENCOUNTER SYMPTOMS
RHINORRHEA: 0
ALLERGIC/IMMUNOLOGIC NEGATIVE: 1
COUGH: 1
WOUND: 0
CARDIOVASCULAR NEGATIVE: 1
SHORTNESS OF BREATH: 0
PALPITATIONS: 0
MYALGIAS: 0
CHILLS: 0
DIZZINESS: 0
ARTHRALGIAS: 0
NECK PAIN: 0
FEVER: 0
EYES NEGATIVE: 1
VOMITING: 0
NECK STIFFNESS: 0
NAUSEA: 0
DIARRHEA: 0
LIGHT-HEADEDNESS: 0
WEAKNESS: 0
SINUS PAIN: 1
SORE THROAT: 0
MUSCULOSKELETAL NEGATIVE: 1
HEADACHES: 0
BACK PAIN: 0
SINUS PRESSURE: 1
JOINT SWELLING: 0
ENDOCRINE NEGATIVE: 1

## 2021-06-11 NOTE — PROGRESS NOTES
Chief Complaint:     Chief Complaint   Patient presents with     Ear Problem     Nose Problem     Cough     Sx since May 24th. Negative covid test on 5/26       No results found for any visits on 06/11/21.    Medical Decision Making:    Differential Diagnosis:  URI Adult/Peds:  Bronchitis-viral, Pneumonia, Sinusitis, Strep pharyngitis, Tonsilitis, Viral pharyngitis, Viral syndrome and Viral upper respiratory illness        ASSESSMENT    1. Suspected COVID-19 virus infection    2. Seasonal allergies    3. Right acute serous otitis media, recurrence not specified    4. Acute bronchitis with symptoms > 10 days        PLAN    Patient is in no acute distress.    Temp is 98.6 in clinic today, lung sounds were clear, and O2 sats at 98% on RA.    Rx for Augmentin and Flonase today.  COVID test ordered and swab collected in clinic today.  Rest, Push fluids, vaporizer, elevation of head of bed.  Ibuprofen and or Tylenol for any fever or body aches.  Over the counter cough suppressant- PRN- as discussed.   If symptoms worsen, recheck immediately otherwise follow up with your PCP in 1 week if symptoms are not improving.  Worrisome symptoms discussed with instructions to go to the ED.  Patient given COVID isolation instructions.  Patient verbalized understanding and agreed with this plan.    Labs:    No results found for any visits on 06/11/21.     Vital signs reviewed by Dong Wilde PA-C  /80   Pulse 68   Temp 98.6  F (37  C) (Oral)   Resp 16   SpO2 98%     Current Meds      Current Outpatient Medications:      amoxicillin-clavulanate (AUGMENTIN) 875-125 MG tablet, Take 1 tablet by mouth 2 times daily for 10 days, Disp: 20 tablet, Rfl: 0     Cholecalciferol (D 5000) 5000 UNITS TABS, Reported on 3/7/2017, Disp: , Rfl:      fluticasone (FLONASE) 50 MCG/ACT nasal spray, Spray 2 sprays into both nostrils daily, Disp: 15.8 mL, Rfl: 1     Homeopathic Products (ZICAM ALLERGY RELIEF NA), , Disp: , Rfl:       levonorgestrel (KYLEENA) 19.5 MG IUD, 1 each by Intrauterine route once Put in 8/12/2020, Disp: , Rfl:      spironolactone (ALDACTONE) 50 MG tablet, Take 2 tablets (100 mg) by mouth daily, Disp: 60 tablet, Rfl: 2     spironolactone (ALDACTONE) 50 MG tablet, Take 50mg (1 pill) in the morning, 100mg (2 pills) in the evening., Disp: 270 tablet, Rfl: 1      Respiratory History    occasional episodes of bronchitis and seasonal allergies      SUBJECTIVE    HPI: Batool Louis is an 20 year old female who presents with chest congestion, cough nonproductive, occasional, ear pain bilateral, facial pain/pressure and nasal congestion.  Symptoms began 2  weeks ago and has unchanged.  There is no shortness of breath, wheezing and chest pain.  Patient is eating and drinking well.  No fever, nausea, vomiting, or diarrhea.    Patient denies any recent travel or exposure to known COVID positive tested individual.      ROS:     Review of Systems   Constitutional: Negative for chills and fever.   HENT: Positive for congestion, ear pain, sinus pressure and sinus pain. Negative for rhinorrhea and sore throat.    Eyes: Negative.    Respiratory: Positive for cough. Negative for shortness of breath.    Cardiovascular: Negative.  Negative for chest pain and palpitations.   Gastrointestinal: Negative for diarrhea, nausea and vomiting.   Endocrine: Negative.    Genitourinary: Negative.    Musculoskeletal: Negative.  Negative for arthralgias, back pain, joint swelling, myalgias, neck pain and neck stiffness.   Skin: Negative.  Negative for rash and wound.   Allergic/Immunologic: Negative.  Negative for immunocompromised state.   Neurological: Negative for dizziness, weakness, light-headedness and headaches.         Family History   Family History   Problem Relation Age of Onset     Anemia Mother         unknown reason     Asthma Sister      Back Pain Maternal Grandmother      Alzheimer Disease Maternal Grandfather      Skin Cancer  Paternal Great-Grandfather      Cancer Paternal Grandfather         Problem history  Patient Active Problem List   Diagnosis     Allergic rhinitis     Chronic tonsillitis     IUD (intrauterine device) in place        Allergies  Allergies   Allergen Reactions     Sulfamethoxazole-Trimethoprim Hives        Social History  Social History     Socioeconomic History     Marital status: Single     Spouse name: Not on file     Number of children: Not on file     Years of education: Not on file     Highest education level: Not on file   Occupational History     Occupation: student     Comment: [employer]     Occupation:    Social Needs     Financial resource strain: Not on file     Food insecurity     Worry: Not on file     Inability: Not on file     Transportation needs     Medical: Not on file     Non-medical: Not on file   Tobacco Use     Smoking status: Never Smoker     Smokeless tobacco: Never Used   Substance and Sexual Activity     Alcohol use: Yes     Frequency: Monthly or less     Drinks per session: 1 or 2     Binge frequency: Monthly     Drug use: No     Sexual activity: Not Currently     Partners: Male     Birth control/protection: Condom     Comment: has had both sex partners, used condoms with last partner   Lifestyle     Physical activity     Days per week: 7 days     Minutes per session: 30 min     Stress: Not on file   Relationships     Social connections     Talks on phone: Not on file     Gets together: Not on file     Attends [employer] service: Not on file     Active member of club or organization: Not on file     Attends meetings of clubs or organizations: Not on file     Relationship status: Not on file     Intimate partner violence     Fear of current or ex partner: Not on file     Emotionally abused: Not on file     Physically abused: Not on file     Forced sexual activity: Not on file   Other Topics Concern     Parent/sibling w/ CABG, MI or angioplasty before 65F 55M?  Not Asked   Social History Narrative     Not on file        OBJECTIVE     Vital signs reviewed by Dong Wilde PA-C  /80   Pulse 68   Temp 98.6  F (37  C) (Oral)   Resp 16   SpO2 98%      Physical Exam  Vitals signs and nursing note reviewed.   Constitutional:       General: She is not in acute distress.     Appearance: She is well-developed. She is not ill-appearing, toxic-appearing or diaphoretic.   HENT:      Head: Normocephalic and atraumatic.      Right Ear: Hearing, ear canal and external ear normal. No drainage, swelling or tenderness. Tympanic membrane is bulging. Tympanic membrane is not perforated, erythematous or retracted.      Left Ear: Hearing, tympanic membrane, ear canal and external ear normal. No drainage, swelling or tenderness. Tympanic membrane is not perforated, erythematous, retracted or bulging.      Nose: Mucosal edema and congestion present. No rhinorrhea.      Right Sinus: No maxillary sinus tenderness or frontal sinus tenderness.      Left Sinus: No maxillary sinus tenderness or frontal sinus tenderness.      Mouth/Throat:      Pharynx: No pharyngeal swelling, oropharyngeal exudate, posterior oropharyngeal erythema or uvula swelling.      Tonsils: No tonsillar exudate or tonsillar abscesses. 0 on the right. 0 on the left.   Eyes:      General:         Right eye: No discharge.         Left eye: No discharge.      Pupils: Pupils are equal, round, and reactive to light.   Neck:      Musculoskeletal: Normal range of motion and neck supple.   Cardiovascular:      Rate and Rhythm: Normal rate and regular rhythm.      Heart sounds: Normal heart sounds. No murmur. No friction rub. No gallop.    Pulmonary:      Effort: Pulmonary effort is normal. No respiratory distress.      Breath sounds: Normal breath sounds. No decreased breath sounds, wheezing, rhonchi or rales.   Chest:      Chest wall: No tenderness.   Abdominal:      General: Bowel sounds are normal. There is no distension.       Palpations: Abdomen is soft. There is no mass.      Tenderness: There is no abdominal tenderness. There is no guarding.   Lymphadenopathy:      Head:      Right side of head: No submental, submandibular, tonsillar, preauricular or posterior auricular adenopathy.      Left side of head: No submental, submandibular, tonsillar, preauricular or posterior auricular adenopathy.      Cervical: No cervical adenopathy.      Right cervical: No superficial or posterior cervical adenopathy.     Left cervical: No superficial or posterior cervical adenopathy.   Skin:     General: Skin is warm and dry.      Findings: No rash.   Neurological:      Mental Status: She is alert and oriented to person, place, and time.      Cranial Nerves: No cranial nerve deficit.      Deep Tendon Reflexes: Reflexes are normal and symmetric.   Psychiatric:         Behavior: Behavior normal. Behavior is cooperative.         Thought Content: Thought content normal.         Judgment: Judgment normal.           Dong Wilde PA-C  6/11/2021, 1:41 PM

## 2021-06-11 NOTE — PROGRESS NOTES
Outpatient Physical Therapy Discharge Note     Patient: Batool Louis  : 2000    Beginning/End Dates of Reporting Period:   to 3/11/2021    Referring Provider: Dr. Donnelly    Therapy Diagnosis: B knee pain     Client Self Report: Pt states things are slightly better.  Pt states no questions with HEP.  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  Not noticing any swelling in the knees, they will sometimes feeling warm.  Has been doing more plia squatting during dance but has been more muscle soreness.    Objective Measurements:  Objective Measure: Strength  Details: Demonstrates L > R hip abd and ext weakness.  Proximal instability L side.                           Goals:  Goal Identifier HEP   Goal Description Pt will be independent with HEP in order to improve LE strength and proprioception.   Target Date 21   Date Met      Progress:     Goal Identifier LEFS   Goal Description Pt will demonstrate 10% improvement per LEFS in order to demonstrate functional improvement of L LE.   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   Target Date 21   Date Met      Progress:     Goal Identifier     Goal Description     Target Date     Date Met      Progress:     Goal Identifier     Goal Description     Target Date     Date Met      Progress:     Goal Identifier     Goal Description     Target Date     Date Met      Progress:     Goal Identifier     Goal Description     Target Date     Date Met      Progress:     Goal Identifier     Goal Description     Target Date     Date Met      Progress:     Progress Toward Goals:   Not assessed this period.          Plan:  Discharge from therapy.    Discharge:    Reason for Discharge: Patient chooses to discontinue therapy.    Equipment Issued:  none    Discharge Plan: Patient to continue home program.

## 2021-06-11 NOTE — PATIENT INSTRUCTIONS
"Discharge Instructions for COVID-19 Patients  You have--or may have--COVID-19. Please follow the instructions listed below.   If you have a weakened immune system, discuss with your doctor any other actions you need to take.  How can I protect others?  If you have symptoms (fever, cough, body aches or trouble breathing):    Stay home and away from others (self-isolate) until:  ? Your other symptoms have resolved (gotten better). And   ? You've had no fever--and no medicine that reduces fever--for 1 full day (24 hours). And   ? At least 10 days have passed since your symptoms started. (You may need to wait 20 days. Follow the advice of your care team.)  If you don't show symptoms, but testing showed that you have COVID-19:    Stay home and away from others (self-isolate) until at least 10 days have passed since the date of your first positive COVID-19 test.  During this time    Stay in your own room, even for meals. Use your own bathroom if you can.    Stay away from others in your home. No hugging, kissing or shaking hands. No visitors.    Don't go to work, school or anywhere else.    Clean \"high touch\" surfaces often (doorknobs, counters, handles). Use household cleaning spray or wipes.    You'll find a full list of  on the EPA website: www.epa.gov/pesticide-registration/list-n-disinfectants-use-against-sars-cov-2.    Cover your mouth and nose with a mask or other face covering to avoid spreading germs.    Wash your hands and face often. Use soap and water.    Caregivers in these groups are at risk for severe illness due to COVID-19:  ? People 65 years and older  ? People who live in a nursing home or long-term care facility  ? People with chronic disease (lung, heart, cancer, diabetes, kidney, liver, immunologic)  ? People who have a weakened immune system, including those who:    Are in cancer treatment    Take medicine that weakens the immune system, such as corticosteroids    Had a bone marrow or organ " transplant    Have an immune deficiency    Have poorly controlled HIV or AIDS    Are obese (body mass index of 40 or higher)    Smoke regularly    Caregivers should wear gloves while washing dishes, handling laundry and cleaning bedrooms and bathrooms.    Use caution when washing and drying laundry: Don't shake dirty laundry and use the warmest water setting that you can.    For more tips on managing your health at home, go to www.cdc.gov/coronavirus/2019-ncov/downloads/10Things.pdf.  How can I take care of myself at home?  1. Get lots of rest. Drink extra fluids (unless a doctor has told you not to).  2. Take Tylenol (acetaminophen) for fever or pain. If you have liver or kidney problems, ask your family doctor if it's okay to take Tylenol.   Adults can take either:   ? 650 mg (two 325 mg pills) every 4 to 6 hours, or   ? 1,000 mg (two 500 mg pills) every 8 hours as needed.  ? Note: Don't take more than 3,000 mg in one day. Acetaminophen is found in many medicines (both prescribed and over-the-counter medicines). Read all labels to be sure you don't take too much.   For children, check the Tylenol bottle for the right dose. The dose is based on the child's age or weight.  3. If you have other health problems (like cancer, heart failure, an organ transplant or severe kidney disease): Call your specialty clinic if you don't feel better in the next 2 days.  4. Know when to call 911. Emergency warning signs include:  ? Trouble breathing or shortness of breath  ? Pain or pressure in the chest that doesn't go away  ? Feeling confused like you haven't felt before, or not being able to wake up  ? Bluish-colored lips or face  5. Your doctor may have prescribed a blood thinner medicine. Follow their instructions.  Where can I get more information?     [facility] - About COVID-19:   [email or web address]    CDC - What to Do If You're Sick:  www.cdc.gov/coronavirus/2019-ncov/about/steps-when-sick.html    CDC - Ending Home Isolation: www.cdc.gov/coronavirus/2019-ncov/hcp/disposition-in-home-patients.html    CDC - Caring for Someone: www.cdc.gov/coronavirus/2019-ncov/if-you-are-sick/care-for-someone.html    [facility] - Interim Guidance for Hospital Discharge to Home: www.health.[facility].[place]./diseases/coronavirus/hcp/hospdischarge.pdf    Below are the COVID-19 hotlines at the [place] Department of Health ([facility]). Interpreters are available.  ? For health questions: Call 988-957-0559 or 1-877.723.2075 (7 a.m. to 7 p.m.)  ? For questions about schools and childcare: Call 516-001-4453 or 1-271.239.4601 (7 a.m. to 7 p.m.)    For informational purposes only. Not to replace the advice of your health care provider. Clinically reviewed by Dr. Reagan Sarmiento.   Copyright   2020 [facility]. All rights reserved. [employer] 431282 - REV 01/05/21.      Patient Education     Acute Bronchitis  Your healthcare provider has told you that you have acute bronchitis. Bronchitis is infection or inflammation of the airways in the lungs (bronchial tubes). Normally, air moves easily in and out of the airways. Bronchitis narrows the airways. This makes it harder for air to flow in and out of the lungs. This causes symptoms such as shortness of breath, coughing up yellow or green mucus, and wheezing.  Bronchitis can be acute or chronic. Acute means it happens quickly and goes away in a short time. Chronic means a condition lasts a long time and often comes back. Most people with acute bronchitis get better in 1 to 2 weeks.     What causes acute bronchitis?  Acute bronchitis is often caused by a virus such as a cold or the flu. In some cases, it may be caused by bacteria. Certain factors make it more likely for a cold or flu to turn into bronchitis. These include being very young, being elderly, having a heart or lung problem, or having a weak immune system. Cigarette smoking  also makes bronchitis more likely.  When bronchitis develops, the airways become swollen. The airways may also become infected with bacteria. This is known as a secondary infection.  Symptoms of acute bronchitis  Symptoms can include:    Coughing with mucus    Wheezing    Feeling short of breath    Chest pain    Fever  Diagnosing acute bronchitis  Your healthcare provider will ask about your symptoms and health history. He or she will give you a physical exam. This will include listening to your lungs while you breathe. You may have a chest X-ray to look for infection in the lungs (pneumonia) if you have had a fever. You may also have blood tests to check for infection.  Treating acute bronchitis  Bronchitis usually goes away in 1 to 2 weeks without treatment. You can help feel better by:    Taking medicine as directed. Talk to your healthcare provider before taking any over-the-counter medicines (OTC). Some OTC medicines help relieve inflammation in your bronchial tubes. They can also thin mucus. This makes it easier to cough up. Your healthcare provider may prescribe an inhaler to help open up the bronchial tubes. Most of the time, acute bronchitis is caused by a viral infection. Antibiotics are usually not prescribed for viral infections.    Drinking plenty of fluids, such as water, juice, or warm soup. Fluids loosen mucus so that you can cough it up. This helps you breathe more easily. Fluids also prevent dehydration.    Using a humidifier. This can help reduce coughing.    Getting plenty of rest    Not smoking. Also, don't let anyone else smoke in your home. In public places, move away from secondhand smoke.  Recovery and follow-up  Follow up with your doctor. You will likely feel better in 1 to 2 weeks. But you may have a dry cough for a longer time. Let your doctor know if you still have symptoms other than a dry cough after 2 weeks. Tell him or her if you get bronchial infections often.  Self-care tips  To  get relief from your symptoms and prevent bronchitis:    Stop smoking. Stopping smoking is the most important step you can take to treat bronchitis. If you need help stopping smoking, talk with your healthcare provider.    Stay away from secondhand smoke and other irritants. Try to stay away from smoke, chemicals, fumes, and dust. Don t let anyone smoke in your home. Stay indoors on smoggy days.    Prevent lung infections. Ask your healthcare provider about the flu and pneumonia vaccines. Take steps to prevent colds and other lung infections.    Wash your hands well. Wash your hands often with soap and water. Use hand  when you can t wash your hands. Stay away from crowds during cold and flu season.  When to call your healthcare provider  Call the healthcare provider if you have any of these:    Fever of 100.4 F ( 38.0 C) or higher, or as directed by your healthcare provider    Symptoms that get worse, or new symptoms    Breathing not getting better with treatments    Symptoms that don t start to get better in 1 week  [employer] last reviewed this educational content on 8/1/2019 2000-2021 [employer]. All rights reserved. This information is not intended as a substitute for professional medical care. Always follow your healthcare professional's instructions.

## 2021-06-12 LAB
LABORATORY COMMENT REPORT: NORMAL
SARS-COV-2 RNA RESP QL NAA+PROBE: NEGATIVE
SPECIMEN SOURCE: NORMAL

## 2021-06-15 ENCOUNTER — MYC MEDICAL ADVICE (OUTPATIENT)
Dept: DERMATOLOGY | Facility: CLINIC | Age: 21
End: 2021-06-15

## 2021-06-15 DIAGNOSIS — Z79.899 MEDICATION MANAGEMENT: ICD-10-CM

## 2021-06-15 DIAGNOSIS — L70.0 ACNE VULGARIS: ICD-10-CM

## 2021-06-16 RX ORDER — SPIRONOLACTONE 50 MG/1
TABLET, FILM COATED ORAL
Qty: 270 TABLET | Refills: 1 | Status: SHIPPED | OUTPATIENT
Start: 2021-06-16 | End: 2021-09-16

## 2021-06-16 NOTE — TELEPHONE ENCOUNTER
spironolactone (ALDACTONE) 50 MG tablet    Last Written Prescription Date:  21  Last Fill Quantity: 270,   # refills: 1    Sent to: [id number] - [place], [place] - [address]  > Hello, I wasn't able to make it to the pharmacy for my acne medication refill in time. Is it possible to have them put that order in again? Also can I change the pharmacy I want it to be picked up at?  . I would prefer to  at [facility] in [place],   Last Office Visit : 21 Stu/ Leann Bocanegra to requested pharmacy as fulfillment order.

## 2021-06-22 ENCOUNTER — VIRTUAL VISIT (OUTPATIENT)
Dept: PSYCHOLOGY | Facility: CLINIC | Age: 21
End: 2021-06-22
Payer: COMMERCIAL

## 2021-06-22 DIAGNOSIS — F41.1 GENERALIZED ANXIETY DISORDER: Primary | ICD-10-CM

## 2021-06-22 PROCEDURE — 90834 PSYTX W PT 45 MINUTES: CPT | Mod: GT | Performed by: COUNSELOR

## 2021-06-22 NOTE — PROGRESS NOTES
Progress Note    Client Name: Batool Louis  Date: 6/22/21         Service Type: Individual      Session Start Time: 1:03pm   Session End Time: 1:55pm      Session Length:   52 mins     Session #: 32     Attendees: Client    Treatment Plan Last Reviewed: 4/6/21  PHQ-9 / SHELBY-7 : see chart    Telemedicine Visit: The patient's condition can be safely assessed and treated via synchronous audio and visual telemedicine encounter.      Reason for Telemedicine Visit: Services only offered telehealth    Originating Site (Patient Location): Patient's home    Distant Site (Provider Location): Provider Remote Setting    Consent:  The patient/guardian has verbally consented to: the potential risks and benefits of telemedicine (video visit) versus in person care; bill my insurance or make self-payment for services provided; and responsibility for payment of non-covered services.     Mode of Communication:  Video Conference via [employer]/telephone    As the provider I attest to compliance with applicable laws and regulations related to telemedicine.     DATA      Progress Since Last Session (Related to Symptoms / Goals / Homework):   Symptoms: Improving .    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 She will be working her three jobs until school starts up again. 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 She stated her boss is going to talk to the board and see if they can get her more hours and a raise.  She stated she was very assertive with her boss. She started the new job yesterday. She's worried about having to bartend and .   She also stated she has an audition for a musical in [place] coming up.      Treatment Objective(s) Addressed in This Session:   use cognitive strategies identified in therapy to challenge anxious thoughts       Intervention:   Talked about the client's goals for this summer- pay off her parents and keep going on her lifestyle change (weightloss).    Talked about how she can make time to prep food for her week. She said she's struggling so much with getting back to that. She want to make sure she can clean her room so she can do yoga again. Talked about how she is going to make time to complete these goals. She stated she's going to put up new encouraging signs in her room to get her back to changing her diet and be more mindful. Worked on changing her thoughts to be encouraging and effective.   Talked about boundaries she needs to have with her male coworker who likes her. She tried to be assertive with him that she doesn't like him like he likes her. She feels like he doesn't get it.  Discussed how she can talk with him about their friendship.        ASSESSMENT: Current Emotional / Mental Status (status of significant symptoms):   Risk status (Self / Other harm or suicidal ideation)   Client denies current fears or concerns for personal safety.   Client denies current or recent suicidal ideation or behaviors.   Client denies current or recent homicidal ideation or behaviors.   Client denies current or recent self injurious behavior or ideation.   Client denies other safety concerns.   A safety and risk management plan has not been developed at this time, however client was given the after-hours number / 911 should there be a change in any of these risk factors.     Appearance:   Appropriate    Eye Contact:   Good    Psychomotor Behavior: Normal    Attitude:   Cooperative    Orientation:   All   Speech    Rate / Production: Normal     Volume:  Normal    Mood:    Normal    Affect:    Appropriate    Thought Content:  Clear    Thought Form:  Coherent  Logical     Insight:    Good      Medication Review:   No changes to current psychiatric medication(s)     Medication Compliance:   Yes     Changes in Health Issues:   None reported : client is on birth control again     Chemical Use Review:   Substance Use: Chemical use reviewed, no active concerns identified      Tobacco Use: No current tobacco use.       Collateral Reports Completed:   Not Applicable    Diagnoses:   Generalized Anxiety Disorder      PLAN: (Client Tasks / Therapist Tasks / Other)  Client to be more assertive with her friend and make time to focus on her health.       Celsa Linda, [facility]                                                         ________________________________________________________________________    Treatment Plan    Client's Name: Batool Louis  YOB: 2000    Date: 4/6/21    DSM-V Diagnoses: Adjustment Disorders  309.28 (F43.23) With mixed anxiety and depressed mood  Psychosocial / Contextual Factors: COVID, living back at home, deaths of loved ones within the past few years. Sexual identity issues.     WHODAS: 12    Referral / Collaboration:  Referral to another professional/service is not indicated at this time.    Anticipated number of session or this episode of care: 5+      MeasurableTreatment Goal(s) related to diagnosis / functional impairment(s)  Goal 1: Client will increase emotion regulation skills/decrease panic attacks    Objective #A (Client Action)    Client will identify 2-4 fears / thoughts that contribute to feeling anxious  use positive self-affirmations daily.  Status: Continued - Date(s): 4/6/21     Intervention(s)  Therapist will teach emotional regulation skills. distress tolerance skills, interpersonal effectiveness skills, emotion regluation skills, mindfulness skills, radical acceptance. Therapist will teach client how to ID body cues for anxiety, anxiety reduction techniques, how to ID triggers for depression and anxiety- decrease  "reactivity/eliminate, lifestyle changes to reduce depression and anxiety, communication skills, explore cognitive beliefs and help client to develop healthy cognitive patterns and beliefs.      Objective #B  Client will use thought-stopping strategy daily to reduce intrusive thoughts.  Status: Continued - Date(s): 4/6/21     Intervention(s)  Therapist will teach emotional regulation skills. distress tolerance skills, interpersonal effectiveness skills, emotion regluation skills, mindfulness skills, radical acceptance. Therapist will teach client how to ID body cues for anxiety, anxiety reduction techniques, how to ID triggers for depression and anxiety- decrease reactivity/eliminate, lifestyle changes to reduce depression and anxiety, communication skills, explore cognitive beliefs and help client to develop healthy cognitive patterns and beliefs.    Objective #C (Client Action)    Status: New - Date: 4/6/21    Client will use \"worry time\" each day for 15 minutes of scheduled worry and then defer obsessive or anxious thinking until the next structured worry time.    Intervention(s)  Therapist will teach emotional regulation skills. work through trauma using somatic experiencing techniques to discharge the anxiety.    Goal 2: Client will work on increasing self esteem and self worth     Objective #A (Client Action)    Status: Continued - Date(s): 4/6/21    Client will identify two areas of life that you would like to have improved functioning.    Intervention(s)  Therapist will teach emotional regulation skills. work through trauma using somatic experiencing techniques to discharge the anxiety.      Client has reviewed and agreed to the above plan.      Celsa Linda [facility]    "

## 2021-07-06 ENCOUNTER — VIRTUAL VISIT (OUTPATIENT)
Dept: PSYCHOLOGY | Facility: CLINIC | Age: 21
End: 2021-07-06
Payer: COMMERCIAL

## 2021-07-06 DIAGNOSIS — F41.1 GENERALIZED ANXIETY DISORDER: Primary | ICD-10-CM

## 2021-07-06 PROCEDURE — 90834 PSYTX W PT 45 MINUTES: CPT | Mod: GT | Performed by: COUNSELOR

## 2021-07-06 NOTE — PROGRESS NOTES
Progress Note    Client Name: Batool Louis  Date: 7/6/21         Service Type: Individual      Session Start Time: 3:10pm   Session End Time: 4:00pm      Session Length:   50 mins     Session #: 33     Attendees: Client    Treatment Plan Last Reviewed: 4/6/21  PHQ-9 / SHELBY-7 : see chart    Telemedicine Visit: The patient's condition can be safely assessed and treated via synchronous audio and visual telemedicine encounter.      Reason for Telemedicine Visit: Services only offered telehealth    Originating Site (Patient Location): Patient's home    Distant Site (Provider Location): Provider Remote Setting    Consent:  The patient/guardian has verbally consented to: the potential risks and benefits of telemedicine (video visit) versus in person care; bill my insurance or make self-payment for services provided; and responsibility for payment of non-covered services.     Mode of Communication:  Video Conference via [employer]/telephone    As the provider I attest to compliance with applicable laws and regulations related to telemedicine.     DATA      Progress Since Last Session (Related to Symptoms / Goals / Homework):   Symptoms: No change .    Homework: Completed in session      Episode of Care Goals: Satisfactory progress - ACTION (Actively working towards change); Intervened by reinforcing change plan / affirming steps taken     Current / Ongoing Stressors and Concerns:  The session started late because the client was experiencing connectivity issues.    The client said she's had a lot happen recently. She started her new job at [employer] and it has been exhausting she stated.   She was told she needed to get a new car battery which would cost her $2500. The family's septic system's alarm went off and they couldn't use the water at the house and had to run around to other places to shower or use the bathroom. 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        Treatment Objective(s) Addressed in This Session:   use cognitive strategies identified in therapy to challenge anxious thoughts       Intervention:   The client also talked about a lyndon she was somewhat seeing when she was going to school in [place]. He will messege her every 3-6 months and she doesn't know what she thinks about it. She said their mom's are good friends which this makes her worry about having some kind of a relationship with him. Talked through her anxiety regarding the audition this week. She stated she's been having high anxiety regarding this and her new job. She's being very hard on herself and she feels like she might have some perfectionistic tendencies.  Talked about how to increase self confidence and how to decrease comparing herself to others. Talked with the client about a growth mindset and how she can continue to cultivate this.        ASSESSMENT: Current Emotional / Mental Status (status of significant symptoms):   Risk status (Self / Other harm or suicidal ideation)   Client denies current fears or concerns for personal safety.   Client denies current or recent suicidal ideation or behaviors.   Client denies current or recent homicidal ideation or behaviors.   Client denies current or recent self injurious behavior or ideation.   Client denies other safety concerns.   A safety and risk management plan has not been developed at this time, however client was given the after-hours number / 911 should there be a change in any of these risk factors.     Appearance:   Appropriate    Eye Contact:   Good    Psychomotor Behavior: Normal    Attitude:   Cooperative    Orientation:   All   Speech    Rate / Production: Normal     Volume:  Normal    Mood:    Normal    Affect:    Appropriate    Thought Content:  Clear    Thought Form:  Coherent  Logical    Insight:    Good      Medication Review:   No changes to current psychiatric medication(s)     Medication Compliance:   Yes     Changes in Health  Issues:   None reported : client is on birth control again     Chemical Use Review:   Substance Use: Chemical use reviewed, no active concerns identified      Tobacco Use: No current tobacco use.       Collateral Reports Completed:   Not Applicable    Diagnoses:   Generalized Anxiety Disorder      PLAN: (Client Tasks / Therapist Tasks / Other)  Client to continue to work on a growth mindset.       Celsa Sy , [facility]                                                         ________________________________________________________________________    Treatment Plan    Client's Name: Batool Louis  YOB: 2000    Date: 4/6/21    DSM-V Diagnoses: Adjustment Disorders  309.28 (F43.23) With mixed anxiety and depressed mood  Psychosocial / Contextual Factors: COVID, living back at home, deaths of loved ones within the past few years. Sexual identity issues.     WHODAS: 12    Referral / Collaboration:  Referral to another professional/service is not indicated at this time.    Anticipated number of session or this episode of care: 5+      MeasurableTreatment Goal(s) related to diagnosis / functional impairment(s)  Goal 1: Client will increase emotion regulation skills/decrease panic attacks    Objective #A (Client Action)    Client will identify 2-4 fears / thoughts that contribute to feeling anxious  use positive self-affirmations daily.  Status: Continued - Date(s): 4/6/21     Intervention(s)  Therapist will teach emotional regulation skills. distress tolerance skills, interpersonal effectiveness skills, emotion regluation skills, mindfulness skills, radical acceptance. Therapist will teach client how to ID body cues for anxiety, anxiety reduction techniques, how to ID triggers for depression and anxiety- decrease reactivity/eliminate, lifestyle changes to reduce depression and anxiety, communication skills, explore cognitive beliefs and help client to develop healthy cognitive patterns and  "beliefs.      Objective #B  Client will use thought-stopping strategy daily to reduce intrusive thoughts.  Status: Continued - Date(s): 4/6/21     Intervention(s)  Therapist will teach emotional regulation skills. distress tolerance skills, interpersonal effectiveness skills, emotion regluation skills, mindfulness skills, radical acceptance. Therapist will teach client how to ID body cues for anxiety, anxiety reduction techniques, how to ID triggers for depression and anxiety- decrease reactivity/eliminate, lifestyle changes to reduce depression and anxiety, communication skills, explore cognitive beliefs and help client to develop healthy cognitive patterns and beliefs.    Objective #C (Client Action)    Status: New - Date: 4/6/21    Client will use \"worry time\" each day for 15 minutes of scheduled worry and then defer obsessive or anxious thinking until the next structured worry time.    Intervention(s)  Therapist will teach emotional regulation skills. work through trauma using somatic experiencing techniques to discharge the anxiety.    Goal 2: Client will work on increasing self esteem and self worth     Objective #A (Client Action)    Status: Continued - Date(s): 4/6/21    Client will identify two areas of life that you would like to have improved functioning.    Intervention(s)  Therapist will teach emotional regulation skills. work through trauma using somatic experiencing techniques to discharge the anxiety.      Client has reviewed and agreed to the above plan.      Celsa Linda [facility]    "

## 2021-07-12 ENCOUNTER — OFFICE VISIT (OUTPATIENT)
Dept: FAMILY MEDICINE | Facility: CLINIC | Age: 21
End: 2021-07-12
Payer: COMMERCIAL

## 2021-07-12 VITALS
DIASTOLIC BLOOD PRESSURE: 74 MMHG | HEART RATE: 63 BPM | RESPIRATION RATE: 14 BRPM | SYSTOLIC BLOOD PRESSURE: 112 MMHG | TEMPERATURE: 97.4 F | OXYGEN SATURATION: 97 %

## 2021-07-12 DIAGNOSIS — J30.9 ALLERGIC RHINITIS, UNSPECIFIED SEASONALITY, UNSPECIFIED TRIGGER: ICD-10-CM

## 2021-07-12 DIAGNOSIS — J40 BRONCHITIS: ICD-10-CM

## 2021-07-12 DIAGNOSIS — R05.9 COUGH: Primary | ICD-10-CM

## 2021-07-12 PROCEDURE — 99213 OFFICE O/P EST LOW 20 MIN: CPT | Performed by: NURSE PRACTITIONER

## 2021-07-12 RX ORDER — ALBUTEROL SULFATE 90 UG/1
2 AEROSOL, METERED RESPIRATORY (INHALATION) EVERY 6 HOURS
Qty: 18 G | Refills: 0 | Status: SHIPPED | OUTPATIENT
Start: 2021-07-12 | End: 2021-08-03

## 2021-07-12 RX ORDER — CETIRIZINE HYDROCHLORIDE 10 MG/1
10 TABLET ORAL DAILY PRN
Status: ON HOLD | COMMUNITY

## 2021-07-12 ASSESSMENT — PAIN SCALES - GENERAL: PAINLEVEL: NO PAIN (0)

## 2021-07-12 ASSESSMENT — ENCOUNTER SYMPTOMS
WHEEZING: 0
ABDOMINAL PAIN: 0
FEVER: 0
ORTHOPNEA: 0
HEMOPTYSIS: 0
NECK PAIN: 0
HEADACHES: 0
SORE THROAT: 0
VOMITING: 0
PND: 0
SYNCOPE: 0
RHINORRHEA: 0
SWOLLEN GLANDS: 0
LEG PAIN: 0
CLAUDICATION: 0
SPUTUM PRODUCTION: 1

## 2021-07-12 NOTE — PROGRESS NOTES
Assessment & Plan     Cough      Bronchitis  Home care instructions were reviewed with the patient. The risks, benefits and treatment options of prescribed medications or other treatments have been discussed with the patient. The patient verbalized their understanding and should call or follow up if no improvement or if they develop further problems.    Recommending voice resting. Use inhaler to help with bronchospasms.     - albuterol (PROAIR HFA/PROVENTIL HFA/VENTOLIN HFA) 108 (90 Base) MCG/ACT inhaler; Inhale 2 puffs into the lungs every 6 hours    Allergic rhinitis, unspecified seasonality, unspecified trigger    - Adult Allergy/Asthma Referral; Future      Recommend further evaluation with specialty for seasonal symptoms.     RONNY Olmedo CNP  [facility] [employer]ALPES[employer] WEAVER Heriberto is a 20 year old female who presents to clinic today for the following health issues accompanied by her mother:    HPI     Patient was dx with an ear infection and bronchitis at an  on 6/11/21. She was placed on an antibiotic and this was finished on 6/22/21. Since then she has noticed that the cough is less often and it is not a productive but she still is coughing. She has a big audition coming up and needs the cough to go away.     Mom is here with her today and states that this is a common problem from when she was a teenager. She is going to get her tonsils out after this upcoming show.     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    Patient states symptoms have considerably improved after antibiotic however now is having a nagging cough that is worse with talking or singing.     She feels she may suffer from seasonal type allergies. She does use zyrtec periodically that is sometimes helpful.     Cough is loose upper airway non productive.     Denies fever, throat pain, congestion, or SOB           Review of Systems   Constitutional:  Negative for fever.   HENT: Negative for ear pain, rhinorrhea and sore throat.    Respiratory: Negative for wheezing.    Cardiovascular: Negative for chest pain.   Gastrointestinal: Negative for abdominal pain and vomiting.   Musculoskeletal: Negative for neck pain.   Skin: Negative for rash.   Neurological: Negative for headaches.      Constitutional, HEENT, cardiovascular, pulmonary, GI, , musculoskeletal, neuro, skin, endocrine and psych systems are negative, except as otherwise noted.      Objective    /74   Pulse 63   Temp 97.4  F (36.3  C) (Temporal)   Resp 14   SpO2 97%   There is no height or weight on file to calculate BMI.  Physical Exam   GENERAL: healthy, alert and no distress  EYES: Eyes grossly normal to inspection, PERRL and conjunctivae and sclerae normal  HENT: ear canals and TM's normal, nose and mouth without ulcers or lesions  NECK: no adenopathy, no asymmetry, masses, or scars and thyroid normal to palpation  RESP: lungs clear to auscultation - no rales, rhonchi or wheezes  BREAST: normal without masses, tenderness or nipple discharge and no palpable axillary masses or adenopathy  CV: regular rate and rhythm, normal S1 S2, no S3 or S4, no murmur, click or rub, no peripheral edema and peripheral pulses strong  MS: no gross musculoskeletal defects noted, no edema  SKIN: no suspicious lesions or rashes  NEURO: Normal strength and tone, mentation intact and speech normal  PSYCH: mentation appears normal, affect normal/bright          Answers for HPI/ROS submitted by the patient on 7/12/2021  claudication: No  coryza: No  hemoptysis: No  leg pain: No  leg swelling: No  orthopnea: No  PND: No  sputum production: Yes  swollen glands: No  syncope: No

## 2021-07-21 ENCOUNTER — OFFICE VISIT (OUTPATIENT)
Dept: ALLERGY | Facility: OTHER | Age: 21
End: 2021-07-21
Payer: COMMERCIAL

## 2021-07-21 VITALS
BODY MASS INDEX: 31.98 KG/M2 | DIASTOLIC BLOOD PRESSURE: 73 MMHG | WEIGHT: 211 LBS | OXYGEN SATURATION: 99 % | SYSTOLIC BLOOD PRESSURE: 107 MMHG | HEIGHT: 68 IN | HEART RATE: 80 BPM

## 2021-07-21 DIAGNOSIS — R05.9 COUGH: ICD-10-CM

## 2021-07-21 DIAGNOSIS — J30.9 ALLERGIC RHINITIS, UNSPECIFIED SEASONALITY, UNSPECIFIED TRIGGER: ICD-10-CM

## 2021-07-21 DIAGNOSIS — Z91.013 SHELLFISH ALLERGY: Primary | ICD-10-CM

## 2021-07-21 PROCEDURE — 36415 COLL VENOUS BLD VENIPUNCTURE: CPT | Performed by: ALLERGY & IMMUNOLOGY

## 2021-07-21 PROCEDURE — 86003 ALLG SPEC IGE CRUDE XTRC EA: CPT | Performed by: ALLERGY & IMMUNOLOGY

## 2021-07-21 PROCEDURE — 99204 OFFICE O/P NEW MOD 45 MIN: CPT | Mod: 25 | Performed by: ALLERGY & IMMUNOLOGY

## 2021-07-21 PROCEDURE — 95004 PERQ TESTS W/ALRGNC XTRCS: CPT | Performed by: ALLERGY & IMMUNOLOGY

## 2021-07-21 ASSESSMENT — PAIN SCALES - GENERAL: PAINLEVEL: NO PAIN (0)

## 2021-07-21 ASSESSMENT — MIFFLIN-ST. JEOR: SCORE: 1765.59

## 2021-07-21 NOTE — PROGRESS NOTES
Batool Louis is a 20 year old White female with previous medical history significant for allergies. Batool Louis is being seen today for evaluation of seasonal allergies and cough.     The patient reports that since middle school she has had fall and spring sneezing, ocular itching, ocular watering, coughing, rhinorrhea, congestion.  Denies postnasal drainage or sinus pressure.  She additionally will have itchy skin.  Increase symptoms around cats.  Zyrtec and Flonase have been used and somewhat beneficial.  Flonase has been used inconsistently.  She has never had allergy testing.  Sense of smell and taste intact.  She denies sinus pressure.  She reports that with upper respiratory tract infection she generally will get a cough that follows for weeks after.  Denies shortness of breath, wheezing or tightness in chest.  Recently treated with albuterol.  Albuterol was somewhat beneficial for the cough.  Never been diagnosed with asthma.    After eating shellfish specifically shrimp she has had immediate vomiting and nausea without other IgE mediated symptoms.  Similar reactions after oyster.  With canned tomatoes and ranch she has had delayed diarrhea.  She is able to tolerate fresh tomatoes.    ENVIRONMENTAL HISTORY: [place] The home is heated with a forced air, gas furnace and wood stove. They do have central air conditioning. The patient's bedroom is furnished with hard olimpia in bedroom.  Pets inside the house include 0 pets. There is history of cockroach or mice infestation. There is/are 0 smokers in the house.  The house do have a damp basement.     Past Medical History:   Diagnosis Date     Amenorrhea, secondary      Family History   Problem Relation Age of Onset     Anemia Mother         unknown reason     Asthma Sister      Back Pain Maternal Grandmother      Alzheimer Disease Maternal Grandfather      Skin Cancer Paternal Great-Grandfather       Cancer Paternal Grandfather      Past Surgical History:   Procedure Laterality Date     NO HISTORY OF SURGERY         REVIEW OF SYSTEMS:  General: negative for weight gain. negative for weight loss. negative for changes in sleep.   Ears: negative for fullness. negative for hearing loss. negative for dizziness.   Nose: negative for snoring.negative for changes in smell. positive  for drainage.   Eyes: negative for eye watering. negative for eye itching. negative for vision changes. negative for eye redness.  Throat: negative for hoarseness. negative for sore throat. negative for trouble swallowing.   Lungs: negative for shortness of breath.negative for wheezing. negative for sputum production.   Cardiovascular: negative for chest pain. negative for swelling of ankles. negative for fast or irregular heartbeat.   Gastrointestinal: negative for nausea. negative for heartburn. negative for acid reflux.   Musculoskeletal: negative for joint pain. negative for joint stiffness. negative for joint swelling.   Neurologic: negative for seizures. negative for fainting. negative for weakness.   Psychiatric: negative for changes in mood. negative for anxiety.   Endocrine: negative for cold intolerance. negative for heat intolerance. negative for tremors.   Lymphatic: negative for lower extremity swelling. negative for lymph node swelling.   Hematologic: negative for easy bruising. negative for easy bleeding.  Integumentary: negative for rash. negative for scaling. negative for nail changes.       Current Outpatient Medications:      albuterol (PROAIR HFA/PROVENTIL HFA/VENTOLIN HFA) 108 (90 Base) MCG/ACT inhaler, Inhale 2 puffs into the lungs every 6 hours, Disp: 18 g, Rfl: 0     cetirizine (ZYRTEC) 10 MG tablet, Take 10 mg by mouth daily, Disp: , Rfl:      Cholecalciferol (D 5000) 5000 UNITS TABS, Reported on 3/7/2017, Disp: , Rfl:      fluticasone (FLONASE) 50 MCG/ACT nasal spray, Spray 2 sprays into both nostrils daily, Disp:  15.8 mL, Rfl: 1     Homeopathic Products (ZICAM ALLERGY RELIEF NA), , Disp: , Rfl:      levonorgestrel (KYLEENA) 19.5 MG IUD, 1 each by Intrauterine route once Put in 8/12/2020, Disp: , Rfl:      spironolactone (ALDACTONE) 50 MG tablet, Take 50mg (1 pill) in the morning, 100mg (2 pills) in the evening., Disp: 270 tablet, Rfl: 1     spironolactone (ALDACTONE) 50 MG tablet, Take 2 tablets (100 mg) by mouth daily, Disp: 60 tablet, Rfl: 2  Immunization History   Administered Date(s) Administered     DTAP (<7y) 1998, 1998, 02/05/1999, 08/02/2004     FLU 6-35 months 10/05/2009, 01/11/2013     A2a5-63 Novel Flu- Nasal 12/15/2009     HPV Quadrivalent 05/05/2011, 08/13/2014     HPV9 09/09/2016     Hep B, Peds or Adolescent 1998, 1998, 02/05/1999     HepA-ped 2 Dose 11/01/2006, 04/03/2009     Hib (PRP-T) 1998, 1998, 02/05/1999     Historical DTP/aP 1998, 1998, 02/05/1999, 11/05/1999, 08/02/2004     Influenza (IIV3) PF 11/01/2006     Influenza Vaccine IM > 6 months Valent IIV4 10/15/2014, 10/28/2015, 11/15/2016, 12/13/2017, 03/12/2020     MMR 02/05/1999, 11/05/1999, 08/02/2004     Meningococcal (Menveo ) 05/05/2011, 08/13/2014     OPV, trivalent, live 11/05/1999     Pedvax-hib 1998, 1998, 02/05/1999     Pneumococcal (PCV 7) 10/17/2001     Poliovirus, inactivated (IPV) 1998, 1998, 11/05/1999, 08/02/2004     Rotavirus, pentavalent 02/05/1999     TDAP Vaccine (Adacel) 05/05/2011, 07/10/2020     TRIHIBIT (DTAP/HIB, <7y) 11/05/1999     Varicella 10/17/2001, 04/03/2009     Allergies   Allergen Reactions     Sulfamethoxazole-Trimethoprim Hives         EXAM:   Constitutional:  Appears well-developed and well-nourished. No distress.   HEENT:   Head: Normocephalic.   Cobblestoning of posterior oropharynx.   Boggy nasal tissue and pale.    Eyes: Conjunctivae are non-erythematous   No maxillary or frontal sinus tenderness to palpation.   Cardiovascular: Normal rate,  regular rhythm and normal heart sounds. Exam reveals no gallop and no friction rub.   No murmur heard.  Respiratory: Effort normal and breath sounds normal. No respiratory distress. No wheezes. No rales.   Musculoskeletal: Normal range of motion.   Neuro: Oriented to person, place, and time.  Skin: Skin is warm and dry. No rash noted.   Psychiatric: Normal mood and affect.     Nursing note and vitals reviewed.      WORKUP:   ENVIRONMENTAL PERCUTANEOUS SKIN TESTING: ADULT  [facility] 7/21/2021   Consent Y   Ordering Physician Dr. Junior   Interpreting Physician Dr. Junior   Testing Technician al   Location Back   Time start:  8:45 AM   Time End:  9:00 AM   Positive Control: Histatrol*ALK 1 mg/ml 5/40   Negative Control: 50% Glycerin 00   Cat Hair*ALK (10,000 BAU/ml) 0   AP Dog Hair/Dander (1:100 w/v) 0   Dust Mite p. 30,000 AU/ml 0   Dust Mite f. (30,000 AU/ml) 0   Toño (W/F in millimeters) 0   Carlos Grass (100,000 BAU/mL) 0   Red Cedar (W/F in millimeters) 0   Maple/Assumption (W/F in millimeters) 0   Hackberry (W/F in millimeters) 0   Frio (W/F in millimeters) 0   Knox *ALK (W/F in millimeters) 0   American Elm (W/F in millimeters) 0   [place] (W/F in millimeters) 0   Black [place] (W/F in millimeters) 0   Birch Mix (W/F in millimeters) 0   [place]ham (W/F in millimeters) 0   Oak (W/F in millimeters) 0   Cocklebur (W/F in millimeters) 0   Gordo (W/F in millimeters) 0   White [place] (W/F in millimeters) 0   Careless (W/F in millimeters) 0   Nettle (W/F in millimeters) 0   English Plantain (W/F in millimeters) 0   Kochia (W/F in millimeters) 0   Lamb's Quarter (W/F in millimeters) 0   Marshelder (W/F in millimeters) 0   Ragweed Mix* ALK (W/F in millimeters) 0   Russian Thistle (W/F in millimeters) 0   Sagebrush/Mugwort (W/F in millimeters) 0   Sheep Sorrel (W/F in millimeters) 0   Feather Mix* ALK (W/F in millimeters) 0   Penicillium Mix (1:10 w/v) 0   Curvularia spicifera (1:10 w/v) 0    Epicoccum (1:10 w/v) 0   Aspergillus fumigatus (1:10 w/v): 0   Alternaria tenius (1:10 w/v) 0   H. Cladosporium (1:10 w/v) 0   Phoma herbarum (1:10 w/v) 0      FOOD ALLERGEN PERCUTANEOUS SKIN TESTING  [facility]  7/21/2021   Shrimp 1:20 (W/F in millimeters) 0   Lobster 1:20 (W/F in millimeters) 0   Crab 1:20 (W/F in millimeters) 0   Clam 1:20 (W/F in millimeters) 0   Oyster 1:20 (W/F in millimeters) 0   Scallops 1:20 (W/F in millimeters) 0          ASSESSMENT/PLAN:  Problem List Items Addressed This Visit        Respiratory    Allergic rhinitis     Historically spring and fall nasal and ocular symptoms.  Increase symptoms around cats.    Skin testing done today negative for environmental allergies.    -Given that she has symptoms suggestive of seasonal and perennial allergies will obtain serum IgE for environmental allergies.  -Flonase 2 sprays per nostril daily.  Use consistently.  -Oral antihistamine as needed.         Relevant Orders    Allergen cottonwood IgE    Allergen elm IgE    Allergen maple box elder IgE    Allergen silver  birch IgE    Allergen oak white IgE    Allergen jazz IgE    Allergen giant ragweed IgE    Allergen ragweed short IgE    Allergen alternaria alternata IgE    Allergen aspergillus fumigatus IgE    Allergen Epicoccum purpurascens IgE    Allergen cat epithellium IgE    Allergen dog epithelium IgE    Allergen D farinae IgE    Allergen D pteronyssinus IgE    ALLERGY SKIN TESTS,ALLERGENS [10161] (Completed)    Cough     Cough without wheezing or shortness of breath that occurs in the spring and fall.  Additionally occurs post viral illnesses.  Albuterol has been somewhat beneficial.    -Albuterol 2-4 puffs inhaled (use a spacer unless using a Proair Respiclick device) every 4 hours as needed for chest tightness, wheezing, shortness of breath and/or coughing.               Other    Shellfish allergy - Primary     Immediate vomiting and nausea after eating shellfish and mollusks..  No  other IgE mediated symptoms.    Skin testing negative for shellfish.  Skin testing negative for mollusks.    -Serum IgE for shellfish and mollusks.  If negative testing would encourage oral food challenge.  Most recent reaction was 1 year ago.  If positive blood testing will provide epinephrine and anaphylaxis action plan.         Relevant Orders    Allergen scallop IgE    Allergen shrimp IgE    Allergen clam IgE    Allergen crab IgE    Allergen lobster IgE    Allergen oyster IgE    ALLERGY SKIN TESTS,ALLERGENS [50030] (Completed)          Chart documentation with Dragon Voice recognition Software. Although reviewed after completion, some words and grammatical errors may remain.    Shelton Junior DO [email or web address]  Medical Director for Allergy/Immunology at [place], [place]

## 2021-07-21 NOTE — ASSESSMENT & PLAN NOTE
Historically spring and fall nasal and ocular symptoms.  Increase symptoms around cats.    Skin testing done today negative for environmental allergies.    -Given that she has symptoms suggestive of seasonal and perennial allergies will obtain serum IgE for environmental allergies.  -Flonase 2 sprays per nostril daily.  Use consistently.  -Oral antihistamine as needed.

## 2021-07-21 NOTE — PATIENT INSTRUCTIONS
Allergy Staff Appt Hours Shot Hours Locations    Physician     Shelton Junior DO       Support Staff     CHRIS Seals CMA  Tuesday:   [place] 7-5 Wednesday:  [place]: 7-5 Thursday:                    [place] 7-6     Friday:  [place]  7-2   [place]        Thursday: 7-5:20        Friday: 7-12:20     [place]        Tuesday: 7- 3:20 Wednesday: 7-4:20 [place] Monday: 7-2:20 Tuesday: 9-5:20         [facility]  [address] [place] [address]  Appt Line: [phone]      [facility]  [address] [place] [address]  Appt Line: [phone]         Important Scheduling Information  Aspirin Desensitization: Appt will last 2 clinic days. Please call the Allergy RN line for your clinic to schedule. Discontinue antihistamines 7 days prior to the appointment.     Food Challenges: Appt will last 3-4 hours. Please call the Allergy RN line for your clinic to schedule. Discontinue antihistamines 7 days prior to the appointment.     Penicillin Testing: Appt will last 2-3 hours. Please call the Allergy RN line for your clinic to schedule. Discontinue antihistamines 7 days prior to the appointment.     Skin Testing: Appt will about 40 minutes. Call the appointment line for your clinic to schedule. Discontinue antihistamines 7 days prior to the appointment.     Venom Testing: Appt will last 2-3 hours. Please call the Allergy RN line for your clinic to schedule. Discontinue antihistamines 7 days prior to the appointment.     Thank you for trusting us with your Allergy, Asthma, and Immunology care. Please feel free to contact us with any questions or concerns you may have.      - Flonase 2 sprays/nostril daily.   - Zyrtec 10mg daily.  - Albuterol 2-4 puffs inhaled (use a spacer unless using a Proair Respiclick device) every 4 hours as needed for chest tightness, wheezing, shortness of breath and/or coughing.   - Blood testing today.

## 2021-07-21 NOTE — ASSESSMENT & PLAN NOTE
Immediate vomiting and nausea after eating shellfish and mollusks..  No other IgE mediated symptoms.    Skin testing negative for shellfish.  Skin testing negative for mollusks.    -Serum IgE for shellfish and mollusks.  If negative testing would encourage oral food challenge.  Most recent reaction was 1 year ago.  If positive blood testing will provide epinephrine and anaphylaxis action plan.

## 2021-07-21 NOTE — LETTER
7/21/2021         RE: Batool Louis  [address] [place] [address]        Dear Colleague,    Thank you for referring your patient, Batool Louis, to the [facility]. Please see a copy of my visit note below.    Batool Louis is a 20 year old White female with previous medical history significant for allergies. Batool Louis is being seen today for evaluation of seasonal allergies and cough.     The patient reports that since middle school she has had fall and spring sneezing, ocular itching, ocular watering, coughing, rhinorrhea, congestion.  Denies postnasal drainage or sinus pressure.  She additionally will have itchy skin.  Increase symptoms around cats.  Zyrtec and Flonase have been used and somewhat beneficial.  Flonase has been used inconsistently.  She has never had allergy testing.  Sense of smell and taste intact.  She denies sinus pressure.  She reports that with upper respiratory tract infection she generally will get a cough that follows for weeks after.  Denies shortness of breath, wheezing or tightness in chest.  Recently treated with albuterol.  Albuterol was somewhat beneficial for the cough.  Never been diagnosed with asthma.    After eating shellfish specifically shrimp she has had immediate vomiting and nausea without other IgE mediated symptoms.  Similar reactions after oyster.  With canned tomatoes and ranch she has had delayed diarrhea.  She is able to tolerate fresh tomatoes.    ENVIRONMENTAL HISTORY: [place] The home is heated with a forced air, gas furnace and wood stove. They do have central air conditioning. The patient's bedroom is furnished with hard olimpia in bedroom.  Pets inside the house include 0 pets. There is history of cockroach or mice infestation. There is/are 0 smokers in the house.  The house do have a damp basement.     Past Medical History:   Diagnosis  Date     Amenorrhea, secondary      Family History   Problem Relation Age of Onset     Anemia Mother         unknown reason     Asthma Sister      Back Pain Maternal Grandmother      Alzheimer Disease Maternal Grandfather      Skin Cancer Paternal Great-Grandfather      Cancer Paternal Grandfather      Past Surgical History:   Procedure Laterality Date     NO HISTORY OF SURGERY         REVIEW OF SYSTEMS:  General: negative for weight gain. negative for weight loss. negative for changes in sleep.   Ears: negative for fullness. negative for hearing loss. negative for dizziness.   Nose: negative for snoring.negative for changes in smell. positive  for drainage.   Eyes: negative for eye watering. negative for eye itching. negative for vision changes. negative for eye redness.  Throat: negative for hoarseness. negative for sore throat. negative for trouble swallowing.   Lungs: negative for shortness of breath.negative for wheezing. negative for sputum production.   Cardiovascular: negative for chest pain. negative for swelling of ankles. negative for fast or irregular heartbeat.   Gastrointestinal: negative for nausea. negative for heartburn. negative for acid reflux.   Musculoskeletal: negative for joint pain. negative for joint stiffness. negative for joint swelling.   Neurologic: negative for seizures. negative for fainting. negative for weakness.   Psychiatric: negative for changes in mood. negative for anxiety.   Endocrine: negative for cold intolerance. negative for heat intolerance. negative for tremors.   Lymphatic: negative for lower extremity swelling. negative for lymph node swelling.   Hematologic: negative for easy bruising. negative for easy bleeding.  Integumentary: negative for rash. negative for scaling. negative for nail changes.       Current Outpatient Medications:      albuterol (PROAIR HFA/PROVENTIL HFA/VENTOLIN HFA) 108 (90 Base) MCG/ACT inhaler, Inhale 2 puffs into the lungs every 6 hours, Disp:  18 g, Rfl: 0     cetirizine (ZYRTEC) 10 MG tablet, Take 10 mg by mouth daily, Disp: , Rfl:      Cholecalciferol (D 5000) 5000 UNITS TABS, Reported on 3/7/2017, Disp: , Rfl:      fluticasone (FLONASE) 50 MCG/ACT nasal spray, Spray 2 sprays into both nostrils daily, Disp: 15.8 mL, Rfl: 1     Homeopathic Products (ZICAM ALLERGY RELIEF NA), , Disp: , Rfl:      levonorgestrel (KYLEENA) 19.5 MG IUD, 1 each by Intrauterine route once Put in 8/12/2020, Disp: , Rfl:      spironolactone (ALDACTONE) 50 MG tablet, Take 50mg (1 pill) in the morning, 100mg (2 pills) in the evening., Disp: 270 tablet, Rfl: 1     spironolactone (ALDACTONE) 50 MG tablet, Take 2 tablets (100 mg) by mouth daily, Disp: 60 tablet, Rfl: 2  Immunization History   Administered Date(s) Administered     DTAP (<7y) 1998, 1998, 02/05/1999, 08/02/2004     FLU 6-35 months 10/05/2009, 01/11/2013     P6g6-51 Novel Flu- Nasal 12/15/2009     HPV Quadrivalent 05/05/2011, 08/13/2014     HPV9 09/09/2016     Hep B, Peds or Adolescent 1998, 1998, 02/05/1999     HepA-ped 2 Dose 11/01/2006, 04/03/2009     Hib (PRP-T) 1998, 1998, 02/05/1999     Historical DTP/aP 1998, 1998, 02/05/1999, 11/05/1999, 08/02/2004     Influenza (IIV3) PF 11/01/2006     Influenza Vaccine IM > 6 months Valent IIV4 10/15/2014, 10/28/2015, 11/15/2016, 12/13/2017, 03/12/2020     MMR 02/05/1999, 11/05/1999, 08/02/2004     Meningococcal (Menveo ) 05/05/2011, 08/13/2014     OPV, trivalent, live 11/05/1999     Pedvax-hib 1998, 1998, 02/05/1999     Pneumococcal (PCV 7) 10/17/2001     Poliovirus, inactivated (IPV) 1998, 1998, 11/05/1999, 08/02/2004     Rotavirus, pentavalent 02/05/1999     TDAP Vaccine (Adacel) 05/05/2011, 07/10/2020     TRIHIBIT (DTAP/HIB, <7y) 11/05/1999     Varicella 10/17/2001, 04/03/2009     Allergies   Allergen Reactions     Sulfamethoxazole-Trimethoprim Hives         EXAM:   Constitutional:  Appears  well-developed and well-nourished. No distress.   HEENT:   Head: Normocephalic.   Cobblestoning of posterior oropharynx.   Boggy nasal tissue and pale.    Eyes: Conjunctivae are non-erythematous   No maxillary or frontal sinus tenderness to palpation.   Cardiovascular: Normal rate, regular rhythm and normal heart sounds. Exam reveals no gallop and no friction rub.   No murmur heard.  Respiratory: Effort normal and breath sounds normal. No respiratory distress. No wheezes. No rales.   Musculoskeletal: Normal range of motion.   Neuro: Oriented to person, place, and time.  Skin: Skin is warm and dry. No rash noted.   Psychiatric: Normal mood and affect.     Nursing note and vitals reviewed.      WORKUP:   ENVIRONMENTAL PERCUTANEOUS SKIN TESTING: ADULT  [facility] 7/21/2021   Consent Y   Ordering Physician Dr. Junior   Interpreting Physician Dr. Junior   Testing Technician al   Location Back   Time start:  8:45 AM   Time End:  9:00 AM   Positive Control: Histatrol*ALK 1 mg/ml 5/40   Negative Control: 50% Glycerin 00   Cat Hair*ALK (10,000 BAU/ml) 0   AP Dog Hair/Dander (1:100 w/v) 0   Dust Mite p. 30,000 AU/ml 0   Dust Mite f. (30,000 AU/ml) 0   Toño (W/F in millimeters) 0   Carlos Grass (100,000 BAU/mL) 0   Red Cedar (W/F in millimeters) 0   Maple/Irons (W/F in millimeters) 0   Hackberry (W/F in millimeters) 0   Merlin (W/F in millimeters) 0   Labette *ALK (W/F in millimeters) 0   American Elm (W/F in millimeters) 0   Austin (W/F in millimeters) 0   Black [place] (W/F in millimeters) 0   Birch Mix (W/F in millimeters) 0   [place] (W/F in millimeters) 0   Oak (W/F in millimeters) 0   Cocklebur (W/F in millimeters) 0   Neches (W/F in millimeters) 0   White [place] (W/F in millimeters) 0   Careless (W/F in millimeters) 0   Nettle (W/F in millimeters) 0   English Plantain (W/F in millimeters) 0   Kochia (W/F in millimeters) 0   Lamb's Quarter (W/F in millimeters) 0   Marshelder (W/F in millimeters) 0    Ragweed Mix* ALK (W/F in millimeters) 0   Russian Thistle (W/F in millimeters) 0   Sagebrush/Mugwort (W/F in millimeters) 0   Sheep Sorrel (W/F in millimeters) 0   Feather Mix* ALK (W/F in millimeters) 0   Penicillium Mix (1:10 w/v) 0   Curvularia spicifera (1:10 w/v) 0   Epicoccum (1:10 w/v) 0   Aspergillus fumigatus (1:10 w/v): 0   Alternaria tenius (1:10 w/v) 0   H. Cladosporium (1:10 w/v) 0   Phoma herbarum (1:10 w/v) 0      FOOD ALLERGEN PERCUTANEOUS SKIN TESTING  [facility] [employer]  7/21/2021   Shrimp 1:20 (W/F in millimeters) 0   Lobster 1:20 (W/F in millimeters) 0   Crab 1:20 (W/F in millimeters) 0   Clam 1:20 (W/F in millimeters) 0   Oyster 1:20 (W/F in millimeters) 0   Scallops 1:20 (W/F in millimeters) 0          ASSESSMENT/PLAN:  Problem List Items Addressed This Visit        Respiratory    Allergic rhinitis     Historically spring and fall nasal and ocular symptoms.  Increase symptoms around cats.    Skin testing done today negative for environmental allergies.    -Given that she has symptoms suggestive of seasonal and perennial allergies will obtain serum IgE for environmental allergies.  -Flonase 2 sprays per nostril daily.  Use consistently.  -Oral antihistamine as needed.         Relevant Orders    Allergen cottonwood IgE    Allergen elm IgE    Allergen maple box elder IgE    Allergen silver  birch IgE    Allergen oak white IgE    Allergen jazz IgE    Allergen giant ragweed IgE    Allergen ragweed short IgE    Allergen alternaria alternata IgE    Allergen aspergillus fumigatus IgE    Allergen Epicoccum purpurascens IgE    Allergen cat epithellium IgE    Allergen dog epithelium IgE    Allergen D farinae IgE    Allergen D pteronyssinus IgE    ALLERGY SKIN TESTS,ALLERGENS [[id number]] (Completed)    Cough     Cough without wheezing or shortness of breath that occurs in the spring and fall.  Additionally occurs post viral illnesses.  Albuterol has been somewhat beneficial.    -Albuterol 2-4 puffs inhaled  (use a spacer unless using a Proair Respiclick device) every 4 hours as needed for chest tightness, wheezing, shortness of breath and/or coughing.               Other    Shellfish allergy - Primary     Immediate vomiting and nausea after eating shellfish and mollusks..  No other IgE mediated symptoms.    Skin testing negative for shellfish.  Skin testing negative for mollusks.    -Serum IgE for shellfish and mollusks.  If negative testing would encourage oral food challenge.  Most recent reaction was 1 year ago.  If positive blood testing will provide epinephrine and anaphylaxis action plan.         Relevant Orders    Allergen scallop IgE    Allergen shrimp IgE    Allergen clam IgE    Allergen crab IgE    Allergen lobster IgE    Allergen oyster IgE    ALLERGY SKIN TESTS,ALLERGENS [[id number]] (Completed)          Chart documentation with Dragon Voice recognition Software. Although reviewed after completion, some words and grammatical errors may remain.    Shelton Junior DO FAAAAI  Medical Director for Allergy/Immunology at [place], [place]        Again, thank you for allowing me to participate in the care of your patient.        Sincerely,        Shelton Junior MD

## 2021-07-21 NOTE — ASSESSMENT & PLAN NOTE
Cough without wheezing or shortness of breath that occurs in the spring and fall.  Additionally occurs post viral illnesses.  Albuterol has been somewhat beneficial.    -Albuterol 2-4 puffs inhaled (use a spacer unless using a Proair Respiclick device) every 4 hours as needed for chest tightness, wheezing, shortness of breath and/or coughing.

## 2021-07-22 LAB
A ALTERNATA IGE QN: <0.1 KU(A)/L
A FUMIGATUS IGE QN: <0.1 KU(A)/L
CAT DANDER IGG QN: <0.1 KU(A)/L
CLAM IGE QN: <0.1 KU(A)/L
COMMON RAGWEED IGE QN: <0.1 KU(A)/L
COTTONWOOD IGE QN: <0.1 KU(A)/L
CRAB IGE QN: <0.1 KU(A)/L
D FARINAE IGE QN: <0.1 KU(A)/L
D PTERONYSS IGE QN: <0.1 KU(A)/L
DOG DANDER+EPITH IGE QN: <0.1 KU(A)/L
E PURPURASCENS IGE QN: <0.1 KU(A)/L
GIANT RAGWEED IGE QN: <0.1 KU(A)/L
LOBSTER IGE QN: <0.1 KU(A)/L
MAPLE IGE QN: <0.1 KU(A)/L
OYSTER IGE QN: <0.1 KU(A)/L
SCALLOP IGE QN: <0.1 KU(A)/L
SHRIMP IGE QN: <0.1 KU(A)/L
SILVER BIRCH IGE QN: <0.1 KU(A)/L
TIMOTHY IGE QN: <0.1 KU(A)/L
WHITE ELM IGE QN: <0.1 KU(A)/L
WHITE OAK IGE QN: <0.1 KU(A)/L

## 2021-07-25 ENCOUNTER — OFFICE VISIT (OUTPATIENT)
Dept: URGENT CARE | Facility: URGENT CARE | Age: 21
End: 2021-07-25
Payer: COMMERCIAL

## 2021-07-25 VITALS
HEART RATE: 86 BPM | HEIGHT: 68 IN | DIASTOLIC BLOOD PRESSURE: 70 MMHG | BODY MASS INDEX: 31.22 KG/M2 | WEIGHT: 206 LBS | OXYGEN SATURATION: 100 % | RESPIRATION RATE: 16 BRPM | TEMPERATURE: 96.3 F | SYSTOLIC BLOOD PRESSURE: 110 MMHG

## 2021-07-25 DIAGNOSIS — R30.0 DYSURIA: Primary | ICD-10-CM

## 2021-07-25 DIAGNOSIS — N30.00 ACUTE CYSTITIS WITHOUT HEMATURIA: ICD-10-CM

## 2021-07-25 LAB
ALBUMIN UR-MCNC: NEGATIVE MG/DL
APPEARANCE UR: CLEAR
BACTERIA #/AREA URNS HPF: ABNORMAL /HPF
BILIRUB UR QL STRIP: NEGATIVE
COLOR UR AUTO: YELLOW
GLUCOSE UR STRIP-MCNC: NEGATIVE MG/DL
HGB UR QL STRIP: ABNORMAL
KETONES UR STRIP-MCNC: NEGATIVE MG/DL
LEUKOCYTE ESTERASE UR QL STRIP: ABNORMAL
NITRATE UR QL: NEGATIVE
PH UR STRIP: 6.5 [PH] (ref 5–7)
RBC #/AREA URNS AUTO: ABNORMAL /HPF
SP GR UR STRIP: <=1.005 (ref 1–1.03)
SQUAMOUS #/AREA URNS AUTO: ABNORMAL /LPF
UROBILINOGEN UR STRIP-ACNC: 0.2 E.U./DL
WBC #/AREA URNS AUTO: ABNORMAL /HPF
WBC CLUMPS #/AREA URNS HPF: PRESENT /HPF

## 2021-07-25 PROCEDURE — 87086 URINE CULTURE/COLONY COUNT: CPT | Performed by: NURSE PRACTITIONER

## 2021-07-25 PROCEDURE — 99213 OFFICE O/P EST LOW 20 MIN: CPT | Performed by: NURSE PRACTITIONER

## 2021-07-25 PROCEDURE — 87186 SC STD MICRODIL/AGAR DIL: CPT | Performed by: NURSE PRACTITIONER

## 2021-07-25 PROCEDURE — 81001 URINALYSIS AUTO W/SCOPE: CPT | Performed by: NURSE PRACTITIONER

## 2021-07-25 RX ORDER — PHENAZOPYRIDINE HYDROCHLORIDE 200 MG/1
200 TABLET, FILM COATED ORAL 3 TIMES DAILY PRN
Qty: 9 TABLET | Refills: 0 | Status: SHIPPED | OUTPATIENT
Start: 2021-07-25 | End: 2021-09-16

## 2021-07-25 RX ORDER — IBUPROFEN 400 MG/1
400 TABLET, FILM COATED ORAL ONCE
Status: COMPLETED | OUTPATIENT
Start: 2021-07-25 | End: 2021-07-25

## 2021-07-25 RX ORDER — NITROFURANTOIN 25; 75 MG/1; MG/1
100 CAPSULE ORAL 2 TIMES DAILY
Qty: 14 CAPSULE | Refills: 0 | Status: SHIPPED | OUTPATIENT
Start: 2021-07-25 | End: 2021-08-01

## 2021-07-25 RX ADMIN — IBUPROFEN 400 MG: 400 TABLET, FILM COATED ORAL at 11:35

## 2021-07-25 ASSESSMENT — MIFFLIN-ST. JEOR: SCORE: 1742.91

## 2021-07-25 NOTE — PATIENT INSTRUCTIONS
Antibiotics as directed   Urine culture is pending, will contact you if there is a change in treatment therapy.   Drink plenty of fluids. Prevention and treatment of UTI's discussed.   Signs and symptoms of pyelonephritis mentioned.   RTC if any worsening symptoms or if not improving as expected.   Can Use Pyridium 200 mg three times a day for 3 days. Stop Taking after 3 days. Take with food to limit stomach upset and note it will change urine a different color and stain underwear.   After completion of your antibiotic return for a repeat urinalysis.   You will just need to call the clinic to make a lab only appointment     Patient Education     Bladder Infection, Female (Adult)     Urine normally doesn't have any germs (bacteria) in it. But bacteria can get into the urinary tract from the skin around the rectum. Or they can travel in the blood from other parts of the body. Once they are in your urinary tract, they can cause infection in these areas:    The urethra (urethritis)    The bladder (cystitis)    The kidneys (pyelonephritis)  The most common place for an infection is in the bladder. This is called a bladder infection. This is one of the most common infections in women. Most bladder infections are easily treated. They are not serious unless the infection spreads to the kidney.  The terms bladder infection, UTI, and cystitis are often used to describe the same thing. But they are not always the same. Cystitis is an inflammation of the bladder. The most common cause of cystitis is an infection.  Symptoms  The infection causes inflammation in the urethra and bladder. This causes many of the symptoms. The most common symptoms of a bladder infection are:    Pain or burning when urinating    Having to urinate more often than normal    Urgent need to urinate    Only a small amount of urine comes out    Blood in urine    Belly (abdominal) discomfort. This is often in the lower belly above the pubic bone.    Cloudy  urine    Strong- or bad-smelling urine    Unable to urinate (urinary retention)    Unable to hold urine in (urinary incontinence)    Fever    Loss of appetite    Confusion (in older adults)  Causes  Bladder infections are not contagious. You can't get one from someone else, from a toilet seat, or from sharing a bath.  The most common cause of bladder infections is bacteria from the bowels. The bacteria get onto the skin around the opening of the urethra. From there, they can get into the urine. Then they travel up to the bladder, causing inflammation and infection. This often happens because of:    Wiping incorrectly after urinating. Always wipe from front to back.    Bowel incontinence    Pregnancy    Procedures such as having a catheter put in    Older age    Not emptying your bladder. This can give bacteria a chance to grow in your urine.    Fluid loss (dehydration)    Constipation    Having sex    Using a diaphragm for birth control   Treatment  Bladder infections are diagnosed by a urine test and urine culture. They are treated with antibiotics. They often clear up quickly without problems. Treatment helps prevent a more serious kidney infection.  Medicines  Medicines can help in the treatment of a bladder infection:    Take antibiotics until they are used up, even if you feel better. It's important to finish them to make sure the infection has cleared.    You can use acetaminophen or ibuprofen for pain, fever, or discomfort, unless another medicine was prescribed. If you have long-term (chronic) liver or kidney disease, talk with your healthcare provider before using these medicines. Also talk with your provider if you've ever had a stomach ulcer or GI (gastrointestinal) bleeding, or are taking blood-thinner medicines.    If you are given phenazopydridine to reduce burning with urination, it will make your urine a bright orange color. This can stain clothing.  Care and prevention  These self-care steps can  help prevent future infections:    Drink plenty of fluids. This helps to prevent dehydration and flush out your bladder. Do this unless you must restrict fluids for other health reasons, or your healthcare provider told you not to.    Clean yourself correctly after going to the bathroom. Wipe from front to back after using the toilet. This helps prevent the spread of bacteria.    Urinate more often. Don't try to hold urine in for a long time.    Wear loose-fitting clothes and cotton underwear. Don't wear tight-fitting pants.    Improve your diet and prevent constipation. Eat more fresh fruits and vegetables, and fiber. Eat less junk foods and fatty foods.    Don't have sex until your symptoms are gone.    Don't have caffeine, alcohol, and spicy foods. These can irritate your bladder.    Urinate right after you have sex to flush out your bladder.    If you use birth control pills and have frequent bladder infections, discuss it with your healthcare provider.  Follow-up care  Call your healthcare provider if all symptoms are not gone after 3 days of treatment. This is especially important if you have repeat infections.  If a culture was done, you will be told if your treatment needs to be changed. If directed, you can call to find out the results.  If X-rays were done, you will be told if the results will affect your treatment.  Call 911  Call 911 if any of the following occur:    Trouble breathing    Hard to wake up or confusion    Fainting (loss of consciousness)    Fast heart rate  When to get medical advice  Call your healthcare provider right away if any of these occur:    Fever of 100.4 F (38.0 C) or higher, or as directed by your healthcare provider    Symptoms are not better after 3 days of treatment    Back or belly pain that gets worse    Repeated vomiting, or unable to keep medicine down    Weakness or dizziness    Vaginal discharge    Pain, redness, or swelling in the outer vaginal area (labia)  [employer]  last reviewed this educational content on 11/1/2019 2000-2021 [employer]. All rights reserved. This information is not intended as a substitute for professional medical care. Always follow your healthcare professional's instructions.           Patient Education     Urinary Tract Infections in Women  Urinary tract infections (UTIs) are most often caused by bacteria. These bacteria enter the urinary tract. The bacteria may come from inside the body. Or they may travel from the skin outside the rectum or vagina into the urethra. Female anatomy makes it easy for bacteria from the bowel to enter a woman s urinary tract, which is the most common source of UTI. This means women develop UTIs more often than men. Pain in or around the urinary tract is a common UTI symptom. But the only way to know for sure if you have a UTI for the healthcare provider to test your urine. The two tests that may be done are the urinalysis and urine culture.     Types of UTIs    Cystitis. A bladder infection (cystitis) is the most common UTI in women. You may have urgent or frequent need to pee. You may also have pain, burning when you pee, and bloody urine.    Urethritis. This is an inflamed urethra, which is the tube that carries urine from the bladder to outside the body. You may have lower stomach or back pain. You may also have urgent or frequent need to pee.    Pyelonephritis. This is a kidney infection. If not treated, it can be serious and damage your kidneys. In severe cases, you may need to stay in the hospital. You may have a fever and lower back pain.    Medicines to treat a UTI  Most UTIs are treated with antibiotics. These kill the bacteria. The length of time you need to take them depends on the type of infection. It may be as short as 3 days. If you have repeated UTIs, you may need a low-dose antibiotic for several months. Take antibiotics exactly as directed. Don t stop taking them until all of the medicine is  gone. If you stop taking the antibiotic too soon, the infection may not go away. You may also develop a resistance to the antibiotic. This can make it much harder to treat.   Lifestyle changes to treat and prevent UTIs   The lifestyle changes below will help get rid of your UTI. They may also help prevent future UTIs.     Drink plenty of fluids. This includes water, juice, or other caffeine-free drinks. Fluids help flush bacteria out of your body.    Empty your bladder. Always empty your bladder when you feel the urge to pee. And always pee before going to sleep. Urine that stays in your bladder can lead to infection. Try to pee before and after sex as well.    Practice good personal hygiene. Wipe yourself from front to back after using the toilet. This helps keep bacteria from getting into the urethra.    Use condoms during sex. These help prevent UTIs caused by sexually transmitted bacteria. Also don't use spermicides during sex. These can increase the risk for UTIs. Choose other forms of birth control instead. For women who tend to get UTIs after sex, a low-dose of a preventive antibiotic may be used. Be sure to discuss this option with your healthcare provider.    Follow up with your healthcare provider as directed. He or she may test to make sure the infection has cleared. If needed, more treatment may be started.  StayWell last reviewed this educational content on 7/1/2019 2000-2021 [employer]. All rights reserved. This information is not intended as a substitute for professional medical care. Always follow your healthcare professional's instructions.

## 2021-07-25 NOTE — PROGRESS NOTES
"    Assessment & Plan   Problem List Items Addressed This Visit     None      Visit Diagnoses     Dysuria    -  Primary    Relevant Medications    ibuprofen (ADVIL/MOTRIN) tablet 400 mg (Completed) (Start on 7/25/2021 12:00 PM)    phenazopyridine (PYRIDIUM) 200 MG tablet    Other Relevant Orders    UA macro with reflex to Microscopic and Culture - Clinc Collect (Completed)    Urine Microscopic Exam (Completed)    Urine Culture    Acute cystitis without hematuria        Relevant Medications    phenazopyridine (PYRIDIUM) 200 MG tablet    nitroFURantoin macrocrystal-monohydrate (MACROBID) 100 MG capsule             15 minutes spent on the date of the encounter doing chart review, history and exam, documentation and further activities per the note       BMI:   Estimated body mass index is 31.32 kg/m  as calculated from the following:    Height as of this encounter: 1.727 m (5' 8\").    Weight as of this encounter: 93.4 kg (206 lb).   Weight management plan: Patient was referred to their PCP to discuss a diet and exercise plan.    Patient Instructions   Antibiotics as directed   Urine culture is pending, will contact you if there is a change in treatment therapy.   Drink plenty of fluids. Prevention and treatment of UTI's discussed.   Signs and symptoms of pyelonephritis mentioned.   RTC if any worsening symptoms or if not improving as expected.   Can Use Pyridium 200 mg three times a day for 3 days. Stop Taking after 3 days. Take with food to limit stomach upset and note it will change urine a different color and stain underwear.   After completion of your antibiotic return for a repeat urinalysis.   You will just need to call the clinic to make a lab only appointment     Patient Education     Bladder Infection, Female (Adult)     Urine normally doesn't have any germs (bacteria) in it. But bacteria can get into the urinary tract from the skin around the rectum. Or they can travel in the blood from other parts of the " body. Once they are in your urinary tract, they can cause infection in these areas:    The urethra (urethritis)    The bladder (cystitis)    The kidneys (pyelonephritis)  The most common place for an infection is in the bladder. This is called a bladder infection. This is one of the most common infections in women. Most bladder infections are easily treated. They are not serious unless the infection spreads to the kidney.  The terms bladder infection, UTI, and cystitis are often used to describe the same thing. But they are not always the same. Cystitis is an inflammation of the bladder. The most common cause of cystitis is an infection.  Symptoms  The infection causes inflammation in the urethra and bladder. This causes many of the symptoms. The most common symptoms of a bladder infection are:    Pain or burning when urinating    Having to urinate more often than normal    Urgent need to urinate    Only a small amount of urine comes out    Blood in urine    Belly (abdominal) discomfort. This is often in the lower belly above the pubic bone.    Cloudy urine    Strong- or bad-smelling urine    Unable to urinate (urinary retention)    Unable to hold urine in (urinary incontinence)    Fever    Loss of appetite    Confusion (in older adults)  Causes  Bladder infections are not contagious. You can't get one from someone else, from a toilet seat, or from sharing a bath.  The most common cause of bladder infections is bacteria from the bowels. The bacteria get onto the skin around the opening of the urethra. From there, they can get into the urine. Then they travel up to the bladder, causing inflammation and infection. This often happens because of:    Wiping incorrectly after urinating. Always wipe from front to back.    Bowel incontinence    Pregnancy    Procedures such as having a catheter put in    Older age    Not emptying your bladder. This can give bacteria a chance to grow in your urine.    Fluid loss  (dehydration)    Constipation    Having sex    Using a diaphragm for birth control   Treatment  Bladder infections are diagnosed by a urine test and urine culture. They are treated with antibiotics. They often clear up quickly without problems. Treatment helps prevent a more serious kidney infection.  Medicines  Medicines can help in the treatment of a bladder infection:    Take antibiotics until they are used up, even if you feel better. It's important to finish them to make sure the infection has cleared.    You can use acetaminophen or ibuprofen for pain, fever, or discomfort, unless another medicine was prescribed. If you have long-term (chronic) liver or kidney disease, talk with your healthcare provider before using these medicines. Also talk with your provider if you've ever had a stomach ulcer or GI (gastrointestinal) bleeding, or are taking blood-thinner medicines.    If you are given phenazopydridine to reduce burning with urination, it will make your urine a bright orange color. This can stain clothing.  Care and prevention  These self-care steps can help prevent future infections:    Drink plenty of fluids. This helps to prevent dehydration and flush out your bladder. Do this unless you must restrict fluids for other health reasons, or your healthcare provider told you not to.    Clean yourself correctly after going to the bathroom. Wipe from front to back after using the toilet. This helps prevent the spread of bacteria.    Urinate more often. Don't try to hold urine in for a long time.    Wear loose-fitting clothes and cotton underwear. Don't wear tight-fitting pants.    Improve your diet and prevent constipation. Eat more fresh fruits and vegetables, and fiber. Eat less junk foods and fatty foods.    Don't have sex until your symptoms are gone.    Don't have caffeine, alcohol, and spicy foods. These can irritate your bladder.    Urinate right after you have sex to flush out your bladder.    If you use  birth control pills and have frequent bladder infections, discuss it with your healthcare provider.  Follow-up care  Call your healthcare provider if all symptoms are not gone after 3 days of treatment. This is especially important if you have repeat infections.  If a culture was done, you will be told if your treatment needs to be changed. If directed, you can call to find out the results.  If X-rays were done, you will be told if the results will affect your treatment.  Call 911  Call 911 if any of the following occur:    Trouble breathing    Hard to wake up or confusion    Fainting (loss of consciousness)    Fast heart rate  When to get medical advice  Call your healthcare provider right away if any of these occur:    Fever of 100.4 F (38.0 C) or higher, or as directed by your healthcare provider    Symptoms are not better after 3 days of treatment    Back or belly pain that gets worse    Repeated vomiting, or unable to keep medicine down    Weakness or dizziness    Vaginal discharge    Pain, redness, or swelling in the outer vaginal area (labia)  [employer] last reviewed this educational content on 11/1/2019 2000-2021 [employer]. All rights reserved. This information is not intended as a substitute for professional medical care. Always follow your healthcare professional's instructions.           Patient Education     Urinary Tract Infections in Women  Urinary tract infections (UTIs) are most often caused by bacteria. These bacteria enter the urinary tract. The bacteria may come from inside the body. Or they may travel from the skin outside the rectum or vagina into the urethra. Female anatomy makes it easy for bacteria from the bowel to enter a woman s urinary tract, which is the most common source of UTI. This means women develop UTIs more often than men. Pain in or around the urinary tract is a common UTI symptom. But the only way to know for sure if you have a UTI for the healthcare provider  to test your urine. The two tests that may be done are the urinalysis and urine culture.     Types of UTIs    Cystitis. A bladder infection (cystitis) is the most common UTI in women. You may have urgent or frequent need to pee. You may also have pain, burning when you pee, and bloody urine.    Urethritis. This is an inflamed urethra, which is the tube that carries urine from the bladder to outside the body. You may have lower stomach or back pain. You may also have urgent or frequent need to pee.    Pyelonephritis. This is a kidney infection. If not treated, it can be serious and damage your kidneys. In severe cases, you may need to stay in the hospital. You may have a fever and lower back pain.    Medicines to treat a UTI  Most UTIs are treated with antibiotics. These kill the bacteria. The length of time you need to take them depends on the type of infection. It may be as short as 3 days. If you have repeated UTIs, you may need a low-dose antibiotic for several months. Take antibiotics exactly as directed. Don t stop taking them until all of the medicine is gone. If you stop taking the antibiotic too soon, the infection may not go away. You may also develop a resistance to the antibiotic. This can make it much harder to treat.   Lifestyle changes to treat and prevent UTIs   The lifestyle changes below will help get rid of your UTI. They may also help prevent future UTIs.     Drink plenty of fluids. This includes water, juice, or other caffeine-free drinks. Fluids help flush bacteria out of your body.    Empty your bladder. Always empty your bladder when you feel the urge to pee. And always pee before going to sleep. Urine that stays in your bladder can lead to infection. Try to pee before and after sex as well.    Practice good personal hygiene. Wipe yourself from front to back after using the toilet. This helps keep bacteria from getting into the urethra.    Use condoms during sex. These help prevent UTIs caused  "by sexually transmitted bacteria. Also don't use spermicides during sex. These can increase the risk for UTIs. Choose other forms of birth control instead. For women who tend to get UTIs after sex, a low-dose of a preventive antibiotic may be used. Be sure to discuss this option with your healthcare provider.    Follow up with your healthcare provider as directed. He or she may test to make sure the infection has cleared. If needed, more treatment may be started.  [employer] last reviewed this educational content on 7/1/2019 2000-2021 [employer]. All rights reserved. This information is not intended as a substitute for professional medical care. Always follow your healthcare professional's instructions.               Return in about 1 week (around 8/1/2021), or if symptoms worsen or fail to improve, for Follow up with your primary care provider.    RONNY Escobar [facility]    Channing Stoll is a 20 year old who presents for the following health issues     HPI           Review of Systems   Constitutional, HEENT, cardiovascular, pulmonary, GI, , musculoskeletal, neuro, skin, endocrine and psych systems are negative, except as otherwise noted.      Objective    /70 (BP Location: Right arm, Patient Position: Sitting, Cuff Size: Adult Regular)   Pulse 86   Temp (!) 96.3  F (35.7  C) (Tympanic)   Resp 16   Ht 1.727 m (5' 8\")   Wt 93.4 kg (206 lb)   SpO2 100%   BMI 31.32 kg/m    Body mass index is 31.32 kg/m .  Physical Exam   GENERAL: healthy, alert and no distress, nontoxic in appearance  EYES: Eyes grossly normal to inspection, PERRL and conjunctivae and sclerae normal  HENT: normocephalic  NECK: supple with full ROM  RESP: lungs clear to auscultation - no rales, rhonchi or wheezes  CV: regular rate and rhythm, normal S1 S2, no S3 or S4, no murmur, click or rub, no peripheral edema   ABDOMEN: soft, nontender  MS: no gross musculoskeletal " defects noted, no edema  Neg CVA tenderness, no rash    Results for orders placed or performed in visit on 07/25/21 (from the past 24 hour(s))   UA macro with reflex to Microscopic and Culture - Clinc Collect    Specimen: Urine, Clean Catch   Result Value Ref Range    Color Urine Yellow Colorless, Straw, Light Yellow, Yellow    Appearance Urine Clear Clear    Glucose Urine Negative Negative mg/dL    Bilirubin Urine Negative Negative    Ketones Urine Negative Negative mg/dL    Specific Gravity Urine <=1.005 1.003 - 1.035    Blood Urine Large (A) Negative    pH Urine 6.5 5.0 - 7.0    Protein Albumin Urine Negative Negative mg/dL    Urobilinogen Urine 0.2 0.2, 1.0 E.U./dL    Nitrite Urine Negative Negative    Leukocyte Esterase Urine Large (A) Negative   Urine Microscopic Exam   Result Value Ref Range    Bacteria Urine Few (A) None Seen /HPF    RBC Urine 0-2 0-2 /HPF /HPF    WBC Urine 25-50 (A) 0-5 /HPF /HPF    Squamous Epithelials Urine Few (A) None Seen /LPF    WBC Clumps Urine Present (A) None Seen /HPF

## 2021-07-25 NOTE — NURSING NOTE
Clinic Administered Medication Documentation    Administrations This Visit     ibuprofen (ADVIL/MOTRIN) tablet 400 mg     Admin Date  07/25/2021 Action  Given Dose  400 mg Route  Oral Site   Administered By  Angelita Miguel CMA    Ordering Provider: Celsa Rahman APRN CNP    Patient Supplied?: No                Oral Medication Documentation    Patient was given Ibuprofen . Prior to medication administration, verified patients identity using patient s name and date of birth. Please see MAR and medication order for additional information.     Was entire amount of medication used? Yes  Expiration Date: 9/22 Lot#[id number]  Angelita Miguel CMA

## 2021-07-26 LAB — BACTERIA UR CULT: ABNORMAL

## 2021-07-26 NOTE — RESULT ENCOUNTER NOTE
[facility] Emergency Dept discharge antibiotic (if prescribed): Nitrofurantoin Macrocrystal-Monohydrate (Macrobid) 100 mg PO capsule, 1 capsule (100 mg) by mouth 2 times daily for 7 days  Date of Rx (if applicable):  7/25 - 8/1  No changes in treatment per [facility] ED Lab Result Urine culture protocol.

## 2021-07-27 NOTE — RESULT ENCOUNTER NOTE
Final Urine Culture Report on 7/26/21  [facility] Emergency Dept discharge antibiotic prescribed: Nitrofurantoin Macrocrystal-Monohydrate (Macrobid) 100 mg PO capsule, 1 capsule (100 mg) by mouth 2 times daily for 7 days  #1. Bacteria, >100,000 colonies/mL Escherichia coli, is SUSCEPTIBLE to Antibiotic.    Recommendations in treatment per [facility] ED lab result Urine Culture protocol.

## 2021-08-02 ENCOUNTER — IMMUNIZATION (OUTPATIENT)
Dept: NURSING | Facility: CLINIC | Age: 21
End: 2021-08-02
Payer: COMMERCIAL

## 2021-08-02 PROCEDURE — 0001A PR COVID VAC PFIZER DIL RECON 30 MCG/0.3 ML IM: CPT

## 2021-08-02 PROCEDURE — 91300 PR COVID VAC PFIZER DIL RECON 30 MCG/0.3 ML IM: CPT

## 2021-08-03 DIAGNOSIS — J40 BRONCHITIS: ICD-10-CM

## 2021-08-03 RX ORDER — ALBUTEROL SULFATE 90 UG/1
2 AEROSOL, METERED RESPIRATORY (INHALATION) EVERY 6 HOURS
Qty: 18 G | Refills: 0 | Status: SHIPPED | OUTPATIENT
Start: 2021-08-03 | End: 2023-07-03

## 2021-08-16 ENCOUNTER — VIRTUAL VISIT (OUTPATIENT)
Dept: PSYCHOLOGY | Facility: CLINIC | Age: 21
End: 2021-08-16
Payer: COMMERCIAL

## 2021-08-16 DIAGNOSIS — F41.1 GENERALIZED ANXIETY DISORDER: Primary | ICD-10-CM

## 2021-08-16 PROCEDURE — 90834 PSYTX W PT 45 MINUTES: CPT | Mod: GT | Performed by: COUNSELOR

## 2021-08-16 NOTE — PROGRESS NOTES
Progress Note    Client Name: Batool Louis  Date: 8/16/21         Service Type: Individual      Session Start Time: 8:35am   Session End Time: 9:25am      Session Length:   50 mins     Session #: 34     Attendees: Client    Treatment Plan Last Reviewed: 8/16/21  PHQ-9 / SHELBY-7 : see chart    Telemedicine Visit: The patient's condition can be safely assessed and treated via synchronous audio and visual telemedicine encounter.      Reason for Telemedicine Visit: Services only offered telehealth    Originating Site (Patient Location): Patient's home    Distant Site (Provider Location): Provider Remote Setting    Consent:  The patient/guardian has verbally consented to: the potential risks and benefits of telemedicine (video visit) versus in person care; bill my insurance or make self-payment for services provided; and responsibility for payment of non-covered services.     Mode of Communication:  Video Conference via [employer]/telephone    As the provider I attest to compliance with applicable laws and regulations related to telemedicine.     DATA      Progress Since Last Session (Related to Symptoms / Goals / Homework):   Symptoms: No change .    Homework: Completed in session      Episode of Care Goals: Satisfactory progress - ACTION (Actively working towards change); Intervened by reinforcing change plan / affirming steps taken     Current / Ongoing Stressors and Concerns:  The client stated theres been a lot going on.   She stated her best friend and ex kissed her before she left for a year long study abroad.   She stated two other people in her life are moving away.   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   She has been dating as well.      Treatment Objective(s) Addressed in This Session:   use cognitive strategies identified in therapy to challenge anxious thoughts       Intervention:   Validated the client's feelings regarding the  dating and relationships. Encouraged the client to be assertive with the one lyndon she's been seeing more. Talked about how she is worried about hurting the lyndon's feelings by telling him she doesn't feel like it's going to work. Processed her feelings about her ex and what she would like to happen with that. Talked about how she can talk to her ex assertively about her feelings. Talked about how she can take care of herself around these events and emotions.      ASSESSMENT: Current Emotional / Mental Status (status of significant symptoms):   Risk status (Self / Other harm or suicidal ideation)   Client denies current fears or concerns for personal safety.   Client denies current or recent suicidal ideation or behaviors.   Client denies current or recent homicidal ideation or behaviors.   Client denies current or recent self injurious behavior or ideation.   Client denies other safety concerns.   A safety and risk management plan has not been developed at this time, however client was given the after-hours number / 911 should there be a change in any of these risk factors.     Appearance:   Appropriate    Eye Contact:   Good    Psychomotor Behavior: Normal    Attitude:   Cooperative    Orientation:   All   Speech    Rate / Production: Normal     Volume:  Normal    Mood:    Normal    Affect:    Appropriate    Thought Content:  Clear    Thought Form:  Coherent  Logical    Insight:    Good      Medication Review:   No changes to current psychiatric medication(s)     Medication Compliance:   Yes     Changes in Health Issues:   None reported : client is on birth control again     Chemical Use Review:   Substance Use: Chemical use reviewed, no active concerns identified      Tobacco Use: No current tobacco use.       Collateral Reports Completed:   Not Applicable    Diagnoses:   Generalized Anxiety Disorder      PLAN: (Client Tasks / Therapist Tasks / Other)  Client to be honest with herself and gentle on herself with  judgments.       Celsa Kerrie , [facility]                                                         ________________________________________________________________________    Treatment Plan    Client's Name: Batool Louis  YOB: 2000    Date: 8/16/21    DSM-V Diagnoses: Adjustment Disorders  309.28 (F43.23) With mixed anxiety and depressed mood  Psychosocial / Contextual Factors: COVID, living back at home, deaths of loved ones within the past few years. Sexual identity issues.     WHODAS: 12    Referral / Collaboration:  Referral to another professional/service is not indicated at this time.    Anticipated number of session or this episode of care: 5+      MeasurableTreatment Goal(s) related to diagnosis / functional impairment(s)  Goal 1: Client will increase emotion regulation skills/decrease panic attacks    Objective #A (Client Action)    Client will identify 2-4 fears / thoughts that contribute to feeling anxious  use positive self-affirmations daily.  Status: Continued - Date(s): 8/16/21    Intervention(s)  Therapist will teach emotional regulation skills. distress tolerance skills, interpersonal effectiveness skills, emotion regluation skills, mindfulness skills, radical acceptance. Therapist will teach client how to ID body cues for anxiety, anxiety reduction techniques, how to ID triggers for depression and anxiety- decrease reactivity/eliminate, lifestyle changes to reduce depression and anxiety, communication skills, explore cognitive beliefs and help client to develop healthy cognitive patterns and beliefs.      Objective #B  Client will use thought-stopping strategy daily to reduce intrusive thoughts.  Status: Continued - Date(s): 8/16/21    Intervention(s)  Therapist will teach emotional regulation skills. distress tolerance skills, interpersonal effectiveness skills, emotion regluation skills, mindfulness skills, radical acceptance. Therapist will teach client how to ID body  "cues for anxiety, anxiety reduction techniques, how to ID triggers for depression and anxiety- decrease reactivity/eliminate, lifestyle changes to reduce depression and anxiety, communication skills, explore cognitive beliefs and help client to develop healthy cognitive patterns and beliefs.    Objective #C (Client Action)    Status: New - Date: 8/16/21    Client will use \"worry time\" each day for 15 minutes of scheduled worry and then defer obsessive or anxious thinking until the next structured worry time.    Intervention(s)  Therapist will teach emotional regulation skills. work through trauma using somatic experiencing techniques to discharge the anxiety.    Goal 2: Client will work on increasing self esteem and self worth     Objective #A (Client Action)    Status: Continued - Date(s): 8/16/21    Client will identify two areas of life that you would like to have improved functioning.    Intervention(s)  Therapist will teach emotional regulation skills. work through trauma using somatic experiencing techniques to discharge the anxiety.      Client has reviewed and agreed to the above plan.      Celsa Linda [facility]    "

## 2021-08-23 ENCOUNTER — IMMUNIZATION (OUTPATIENT)
Dept: NURSING | Facility: CLINIC | Age: 21
End: 2021-08-23
Attending: FAMILY MEDICINE
Payer: COMMERCIAL

## 2021-08-23 PROCEDURE — 0002A PR COVID VAC PFIZER DIL RECON 30 MCG/0.3 ML IM: CPT

## 2021-08-23 PROCEDURE — 91300 PR COVID VAC PFIZER DIL RECON 30 MCG/0.3 ML IM: CPT

## 2021-08-30 ENCOUNTER — VIRTUAL VISIT (OUTPATIENT)
Dept: PSYCHOLOGY | Facility: CLINIC | Age: 21
End: 2021-08-30
Payer: COMMERCIAL

## 2021-08-30 DIAGNOSIS — F41.1 GENERALIZED ANXIETY DISORDER: Primary | ICD-10-CM

## 2021-08-30 PROCEDURE — 90834 PSYTX W PT 45 MINUTES: CPT | Mod: GT | Performed by: COUNSELOR

## 2021-08-30 NOTE — PROGRESS NOTES
Progress Note    Client Name: Batool Louis  Date: 8/30/21         Service Type: Individual      Session Start Time: 8:35am   Session End Time: 9:25am      Session Length:   50 mins     Session #: 35     Attendees: Client    Treatment Plan Last Reviewed: 8/16/21  PHQ-9 / SHELBY-7 : see chart    Telemedicine Visit: The patient's condition can be safely assessed and treated via synchronous audio and visual telemedicine encounter.      Reason for Telemedicine Visit: Services only offered telehealth    Originating Site (Patient Location): Patient's home    Distant Site (Provider Location): Provider Remote Setting    Consent:  The patient/guardian has verbally consented to: the potential risks and benefits of telemedicine (video visit) versus in person care; bill my insurance or make self-payment for services provided; and responsibility for payment of non-covered services.     Mode of Communication:  Video Conference via [employer]/telephone    As the provider I attest to compliance with applicable laws and regulations related to telemedicine.     DATA      Progress Since Last Session (Related to Symptoms / Goals / Homework):   Symptoms: No change .    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 She stated she feels like she doesn't fit in there with her coworkers. She talked about the differences in beliefs and values. 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         Treatment Objective(s) Addressed in This Session:   use cognitive strategies identified in therapy to challenge anxious thoughts       Intervention:   Explored her experiences more she is having with coworkers and the other performers.  Talked with her about how she might be able to think about things differently to be able to interact differently with these people. Processed her emotions around the lyndon she was sort of seeing. Encouraged her to focus more on her values and think more from wise mind about her relationship with this lyndon.       ASSESSMENT: Current Emotional / Mental Status (status of significant symptoms):   Risk status (Self / Other harm or suicidal ideation)   Client denies current fears or concerns for personal safety.   Client denies current or recent suicidal ideation or behaviors.   Client denies current or recent homicidal ideation or behaviors.   Client denies current or recent self injurious behavior or ideation.   Client denies other safety concerns.   A safety and risk management plan has not been developed at this time, however client was given the after-hours number / 911 should there be a change in any of these risk factors.     Appearance:   Appropriate    Eye Contact:   Good    Psychomotor Behavior: Normal    Attitude:   Cooperative    Orientation:   All   Speech    Rate / Production: Normal     Volume:  Normal    Mood:    Normal    Affect:    Appropriate    Thought Content:  Clear    Thought Form:  Coherent  Logical    Insight:    Good      Medication Review:   No changes to current psychiatric medication(s)     Medication Compliance:   Yes     Changes in Health Issues:   None reported : client is on birth control again     Chemical Use Review:   Substance Use: Chemical use reviewed, no active concerns identified      Tobacco Use: No current tobacco use.       Collateral Reports Completed:   Not Applicable    Diagnoses:   Generalized Anxiety Disorder      PLAN: (Client Tasks / Therapist Tasks / Other)  Client to be honest with herself and gentle on herself with judgments.       Celsa Linda [facility]                                                          ________________________________________________________________________    Treatment Plan    Client's Name: Batool Louis  YOB: 2000    Date: 8/16/21    DSM-V Diagnoses: Adjustment Disorders  309.28 (F43.23) With mixed anxiety and depressed mood  Psychosocial / Contextual Factors: COVID, living back at home, deaths of loved ones within the past few years. Sexual identity issues.     WHODAS: 12    Referral / Collaboration:  Referral to another professional/service is not indicated at this time.    Anticipated number of session or this episode of care: 5+      MeasurableTreatment Goal(s) related to diagnosis / functional impairment(s)  Goal 1: Client will increase emotion regulation skills/decrease panic attacks    Objective #A (Client Action)    Client will identify 2-4 fears / thoughts that contribute to feeling anxious  use positive self-affirmations daily.  Status: Continued - Date(s): 8/16/21    Intervention(s)  Therapist will teach emotional regulation skills. distress tolerance skills, interpersonal effectiveness skills, emotion regluation skills, mindfulness skills, radical acceptance. Therapist will teach client how to ID body cues for anxiety, anxiety reduction techniques, how to ID triggers for depression and anxiety- decrease reactivity/eliminate, lifestyle changes to reduce depression and anxiety, communication skills, explore cognitive beliefs and help client to develop healthy cognitive patterns and beliefs.      Objective #B  Client will use thought-stopping strategy daily to reduce intrusive thoughts.  Status: Continued - Date(s): 8/16/21    Intervention(s)  Therapist will teach emotional regulation skills. distress tolerance skills, interpersonal effectiveness skills, emotion regluation skills, mindfulness skills, radical acceptance. Therapist will teach client how to ID body cues for anxiety, anxiety reduction techniques, how to ID triggers for depression and anxiety-  "decrease reactivity/eliminate, lifestyle changes to reduce depression and anxiety, communication skills, explore cognitive beliefs and help client to develop healthy cognitive patterns and beliefs.    Objective #C (Client Action)    Status: New - Date: 8/16/21    Client will use \"worry time\" each day for 15 minutes of scheduled worry and then defer obsessive or anxious thinking until the next structured worry time.    Intervention(s)  Therapist will teach emotional regulation skills. work through trauma using somatic experiencing techniques to discharge the anxiety.    Goal 2: Client will work on increasing self esteem and self worth     Objective #A (Client Action)    Status: Continued - Date(s): 8/16/21    Client will identify two areas of life that you would like to have improved functioning.    Intervention(s)  Therapist will teach emotional regulation skills. work through trauma using somatic experiencing techniques to discharge the anxiety.      Client has reviewed and agreed to the above plan.      Celsa Linda [facility]    "

## 2021-09-05 ENCOUNTER — HEALTH MAINTENANCE LETTER (OUTPATIENT)
Age: 21
End: 2021-09-05

## 2021-09-16 ENCOUNTER — OFFICE VISIT (OUTPATIENT)
Dept: OBGYN | Facility: OTHER | Age: 21
End: 2021-09-16
Payer: COMMERCIAL

## 2021-09-16 VITALS
BODY MASS INDEX: 30.62 KG/M2 | HEART RATE: 84 BPM | TEMPERATURE: 98.3 F | SYSTOLIC BLOOD PRESSURE: 118 MMHG | WEIGHT: 201.4 LBS | DIASTOLIC BLOOD PRESSURE: 80 MMHG

## 2021-09-16 DIAGNOSIS — Z30.431 IUD CHECK UP: ICD-10-CM

## 2021-09-16 DIAGNOSIS — Z11.3 ROUTINE SCREENING FOR STI (SEXUALLY TRANSMITTED INFECTION): ICD-10-CM

## 2021-09-16 DIAGNOSIS — Z01.419 WELL WOMAN EXAM: Primary | ICD-10-CM

## 2021-09-16 PROCEDURE — 87591 N.GONORRHOEAE DNA AMP PROB: CPT | Performed by: ADVANCED PRACTICE MIDWIFE

## 2021-09-16 PROCEDURE — 99395 PREV VISIT EST AGE 18-39: CPT | Performed by: ADVANCED PRACTICE MIDWIFE

## 2021-09-16 PROCEDURE — 87491 CHLMYD TRACH DNA AMP PROBE: CPT | Performed by: ADVANCED PRACTICE MIDWIFE

## 2021-09-16 RX ORDER — BIOTIN 10000 MCG
CAPSULE ORAL
COMMUNITY
End: 2023-07-03

## 2021-09-16 NOTE — PROGRESS NOTES
SUBJECTIVE:   CC: Batool Louis is an 21 year old woman who presents for preventive health visit.     {  Patient has been advised of split billing requirements and indicates understanding: Yes  Healthy Habits:     Getting at least 3 servings of Calcium per day:  Yes    Bi-annual eye exam:  Yes    Dental care twice a year:  Yes    Sleep apnea or symptoms of sleep apnea:  None    Diet:  Regular (no restrictions)    Frequency of exercise:  2-3 days/week    Duration of exercise:  30-45 minutes    Taking medications regularly:  No    Medication side effects:  Not applicable    PHQ-2 Total Score: 0    Additional concerns today:  Yes              Today's PHQ-2 Score:   PHQ-2 ( 1999 [employer]) 9/16/2021   Q1: Little interest or pleasure in doing things 0   Q2: Feeling down, depressed or hopeless 0   PHQ-2 Score 0   Q1: Little interest or pleasure in doing things Not at all   Q2: Feeling down, depressed or hopeless Not at all   PHQ-2 Score 0       Abuse: Current or Past (Physical, Sexual or Emotional) - No  Do you feel safe in your environment? Yes    Have you ever done Advance Care Planning? (For example, a Health Directive, POLST, or a discussion with a medical provider or your loved ones about your wishes): No, advance care planning information given to patient to review.  Patient plans to discuss their wishes with loved ones or provider.      Social History     Tobacco Use     Smoking status: Never Smoker     Smokeless tobacco: Never Used   Substance Use Topics     Alcohol use: Yes         Alcohol Use 9/16/2021   Prescreen: >3 drinks/day or >7 drinks/week? No   Prescreen: >3 drinks/day or >7 drinks/week? -       Reviewed orders with patient.  Reviewed health maintenance and updated orders accordingly - Yes      Breast Cancer Screening:        History of abnormal Pap smear: NO - age 21-29 PAP every 3 years recommended  PAP / HPV 6/22/2020   PAP (Historical) NIL     Reviewed and updated as needed this visit by  clinical staff  Tobacco  Allergies    Med Hx  Surg Hx  Fam Hx  Soc Hx        Reviewed and updated as needed this visit by Provider                    Review of Systems  CONSTITUTIONAL: NEGATIVE for fever, chills, change in weight  INTEGUMENTARU/SKIN: NEGATIVE for worrisome rashes, moles or lesions  EYES: NEGATIVE for vision changes or irritation  ENT: NEGATIVE for ear, mouth and throat problems  RESP: NEGATIVE for significant cough or SOB  BREAST: NEGATIVE for masses, tenderness or discharge  CV: NEGATIVE for chest pain, palpitations or peripheral edema  GI: NEGATIVE for nausea, abdominal pain, heartburn, or change in bowel habits  : NEGATIVE for unusual urinary or vaginal symptoms. Periods are about every 45 days.  MUSCULOSKELETAL: NEGATIVE for significant arthralgias or myalgia  NEURO: NEGATIVE for weakness, dizziness or paresthesias  ENDOCRINE: NEGATIVE for temperature intolerance, skin/hair changes  PSYCHIATRIC: NEGATIVE for changes in mood or affect     OBJECTIVE:   /80   Pulse 84   Temp 98.3  F (36.8  C) (Temporal)   Wt 91.4 kg (201 lb 6.4 oz)   LMP 09/10/2021   BMI 30.62 kg/m    Physical Exam  GENERAL: healthy, alert and no distress  EYES: Eyes grossly normal to inspection, PERRL and conjunctivae and sclerae normal  HENT: ear canals and TM's normal, nose and mouth without ulcers or lesions  NECK: no adenopathy, no asymmetry, masses, or scars and thyroid normal to palpation  RESP: lungs clear to auscultation - no rales, rhonchi or wheezes  BREAST: normal without masses, tenderness or nipple discharge and no palpable axillary masses or adenopathy  CV: regular rate and rhythm, normal S1 S2, no S3 or S4, no murmur, click or rub, no peripheral edema and peripheral pulses strong  ABDOMEN: soft, nontender, no hepatosplenomegaly, no masses and bowel sounds normal   (female): normal female external genitalia, normal urethral meatus, vaginal mucosa pink, moist, well rugated, and normal cervix  "without masses or discharge.  IUD strings at os  MS: no gross musculoskeletal defects noted, no edema  SKIN: no suspicious lesions or rashes  NEURO: Normal strength and tone, mentation intact and speech normal  PSYCH: mentation appears normal, affect normal/bright    Diagnostic Test Results:  Labs reviewed in Epic  No results found for this or any previous visit (from the past 24 hour(s)).    ASSESSMENT/PLAN:   (Z11.3) Routine screening for STI (sexually transmitted infection)  (primary encounter diagnosis)  Comment:   Plan: Chlamydia trachomatis PCR, Neisseria         gonorrhoeae PCR            (Z01.419) Well woman exam  Comment:   Plan:     (Z30.431) IUD check up  Comment:   Plan:       COUNSELING:  Reviewed preventive health counseling, as reflected in patient instructions       Vision screening       Contraception       Family planning    Estimated body mass index is 30.62 kg/m  as calculated from the following:    Height as of 7/25/21: 1.727 m (5' 8\").    Weight as of this encounter: 91.4 kg (201 lb 6.4 oz).        She reports that she has never smoked. She has never used smokeless tobacco.      Counseling Resources:  ATP IV Guidelines  Pooled Cohorts Equation Calculator  Breast Cancer Risk Calculator  BRCA-Related Cancer Risk Assessment: FHS-7 Tool  FRAX Risk Assessment  ICSI Preventive Guidelines  Dietary Guidelines for Americans, 2010  [place] MyPlate  ASA Prophylaxis  Lung CA Screening    Day RONNY [facility] for HPI/ROS submitted by the patient on 9/16/2021  Frequency of exercise:: 2-3 days/week  Getting at least 3 servings of Calcium per day:: Yes  Diet:: Regular (no restrictions)  Taking medications regularly:: No  Medication side effects:: Not applicable  Bi-annual eye exam:: Yes  Dental care twice a year:: Yes  Sleep apnea or symptoms of sleep apnea:: None  Additional concerns today:: Yes  Duration of exercise:: 30-45 minutes      "

## 2021-09-17 LAB
C TRACH DNA SPEC QL NAA+PROBE: NEGATIVE
N GONORRHOEA DNA SPEC QL NAA+PROBE: NEGATIVE

## 2021-10-11 ENCOUNTER — VIRTUAL VISIT (OUTPATIENT)
Dept: PSYCHOLOGY | Facility: CLINIC | Age: 21
End: 2021-10-11
Payer: COMMERCIAL

## 2021-10-11 DIAGNOSIS — F41.1 GENERALIZED ANXIETY DISORDER: Primary | ICD-10-CM

## 2021-10-11 PROCEDURE — 90834 PSYTX W PT 45 MINUTES: CPT | Mod: GT | Performed by: COUNSELOR

## 2021-10-11 NOTE — PROGRESS NOTES
Progress Note    Client Name: Batool Louis  Date: 10/11/21         Service Type: Individual      Session Start Time: 8:35am   Session End Time: 9:25am      Session Length:   50 mins     Session #: 36     Attendees: Client    Treatment Plan Last Reviewed: 8/16/21  PHQ-9 / SHELBY-7 : see chart    Telemedicine Visit: The patient's condition can be safely assessed and treated via synchronous audio and visual telemedicine encounter.      Reason for Telemedicine Visit: Services only offered telehealth    Originating Site (Patient Location): Patient's home    Distant Site (Provider Location): Provider Remote Setting    Consent:  The patient/guardian has verbally consented to: the potential risks and benefits of telemedicine (video visit) versus in person care; bill my insurance or make self-payment for services provided; and responsibility for payment of non-covered services.     Mode of Communication:  Video Conference via [employer]/telephone    As the provider I attest to compliance with applicable laws and regulations related to telemedicine.     DATA      Progress Since Last Session (Related to Symptoms / Goals / Homework):   Symptoms: No change .    Homework: Completed in session      Episode of Care Goals: Satisfactory progress - ACTION (Actively working towards change); Intervened by reinforcing change plan / affirming steps taken     Current / Ongoing Stressors and Concerns:  The client stated her parents have been a big stressor for her. She's working the three jobs and going to school which has been also very stressful. Her coworkers continue to be a big stressor for her.   She stated her youngest sister and her have become friends now. She notices though that when their parents are home, her sister will revert back to being mean to her.       Treatment Objective(s) Addressed in This Session:   use cognitive strategies identified in therapy to challenge  anxious thoughts       Intervention:   Actively listened to the client and validated her feelings about her family concerns. Validated her use of boundaries with her mom even if her mom isn't respecting them. The client continues to use the broken record method when she needs to. Reflected on the progress the client has made since the beginning of therapy.       ASSESSMENT: Current Emotional / Mental Status (status of significant symptoms):   Risk status (Self / Other harm or suicidal ideation)   Client denies current fears or concerns for personal safety.   Client denies current or recent suicidal ideation or behaviors.   Client denies current or recent homicidal ideation or behaviors.   Client denies current or recent self injurious behavior or ideation.   Client denies other safety concerns.   A safety and risk management plan has not been developed at this time, however client was given the after-hours number / 911 should there be a change in any of these risk factors.     Appearance:   Appropriate    Eye Contact:   Good    Psychomotor Behavior: Normal    Attitude:   Cooperative    Orientation:   All   Speech    Rate / Production: Normal     Volume:  Normal    Mood:    Normal    Affect:    Appropriate    Thought Content:  Clear    Thought Form:  Coherent  Logical    Insight:    Good      Medication Review:   No changes to current psychiatric medication(s)     Medication Compliance:   Yes     Changes in Health Issues:   None reported : client is on birth control again     Chemical Use Review:   Substance Use: Chemical use reviewed, no active concerns identified      Tobacco Use: No current tobacco use.       Collateral Reports Completed:   Not Applicable    Diagnoses:   Generalized Anxiety Disorder      PLAN: (Client Tasks / Therapist Tasks / Other)  Client to be honest with herself and gentle on herself with judgments.       Celsa Linda [facility]                                                          ________________________________________________________________________    Treatment Plan    Client's Name: Batool Louis  YOB: 2000    Date: 8/16/21    DSM-V Diagnoses: Adjustment Disorders  309.28 (F43.23) With mixed anxiety and depressed mood  Psychosocial / Contextual Factors: COVID, living back at home, deaths of loved ones within the past few years. Sexual identity issues.     WHODAS: 12    Referral / Collaboration:  Referral to another professional/service is not indicated at this time.    Anticipated number of session or this episode of care: 5+      MeasurableTreatment Goal(s) related to diagnosis / functional impairment(s)  Goal 1: Client will increase emotion regulation skills/decrease panic attacks    Objective #A (Client Action)    Client will identify 2-4 fears / thoughts that contribute to feeling anxious  use positive self-affirmations daily.  Status: Continued - Date(s): 8/16/21    Intervention(s)  Therapist will teach emotional regulation skills. distress tolerance skills, interpersonal effectiveness skills, emotion regluation skills, mindfulness skills, radical acceptance. Therapist will teach client how to ID body cues for anxiety, anxiety reduction techniques, how to ID triggers for depression and anxiety- decrease reactivity/eliminate, lifestyle changes to reduce depression and anxiety, communication skills, explore cognitive beliefs and help client to develop healthy cognitive patterns and beliefs.      Objective #B  Client will use thought-stopping strategy daily to reduce intrusive thoughts.  Status: Continued - Date(s): 8/16/21    Intervention(s)  Therapist will teach emotional regulation skills. distress tolerance skills, interpersonal effectiveness skills, emotion regluation skills, mindfulness skills, radical acceptance. Therapist will teach client how to ID body cues for anxiety, anxiety reduction techniques, how to ID triggers for depression and anxiety-  "decrease reactivity/eliminate, lifestyle changes to reduce depression and anxiety, communication skills, explore cognitive beliefs and help client to develop healthy cognitive patterns and beliefs.    Objective #C (Client Action)    Status: New - Date: 8/16/21    Client will use \"worry time\" each day for 15 minutes of scheduled worry and then defer obsessive or anxious thinking until the next structured worry time.    Intervention(s)  Therapist will teach emotional regulation skills. work through trauma using somatic experiencing techniques to discharge the anxiety.    Goal 2: Client will work on increasing self esteem and self worth     Objective #A (Client Action)    Status: Continued - Date(s): 8/16/21    Client will identify two areas of life that you would like to have improved functioning.    Intervention(s)  Therapist will teach emotional regulation skills. work through trauma using somatic experiencing techniques to discharge the anxiety.      Client has reviewed and agreed to the above plan.      Celsa Linda [facility]    "

## 2021-10-25 ENCOUNTER — VIRTUAL VISIT (OUTPATIENT)
Dept: PSYCHOLOGY | Facility: CLINIC | Age: 21
End: 2021-10-25
Payer: COMMERCIAL

## 2021-10-25 DIAGNOSIS — F41.1 GENERALIZED ANXIETY DISORDER: Primary | ICD-10-CM

## 2021-10-25 PROCEDURE — 90834 PSYTX W PT 45 MINUTES: CPT | Mod: GT | Performed by: COUNSELOR

## 2021-10-25 NOTE — PROGRESS NOTES
Progress Note    Client Name: Batool Louis  Date: 10/25/21         Service Type: Individual      Session Start Time: 8:40am   Session End Time: 9:25am      Session Length:   45 mins     Session #: 37     Attendees: Client    Treatment Plan Last Reviewed: 8/16/21  PHQ-9 / SHELBY-7 : see chart    Telemedicine Visit: The patient's condition can be safely assessed and treated via synchronous audio and visual telemedicine encounter.      Reason for Telemedicine Visit: Services only offered telehealth    Originating Site (Patient Location): Patient's home    Distant Site (Provider Location): Provider Remote Setting    Consent:  The patient/guardian has verbally consented to: the potential risks and benefits of telemedicine (video visit) versus in person care; bill my insurance or make self-payment for services provided; and responsibility for payment of non-covered services.     Mode of Communication:  Video Conference via [employer]/telephone    As the provider I attest to compliance with applicable laws and regulations related to telemedicine.     DATA      Progress Since Last Session (Related to Symptoms / Goals / Homework):   Symptoms: No change .    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 It's in the [employer]. She starts there next fall and graduates from [employer] this spring.     She talked about the lyndon she's been trying to get to know and/or date.      Treatment Objective(s) Addressed in This Session:   use cognitive strategies identified in therapy to challenge anxious thoughts       Intervention:   Helped  the client to process her thoughts and feelings about the lyndon and whether or not she wants to continue to pursue something with him.       ASSESSMENT: Current Emotional / Mental Status (status of significant symptoms):   Risk status (Self / Other harm or suicidal ideation)   Client denies current fears or concerns for personal safety.   Client denies current or recent suicidal ideation or behaviors.   Client denies current or recent homicidal ideation or behaviors.   Client denies current or recent self injurious behavior or ideation.   Client denies other safety concerns.   A safety and risk management plan has not been developed at this time, however client was given the after-hours number / 911 should there be a change in any of these risk factors.     Appearance:   Appropriate    Eye Contact:   Good    Psychomotor Behavior: Normal    Attitude:   Cooperative    Orientation:   All   Speech    Rate / Production: Normal     Volume:  Normal    Mood:    Normal    Affect:    Appropriate    Thought Content:  Clear    Thought Form:  Coherent  Logical    Insight:    Good      Medication Review:   No changes to current psychiatric medication(s)     Medication Compliance:   Yes     Changes in Health Issues:   None reported : client is on birth control again     Chemical Use Review:   Substance Use: Chemical use reviewed, no active concerns identified      Tobacco Use: No current tobacco use.       Collateral Reports Completed:   Not Applicable    Diagnoses:   Generalized Anxiety Disorder      PLAN: (Client Tasks / Therapist Tasks / Other)  Client to be honest with herself around her thoughts and feelings regarding this relationship.       Celsa Linda, [facility]                                                         ________________________________________________________________________    Treatment Plan    Client's Name: Batool Mcmahonnbsania  YOB: 2000    Date: 8/16/21    DSM-V Diagnoses: Adjustment  Disorders  309.28 (F43.23) With mixed anxiety and depressed mood  Psychosocial / Contextual Factors: COVID, living back at home, deaths of loved ones within the past few years. Sexual identity issues.     WHODAS: 12    Referral / Collaboration:  Referral to another professional/service is not indicated at this time.    Anticipated number of session or this episode of care: 5+      MeasurableTreatment Goal(s) related to diagnosis / functional impairment(s)  Goal 1: Client will increase emotion regulation skills/decrease panic attacks    Objective #A (Client Action)    Client will identify 2-4 fears / thoughts that contribute to feeling anxious  use positive self-affirmations daily.  Status: Continued - Date(s): 8/16/21    Intervention(s)  Therapist will teach emotional regulation skills. distress tolerance skills, interpersonal effectiveness skills, emotion regluation skills, mindfulness skills, radical acceptance. Therapist will teach client how to ID body cues for anxiety, anxiety reduction techniques, how to ID triggers for depression and anxiety- decrease reactivity/eliminate, lifestyle changes to reduce depression and anxiety, communication skills, explore cognitive beliefs and help client to develop healthy cognitive patterns and beliefs.      Objective #B  Client will use thought-stopping strategy daily to reduce intrusive thoughts.  Status: Continued - Date(s): 8/16/21    Intervention(s)  Therapist will teach emotional regulation skills. distress tolerance skills, interpersonal effectiveness skills, emotion regluation skills, mindfulness skills, radical acceptance. Therapist will teach client how to ID body cues for anxiety, anxiety reduction techniques, how to ID triggers for depression and anxiety- decrease reactivity/eliminate, lifestyle changes to reduce depression and anxiety, communication skills, explore cognitive beliefs and help client to develop healthy cognitive patterns and beliefs.    Objective  "#C (Client Action)    Status: New - Date: 8/16/21    Client will use \"worry time\" each day for 15 minutes of scheduled worry and then defer obsessive or anxious thinking until the next structured worry time.    Intervention(s)  Therapist will teach emotional regulation skills. work through trauma using somatic experiencing techniques to discharge the anxiety.    Goal 2: Client will work on increasing self esteem and self worth     Objective #A (Client Action)    Status: Continued - Date(s): 8/16/21    Client will identify two areas of life that you would like to have improved functioning.    Intervention(s)  Therapist will teach emotional regulation skills. work through trauma using somatic experiencing techniques to discharge the anxiety.      Client has reviewed and agreed to the above plan.      Celsa Linda [facility]    "

## 2021-11-04 ENCOUNTER — VIRTUAL VISIT (OUTPATIENT)
Dept: FAMILY MEDICINE | Facility: CLINIC | Age: 21
End: 2021-11-04
Payer: COMMERCIAL

## 2021-11-04 DIAGNOSIS — Z20.822 COVID-19 RULED OUT: Primary | ICD-10-CM

## 2021-11-04 PROCEDURE — 99212 OFFICE O/P EST SF 10 MIN: CPT | Performed by: PHYSICIAN ASSISTANT

## 2021-11-04 NOTE — PROGRESS NOTES
Dodie is a 21 year old who is being evaluated via a billable telephone visit.      What phone number would you like to be contacted at? [phone]  How would you like to obtain your AVS? MyChart    Assessment & Plan       ICD-10-CM    1. COVID-19 ruled out  Z20.822 Symptomatic COVID-19 Virus (Coronavirus) by PCR   Talk to patient about her concerns labs are pending follow-up with depend on lab results.  We talked about quarantining in the meantime.  Warning signs were discussed she will follow-up if any of her symptoms worsen.    Return in about 1 week (around 11/11/2021) for recheck, As Needed.    Ghassan Hutton PA-C  [facility]   Dodie is a 21 year old who presents for the following health issues     HPI     Acute Illness  Acute illness concerns: cough   Onset/Duration: 2 days   Symptoms:  Fever: YES  Chills/Sweats: YES  Headache (location?): YES  Sinus Pressure: YES  Conjunctivitis:  no  Ear Pain: pressure  Rhinorrhea: YES  Congestion: YES  Sore Throat: YES  Cough: no  Wheeze: no  Decreased Appetite: no  Nausea: YES  Vomiting: no  Diarrhea: YES  Dysuria/Freq.: no  Dysuria or Hematuria: no  Fatigue/Achiness: YES  Sick/Strep Exposure: no  Therapies tried and outcome: tylenol  No history of COVID.     COVID vaccinated     Review of Systems   Constitutional, HEENT, cardiovascular, pulmonary, gi and gu systems are negative, except as otherwise noted.      Objective           Vitals:  No vitals were obtained today due to virtual visit.    Physical Exam   healthy, alert and no distress  PSYCH: Alert and oriented times 3; coherent speech, normal   rate and volume, able to articulate logical thoughts, able   to abstract reason, no tangential thoughts, no hallucinations   or delusions  Her affect is normal  RESP: No cough, no audible wheezing, able to talk in full sentences  Remainder of exam unable to be completed due to telephone visits    Labs pending            Phone call  duration: 5 minutes

## 2021-11-05 ENCOUNTER — OFFICE VISIT (OUTPATIENT)
Dept: URGENT CARE | Facility: URGENT CARE | Age: 21
End: 2021-11-05
Payer: COMMERCIAL

## 2021-11-05 ENCOUNTER — LAB (OUTPATIENT)
Dept: FAMILY MEDICINE | Facility: CLINIC | Age: 21
End: 2021-11-05
Attending: PHYSICIAN ASSISTANT
Payer: COMMERCIAL

## 2021-11-05 VITALS
WEIGHT: 201.2 LBS | OXYGEN SATURATION: 95 % | TEMPERATURE: 99 F | RESPIRATION RATE: 20 BRPM | SYSTOLIC BLOOD PRESSURE: 118 MMHG | BODY MASS INDEX: 30.59 KG/M2 | HEART RATE: 110 BPM | DIASTOLIC BLOOD PRESSURE: 66 MMHG

## 2021-11-05 DIAGNOSIS — H92.03 OTALGIA, BILATERAL: ICD-10-CM

## 2021-11-05 DIAGNOSIS — Z20.822 COVID-19 RULED OUT: ICD-10-CM

## 2021-11-05 DIAGNOSIS — R07.0 THROAT PAIN: Primary | ICD-10-CM

## 2021-11-05 LAB — DEPRECATED S PYO AG THROAT QL EIA: NEGATIVE

## 2021-11-05 PROCEDURE — 99000 SPECIMEN HANDLING OFFICE-LAB: CPT

## 2021-11-05 PROCEDURE — 99213 OFFICE O/P EST LOW 20 MIN: CPT | Performed by: FAMILY MEDICINE

## 2021-11-05 PROCEDURE — U0003 INFECTIOUS AGENT DETECTION BY NUCLEIC ACID (DNA OR RNA); SEVERE ACUTE RESPIRATORY SYNDROME CORONAVIRUS 2 (SARS-COV-2) (CORONAVIRUS DISEASE [COVID-19]), AMPLIFIED PROBE TECHNIQUE, MAKING USE OF HIGH THROUGHPUT TECHNOLOGIES AS DESCRIBED BY CMS-2020-01-R: HCPCS | Mod: 90

## 2021-11-05 PROCEDURE — U0005 INFEC AGEN DETEC AMPLI PROBE: HCPCS | Mod: 90

## 2021-11-05 PROCEDURE — 87651 STREP A DNA AMP PROBE: CPT | Performed by: FAMILY MEDICINE

## 2021-11-05 NOTE — PATIENT INSTRUCTIONS
If not caused by the Streptococcus bacteria (strep throat), sore throats are usually caused by viruses.   Antibiotics don't help the vast majority of people recover any quicker.  The body will fight this infection but it needs to be treated well in order to help heal itself.  Rest as needed.  It is ok to reduce food intake if appetite is poor but it is quite important to maintain/increase fluid intake.    For pain and fevers, acetaminophen (Tylenol) is most appropriate.  Ibuprofen (Advil) or naproxen (Aleve) are useful too and last longer but they can cause elevation of blood pressure or stomach problems.    A warm, salt water gargle will help soothe the throat.  This can be made with 1/4 tsp of salt per 8 oz of water).    If the symptoms get worse or last longer than a week, you should consider contacting the clinic to discuss the possibility of infectious mononucleosis.

## 2021-11-05 NOTE — PROGRESS NOTES
SUBJECTIVE:  Batool Louis is a 21 year old female with a chief complaint of sore throat.  Onset of symptoms was 3 day(s) ago.    Course of illness: sudden onset.  Severity moderate  Current and Associated symptoms: fever, runny nose, stuffy nose, ear pain bilateral, sore throat, headache, body aches and fatigue  Treatment measures tried include Tylenol/Ibuprofen.  Predisposing factors include ill contact: School.    Past Medical History:   Diagnosis Date     Amenorrhea, secondary      Current Outpatient Medications   Medication Sig Dispense Refill     cetirizine (ZYRTEC) 10 MG tablet Take 10 mg by mouth daily        Cholecalciferol (D 5000) 5000 UNITS TABS Reported on 3/7/2017       levonorgestrel (KYLEENA) 19.5 MG IUD 1 each by Intrauterine route once Put in 8/12/2020       albuterol (PROAIR HFA/PROVENTIL HFA/VENTOLIN HFA) 108 (90 Base) MCG/ACT inhaler INHALE 2 PUFFS INTO THE LUNGS EVERY 6 HOURS (Patient not taking: Reported on 11/5/2021) 18 g 0     Biotin 10 MG CAPS  (Patient not taking: Reported on 11/5/2021)       fluticasone (FLONASE) 50 MCG/ACT nasal spray Spray 2 sprays into both nostrils daily (Patient not taking: Reported on 11/4/2021) 15.8 mL 1     Homeopathic Products (ZICAM ALLERGY RELIEF NA)  (Patient not taking: Reported on 7/25/2021)       Social History     Tobacco Use     Smoking status: Never Smoker     Smokeless tobacco: Never Used   Substance Use Topics     Alcohol use: Yes       ROS:      OBJECTIVE:   /66 (BP Location: Right arm, Patient Position: Sitting, Cuff Size: Adult Large)   Pulse 110   Temp 99  F (37.2  C) (Tympanic)   Resp 20   Wt 91.3 kg (201 lb 3.2 oz)   LMP 10/19/2021   SpO2 95%   BMI 30.59 kg/m    GENERAL APPEARANCE: healthy, alert and no distress  EYES: EOMI,  PERRL, conjunctiva clear  HENT: ear canals and TM's normal.  Nose normal.  Pharynx erythematous with some exudate noted.  NECK: supple, non-tender to palpation, no adenopathy noted  RESP: lungs clear to  auscultation - no rales, rhonchi or wheezes  CV: regular rates and rhythm, normal S1 S2, no murmur noted  SKIN: no suspicious lesions or rashes    Rapid Strep test is negative; await throat culture results.    ASSESSMENT:      Throat pain  Otalgia, bilateral    PLAN:   See orders in epic.   Symptomatic treat with gargles, lozenges, and OTC analgesic as needed. Follow-up with primary clinic if not improving.  Libby Jernigan M.D.

## 2021-11-06 LAB — GROUP A STREP BY PCR: NOT DETECTED

## 2021-11-07 LAB — SARS-COV-2 RNA RESP QL NAA+PROBE: NOT DETECTED

## 2021-11-08 ENCOUNTER — VIRTUAL VISIT (OUTPATIENT)
Dept: PSYCHOLOGY | Facility: CLINIC | Age: 21
End: 2021-11-08
Payer: COMMERCIAL

## 2021-11-08 DIAGNOSIS — F41.1 GENERALIZED ANXIETY DISORDER: Primary | ICD-10-CM

## 2021-11-08 PROCEDURE — 90834 PSYTX W PT 45 MINUTES: CPT | Mod: GT | Performed by: COUNSELOR

## 2021-11-08 NOTE — PROGRESS NOTES
Progress Note    Client Name: Batool Louis  Date: 11/8/21         Service Type: Individual      Session Start Time: 8:40am   Session End Time: 9:25am      Session Length:   45 mins     Session #: 38     Attendees: Client    Treatment Plan Last Reviewed:11/8/21  PHQ-9 / SHELBY-7 : see chart    Telemedicine Visit: The patient's condition can be safely assessed and treated via synchronous audio and visual telemedicine encounter.      Reason for Telemedicine Visit: Services only offered telehealth    Originating Site (Patient Location): Patient's home    Distant Site (Provider Location): Provider Remote Setting    Consent:  The patient/guardian has verbally consented to: the potential risks and benefits of telemedicine (video visit) versus in person care; bill my insurance or make self-payment for services provided; and responsibility for payment of non-covered services.     Mode of Communication:  Video Conference via [employer]/telephone    As the provider I attest to compliance with applicable laws and regulations related to telemedicine.     DATA      Progress Since Last Session (Related to Symptoms / Goals / Homework):   Symptoms: No change .    Homework: Completed in session      Episode of Care Goals: Satisfactory progress - ACTION (Actively working towards change); Intervened by reinforcing change plan / affirming steps taken     Current / Ongoing Stressors and Concerns:  The client stated she's been sick. She talked about her decisions with her jobs.   She brought up the difficulties with the lyndon she's been seeing.     Treatment Objective(s) Addressed in This Session:   use cognitive strategies identified in therapy to challenge anxious thoughts       Intervention:   Helped the client to process her thoughts and feelings about the lyndon and whether or not she wants to continue to pursue something with him. Educated the client on the DBT PERRY skill for her to  be able to more effectively communicate her decision to he lyndon on dating or not.       ASSESSMENT: Current Emotional / Mental Status (status of significant symptoms):   Risk status (Self / Other harm or suicidal ideation)   Client denies current fears or concerns for personal safety.   Client denies current or recent suicidal ideation or behaviors.   Client denies current or recent homicidal ideation or behaviors.   Client denies current or recent self injurious behavior or ideation.   Client denies other safety concerns.   A safety and risk management plan has not been developed at this time, however client was given the after-hours number / 911 should there be a change in any of these risk factors.     Appearance:   Appropriate    Eye Contact:   Good    Psychomotor Behavior: Normal    Attitude:   Cooperative    Orientation:   All   Speech    Rate / Production: Normal     Volume:  Normal    Mood:    Normal    Affect:    Appropriate    Thought Content:  Clear    Thought Form:  Coherent  Logical    Insight:    Good      Medication Review:   No changes to current psychiatric medication(s)     Medication Compliance:   Yes     Changes in Health Issues:   None reported : client is on birth control again     Chemical Use Review:   Substance Use: Chemical use reviewed, no active concerns identified      Tobacco Use: No current tobacco use.       Collateral Reports Completed:   Not Applicable    Diagnoses:   Generalized Anxiety Disorder      PLAN: (Client Tasks / Therapist Tasks / Other)  Client to write out what she wants to say to the lyndon using the PERRY skill.      Celsa Linda, [facility]                                                         ________________________________________________________________________    Treatment Plan    Client's Name: Batool Louis  YOB: 2000    Date: 11/8/21    DSM-V Diagnoses: Adjustment Disorders  309.28 (F43.23) With mixed anxiety and depressed  mood  Psychosocial / Contextual Factors: COVID, living back at home, deaths of loved ones within the past few years. Sexual identity issues.     WHODAS: 12    Referral / Collaboration:  Referral to another professional/service is not indicated at this time.    Anticipated number of session or this episode of care: 5+      MeasurableTreatment Goal(s) related to diagnosis / functional impairment(s)  Goal 1: Client will increase emotion regulation skills/decrease panic attacks    Objective #A (Client Action)    Client will identify 2-4 fears / thoughts that contribute to feeling anxious  use positive self-affirmations daily.  Status: Continued - Date(s): 11/8/21    Intervention(s)  Therapist will teach emotional regulation skills. distress tolerance skills, interpersonal effectiveness skills, emotion regluation skills, mindfulness skills, radical acceptance. Therapist will teach client how to ID body cues for anxiety, anxiety reduction techniques, how to ID triggers for depression and anxiety- decrease reactivity/eliminate, lifestyle changes to reduce depression and anxiety, communication skills, explore cognitive beliefs and help client to develop healthy cognitive patterns and beliefs.      Objective #B  Client will use thought-stopping strategy daily to reduce intrusive thoughts.  Status: Continued - Date(s): 11/8/21    Intervention(s)  Therapist will teach emotional regulation skills. distress tolerance skills, interpersonal effectiveness skills, emotion regluation skills, mindfulness skills, radical acceptance. Therapist will teach client how to ID body cues for anxiety, anxiety reduction techniques, how to ID triggers for depression and anxiety- decrease reactivity/eliminate, lifestyle changes to reduce depression and anxiety, communication skills, explore cognitive beliefs and help client to develop healthy cognitive patterns and beliefs.    Objective #C (Client Action)    Status: Continued - Date:  "11/8/21    Client will use \"worry time\" each day for 15 minutes of scheduled worry and then defer obsessive or anxious thinking until the next structured worry time.    Intervention(s)  Therapist will teach emotional regulation skills. work through trauma using somatic experiencing techniques to discharge the anxiety.    Goal 2: Client will work on increasing self esteem and self worth     Objective #A (Client Action)    Status: Continued - Date(s): 11/8/21    Client will identify two areas of life that you would like to have improved functioning.    Intervention(s)  Therapist will teach emotional regulation skills. work through trauma using somatic experiencing techniques to discharge the anxiety.      Client has reviewed and agreed to the above plan.      Celsa Linda [facility]    "

## 2021-11-12 ENCOUNTER — OFFICE VISIT (OUTPATIENT)
Dept: FAMILY MEDICINE | Facility: CLINIC | Age: 21
End: 2021-11-12
Payer: COMMERCIAL

## 2021-11-12 VITALS
TEMPERATURE: 98.5 F | DIASTOLIC BLOOD PRESSURE: 64 MMHG | HEART RATE: 85 BPM | BODY MASS INDEX: 30.35 KG/M2 | SYSTOLIC BLOOD PRESSURE: 122 MMHG | OXYGEN SATURATION: 97 % | WEIGHT: 199.6 LBS

## 2021-11-12 DIAGNOSIS — J32.9 BACTERIAL SINUSITIS: ICD-10-CM

## 2021-11-12 DIAGNOSIS — B96.89 BACTERIAL SINUSITIS: ICD-10-CM

## 2021-11-12 DIAGNOSIS — J04.0 LARYNGITIS: Primary | ICD-10-CM

## 2021-11-12 PROCEDURE — 99213 OFFICE O/P EST LOW 20 MIN: CPT | Performed by: NURSE PRACTITIONER

## 2021-11-12 ASSESSMENT — PAIN SCALES - GENERAL: PAINLEVEL: NO PAIN (0)

## 2021-11-12 NOTE — PATIENT INSTRUCTIONS
Patient Education     Laryngitis    Laryngitis is a swelling of the tissues around the vocal cords. Symptoms include a hoarse (scratchy) voice. Or your voice may be gone for a few days or longer. This may be caused by a viral illness, such as a head or chest cold. It may also be due to overuse and strain of your voice. Smoking, drinking alcohol, acid reflux, allergies, or inhaling harsh chemicals may also lead to symptoms. This condition will usually go away in 1 to 2 weeks.   Home care    Rest your voice until it recovers. Talk as little as possible. If your symptoms are severe, rest at home for a day or so.    Moist air may help your symptoms. Try breathing cool steam from a humidifier or vaporizer. Or breathe air from a steamy shower.    Drink plenty of fluids to stay well hydrated.    Don't smoke    Follow-up care  Follow up with your healthcare provider or this facility if you are not better after 1 week. If your hoarse voice lasts more than 2 weeks, you may need to see an otolaryngologist. This is a doctor who treats diseases and disorders of the ear, nose, and throat (ENT). Seeing this doctor is especially important if you have a history of alcohol or tobacco use.   When to seek medical advice  Contact your healthcare provider right away if you have any of the following:     Symptoms that get worse    Severe pain with swallowing    Trouble opening your mouth    Neck swelling, neck pain, or trouble moving your neck    Fever of 100.4 F (38. C) or higher, or as directed by your healthcare provider    Symptoms do not go away in 2 weeks  Call 911  Call 911 or seek immediate medical care if you have any of the following:     Noisy breathing or trouble breathing    Drooling or not able to swallow    Not able to talk    Feeling dizzy or lightheaded  [employer] last reviewed this educational content on 4/1/2020 2000-2021 [employer]. All rights reserved. This information is not intended as a  substitute for professional medical care. Always follow your healthcare professional's instructions.         I have prescribed an antibiotic for you today. You will take 2 tablet daily with food for 10 days. Please take a probiotic or yogurt while on this medication as this will help protect the good bacteria in your colon. Drink plenty of fluids. Use saline in your sinuses as directed to help with the nasal congestion.I also recommend a nightly humidifier if you have one available.    If symptoms are not improving with treatment plan please return to clinic for further evaluation.

## 2021-11-12 NOTE — PROGRESS NOTES
Assessment & Plan     Laryngitis  Home care instructions were reviewed with the patient. The risks, benefits and treatment options of prescribed medications or other treatments have been discussed with the patient. The patient verbalized their understanding and should call or follow up if no improvement or if they develop further problems.    - amoxicillin-clavulanate (AUGMENTIN) 875-125 MG tablet; Take 1 tablet by mouth 2 times daily    Bacterial sinusitis  - Due to length of symptoms, will treat   - Discussed antibiotic use, duration, and side effects  - Recommend rest and fluids  - Can continue with nyquil/dayquil   - Hand out given     The patient indicates understanding of these issues and agrees with the plan.     Follow up: PRN        - amoxicillin-clavulanate (AUGMENTIN) 875-125 MG tablet; Take 1 tablet by mouth 2 times daily     Patient Instructions     Patient Education     Laryngitis    Laryngitis is a swelling of the tissues around the vocal cords. Symptoms include a hoarse (scratchy) voice. Or your voice may be gone for a few days or longer. This may be caused by a viral illness, such as a head or chest cold. It may also be due to overuse and strain of your voice. Smoking, drinking alcohol, acid reflux, allergies, or inhaling harsh chemicals may also lead to symptoms. This condition will usually go away in 1 to 2 weeks.   Home care    Rest your voice until it recovers. Talk as little as possible. If your symptoms are severe, rest at home for a day or so.    Moist air may help your symptoms. Try breathing cool steam from a humidifier or vaporizer. Or breathe air from a steamy shower.    Drink plenty of fluids to stay well hydrated.    Don't smoke    Follow-up care  Follow up with your healthcare provider or this facility if you are not better after 1 week. If your hoarse voice lasts more than 2 weeks, you may need to see an otolaryngologist. This is a doctor who treats diseases and disorders of the  ear, nose, and throat (ENT). Seeing this doctor is especially important if you have a history of alcohol or tobacco use.   When to seek medical advice  Contact your healthcare provider right away if you have any of the following:     Symptoms that get worse    Severe pain with swallowing    Trouble opening your mouth    Neck swelling, neck pain, or trouble moving your neck    Fever of 100.4 F (38. C) or higher, or as directed by your healthcare provider    Symptoms do not go away in 2 weeks  Call 911  Call 911 or seek immediate medical care if you have any of the following:     Noisy breathing or trouble breathing    Drooling or not able to swallow    Not able to talk    Feeling dizzy or lightheaded  [employer] last reviewed this educational content on 4/1/2020 2000-2021 [employer]. All rights reserved. This information is not intended as a substitute for professional medical care. Always follow your healthcare professional's instructions.         I have prescribed an antibiotic for you today. You will take 2 tablet daily with food for 10 days. Please take a probiotic or yogurt while on this medication as this will help protect the good bacteria in your colon. Drink plenty of fluids. Use saline in your sinuses as directed to help with the nasal congestion.I also recommend a nightly humidifier if you have one available.    If symptoms are not improving with treatment plan please return to clinic for further evaluation.      RONNY Olmedo [facility] HUMBERTO Stoll is a 21 year old who presents for the following health issues     HPI     Acute Illness  Acute illness concerns: loss voice, had virtual visit on 11/04 COVID test negative  Onset/Duration: 2 weeks  Symptoms:  Fever: no  Chills/Sweats: no  Headache (location?): no  Sinus Pressure: no  Conjunctivitis:  no  Ear Pain: no  Rhinorrhea: no  Congestion: no  Sore Throat: no  Cough: YES  Wheeze:  no  Decreased Appetite: no  Nausea: no  Vomiting: no  Diarrhea: no  Dysuria/Freq.: no  Dysuria or Hematuria: no  Fatigue/Achiness: no  Sick/Strep Exposure: no  Therapies tried and outcome: mucinex, dayquil/nightquil    Patient with approximately 2-week history of waxing and waning symptoms of severity related to upper respiratory sinus congestion mild nonproductive cough and is now having laryngitis and mild sore throat  She is using humidity to help and has been gargling with warm salt water as well as products noted above.    Patient is a hoffmann however has been resting voice    Review of Systems   Constitutional, HEENT, cardiovascular, pulmonary, GI, , musculoskeletal, neuro, skin, endocrine and psych systems are negative, except as otherwise noted.      Objective    /64   Pulse 85   Temp 98.5  F (36.9  C) (Temporal)   Wt 90.5 kg (199 lb 9.6 oz)   LMP 10/19/2021   SpO2 97%   BMI 30.35 kg/m    Body mass index is 30.35 kg/m .  Physical Exam   GENERAL: alert and no distress  EYES: Eyes grossly normal to inspection, PERRL and conjunctivae and sclerae normal  HENT: normal cephalic/atraumatic, ear canals and TM's normal, nose and mouth without ulcers or lesions, nasal mucosa edematous , rhinorrhea yellow, oropharynx clear, oral mucous membranes moist, tonsillar erythema and sinuses: not tender  NECK: bilateral anterior cervical adenopathy, no asymmetry, masses, or scars and thyroid normal to palpation  RESP: lungs clear to auscultation - no rales, rhonchi or wheezes  CV: regular rate and rhythm, normal S1 S2, no S3 or S4, no murmur, click or rub, no peripheral edema and peripheral pulses strong  MS: no gross musculoskeletal defects noted, no edema  SKIN: no suspicious lesions or rashes  NEURO: Normal strength and tone, mentation intact and speech normal  PSYCH: mentation appears normal, affect normal/bright

## 2021-11-22 ENCOUNTER — VIRTUAL VISIT (OUTPATIENT)
Dept: PSYCHOLOGY | Facility: CLINIC | Age: 21
End: 2021-11-22
Payer: COMMERCIAL

## 2021-11-22 DIAGNOSIS — F41.1 GENERALIZED ANXIETY DISORDER: Primary | ICD-10-CM

## 2021-11-22 PROCEDURE — 90834 PSYTX W PT 45 MINUTES: CPT | Mod: GT | Performed by: COUNSELOR

## 2021-11-22 NOTE — PROGRESS NOTES
Progress Note    Client Name: Batool Louis  Date: 11/22/21         Service Type: Individual      Session Start Time: 8:35am   Session End Time: 9:25am      Session Length:   50 mins     Session #: 39     Attendees: Client    Treatment Plan Last Reviewed:11/8/21  PHQ-9 / SHELBY-7 : see chart    Telemedicine Visit: The patient's condition can be safely assessed and treated via synchronous audio and visual telemedicine encounter.      Reason for Telemedicine Visit: Services only offered telehealth    Originating Site (Patient Location): Patient's home    Distant Site (Provider Location): Provider Remote Setting    Consent:  The patient/guardian has verbally consented to: the potential risks and benefits of telemedicine (video visit) versus in person care; bill my insurance or make self-payment for services provided; and responsibility for payment of non-covered services.     Mode of Communication:  Video Conference via [employer]/telephone    As the provider I attest to compliance with applicable laws and regulations related to telemedicine.     DATA      Progress Since Last Session (Related to Symptoms / Goals / Homework):   Symptoms: Improving .    Homework: Completed in session      Episode of Care Goals: Satisfactory progress - ACTION (Actively working towards change); Intervened by reinforcing change plan / affirming steps taken     Current / Ongoing Stressors and Concerns:  The client stated she ended things with the boy she was seeing.   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 She has some other prospects she will be looking into.   She also stated she has surpassed one of her weight loss goals and is very excited.      Treatment Objective(s) Addressed in This Session:   use cognitive strategies identified in therapy to challenge anxious  thoughts       Intervention:   The client stated for the past month her parents have been at the [employer] cleaning it out to sell it for her grandmother. She and her sister have both been doing better emotionally and physically because of this. The client said she feels like her sister has more social energy for her and they are becoming friends now. Her parents are now in [place] visiting their sister for two weeks. Normalized and validated her feelings about her parents.  Reflected on the progress the client has made in her mindset and choices in general.       ASSESSMENT: Current Emotional / Mental Status (status of significant symptoms):   Risk status (Self / Other harm or suicidal ideation)   Client denies current fears or concerns for personal safety.   Client denies current or recent suicidal ideation or behaviors.   Client denies current or recent homicidal ideation or behaviors.   Client denies current or recent self injurious behavior or ideation.   Client denies other safety concerns.   A safety and risk management plan has not been developed at this time, however client was given the after-hours number / 911 should there be a change in any of these risk factors.     Appearance:   Appropriate    Eye Contact:   Good    Psychomotor Behavior: Normal    Attitude:   Cooperative    Orientation:   All   Speech    Rate / Production: Normal     Volume:  Normal    Mood:    Normal    Affect:    Appropriate    Thought Content:  Clear    Thought Form:  Coherent  Logical    Insight:    Good      Medication Review:   No changes to current psychiatric medication(s)     Medication Compliance:   Yes     Changes in Health Issues:   None reported : client is on birth control again     Chemical Use Review:   Substance Use: Chemical use reviewed, no active concerns identified      Tobacco Use: No current tobacco use.       Collateral Reports Completed:   Not Applicable    Diagnoses:   Generalized Anxiety Disorder      PLAN:  (Client Tasks / Therapist Tasks / Other)  Client to continue to focus on her goals daily.       Celsa Linda, [facility]                                                         ________________________________________________________________________    Treatment Plan    Client's Name: Batool oLuis  YOB: 2000    Date: 11/8/21    DSM-V Diagnoses: Adjustment Disorders  309.28 (F43.23) With mixed anxiety and depressed mood  Psychosocial / Contextual Factors: COVID, living back at home, deaths of loved ones within the past few years. Sexual identity issues.     WHODAS: 12    Referral / Collaboration:  Referral to another professional/service is not indicated at this time.    Anticipated number of session or this episode of care: 5+      MeasurableTreatment Goal(s) related to diagnosis / functional impairment(s)  Goal 1: Client will increase emotion regulation skills/decrease panic attacks    Objective #A (Client Action)    Client will identify 2-4 fears / thoughts that contribute to feeling anxious  use positive self-affirmations daily.  Status: Continued - Date(s): 11/8/21    Intervention(s)  Therapist will teach emotional regulation skills. distress tolerance skills, interpersonal effectiveness skills, emotion regluation skills, mindfulness skills, radical acceptance. Therapist will teach client how to ID body cues for anxiety, anxiety reduction techniques, how to ID triggers for depression and anxiety- decrease reactivity/eliminate, lifestyle changes to reduce depression and anxiety, communication skills, explore cognitive beliefs and help client to develop healthy cognitive patterns and beliefs.      Objective #B  Client will use thought-stopping strategy daily to reduce intrusive thoughts.  Status: Continued - Date(s): 11/8/21    Intervention(s)  Therapist will teach emotional regulation skills. distress tolerance skills, interpersonal effectiveness skills, emotion regluation skills,  "mindfulness skills, radical acceptance. Therapist will teach client how to ID body cues for anxiety, anxiety reduction techniques, how to ID triggers for depression and anxiety- decrease reactivity/eliminate, lifestyle changes to reduce depression and anxiety, communication skills, explore cognitive beliefs and help client to develop healthy cognitive patterns and beliefs.    Objective #C (Client Action)    Status: Continued - Date: 11/8/21    Client will use \"worry time\" each day for 15 minutes of scheduled worry and then defer obsessive or anxious thinking until the next structured worry time.    Intervention(s)  Therapist will teach emotional regulation skills. work through trauma using somatic experiencing techniques to discharge the anxiety.    Goal 2: Client will work on increasing self esteem and self worth     Objective #A (Client Action)    Status: Continued - Date(s): 11/8/21    Client will identify two areas of life that you would like to have improved functioning.    Intervention(s)  Therapist will teach emotional regulation skills. work through trauma using somatic experiencing techniques to discharge the anxiety.      Client has reviewed and agreed to the above plan.      Celsa Linda [facility]    "

## 2021-12-21 ENCOUNTER — VIRTUAL VISIT (OUTPATIENT)
Dept: PSYCHOLOGY | Facility: CLINIC | Age: 21
End: 2021-12-21
Payer: COMMERCIAL

## 2021-12-21 DIAGNOSIS — F41.1 GENERALIZED ANXIETY DISORDER: Primary | ICD-10-CM

## 2021-12-21 PROCEDURE — 90834 PSYTX W PT 45 MINUTES: CPT | Mod: GT | Performed by: COUNSELOR

## 2021-12-21 NOTE — PROGRESS NOTES
Progress Note    Client Name: Batool Louis  Date: 12/21/21         Service Type: Individual      Session Start Time: 10:00am   Session End Time: 10:50am      Session Length:   50 mins     Session #: 40     Attendees: Client    Treatment Plan Last Reviewed:11/8/21  PHQ-9 / SHELBY-7 : see chart    Telemedicine Visit: The patient's condition can be safely assessed and treated via synchronous audio and visual telemedicine encounter.      Reason for Telemedicine Visit: Services only offered telehealth    Originating Site (Patient Location): Patient's home    Distant Site (Provider Location): Provider Remote Setting    Consent:  The patient/guardian has verbally consented to: the potential risks and benefits of telemedicine (video visit) versus in person care; bill my insurance or make self-payment for services provided; and responsibility for payment of non-covered services.     Mode of Communication:  Video Conference via [email or web address]    As the provider I attest to compliance with applicable laws and regulations related to telemedicine.     DATA      Progress Since Last Session (Related to Symptoms / Goals / Homework):   Symptoms: Improving .    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 She stated her grandmother's [employer] by [employer] house is sold now so that may decrease some of the stress her parents were experiencing.   She talked about the continuing issues with her family members and the expectations they put on her.       Treatment Objective(s) Addressed in This Session:   use cognitive strategies identified in therapy to challenge anxious  "thoughts       Intervention:   Validated her concerns about her family's issues. Reinforced the skills she continues to try to use and her goals to move out so she doesn't have to \"always\" deal with the negativity.       ASSESSMENT: Current Emotional / Mental Status (status of significant symptoms):   Risk status (Self / Other harm or suicidal ideation)   Client denies current fears or concerns for personal safety.   Client denies current or recent suicidal ideation or behaviors.   Client denies current or recent homicidal ideation or behaviors.   Client denies current or recent self injurious behavior or ideation.   Client denies other safety concerns.   A safety and risk management plan has not been developed at this time, however client was given the after-hours number / 911 should there be a change in any of these risk factors.     Appearance:   Appropriate    Eye Contact:   Good    Psychomotor Behavior: Normal    Attitude:   Cooperative    Orientation:   All   Speech    Rate / Production: Normal     Volume:  Normal    Mood:    Normal    Affect:    Appropriate    Thought Content:  Clear    Thought Form:  Coherent  Logical    Insight:    Good      Medication Review:   No changes to current psychiatric medication(s)     Medication Compliance:   Yes     Changes in Health Issues:   None reported : client is on birth control again     Chemical Use Review:   Substance Use: Chemical use reviewed, no active concerns identified      Tobacco Use: No current tobacco use.       Collateral Reports Completed:   Not Applicable    Diagnoses:   Generalized Anxiety Disorder      PLAN: (Client Tasks / Therapist Tasks / Other)  Client to continue to focus on her goals daily.       Celsa Linda [facility]                                                         ________________________________________________________________________    Treatment Plan    Client's Name: Batool Louis  YOB: 2000    Date: " 11/8/21    DSM-V Diagnoses: Adjustment Disorders  309.28 (F43.23) With mixed anxiety and depressed mood  Psychosocial / Contextual Factors: COVID, living back at home, deaths of loved ones within the past few years. Sexual identity issues.     WHODAS: 12    Referral / Collaboration:  Referral to another professional/service is not indicated at this time.    Anticipated number of session or this episode of care: 5+      MeasurableTreatment Goal(s) related to diagnosis / functional impairment(s)  Goal 1: Client will increase emotion regulation skills/decrease panic attacks    Objective #A (Client Action)    Client will identify 2-4 fears / thoughts that contribute to feeling anxious  use positive self-affirmations daily.  Status: Continued - Date(s): 11/8/21    Intervention(s)  Therapist will teach emotional regulation skills. distress tolerance skills, interpersonal effectiveness skills, emotion regluation skills, mindfulness skills, radical acceptance. Therapist will teach client how to ID body cues for anxiety, anxiety reduction techniques, how to ID triggers for depression and anxiety- decrease reactivity/eliminate, lifestyle changes to reduce depression and anxiety, communication skills, explore cognitive beliefs and help client to develop healthy cognitive patterns and beliefs.      Objective #B  Client will use thought-stopping strategy daily to reduce intrusive thoughts.  Status: Continued - Date(s): 11/8/21    Intervention(s)  Therapist will teach emotional regulation skills. distress tolerance skills, interpersonal effectiveness skills, emotion regluation skills, mindfulness skills, radical acceptance. Therapist will teach client how to ID body cues for anxiety, anxiety reduction techniques, how to ID triggers for depression and anxiety- decrease reactivity/eliminate, lifestyle changes to reduce depression and anxiety, communication skills, explore cognitive beliefs and help client to develop healthy  "cognitive patterns and beliefs.    Objective #C (Client Action)    Status: Continued - Date: 11/8/21    Client will use \"worry time\" each day for 15 minutes of scheduled worry and then defer obsessive or anxious thinking until the next structured worry time.    Intervention(s)  Therapist will teach emotional regulation skills. work through trauma using somatic experiencing techniques to discharge the anxiety.    Goal 2: Client will work on increasing self esteem and self worth     Objective #A (Client Action)    Status: Continued - Date(s): 11/8/21    Client will identify two areas of life that you would like to have improved functioning.    Intervention(s)  Therapist will teach emotional regulation skills. work through trauma using somatic experiencing techniques to discharge the anxiety.      Client has reviewed and agreed to the above plan.      Celsa Linda [facility]    "

## 2021-12-30 ENCOUNTER — OFFICE VISIT (OUTPATIENT)
Dept: INTERNAL MEDICINE | Facility: CLINIC | Age: 21
End: 2021-12-30
Payer: COMMERCIAL

## 2021-12-30 VITALS
BODY MASS INDEX: 29.21 KG/M2 | TEMPERATURE: 97.5 F | SYSTOLIC BLOOD PRESSURE: 104 MMHG | DIASTOLIC BLOOD PRESSURE: 76 MMHG | HEART RATE: 88 BPM | HEIGHT: 69 IN | WEIGHT: 197.2 LBS | OXYGEN SATURATION: 98 % | RESPIRATION RATE: 18 BRPM

## 2021-12-30 DIAGNOSIS — L03.115 CELLULITIS OF RIGHT LOWER EXTREMITY: Primary | ICD-10-CM

## 2021-12-30 PROCEDURE — 99213 OFFICE O/P EST LOW 20 MIN: CPT | Performed by: INTERNAL MEDICINE

## 2021-12-30 RX ORDER — CEPHALEXIN 500 MG/1
500 CAPSULE ORAL 3 TIMES DAILY
Qty: 21 CAPSULE | Refills: 0 | Status: SHIPPED | OUTPATIENT
Start: 2021-12-30 | End: 2022-03-21

## 2021-12-30 ASSESSMENT — MIFFLIN-ST. JEOR: SCORE: 1705.93

## 2021-12-30 ASSESSMENT — PAIN SCALES - GENERAL: PAINLEVEL: NO PAIN (0)

## 2021-12-30 NOTE — PROGRESS NOTES
"      Yosef Stephen DO  [facility] HUMBERTO Stoll is a 21 year old who presents for the following health issues     HPI     Chief Complaint   Patient presents with     Derm Problem     1 week. right lower leg infected bump, painful, puss draining         EMR reviewed including:             Complaint, History of Chief Complaint, Corresponding Review of Systems, and Complaint Specific Physical Examination.    #1   Infected left leg  Had pimple or similar follicular inflammation on mid calf medial right leg  \"Squeezed it\"  Developed larger, red, swollen area measuring about 3 inches in diameter.  Slightly better today.  Tender to touch.        Exam:  Mild cellulitis of affected area.  No evidence of abscess formation.        Patient has been interviewed, applicable history and applied review of systems have been performed.    Vital Signs:   /76 (BP Location: Right arm, Patient Position: Sitting, Cuff Size: Adult Regular)   Pulse 88   Temp 97.5  F (36.4  C) (Temporal)   Resp 18   Ht 1.74 m (5' 8.5\")   Wt 89.4 kg (197 lb 3.2 oz)   SpO2 98%   BMI 29.55 kg/m        Decision Making    Problem and Complexity     1. Cellulitis of right lower extremity  Moist heat as needed  Tylenol as needed  - cephALEXin (KEFLEX) 500 MG capsule; Take 1 capsule (500 mg) by mouth 3 times daily  Dispense: 21 capsule; Refill: 0                                FOLLOW UP   I have asked the patient to make an appointment for followup with primary care provider as needed            I have carefully explained the diagnosis and treatment options to the patient.  The patient has displayed an understanding of the above, and all subsequent questions were answered.      DO AUBRIE Fan    Portions of this note were produced using [employer]  Although every attempt at real-time proof reading has been made, occasional grammar/syntax errors may have been missed.          I have " carefully explained the diagnosis and treatment options to the patient.  The patient has displayed an understanding of the above, and all subsequent questions were answered.      DO AUBRIE Fan    Portions of this note were produced using [employer]  Although every attempt at real-time proof reading has been made, occasional grammar/syntax errors may have been missed.

## 2022-01-19 ENCOUNTER — VIRTUAL VISIT (OUTPATIENT)
Dept: PSYCHOLOGY | Facility: CLINIC | Age: 22
End: 2022-01-19
Payer: COMMERCIAL

## 2022-01-19 DIAGNOSIS — F41.1 GENERALIZED ANXIETY DISORDER: Primary | ICD-10-CM

## 2022-01-19 PROCEDURE — 90834 PSYTX W PT 45 MINUTES: CPT | Mod: GT | Performed by: COUNSELOR

## 2022-01-19 NOTE — PROGRESS NOTES
"                                             Progress Note    Client Name: Batool Louis  Date: 1/19/22         Service Type: Individual      Session Start Time: 9:05am   Session End Time: 9:55am      Session Length:   50 mins     Session #: 41     Attendees: Client    Treatment Plan Last Reviewed:11/8/21  PHQ-9 / SHELBY-7 : see chart    Telemedicine Visit: The patient's condition can be safely assessed and treated via synchronous audio and visual telemedicine encounter.      Reason for Telemedicine Visit: Services only offered telehealth    Originating Site (Patient Location): Patient's home    Distant Site (Provider Location): Provider Remote Setting    Consent:  The patient/guardian has verbally consented to: the potential risks and benefits of telemedicine (video visit) versus in person care; bill my insurance or make self-payment for services provided; and responsibility for payment of non-covered services.     Mode of Communication:  Video Conference via [employer]/telephone    As the provider I attest to compliance with applicable laws and regulations related to telemedicine.     DATA      Progress Since Last Session (Related to Symptoms / Goals / Homework):   Symptoms: Improving .    Homework: Completed in session      Episode of Care Goals: Satisfactory progress - ACTION (Actively working towards change); Intervened by reinforcing change plan / affirming steps taken     Current / Ongoing Stressors and Concerns:  The client stated she is getting over having covid.  She stated her mom has covid and she's currently in [place] recovering as she was on vacation.   She stated she's going through \"an existential crisis\" right now regarding her future. She's trying to decide if she's going to the  to finish her bachelors or just finish out her associates degree or just not go.      Treatment Objective(s) Addressed in This Session:   use cognitive strategies identified in therapy to challenge anxious " thoughts       Intervention:   Talked with the client about her future decisions she's trying to make. Encouraged her to do the DBT pros and cons to help think more logically about these decisions. Asked the client if money were not an issue what would she want to do.       ASSESSMENT: Current Emotional / Mental Status (status of significant symptoms):   Risk status (Self / Other harm or suicidal ideation)   Client denies current fears or concerns for personal safety.   Client denies current or recent suicidal ideation or behaviors.   Client denies current or recent homicidal ideation or behaviors.   Client denies current or recent self injurious behavior or ideation.   Client denies other safety concerns.   A safety and risk management plan has not been developed at this time, however client was given the after-hours number / 911 should there be a change in any of these risk factors.     Appearance:   Appropriate    Eye Contact:   Good    Psychomotor Behavior: Normal    Attitude:   Cooperative    Orientation:   All   Speech    Rate / Production: Normal     Volume:  Normal    Mood:    Normal    Affect:    Appropriate    Thought Content:  Clear    Thought Form:  Coherent  Logical    Insight:    Good      Medication Review:   No changes to current psychiatric medication(s)     Medication Compliance:   Yes     Changes in Health Issues:   None reported : client is on birth control again     Chemical Use Review:   Substance Use: Chemical use reviewed, no active concerns identified      Tobacco Use: No current tobacco use.       Collateral Reports Completed:   Not Applicable    Diagnoses:   Generalized Anxiety Disorder      PLAN: (Client Tasks / Therapist Tasks / Other)  Client to consider working on the DBT pros and cons exercise.       Celsa Linda [facility]                                                         ________________________________________________________________________    Treatment Plan    Client's  Name: Batool Louis  YOB: 2000    Date: 11/8/21    DSM-V Diagnoses: Adjustment Disorders  309.28 (F43.23) With mixed anxiety and depressed mood  Psychosocial / Contextual Factors: COVID, living back at home, deaths of loved ones within the past few years. Sexual identity issues.     WHODAS: 12    Referral / Collaboration:  Referral to another professional/service is not indicated at this time.    Anticipated number of session or this episode of care: 5+      MeasurableTreatment Goal(s) related to diagnosis / functional impairment(s)  Goal 1: Client will increase emotion regulation skills/decrease panic attacks    Objective #A (Client Action)    Client will identify 2-4 fears / thoughts that contribute to feeling anxious  use positive self-affirmations daily.  Status: Continued - Date(s): 11/8/21    Intervention(s)  Therapist will teach emotional regulation skills. distress tolerance skills, interpersonal effectiveness skills, emotion regluation skills, mindfulness skills, radical acceptance. Therapist will teach client how to ID body cues for anxiety, anxiety reduction techniques, how to ID triggers for depression and anxiety- decrease reactivity/eliminate, lifestyle changes to reduce depression and anxiety, communication skills, explore cognitive beliefs and help client to develop healthy cognitive patterns and beliefs.      Objective #B  Client will use thought-stopping strategy daily to reduce intrusive thoughts.  Status: Continued - Date(s): 11/8/21    Intervention(s)  Therapist will teach emotional regulation skills. distress tolerance skills, interpersonal effectiveness skills, emotion regluation skills, mindfulness skills, radical acceptance. Therapist will teach client how to ID body cues for anxiety, anxiety reduction techniques, how to ID triggers for depression and anxiety- decrease reactivity/eliminate, lifestyle changes to reduce depression and anxiety, communication skills,  "explore cognitive beliefs and help client to develop healthy cognitive patterns and beliefs.    Objective #C (Client Action)    Status: Continued - Date: 11/8/21    Client will use \"worry time\" each day for 15 minutes of scheduled worry and then defer obsessive or anxious thinking until the next structured worry time.    Intervention(s)  Therapist will teach emotional regulation skills. work through trauma using somatic experiencing techniques to discharge the anxiety.    Goal 2: Client will work on increasing self esteem and self worth     Objective #A (Client Action)    Status: Continued - Date(s): 11/8/21    Client will identify two areas of life that you would like to have improved functioning.    Intervention(s)  Therapist will teach emotional regulation skills. work through trauma using somatic experiencing techniques to discharge the anxiety.      Client has reviewed and agreed to the above plan.      [facility]    "

## 2022-01-20 ENCOUNTER — OFFICE VISIT (OUTPATIENT)
Dept: OBGYN | Facility: OTHER | Age: 22
End: 2022-01-20
Payer: COMMERCIAL

## 2022-01-20 VITALS
BODY MASS INDEX: 29.52 KG/M2 | SYSTOLIC BLOOD PRESSURE: 120 MMHG | WEIGHT: 197 LBS | DIASTOLIC BLOOD PRESSURE: 78 MMHG | HEART RATE: 78 BPM

## 2022-01-20 DIAGNOSIS — Z00.00 NORMAL BREAST EXAM: Primary | ICD-10-CM

## 2022-01-20 PROCEDURE — 99212 OFFICE O/P EST SF 10 MIN: CPT | Performed by: OBSTETRICS & GYNECOLOGY

## 2022-01-20 NOTE — PROGRESS NOTES
OB/GYN      NAME:  Batool Louis  PCP:  Nancy Donnelly  MRN:  [id number]    Impression / Plan     21 year old  with:      ICD-10-CM    1. Normal breast exam  Z00.00        Discussed normal exam findings.  Reportable signs and symptoms discussed.  Plan to repeat her breast exam routine health maintenance exam later this year.  She will contact me if she develops any breast changes before then.        Chief Complaint     Chief Complaint   Patient presents with     Breast Exam       HPI     Batool Louis is a  21 year old female who is seen for breast concerns.  She recently noticed that the left nipple inverts after she removed her bra at the end of the day.  Currently, the nipple is not inverted.  No discrete masses, skin changes, nipple discharge, or other concerns today.    No LMP recorded. (Menstrual status: IUD).         Problem List     Patient Active Problem List    Diagnosis Date Noted     Shellfish allergy 2021     Priority: Medium     Cough 2021     Priority: Medium     IUD (intrauterine device) in place 2020     Priority: Medium     Kyleena placed 2020 for oligomenorrhea.  Due for removal Aug 2025       Chronic tonsillitis 2020     Priority: Medium     Added automatically from request for surgery [id number]       Allergic rhinitis 01/10/2012     Priority: Medium       Medications     Current Outpatient Medications   Medication     albuterol (PROAIR HFA/PROVENTIL HFA/VENTOLIN HFA) 108 (90 Base) MCG/ACT inhaler     Biotin 10 MG CAPS     cephALEXin (KEFLEX) 500 MG capsule     cetirizine (ZYRTEC) 10 MG tablet     Cholecalciferol (D 5000) 5000 UNITS TABS     fluticasone (FLONASE) 50 MCG/ACT nasal spray     Homeopathic Products (ZICAM ALLERGY RELIEF NA)     levonorgestrel (KYLEENA) 19.5 MG IUD     No current facility-administered medications for this visit.        Allergies     Allergies   Allergen Reactions     Sulfamethoxazole-Trimethoprim Hives       ROS      Pertinent positives and negatives are listed in the HPI.     Physical Exam   Vitals: /78 (BP Location: Left arm, Cuff Size: Adult Regular)   Pulse 78   Wt 89.4 kg (197 lb)   BMI 29.52 kg/m      General: Comfortable, no obvious distress  Breast: Symmetric.  No discrete masses, skin changes, or nipple discharge.  Left nipple is slightly inverted when she lays back.  No lymphadenopathy.        Adelaide Ace MD

## 2022-02-23 ENCOUNTER — VIRTUAL VISIT (OUTPATIENT)
Dept: PSYCHOLOGY | Facility: CLINIC | Age: 22
End: 2022-02-23
Payer: COMMERCIAL

## 2022-02-23 DIAGNOSIS — F41.1 GENERALIZED ANXIETY DISORDER: Primary | ICD-10-CM

## 2022-02-23 PROCEDURE — 90834 PSYTX W PT 45 MINUTES: CPT | Mod: GT | Performed by: COUNSELOR

## 2022-02-23 NOTE — PROGRESS NOTES
Progress Note    Client Name: Batool Louis  Date: 2/23/22         Service Type: Individual      Session Start Time: 9:05am   Session End Time: 9:55am      Session Length:   50 mins     Session #: 42     Attendees: Client    Treatment Plan Last Reviewed:2/23/22  PHQ-9 / SHELBY-7 : see chart    Telemedicine Visit: The patient's condition can be safely assessed and treated via synchronous audio and visual telemedicine encounter.      Reason for Telemedicine Visit: Services only offered telehealth    Originating Site (Patient Location): Patient's home    Distant Site (Provider Location): Provider Remote Setting    Consent:  The patient/guardian has verbally consented to: the potential risks and benefits of telemedicine (video visit) versus in person care; bill my insurance or make self-payment for services provided; and responsibility for payment of non-covered services.     Mode of Communication:  Video Conference via [employer]/telephone    As the provider I attest to compliance with applicable laws and regulations related to telemedicine.     DATA      Progress Since Last Session (Related to Symptoms / Goals / Homework):   Symptoms: Improving .    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   She stated she met a lyndon and they've been dating now for about a month. She stated she wants to help get the house cleaned and organized so she can have him over.      Treatment Objective(s) Addressed in This Session:   use cognitive strategies identified in therapy to challenge anxious thoughts       Intervention:   Held space for the client to talk about her boyfriend and the frustrations she's having with her  mom regarding the house. She feels like she can't have her boyfriend over because she feels the house is a mess and her mom won't clean it, even when the client tries to help. Encouraged the client to talk to her boyfriend about these fears and concerns. Discussed how she might be able to help her mom further and if it is worth her energy.       ASSESSMENT: Current Emotional / Mental Status (status of significant symptoms):   Risk status (Self / Other harm or suicidal ideation)   Client denies current fears or concerns for personal safety.   Client denies current or recent suicidal ideation or behaviors.   Client denies current or recent homicidal ideation or behaviors.   Client denies current or recent self injurious behavior or ideation.   Client denies other safety concerns.   A safety and risk management plan has not been developed at this time, however client was given the after-hours number / 911 should there be a change in any of these risk factors.     Appearance:   Appropriate    Eye Contact:   Good    Psychomotor Behavior: Normal    Attitude:   Cooperative    Orientation:   All   Speech    Rate / Production: Normal     Volume:  Normal    Mood:    Normal    Affect:    Appropriate    Thought Content:  Clear    Thought Form:  Coherent  Logical    Insight:    Good      Medication Review:   No changes to current psychiatric medication(s)     Medication Compliance:   Yes     Changes in Health Issues:   None reported : client is on birth control again     Chemical Use Review:   Substance Use: Chemical use reviewed, no active concerns identified      Tobacco Use: No current tobacco use.       Collateral Reports Completed:   Not Applicable    Diagnoses:   Generalized Anxiety Disorder      PLAN: (Client Tasks / Therapist Tasks / Other)  Client to consider talking with her boyfriend about her mom.       Celsa Linda [facility]                                                          ________________________________________________________________________    Treatment Plan    Client's Name: Batool Louis  YOB: 2000    Date: 2/23/22    DSM-V Diagnoses: Adjustment Disorders  309.28 (F43.23) With mixed anxiety and depressed mood  Psychosocial / Contextual Factors: COVID, living back at home, deaths of loved ones within the past few years. Sexual identity issues.     WHODAS: 12    Referral / Collaboration:  Referral to another professional/service is not indicated at this time.    Anticipated number of session or this episode of care: 5+      MeasurableTreatment Goal(s) related to diagnosis / functional impairment(s)  Goal 1: Client will increase emotion regulation skills/decrease panic attacks    Objective #A (Client Action)    Client will identify 2-4 fears / thoughts that contribute to feeling anxious  use positive self-affirmations daily.  Status: Continued - Date(s): 2/23/22    Intervention(s)  Therapist will teach emotional regulation skills. distress tolerance skills, interpersonal effectiveness skills, emotion regluation skills, mindfulness skills, radical acceptance. Therapist will teach client how to ID body cues for anxiety, anxiety reduction techniques, how to ID triggers for depression and anxiety- decrease reactivity/eliminate, lifestyle changes to reduce depression and anxiety, communication skills, explore cognitive beliefs and help client to develop healthy cognitive patterns and beliefs.      Objective #B  Client will use thought-stopping strategy daily to reduce intrusive thoughts.  Status: Continued - Date(s): 2/23/22    Intervention(s)  Therapist will teach emotional regulation skills. distress tolerance skills, interpersonal effectiveness skills, emotion regluation skills, mindfulness skills, radical acceptance. Therapist will teach client how to ID body cues for anxiety, anxiety reduction techniques, how to ID triggers for depression and anxiety-  "decrease reactivity/eliminate, lifestyle changes to reduce depression and anxiety, communication skills, explore cognitive beliefs and help client to develop healthy cognitive patterns and beliefs.    Objective #C (Client Action)    Status: Continued - Date: 2/23/22    Client will use \"worry time\" each day for 15 minutes of scheduled worry and then defer obsessive or anxious thinking until the next structured worry time.    Intervention(s)  Therapist will teach emotional regulation skills. work through trauma using somatic experiencing techniques to discharge the anxiety.    Goal 2: Client will work on increasing self esteem and self worth     Objective #A (Client Action)    Status: Continued - Date(s): 2/23/22    Client will identify two areas of life that you would like to have improved functioning.    Intervention(s)  Therapist will teach emotional regulation skills. work through trauma using somatic experiencing techniques to discharge the anxiety.      Client has reviewed and agreed to the above plan.      Celsa Linda [facility]    "

## 2022-03-21 ENCOUNTER — OFFICE VISIT (OUTPATIENT)
Dept: FAMILY MEDICINE | Facility: CLINIC | Age: 22
End: 2022-03-21
Payer: COMMERCIAL

## 2022-03-21 VITALS
HEIGHT: 68 IN | OXYGEN SATURATION: 98 % | TEMPERATURE: 98 F | BODY MASS INDEX: 29.7 KG/M2 | DIASTOLIC BLOOD PRESSURE: 58 MMHG | RESPIRATION RATE: 18 BRPM | SYSTOLIC BLOOD PRESSURE: 110 MMHG | WEIGHT: 196 LBS | HEART RATE: 102 BPM

## 2022-03-21 DIAGNOSIS — Z00.00 ROUTINE GENERAL MEDICAL EXAMINATION AT A HEALTH CARE FACILITY: Primary | ICD-10-CM

## 2022-03-21 DIAGNOSIS — Z97.5 IUD (INTRAUTERINE DEVICE) IN PLACE: ICD-10-CM

## 2022-03-21 DIAGNOSIS — B96.89 BV (BACTERIAL VAGINOSIS): ICD-10-CM

## 2022-03-21 DIAGNOSIS — R10.2 PELVIC CRAMPING: ICD-10-CM

## 2022-03-21 DIAGNOSIS — N76.0 BV (BACTERIAL VAGINOSIS): ICD-10-CM

## 2022-03-21 PROBLEM — R05.9 COUGH: Status: RESOLVED | Noted: 2021-07-21 | Resolved: 2022-03-21

## 2022-03-21 LAB
CLUE CELLS: PRESENT
HCG UR QL: NEGATIVE
HCV AB SERPL QL IA: NONREACTIVE
HIV 1+2 AB+HIV1 P24 AG SERPL QL IA: NONREACTIVE
T PALLIDUM AB SER QL: NONREACTIVE
TRICHOMONAS, WET PREP: ABNORMAL
WBC'S/HIGH POWER FIELD, WET PREP: ABNORMAL
YEAST, WET PREP: ABNORMAL

## 2022-03-21 PROCEDURE — 91305 COVID-19,PF,PFIZER (12+ YRS): CPT | Performed by: FAMILY MEDICINE

## 2022-03-21 PROCEDURE — 99395 PREV VISIT EST AGE 18-39: CPT | Performed by: FAMILY MEDICINE

## 2022-03-21 PROCEDURE — 86803 HEPATITIS C AB TEST: CPT | Performed by: FAMILY MEDICINE

## 2022-03-21 PROCEDURE — 86780 TREPONEMA PALLIDUM: CPT | Performed by: FAMILY MEDICINE

## 2022-03-21 PROCEDURE — 99213 OFFICE O/P EST LOW 20 MIN: CPT | Mod: 25 | Performed by: FAMILY MEDICINE

## 2022-03-21 PROCEDURE — 87591 N.GONORRHOEAE DNA AMP PROB: CPT | Performed by: FAMILY MEDICINE

## 2022-03-21 PROCEDURE — 87389 HIV-1 AG W/HIV-1&-2 AB AG IA: CPT | Performed by: FAMILY MEDICINE

## 2022-03-21 PROCEDURE — 36415 COLL VENOUS BLD VENIPUNCTURE: CPT | Performed by: FAMILY MEDICINE

## 2022-03-21 PROCEDURE — 81025 URINE PREGNANCY TEST: CPT | Performed by: FAMILY MEDICINE

## 2022-03-21 PROCEDURE — 87210 SMEAR WET MOUNT SALINE/INK: CPT | Performed by: FAMILY MEDICINE

## 2022-03-21 PROCEDURE — 0054A COVID-19,PF,PFIZER (12+ YRS): CPT | Performed by: FAMILY MEDICINE

## 2022-03-21 PROCEDURE — 87491 CHLMYD TRACH DNA AMP PROBE: CPT | Performed by: FAMILY MEDICINE

## 2022-03-21 ASSESSMENT — ENCOUNTER SYMPTOMS
NERVOUS/ANXIOUS: 1
PALPITATIONS: 0
SHORTNESS OF BREATH: 0
DIZZINESS: 0
HEMATURIA: 0
HEADACHES: 0
ARTHRALGIAS: 0
HEMATOCHEZIA: 0
FEVER: 0
DYSURIA: 0
COUGH: 0
ABDOMINAL PAIN: 0
PARESTHESIAS: 0
FREQUENCY: 0
HEARTBURN: 0
JOINT SWELLING: 0
NAUSEA: 1
DIARRHEA: 0
SORE THROAT: 0
CHILLS: 0
CONSTIPATION: 0
WEAKNESS: 0
MYALGIAS: 0
EYE PAIN: 0

## 2022-03-21 ASSESSMENT — PAIN SCALES - GENERAL: PAINLEVEL: NO PAIN (0)

## 2022-03-21 ASSESSMENT — PATIENT HEALTH QUESTIONNAIRE - PHQ9: SUM OF ALL RESPONSES TO PHQ QUESTIONS 1-9: 8

## 2022-03-21 NOTE — PROGRESS NOTES
SUBJECTIVE:   CC: Batool Louis is an 21 year old woman who presents for preventive health visit.       Patient has been advised of split billing requirements and indicates understanding: Yes  Healthy Habits:     Getting at least 3 servings of Calcium per day:  NO    Bi-annual eye exam:  NO    Dental care twice a year:  Yes    Sleep apnea or symptoms of sleep apnea:  Excessive snoring    Diet:  Regular (no restrictions)    Frequency of exercise:  4-5 days/week    Duration of exercise:  30-45 minutes    Taking medications regularly:  Not Applicable    Medication side effects:  None    PHQ-2 Total Score: 2    Additional concerns today:  Yes    Batool is here today for physical and general follow up.  She request to be screened for STD.  Just got relationship with whom she had had unprotected intercourse.  Was a new relationship, knew each other for about 3 months.  Has IUD for birth control.  Been having the pelvic cramp that comes and goes in the last week.  No menstrual bleeding due to the Nexplanon.  No abnormal discharge.  No UTI symptoms.    Otherwise, she is been doing well.  No major medical care or procedure done since the last physical over a year ago.  No history of abnormal pap smear, last pap was 2 years and it was normal.  No headache, dizziness, acute visual change. No runny nose, nasal congestion, coughing, fever or chill. No CP/SOB. No N/V/D/C or problem with urination.  No abdominal pain except of the cramping as discussed above.  No leg swelling or pain. 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   Social alcohol drinker, but no drug or smoking.  No problem with sleeping - no depression or anxiety.  Feels safe - lives with parents.  No other concern today.     Today's PHQ-2 Score:   PHQ-2 ( 1999 Pf[employer]) 3/21/2022   Q1: Little interest or pleasure in doing things 1   Q2: Feeling down, depressed or hopeless 1   PHQ-2 Score 2   PHQ-2 Total Score (12-17 Years)- Positive if 3 or more  points; Administer PHQ-A if positive -   Q1: Little interest or pleasure in doing things Several days   Q2: Feeling down, depressed or hopeless Several days   PHQ-2 Score 2       Abuse: Current or Past (Physical, Sexual or Emotional) - No  Do you feel safe in your environment? Yes        Social History     Tobacco Use     Smoking status: Never Smoker     Smokeless tobacco: Never Used   Substance Use Topics     Alcohol use: Yes     If you drink alcohol do you typically have >3 drinks per day or >7 drinks per week? No    Alcohol Use 3/21/2022   Prescreen: >3 drinks/day or >7 drinks/week? No   Prescreen: >3 drinks/day or >7 drinks/week? -       Reviewed orders with patient.  Reviewed health maintenance and updated orders accordingly - Yes  Patient Active Problem List   Diagnosis     Allergic rhinitis     Chronic tonsillitis     IUD (intrauterine device) in place     Shellfish allergy     Past Surgical History:   Procedure Laterality Date     NO HISTORY OF SURGERY         Social History     Tobacco Use     Smoking status: Never Smoker     Smokeless tobacco: Never Used   Substance Use Topics     Alcohol use: Yes     Comment: socially     Family History   Problem Relation Age of Onset     Anemia Mother         unknown reason     No Known Problems Father      Asthma Sister      Back Pain Maternal Grandmother      Alzheimer Disease Maternal Grandfather      No Known Problems Paternal Grandmother      Cancer Paternal Grandfather         pancreatic     Skin Cancer Paternal Great-Grandfather          Current Outpatient Medications   Medication Sig Dispense Refill     albuterol (PROAIR HFA/PROVENTIL HFA/VENTOLIN HFA) 108 (90 Base) MCG/ACT inhaler INHALE 2 PUFFS INTO THE LUNGS EVERY 6 HOURS 18 g 0     Biotin 10 MG CAPS        cetirizine (ZYRTEC) 10 MG tablet Take 10 mg by mouth daily        Cholecalciferol (D 5000) 5000 UNITS TABS Reported on 3/7/2017       fluticasone (FLONASE) 50 MCG/ACT nasal spray Spray 2 sprays into both  "nostrils daily 15.8 mL 1     levonorgestrel (KYLEENA) 19.5 MG IUD 1 each by Intrauterine route once Put in 8/12/2020       metroNIDAZOLE (FLAGYL) 500 MG tablet Take 1 tablet (500 mg) by mouth 2 times daily for 7 days 14 tablet 0     Allergies   Allergen Reactions     Sulfamethoxazole-Trimethoprim Hives       Breast Cancer Screening:        History of abnormal Pap smear: NO - age 21-29 PAP every 3 years recommended  PAP / HPV 6/22/2020   PAP (Historical) NIL     Reviewed and updated as needed this visit by clinical staff   Tobacco  Allergies  Meds   Med Hx  Surg Hx  Fam Hx  Soc Hx        Reviewed and updated as needed this visit by Provider                 Past Medical History:   Diagnosis Date     Amenorrhea, secondary       Past Surgical History:   Procedure Laterality Date     NO HISTORY OF SURGERY         Review of Systems   Constitutional: Negative for chills and fever.   HENT: Positive for ear pain. Negative for congestion, hearing loss and sore throat.    Eyes: Negative for pain and visual disturbance.   Respiratory: Negative for cough and shortness of breath.    Cardiovascular: Negative for chest pain, palpitations and peripheral edema.   Gastrointestinal: Positive for nausea. Negative for abdominal pain, constipation, diarrhea, heartburn and hematochezia.   Genitourinary: Negative for dysuria, frequency, genital sores, hematuria and urgency.   Musculoskeletal: Negative for arthralgias, joint swelling and myalgias.   Skin: Negative for rash.   Neurological: Negative for dizziness, weakness, headaches and paresthesias.   Psychiatric/Behavioral: Positive for mood changes. The patient is nervous/anxious.         OBJECTIVE:   /58   Pulse 102   Temp 98  F (36.7  C) (Temporal)   Resp 18   Ht 1.727 m (5' 8\")   Wt 88.9 kg (196 lb)   SpO2 98%   Breastfeeding No   BMI 29.80 kg/m       Physical Exam   GENERAL: healthy, alert and no distress.  Speaking in full sentences.  EYES: Eyes grossly normal to " inspection, PERRL and conjunctivae and sclerae normal.  No nystagmus.  All 4 visual fields intact.  HENT: ear canals and TM's normal.  Nares are non-congested. Oropharynx is pink and moist. No tender with palpation to the sinuses.   NECK: no adenopathy, supple, no lymphadenopathy or thyromegaly.  No tender with palpation to the cervical spine or its paraspinous muscle.  RESP: lungs clear to auscultation - no rales, rhonchi or wheezes.  Good respiratory effort throughout.  BREAST: Deferred, no concern  CV: regular rate and rhythm, no murmur  ABDOMEN: soft, nontender, nondistended, no palpable masses organomegaly with normal culture.  No CVA tenderness.   (female): normal female external genitalia, normal urethral meatus, vaginal mucosa, normal cervix/adnexa/uterus without masses.  Moderate amount of light black discharge noted. IUD string was easily appreciated.  Pending for wet prep and GC.  MS: no gross musculoskeletal defects noted, no edema. All joints are in the normal range of motion and 4 extremities were equally in strength. Hips, knees, ankles, shoulders, elbows, wrists exams were normal. Fine motor skills of the fingers are intact. Back is straight, no tender with palpation to the spine.  SKIN: no suspicious lesions or rashes  NEURO: Normal strength and tone, mentation intact and speech normal.  Cranial nerve II through XII intact.  DTRs +2 throughout.  No focal neurological deficit.  PSYCH: mentation appears normal, affect normal/bright.  Thoughts intact, suicidal/homicidal ideation.  No hallucination.      Diagnostic Test Results:  Labs reviewed in Epic  Results for orders placed or performed in visit on 03/21/22   HCG Qual, Urine ([id number])     Status: Normal   Result Value Ref Range    hCG Urine Qualitative Negative Negative   Treponema Abs w Reflex to RPR and Titer     Status: Normal   Result Value Ref Range    Treponema Antibody Total Nonreactive Nonreactive   Hepatitis C antibody     Status: Normal    Result Value Ref Range    Hepatitis C Antibody Nonreactive Nonreactive    Narrative    Assay performance characteristics have not been established for newborns, infants, and children.   HIV Antigen Antibody Combo     Status: Normal   Result Value Ref Range    HIV Antigen Antibody Combo Nonreactive Nonreactive   Wet prep - Clinic Collect     Status: Abnormal    Specimen: Vagina; Swab   Result Value Ref Range    Trichomonas Absent Absent    Yeast Absent Absent    Clue Cells Present (A) Absent    WBCs/high power field 1+ (A) None       ASSESSMENT/PLAN:       ICD-10-CM    1. Routine general medical examination at a health care facility  Z00.00 HIV Antigen Antibody Combo     Hepatitis C antibody     Treponema Abs w Reflex to RPR and Titer     NEISSERIA GONORRHOEA PCR     CHLAMYDIA TRACHOMATIS PCR     Wet prep - Clinic Collect     HCG Qual, Urine ([id number])     HCG Qual, Urine ([id number])     Treponema Abs w Reflex to RPR and Titer     Hepatitis C antibody     HIV Antigen Antibody Combo   2. Pelvic cramping  R10.2 HCG Qual, Urine ([id number])     HCG Qual, Urine ([id number])   3. IUD (intrauterine device) in place  Z97.5    4. BV (bacterial vaginosis)  N76.0     B96.89      Generally Batool is healthy.  Discussed about safety issue, healthy diet, exercising, weight management, good sleeping hygiene, contraceptions option, advanced care planning, safe sex and STD prevention, substance/alcohol misuse prevention, domestic violence and depression prevention.  UTD for immunization.  UTD for pap smear.  Labs as ordered.  All of her questions were answered.      For her pelvic cramps, could be ovulation cramp, ovarian cyst or others including STD or IUD dislodged.  Pelvic exam was very normal -the IUD string was easily appreciated.  Pregnancy test was negative.  Wet prep suggested of bacterial vaginitis.  Pending for the GC.  She was treated with Flagyl.  Encouraged to let me know if not getting better or is worsening, will consider  "pelvic ultrasound.    STD screening as per her request.  Safe sex and STD prevention discussed.      Patient has been advised of split billing requirements and indicates understanding: Yes    COUNSELING:  Reviewed preventive health counseling, as reflected in patient instructions       Regular exercise       Healthy diet/nutrition       Immunizations    Declined: Influenza due to Conscientious objector               Alcohol Use       Contraception       Family planning       Folic Acid       Safe sex practices/STD prevention       Advance Care Planning    Estimated body mass index is 29.52 kg/m  as calculated from the following:    Height as of 12/30/21: 1.74 m (5' 8.5\").    Weight as of 1/20/22: 89.4 kg (197 lb).    Weight management plan: Discussed healthy diet and exercise guidelines    She reports that she has never smoked. She has never used smokeless tobacco.      Counseling Resources:  ATP IV Guidelines  Pooled Cohorts Equation Calculator  Breast Cancer Risk Calculator  BRCA-Related Cancer Risk Assessment: FHS-7 Tool  FRAX Risk Assessment  ICSI Preventive Guidelines  Dietary Guidelines for Americans, 2010  USDA's MyPlate  ASA Prophylaxis  Lung CA Screening    Nancy Mcdowell Mai, MD  [facility]  "

## 2022-03-22 LAB
C TRACH DNA SPEC QL NAA+PROBE: NEGATIVE
N GONORRHOEA DNA SPEC QL NAA+PROBE: NEGATIVE

## 2022-03-23 ENCOUNTER — VIRTUAL VISIT (OUTPATIENT)
Dept: PSYCHOLOGY | Facility: CLINIC | Age: 22
End: 2022-03-23
Payer: COMMERCIAL

## 2022-03-23 DIAGNOSIS — F41.1 GENERALIZED ANXIETY DISORDER: Primary | ICD-10-CM

## 2022-03-23 PROCEDURE — 90834 PSYTX W PT 45 MINUTES: CPT | Mod: GT | Performed by: COUNSELOR

## 2022-03-23 NOTE — PROGRESS NOTES
[facility]                                     Progress Note    Patient Name: Batool Louis  Date: 3/23/22         Service Type: Individual      Session Start Time: 9:15am  Session End Time: 10:00am     Session Length: 45mins    Session #: 43    Attendees: Client    Service Modality:  Video Visit:      Provider verified identity through the following two step process.  Patient provided:  Patient is known previously to provider    Telemedicine Visit: The patient's condition can be safely assessed and treated via synchronous audio and visual telemedicine encounter.      Reason for Telemedicine Visit: Services only offered telehealth    Originating Site (Patient Location): Patient's home    Distant Site (Provider Location): Provider Remote Setting- Home Office    Consent:  The patient/guardian has verbally consented to: the potential risks and benefits of telemedicine (video visit) versus in person care; bill my insurance or make self-payment for services provided; and responsibility for payment of non-covered services.     Patient would like the video invitation sent by:  Send to e-mail at: [email or web address]    Mode of Communication:  Video Conference via well    As the provider I attest to compliance with applicable laws and regulations related to telemedicine.    DATA  Interactive Complexity: No  Crisis: No        Progress Since Last Session (Related to Symptoms / Goals / Homework):   Symptoms: No change .    Homework: Achieved / completed to satisfaction      Episode of Care Goals: Satisfactory progress - ACTION (Actively working towards change); Intervened by reinforcing change plan / affirming steps taken     Current / Ongoing Stressors and Concerns:  The session started late because the client forgot about it.    The client talked about how she wants a relationship with someone who is 24 years older than her. She broke up with the lyndon from school she was seeing because of this  other lyndon.   She stated she's been nervous to talk about him and tell other people because she's worried about the judgments others might have.      Treatment Objective(s) Addressed in This Session:   use thought-stopping strategy daily to reduce intrusive thoughts       Intervention:   Processed her thoughts about the age difference between her and the lyndon she likes. Talked about the concerns her mom has brought up and how she's thought about these too.     Assessments completed prior to visit:  The following assessments were completed by patient for this visit:  PHQ9:   PHQ-9 SCORE 8/13/2018 3/12/2020 4/30/2020 5/22/2020 7/10/2020 2/1/2021 3/21/2022   PHQ-9 Total Score MyChart 7 (Mild depression) - - 15 (Moderately severe depression) - 4 (Minimal depression) -   PHQ-9 Total Score 7 10 6 15 14 4 8     GAD7:   SHELBY-7 SCORE 2/5/2018 3/1/2018 6/11/2018 8/13/2018 3/12/2020 5/22/2020 2/1/2021   Total Score - - - 6 (mild anxiety) - 16 (severe anxiety) 10 (moderate anxiety)   Total Score 6 5 4 6 7 16 10     PROMIS 10-Global Health (all questions and answers displayed):   PROMIS 10 12/21/2021   In general, would you say your health is: Very good   In general, would you say your quality of life is: Very good   In general, how would you rate your physical health? Very good   In general, how would you rate your mental health, including your mood and your ability to think? Very good   In general, how would you rate your satisfaction with your social activities and relationships? Very good   In general, please rate how well you carry out your usual social activities and roles Very good   To what extent are you able to carry out your everyday physical activities such as walking, climbing stairs, carrying groceries, or moving a chair? Completely   How often have you been bothered by emotional problems such as feeling anxious, depressed or irritable? Rarely   How would you rate your fatigue on average? Mild   How would you rate  your pain on average?   0 = No Pain  to  10 = Worst Imaginable Pain 1   In general, would you say your health is: 4   In general, would you say your quality of life is: 4   In general, how would you rate your physical health? 4   In general, how would you rate your mental health, including your mood and your ability to think? 4   In general, how would you rate your satisfaction with your social activities and relationships? 4   In general, please rate how well you carry out your usual social activities and roles. (This includes activities at home, at work and in your community, and responsibilities as a parent, child, spouse, employee, friend, etc.) 4   To what extent are you able to carry out your everyday physical activities such as walking, climbing stairs, carrying groceries, or moving a chair? 5   In the past 7 days, how often have you been bothered by emotional problems such as feeling anxious, depressed, or irritable? 2   In the past 7 days, how would you rate your fatigue on average? 2   In the past 7 days, how would you rate your pain on average, where 0 means no pain, and 10 means worst imaginable pain? 1   Global Mental Health Score 16   Global Physical Health Score 17   PROMIS TOTAL - SUBSCORES 33   Some recent data might be hidden         ASSESSMENT: Current Emotional / Mental Status (status of significant symptoms):   Risk status (Self / Other harm or suicidal ideation)   Patient denies current fears or concerns for personal safety.   Patient denies current or recent suicidal ideation or behaviors.   Patient denies current or recent homicidal ideation or behaviors.   Patient denies current or recent self injurious behavior or ideation.   Patient denies other safety concerns.   Patient reports there has been no change in risk factors since their last session.     Patient reports there has been no change in protective factors since their last session.     Recommended that patient call 911 or go to the  local ED should there be a change in any of these risk factors.     Appearance:   Appropriate    Eye Contact:   Good    Psychomotor Behavior: Normal    Attitude:   Cooperative    Orientation:   All   Speech    Rate / Production: Normal/ Responsive    Volume:  Normal    Mood:    Normal   Affect:    Appropriate    Thought Content:  Clear    Thought Form:  Coherent  Logical    Insight:    Good      Medication Review:   No current psychiatric medications prescribed     Medication Compliance:   NA     Changes in Health Issues:   None reported     Chemical Use Review:   Substance Use: Chemical use reviewed, no active concerns identified      Tobacco Use: No current tobacco use.      Diagnosis:  1. Generalized anxiety disorder        Collateral Reports Completed:   Not Applicable    PLAN: (Patient Tasks / Therapist Tasks / Other)  Encouraged the client to increase journaling.         Celsa Linda, [facility]                                                         ______________________________________________________________________    Individual Treatment Plan    Patient's Name: Batool Louis  YOB: 2000    Date of Creation: 3/23/22  Date Treatment Plan Last Reviewed/Revised: 3/23/22    DSM5 Diagnoses: 300.02 (F41.1) Generalized Anxiety Disorder  Psychosocial / Contextual Factors: college student, living at home, family conflict  PROMIS (reviewed every 90 days):     Referral / Collaboration:  Referral to another professional/service is not indicated at this time..    Anticipated number of session for this episode of care: on-going  Anticipation frequency of session: Every other week  Anticipated Duration of each session: 38-52 minutes  Treatment plan will be reviewed in 90 days or when goals have been changed.       MeasurableTreatment Goal(s) related to diagnosis / functional impairment(s)    MeasurableTreatment Goal(s) related to diagnosis / functional impairment(s)  Goal 1: Client will increase  "emotion regulation skills/decrease panic attacks    Objective #A (Client Action)    Client will identify 2-4 fears / thoughts that contribute to feeling anxious  use positive self-affirmations daily.  Status: Continued - Date(s): 2/23/22    Intervention(s)  Therapist will teach emotional regulation skills. distress tolerance skills, interpersonal effectiveness skills, emotion regluation skills, mindfulness skills, radical acceptance. Therapist will teach client how to ID body cues for anxiety, anxiety reduction techniques, how to ID triggers for depression and anxiety- decrease reactivity/eliminate, lifestyle changes to reduce depression and anxiety, communication skills, explore cognitive beliefs and help client to develop healthy cognitive patterns and beliefs.      Objective #B  Client will use thought-stopping strategy daily to reduce intrusive thoughts.  Status: Continued - Date(s): 2/23/22    Intervention(s)  Therapist will teach emotional regulation skills. distress tolerance skills, interpersonal effectiveness skills, emotion regluation skills, mindfulness skills, radical acceptance. Therapist will teach client how to ID body cues for anxiety, anxiety reduction techniques, how to ID triggers for depression and anxiety- decrease reactivity/eliminate, lifestyle changes to reduce depression and anxiety, communication skills, explore cognitive beliefs and help client to develop healthy cognitive patterns and beliefs.    Objective #C (Client Action)    Status: Continued - Date: 2/23/22    Client will use \"worry time\" each day for 15 minutes of scheduled worry and then defer obsessive or anxious thinking until the next structured worry time.    Intervention(s)  Therapist will teach emotional regulation skills. work through trauma using somatic experiencing techniques to discharge the anxiety.    Goal 2: Client will work on increasing self esteem and self worth     Objective #A (Client Action)    Status: Continued " - Date(s): 2/23/22    Client will identify two areas of life that you would like to have improved functioning.    Intervention(s)  Therapist will teach emotional regulation skills. work through trauma using somatic experiencing techniques to discharge the anxiety.      Client has reviewed and agreed to the above plan.      Celsa Linda, [facility]

## 2022-04-13 ENCOUNTER — VIRTUAL VISIT (OUTPATIENT)
Dept: PSYCHOLOGY | Facility: CLINIC | Age: 22
End: 2022-04-13
Payer: COMMERCIAL

## 2022-04-13 DIAGNOSIS — F41.1 GENERALIZED ANXIETY DISORDER: Primary | ICD-10-CM

## 2022-04-13 PROCEDURE — 90837 PSYTX W PT 60 MINUTES: CPT | Mod: GT | Performed by: COUNSELOR

## 2022-04-13 NOTE — PROGRESS NOTES
[facility]                                     Progress Note    Patient Name: Batool Louis  Date: 4/13/22         Service Type: Individual      Session Start Time: 9:00am  Session End Time: 10:00am      Session Length: 60mins    Session #: 44    Attendees: Client    Service Modality:  Video Visit:      Provider verified identity through the following two step process.  Patient provided:  Patient is known previously to provider    Telemedicine Visit: The patient's condition can be safely assessed and treated via synchronous audio and visual telemedicine encounter.      Reason for Telemedicine Visit: Services only offered telehealth    Originating Site (Patient Location): Patient's home    Distant Site (Provider Location): Provider Remote Setting- Home Office    Consent:  The patient/guardian has verbally consented to: the potential risks and benefits of telemedicine (video visit) versus in person care; bill my insurance or make self-payment for services provided; and responsibility for payment of non-covered services.     Patient would like the video invitation sent by:  Send to e-mail at: [email or web address]    Mode of Communication:  Video Conference via well    As the provider I attest to compliance with applicable laws and regulations related to telemedicine.    DATA  Interactive Complexity: No  Crisis: No        Progress Since Last Session (Related to Symptoms / Goals / Homework):   Symptoms: No change .    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   She said she is getting back into her weight loss routine again.      Treatment Objective(s) Addressed in This  Session:   use thought-stopping strategy daily to reduce intrusive thoughts       Intervention:   Worked on stress reduction skills. Talked about how how to change her thinking from working on weight loss only to her goals being to get healthy. She was able to notice certain things that would indicate health versus just weight loss. Processed her feelings about dating the older lyndon. The talked about the conversations she's had with her mom and her worries around that.     Assessments completed prior to visit:  The following assessments were completed by patient for this visit:  PHQ9:   PHQ-9 SCORE 8/13/2018 3/12/2020 4/30/2020 5/22/2020 7/10/2020 2/1/2021 3/21/2022   PHQ-9 Total Score MyChart 7 (Mild depression) - - 15 (Moderately severe depression) - 4 (Minimal depression) -   PHQ-9 Total Score 7 10 6 15 14 4 8     GAD7:   SHELBY-7 SCORE 2/5/2018 3/1/2018 6/11/2018 8/13/2018 3/12/2020 5/22/2020 2/1/2021   Total Score - - - 6 (mild anxiety) - 16 (severe anxiety) 10 (moderate anxiety)   Total Score 6 5 4 6 7 16 10     PROMIS 10-Global Health (all questions and answers displayed):   PROMIS 10 12/21/2021   In general, would you say your health is: Very good   In general, would you say your quality of life is: Very good   In general, how would you rate your physical health? Very good   In general, how would you rate your mental health, including your mood and your ability to think? Very good   In general, how would you rate your satisfaction with your social activities and relationships? Very good   In general, please rate how well you carry out your usual social activities and roles Very good   To what extent are you able to carry out your everyday physical activities such as walking, climbing stairs, carrying groceries, or moving a chair? Completely   How often have you been bothered by emotional problems such as feeling anxious, depressed or irritable? Rarely   How would you rate your fatigue on average? Mild   How  would you rate your pain on average?   0 = No Pain  to  10 = Worst Imaginable Pain 1   In general, would you say your health is: 4   In general, would you say your quality of life is: 4   In general, how would you rate your physical health? 4   In general, how would you rate your mental health, including your mood and your ability to think? 4   In general, how would you rate your satisfaction with your social activities and relationships? 4   In general, please rate how well you carry out your usual social activities and roles. (This includes activities at home, at work and in your community, and responsibilities as a parent, child, spouse, employee, friend, etc.) 4   To what extent are you able to carry out your everyday physical activities such as walking, climbing stairs, carrying groceries, or moving a chair? 5   In the past 7 days, how often have you been bothered by emotional problems such as feeling anxious, depressed, or irritable? 2   In the past 7 days, how would you rate your fatigue on average? 2   In the past 7 days, how would you rate your pain on average, where 0 means no pain, and 10 means worst imaginable pain? 1   Global Mental Health Score 16   Global Physical Health Score 17   PROMIS TOTAL - SUBSCORES 33   Some recent data might be hidden         ASSESSMENT: Current Emotional / Mental Status (status of significant symptoms):   Risk status (Self / Other harm or suicidal ideation)   Patient denies current fears or concerns for personal safety.   Patient denies current or recent suicidal ideation or behaviors.   Patient denies current or recent homicidal ideation or behaviors.   Patient denies current or recent self injurious behavior or ideation.   Patient denies other safety concerns.   Patient reports there has been no change in risk factors since their last session.     Patient reports there has been no change in protective factors since their last session.     Recommended that patient call 911  or go to the local ED should there be a change in any of these risk factors.     Appearance:   Appropriate    Eye Contact:   Good    Psychomotor Behavior: Normal    Attitude:   Cooperative    Orientation:   All   Speech    Rate / Production: Normal/ Responsive    Volume:  Normal    Mood:    Normal   Affect:    Appropriate    Thought Content:  Clear    Thought Form:  Coherent  Logical    Insight:    Good      Medication Review:   No current psychiatric medications prescribed     Medication Compliance:   NA     Changes in Health Issues:   None reported     Chemical Use Review:   Substance Use: Chemical use reviewed, no active concerns identified      Tobacco Use: No current tobacco use.      Diagnosis:  1. Generalized anxiety disorder        Collateral Reports Completed:   Not Applicable    PLAN: (Patient Tasks / Therapist Tasks / Other)  The client will continue to be mindful about her thoughts and choices.          Celsa Linda, [facility]                                                         ______________________________________________________________________    Individual Treatment Plan    Patient's Name: Batool Louis  YOB: 2000    Date of Creation: 3/23/22  Date Treatment Plan Last Reviewed/Revised: 3/23/22    DSM5 Diagnoses: 300.02 (F41.1) Generalized Anxiety Disorder  Psychosocial / Contextual Factors: college student, living at home, family conflict  PROMIS (reviewed every 90 days):     Referral / Collaboration:  Referral to another professional/service is not indicated at this time..    Anticipated number of session for this episode of care: on-going  Anticipation frequency of session: Every other week  Anticipated Duration of each session: 38-52 minutes  Treatment plan will be reviewed in 90 days or when goals have been changed.       MeasurableTreatment Goal(s) related to diagnosis / functional impairment(s)    MeasurableTreatment Goal(s) related to diagnosis / functional  "impairment(s)  Goal 1: Client will increase emotion regulation skills/decrease panic attacks    Objective #A (Client Action)    Client will identify 2-4 fears / thoughts that contribute to feeling anxious  use positive self-affirmations daily.  Status: Continued - Date(s): 2/23/22    Intervention(s)  Therapist will teach emotional regulation skills. distress tolerance skills, interpersonal effectiveness skills, emotion regluation skills, mindfulness skills, radical acceptance. Therapist will teach client how to ID body cues for anxiety, anxiety reduction techniques, how to ID triggers for depression and anxiety- decrease reactivity/eliminate, lifestyle changes to reduce depression and anxiety, communication skills, explore cognitive beliefs and help client to develop healthy cognitive patterns and beliefs.      Objective #B  Client will use thought-stopping strategy daily to reduce intrusive thoughts.  Status: Continued - Date(s): 2/23/22    Intervention(s)  Therapist will teach emotional regulation skills. distress tolerance skills, interpersonal effectiveness skills, emotion regluation skills, mindfulness skills, radical acceptance. Therapist will teach client how to ID body cues for anxiety, anxiety reduction techniques, how to ID triggers for depression and anxiety- decrease reactivity/eliminate, lifestyle changes to reduce depression and anxiety, communication skills, explore cognitive beliefs and help client to develop healthy cognitive patterns and beliefs.    Objective #C (Client Action)    Status: Continued - Date: 2/23/22    Client will use \"worry time\" each day for 15 minutes of scheduled worry and then defer obsessive or anxious thinking until the next structured worry time.    Intervention(s)  Therapist will teach emotional regulation skills. work through trauma using somatic experiencing techniques to discharge the anxiety.    Goal 2: Client will work on increasing self esteem and self worth   "   Objective #A (Client Action)    Status: Continued - Date(s): 2/23/22    Client will identify two areas of life that you would like to have improved functioning.    Intervention(s)  Therapist will teach emotional regulation skills. work through trauma using somatic experiencing techniques to discharge the anxiety.      Client has reviewed and agreed to the above plan.      Celsa Linda [facility]

## 2022-04-18 ENCOUNTER — OFFICE VISIT (OUTPATIENT)
Dept: OBGYN | Facility: OTHER | Age: 22
End: 2022-04-18
Payer: COMMERCIAL

## 2022-04-18 VITALS
DIASTOLIC BLOOD PRESSURE: 76 MMHG | WEIGHT: 198 LBS | SYSTOLIC BLOOD PRESSURE: 126 MMHG | BODY MASS INDEX: 30.11 KG/M2 | HEART RATE: 71 BPM

## 2022-04-18 DIAGNOSIS — Z11.3 ROUTINE SCREENING FOR STI (SEXUALLY TRANSMITTED INFECTION): ICD-10-CM

## 2022-04-18 DIAGNOSIS — N89.8 VAGINAL ITCHING: Primary | ICD-10-CM

## 2022-04-18 PROCEDURE — 99213 OFFICE O/P EST LOW 20 MIN: CPT | Performed by: ADVANCED PRACTICE MIDWIFE

## 2022-04-18 PROCEDURE — 87591 N.GONORRHOEAE DNA AMP PROB: CPT | Performed by: ADVANCED PRACTICE MIDWIFE

## 2022-04-18 PROCEDURE — 87491 CHLMYD TRACH DNA AMP PROBE: CPT | Performed by: ADVANCED PRACTICE MIDWIFE

## 2022-04-18 RX ORDER — TRIAMCINOLONE ACETONIDE 1 MG/G
CREAM TOPICAL 2 TIMES DAILY
Qty: 30 G | Refills: 1 | Status: SHIPPED | OUTPATIENT
Start: 2022-04-18 | End: 2022-04-25

## 2022-04-18 NOTE — PROGRESS NOTES
Batool Louis is a 21 year old who presents to the clinic for evaluation of itching of her mons.   Has had this in the past and was treated with kenalog cream. Feels it helps on and off.   Also wants an STI screen.  Was tested less that a month ago but has a new partner.  No vaginal sex only oral .      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     Comment: has had both sex partners, used condoms with last partner   Other Topics Concern     Parent/sibling w/ CABG, MI or angioplasty before 65F 55M? Not Asked   Social History Narrative     Not on file     Social Determinants of Health     Financial Resource Strain: Not on file   Food Insecurity: Not on file   Transportation Needs: Not on file   Physical Activity: Not on file   Stress: Not on file   Social Connections: Not on file   Intimate Partner Violence: Not on file   Housing Stability: Not on file     Family History   Problem Relation Age of Onset     Anemia Mother         unknown reason     No Known Problems Father      Asthma Sister      Back Pain Maternal Grandmother      Alzheimer Disease Maternal Grandfather      No Known Problems Paternal Grandmother      Cancer Paternal Grandfather          "pancreatic     Skin Cancer Paternal Great-Grandfather           ROS: 10 point ROS neg other than the symptoms noted above in the HPI.    EXAM:  /76 (BP Location: Left arm, Patient Position: Sitting, Cuff Size: Adult Regular)   Pulse 71   Wt 89.8 kg (198 lb)   BMI 30.11 kg/m      : PELVIC EXAM:  Vulva: No external lesions, normal hair distribution, no adenopathy, BUS WNL  Mons was shaved a while ago.  Hair about 1/4 \"  No lesions. Has more itching on the right than the left.        ASSESSMENT/PLAN:    (N89.8) Vaginal itching  (primary encounter diagnosis)  Comment:   Plan: triamcinolone (KENALOG) 0.1 % external cream            (Z11.3) Routine screening for STI (sexually transmitted infection)  Comment:   Plan: Chlamydia trachomatis PCR, Neisseria         gonorrhoeae PCR     Recommended to review her soaps at home.   Decrease tight clothing and allow the area open to air at night.   Recommended to stop shaving.            Labs were not reviewed in [facility]      Imaging was not reviewed in Epic      15 minutes spent on the date of the encounter doing chart review, patient visit and documentation           "

## 2022-04-19 LAB
C TRACH DNA SPEC QL NAA+PROBE: NEGATIVE
N GONORRHOEA DNA SPEC QL NAA+PROBE: NEGATIVE

## 2022-05-13 ENCOUNTER — OFFICE VISIT (OUTPATIENT)
Dept: URGENT CARE | Facility: URGENT CARE | Age: 22
End: 2022-05-13
Payer: COMMERCIAL

## 2022-05-13 VITALS
WEIGHT: 192 LBS | DIASTOLIC BLOOD PRESSURE: 72 MMHG | HEART RATE: 99 BPM | BODY MASS INDEX: 29.19 KG/M2 | SYSTOLIC BLOOD PRESSURE: 108 MMHG | OXYGEN SATURATION: 98 % | TEMPERATURE: 99 F | RESPIRATION RATE: 18 BRPM

## 2022-05-13 DIAGNOSIS — R07.0 THROAT PAIN: Primary | ICD-10-CM

## 2022-05-13 DIAGNOSIS — J02.9 ACUTE PHARYNGITIS, UNSPECIFIED ETIOLOGY: ICD-10-CM

## 2022-05-13 DIAGNOSIS — J35.01 CHRONIC TONSILLITIS: ICD-10-CM

## 2022-05-13 LAB — DEPRECATED S PYO AG THROAT QL EIA: NEGATIVE

## 2022-05-13 PROCEDURE — 99214 OFFICE O/P EST MOD 30 MIN: CPT | Performed by: FAMILY MEDICINE

## 2022-05-13 PROCEDURE — 87651 STREP A DNA AMP PROBE: CPT | Performed by: FAMILY MEDICINE

## 2022-05-13 RX ORDER — PREDNISONE 20 MG/1
40 TABLET ORAL DAILY
Qty: 4 TABLET | Refills: 1 | Status: SHIPPED | OUTPATIENT
Start: 2022-05-13 | End: 2022-07-03

## 2022-05-13 NOTE — PROGRESS NOTES
SUBJECTIVE:  Batool Louis is a 21 year old female with a chief complaint of sore throat.  Onset of symptoms was 4 day(s) ago.    Course of illness: gradual onset.  Severity moderate  Current and Associated symptoms: fever and sore throat  Treatment measures tried include Tylenol/Ibuprofen and Decongestants.  Predisposing factors include .    Past Medical History:   Diagnosis Date     Amenorrhea, secondary      Current Outpatient Medications   Medication Sig Dispense Refill     albuterol (PROAIR HFA/PROVENTIL HFA/VENTOLIN HFA) 108 (90 Base) MCG/ACT inhaler INHALE 2 PUFFS INTO THE LUNGS EVERY 6 HOURS 18 g 0     Biotin 10 MG CAPS        cetirizine (ZYRTEC) 10 MG tablet Take 10 mg by mouth daily        fluticasone (FLONASE) 50 MCG/ACT nasal spray Spray 2 sprays into both nostrils daily 15.8 mL 1     levonorgestrel (KYLEENA) 19.5 MG IUD 1 each by Intrauterine route once Put in 8/12/2020       vitamin D3 (CHOLECALCIFEROL) 125 MCG (5000 UT) tablet Reported on 3/7/2017       Social History     Tobacco Use     Smoking status: Never Smoker     Smokeless tobacco: Never Used   Substance Use Topics     Alcohol use: Yes     Comment: socially       ROS:  Review of systems negative except as stated above.    OBJECTIVE:   /72 (BP Location: Right arm, Patient Position: Sitting, Cuff Size: Adult Regular)   Pulse 99   Temp 99  F (37.2  C) (Tympanic)   Resp 18   Wt 87.1 kg (192 lb)   SpO2 98%   BMI 29.19 kg/m    GENERAL APPEARANCE: healthy, alert and no distress  EYES: EOMI,  PERRL, conjunctiva clear  HENT: TM's normal bilaterally, tonsillar hypertrophy, tonsillar erythema and tonsillar exudate  NECK: no adenopathy  SKIN: no suspicious lesions or rashes    Rapid Strep test is negative; await throat culture results.    ASSESSMENT:      Throat pain  Acute pharyngitis, unspecified etiology  Chronic tonsillitis  1. Throat pain    - Streptococcus A Rapid Screen w/Reflex to PCR - Clinic Collect  - Group A Streptococcus  PCR Throat Swab    2. Acute pharyngitis, unspecified etiology    - predniSONE (DELTASONE) 20 MG tablet; Take 2 tablets (40 mg) by mouth daily  Dispense: 4 tablet; Refill: 1    3. Chronic tonsillitis  Recommend consult for removal   - Otolaryngology Referral; Future    PLAN:   See orders in epic.   Symptomatic treat with gargles, lozenges, and OTC analgesic as needed. Follow-up with primary clinic if not improving.  Libby Jernigan M.D.

## 2022-05-14 LAB — GROUP A STREP BY PCR: NOT DETECTED

## 2022-05-15 ENCOUNTER — OFFICE VISIT (OUTPATIENT)
Dept: URGENT CARE | Facility: URGENT CARE | Age: 22
End: 2022-05-15
Payer: COMMERCIAL

## 2022-05-15 VITALS
TEMPERATURE: 98.8 F | HEART RATE: 89 BPM | SYSTOLIC BLOOD PRESSURE: 109 MMHG | DIASTOLIC BLOOD PRESSURE: 71 MMHG | OXYGEN SATURATION: 97 %

## 2022-05-15 DIAGNOSIS — J02.9 PHARYNGITIS, UNSPECIFIED ETIOLOGY: Primary | ICD-10-CM

## 2022-05-15 LAB — MONOCYTES NFR BLD AUTO: NEGATIVE %

## 2022-05-15 PROCEDURE — 86308 HETEROPHILE ANTIBODY SCREEN: CPT | Performed by: NURSE PRACTITIONER

## 2022-05-15 PROCEDURE — 96372 THER/PROPH/DIAG INJ SC/IM: CPT | Performed by: NURSE PRACTITIONER

## 2022-05-15 PROCEDURE — 36415 COLL VENOUS BLD VENIPUNCTURE: CPT | Performed by: NURSE PRACTITIONER

## 2022-05-15 PROCEDURE — 99213 OFFICE O/P EST LOW 20 MIN: CPT | Mod: 25 | Performed by: NURSE PRACTITIONER

## 2022-05-15 NOTE — PATIENT INSTRUCTIONS
Continue oral steroid. Monitor symptoms if difficulty breathing or swallowing occurs please return to clinic or emergency

## 2022-05-15 NOTE — PROGRESS NOTES
Assessment & Plan     1. Pharyngitis, unspecified etiology  Will complete course of glucocorticoid steroid. Bicillin given in clinic. Home instructions reviewed concern for tonsil abscess if symptoms worsen will go to emergency.   - penicillin G benzathine (BICILLIN L-A) injection 1.2 Million Units  - Mononucleosis screen    Patient Instructions   Continue oral steroid. Monitor symptoms if difficulty breathing or swallowing occurs please return to clinic or emergency            RONNY Olmedo [facility]    Channing Stoll is a 21 year old female who presents to clinic today for the following health issues:  Chief Complaint   Patient presents with     Pharyngitis     Swelling right side of face and neck. Sore throat and right ear pain     HPI    Patient presents to clinic with worsening symptoms of throat pain, swelling, voice is muffled. She notes she is drinking well and breathing well.   She was seen in clinic 2 days ago and started glucocorticoid steroid was tested negative for strep throat.       Review of Systems  Constitutional, HEENT, cardiovascular, pulmonary, gi and gu systems are negative, except as otherwise noted.      Objective    /71   Pulse 89   Temp 98.8  F (37.1  C)   SpO2 97%   Physical Exam   GENERAL: alert  EYES: Eyes grossly normal to inspection, PERRL and conjunctivae and sclerae normal  HENT: normal cephalic/atraumatic, ear canals and TM's normal, nose and mouth without ulcers or lesions, oropharynx clear, oral mucous membranes moist, enlarged bilateral tonsil with erythema.    NECK: bilateral anterior cervical adenopathy, no asymmetry, masses, or scars and thyroid normal to palpation  RESP: lungs clear to auscultation - no rales, rhonchi or wheezes  CV: regular rate and rhythm, normal S1 S2, no S3 or S4, no murmur, click or rub, no peripheral edema and peripheral pulses strong  ABDOMEN: soft, nontender, no hepatosplenomegaly, no  masses and bowel sounds normal  MS: no gross musculoskeletal defects noted, no edema  SKIN: no suspicious lesions or rashes

## 2022-06-11 ENCOUNTER — OFFICE VISIT (OUTPATIENT)
Dept: URGENT CARE | Facility: URGENT CARE | Age: 22
End: 2022-06-11
Payer: COMMERCIAL

## 2022-06-11 VITALS
SYSTOLIC BLOOD PRESSURE: 121 MMHG | WEIGHT: 195 LBS | HEART RATE: 78 BPM | BODY MASS INDEX: 29.65 KG/M2 | TEMPERATURE: 97.2 F | OXYGEN SATURATION: 98 % | DIASTOLIC BLOOD PRESSURE: 67 MMHG

## 2022-06-11 DIAGNOSIS — N89.8 VAGINAL ITCHING: ICD-10-CM

## 2022-06-11 DIAGNOSIS — N89.8 VAGINAL DISCHARGE: ICD-10-CM

## 2022-06-11 DIAGNOSIS — N30.00 ACUTE CYSTITIS WITHOUT HEMATURIA: Primary | ICD-10-CM

## 2022-06-11 DIAGNOSIS — B37.31 YEAST INFECTION OF THE VAGINA: ICD-10-CM

## 2022-06-11 LAB
ALBUMIN UR-MCNC: NEGATIVE MG/DL
APPEARANCE UR: CLEAR
BACTERIA #/AREA URNS HPF: ABNORMAL /HPF
BILIRUB UR QL STRIP: NEGATIVE
CLUE CELLS: ABNORMAL
COLOR UR AUTO: YELLOW
GLUCOSE UR STRIP-MCNC: NEGATIVE MG/DL
HGB UR QL STRIP: NEGATIVE
KETONES UR STRIP-MCNC: NEGATIVE MG/DL
LEUKOCYTE ESTERASE UR QL STRIP: ABNORMAL
NITRATE UR QL: NEGATIVE
PH UR STRIP: 7 [PH] (ref 5–7)
RBC #/AREA URNS AUTO: ABNORMAL /HPF
SP GR UR STRIP: 1.02 (ref 1–1.03)
SQUAMOUS #/AREA URNS AUTO: ABNORMAL /LPF
TRICHOMONAS, WET PREP: ABNORMAL
UROBILINOGEN UR STRIP-ACNC: 0.2 E.U./DL
WBC #/AREA URNS AUTO: ABNORMAL /HPF
WBC'S/HIGH POWER FIELD, WET PREP: ABNORMAL
YEAST, WET PREP: PRESENT

## 2022-06-11 PROCEDURE — 81001 URINALYSIS AUTO W/SCOPE: CPT | Performed by: STUDENT IN AN ORGANIZED HEALTH CARE EDUCATION/TRAINING PROGRAM

## 2022-06-11 PROCEDURE — 87210 SMEAR WET MOUNT SALINE/INK: CPT | Performed by: STUDENT IN AN ORGANIZED HEALTH CARE EDUCATION/TRAINING PROGRAM

## 2022-06-11 PROCEDURE — 99214 OFFICE O/P EST MOD 30 MIN: CPT | Performed by: STUDENT IN AN ORGANIZED HEALTH CARE EDUCATION/TRAINING PROGRAM

## 2022-06-11 PROCEDURE — 87086 URINE CULTURE/COLONY COUNT: CPT | Performed by: STUDENT IN AN ORGANIZED HEALTH CARE EDUCATION/TRAINING PROGRAM

## 2022-06-11 RX ORDER — FLUCONAZOLE 150 MG/1
TABLET ORAL
Qty: 3 TABLET | Refills: 0 | Status: SHIPPED | OUTPATIENT
Start: 2022-06-11 | End: 2022-07-03

## 2022-06-11 RX ORDER — NITROFURANTOIN 25; 75 MG/1; MG/1
100 CAPSULE ORAL 2 TIMES DAILY
Qty: 10 CAPSULE | Refills: 0 | Status: SHIPPED | OUTPATIENT
Start: 2022-06-11 | End: 2022-06-16

## 2022-06-11 NOTE — PROGRESS NOTES
Assessment & Plan     Acute cystitis without hematuria  UA positive for white blood cells so I am going to treat her for UTI with nitrofurantoin and await culture and sensitivities.  - nitroFURantoin macrocrystal-monohydrate (MACROBID) 100 MG capsule  Dispense: 10 capsule; Refill: 0    Yeast infection of the vagina  We will treat for yeast infection with positive yeast on wet prep.  I advised her to take 1 dose of fluconazole today and repeat the dose in 72 hours if she is still symptomatic I gave her a third dose to use at the end of the course of antibiotics if she is symptomatic of a yeast infection though I explained to her that she does not need to take all 3 doses if she is not symptomatic.  - fluconazole (DIFLUCAN) 150 MG tablet  Dispense: 3 tablet; Refill: 0    Vaginal itching  - UA macro with reflex to Microscopic and Culture - Clinc Collect  - Wet prep - Clinic Collect  - Urine Microscopic  - Urine Culture    Vaginal discharge  - UA macro with reflex to Microscopic and Culture - Clinc Collect  - Wet prep - Clinic Collect  - Urine Microscopic  - Urine Culture         No follow-ups on file.    RONNY Kwok [facility]    Channing Stoll is a 21 year old female who presents to clinic today for the following health issues:  Chief Complaint   Patient presents with     Vaginal Problem     Itchy, discharge, and some redness, started about 1 wk ago.     HPI    She thought she might have a UTI or a yeast infection. No fever or chills. Is having urinary urgency and frequency.     Review of Systems  Constitutional, HEENT, cardiovascular, pulmonary, gi and gu systems are negative, except as otherwise noted.      Objective    /67   Pulse 78   Temp 97.2  F (36.2  C) (Tympanic)   Wt 88.5 kg (195 lb)   SpO2 98%   BMI 29.65 kg/m    Physical Exam   GENERAL: healthy, alert and no distress  ABDOMEN: soft, nontender, no hepatosplenomegaly, no masses   MS: no gross  musculoskeletal defects noted, no edema  SKIN: no suspicious lesions or rashes  NEURO: Normal strength and tone, mentation intact and speech normal  PSYCH: mentation appears normal, affect normal/bright    Results for orders placed or performed in visit on 06/11/22 (from the past 24 hour(s))   UA macro with reflex to Microscopic and Culture - Clinc Collect    Specimen: Urine, Clean Catch   Result Value Ref Range    Color Urine Yellow Colorless, Straw, Light Yellow, Yellow    Appearance Urine Clear Clear    Glucose Urine Negative Negative mg/dL    Bilirubin Urine Negative Negative    Ketones Urine Negative Negative mg/dL    Specific Gravity Urine 1.025 1.003 - 1.035    Blood Urine Negative Negative    pH Urine 7.0 5.0 - 7.0    Protein Albumin Urine Negative Negative mg/dL    Urobilinogen Urine 0.2 0.2, 1.0 E.U./dL    Nitrite Urine Negative Negative    Leukocyte Esterase Urine Small (A) Negative   Wet prep - Clinic Collect    Specimen: Vagina; Swab   Result Value Ref Range    Trichomonas Absent Absent    Yeast Present (A) Absent    Clue Cells Absent Absent    WBCs/high power field 1+ (A) None   Urine Microscopic   Result Value Ref Range    Bacteria Urine Few (A) None Seen /HPF    RBC Urine 0-2 0-2 /HPF /HPF    WBC Urine 10-25 (A) 0-5 /HPF /HPF    Squamous Epithelials Urine Moderate (A) None Seen /LPF

## 2022-06-13 LAB — BACTERIA UR CULT: NORMAL

## 2022-07-03 ENCOUNTER — OFFICE VISIT (OUTPATIENT)
Dept: URGENT CARE | Facility: URGENT CARE | Age: 22
End: 2022-07-03
Payer: COMMERCIAL

## 2022-07-03 VITALS
SYSTOLIC BLOOD PRESSURE: 108 MMHG | DIASTOLIC BLOOD PRESSURE: 65 MMHG | HEART RATE: 80 BPM | WEIGHT: 200 LBS | OXYGEN SATURATION: 98 % | BODY MASS INDEX: 30.41 KG/M2 | TEMPERATURE: 97.2 F

## 2022-07-03 DIAGNOSIS — L42 PITYRIASIS ROSEA: ICD-10-CM

## 2022-07-03 DIAGNOSIS — R07.0 THROAT PAIN: Primary | ICD-10-CM

## 2022-07-03 LAB
BASOPHILS # BLD AUTO: 0 10E3/UL (ref 0–0.2)
BASOPHILS NFR BLD AUTO: 0 %
DEPRECATED S PYO AG THROAT QL EIA: NEGATIVE
EOSINOPHIL # BLD AUTO: 0.1 10E3/UL (ref 0–0.7)
EOSINOPHIL NFR BLD AUTO: 1 %
ERYTHROCYTE [DISTWIDTH] IN BLOOD BY AUTOMATED COUNT: 12.9 % (ref 10–15)
GROUP A STREP BY PCR: NOT DETECTED
HCT VFR BLD AUTO: 38.4 % (ref 35–47)
HGB BLD-MCNC: 12.6 G/DL (ref 11.7–15.7)
IMM GRANULOCYTES # BLD: 0 10E3/UL
IMM GRANULOCYTES NFR BLD: 0 %
LYMPHOCYTES # BLD AUTO: 2.3 10E3/UL (ref 0.8–5.3)
LYMPHOCYTES NFR BLD AUTO: 25 %
MCH RBC QN AUTO: 29 PG (ref 26.5–33)
MCHC RBC AUTO-ENTMCNC: 32.8 G/DL (ref 31.5–36.5)
MCV RBC AUTO: 88 FL (ref 78–100)
MONOCYTES # BLD AUTO: 0.6 10E3/UL (ref 0–1.3)
MONOCYTES NFR BLD AUTO: 7 %
MONOCYTES NFR BLD AUTO: NEGATIVE %
NEUTROPHILS # BLD AUTO: 6 10E3/UL (ref 1.6–8.3)
NEUTROPHILS NFR BLD AUTO: 66 %
PLATELET # BLD AUTO: 232 10E3/UL (ref 150–450)
RBC # BLD AUTO: 4.35 10E6/UL (ref 3.8–5.2)
WBC # BLD AUTO: 9.1 10E3/UL (ref 4–11)

## 2022-07-03 PROCEDURE — 87651 STREP A DNA AMP PROBE: CPT | Performed by: NURSE PRACTITIONER

## 2022-07-03 PROCEDURE — 85025 COMPLETE CBC W/AUTO DIFF WBC: CPT | Performed by: NURSE PRACTITIONER

## 2022-07-03 PROCEDURE — 99213 OFFICE O/P EST LOW 20 MIN: CPT | Performed by: NURSE PRACTITIONER

## 2022-07-03 PROCEDURE — 86308 HETEROPHILE ANTIBODY SCREEN: CPT | Performed by: NURSE PRACTITIONER

## 2022-07-03 PROCEDURE — 36415 COLL VENOUS BLD VENIPUNCTURE: CPT | Performed by: NURSE PRACTITIONER

## 2022-07-03 RX ORDER — PREDNISONE 20 MG/1
20 TABLET ORAL 2 TIMES DAILY
Qty: 10 TABLET | Refills: 0 | Status: SHIPPED | OUTPATIENT
Start: 2022-07-03 | End: 2022-07-08

## 2022-07-03 ASSESSMENT — ENCOUNTER SYMPTOMS
SHORTNESS OF BREATH: 0
COUGH: 1
EYE PAIN: 0
SINUS PAIN: 0
FEVER: 0
WHEEZING: 0
CHILLS: 0
SORE THROAT: 1
SINUS PRESSURE: 0
VOICE CHANGE: 1
TROUBLE SWALLOWING: 1
VOMITING: 0
NAUSEA: 0
CHEST TIGHTNESS: 0

## 2022-07-03 NOTE — PATIENT INSTRUCTIONS
"Rapid strep test today is negative.   Drink plenty of fluids and rest.  May use salt water gargles- about 8 oz warm water with about 1 teaspoon salt  Sucrets and Cepacol spray are over the counter medications that numb the throat.  Over the counter pain relievers such as Tylenol or ibuprofen may be used as needed.   Honey lemon tea helps to soothe the throat and prevent cough. \"Throat Coat\" tea is soothing as well.  Mucinex (guaifenesin) is product known to help loosen congestion  Delsym (dextromethorphan polistirex) is an over the counter cough medication that lasts 12 hours.   Avoid smoke (cigarettes, bonfires, fireplace, wood burning stoves).  Using a vaporizer, humidifier, or steam from hot water to add moisture to the air can help.  Follow-up with primary care provider this coming week  If increased sore throat in 24 hours go to the emergency department      "

## 2022-07-03 NOTE — PROGRESS NOTES
Assessment & Plan     #1 Throat pain  - Streptococcus A Rapid Screen w/Reflex to PCR - Clinic Collect negative  - Group A Streptococcus PCR Throat Swab pending   - CBC with platelets and differential  CBC RESULTS: Recent Labs   Lab Test 07/03/22  1041   WBC 9.1   RBC 4.35   HGB 12.6   HCT 38.4   MCV 88   MCH 29.0   MCHC 32.8   RDW 12.9        - Mononucleosis screen negative   - predniSONE (DELTASONE) 20 MG tablet  Dispense: 10 tablet; Refill: 0  Follow-up with primary care provider this coming week  If increased sore throat in 24 hours go to the emergency department   19:48 called patient for update and did not answer.     Return in about 2 days (around 7/5/2022).     #2 RASH more viral     Shreya Toro [facility]    Subjective     #1.SORE THROAT   Dodie is a 21 year old female who presents to clinic today for the following health issues: sore throat starting on 6/25/2022 and now its 5/10 up to 6-7/10 at night runny nose, cough - non-productive, cough - productive, ear pain right, sore throat, hoarse voice and moderate headache. Patient states she is wondering if she has infected tonsils.      Patient was seen for a sore throat and had a negative strep test. During this visit she was treated with Prednisone for two days of 40 mg.     The patient returned to the clinic with sore throat, right ear pain, swelling of the right side of her face and right side of neck on 5/15/2022 and treated with Bicillin injection 1.2 million units      Patient has had negative covid PCR test through [facility] 6/29/2022. Patient declined to repeat covid test.     # 2. RASH   Patient states she developed a red patch itchy dry rash on her back of her neck,  chest, and  back started on 6/15/2022 but got worse 6/18/2022. The rash is not changing over the course of time. Patient has not had a similar rash.  Patient did have medications of Diflucan and Macrobid 6/11/2022. Patient wonders if it is  from the medication.   Associated symptoms include: Patient reported no other symptoms.    Patient denied fever, throat tightness, wheezing, cough, tongue/lip swelling, shortness of breath, joint pain, nausea, vomiting, rhinorrhea and URI symptoms.    Chief Complaint   Patient presents with     Pharyngitis     Sore throat 1 week negative covid test.        URI Adult  Onset of symptoms was 9 day 6/25/2022 ago.  Course of illness is worsening.    Severity moderately severe  Current and Associated symptoms: runny nose, cough - non-productive, cough - productive, ear pain right, sore throat, hoarse voice and moderate headache  Treatment measures tried include Tylenol, Fluids and Rest.  Predisposing factors include seasonal allergies and HX of recurrent OM.    Patient has been off work since 6/29/2022        Past Medical History:   Diagnosis Date     Amenorrhea, secondary      Current Outpatient Medications   Medication Sig Dispense Refill     albuterol (PROAIR HFA/PROVENTIL HFA/VENTOLIN HFA) 108 (90 Base) MCG/ACT inhaler INHALE 2 PUFFS INTO THE LUNGS EVERY 6 HOURS 18 g 0     Biotin 10 MG CAPS        cetirizine (ZYRTEC) 10 MG tablet Take 10 mg by mouth daily        fluticasone (FLONASE) 50 MCG/ACT nasal spray Spray 2 sprays into both nostrils daily 15.8 mL 1     levonorgestrel (KYLEENA) 19.5 MG IUD 1 each by Intrauterine route once Put in 8/12/2020       vitamin D3 (CHOLECALCIFEROL) 125 MCG (5000 UT) tablet Reported on 3/7/2017       Social History     Tobacco Use     Smoking status: Never Smoker     Smokeless tobacco: Never Used   Substance Use Topics     Alcohol use: Yes     Comment: socially     Review of Systems   Constitutional: Negative for chills and fever.   HENT: Positive for ear pain, postnasal drip, sore throat, trouble swallowing and voice change. Negative for sinus pressure, sinus pain and sneezing.         Cankor sore of the lip   Eyes: Negative for pain.   Respiratory: Positive for cough. Negative for  chest tightness, shortness of breath and wheezing.    Cardiovascular: Negative for chest pain.   Gastrointestinal: Negative for nausea and vomiting.         Objective    /65   Pulse 80   Temp 97.2  F (36.2  C)   Wt 90.7 kg (200 lb)   SpO2 98%   BMI 30.41 kg/m    Physical Exam  Vitals and nursing note reviewed.   Constitutional:       Appearance: Normal appearance.   HENT:      Head: Normocephalic and atraumatic.      Right Ear: Tympanic membrane, ear canal and external ear normal.      Left Ear: Tympanic membrane, ear canal and external ear normal.      Nose: Nose normal.      Mouth/Throat:      Pharynx: Pharyngeal swelling and posterior oropharyngeal erythema present.      Tonsils: Tonsillar exudate present. 3+ on the right. 3+ on the left.      Comments: Bilateral tonsils +3/+3 with exudate and patent   Eyes:      Conjunctiva/sclera: Conjunctivae normal.   Cardiovascular:      Rate and Rhythm: Normal rate and regular rhythm.      Pulses: Normal pulses.      Heart sounds: Normal heart sounds.   Pulmonary:      Effort: Pulmonary effort is normal.      Breath sounds: Normal breath sounds.   Lymphadenopathy:      Cervical: Cervical adenopathy present.   Skin:            Comments: Distribution: localized  Location: abdomen, back, under breast, chest and neck    Color: 5 mm to 1 cm singular oval sharply delimited pink dry patches ,  Lesion type: plaque, round, annular with no other findings, warmth, tenderness, swelling, induration, crusting, lymphangitis, honey colored crusting and petechiae   Neurological:      Mental Status: She is alert.

## 2022-07-10 ENCOUNTER — TELEPHONE (OUTPATIENT)
Dept: FAMILY MEDICINE | Facility: CLINIC | Age: 22
End: 2022-07-10

## 2022-07-11 NOTE — TELEPHONE ENCOUNTER
Reason for Call:  Other call back    Detailed comments: Patient has scheduled appointment with Dr. Zimmer. Would prefer to speak with  if possible.    Phone Number Patient can be reached at: Home number on file [phone] (home)    Best Time: Anytime    Can we leave a detailed message on this number? YES    Call taken on 7/10/2022 at 7:44 PM by Adelaide Jackson

## 2022-07-12 NOTE — TELEPHONE ENCOUNTER
Please let patient know that I am on medical leave, will not return until 8/22.  If she can wait the I will see her then otherwise please see Dr. Zimmer as scheduled.  thanks

## 2022-07-12 NOTE — TELEPHONE ENCOUNTER
Called and left detailed message per note below. Advised to call back with any questions.     Shazia English MA

## 2022-07-13 ENCOUNTER — OFFICE VISIT (OUTPATIENT)
Dept: FAMILY MEDICINE | Facility: CLINIC | Age: 22
End: 2022-07-13
Payer: COMMERCIAL

## 2022-07-13 VITALS
DIASTOLIC BLOOD PRESSURE: 70 MMHG | WEIGHT: 203.4 LBS | SYSTOLIC BLOOD PRESSURE: 110 MMHG | BODY MASS INDEX: 30.93 KG/M2 | TEMPERATURE: 97.3 F | RESPIRATION RATE: 16 BRPM | OXYGEN SATURATION: 97 % | HEART RATE: 83 BPM

## 2022-07-13 DIAGNOSIS — J35.01 CHRONIC TONSILLITIS: Primary | ICD-10-CM

## 2022-07-13 DIAGNOSIS — L70.9 ACNE, UNSPECIFIED ACNE TYPE: ICD-10-CM

## 2022-07-13 DIAGNOSIS — Z11.3 SCREEN FOR STD (SEXUALLY TRANSMITTED DISEASE): ICD-10-CM

## 2022-07-13 LAB
HCV AB SERPL QL IA: NONREACTIVE
HIV 1+2 AB+HIV1 P24 AG SERPL QL IA: NONREACTIVE
T PALLIDUM AB SER QL: NONREACTIVE

## 2022-07-13 PROCEDURE — 87389 HIV-1 AG W/HIV-1&-2 AB AG IA: CPT | Performed by: FAMILY MEDICINE

## 2022-07-13 PROCEDURE — 87591 N.GONORRHOEAE DNA AMP PROB: CPT | Performed by: FAMILY MEDICINE

## 2022-07-13 PROCEDURE — 99213 OFFICE O/P EST LOW 20 MIN: CPT | Performed by: FAMILY MEDICINE

## 2022-07-13 PROCEDURE — 87491 CHLMYD TRACH DNA AMP PROBE: CPT | Performed by: FAMILY MEDICINE

## 2022-07-13 PROCEDURE — 86803 HEPATITIS C AB TEST: CPT | Performed by: FAMILY MEDICINE

## 2022-07-13 PROCEDURE — 86780 TREPONEMA PALLIDUM: CPT | Performed by: FAMILY MEDICINE

## 2022-07-13 PROCEDURE — 36415 COLL VENOUS BLD VENIPUNCTURE: CPT | Performed by: FAMILY MEDICINE

## 2022-07-13 ASSESSMENT — PAIN SCALES - GENERAL: PAINLEVEL: NO PAIN (0)

## 2022-07-14 LAB
C TRACH DNA SPEC QL NAA+PROBE: NEGATIVE
N GONORRHOEA DNA SPEC QL NAA+PROBE: NEGATIVE

## 2022-08-23 ENCOUNTER — OFFICE VISIT (OUTPATIENT)
Dept: URGENT CARE | Facility: URGENT CARE | Age: 22
End: 2022-08-23
Payer: COMMERCIAL

## 2022-08-23 VITALS
HEART RATE: 96 BPM | TEMPERATURE: 99.2 F | BODY MASS INDEX: 30.41 KG/M2 | WEIGHT: 200 LBS | DIASTOLIC BLOOD PRESSURE: 70 MMHG | OXYGEN SATURATION: 98 % | SYSTOLIC BLOOD PRESSURE: 125 MMHG

## 2022-08-23 DIAGNOSIS — N76.0 BV (BACTERIAL VAGINOSIS): ICD-10-CM

## 2022-08-23 DIAGNOSIS — R30.0 DYSURIA: ICD-10-CM

## 2022-08-23 DIAGNOSIS — N30.00 ACUTE CYSTITIS WITHOUT HEMATURIA: Primary | ICD-10-CM

## 2022-08-23 DIAGNOSIS — B96.89 BV (BACTERIAL VAGINOSIS): ICD-10-CM

## 2022-08-23 LAB
ALBUMIN UR-MCNC: NEGATIVE MG/DL
APPEARANCE UR: CLEAR
BACTERIA #/AREA URNS HPF: ABNORMAL /HPF
BILIRUB UR QL STRIP: NEGATIVE
CLUE CELLS: PRESENT
COLOR UR AUTO: YELLOW
GLUCOSE UR STRIP-MCNC: NEGATIVE MG/DL
HGB UR QL STRIP: ABNORMAL
KETONES UR STRIP-MCNC: NEGATIVE MG/DL
LEUKOCYTE ESTERASE UR QL STRIP: ABNORMAL
NITRATE UR QL: NEGATIVE
PH UR STRIP: 7 [PH] (ref 5–7)
RBC #/AREA URNS AUTO: ABNORMAL /HPF
SP GR UR STRIP: 1.02 (ref 1–1.03)
SQUAMOUS #/AREA URNS AUTO: ABNORMAL /LPF
TRICHOMONAS, WET PREP: ABNORMAL
UROBILINOGEN UR STRIP-ACNC: 0.2 E.U./DL
WBC #/AREA URNS AUTO: ABNORMAL /HPF
WBC'S/HIGH POWER FIELD, WET PREP: ABNORMAL
YEAST, WET PREP: ABNORMAL

## 2022-08-23 PROCEDURE — 87086 URINE CULTURE/COLONY COUNT: CPT | Performed by: PHYSICIAN ASSISTANT

## 2022-08-23 PROCEDURE — 99213 OFFICE O/P EST LOW 20 MIN: CPT | Performed by: PHYSICIAN ASSISTANT

## 2022-08-23 PROCEDURE — 81001 URINALYSIS AUTO W/SCOPE: CPT | Performed by: PHYSICIAN ASSISTANT

## 2022-08-23 PROCEDURE — 87210 SMEAR WET MOUNT SALINE/INK: CPT | Performed by: PHYSICIAN ASSISTANT

## 2022-08-23 RX ORDER — NITROFURANTOIN 25; 75 MG/1; MG/1
100 CAPSULE ORAL 2 TIMES DAILY
Qty: 10 CAPSULE | Refills: 0 | Status: SHIPPED | OUTPATIENT
Start: 2022-08-23 | End: 2022-08-28

## 2022-08-23 RX ORDER — METRONIDAZOLE 7.5 MG/G
1 GEL VAGINAL AT BEDTIME
Qty: 25 G | Refills: 0 | Status: SHIPPED | OUTPATIENT
Start: 2022-08-23 | End: 2022-08-26 | Stop reason: ALTCHOICE

## 2022-08-23 NOTE — PROGRESS NOTES
Assessment & Plan     Acute cystitis without hematuria  Culture pending. Will treat with nitroFURantoin macrocrystal-monohydrate (MACROBID) 100 MG capsule; Take 1 capsule (100 mg) by mouth 2 times daily for 5 days. Push fluids. Return to clinic if symptoms worsen or do not improve; otherwise follow up as needed      BV (bacterial vaginosis)    - metroNIDAZOLE (METROGEL) 0.75 % vaginal gel; Place 1 applicator (5 g) vaginally At Bedtime for 5 days    Dysuria    - UA macro with reflex to Microscopic and Culture - Clinc Collect  - Wet prep - Clinic Collect  - Urine Microscopic Exam  - Urine Culture                 Return in about 2 days (around 8/25/2022), or if symptoms worsen or fail to improve.    UM[facility]            Subjective   Chief Complaint   Patient presents with     Dysuria     Pain with urination, frequency, urgency, for 24 hours.         HPI       UTI    Onset of symptoms was 1day(s).  Course of illness is worsening  Severity moderate  Current and associated symptoms dysuria and frequency  Treatment and measures tried Increase fluid intake  Predisposing factors include history of frequent UTI's  Patient denies rigors, flank pain and temperature > 101 degrees F.                    Review of Systems   Constitutional, HEENT, cardiovascular, pulmonary, gi and gu systems are negative, except as otherwise noted.      Objective    /70   Pulse 96   Temp 99.2  F (37.3  C) (Tympanic)   Wt 90.7 kg (200 lb)   SpO2 98%   BMI 30.41 kg/m    Body mass index is 30.41 kg/m .  Physical Exam  Constitutional:       General: She is not in acute distress.     Appearance: She is well-developed.   Psychiatric:         Behavior: Behavior normal.            Results for orders placed or performed in visit on 08/23/22 (from the past 24 hour(s))   UA macro with reflex to Microscopic and Culture - Clinc Collect    Specimen: Urine, Clean Catch   Result Value Ref Range     Color Urine Yellow Colorless, Straw, Light Yellow, Yellow    Appearance Urine Clear Clear    Glucose Urine Negative Negative mg/dL    Bilirubin Urine Negative Negative    Ketones Urine Negative Negative mg/dL    Specific Gravity Urine 1.025 1.003 - 1.035    Blood Urine Small (A) Negative    pH Urine 7.0 5.0 - 7.0    Protein Albumin Urine Negative Negative mg/dL    Urobilinogen Urine 0.2 0.2, 1.0 E.U./dL    Nitrite Urine Negative Negative    Leukocyte Esterase Urine Moderate (A) Negative   Wet prep - Clinic Collect    Specimen: Vagina; Swab   Result Value Ref Range    Trichomonas Absent Absent    Yeast Absent Absent    Clue Cells Present (A) Absent    WBCs/high power field 1+ (A) None   Urine Microscopic Exam   Result Value Ref Range    Bacteria Urine Moderate (A) None Seen /HPF    RBC Urine 0-2 0-2 /HPF /HPF    WBC Urine 5-10 (A) 0-5 /HPF /HPF    Squamous Epithelials Urine Many (A) None Seen /LPF                   .  ..

## 2022-08-24 NOTE — RESULT ENCOUNTER NOTE
[facility] Emergency Dept discharge antibiotic (if prescribed): Nitrofurantoin Macrocrystal-Monohydrate (Macrobid) 100 mg PO capsule, 1 capsule (100 mg) by mouth 2 times daily for 5 days   Date of Rx (if applicable):  8/23/22  No changes in treatment per [facility] ED Lab Result Urine culture protocol.

## 2022-08-25 ENCOUNTER — E-VISIT (OUTPATIENT)
Dept: FAMILY MEDICINE | Facility: CLINIC | Age: 22
End: 2022-08-25
Payer: COMMERCIAL

## 2022-08-25 ENCOUNTER — MYC MEDICAL ADVICE (OUTPATIENT)
Dept: FAMILY MEDICINE | Facility: CLINIC | Age: 22
End: 2022-08-25

## 2022-08-25 DIAGNOSIS — M54.50 ACUTE BILATERAL LOW BACK PAIN WITHOUT SCIATICA: Primary | ICD-10-CM

## 2022-08-25 LAB — BACTERIA UR CULT: NORMAL

## 2022-08-25 PROCEDURE — 99421 OL DIG E/M SVC 5-10 MIN: CPT | Performed by: FAMILY MEDICINE

## 2022-08-26 ENCOUNTER — OFFICE VISIT (OUTPATIENT)
Dept: OTOLARYNGOLOGY | Facility: CLINIC | Age: 22
End: 2022-08-26
Attending: FAMILY MEDICINE
Payer: COMMERCIAL

## 2022-08-26 ENCOUNTER — OFFICE VISIT (OUTPATIENT)
Dept: FAMILY MEDICINE | Facility: CLINIC | Age: 22
End: 2022-08-26
Payer: COMMERCIAL

## 2022-08-26 VITALS
RESPIRATION RATE: 16 BRPM | WEIGHT: 200.6 LBS | HEART RATE: 66 BPM | OXYGEN SATURATION: 100 % | DIASTOLIC BLOOD PRESSURE: 74 MMHG | SYSTOLIC BLOOD PRESSURE: 113 MMHG | BODY MASS INDEX: 30.5 KG/M2

## 2022-08-26 DIAGNOSIS — N89.8 VAGINAL ITCHING: ICD-10-CM

## 2022-08-26 DIAGNOSIS — J35.01 CHRONIC TONSILLITIS: ICD-10-CM

## 2022-08-26 DIAGNOSIS — B96.89 BV (BACTERIAL VAGINOSIS): ICD-10-CM

## 2022-08-26 DIAGNOSIS — N76.0 BV (BACTERIAL VAGINOSIS): ICD-10-CM

## 2022-08-26 DIAGNOSIS — R06.83 SNORING: Primary | ICD-10-CM

## 2022-08-26 DIAGNOSIS — B37.31 YEAST INFECTION OF THE VAGINA: Primary | ICD-10-CM

## 2022-08-26 LAB
CLUE CELLS: ABNORMAL
TRICHOMONAS, WET PREP: ABNORMAL
WBC'S/HIGH POWER FIELD, WET PREP: ABNORMAL
YEAST, WET PREP: PRESENT

## 2022-08-26 PROCEDURE — 99213 OFFICE O/P EST LOW 20 MIN: CPT | Performed by: OTOLARYNGOLOGY

## 2022-08-26 PROCEDURE — 87210 SMEAR WET MOUNT SALINE/INK: CPT | Performed by: NURSE PRACTITIONER

## 2022-08-26 PROCEDURE — 99213 OFFICE O/P EST LOW 20 MIN: CPT | Performed by: NURSE PRACTITIONER

## 2022-08-26 RX ORDER — FLUCONAZOLE 150 MG/1
150 TABLET ORAL DAILY
Qty: 2 TABLET | Refills: 0 | Status: SHIPPED | OUTPATIENT
Start: 2022-08-26 | End: 2023-07-03

## 2022-08-26 RX ORDER — METRONIDAZOLE 500 MG/1
500 TABLET ORAL 2 TIMES DAILY
Qty: 10 TABLET | Refills: 0 | Status: SHIPPED | OUTPATIENT
Start: 2022-08-26 | End: 2022-08-31

## 2022-08-26 RX ORDER — CHLORHEXIDINE GLUCONATE ORAL RINSE 1.2 MG/ML
15 SOLUTION DENTAL 2 TIMES DAILY
Qty: 300 ML | Refills: 0 | Status: SHIPPED | OUTPATIENT
Start: 2022-08-26 | End: 2022-09-05

## 2022-08-26 ASSESSMENT — ENCOUNTER SYMPTOMS
ABDOMINAL PAIN: 0
FATIGUE: 0
FEVER: 0
VOMITING: 0
DYSURIA: 0
DIARRHEA: 0
CHILLS: 0
BACK PAIN: 0
ACTIVITY CHANGE: 0
NAUSEA: 0
APPETITE CHANGE: 0

## 2022-08-26 NOTE — LETTER
8/26/2022         RE: Batool Louis  [address] [place] [address]        Dear Colleague,    Thank you for referring your patient, Batool Louis, to the [facility]. Please see a copy of my visit note below.    CHIEF COMPLAINT: Patient presents with:  Ent Problem: Tonsillitis, supposed to have surgery 2020, July had symptoms         HISTORY OF PRESENT ILLNESS    Dodie was seen at the behest of Anil Zimmer for chronic tonsillitis    Previous ENT visit ([facility])   2015    ASSESSMENT: Tonsil stones, halitosis, and oral lesion.    PLAN: I offered tonsillectomy to prevent the tonsil stones from  re-accumulating and that would also remove the small growth, which  actually appears benign. I discussed the risks, benefits, and possible  complications including, but not limited to, bleeding. She understands.  She can decide if she would like to go ahead and let us know, and we  might be happy to schedule that at their convenience.    ________________________________  Alisha Lutz M.D.           REVIEW OF SYSTEMS    Review of Systems as per HPI and PMHx, otherwise 10 system review system are negative.     Sulfamethoxazole-trimethoprim     There were no vitals taken for this visit.    HEAD: Normal appearance and symmetry:  No cutaneous lesions.      NECK:  supple     EARS: normal TM, EACs    EYES:  EOMI    CN VII/XII:  intact     NOSE:     Dorsum:   straight  Septum:  midline  Mucosa:  moist        ORAL CAVITY/OROPHARYNX:     Lips:  Normal.  Tongue: normal, midline  Mucosa:   no lesions  Tonsils:      NECK:  Trachea:  midline.              Thyroid:  normal              Adenopathy:  none        NEURO:   Alert and Oriented     GAIT AND STATION:  normal     RESPIRATORY:   Symmetry and Respiratory effort     PSYCH:  Normal mood and affect     SKIN:   warm and dry         IMPRESSION:    Encounter Diagnoses   Name Primary?     Chronic tonsillitis      Snoring  Yes          RECOMMENDATIONS:      Orders Placed This Encounter   Procedures     Adult Sleep Eval & Management  Referral     Case Request: TONSILLECTOMY      Recommend tonsillectomy under anesthesia.   She is aware of the risks and benefits including VPI and post operative hemmorrhage.   Medrol dosepack to be taken immediately before surgery.   Pre operative sleep study.  May need overnight stay if THOR is present.         Again, thank you for allowing me to participate in the care of your patient.        Sincerely,        Sebastian Olson MD

## 2022-08-26 NOTE — PROGRESS NOTES
Patient presents with:  Vaginal Itching: Dx with uti, given metronidazole gel, pt states gel started bad vaginal itching, unsure if bv or yeast have developed      Clinical Decision Making: Focused exam benign, abdomen soft, no CVA or abdominal tenderness.  Wet prep positive for yeast.    Clinical presentation consistent with vaginal yeast in the setting of current treatment for bacterial vaginosis and UTI.  Will treat with course of Diflucan and encouraged Flagyl oral treatment for the next 5 days, given completed 2-day course of MetroGel.  Patient is advised to stop MetroGel and may use OTC topical Monistat vaginally along with Diflucan.  Patient is advised to avoid EtOH and coitus over the next week to assist with symptom improvement.  Discussed red flag symptoms of vaginal discomfort and when to return for reevaluation, patient verbalized understanding.      ICD-10-CM    1. Yeast infection of the vagina  B37.3 fluconazole (DIFLUCAN) 150 MG tablet   2. Vaginal itching  N89.8 Wet prep - Clinic Collect   3. BV (bacterial vaginosis)  N76.0 metroNIDAZOLE (FLAGYL) 500 MG tablet    B96.89      There are no Patient Instructions on file for this visit.      HPI: Batool Louis is a 22 year old female who presents today complaining of worsening vaginal symptoms with increased pruritus while using MetroGel.  Patient reports that she was recently treated for a urine infection and bacterial vaginosis, and continues to take antibiotic course along with MetroGel vaginal gel.  Denies any new fevers, chills or myalgia symptoms.    History obtained from the patient.  LMP: 8/17/2022    Problem List:  2021-07: Shellfish allergy  2021-07: Cough  2020-08: IUD (intrauterine device) in place  2020-05: Chronic tonsillitis  2018-06: Strep throat  2012-01: Allergic rhinitis      Past Medical History:   Diagnosis Date     Amenorrhea, secondary        Social History     Tobacco Use     Smoking status: Never Smoker     Smokeless  tobacco: Never Used   Substance Use Topics     Alcohol use: Yes     Comment: socially       Review of Systems   Constitutional: Negative for activity change, appetite change, chills, fatigue and fever.   Gastrointestinal: Negative for abdominal pain, diarrhea, nausea and vomiting.   Genitourinary: Negative for dysuria and vaginal discharge.        Vaginal irritation and pruritus   Musculoskeletal: Negative for back pain.       Vitals:    08/26/22 1244   BP: 113/74   BP Location: Right arm   Patient Position: Sitting   Cuff Size: Adult Regular   Pulse: 66   Resp: 16   SpO2: 100%   Weight: 91 kg (200 lb 9.6 oz)       Physical Exam  Constitutional:       General: She is not in acute distress.     Appearance: Normal appearance. She is not ill-appearing, toxic-appearing or diaphoretic.   Cardiovascular:      Rate and Rhythm: Normal rate and regular rhythm.      Pulses: Normal pulses.      Heart sounds: Normal heart sounds.   Pulmonary:      Effort: Pulmonary effort is normal.      Breath sounds: Normal breath sounds.   Abdominal:      General: Bowel sounds are normal. There is no distension.      Palpations: Abdomen is soft. There is no mass.      Tenderness: There is no abdominal tenderness. There is no right CVA tenderness, left CVA tenderness, guarding or rebound.   Skin:     General: Skin is warm.      Capillary Refill: Capillary refill takes less than 2 seconds.      Findings: No rash.   Neurological:      Mental Status: She is alert and oriented to person, place, and time.   Psychiatric:         Behavior: Behavior normal.         Labs:  Results for orders placed or performed in visit on 08/26/22   Wet prep - Clinic Collect     Status: Abnormal    Specimen: Vagina; Swab   Result Value Ref Range    Trichomonas Absent Absent    Yeast Present (A) Absent    Clue Cells Absent Absent    WBCs/high power field 1+ (A) None         At the end of the encounter, I discussed results, diagnosis, medications. Discussed red flags  for immediate return to clinic/ER, as well as indications for follow up if no improvement. Patient understood and agreed to plan.     RONNY Carolina CNP

## 2022-08-26 NOTE — PATIENT INSTRUCTIONS
Dear Batool Louis,     After reviewing your responses, I would like you to come in for a urine test to make sure we treat you correctly. This urine test is to evaluate you for a possible urinary tract infection, and should be scheduled for today or tomorrow. Schedule a Lab Only appointment here.     Lab appointments are not available at most locations on the weekends. If no Lab Only appointment is available, you should be seen in any of our convenient Walk-in or Urgent Care Centers, which can be found on our website here.     You will receive instructions with your results in [employer] once they are available.     If your symptoms worsen, you develop pain in your back or stomach, develop fevers, or are not improving in 5 days, please contact your primary care provider for an appointment or visit a Walk-in or Urgent Care Center to be seen.    Are you having any other symptoms beside the back pain?  Please get the lab done asap.  If if it normal then I recommend to follow up for further evaluation if not resolving.  Thanks.  Nancy     Thanks again for choosing us as your health care partner,     Nancy Mcdowell Mai, MD

## 2022-08-26 NOTE — PROGRESS NOTES
CHIEF COMPLAINT: Patient presents with:  Ent Problem: Tonsillitis, supposed to have surgery 2020, July had symptoms         HISTORY OF PRESENT ILLNESS    Dodie was seen at the behest of [facility] for chronic tonsillitis.  She experiences 3-4 infections per year.  She is a profressional hoffmann and dancer and when she has a bout of tonsillitis it will also effect her singing voice.  She was scheduled to have per tonsils out previously but was delayed due to the COVID pandemic.     Previous ENT visit ([facility])   2015    ASSESSMENT: Tonsil stones, halitosis, and oral lesion.    PLAN: I offered tonsillectomy to prevent the tonsil stones from  re-accumulating and that would also remove the small growth, which  actually appears benign. I discussed the risks, benefits, and possible  complications including, but not limited to, bleeding. She understands.  She can decide if she would like to go ahead and let us know, and we  might be happy to schedule that at their convenience.    ________________________________  Alisha Lutz M.D.           REVIEW OF SYSTEMS    Review of Systems as per HPI and PMHx, otherwise 10 system review system are negative.     Sulfamethoxazole-trimethoprim     There were no vitals taken for this visit.    HEAD: Normal appearance and symmetry:  No cutaneous lesions.      NECK:  supple     EARS: normal TM, EACs    EYES:  EOMI    CN VII/XII:  intact     NOSE:     Dorsum:   straight  Septum:  midline  Mucosa:  moist        ORAL CAVITY/OROPHARYNX:     Lips:  Normal.  Tongue: normal, midline  Mucosa:   no lesions  Tonsils:      NECK:  Trachea:  midline.              Thyroid:  normal              Adenopathy:  none        NEURO:   Alert and Oriented     GAIT AND STATION:  normal     RESPIRATORY:   Symmetry and Respiratory effort     PSYCH:  Normal mood and affect     SKIN:   warm and dry         IMPRESSION:    Encounter Diagnoses   Name Primary?     Chronic tonsillitis      Snoring Yes           RECOMMENDATIONS:      Orders Placed This Encounter   Procedures     Adult Sleep Eval & Management  Referral     Case Request: TONSILLECTOMY      Recommend tonsillectomy under anesthesia.   She is aware of the risks and benefits including VPI and post operative hemmorrhage.   Medrol dosepack to be taken immediately before surgery.   Pre operative sleep study.  May need overnight stay if THOR is present.

## 2022-09-23 ENCOUNTER — TELEPHONE (OUTPATIENT)
Dept: OTOLARYNGOLOGY | Facility: CLINIC | Age: 22
End: 2022-09-23

## 2022-10-22 ENCOUNTER — HEALTH MAINTENANCE LETTER (OUTPATIENT)
Age: 22
End: 2022-10-22

## 2022-11-14 ENCOUNTER — VIRTUAL VISIT (OUTPATIENT)
Dept: PSYCHOLOGY | Facility: CLINIC | Age: 22
End: 2022-11-14
Payer: COMMERCIAL

## 2022-11-14 DIAGNOSIS — F41.1 GENERALIZED ANXIETY DISORDER: Primary | ICD-10-CM

## 2022-11-14 PROCEDURE — 90837 PSYTX W PT 60 MINUTES: CPT | Mod: GT | Performed by: COUNSELOR

## 2022-11-14 ASSESSMENT — ANXIETY QUESTIONNAIRES
8. IF YOU CHECKED OFF ANY PROBLEMS, HOW DIFFICULT HAVE THESE MADE IT FOR YOU TO DO YOUR WORK, TAKE CARE OF THINGS AT HOME, OR GET ALONG WITH OTHER PEOPLE?: VERY DIFFICULT
IF YOU CHECKED OFF ANY PROBLEMS ON THIS QUESTIONNAIRE, HOW DIFFICULT HAVE THESE PROBLEMS MADE IT FOR YOU TO DO YOUR WORK, TAKE CARE OF THINGS AT HOME, OR GET ALONG WITH OTHER PEOPLE: VERY DIFFICULT
7. FEELING AFRAID AS IF SOMETHING AWFUL MIGHT HAPPEN: MORE THAN HALF THE DAYS
4. TROUBLE RELAXING: NEARLY EVERY DAY
6. BECOMING EASILY ANNOYED OR IRRITABLE: NEARLY EVERY DAY
1. FEELING NERVOUS, ANXIOUS, OR ON EDGE: NEARLY EVERY DAY
2. NOT BEING ABLE TO STOP OR CONTROL WORRYING: NEARLY EVERY DAY
GAD7 TOTAL SCORE: 16
GAD7 TOTAL SCORE: 16
3. WORRYING TOO MUCH ABOUT DIFFERENT THINGS: MORE THAN HALF THE DAYS
7. FEELING AFRAID AS IF SOMETHING AWFUL MIGHT HAPPEN: MORE THAN HALF THE DAYS
GAD7 TOTAL SCORE: 16
5. BEING SO RESTLESS THAT IT IS HARD TO SIT STILL: NOT AT ALL

## 2022-11-14 NOTE — PROGRESS NOTES
Answers for HPI/ROS submitted by the patient on 11/14/2022  SHELBY 7 TOTAL SCORE: 16        [facility]                                     Progress Note    Patient Name: Batool Louis  Date: 11/14/22         Service Type: Individual      Session Start Time: 9:37am  Session End Time:  10:30am     Session Length: 53    Session #: 45    Attendees: Client    Service Modality:  Video Visit:      Provider verified identity through the following two step process.  Patient provided:  Patient is known previously to provider    Telemedicine Visit: The patient's condition can be safely assessed and treated via synchronous audio and visual telemedicine encounter.      Reason for Telemedicine Visit: Services only offered telehealth    Originating Site (Patient Location): Patient's home    Distant Site (Provider Location): Provider Remote Setting- Home Office    Consent:  The patient/guardian has verbally consented to: the potential risks and benefits of telemedicine (video visit) versus in person care; bill my insurance or make self-payment for services provided; and responsibility for payment of non-covered services.     Patient would like the video invitation sent by:  Send to e-mail at: [email or web address]    Mode of Communication:  Video Conference via [facility]    As the provider I attest to compliance with applicable laws and regulations related to telemedicine.    DATA  Interactive Complexity: No  Crisis: No        Progress Since Last Session (Related to Symptoms / Goals / Homework):   Symptoms: No change .    Homework: Achieved / completed to satisfaction      Episode of Care Goals: Satisfactory progress - ACTION (Actively working towards change); Intervened by reinforcing change plan / affirming steps taken     Current / Ongoing Stressors and Concerns:  The client stated she was dating the older man for awhile but then found out he had another girlfriend. 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      Treatment Objective(s) Addressed in This Session:   use thought-stopping strategy daily to reduce intrusive thoughts       Intervention:   Processed the breakup the client went through and how she moved on into another relationship quickly afterwards. Redirected her negative self talk throughout the session and pointed out judgments. Talked about how the client doesn't want to stay with her pain at all and is distracting any chance she can get which continues to make the pain increase the more she avoids. Talked about her working on grounding skills, making time to sit in the pain/grief.     Assessments completed prior to visit:  The following assessments were completed by patient for this visit:  PHQ9:   PHQ-9 SCORE 8/13/2018 3/12/2020 4/30/2020 5/22/2020 7/10/2020 2/1/2021 3/21/2022   PHQ-9 Total Score MyChart 7 (Mild depression) - - 15 (Moderately severe depression) - 4 (Minimal depression) -   PHQ-9 Total Score 7 10 6 15 14 4 8     GAD7:   SHELBY-7 SCORE 3/1/2018 6/11/2018 8/13/2018 3/12/2020 5/22/2020 2/1/2021 11/14/2022   Total Score - - 6 (mild anxiety) - 16 (severe anxiety) 10 (moderate anxiety) 16 (severe anxiety)   Total Score 5 4 6 7 16 10 16     PROMIS 10-Global Health (all questions and answers displayed):   PROMIS 10 12/21/2021 11/14/2022   In general, would you say your health is: Very good Fair   In general, would you say your quality of life is: Very good Good   In general, how would you rate your physical health? Very good Good   In general, how would you rate your mental health, including your mood and your ability to think? Very good Fair   In general, how would you rate your satisfaction with your social activities and relationships? Very good Fair   In general, please rate how well you carry out your usual social activities and roles Very good Fair   To what extent are you able to carry out your everyday physical activities  such as walking, climbing stairs, carrying groceries, or moving a chair? Completely Completely   How often have you been bothered by emotional problems such as feeling anxious, depressed or irritable? Rarely Often   How would you rate your fatigue on average? Mild Moderate   How would you rate your pain on average?   0 = No Pain  to  10 = Worst Imaginable Pain 1 2   In general, would you say your health is: 4 2   In general, would you say your quality of life is: 4 3   In general, how would you rate your physical health? 4 3   In general, how would you rate your mental health, including your mood and your ability to think? 4 2   In general, how would you rate your satisfaction with your social activities and relationships? 4 2   In general, please rate how well you carry out your usual social activities and roles. (This includes activities at home, at work and in your community, and responsibilities as a parent, child, spouse, employee, friend, etc.) 4 2   To what extent are you able to carry out your everyday physical activities such as walking, climbing stairs, carrying groceries, or moving a chair? 5 5   In the past 7 days, how often have you been bothered by emotional problems such as feeling anxious, depressed, or irritable? 2 4   In the past 7 days, how would you rate your fatigue on average? 2 3   In the past 7 days, how would you rate your pain on average, where 0 means no pain, and 10 means worst imaginable pain? 1 2   Global Mental Health Score 16 9   Global Physical Health Score 17 15   PROMIS TOTAL - SUBSCORES 33 24   Some recent data might be hidden         ASSESSMENT: Current Emotional / Mental Status (status of significant symptoms):   Risk status (Self / Other harm or suicidal ideation)   Patient denies current fears or concerns for personal safety.   Patient denies current or recent suicidal ideation or behaviors.   Patient denies current or recent homicidal ideation or behaviors.   Patient denies  current or recent self injurious behavior or ideation.   Patient denies other safety concerns.   Patient reports there has been no change in risk factors since their last session.     Patient reports there has been no change in protective factors since their last session.     Recommended that patient call 911 or go to the local ED should there be a change in any of these risk factors.     Appearance:   Appropriate    Eye Contact:   Good    Psychomotor Behavior: Normal    Attitude:   Cooperative    Orientation:   All   Speech    Rate / Production: Normal/ Responsive    Volume:  Normal    Mood:    Normal   Affect:    Appropriate    Thought Content:  Clear    Thought Form:  Coherent  Logical    Insight:    Good      Medication Review:   No current psychiatric medications prescribed     Medication Compliance:   NA     Changes in Health Issues:   None reported     Chemical Use Review:   Substance Use: Chemical use reviewed, no active concerns identified      Tobacco Use: No current tobacco use.      Diagnosis:  1. Generalized anxiety disorder        Collateral Reports Completed:   Not Applicable    PLAN: (Patient Tasks / Therapist Tasks / Other)  The client will work on mindfulness and grounding skills.         Celsa Linda, [facility]                                                         ______________________________________________________________________    Individual Treatment Plan    Patient's Name: Batool Louis  YOB: 2000    Date of Creation: 3/23/22  Date Treatment Plan Last Reviewed/Revised: 11/14/22    DSM5 Diagnoses: 300.02 (F41.1) Generalized Anxiety Disorder  Psychosocial / Contextual Factors: college student, living at home, family conflict  PROMIS (reviewed every 90 days):     Referral / Collaboration:  Referral to another professional/service is not indicated at this time..    Anticipated number of session for this episode of care: on-going  Anticipation frequency of session: Every  "other week  Anticipated Duration of each session: 38-52 minutes  Treatment plan will be reviewed in 90 days or when goals have been changed.       MeasurableTreatment Goal(s) related to diagnosis / functional impairment(s)    MeasurableTreatment Goal(s) related to diagnosis / functional impairment(s)  Goal 1: Client will increase emotion regulation skills/decrease panic attacks    Objective #A (Client Action)    Client will identify 2-4 fears / thoughts that contribute to feeling anxious  use positive self-affirmations daily.  Status: Continued - Date(s): 11/14/22    Intervention(s)  Therapist will teach emotional regulation skills. distress tolerance skills, interpersonal effectiveness skills, emotion regluation skills, mindfulness skills, radical acceptance. Therapist will teach client how to ID body cues for anxiety, anxiety reduction techniques, how to ID triggers for depression and anxiety- decrease reactivity/eliminate, lifestyle changes to reduce depression and anxiety, communication skills, explore cognitive beliefs and help client to develop healthy cognitive patterns and beliefs.      Objective #B  Client will use thought-stopping strategy daily to reduce intrusive thoughts.  Status: Continued - Date(s): 11/14/22    Intervention(s)  Therapist will teach emotional regulation skills. distress tolerance skills, interpersonal effectiveness skills, emotion regluation skills, mindfulness skills, radical acceptance. Therapist will teach client how to ID body cues for anxiety, anxiety reduction techniques, how to ID triggers for depression and anxiety- decrease reactivity/eliminate, lifestyle changes to reduce depression and anxiety, communication skills, explore cognitive beliefs and help client to develop healthy cognitive patterns and beliefs.    Objective #C (Client Action)    Status: Continued - Date: 11/14/22    Client will use \"worry time\" each day for 15 minutes of scheduled worry and then defer obsessive " or anxious thinking until the next structured worry time.    Intervention(s)  Therapist will teach emotional regulation skills. work through trauma using somatic experiencing techniques to discharge the anxiety.    Goal 2: Client will work on increasing self esteem and self worth     Objective #A (Client Action)    Status: Continued - Date(s): 11/14/22    Client will identify two areas of life that you would like to have improved functioning.    Intervention(s)  Therapist will teach emotional regulation skills. work through trauma using somatic experiencing techniques to discharge the anxiety.      Client has reviewed and agreed to the above plan.      Celsa Linda [facility]

## 2022-12-02 ENCOUNTER — VIRTUAL VISIT (OUTPATIENT)
Dept: PSYCHOLOGY | Facility: CLINIC | Age: 22
End: 2022-12-02
Payer: COMMERCIAL

## 2022-12-02 DIAGNOSIS — F41.1 GENERALIZED ANXIETY DISORDER: Primary | ICD-10-CM

## 2022-12-02 PROCEDURE — 90837 PSYTX W PT 60 MINUTES: CPT | Mod: GT | Performed by: COUNSELOR

## 2022-12-02 NOTE — PROGRESS NOTES
Answers for HPI/ROS submitted by the patient on 11/14/2022  SHELBY 7 TOTAL SCORE: 16        [facility]                                     Progress Note    Patient Name: Batool Louis  Date: 12/2/22         Service Type: Individual      Session Start Time: 9:00am  Session End Time:  9:55am     Session Length: 55    Session #: 46    Attendees: Client    Service Modality:  Video Visit:      Provider verified identity through the following two step process.  Patient provided:  Patient is known previously to provider    Telemedicine Visit: The patient's condition can be safely assessed and treated via synchronous audio and visual telemedicine encounter.      Reason for Telemedicine Visit: Services only offered telehealth    Originating Site (Patient Location): Patient's home    Distant Site (Provider Location): Provider Remote Setting- Home Office    Consent:  The patient/guardian has verbally consented to: the potential risks and benefits of telemedicine (video visit) versus in person care; bill my insurance or make self-payment for services provided; and responsibility for payment of non-covered services.     Patient would like the video invitation sent by:  Send to e-mail at: [email or web address]    Mode of Communication:  Video Conference via well    As the provider I attest to compliance with applicable laws and regulations related to telemedicine.    DATA  Interactive Complexity: No  Crisis: No        Progress Since Last Session (Related to Symptoms / Goals / Homework):   Symptoms: No change .    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   She is interviewing for a new job.      Treatment Objective(s) Addressed in This Session:   use  thought-stopping strategy daily to reduce intrusive thoughts       Intervention:   The client stated she did make time to just sit with her grief and her thoughts about her relationships. She stated she does feel like she wants to be with her current boyfriend. Discussed the pros and cons of doing the musical and also the new potential job.      Assessments completed prior to visit:  The following assessments were completed by patient for this visit:  PHQ9:   PHQ-9 SCORE 8/13/2018 3/12/2020 4/30/2020 5/22/2020 7/10/2020 2/1/2021 3/21/2022   PHQ-9 Total Score MyChart 7 (Mild depression) - - 15 (Moderately severe depression) - 4 (Minimal depression) -   PHQ-9 Total Score 7 10 6 15 14 4 8     GAD7:   SHELBY-7 SCORE 3/1/2018 6/11/2018 8/13/2018 3/12/2020 5/22/2020 2/1/2021 11/14/2022   Total Score - - 6 (mild anxiety) - 16 (severe anxiety) 10 (moderate anxiety) 16 (severe anxiety)   Total Score 5 4 6 7 16 10 16     PROMIS 10-Global Health (all questions and answers displayed):   PROMIS 10 12/21/2021 11/14/2022   In general, would you say your health is: Very good Fair   In general, would you say your quality of life is: Very good Good   In general, how would you rate your physical health? Very good Good   In general, how would you rate your mental health, including your mood and your ability to think? Very good Fair   In general, how would you rate your satisfaction with your social activities and relationships? Very good Fair   In general, please rate how well you carry out your usual social activities and roles Very good Fair   To what extent are you able to carry out your everyday physical activities such as walking, climbing stairs, carrying groceries, or moving a chair? Completely Completely   How often have you been bothered by emotional problems such as feeling anxious, depressed or irritable? Rarely Often   How would you rate your fatigue on average? Mild Moderate   How would you rate your pain on average?   0 = No  Pain  to  10 = Worst Imaginable Pain 1 2   In general, would you say your health is: 4 2   In general, would you say your quality of life is: 4 3   In general, how would you rate your physical health? 4 3   In general, how would you rate your mental health, including your mood and your ability to think? 4 2   In general, how would you rate your satisfaction with your social activities and relationships? 4 2   In general, please rate how well you carry out your usual social activities and roles. (This includes activities at home, at work and in your community, and responsibilities as a parent, child, spouse, employee, friend, etc.) 4 2   To what extent are you able to carry out your everyday physical activities such as walking, climbing stairs, carrying groceries, or moving a chair? 5 5   In the past 7 days, how often have you been bothered by emotional problems such as feeling anxious, depressed, or irritable? 2 4   In the past 7 days, how would you rate your fatigue on average? 2 3   In the past 7 days, how would you rate your pain on average, where 0 means no pain, and 10 means worst imaginable pain? 1 2   Global Mental Health Score 16 9   Global Physical Health Score 17 15   PROMIS TOTAL - SUBSCORES 33 24   Some recent data might be hidden         ASSESSMENT: Current Emotional / Mental Status (status of significant symptoms):   Risk status (Self / Other harm or suicidal ideation)   Patient denies current fears or concerns for personal safety.   Patient denies current or recent suicidal ideation or behaviors.   Patient denies current or recent homicidal ideation or behaviors.   Patient denies current or recent self injurious behavior or ideation.   Patient denies other safety concerns.   Patient reports there has been no change in risk factors since their last session.     Patient reports there has been no change in protective factors since their last session.     Recommended that patient call 911 or go to the  local ED should there be a change in any of these risk factors.     Appearance:   Appropriate    Eye Contact:   Good    Psychomotor Behavior: Normal    Attitude:   Cooperative    Orientation:   All   Speech    Rate / Production: Normal/ Responsive    Volume:  Normal    Mood:    Normal   Affect:    Appropriate    Thought Content:  Clear    Thought Form:  Coherent  Logical    Insight:    Good      Medication Review:   No current psychiatric medications prescribed     Medication Compliance:   NA     Changes in Health Issues:   None reported     Chemical Use Review:   Substance Use: Chemical use reviewed, no active concerns identified      Tobacco Use: No current tobacco use.      Diagnosis:  1. Generalized anxiety disorder        Collateral Reports Completed:   Not Applicable    PLAN: (Patient Tasks / Therapist Tasks / Other)  The client will work on mindfulness and grounding skills.         Celsa Linda, [facility]                                                         ______________________________________________________________________    Individual Treatment Plan    Patient's Name: Batool Louis  YOB: 2000    Date of Creation: 3/23/22  Date Treatment Plan Last Reviewed/Revised: 11/14/22    DSM5 Diagnoses: 300.02 (F41.1) Generalized Anxiety Disorder  Psychosocial / Contextual Factors: college student, living at home, family conflict  PROMIS (reviewed every 90 days):     Referral / Collaboration:  Referral to another professional/service is not indicated at this time..    Anticipated number of session for this episode of care: on-going  Anticipation frequency of session: Every other week  Anticipated Duration of each session: 38-52 minutes  Treatment plan will be reviewed in 90 days or when goals have been changed.       MeasurableTreatment Goal(s) related to diagnosis / functional impairment(s)    MeasurableTreatment Goal(s) related to diagnosis / functional impairment(s)  Goal 1: Client will  "increase emotion regulation skills/decrease panic attacks    Objective #A (Client Action)    Client will identify 2-4 fears / thoughts that contribute to feeling anxious  use positive self-affirmations daily.  Status: Continued - Date(s): 11/14/22    Intervention(s)  Therapist will teach emotional regulation skills. distress tolerance skills, interpersonal effectiveness skills, emotion regluation skills, mindfulness skills, radical acceptance. Therapist will teach client how to ID body cues for anxiety, anxiety reduction techniques, how to ID triggers for depression and anxiety- decrease reactivity/eliminate, lifestyle changes to reduce depression and anxiety, communication skills, explore cognitive beliefs and help client to develop healthy cognitive patterns and beliefs.      Objective #B  Client will use thought-stopping strategy daily to reduce intrusive thoughts.  Status: Continued - Date(s): 11/14/22    Intervention(s)  Therapist will teach emotional regulation skills. distress tolerance skills, interpersonal effectiveness skills, emotion regluation skills, mindfulness skills, radical acceptance. Therapist will teach client how to ID body cues for anxiety, anxiety reduction techniques, how to ID triggers for depression and anxiety- decrease reactivity/eliminate, lifestyle changes to reduce depression and anxiety, communication skills, explore cognitive beliefs and help client to develop healthy cognitive patterns and beliefs.    Objective #C (Client Action)    Status: Continued - Date: 11/14/22    Client will use \"worry time\" each day for 15 minutes of scheduled worry and then defer obsessive or anxious thinking until the next structured worry time.    Intervention(s)  Therapist will teach emotional regulation skills. work through trauma using somatic experiencing techniques to discharge the anxiety.    Goal 2: Client will work on increasing self esteem and self worth     Objective #A (Client " Action)    Status: Continued - Date(s): 11/14/22    Client will identify two areas of life that you would like to have improved functioning.    Intervention(s)  Therapist will teach emotional regulation skills. work through trauma using somatic experiencing techniques to discharge the anxiety.      Client has reviewed and agreed to the above plan.      JAGDEEP Camacho

## 2022-12-12 ENCOUNTER — VIRTUAL VISIT (OUTPATIENT)
Dept: PSYCHOLOGY | Facility: CLINIC | Age: 22
End: 2022-12-12
Payer: COMMERCIAL

## 2022-12-12 DIAGNOSIS — F41.1 GENERALIZED ANXIETY DISORDER: Primary | ICD-10-CM

## 2022-12-12 PROCEDURE — 90837 PSYTX W PT 60 MINUTES: CPT | Mod: GT | Performed by: COUNSELOR

## 2022-12-12 NOTE — PROGRESS NOTES
Answers for HPI/ROS submitted by the patient on 11/14/2022  SHELBY 7 TOTAL SCORE: 16        [facility]                                     Progress Note    Patient Name: Batool Louis  Date: 12/12/22         Service Type: Individual      Session Start Time: 9:30am  Session End Time:  10:30am     Session Length: 60    Session #: 47    Attendees: Client    Service Modality:  Video Visit:      Provider verified identity through the following two step process.  Patient provided:  Patient is known previously to provider    Telemedicine Visit: The patient's condition can be safely assessed and treated via synchronous audio and visual telemedicine encounter.      Reason for Telemedicine Visit: Services only offered telehealth    Originating Site (Patient Location): Patient's home    Distant Site (Provider Location): Provider Remote Setting- Home Office    Consent:  The patient/guardian has verbally consented to: the potential risks and benefits of telemedicine (video visit) versus in person care; bill my insurance or make self-payment for services provided; and responsibility for payment of non-covered services.     Patient would like the video invitation sent by:  Send to e-mail at: [email or web address]    Mode of Communication:  Video Conference via [facility]    As the provider I attest to compliance with applicable laws and regulations related to telemedicine.    DATA  Interactive Complexity: No  Crisis: No        Progress Since Last Session (Related to Symptoms / Goals / Homework):   Symptoms: No change .    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   She stated she's very busy with her job  right now and has a lot of work to do.      Treatment Objective(s) Addressed in This Session:   use thought-stopping strategy daily to reduce intrusive thoughts       Intervention:   Talked about the job stressors she's experiencing. She stated she and her boyfriend are working on some new  boundaries together and she's trying to get him interested in other activities together.      Assessments completed prior to visit:  The following assessments were completed by patient for this visit:  PHQ9:   PHQ-9 SCORE 8/13/2018 3/12/2020 4/30/2020 5/22/2020 7/10/2020 2/1/2021 3/21/2022   PHQ-9 Total Score MyChart 7 (Mild depression) - - 15 (Moderately severe depression) - 4 (Minimal depression) -   PHQ-9 Total Score 7 10 6 15 14 4 8     GAD7:   SHELBY-7 SCORE 3/1/2018 6/11/2018 8/13/2018 3/12/2020 5/22/2020 2/1/2021 11/14/2022   Total Score - - 6 (mild anxiety) - 16 (severe anxiety) 10 (moderate anxiety) 16 (severe anxiety)   Total Score 5 4 6 7 16 10 16     PROMIS 10-Global Health (all questions and answers displayed):   PROMIS 10 12/21/2021 11/14/2022   In general, would you say your health is: Very good Fair   In general, would you say your quality of life is: Very good Good   In general, how would you rate your physical health? Very good Good   In general, how would you rate your mental health, including your mood and your ability to think? Very good Fair   In general, how would you rate your satisfaction with your social activities and relationships? Very good Fair   In general, please rate how well you carry out your usual social activities and roles Very good Fair   To what extent are you able to carry out your everyday physical activities such as walking, climbing stairs, carrying groceries, or moving a chair? Completely Completely   How often have you been bothered by emotional problems such as feeling anxious, depressed or irritable? Rarely Often   How would you rate your fatigue on average? Mild Moderate   How would  you rate your pain on average?   0 = No Pain  to  10 = Worst Imaginable Pain 1 2   In general, would you say your health is: 4 2   In general, would you say your quality of life is: 4 3   In general, how would you rate your physical health? 4 3   In general, how would you rate your mental health, including your mood and your ability to think? 4 2   In general, how would you rate your satisfaction with your social activities and relationships? 4 2   In general, please rate how well you carry out your usual social activities and roles. (This includes activities at home, at work and in your community, and responsibilities as a parent, child, spouse, employee, friend, etc.) 4 2   To what extent are you able to carry out your everyday physical activities such as walking, climbing stairs, carrying groceries, or moving a chair? 5 5   In the past 7 days, how often have you been bothered by emotional problems such as feeling anxious, depressed, or irritable? 2 4   In the past 7 days, how would you rate your fatigue on average? 2 3   In the past 7 days, how would you rate your pain on average, where 0 means no pain, and 10 means worst imaginable pain? 1 2   Global Mental Health Score 16 9   Global Physical Health Score 17 15   PROMIS TOTAL - SUBSCORES 33 24   Some recent data might be hidden         ASSESSMENT: Current Emotional / Mental Status (status of significant symptoms):   Risk status (Self / Other harm or suicidal ideation)   Patient denies current fears or concerns for personal safety.   Patient denies current or recent suicidal ideation or behaviors.   Patient denies current or recent homicidal ideation or behaviors.   Patient denies current or recent self injurious behavior or ideation.   Patient denies other safety concerns.   Patient reports there has been no change in risk factors since their last session.     Patient reports there has been no change in protective factors since their last session.      Recommended that patient call 911 or go to the local ED should there be a change in any of these risk factors.     Appearance:   Appropriate    Eye Contact:   Good    Psychomotor Behavior: Normal    Attitude:   Cooperative    Orientation:   All   Speech    Rate / Production: Normal/ Responsive    Volume:  Normal    Mood:    Normal   Affect:    Appropriate    Thought Content:  Clear    Thought Form:  Coherent  Logical    Insight:    Good      Medication Review:   No current psychiatric medications prescribed     Medication Compliance:   NA     Changes in Health Issues:   None reported     Chemical Use Review:   Substance Use: Chemical use reviewed, no active concerns identified      Tobacco Use: No current tobacco use.      Diagnosis:  1. Generalized anxiety disorder        Collateral Reports Completed:   Not Applicable    PLAN: (Patient Tasks / Therapist Tasks / Other)  The client will work on mindfulness and grounding skills.         Celsa Linda, [facility]                                                         ______________________________________________________________________    Individual Treatment Plan    Patient's Name: Batool Louis  YOB: 2000    Date of Creation: 3/23/22  Date Treatment Plan Last Reviewed/Revised: 11/14/22    DSM5 Diagnoses: 300.02 (F41.1) Generalized Anxiety Disorder  Psychosocial / Contextual Factors: college student, living at home, family conflict  PROMIS (reviewed every 90 days):     Referral / Collaboration:  Referral to another professional/service is not indicated at this time..    Anticipated number of session for this episode of care: on-going  Anticipation frequency of session: Every other week  Anticipated Duration of each session: 38-52 minutes  Treatment plan will be reviewed in 90 days or when goals have been changed.       MeasurableTreatment Goal(s) related to diagnosis / functional impairment(s)    MeasurableTreatment Goal(s) related to diagnosis  "/ functional impairment(s)  Goal 1: Client will increase emotion regulation skills/decrease panic attacks    Objective #A (Client Action)    Client will identify 2-4 fears / thoughts that contribute to feeling anxious  use positive self-affirmations daily.  Status: Continued - Date(s): 11/14/22    Intervention(s)  Therapist will teach emotional regulation skills. distress tolerance skills, interpersonal effectiveness skills, emotion regluation skills, mindfulness skills, radical acceptance. Therapist will teach client how to ID body cues for anxiety, anxiety reduction techniques, how to ID triggers for depression and anxiety- decrease reactivity/eliminate, lifestyle changes to reduce depression and anxiety, communication skills, explore cognitive beliefs and help client to develop healthy cognitive patterns and beliefs.      Objective #B  Client will use thought-stopping strategy daily to reduce intrusive thoughts.  Status: Continued - Date(s): 11/14/22    Intervention(s)  Therapist will teach emotional regulation skills. distress tolerance skills, interpersonal effectiveness skills, emotion regluation skills, mindfulness skills, radical acceptance. Therapist will teach client how to ID body cues for anxiety, anxiety reduction techniques, how to ID triggers for depression and anxiety- decrease reactivity/eliminate, lifestyle changes to reduce depression and anxiety, communication skills, explore cognitive beliefs and help client to develop healthy cognitive patterns and beliefs.    Objective #C (Client Action)    Status: Continued - Date: 11/14/22    Client will use \"worry time\" each day for 15 minutes of scheduled worry and then defer obsessive or anxious thinking until the next structured worry time.    Intervention(s)  Therapist will teach emotional regulation skills. work through trauma using somatic experiencing techniques to discharge the anxiety.    Goal 2: Client will work on increasing self esteem and self " worth     Objective #A (Client Action)    Status: Continued - Date(s): 11/14/22    Client will identify two areas of life that you would like to have improved functioning.    Intervention(s)  Therapist will teach emotional regulation skills. work through trauma using somatic experiencing techniques to discharge the anxiety.      Client has reviewed and agreed to the above plan.      Celsa Linda [facility]

## 2023-01-16 ENCOUNTER — VIRTUAL VISIT (OUTPATIENT)
Dept: PSYCHOLOGY | Facility: CLINIC | Age: 23
End: 2023-01-16
Payer: COMMERCIAL

## 2023-01-16 DIAGNOSIS — F41.1 GENERALIZED ANXIETY DISORDER: Primary | ICD-10-CM

## 2023-01-16 PROCEDURE — 90837 PSYTX W PT 60 MINUTES: CPT | Mod: GT | Performed by: COUNSELOR

## 2023-01-16 NOTE — PROGRESS NOTES
[facility]                                     Progress Note    Patient Name: Batool Louis  Date: 1/16/23         Service Type: Individual      Session Start Time: 10:30am  Session End Time:  11:30am     Session Length: 60    Session #: 48    Attendees: Client    Service Modality:  Video Visit:      Provider verified identity through the following two step process.  Patient provided:  Patient is known previously to provider    Telemedicine Visit: The patient's condition can be safely assessed and treated via synchronous audio and visual telemedicine encounter.      Reason for Telemedicine Visit: Services only offered telehealth    Originating Site (Patient Location): Patient's home    Distant Site (Provider Location): Provider Remote Setting- Home Office    Consent:  The patient/guardian has verbally consented to: the potential risks and benefits of telemedicine (video visit) versus in person care; bill my insurance or make self-payment for services provided; and responsibility for payment of non-covered services.     Patient would like the video invitation sent by:  Send to e-mail at: [email or web address]    Mode of Communication:  Video Conference via [employer]    As the provider I attest to compliance with applicable laws and regulations related to telemedicine.    DATA  Interactive Complexity: No  Crisis: No        Progress Since Last Session (Related to Symptoms / Goals / Homework):   Symptoms: No change .    Homework: Achieved / completed to satisfaction      Episode of Care Goals: Satisfactory progress - ACTION (Actively working towards change); Intervened by reinforcing change plan / affirming steps taken     Current / Ongoing Stressors and Concerns:  The client stated she recently had a panic attack. 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 Her dad is also moving out.      Treatment Objective(s) Addressed in This  Session:   use thought-stopping strategy daily to reduce intrusive thoughts       Intervention:   Discussed the client having four jobs and if she might be trying to avoid something. Processed the situation with her parents and how she's feeling about this.      Assessments completed prior to visit:  The following assessments were completed by patient for this visit:  PHQ9:   PHQ-9 SCORE 8/13/2018 3/12/2020 4/30/2020 5/22/2020 7/10/2020 2/1/2021 3/21/2022   PHQ-9 Total Score MyChart 7 (Mild depression) - - 15 (Moderately severe depression) - 4 (Minimal depression) -   PHQ-9 Total Score 7 10 6 15 14 4 8     GAD7:   SHELBY-7 SCORE 3/1/2018 6/11/2018 8/13/2018 3/12/2020 5/22/2020 2/1/2021 11/14/2022   Total Score - - 6 (mild anxiety) - 16 (severe anxiety) 10 (moderate anxiety) 16 (severe anxiety)   Total Score 5 4 6 7 16 10 16     PROMIS 10-Global Health (all questions and answers displayed):   PROMIS 10 12/21/2021 11/14/2022   In general, would you say your health is: Very good Fair   In general, would you say your quality of life is: Very good Good   In general, how would you rate your physical health? Very good Good   In general, how would you rate your mental health, including your mood and your ability to think? Very good Fair   In general, how would you rate your satisfaction with your social activities and relationships? Very good Fair   In general, please rate how well you carry out your usual social activities and roles Very good Fair   To what extent are you able to carry out your everyday physical activities such as walking, climbing stairs, carrying groceries, or moving a chair? Completely Completely   How often have you been bothered by emotional problems such as feeling anxious, depressed or irritable? Rarely Often   How would you rate your fatigue on average? Mild Moderate   How would you rate your pain on average?   0 = No Pain  to  10 = Worst Imaginable Pain 1 2   In general, would you say your health is:  4 2   In general, would you say your quality of life is: 4 3   In general, how would you rate your physical health? 4 3   In general, how would you rate your mental health, including your mood and your ability to think? 4 2   In general, how would you rate your satisfaction with your social activities and relationships? 4 2   In general, please rate how well you carry out your usual social activities and roles. (This includes activities at home, at work and in your community, and responsibilities as a parent, child, spouse, employee, friend, etc.) 4 2   To what extent are you able to carry out your everyday physical activities such as walking, climbing stairs, carrying groceries, or moving a chair? 5 5   In the past 7 days, how often have you been bothered by emotional problems such as feeling anxious, depressed, or irritable? 2 4   In the past 7 days, how would you rate your fatigue on average? 2 3   In the past 7 days, how would you rate your pain on average, where 0 means no pain, and 10 means worst imaginable pain? 1 2   Global Mental Health Score 16 9   Global Physical Health Score 17 15   PROMIS TOTAL - SUBSCORES 33 24   Some recent data might be hidden         ASSESSMENT: Current Emotional / Mental Status (status of significant symptoms):   Risk status (Self / Other harm or suicidal ideation)   Patient denies current fears or concerns for personal safety.   Patient denies current or recent suicidal ideation or behaviors.   Patient denies current or recent homicidal ideation or behaviors.   Patient denies current or recent self injurious behavior or ideation.   Patient denies other safety concerns.   Patient reports there has been no change in risk factors since their last session.     Patient reports there has been no change in protective factors since their last session.     Recommended that patient call 911 or go to the local ED should there be a change in any of these risk  factors.     Appearance:   Appropriate    Eye Contact:   Good    Psychomotor Behavior: Normal    Attitude:   Cooperative    Orientation:   All   Speech    Rate / Production: Normal/ Responsive    Volume:  Normal    Mood:    Normal   Affect:    Appropriate    Thought Content:  Clear    Thought Form:  Coherent  Logical    Insight:    Good      Medication Review:   No current psychiatric medications prescribed     Medication Compliance:   NA     Changes in Health Issues:   None reported     Chemical Use Review:   Substance Use: Chemical use reviewed, no active concerns identified      Tobacco Use: No current tobacco use.      Diagnosis:  1. Generalized anxiety disorder        Collateral Reports Completed:   Not Applicable    PLAN: (Patient Tasks / Therapist Tasks / Other)  The client will work on mindfulness and grounding skills.         Celsa Linda, [facility]                                                         ______________________________________________________________________    Individual Treatment Plan    Patient's Name: Batool Louis  YOB: 2000    Date of Creation: 3/23/22  Date Treatment Plan Last Reviewed/Revised: 1/16/23    DSM5 Diagnoses: 300.02 (F41.1) Generalized Anxiety Disorder  Psychosocial / Contextual Factors: college student, living at home, family conflict  PROMIS (reviewed every 90 days):     Referral / Collaboration:  Referral to another professional/service is not indicated at this time..    Anticipated number of session for this episode of care: on-going  Anticipation frequency of session: Every other week  Anticipated Duration of each session: 38-52 minutes  Treatment plan will be reviewed in 90 days or when goals have been changed.       MeasurableTreatment Goal(s) related to diagnosis / functional impairment(s)    MeasurableTreatment Goal(s) related to diagnosis / functional impairment(s)  Goal 1: Client will increase emotion regulation skills/decrease panic  "attacks    Objective #A (Client Action)    Client will identify 2-4 fears / thoughts that contribute to feeling anxious  use positive self-affirmations daily.  Status: Continued - Date(s): 1/16/23    Intervention(s)  Therapist will teach emotional regulation skills. distress tolerance skills, interpersonal effectiveness skills, emotion regluation skills, mindfulness skills, radical acceptance. Therapist will teach client how to ID body cues for anxiety, anxiety reduction techniques, how to ID triggers for depression and anxiety- decrease reactivity/eliminate, lifestyle changes to reduce depression and anxiety, communication skills, explore cognitive beliefs and help client to develop healthy cognitive patterns and beliefs.      Objective #B  Client will use thought-stopping strategy daily to reduce intrusive thoughts.  Status: Continued - Date(s): 1/16/23    Intervention(s)  Therapist will teach emotional regulation skills. distress tolerance skills, interpersonal effectiveness skills, emotion regluation skills, mindfulness skills, radical acceptance. Therapist will teach client how to ID body cues for anxiety, anxiety reduction techniques, how to ID triggers for depression and anxiety- decrease reactivity/eliminate, lifestyle changes to reduce depression and anxiety, communication skills, explore cognitive beliefs and help client to develop healthy cognitive patterns and beliefs.    Objective #C (Client Action)    Status: Continued - Date: 1/16/23    Client will use \"worry time\" each day for 15 minutes of scheduled worry and then defer obsessive or anxious thinking until the next structured worry time.    Intervention(s)  Therapist will teach emotional regulation skills. work through trauma using somatic experiencing techniques to discharge the anxiety.    Goal 2: Client will work on increasing self esteem and self worth     Objective #A (Client Action)    Status: Continued - Date(s): 1/16/23    Client will " identify two areas of life that you would like to have improved functioning.    Intervention(s)  Therapist will teach emotional regulation skills. work through trauma using somatic experiencing techniques to discharge the anxiety.      Client has reviewed and agreed to the above plan.      JAGDEEP Camacho

## 2023-01-30 ENCOUNTER — VIRTUAL VISIT (OUTPATIENT)
Dept: PSYCHOLOGY | Facility: CLINIC | Age: 23
End: 2023-01-30
Payer: COMMERCIAL

## 2023-01-30 DIAGNOSIS — F41.1 GENERALIZED ANXIETY DISORDER: Primary | ICD-10-CM

## 2023-01-30 PROCEDURE — 90837 PSYTX W PT 60 MINUTES: CPT | Mod: GT | Performed by: COUNSELOR

## 2023-01-30 NOTE — PROGRESS NOTES
[facility]                                     Progress Note    Patient Name: Batool Louis  Date: 1/30/23         Service Type: Individual      Session Start Time: 10:30am  Session End Time:  11:30am     Session Length: 60    Session #: 49    Attendees: Client    Service Modality:  Video Visit:      Provider verified identity through the following two step process.  Patient provided:  Patient is known previously to provider    Telemedicine Visit: The patient's condition can be safely assessed and treated via synchronous audio and visual telemedicine encounter.      Reason for Telemedicine Visit: Services only offered telehealth    Originating Site (Patient Location): Patient's home    Distant Site (Provider Location): Provider Remote Setting- Home Office    Consent:  The patient/guardian has verbally consented to: the potential risks and benefits of telemedicine (video visit) versus in person care; bill my insurance or make self-payment for services provided; and responsibility for payment of non-covered services.     Patient would like the video invitation sent by:  Send to e-mail at: [email or web address]    Mode of Communication:  Video Conference via [employer]    As the provider I attest to compliance with applicable laws and regulations related to telemedicine.    DATA  Interactive Complexity: No  Crisis: No        Progress Since Last Session (Related to Symptoms / Goals / Homework):   Symptoms: No change .    Homework: Achieved / completed to satisfaction      Episode of Care Goals: Satisfactory progress - ACTION (Actively working towards change); Intervened by reinforcing change plan / affirming steps taken     Current / Ongoing Stressors and Concerns:  The client stated her dad hasn't moved out and she found her parents snuggling on the couch together the other day.   She stated she and her boyfriend have been talking about the future. They have differences in wanting kids. The  client doesn't want kids and her boyfriend does. She isn't sure if they should continue to date because of this.      Treatment Objective(s) Addressed in This Session:   use thought-stopping strategy daily to reduce intrusive thoughts       Intervention:   Processed her thoughts and feelings about her relationship. Talked more in depth about her decision about kids and her decision process around this.      Assessments completed prior to visit:  The following assessments were completed by patient for this visit:  PHQ9:   PHQ-9 SCORE 8/13/2018 3/12/2020 4/30/2020 5/22/2020 7/10/2020 2/1/2021 3/21/2022   PHQ-9 Total Score MyChart 7 (Mild depression) - - 15 (Moderately severe depression) - 4 (Minimal depression) -   PHQ-9 Total Score 7 10 6 15 14 4 8     GAD7:   SHELBY-7 SCORE 3/1/2018 6/11/2018 8/13/2018 3/12/2020 5/22/2020 2/1/2021 11/14/2022   Total Score - - 6 (mild anxiety) - 16 (severe anxiety) 10 (moderate anxiety) 16 (severe anxiety)   Total Score 5 4 6 7 16 10 16     PROMIS 10-Global Health (all questions and answers displayed):   PROMIS 10 12/21/2021 11/14/2022   In general, would you say your health is: Very good Fair   In general, would you say your quality of life is: Very good Good   In general, how would you rate your physical health? Very good Good   In general, how would you rate your mental health, including your mood and your ability to think? Very good Fair   In general, how would you rate your satisfaction with your social activities and relationships? Very good Fair   In general, please rate how well you carry out your usual social activities and roles Very good Fair   To what extent are you able to carry out your everyday physical activities such as walking, climbing stairs, carrying groceries, or moving a chair? Completely Completely   How often have you been bothered by emotional problems such as feeling anxious, depressed or irritable? Rarely Often   How would you rate your fatigue on average?  Mild Moderate   How would you rate your pain on average?   0 = No Pain  to  10 = Worst Imaginable Pain 1 2   In general, would you say your health is: 4 2   In general, would you say your quality of life is: 4 3   In general, how would you rate your physical health? 4 3   In general, how would you rate your mental health, including your mood and your ability to think? 4 2   In general, how would you rate your satisfaction with your social activities and relationships? 4 2   In general, please rate how well you carry out your usual social activities and roles. (This includes activities at home, at work and in your community, and responsibilities as a parent, child, spouse, employee, friend, etc.) 4 2   To what extent are you able to carry out your everyday physical activities such as walking, climbing stairs, carrying groceries, or moving a chair? 5 5   In the past 7 days, how often have you been bothered by emotional problems such as feeling anxious, depressed, or irritable? 2 4   In the past 7 days, how would you rate your fatigue on average? 2 3   In the past 7 days, how would you rate your pain on average, where 0 means no pain, and 10 means worst imaginable pain? 1 2   Global Mental Health Score 16 9   Global Physical Health Score 17 15   PROMIS TOTAL - SUBSCORES 33 24   Some recent data might be hidden         ASSESSMENT: Current Emotional / Mental Status (status of significant symptoms):   Risk status (Self / Other harm or suicidal ideation)   Patient denies current fears or concerns for personal safety.   Patient denies current or recent suicidal ideation or behaviors.   Patient denies current or recent homicidal ideation or behaviors.   Patient denies current or recent self injurious behavior or ideation.   Patient denies other safety concerns.   Patient reports there has been no change in risk factors since their last session.     Patient reports there has been no change in protective factors since their  last session.     Recommended that patient call 911 or go to the local ED should there be a change in any of these risk factors.     Appearance:   Appropriate    Eye Contact:   Good    Psychomotor Behavior: Normal    Attitude:   Cooperative    Orientation:   All   Speech    Rate / Production: Normal/ Responsive    Volume:  Normal    Mood:    Normal   Affect:    Appropriate    Thought Content:  Clear    Thought Form:  Coherent  Logical    Insight:    Good      Medication Review:   No current psychiatric medications prescribed     Medication Compliance:   NA     Changes in Health Issues:   None reported     Chemical Use Review:   Substance Use: Chemical use reviewed, no active concerns identified      Tobacco Use: No current tobacco use.      Diagnosis:  1. Generalized anxiety disorder        Collateral Reports Completed:   Not Applicable    PLAN: (Patient Tasks / Therapist Tasks / Other)  The client will work on mindfulness and grounding skills.         Celsa Linda, [facility]                                                         ______________________________________________________________________    Individual Treatment Plan    Patient's Name: Batool Louis  YOB: 2000    Date of Creation: 3/23/22  Date Treatment Plan Last Reviewed/Revised: 1/16/23    DSM5 Diagnoses: 300.02 (F41.1) Generalized Anxiety Disorder  Psychosocial / Contextual Factors: college student, living at home, family conflict  PROMIS (reviewed every 90 days):     Referral / Collaboration:  Referral to another professional/service is not indicated at this time..    Anticipated number of session for this episode of care: on-going  Anticipation frequency of session: Every other week  Anticipated Duration of each session: 38-52 minutes  Treatment plan will be reviewed in 90 days or when goals have been changed.       MeasurableTreatment Goal(s) related to diagnosis / functional impairment(s)    MeasurableTreatment Goal(s) related  "to diagnosis / functional impairment(s)  Goal 1: Client will increase emotion regulation skills/decrease panic attacks    Objective #A (Client Action)    Client will identify 2-4 fears / thoughts that contribute to feeling anxious  use positive self-affirmations daily.  Status: Continued - Date(s): 1/16/23    Intervention(s)  Therapist will teach emotional regulation skills. distress tolerance skills, interpersonal effectiveness skills, emotion regluation skills, mindfulness skills, radical acceptance. Therapist will teach client how to ID body cues for anxiety, anxiety reduction techniques, how to ID triggers for depression and anxiety- decrease reactivity/eliminate, lifestyle changes to reduce depression and anxiety, communication skills, explore cognitive beliefs and help client to develop healthy cognitive patterns and beliefs.      Objective #B  Client will use thought-stopping strategy daily to reduce intrusive thoughts.  Status: Continued - Date(s): 1/16/23    Intervention(s)  Therapist will teach emotional regulation skills. distress tolerance skills, interpersonal effectiveness skills, emotion regluation skills, mindfulness skills, radical acceptance. Therapist will teach client how to ID body cues for anxiety, anxiety reduction techniques, how to ID triggers for depression and anxiety- decrease reactivity/eliminate, lifestyle changes to reduce depression and anxiety, communication skills, explore cognitive beliefs and help client to develop healthy cognitive patterns and beliefs.    Objective #C (Client Action)    Status: Continued - Date: 1/16/23    Client will use \"worry time\" each day for 15 minutes of scheduled worry and then defer obsessive or anxious thinking until the next structured worry time.    Intervention(s)  Therapist will teach emotional regulation skills. work through trauma using somatic experiencing techniques to discharge the anxiety.    Goal 2: Client will work on increasing self esteem " and self worth     Objective #A (Client Action)    Status: Continued - Date(s): 1/16/23    Client will identify two areas of life that you would like to have improved functioning.    Intervention(s)  Therapist will teach emotional regulation skills. work through trauma using somatic experiencing techniques to discharge the anxiety.      Client has reviewed and agreed to the above plan.      Celsa Linda [facility]

## 2023-01-31 ENCOUNTER — VIRTUAL VISIT (OUTPATIENT)
Dept: SLEEP MEDICINE | Facility: CLINIC | Age: 23
End: 2023-01-31
Attending: OTOLARYNGOLOGY
Payer: COMMERCIAL

## 2023-01-31 DIAGNOSIS — R06.83 SNORING: ICD-10-CM

## 2023-01-31 DIAGNOSIS — R29.818 SUSPECTED SLEEP APNEA: Primary | ICD-10-CM

## 2023-01-31 PROCEDURE — 99214 OFFICE O/P EST MOD 30 MIN: CPT | Mod: GT | Performed by: PHYSICIAN ASSISTANT

## 2023-01-31 ASSESSMENT — SLEEP AND FATIGUE QUESTIONNAIRES
HOW LIKELY ARE YOU TO NOD OFF OR FALL ASLEEP WHILE LYING DOWN TO REST IN THE AFTERNOON WHEN CIRCUMSTANCES PERMIT: WOULD NEVER DOZE
HOW LIKELY ARE YOU TO NOD OFF OR FALL ASLEEP WHEN YOU ARE A PASSENGER IN A CAR FOR AN HOUR WITHOUT A BREAK: WOULD NEVER DOZE
HOW LIKELY ARE YOU TO NOD OFF OR FALL ASLEEP WHILE SITTING QUIETLY AFTER LUNCH WITHOUT ALCOHOL: WOULD NEVER DOZE
HOW LIKELY ARE YOU TO NOD OFF OR FALL ASLEEP WHILE SITTING AND TALKING TO SOMEONE: WOULD NEVER DOZE
HOW LIKELY ARE YOU TO NOD OFF OR FALL ASLEEP WHILE SITTING AND READING: SLIGHT CHANCE OF DOZING
HOW LIKELY ARE YOU TO NOD OFF OR FALL ASLEEP WHILE WATCHING TV: SLIGHT CHANCE OF DOZING
HOW LIKELY ARE YOU TO NOD OFF OR FALL ASLEEP IN A CAR, WHILE STOPPED FOR A FEW MINUTES IN TRAFFIC: WOULD NEVER DOZE
HOW LIKELY ARE YOU TO NOD OFF OR FALL ASLEEP WHILE SITTING INACTIVE IN A PUBLIC PLACE: WOULD NEVER DOZE

## 2023-01-31 NOTE — PATIENT INSTRUCTIONS
"A common problem for people with insomnia is spending too much time in bed  trying  to sleep.  You really should only be in bed for no more than 7-8 hours per night.  Spending too much time in bed can lead to being awake and having an  overactive  mind.  One effective way to address this problem is reducing how much time you spend in bed each night.  Be careful with driving or other dangerous activities when trying these strategeis however.  We recommend that you follow these steps to improve your insomnia:  Set a new sleep schedule where you reduce the time you spend in bed by 1-2 hours, e.g. Go to bed at 10 and get up at 6  Keep this sleep schedule every day of the week including the weekends for two weeks.  After two weeks you can add 30-60 minutes more time in bed if you have been sleeping more soundly.  If you still aren t sleeping well, reduce the time you spend in bed by another 30-60 but not less than 5 hours per night.  Please keep a log or diary during this time to track your sleep pattern           MY TREATMENT INFORMATION FOR SLEEP APNEA-  Batool Louis    DOCTOR : IRINEO Gallego-C    Am I having a sleep study at a sleep center?  --->Due to normal delays, you will be contacted within 2-4 weeks to schedule    Am I having a home sleep study?  --->Watch the video for the device you are using:    -/drop off device-   https://www.[employer].com/watch?v=yGGFBdELGhk    -Disposable device sent out require phone/computer application-   https://www.[employer].com/watch?v=BCce_vbiwxE      Frequently asked questions:  1. What is Obstructive Sleep Apnea (THOR)? THOR is the most common type of sleep apnea. Apnea means, \"without breath.\"  Apnea is most often caused by narrowing or collapse of the upper airway as muscles relax during sleep.   Almost everyone has occasional apneas. Most people with sleep apnea have had brief interruptions at night frequently for many years.  The severity of sleep apnea is " related to how frequent and severe the events are.   2. What are the consequences of THOR? Symptoms include: feeling sleepy during the day, snoring loudly, gasping or stopping of breathing, trouble sleeping, and occasionally morning headaches or heartburn at night.  Sleepiness can be serious and even increase the risk of falling asleep while driving. Other health consequences may include development of high blood pressure and other cardiovascular disease in persons who are susceptible. Untreated THOR  can contribute to heart disease, stroke and diabetes.   3. What are the treatment options? In most situations, sleep apnea is a lifelong disease that must be managed with daily therapy. Medications are not effective for sleep apnea and surgery is generally not considered until other therapies have been tried. Your treatment is your choice . Continuous Positive Airway (CPAP) works right away and is the therapy that is effective in nearly everyone. An oral device to hold your jaw forward is usually the next most reliable option. Other options include postioning devices (to keep you off your back), weight loss, and surgery including a tongue pacing device. There is more detail about some of these options below.  4. Are my sleep studies covered by insurance? Although we will request verification of coverage, we advise you also check in advance of the study to ensure there is coverage.    Important tips for those choosing CPAP and similar devices   Know your equipment:  CPAP is continuous positive airway pressure that prevents obstructive sleep apnea by keeping the throat from collapsing while you are sleeping. In most cases, the device is  smart  and can slowly self-adjusts if your throat collapses and keeps a record every day of how well you are treated-this information is available to you and your care team.  BPAP is bilevel positive airway pressure that keeps your throat open and also assists each breath with a pressure  boost to maintain adequate breathing.  Special kinds of BPAP are used in patients who have inadequate breathing from lung or heart disease. In most cases, the device is  smart  and can slowly self-adjusts to assist breathing. Like CPAP, the device keeps a record of how well you are treated.  Your mask is your connection to the device. You get to choose what feels most comfortable and the staff will help to make sure if fits. Here: are some examples of the different masks that are available:       Key points to remember on your journey with sleep apnea:  Sleep study.  PAP devices often need to be adjusted during a sleep study to show that they are effective and adjusted right.  Good tips to remember: Try wearing just the mask during a quiet time during the day so your body adapts to wearing it. A humidifier is recommended for comfort in most cases to prevent drying of your nose and throat. Allergy medication from your provider may help you if you are having nasal congestion.  Getting settled-in. It takes more than one night for most of us to get used to wearing a mask. Try wearing just the mask during a quiet time during the day so your body adapts to wearing it. A humidifier is recommended for comfort in most cases. Our team will work with you carefully on the first day and will be in contact within 4 days and again at 2 and 4 weeks for advice and remote device adjustments. Your therapy is evaluated by the device each day.   Use it every night. The more you are able to sleep naturally for 7-8 hours, the more likely you will have good sleep and to prevent health risks or symptoms from sleep apnea. Even if you use it 4 hours it helps. Occasionally all of us are unable to use a medical therapy, in sleep apnea, it is not dangerous to miss one night.   Communicate. Call our skilled team on the number provided on the first day if your visit for problems that make it difficult to wear the device. Over 2 out of 3 patients  can learn to wear the device long-term with help from our team. Remember to call our team or your sleep providers if you are unable to wear the device as we may have other solutions for those who cannot adapt to mask CPAP therapy. It is recommended that you sleep your sleep provider within the first 3 months and yearly after that if you are not having problems.   Use it for your health. We encourage use of CPAP masks during daytime quiet periods to allow your face and brain to adapt to the sensation of CPAP so that it will be a more natural sensation to awaken to at night or during naps. This can be very useful during the first few weeks or months of adapting to CPAP though it does not help medically to wear CPAP during wakefulness and  should not be used as a strategy just to meet guidelines.  Take care of your equipment. Make sure you clean your mask and tubing using directions every day and that your filter and mask are replaced as recommended or if they are not working.     BESIDES CPAP, WHAT OTHER THERAPIES ARE THERE?    Positioning Device  Positioning devices are generally used when sleep apnea is mild and only occurs on your back.This example shows a pillow that straps around the waist. It may be appropriate for those whose sleep study shows milder sleep apnea that occurs primarily when lying flat on one's back. Preliminary studies have shown benefit but effectiveness at home may need to be verified by a home sleep test. These devices are generally not covered by medical insurance.  Examples of devices that maintain sleeping on the back to prevent snoring and mild sleep apnea.    Belt type body positioner  http://[employer]/    Electronic reminder  http://nightshifttherapy.com/            Oral Appliance  What is oral appliance therapy?  An oral appliance device fits on your teeth at night like a retainer used after having braces. The device is made by a specialized dentist and requires several visits over  1-2 months before a manufactured device is made to fit your teeth and is adjusted to prevent your sleep apnea. Once an oral device is working properly, snoring should be improved. A home sleep test may be recommended at that time if to determine whether the sleep apnea is adequately treated.       Some things to remember:  -Oral devices are often, but not always, covered by your medical insurance. Be sure to check with your insurance provider.   -If you are referred for oral therapy, you will be given a list of specialized dentists to consider or you may choose to visit the Web site of the American Academy of Dental Sleep Medicine  -Oral devices are less likely to work if you have severe sleep apnea or are extremely overweight.     More detailed information  An oral appliance is a small acrylic device that fits over the upper and lower teeth  (similar to a retainer or a mouth guard). This device slightly moves jaw forward, which moves the base of the tongue forward, opens the airway, improves breathing for effective treat snoring and obstructive sleep apnea in perhaps 7 out of 10 people .  The best working devices are custom-made by a dental device  after a mold is made of the teeth 1, 2, 3.  When is an oral appliance indicated?  Oral appliance therapy is recommended as a first-line treatment for patients with primary snoring, mild sleep apnea, and for patients with moderate sleep apnea who prefer appliance therapy to use of CPAP4, 5. Severity of sleep apnea is determined by sleep testing and is based on the number of respiratory events per hour of sleep.   How successful is oral appliance therapy?  The success rate of oral appliance therapy in patients with mild sleep apnea is 75-80% while in patients with moderate sleep apnea it is 50-70%. The chance of success in patients with severe sleep apnea is 40-50%. The research also shows that oral appliances have a beneficial effect on the cardiovascular  health of THOR patients at the same magnitude as CPAP therapy7.  Oral appliances should be a second-line treatment in cases of severe sleep apnea, but if not completely successful then a combination therapy utilizing CPAP plus oral appliance therapy may be effective. Oral appliances tend to be effective in a broad range of patients although studies show that the patients who have the highest success are females, younger patients, those with milder disease, and less severe obesity. 3, 6.   Finding a dentist that practices dental sleep medicine  Specific training is available through the American Academy of Dental Sleep Medicine for dentists interested in working in the field of sleep. To find a dentist who is educated in the field of sleep and the use of oral appliances, near you, visit the Web site of the American Academy of Dental Sleep Medicine.    References  1. Michaelle et al. Objectively measured vs self-reported compliance during oral appliance therapy for sleep-disordered breathing. Chest 2013; 144(5): 3269-1060.  2. Bryan et al. Objective measurement of compliance during oral appliance therapy for sleep-disordered breathing. Thorax 2013; 68(1): 91-96.  3. Sourav, et al. Mandibular advancement devices in 620 men and women with THOR and snoring: tolerability and predictors of treatment success. Chest 2004; 125: 0891-0779.  4. Miguel, et al. Oral appliances for snoring and THOR: a review. Sleep 2006; 29: 244-262.  5. Marciano et al. Oral appliance treatment for THOR: an update. J Clin Sleep Med 2014; 10(2): 215-227.  6. Xochilt et al. Predictors of OSAH treatment outcome. J Dent Res 2007; 86: 1234-4197.      Weight Loss:    Weight loss is a long-term strategy that may improve sleep apnea in some patients.    Weight management is a personal decision and the decision should be based on your interest and the potential benefits.  If you are interested in exploring weight loss strategies, the following  discussion covers the impact on weight loss on sleep apnea and the approaches that may be successful.    Being overweight does not necessarily mean you will have health consequences.  Those who have BMI over 35 or over 27 with existing medical conditions carries greater risk.   Weight loss decreases severity of sleep apnea in most people with obesity. For those with mild obesity who have developed snoring with weight gain, even 15-30 pound weight loss can improve and occasionally eliminate sleep apnea.  Structured and life-long dietary and health habits are necessary to lose weight and keep healthier weight levels.     Though there may be significant health benefits from weight loss, long-term weight loss is very difficult to achieve- studies show success with dietary management in less than 10% of people. In addition, substantial weight loss may require years of dietary control and may be difficult if patients have severe obesity. In these cases, surgical management may be considered.  Finally, older individuals who have tolerated obesity without health complications may be less likely to benefit from weight loss strategies.      [unfilled]    Surgery:    Surgery for obstructive sleep apnea is considered generally only when other therapies fail to work. Surgery may be discussed with you if you are having a difficult time tolerating CPAP and or when there is an abnormal structure that requires surgical correction.  Nose and throat surgeries often enlarge the airway to prevent collapse.  Most of these surgeries create pain for 1-2 weeks and up to half of the most common surgeries are not effective throughout life.  You should carefully discuss the benefits and drawbacks to surgery with your sleep provider and surgeon to determine if it is the best solution for you.   More information  Surgery for THOR is directed at areas that are responsible for narrowing or complete obstruction of the airway during sleep.  There are  a wide range of procedures available to enlarge and/or stabilize the airway to prevent blockage of breathing in the three major areas where it can occur: the palate, tongue, and nasal regions.  Successful surgical treatment depends on the accurate identification of the factors responsible for obstructive sleep apnea in each person.  A personalized approach is required because there is no single treatment that works well for everyone.  Because of anatomic variation, consultation with an examination by a sleep surgeon is a critical first step in determining what surgical options are best for each patient.  In some cases, examination during sedation may be recommended in order to guide the selection of procedures.  Patients will be counseled about risks and benefits as well as the typical recovery course after surgery. Surgery is typically not a cure for a person s THOR.  However, surgery will often significantly improve one s THOR severity (termed  success rate ).  Even in the absence of a cure, surgery will decrease the cardiovascular risk associated with OSA7; improve overall quality of life8 (sleepiness, functionality, sleep quality, etc).      Palate Procedures:  Patients with THOR often have narrowing of their airway in the region of their tonsils and uvula.  The goals of palate procedures are to widen the airway in this region as well as to help the tissues resist collapse.  Modern palate procedure techniques focus on tissue conservation and soft tissue rearrangement, rather than tissue removal.  Often the uvula is preserved in this procedure. Residual sleep apnea is common in patient after pharyngoplasty with an average reduction in sleep apnea events of 33%2.      Tongue Procedures:  ExamWhile patients are awake, the muscles that surround the throat are active and keep this region open for breathing. These muscles relax during sleep, allowing the tongue and other structures to collapse and block breathing.  There  are several different tongue procedures available.  Selection of a tongue base procedure depends on characteristics seen on physical exam.  Generally, procedures are aimed at removing bulky tissues in this area or preventing the back of the tongue from falling back during sleep.  Success rates for tongue surgery range from 50-62%3.    Hypoglossal Nerve Stimulation:  Hypoglossal nerve stimulation has recently received approval from the [place] Food and Drug Administration for the treatment of obstructive sleep apnea.  This is based on research showing that the system was safe and effective in treating sleep apnea6.  Results showed that the median AHI score decreased 68%, from 29.3 to 9.0. This therapy uses an implant system that senses breathing patterns and delivers mild stimulation to airway muscles, which keeps the airway open during sleep.  The system consists of three fully implanted components: a small generator (similar in size to a pacemaker), a breathing sensor, and a stimulation lead.  Using a small handheld remote, a patient turns the therapy on before bed and off upon awakening.    Candidates for this device must be greater than 18 years of age, have moderate to severe THOR (AHI between 15-65), BMI less than 35, have tried CPAP/oral appliance for at least 8 weeks without success, and have appropriate upper airway anatomy (determined by a sleep endoscopy performed by Dr. Pineda Larose).    Hypoglossal Nerve Stimulation Pathway:    The sleep surgeon s office will work with the patient through the insurance prior-authorization process (including communications and appeals).    Nasal Procedures:  Nasal obstruction can interfere with nasal breathing during the day and night.  Studies have shown that relief of nasal obstruction can improve the ability of some patients to tolerate positive airway pressure therapy for obstructive sleep apnea1.  Treatment options include medications such as nasal saline, topical  corticosteroid and antihistamine sprays, and oral medications such as antihistamines or decongestants. Non-surgical treatments can include external nasal dilators for selected patients. If these are not successful by themselves, surgery can improve the nasal airway either alone or in combination with these other options.      Combination Procedures:  Combination of surgical procedures and other treatments may be recommended, particularly if patients have more than one area of narrowing or persistent positional disease.  The success rate of combination surgery ranges from 66-80%2,3.    References  Quintin JERRY. The Role of the Nose in Snoring and Obstructive Sleep Apnoea: An Update.  Eur Arch Otorhinolaryngol. 2011; 268: 1365-73.   Gricel SM; Steve JA; Marsha JR; Pallanch JF; Ira MB; Henry SG; Jenni MCCONNELL. Surgical modifications of the upper airway for obstructive sleep apnea in adults: a systematic review and meta-analysis. SLEEP 2010;33(10):5127-9786. Mitchell MCKEON. Hypopharyngeal surgery in obstructive sleep apnea: an evidence-based medicine review.  Arch Otolaryngol Head Neck Surg. 2006 Feb;132(2):206-13.  Fred YH1, Harley Y, Adam FRANCIS. The efficacy of anatomically based multilevel surgery for obstructive sleep apnea. Otolaryngol Head Neck Surg. 2003 Oct;129(4):327-35.  Mitchell MCKEON, Goldberg A. Hypopharyngeal Surgery in Obstructive Sleep Apnea: An Evidence-Based Medicine Review. Arch Otolaryngol Head Neck Surg. 2006 Feb;132(2):206-13.  Derrick PJ et al. Upper-Airway Stimulation for Obstructive Sleep Apnea.  N Engl J Med. 2014 Jan 9;370(2):139-49.  Huy Y et al. Increased Incidence of Cardiovascular Disease in Middle-aged Men with Obstructive Sleep Apnea. Am J Respir Crit Care Med; 2002 166: 159-165  Argelia EM et al. Studying Life Effects and Effectiveness of Palatopharyngoplasty (SLEEP) study: Subjective Outcomes of Isolated Uvulopalatopharyngoplasty. Otolaryngol Head Neck Surg. 2011; 144: 623-631.        WHAT IF I  ONLY HAVE SNORING?    Mandibular advancement devices, lateral sleep positioning, long-term weight loss and treatment of nasal allergies have been shown to improve snoring.  Exercising tongue muscles with a game ([employer] Systemsttps://apps.teextee.[employer]/us/pat/soundly-reduce-snoring/cd6280915285) or stimulating the tongue during the day with a device (https://doi.org/10.3390/twe16479126) have improved snoring in some individuals.    Remember to Drive Safe... Drive Alive     Sleep health profoundly affects your health, mood, and your safety.  Thirty three percent of the population (one in three of us) is not getting enough sleep and many have a sleep disorder. Not getting enough sleep or having an untreated / undertreated sleep condition may make us sleepy without even knowing it. In fact, our driving could be dramatically impaired due to our sleep health. As your provider, here are some things I would like you to know about driving:     Here are some warning signs for impairment and dangerous drowsy driving:              -Having been awake more than 16 hours               -Looking tired               -Eyelid drooping              -Head nodding (it could be too late at this point)              -Driving for more than 30 minutes     Some things you could do to make the driving safer if you are experiencing some drowsiness:              -Stop driving and rest              -Call for transportation              -Make sure your sleep disorder is adequately treated     Some things that have been shown NOT to work when experiencing drowsiness while driving:              -Turning on the radio              -Opening windows              -Eating any  distracting  /  entertaining  foods (e.g., sunflower seeds, candy, or any other)              -Talking on the phone      Your decision may not only impact your life, but also the life of others. Please, remember to drive safe for yourself and all of us.

## 2023-02-13 ENCOUNTER — VIRTUAL VISIT (OUTPATIENT)
Dept: PSYCHOLOGY | Facility: CLINIC | Age: 23
End: 2023-02-13
Payer: COMMERCIAL

## 2023-02-13 DIAGNOSIS — F41.1 GENERALIZED ANXIETY DISORDER: Primary | ICD-10-CM

## 2023-02-13 PROCEDURE — 90837 PSYTX W PT 60 MINUTES: CPT | Mod: VID | Performed by: COUNSELOR

## 2023-02-13 NOTE — PROGRESS NOTES
[facility]                                     Progress Note    Patient Name: Batool Louis  Date: 2/13/23         Service Type: Individual      Session Start Time: 10:35am  Session End Time:  11:30am     Session Length: 55    Session #: 50    Attendees: Client    Service Modality:  Video Visit:      Provider verified identity through the following two step process.  Patient provided:  Patient is known previously to provider    Telemedicine Visit: The patient's condition can be safely assessed and treated via synchronous audio and visual telemedicine encounter.      Reason for Telemedicine Visit: Services only offered telehealth    Originating Site (Patient Location): Patient's home    Distant Site (Provider Location): Provider Remote Setting- Home Office    Consent:  The patient/guardian has verbally consented to: the potential risks and benefits of telemedicine (video visit) versus in person care; bill my insurance or make self-payment for services provided; and responsibility for payment of non-covered services.     Patient would like the video invitation sent by:  Send to e-mail at: [email or web address]    Mode of Communication:  Video Conference via Amwell    As the provider I attest to compliance with applicable laws and regulations related to telemedicine.    DATA  Interactive Complexity: No  Crisis: No        Progress Since Last Session (Related to Symptoms / Goals / Homework):   Symptoms: No change .    Homework: Achieved / completed to satisfaction      Episode of Care Goals: Satisfactory progress - ACTION (Actively working towards change); Intervened by reinforcing change plan / affirming steps taken     Current / Ongoing Stressors and Concerns:  During the session there were frequent interruptions due to technology/connection issues.   The client stated her boyfriend recently had a panic attack and she witnessed some unhealthy family  Dynamics between him and his mom and  grandmother     Treatment Objective(s) Addressed in This Session:   use thought-stopping strategy daily to reduce intrusive thoughts       Intervention:   Processed her thoughts and feelings about her relationship. Talked more in depth about her decision about kids and her decision process around this.  She and her boyfriend are both working hard on thinking about what they want. The client stated she feels like he is just waiting for her to say she will have kids the longer they are together.     Assessments completed prior to visit:  The following assessments were completed by patient for this visit:  PHQ9:   PHQ-9 SCORE 8/13/2018 3/12/2020 4/30/2020 5/22/2020 7/10/2020 2/1/2021 3/21/2022   PHQ-9 Total Score MyChart 7 (Mild depression) - - 15 (Moderately severe depression) - 4 (Minimal depression) -   PHQ-9 Total Score 7 10 6 15 14 4 8     GAD7:   SHELBY-7 SCORE 3/1/2018 6/11/2018 8/13/2018 3/12/2020 5/22/2020 2/1/2021 11/14/2022   Total Score - - 6 (mild anxiety) - 16 (severe anxiety) 10 (moderate anxiety) 16 (severe anxiety)   Total Score 5 4 6 7 16 10 16     PROMIS 10-Global Health (all questions and answers displayed):   PROMIS 10 12/21/2021 11/14/2022 2/13/2023   In general, would you say your health is: Very good Fair Fair   In general, would you say your quality of life is: Very good Good Good   In general, how would you rate your physical health? Very good Good Fair   In general, how would you rate your mental health, including your mood and your ability to think? Very good Fair Fair   In general, how would you rate your satisfaction with your social activities and relationships? Very good Fair Fair   In general, please rate how well you carry out your usual social activities and roles Very good Fair Good   To what extent are you able to carry out your everyday physical activities such as walking, climbing stairs, carrying groceries, or moving a chair? Completely Completely Completely   How often have you  been bothered by emotional problems such as feeling anxious, depressed or irritable? Rarely Often Sometimes   How would you rate your fatigue on average? Mild Moderate Mild   How would you rate your pain on average?   0 = No Pain  to  10 = Worst Imaginable Pain 1 2 3   In general, would you say your health is: 4 2 2   In general, would you say your quality of life is: 4 3 3   In general, how would you rate your physical health? 4 3 2   In general, how would you rate your mental health, including your mood and your ability to think? 4 2 2   In general, how would you rate your satisfaction with your social activities and relationships? 4 2 2   In general, please rate how well you carry out your usual social activities and roles. (This includes activities at home, at work and in your community, and responsibilities as a parent, child, spouse, employee, friend, etc.) 4 2 3   To what extent are you able to carry out your everyday physical activities such as walking, climbing stairs, carrying groceries, or moving a chair? 5 5 5   In the past 7 days, how often have you been bothered by emotional problems such as feeling anxious, depressed, or irritable? 2 4 3   In the past 7 days, how would you rate your fatigue on average? 2 3 2   In the past 7 days, how would you rate your pain on average, where 0 means no pain, and 10 means worst imaginable pain? 1 2 3   Global Mental Health Score 16 9 10   Global Physical Health Score 17 15 15   PROMIS TOTAL - SUBSCORES 33 24 25   Some recent data might be hidden         ASSESSMENT: Current Emotional / Mental Status (status of significant symptoms):   Risk status (Self / Other harm or suicidal ideation)   Patient denies current fears or concerns for personal safety.   Patient denies current or recent suicidal ideation or behaviors.   Patient denies current or recent homicidal ideation or behaviors.   Patient denies current or recent self injurious behavior or ideation.   Patient denies  other safety concerns.   Patient reports there has been no change in risk factors since their last session.     Patient reports there has been no change in protective factors since their last session.     Recommended that patient call 911 or go to the local ED should there be a change in any of these risk factors.     Appearance:   Appropriate    Eye Contact:   Good    Psychomotor Behavior: Normal    Attitude:   Cooperative    Orientation:   All   Speech    Rate / Production: Normal/ Responsive    Volume:  Normal    Mood:    Normal   Affect:    Appropriate    Thought Content:  Clear    Thought Form:  Coherent  Logical    Insight:    Good      Medication Review:   No current psychiatric medications prescribed     Medication Compliance:   NA     Changes in Health Issues:   None reported     Chemical Use Review:   Substance Use: Chemical use reviewed, no active concerns identified      Tobacco Use: No current tobacco use.      Diagnosis:  1. Generalized anxiety disorder        Collateral Reports Completed:   Not Applicable    PLAN: (Patient Tasks / Therapist Tasks / Other)  The client will work on mindfulness and grounding skills.         Celsa Linda, [facility]                                                         ______________________________________________________________________    Individual Treatment Plan    Patient's Name: Batool Louis  YOB: 2000    Date of Creation: 3/23/22  Date Treatment Plan Last Reviewed/Revised: 1/16/23    DSM5 Diagnoses: 300.02 (F41.1) Generalized Anxiety Disorder  Psychosocial / Contextual Factors: college student, living at home, family conflict  PROMIS (reviewed every 90 days):     Referral / Collaboration:  Referral to another professional/service is not indicated at this time..    Anticipated number of session for this episode of care: on-going  Anticipation frequency of session: Every other week  Anticipated Duration of each session: 38-52 minutes  Treatment  "plan will be reviewed in 90 days or when goals have been changed.       MeasurableTreatment Goal(s) related to diagnosis / functional impairment(s)    MeasurableTreatment Goal(s) related to diagnosis / functional impairment(s)  Goal 1: Client will increase emotion regulation skills/decrease panic attacks    Objective #A (Client Action)    Client will identify 2-4 fears / thoughts that contribute to feeling anxious  use positive self-affirmations daily.  Status: Continued - Date(s): 1/16/23    Intervention(s)  Therapist will teach emotional regulation skills. distress tolerance skills, interpersonal effectiveness skills, emotion regluation skills, mindfulness skills, radical acceptance. Therapist will teach client how to ID body cues for anxiety, anxiety reduction techniques, how to ID triggers for depression and anxiety- decrease reactivity/eliminate, lifestyle changes to reduce depression and anxiety, communication skills, explore cognitive beliefs and help client to develop healthy cognitive patterns and beliefs.      Objective #B  Client will use thought-stopping strategy daily to reduce intrusive thoughts.  Status: Continued - Date(s): 1/16/23    Intervention(s)  Therapist will teach emotional regulation skills. distress tolerance skills, interpersonal effectiveness skills, emotion regluation skills, mindfulness skills, radical acceptance. Therapist will teach client how to ID body cues for anxiety, anxiety reduction techniques, how to ID triggers for depression and anxiety- decrease reactivity/eliminate, lifestyle changes to reduce depression and anxiety, communication skills, explore cognitive beliefs and help client to develop healthy cognitive patterns and beliefs.    Objective #C (Client Action)    Status: Continued - Date: 1/16/23    Client will use \"worry time\" each day for 15 minutes of scheduled worry and then defer obsessive or anxious thinking until the next structured worry " time.    Intervention(s)  Therapist will teach emotional regulation skills. work through trauma using somatic experiencing techniques to discharge the anxiety.    Goal 2: Client will work on increasing self esteem and self worth     Objective #A (Client Action)    Status: Continued - Date(s): 1/16/23    Client will identify two areas of life that you would like to have improved functioning.    Intervention(s)  Therapist will teach emotional regulation skills. work through trauma using somatic experiencing techniques to discharge the anxiety.      Client has reviewed and agreed to the above plan.      JAGDEEP Camacho

## 2023-02-27 ENCOUNTER — VIRTUAL VISIT (OUTPATIENT)
Dept: PSYCHOLOGY | Facility: CLINIC | Age: 23
End: 2023-02-27
Payer: COMMERCIAL

## 2023-02-27 DIAGNOSIS — F41.1 GENERALIZED ANXIETY DISORDER: Primary | ICD-10-CM

## 2023-02-27 PROCEDURE — 90837 PSYTX W PT 60 MINUTES: CPT | Mod: VID | Performed by: COUNSELOR

## 2023-02-27 NOTE — PROGRESS NOTES
[facility]                                     Progress Note    Patient Name: Batool Louis  Date: 2/27/23         Service Type: Individual      Session Start Time: 10:35am  Session End Time:  11:30am     Session Length: 55    Session #: 51    Attendees: Client    Service Modality:  Video Visit:      Provider verified identity through the following two step process.  Patient provided:  Patient is known previously to provider    Telemedicine Visit: The patient's condition can be safely assessed and treated via synchronous audio and visual telemedicine encounter.      Reason for Telemedicine Visit: Services only offered telehealth    Originating Site (Patient Location): Patient's home    Distant Site (Provider Location): Provider Remote Setting- Home Office    Consent:  The patient/guardian has verbally consented to: the potential risks and benefits of telemedicine (video visit) versus in person care; bill my insurance or make self-payment for services provided; and responsibility for payment of non-covered services.     Patient would like the video invitation sent by:  Send to e-mail at: [email or web address]    Mode of Communication:  Video Conference via Amwell    As the provider I attest to compliance with applicable laws and regulations related to telemedicine.    DATA  Interactive Complexity: No  Crisis: No        Progress Since Last Session (Related to Symptoms / Goals / Homework):   Symptoms: No change .    Homework: Achieved / completed to satisfaction      Episode of Care Goals: Satisfactory progress - ACTION (Actively working towards change); Intervened by reinforcing change plan / affirming steps taken     Current / Ongoing Stressors and Concerns:  The client stated her ex texted her recently and told her he missed her. She stated her boyfriend didn't want her to respond to him but she decided to text him back.      Treatment Objective(s) Addressed in This Session:   use  thought-stopping strategy daily to reduce intrusive thoughts       Intervention:   Processed the interaction she had with her ex. She stated she hasn't responded back since the last message. She stated she and her boyfriend continue to talk about their relationship and the future. The client continues to believe they aren't going to be compatable in the long run because of their differences. Continued to talk with the client about her relationship and her wants.      Assessments completed prior to visit:  The following assessments were completed by patient for this visit:  PHQ9:   PHQ-9 SCORE 8/13/2018 3/12/2020 4/30/2020 5/22/2020 7/10/2020 2/1/2021 3/21/2022   PHQ-9 Total Score MyChart 7 (Mild depression) - - 15 (Moderately severe depression) - 4 (Minimal depression) -   PHQ-9 Total Score 7 10 6 15 14 4 8     GAD7:   SHEBLY-7 SCORE 3/1/2018 6/11/2018 8/13/2018 3/12/2020 5/22/2020 2/1/2021 11/14/2022   Total Score - - 6 (mild anxiety) - 16 (severe anxiety) 10 (moderate anxiety) 16 (severe anxiety)   Total Score 5 4 6 7 16 10 16     PROMIS 10-Global Health (all questions and answers displayed):   PROMIS 10 12/21/2021 11/14/2022 2/13/2023 2/27/2023   In general, would you say your health is: Very good Fair Fair Fair   In general, would you say your quality of life is: Very good Good Good Good   In general, how would you rate your physical health? Very good Good Fair Fair   In general, how would you rate your mental health, including your mood and your ability to think? Very good Fair Fair Good   In general, how would you rate your satisfaction with your social activities and relationships? Very good Fair Fair Fair   In general, please rate how well you carry out your usual social activities and roles Very good Fair Good Fair   To what extent are you able to carry out your everyday physical activities such as walking, climbing stairs, carrying groceries, or moving a chair? Completely Completely Completely Completely    How often have you been bothered by emotional problems such as feeling anxious, depressed or irritable? Rarely Often Sometimes Often   How would you rate your fatigue on average? Mild Moderate Mild Mild   How would you rate your pain on average?   0 = No Pain  to  10 = Worst Imaginable Pain 1 2 3 0   In general, would you say your health is: 4 2 2 2   In general, would you say your quality of life is: 4 3 3 3   In general, how would you rate your physical health? 4 3 2 2   In general, how would you rate your mental health, including your mood and your ability to think? 4 2 2 3   In general, how would you rate your satisfaction with your social activities and relationships? 4 2 2 2   In general, please rate how well you carry out your usual social activities and roles. (This includes activities at home, at work and in your community, and responsibilities as a parent, child, spouse, employee, friend, etc.) 4 2 3 2   To what extent are you able to carry out your everyday physical activities such as walking, climbing stairs, carrying groceries, or moving a chair? 5 5 5 5   In the past 7 days, how often have you been bothered by emotional problems such as feeling anxious, depressed, or irritable? 2 4 3 4   In the past 7 days, how would you rate your fatigue on average? 2 3 2 2   In the past 7 days, how would you rate your pain on average, where 0 means no pain, and 10 means worst imaginable pain? 1 2 3 0   Global Mental Health Score 16 9 10 10   Global Physical Health Score 17 15 15 16   PROMIS TOTAL - SUBSCORES 33 24 25 26   Some recent data might be hidden         ASSESSMENT: Current Emotional / Mental Status (status of significant symptoms):   Risk status (Self / Other harm or suicidal ideation)   Patient denies current fears or concerns for personal safety.   Patient denies current or recent suicidal ideation or behaviors.   Patient denies current or recent homicidal ideation or behaviors.   Patient denies current  or recent self injurious behavior or ideation.   Patient denies other safety concerns.   Patient reports there has been no change in risk factors since their last session.     Patient reports there has been no change in protective factors since their last session.     Recommended that patient call 911 or go to the local ED should there be a change in any of these risk factors.     Appearance:   Appropriate    Eye Contact:   Good    Psychomotor Behavior: Normal    Attitude:   Cooperative    Orientation:   All   Speech    Rate / Production: Normal/ Responsive    Volume:  Normal    Mood:    Normal   Affect:    Appropriate    Thought Content:  Clear    Thought Form:  Coherent  Logical    Insight:    Good      Medication Review:   No current psychiatric medications prescribed     Medication Compliance:   NA     Changes in Health Issues:   None reported     Chemical Use Review:   Substance Use: Chemical use reviewed, no active concerns identified      Tobacco Use: No current tobacco use.      Diagnosis:  1. Generalized anxiety disorder        Collateral Reports Completed:   Not Applicable    PLAN: (Patient Tasks / Therapist Tasks / Other)  Client to continue to reflect on her relationship wants.         Celsa Linda, [facility]                                                         ______________________________________________________________________    Individual Treatment Plan    Patient's Name: Batool Louis  YOB: 2000    Date of Creation: 3/23/22  Date Treatment Plan Last Reviewed/Revised: 1/16/23    DSM5 Diagnoses: 300.02 (F41.1) Generalized Anxiety Disorder  Psychosocial / Contextual Factors: college student, living at home, family conflict  PROMIS (reviewed every 90 days):     Referral / Collaboration:  Referral to another professional/service is not indicated at this time..    Anticipated number of session for this episode of care: on-going  Anticipation frequency of session: Every other  "week  Anticipated Duration of each session: 38-52 minutes  Treatment plan will be reviewed in 90 days or when goals have been changed.       MeasurableTreatment Goal(s) related to diagnosis / functional impairment(s)    MeasurableTreatment Goal(s) related to diagnosis / functional impairment(s)  Goal 1: Client will increase emotion regulation skills/decrease panic attacks    Objective #A (Client Action)    Client will identify 2-4 fears / thoughts that contribute to feeling anxious  use positive self-affirmations daily.  Status: Continued - Date(s): 1/16/23    Intervention(s)  Therapist will teach emotional regulation skills. distress tolerance skills, interpersonal effectiveness skills, emotion regluation skills, mindfulness skills, radical acceptance. Therapist will teach client how to ID body cues for anxiety, anxiety reduction techniques, how to ID triggers for depression and anxiety- decrease reactivity/eliminate, lifestyle changes to reduce depression and anxiety, communication skills, explore cognitive beliefs and help client to develop healthy cognitive patterns and beliefs.      Objective #B  Client will use thought-stopping strategy daily to reduce intrusive thoughts.  Status: Continued - Date(s): 1/16/23    Intervention(s)  Therapist will teach emotional regulation skills. distress tolerance skills, interpersonal effectiveness skills, emotion regluation skills, mindfulness skills, radical acceptance. Therapist will teach client how to ID body cues for anxiety, anxiety reduction techniques, how to ID triggers for depression and anxiety- decrease reactivity/eliminate, lifestyle changes to reduce depression and anxiety, communication skills, explore cognitive beliefs and help client to develop healthy cognitive patterns and beliefs.    Objective #C (Client Action)    Status: Continued - Date: 1/16/23    Client will use \"worry time\" each day for 15 minutes of scheduled worry and then defer obsessive or " anxious thinking until the next structured worry time.    Intervention(s)  Therapist will teach emotional regulation skills. work through trauma using somatic experiencing techniques to discharge the anxiety.    Goal 2: Client will work on increasing self esteem and self worth     Objective #A (Client Action)    Status: Continued - Date(s): 1/16/23    Client will identify two areas of life that you would like to have improved functioning.    Intervention(s)  Therapist will teach emotional regulation skills. work through trauma using somatic experiencing techniques to discharge the anxiety.      Client has reviewed and agreed to the above plan.      Celsa Linda [facility]

## 2023-02-28 ENCOUNTER — OFFICE VISIT (OUTPATIENT)
Dept: OBGYN | Facility: OTHER | Age: 23
End: 2023-02-28
Payer: COMMERCIAL

## 2023-02-28 VITALS — WEIGHT: 240 LBS | DIASTOLIC BLOOD PRESSURE: 80 MMHG | BODY MASS INDEX: 36.49 KG/M2 | SYSTOLIC BLOOD PRESSURE: 119 MMHG

## 2023-02-28 DIAGNOSIS — L70.3 TROPICAL ACNE: ICD-10-CM

## 2023-02-28 DIAGNOSIS — Z11.3 SCREENING FOR STDS (SEXUALLY TRANSMITTED DISEASES): ICD-10-CM

## 2023-02-28 DIAGNOSIS — N92.0 MENORRHAGIA WITH REGULAR CYCLE: ICD-10-CM

## 2023-02-28 DIAGNOSIS — Z12.4 SCREENING FOR CERVICAL CANCER: Primary | ICD-10-CM

## 2023-02-28 LAB
CLUE CELLS: ABNORMAL
FASTING STATUS PATIENT QL REPORTED: YES
GLUCOSE SERPL-MCNC: 91 MG/DL (ref 70–99)
TRICHOMONAS, WET PREP: ABNORMAL
TSH SERPL DL<=0.005 MIU/L-ACNC: 2.27 UIU/ML (ref 0.3–4.2)
WBC'S/HIGH POWER FIELD, WET PREP: ABNORMAL
YEAST, WET PREP: ABNORMAL

## 2023-02-28 PROCEDURE — 87491 CHLMYD TRACH DNA AMP PROBE: CPT | Performed by: OBSTETRICS & GYNECOLOGY

## 2023-02-28 PROCEDURE — 84443 ASSAY THYROID STIM HORMONE: CPT | Performed by: OBSTETRICS & GYNECOLOGY

## 2023-02-28 PROCEDURE — 87591 N.GONORRHOEAE DNA AMP PROB: CPT | Performed by: OBSTETRICS & GYNECOLOGY

## 2023-02-28 PROCEDURE — 82947 ASSAY GLUCOSE BLOOD QUANT: CPT | Performed by: OBSTETRICS & GYNECOLOGY

## 2023-02-28 PROCEDURE — 36415 COLL VENOUS BLD VENIPUNCTURE: CPT | Performed by: OBSTETRICS & GYNECOLOGY

## 2023-02-28 PROCEDURE — 99214 OFFICE O/P EST MOD 30 MIN: CPT | Performed by: OBSTETRICS & GYNECOLOGY

## 2023-02-28 PROCEDURE — 84403 ASSAY OF TOTAL TESTOSTERONE: CPT | Performed by: OBSTETRICS & GYNECOLOGY

## 2023-02-28 PROCEDURE — 84270 ASSAY OF SEX HORMONE GLOBUL: CPT | Performed by: OBSTETRICS & GYNECOLOGY

## 2023-02-28 PROCEDURE — G0145 SCR C/V CYTO,THINLAYER,RESCR: HCPCS | Performed by: OBSTETRICS & GYNECOLOGY

## 2023-02-28 PROCEDURE — 87210 SMEAR WET MOUNT SALINE/INK: CPT | Performed by: OBSTETRICS & GYNECOLOGY

## 2023-02-28 NOTE — PROGRESS NOTES
"Batool is a 22 year old  female who presents for annual exam.     Besides routine health maintenance, {NO OTHER:890362}.    HPI:  The patient's PCP is *** Nancy Mcdowell Mai, MD.  ***      GYNECOLOGIC HISTORY:    No LMP recorded. (Menstrual status: IUD).    Regular menses? { :207493}  Menses every *** days.  Length of menses: {NUMBERS 1-16:10} days    Her current contraception method is: IUD.  She  reports that she has never smoked. She has never used smokeless tobacco.    Patient is sexually active.  STD testing offered?  Accepted  Last PHQ-9 score on record =   PHQ-9 SCORE 3/21/2022   PHQ-9 Total Score MyChart -   PHQ-9 Total Score 8     Last GAD7 score on record =   SHELBY-7 SCORE 2022   Total Score 16 (severe anxiety)   Total Score 16     Alcohol Score = ***    HEALTH MAINTENANCE:  Cholesterol: (  Cholesterol   Date Value Ref Range Status   07/10/2020 196 <200 mg/dL Final    ***  Last Mammo: {plc mammo:182314::\"One year ago\"}, Result: {plc normal:066941::\"Normal\"}, Next Mammo: {plc next mammo:278107::\"Today\"} ***  Pap: (  Lab Results   Component Value Date    PAP NIL 2020    )  ***  Colonoscopy:  ***, Result: {plc normal:947931::\"Normal\"}, Next Colonoscopy: *** years.  Dexa:  ***    Health maintenance updated:  {yes no:297137}    HISTORY:  OB History    Para Term  AB Living   0 0 0 0 0 0   SAB IAB Ectopic Multiple Live Births   0 0 0 0 0       Patient Active Problem List   Diagnosis     Allergic rhinitis     Chronic tonsillitis     IUD (intrauterine device) in place     Shellfish allergy     Past Surgical History:   Procedure Laterality Date     NO HISTORY OF SURGERY        Social History     Tobacco Use     Smoking status: Never     Smokeless tobacco: Never   Substance Use Topics     Alcohol use: Yes     Comment: socially      Problem (# of Occurrences) Relation (Name,Age of Onset)    Anemia (1) Mother ([place]): unknown reason    Alzheimer Disease (1) Maternal Grandfather    Asthma " "(1) Sister (Dorota)    Cancer (1) Paternal Grandfather: pancreatic    Back Pain (1) Maternal Grandmother    Skin Cancer (1) Paternal Great-Grandfather    No Known Problems (2) Father, Paternal Grandmother            Current Outpatient Medications   Medication Sig     albuterol (PROAIR HFA/PROVENTIL HFA/VENTOLIN HFA) 108 (90 Base) MCG/ACT inhaler INHALE 2 PUFFS INTO THE LUNGS EVERY 6 HOURS     Biotin 10 MG CAPS      cetirizine (ZYRTEC) 10 MG tablet Take 10 mg by mouth daily      fluconazole (DIFLUCAN) 150 MG tablet Take 1 tablet (150 mg) by mouth daily     fluticasone (FLONASE) 50 MCG/ACT nasal spray Spray 2 sprays into both nostrils daily     levonorgestrel (KYLEENA) 19.5 MG IUD 1 each by Intrauterine route once Put in 8/12/2020     vitamin D3 (CHOLECALCIFEROL) 125 MCG (5000 UT) tablet Reported on 3/7/2017     No current facility-administered medications for this visit.     Allergies   Allergen Reactions     Sulfamethoxazole-Trimethoprim Hives       Past medical, surgical, social and family histories were reviewed and updated in EPIC.    ROS:   {[facility] ROSGYN:055301::\"12 point review of systems negative other than symptoms noted below or in the HPI.\"}  {ROS - :502639::\"No urinary frequency or dysuria, bladder or kidney problems\"}    EXAM:  There were no vitals taken for this visit.   BMI: There is no height or weight on file to calculate BMI.    PHYSICAL EXAM:  Constitutional:   Appearance: Well nourished, well developed, alert, in no acute distress  Neck:  Lymph Nodes:  No lymphadenopathy present    Thyroid:  Gland size normal, nontender, no nodules or masses present  on palpation  Chest:  Respiratory Effort:  Breathing unlabored  Cardiovascular:    Heart: Auscultation:  Regular rate, normal rhythm, no murmurs present  Breasts: {[facility] Breast exam:349186}  Gastrointestinal:   Abdominal Examination:  Abdomen nontender to palpation, tone normal without rigidity or guarding, no masses present, umbilicus without " "lesions   Liver and Spleen:  No hepatomegaly present, liver nontender to palpation    Hernias:  No hernias present  Lymphatic: Lymph Nodes:  No other lymphadenopathy present  Skin:  General Inspection:  No rashes present, no lesions present, no areas of  discoloration  Neurologic:    Mental Status:  Oriented X3.  Normal strength and tone, sensory exam                grossly normal, mentation intact and speech normal.    Psychiatric:   Mentation appears normal and affect normal/bright.         {[facility] Pelvic Exam:262034}    COUNSELING:   {FEMALE COUNSELING MESSAGES:065095::\"Reviewed preventive health counseling, as reflected in patient instructions\"}    BMI: There is no height or weight on file to calculate BMI.  {Obesity Action Plan -- Delete if BMA < 25:830224}    ASSESSMENT:  22 year old female with satisfactory annual exam.  No diagnosis found.    PLAN:  ***    Khoi Olson MD  "

## 2023-02-28 NOTE — PROGRESS NOTES
SUBJECTIVE:                                                   Batool Luois is a 22 year old female who presents to clinic today for the following health issue(s):  No chief complaint on file.      Additional information: ***    HPI:  ***    No LMP recorded. (Menstrual status: IUD)..     Patient is sexually active, .  Using IUD for contraception.    reports that she has never smoked. She has never used smokeless tobacco.    STD testing offered?  Accepted    Health maintenance updated:  yes    Today's PHQ-2 Score:   PHQ-2 (  [employer]) 2023   Q1: Little interest or pleasure in doing things 1   Q2: Feeling down, depressed or hopeless 1   PHQ-2 Score 2   PHQ-2 Total Score (12-17 Years)- Positive if 3 or more points; Administer PHQ-A if positive -   Q1: Little interest or pleasure in doing things Several days   Q2: Feeling down, depressed or hopeless Several days   PHQ-2 Score 2     Today's PHQ-9 Score:   PHQ-9 SCORE 3/21/2022   PHQ-9 Total Score MyChart -   PHQ-9 Total Score 8     Today's SHELBY-7 Score:   SHELBY-7 SCORE 2022   Total Score 16 (severe anxiety)   Total Score 16       Problem list and histories reviewed & adjusted, as indicated.  Additional history: as documented.    Patient Active Problem List   Diagnosis     Allergic rhinitis     Chronic tonsillitis     IUD (intrauterine device) in place     Shellfish allergy     Past Surgical History:   Procedure Laterality Date     NO HISTORY OF SURGERY        Social History     Tobacco Use     Smoking status: Never     Smokeless tobacco: Never   Substance Use Topics     Alcohol use: Yes     Comment: socially      Problem (# of Occurrences) Relation (Name,Age of Onset)    Anemia (1) Mother ([place]): unknown reason    Alzheimer Disease (1) Maternal Grandfather    Asthma (1) Sister (Dorota)    Cancer (1) Paternal Grandfather: pancreatic    Back Pain (1) Maternal Grandmother    Skin Cancer (1) Paternal Great-Grandfather    No Known Problems (2)  "Father, Paternal Grandmother            Current Outpatient Medications   Medication Sig     albuterol (PROAIR HFA/PROVENTIL HFA/VENTOLIN HFA) 108 (90 Base) MCG/ACT inhaler INHALE 2 PUFFS INTO THE LUNGS EVERY 6 HOURS     Biotin 10 MG CAPS      cetirizine (ZYRTEC) 10 MG tablet Take 10 mg by mouth daily      fluconazole (DIFLUCAN) 150 MG tablet Take 1 tablet (150 mg) by mouth daily     fluticasone (FLONASE) 50 MCG/ACT nasal spray Spray 2 sprays into both nostrils daily     levonorgestrel (KYLEENA) 19.5 MG IUD 1 each by Intrauterine route once Put in 8/12/2020     vitamin D3 (CHOLECALCIFEROL) 125 MCG (5000 UT) tablet Reported on 3/7/2017     No current facility-administered medications for this visit.     Allergies   Allergen Reactions     Sulfamethoxazole-Trimethoprim Hives       ROS:  {[facility] ROSGYN:597904::\"12 point review of systems negative other than symptoms noted below or in the HPI.\"}  {ROS - :582566::\"No urinary frequency or dysuria, bladder or kidney problems\"}      OBJECTIVE:     There were no vitals taken for this visit.  There is no height or weight on file to calculate BMI.    Exam:  {[facility] EXAM CHOICES:965550}     In-Clinic Test Results:  No results found for this or any previous visit (from the past 24 hour(s)).    ASSESSMENT/PLAN:                                                      No diagnosis found.    Patient Instructions                                                        If you have any questions regarding your visit, Please contact your care team.     [employer] [facility]: [phone]    To Schedule an Appointment 24/7  Call: [phone]     Khoi Olson MD    Medical Assistant    Lorenzo Das-Surgery Scheduler  Radha-Surgery Scheduler     [place]  1:00 pm-4:30 pm    Tuesday-[place]  7:30 am-4:30 pm    Wednesday-[place]  7:30 am-4:30 pm        Typical Surgery Days: Monday or Thursday       [facility] " [address] [address]  [phone] appointment line  [phone] Fax    Imaging Scheduling all locations  [phone]    **Surgeries** Our Surgery Schedulers will contact you to schedule. If you do not receive a call within 3 business days, please call [phone].    If you need a medication refill, please contact your pharmacy. Please allow 3 business days for your refill to be completed.    As always, Thank you for trusting us with your healthcare needs!                   see additional instructions from your care team below        ***    Khoi Olson MD  [facility]

## 2023-02-28 NOTE — PATIENT INSTRUCTIONS
If you have any questions regarding your visit, Please contact your care team.     [employer] [facility]: [phone]    To Schedule an Appointment 24/7  Call: [phone] HOURS TELEPHONE NUMBER     Khoi Olson MD    Medical Assistant    Lorenzo Das-Surgery Scheduler  Radha-Surgery Scheduler     Monday-[place]  1:00 pm-4:30 pm    Tuesday-[place]  7:30 am-4:30 pm    Wednesday-[place]  7:30 am-4:30 pm        Typical Surgery Days: Monday or Thursday       [address] [address]  [phone] appointment line  [phone] Fax    Imaging Scheduling all locations  [phone]    **Surgeries** Our Surgery Schedulers will contact you to schedule. If you do not receive a call within 3 business days, please call [phone].    If you need a medication refill, please contact your pharmacy. Please allow 3 business days for your refill to be completed.    As always, Thank you for trusting us with your healthcare needs!                   see additional instructions from your care team below

## 2023-02-28 NOTE — PROGRESS NOTES
SUBJECTIVE:      I spent approximately 30 minutes on the care of the Batool service face-to-face time with remainder in chart review, coordination, documentation on the day of service.                                                 Batool Louis is a 22 year old female who presents to clinic today for the following health issue(s):  Patient presents with:  IUD: Check     Patient is a 22-year-old G0 who had an appointment for an annual.  She was significantly late and the annual exam and discussion was deferred for other questions that she had.    Main concerns for today is IUD pain.  She states that she has been in for approximately a year and a half.  She states that it took her about 6 months to get used to it in terms of cramping but more recently now she is having more cramping again.  She initially was diagnosed as amenorrheic but then with the placement of the Kyleena started having regular periods.  She notes also having concerns regarding acne facial hair growth increased weight gain.    She notes that exercising significantly.  She teaches dance at 3 different studios and since the placement of the IUD has gained approximately 40 pounds.  Discussed briefly with polycystic ovary disease and treatment for the significant symptoms.  Additionally she is interested in having her IUD checked.  She seems to want to gravitate towards having an IUD even though it may be causing her more trouble than its worth  I did discuss also starting on birth control pills and for her symptomatology.  She asked whether she should be using the ParaGard instead of the Mirena and then we talked briefly about side effects of IUDs.  Most of what she is discussing for her symptomatology is not related to an IUD but possibly more hormonal aberration.    Examination:  Patient is pleasant and appropriate no apparent distress  External genitalia are normal for age  Vaginal mucosa is normal with normal discharge.  Cervix is  visualized and IUD string is about 1/4 inch long.  Wet prep and GC chlamydia was done  Uterus is freely mobile nontender no adnexal masses urinary bladder was reported perineum intact.    Assessment/plan:  Pending labs include testosterone, free testosterone TSH, glucose.  The pelvic ultrasound is also pending  We discussed that it probably be best to discuss these lab results and person as opposed to have extended MyChart interaction and suggest she make an appointment after the ultrasound is done.      Patient's last menstrual period was 2023..     Patient is sexually active, .  Using IUD for contraception.    reports that she has never smoked. She has never used smokeless tobacco.    STD testing offered?  Accepted    Health maintenance updated:  yes    Today's PHQ-2 Score:   PHQ-2 (  [employer]) 2023   Q1: Little interest or pleasure in doing things 1   Q2: Feeling down, depressed or hopeless 1   PHQ-2 Score 2   PHQ-2 Total Score (12-17 Years)- Positive if 3 or more points; Administer PHQ-A if positive -   Q1: Little interest or pleasure in doing things Several days   Q2: Feeling down, depressed or hopeless Several days   PHQ-2 Score 2     Today's PHQ-9 Score:   PHQ-9 SCORE 3/21/2022   [facility]9 [facility] Score [facility] -   PHQ-9 Total Score 8     Today's SHELBY-7 Score:   SHELBY-7 SCORE 2022   Total Score 16 (severe anxiety)   Total Score 16       Problem list and histories reviewed & adjusted, as indicated.  Additional history: as documented.    Patient Active Problem List   Diagnosis     Allergic rhinitis     Chronic tonsillitis     IUD (intrauterine device) in place     Shellfish allergy     Past Surgical History:   Procedure Laterality Date     NO HISTORY OF SURGERY        Social History     Tobacco Use     Smoking status: Never     Smokeless tobacco: Never   Substance Use Topics     Alcohol use: Yes     Comment: socially      Problem (# of Occurrences) Relation (Name,Age of Onset)    Anemia (1)  Mother ([place]): unknown reason    Alzheimer Disease (1) Maternal Grandfather    Asthma (1) Sister (Dorota)    Cancer (1) Paternal Grandfather: pancreatic    Back Pain (1) Maternal Grandmother    Skin Cancer (1) Paternal Great-Grandfather    No Known Problems (2) Father, Paternal Grandmother            Current Outpatient Medications   Medication Sig     albuterol (PROAIR HFA/PROVENTIL HFA/VENTOLIN HFA) 108 (90 Base) MCG/ACT inhaler INHALE 2 PUFFS INTO THE LUNGS EVERY 6 HOURS     Biotin 10 MG CAPS      cetirizine (ZYRTEC) 10 MG tablet Take 10 mg by mouth daily      fluticasone (FLONASE) 50 MCG/ACT nasal spray Spray 2 sprays into both nostrils daily     levonorgestrel (KYLEENA) 19.5 MG IUD 1 each by Intrauterine route once Put in 8/12/2020     vitamin D3 (CHOLECALCIFEROL) 125 MCG (5000 UT) tablet Reported on 3/7/2017     fluconazole (DIFLUCAN) 150 MG tablet Take 1 tablet (150 mg) by mouth daily (Patient not taking: Reported on 2/28/2023)     No current facility-administered medications for this visit.     Allergies   Allergen Reactions     Sulfamethoxazole-Trimethoprim Hives           OBJECTIVE:     /80 (BP Location: Right arm, Cuff Size: Adult Large)   Wt 108.9 kg (240 lb)   LMP 02/01/2023   BMI 36.49 kg/m    Body mass index is 36.49 kg/m .    Exam:  See above     In-Clinic Test Results:  No results found for this or any previous visit (from the past 24 hour(s)).    ASSESSMENT/PLAN:                                                        Patient Instructions                                                        If you have any questions regarding your visit, Please contact your care team.     [employer] [facility]: [phone]    To Schedule an Appointment 24/7  Call: [phone] HOURS TELEPHONE NUMBER     Khoi Olson MD    Medical Assistant    Lorenzo Das-Surgery Scheduler  Radha-Surgery Scheduler     Monday-[place]  1:00 pm-4:30  pm    Tuesday-[place]  7:30 am-4:30 pm    Wednesday-[place]  7:30 am-4:30 pm        Typical Surgery Days: Monday or Thursday       [address] [address]  [phone] appointment line  [phone] Fax    Imaging Scheduling all locations  [phone]    **Surgeries** Our Surgery Schedulers will contact you to schedule. If you do not receive a call within 3 business days, please call [phone].    If you need a medication refill, please contact your pharmacy. Please allow 3 business days for your refill to be completed.    As always, Thank you for trusting us with your healthcare needs!                   see additional instructions from your care team below    Khoi Olson MD  [facility]

## 2023-03-01 LAB
C TRACH DNA SPEC QL NAA+PROBE: NEGATIVE
N GONORRHOEA DNA SPEC QL NAA+PROBE: NEGATIVE
SHBG SERPL-SCNC: 15 NMOL/L (ref 30–135)

## 2023-03-02 LAB
BKR LAB AP GYN ADEQUACY: NORMAL
BKR LAB AP GYN INTERPRETATION: NORMAL
BKR LAB AP HPV REFLEX: NO
BKR LAB AP LMP: NORMAL
BKR LAB AP PREVIOUS ABNORMAL: NORMAL
PATH REPORT.COMMENTS IMP SPEC: NORMAL
PATH REPORT.COMMENTS IMP SPEC: NORMAL
PATH REPORT.RELEVANT HX SPEC: NORMAL
TESTOST FREE SERPL-MCNC: 0.84 NG/DL
TESTOST SERPL-MCNC: 32 NG/DL (ref 8–60)

## 2023-04-24 ENCOUNTER — VIRTUAL VISIT (OUTPATIENT)
Dept: PSYCHOLOGY | Facility: CLINIC | Age: 23
End: 2023-04-24
Payer: COMMERCIAL

## 2023-04-24 DIAGNOSIS — F41.1 GENERALIZED ANXIETY DISORDER: Primary | ICD-10-CM

## 2023-04-24 PROCEDURE — 90834 PSYTX W PT 45 MINUTES: CPT | Mod: VID | Performed by: COUNSELOR
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
## 2023-05-19 ENCOUNTER — HOSPITAL ENCOUNTER (EMERGENCY)
Facility: CLINIC | Age: 23
Discharge: HOME | End: 2023-05-19
Attending: EMERGENCY MEDICINE | Admitting: EMERGENCY MEDICINE
Payer: COMMERCIAL

## 2023-05-19 ENCOUNTER — APPOINTMENT (OUTPATIENT)
Dept: GENERAL RADIOLOGY | Facility: CLINIC | Age: 23
End: 2023-05-19
Attending: EMERGENCY MEDICINE
Payer: COMMERCIAL

## 2023-05-19 VITALS
HEIGHT: 69 IN | TEMPERATURE: 98.6 F | DIASTOLIC BLOOD PRESSURE: 73 MMHG | SYSTOLIC BLOOD PRESSURE: 129 MMHG | WEIGHT: 235 LBS | HEART RATE: 73 BPM | BODY MASS INDEX: 34.8 KG/M2 | RESPIRATION RATE: 16 BRPM | OXYGEN SATURATION: 100 %

## 2023-05-19 DIAGNOSIS — S22.32XA CLOSED FRACTURE OF ONE RIB OF LEFT SIDE, INITIAL ENCOUNTER: ICD-10-CM

## 2023-05-19 PROCEDURE — 250N000011 HC RX IP 250 OP 636: Performed by: EMERGENCY MEDICINE

## 2023-05-19 PROCEDURE — 71101 X-RAY EXAM UNILAT RIBS/CHEST: CPT | Mod: LT

## 2023-05-19 PROCEDURE — 99284 EMERGENCY DEPT VISIT MOD MDM: CPT | Performed by: EMERGENCY MEDICINE

## 2023-05-19 PROCEDURE — 96372 THER/PROPH/DIAG INJ SC/IM: CPT | Performed by: EMERGENCY MEDICINE

## 2023-05-19 RX ORDER — KETOROLAC TROMETHAMINE 30 MG/ML
30 INJECTION, SOLUTION INTRAMUSCULAR; INTRAVENOUS ONCE
Status: COMPLETED | OUTPATIENT
Start: 2023-05-19 | End: 2023-05-19

## 2023-05-19 RX ORDER — OXYCODONE HYDROCHLORIDE 5 MG/1
5 TABLET ORAL EVERY 6 HOURS PRN
Qty: 12 TABLET | Refills: 0 | Status: SHIPPED | OUTPATIENT
Start: 2023-05-19 | End: 2023-05-22

## 2023-05-19 RX ADMIN — HYDROMORPHONE HYDROCHLORIDE 1 MG: 1 INJECTION, SOLUTION INTRAMUSCULAR; INTRAVENOUS; SUBCUTANEOUS at 17:52

## 2023-05-19 RX ADMIN — KETOROLAC TROMETHAMINE 30 MG: 30 INJECTION, SOLUTION INTRAMUSCULAR at 17:54

## 2023-05-19 ASSESSMENT — ACTIVITIES OF DAILY LIVING (ADL)
ADLS_ACUITY_SCORE: 35
ADLS_ACUITY_SCORE: 35

## 2023-05-19 NOTE — DISCHARGE INSTRUCTIONS
Return to the emergency department if you develop new or worsening symptoms.  I think the rib fracture that we found on x-ray is new but it is hard to say.  That should be healed up in about 4 weeks.  Take the pain pills as directed and use caution when taking them.  They can cause constipation and dizziness etc.  Use ibuprofen and Tylenol during the day.  No driving if taking the oxycodone.

## 2023-05-19 NOTE — ED TRIAGE NOTES
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      Triage Assessment     Row Name 05/19/23 3824       Triage Assessment (Adult)    Airway WDL WDL       Respiratory WDL    Respiratory WDL WDL       Peripheral/Neurovascular WDL    Peripheral Neurovascular WDL WDL

## 2023-05-19 NOTE — ED PROVIDER NOTES
History     Chief Complaint   Patient presents with     Back Injury     HPI  Batool Louis is a 22 year old female who presents to the emergency department secondary to left sided low back/rib pain.  It started just prior to arrival when the patient was doing a choreograph jump move and she felt a pop and has had fairly severe sharp pain in the left posterior ribs.  No midline tenderness and no radiation of the pain.  Is difficult to take a deep breath due to the pain.  No trauma.  No radicular symptoms.  No numbness or tingling in extremities.    Allergies:  Allergies   Allergen Reactions     Sulfamethoxazole-Trimethoprim Hives       Problem List:    Patient Active Problem List    Diagnosis Date Noted     Shellfish allergy 07/21/2021     Priority: Medium     IUD (intrauterine device) in place 08/12/2020     Priority: Medium     Kyleena placed 8/12/2020 for oligomenorrhea.  Due for removal Aug 2025       Chronic tonsillitis 05/13/2020     Priority: Medium     Added automatically from request for surgery [id number]       Allergic rhinitis 01/10/2012     Priority: Medium        Past Medical History:    Past Medical History:   Diagnosis Date     Amenorrhea, secondary        Past Surgical History:    Past Surgical History:   Procedure Laterality Date     NO HISTORY OF SURGERY         Family History:    Family History   Problem Relation Age of Onset     Anemia Mother         unknown reason     No Known Problems Father      Asthma Sister      Back Pain Maternal Grandmother      Alzheimer Disease Maternal Grandfather      No Known Problems Paternal Grandmother      Cancer Paternal Grandfather         pancreatic     Skin Cancer Paternal Great-Grandfather        Social History:  Marital Status:  Single [1]  Social History     Tobacco Use     Smoking status: Never     Smokeless tobacco: Never   Vaping Use     Vaping status: Never Used   Substance Use Topics     Alcohol use: Yes     Comment: socially     Drug use: No     "    Medications:    oxyCODONE (ROXICODONE) 5 MG tablet  albuterol (PROAIR HFA/PROVENTIL HFA/VENTOLIN HFA) 108 (90 Base) MCG/ACT inhaler  Biotin 10 MG CAPS  cetirizine (ZYRTEC) 10 MG tablet  fluconazole (DIFLUCAN) 150 MG tablet  fluticasone (FLONASE) 50 MCG/ACT nasal spray  levonorgestrel (KYLEENA) 19.5 MG IUD  vitamin D3 (CHOLECALCIFEROL) 125 MCG (5000 UT) tablet          Review of Systems   All other systems reviewed and are negative.      Physical Exam   BP: 129/73  Pulse: 73  Temp: 98.6  F (37  C)  Resp: 18  Height: 175.3 cm (5' 9\")  Weight: 106.6 kg (235 lb)  SpO2: 100 %      Physical Exam  Vitals and nursing note reviewed.   Constitutional:       General: She is not in acute distress.     Appearance: Normal appearance. She is well-developed.      Comments: Patient appears to be in pain   HENT:      Head: Normocephalic and atraumatic.   Eyes:      General: No scleral icterus.     Conjunctiva/sclera: Conjunctivae normal.   Cardiovascular:      Rate and Rhythm: Normal rate.   Pulmonary:      Effort: Pulmonary effort is normal. No respiratory distress.   Abdominal:      General: Abdomen is flat.   Musculoskeletal:         General: No deformity.      Cervical back: Normal range of motion and neck supple.      Comments: Tenderness palpation over the left lateral rib cage posteriorly.  Pain made worse by twisting and turning therefore limited range of motion   Skin:     General: Skin is warm and dry.      Findings: No rash.   Neurological:      General: No focal deficit present.      Mental Status: She is alert and oriented to person, place, and time.         ED Course                 Procedures             Results for orders placed or performed during the hospital encounter of 05/19/23 (from the past 24 hour(s))   Ribs XR, unilat 3 views + PA chest,  left    Narrative    EXAM: XR RIBS AND CHEST LEFT 3 VIEWS  LOCATION: [facility]  DATE/TIME: 5/19/2023 6:12 PM CDT    INDICATION: rib pain " lower posterior left  COMPARISON: None.      Impression    IMPRESSION: The visualized heart and lungs are negative. Indeterminate chronicity fracture left sixth rib appears likely healed.       Medications   HYDROmorphone (DILAUDID) injection 1 mg (1 mg Intramuscular $Given 5/19/23 5400)   ketorolac (TORADOL) injection 30 mg (30 mg Intramuscular $Given 5/19/23 6030)       Assessments & Plan (with Medical Decision Making)  22-year-old female with acute onset of sharp left-sided posterior rib pain after jumping while dancing.  She felt a pop.  The area of uncertain chronicity rib fracture over the sixth rib is consistent with the location of her pain.  Most likely this is an acute rib fracture.  She has no history of rib fractures in the past.  The patient was given IM Dilaudid and Toradol with excellent resolution of her pain.  She was discharged home in stable condition with a prescription for oxycodone.  Return to ER precautions and fall precautions and driving precautions discussed.  All questions answered prior to discharge.     I have reviewed the nursing notes.    I have reviewed the findings, diagnosis, plan and need for follow up with the patient.          Medical Decision Making  The patient's presentation was of moderate complexity (an acute illness with systemic symptoms).    The patient's evaluation involved:  ordering and/or review of 1+ test(s) in this encounter (see separate area of note for details)    The patient's management necessitated moderate risk (prescription drug management including medications given in the ED).          Discharge Medication List as of 5/19/2023  7:01 PM      START taking these medications    Details   oxyCODONE (ROXICODONE) 5 MG tablet Take 1 tablet (5 mg) by mouth every 6 hours as needed for pain, Disp-12 tablet, R-0, E-Prescribe             Final diagnoses:   Closed fracture of one rib of left side, initial encounter       5/19/2023   [facility] EMERGENCY  Dustin Ayers MD  05/19/23 9028

## 2023-05-22 ENCOUNTER — VIRTUAL VISIT (OUTPATIENT)
Dept: PSYCHOLOGY | Facility: CLINIC | Age: 23
End: 2023-05-22
Payer: COMMERCIAL

## 2023-05-22 DIAGNOSIS — F41.1 GENERALIZED ANXIETY DISORDER: Primary | ICD-10-CM

## 2023-05-22 PROCEDURE — 90834 PSYTX W PT 45 MINUTES: CPT | Mod: VID | Performed by: COUNSELOR

## 2023-05-22 NOTE — PROGRESS NOTES
[facility]                                     Progress Note    Patient Name: Batool Louis  Date: 5/22/23         Service Type: Individual      Session Start Time: 8:30am  Session End Time: 9:20am     Session Length: 50    Session #: 53    Attendees: Client    Service Modality:  Video Visit:      Provider verified identity through the following two step process.  Patient provided:  Patient is known previously to provider    Telemedicine Visit: The patient's condition can be safely assessed and treated via synchronous audio and visual telemedicine encounter.      Reason for Telemedicine Visit: Services only offered telehealth    Originating Site (Patient Location): Patient's home    Distant Site (Provider Location): Provider Remote Setting- Home Office    Consent:  The patient/guardian has verbally consented to: the potential risks and benefits of telemedicine (video visit) versus in person care; bill my insurance or make self-payment for services provided; and responsibility for payment of non-covered services.     Patient would like the video invitation sent by:  Send to e-mail at: [email or web address]    Mode of Communication:  Video Conference via [employer]well    As the provider I attest to compliance with applicable laws and regulations related to telemedicine.    DATA  Interactive Complexity: No  Crisis: No        Progress Since Last Session (Related to Symptoms / Goals / Homework):   Symptoms: No change .    Homework: Achieved / completed to satisfaction      Episode of Care Goals: Satisfactory progress - ACTION (Actively working towards change); Intervened by reinforcing change plan / affirming steps taken     Current / Ongoing Stressors and Concerns:  The client stated two days ago she fractured her 6th rib while dancing. She stated she had a breakdown while in the ER about her life.      Treatment Objective(s) Addressed in This Session:   use thought-stopping strategy daily to  reduce intrusive thoughts       Intervention:   Processed her thoughts regarding her relationship and recent events that have happened with him. Talked about how she's feeling about herself; she stated she doesn't feel like she's her best self right now.  She stated she's also been contemplating her career due to the amount of stress and lack of help. Talked about how to manage her feeling burntout.     Assessments completed prior to visit:  The following assessments were completed by patient for this visit:  PHQ9:       8/13/2018     9:26 AM 3/12/2020     3:07 PM 4/30/2020    11:50 AM 5/22/2020     3:25 PM 7/10/2020     9:57 AM 2/1/2021     8:45 AM 3/21/2022     9:28 AM   PHQ-9 SCORE   PHQ-9 Total Score MyChart 7 (Mild depression)   15 (Moderately severe depression)  4 (Minimal depression)    PHQ-9 Total Score 7 10 6 15 14 4 8     GAD7:       3/1/2018     7:08 AM 6/11/2018    10:28 AM 8/13/2018     9:27 AM 3/12/2020     3:07 PM 5/22/2020     3:24 PM 2/1/2021     8:45 AM 11/14/2022     9:36 AM   SHELBY-7 SCORE   Total Score   6 (mild anxiety)  16 (severe anxiety) 10 (moderate anxiety) 16 (severe anxiety)   Total Score 5 4 6 7 16 10 16     PROMIS 10-Global Health (all questions and answers displayed):       12/21/2021    10:06 AM 11/14/2022     9:37 AM 2/13/2023    10:23 AM 2/27/2023    10:07 AM   PROMIS 10   In general, would you say your health is: Very good Fair Fair Fair   In general, would you say your quality of life is: Very good Good Good Good   In general, how would you rate your physical health? Very good Good Fair Fair   In general, how would you rate your mental health, including your mood and your ability to think? Very good Fair Fair Good   In general, how would you rate your satisfaction with your social activities and relationships? Very good Fair Fair Fair   In general, please rate how well you carry out your usual social activities and roles Very good Fair Good Fair   To what extent are you able to  carry out your everyday physical activities such as walking, climbing stairs, carrying groceries, or moving a chair? Completely Completely Completely Completely   In the past 7 days, how often have you been bothered by emotional problems such as feeling anxious, depressed, or irritable? Rarely Often Sometimes Often   In the past 7 days, how would you rate your fatigue on average? Mild Moderate Mild Mild   In the past 7 days, how would you rate your pain on average, where 0 means no pain, and 10 means worst imaginable pain? 1 2 3 0   In general, would you say your health is: 4 2 2 2   In general, would you say your quality of life is: 4 3 3 3   In general, how would you rate your physical health? 4 3 2 2   In general, how would you rate your mental health, including your mood and your ability to think? 4 2 2 3   In general, how would you rate your satisfaction with your social activities and relationships? 4 2 2 2   In general, please rate how well you carry out your usual social activities and roles. (This includes activities at home, at work and in your community, and responsibilities as a parent, child, spouse, employee, friend, etc.) 4 2 3 2   To what extent are you able to carry out your everyday physical activities such as walking, climbing stairs, carrying groceries, or moving a chair? 5 5 5 5   In the past 7 days, how often have you been bothered by emotional problems such as feeling anxious, depressed, or irritable? 2 4 3 4   In the past 7 days, how would you rate your fatigue on average? 2 3 2 2   In the past 7 days, how would you rate your pain on average, where 0 means no pain, and 10 means worst imaginable pain? 1 2 3 0   Global Mental Health Score 16 9 10 10   Global Physical Health Score 17 15 15 16   PROMIS TOTAL - SUBSCORES 33 24 25 26         ASSESSMENT: Current Emotional / Mental Status (status of significant symptoms):   Risk status (Self / Other harm or suicidal ideation)   Patient denies current  fears or concerns for personal safety.   Patient denies current or recent suicidal ideation or behaviors.   Patient denies current or recent homicidal ideation or behaviors.   Patient denies current or recent self injurious behavior or ideation.   Patient denies other safety concerns.   Patient reports there has been no change in risk factors since their last session.     Patient reports there has been no change in protective factors since their last session.     Recommended that patient call 911 or go to the local ED should there be a change in any of these risk factors.     Appearance:   Appropriate    Eye Contact:   Good    Psychomotor Behavior: Normal    Attitude:   Cooperative    Orientation:   All   Speech    Rate / Production: Normal/ Responsive    Volume:  Normal    Mood:    Normal   Affect:    Appropriate    Thought Content:  Clear    Thought Form:  Coherent  Logical    Insight:    Good      Medication Review:   No current psychiatric medications prescribed     Medication Compliance:   NA     Changes in Health Issues:   None reported     Chemical Use Review:   Substance Use: Chemical use reviewed, no active concerns identified      Tobacco Use: No current tobacco use.      Diagnosis:  1. Generalized anxiety disorder        Collateral Reports Completed:   Not Applicable    PLAN: (Patient Tasks / Therapist Tasks / Other)  Client to make time for self care and self reflection.         [facility]                                                         ______________________________________________________________________    Individual Treatment Plan    Patient's Name: Batool Louis  YOB: 2000    Date of Creation: 3/23/22  Date Treatment Plan Last Reviewed/Revised: 4/24/23    DSM5 Diagnoses: 300.02 (F41.1) Generalized Anxiety Disorder  Psychosocial / Contextual Factors: college student, living at home, family conflict  PROMIS (reviewed every 90 days):     Referral /  Collaboration:  Referral to another professional/service is not indicated at this time..    Anticipated number of session for this episode of care: on-going  Anticipation frequency of session: Every other week  Anticipated Duration of each session: 38-52 minutes  Treatment plan will be reviewed in 90 days or when goals have been changed.       MeasurableTreatment Goal(s) related to diagnosis / functional impairment(s)    MeasurableTreatment Goal(s) related to diagnosis / functional impairment(s)  Goal 1: Client will increase emotion regulation skills/decrease panic attacks    Objective #A (Client Action)    Client will identify 2-4 fears / thoughts that contribute to feeling anxious  use positive self-affirmations daily.  Status: Continued - Date(s): 4/24/23    Intervention(s)  Therapist will teach emotional regulation skills. distress tolerance skills, interpersonal effectiveness skills, emotion regluation skills, mindfulness skills, radical acceptance. Therapist will teach client how to ID body cues for anxiety, anxiety reduction techniques, how to ID triggers for depression and anxiety- decrease reactivity/eliminate, lifestyle changes to reduce depression and anxiety, communication skills, explore cognitive beliefs and help client to develop healthy cognitive patterns and beliefs.      Objective #B  Client will use thought-stopping strategy daily to reduce intrusive thoughts.  Status: Continued - Date(s): 4/24/23    Intervention(s)  Therapist will teach emotional regulation skills. distress tolerance skills, interpersonal effectiveness skills, emotion regluation skills, mindfulness skills, radical acceptance. Therapist will teach client how to ID body cues for anxiety, anxiety reduction techniques, how to ID triggers for depression and anxiety- decrease reactivity/eliminate, lifestyle changes to reduce depression and anxiety, communication skills, explore cognitive beliefs and help client to develop healthy  "cognitive patterns and beliefs.    Objective #C (Client Action)    Status: Continued - Date: 4/24/23    Client will use \"worry time\" each day for 15 minutes of scheduled worry and then defer obsessive or anxious thinking until the next structured worry time.    Intervention(s)  Therapist will teach emotional regulation skills. work through trauma using somatic experiencing techniques to discharge the anxiety.    Goal 2: Client will work on increasing self esteem and self worth     Objective #A (Client Action)    Status: Continued - Date(s): 4/24/23    Client will identify two areas of life that you would like to have improved functioning.    Intervention(s)  Therapist will teach emotional regulation skills. work through trauma using somatic experiencing techniques to discharge the anxiety.      Client has reviewed and agreed to the above plan.      JAGDEEP Camacho    "

## 2023-06-01 ENCOUNTER — HEALTH MAINTENANCE LETTER (OUTPATIENT)
Age: 23
End: 2023-06-01

## 2023-07-03 ENCOUNTER — HOSPITAL ENCOUNTER (EMERGENCY)
Facility: CLINIC | Age: 23
Discharge: OTHER INSTITUTION | End: 2023-07-04
Attending: PHYSICIAN ASSISTANT | Admitting: PHYSICIAN ASSISTANT
Payer: COMMERCIAL

## 2023-07-03 DIAGNOSIS — F41.8 DEPRESSION WITH ANXIETY: ICD-10-CM

## 2023-07-03 DIAGNOSIS — R45.851 SUICIDAL IDEATION: ICD-10-CM

## 2023-07-03 LAB
ALBUMIN SERPL BCG-MCNC: 4.8 G/DL (ref 3.5–5.2)
ALP SERPL-CCNC: 81 U/L (ref 35–104)
ALT SERPL W P-5'-P-CCNC: 53 U/L (ref 0–50)
AMPHETAMINES UR QL: NOT DETECTED
ANION GAP SERPL CALCULATED.3IONS-SCNC: 13 MMOL/L (ref 7–15)
APAP SERPL-MCNC: <5 UG/ML (ref 10–30)
APTT PPP: 28 SECONDS (ref 22–38)
AST SERPL W P-5'-P-CCNC: 29 U/L (ref 0–45)
BARBITURATES UR QL SCN: NOT DETECTED
BASOPHILS # BLD AUTO: 0.1 10E3/UL (ref 0–0.2)
BASOPHILS NFR BLD AUTO: 1 %
BENZODIAZ UR QL SCN: NOT DETECTED
BILIRUB SERPL-MCNC: 0.6 MG/DL
BUN SERPL-MCNC: 13.1 MG/DL (ref 6–20)
BUPRENORPHINE UR QL: NOT DETECTED
CALCIUM SERPL-MCNC: 9.7 MG/DL (ref 8.6–10)
CANNABINOIDS UR QL: NOT DETECTED
CHLORIDE SERPL-SCNC: 100 MMOL/L (ref 98–107)
COCAINE UR QL SCN: NOT DETECTED
CREAT SERPL-MCNC: 0.75 MG/DL (ref 0.51–0.95)
D-METHAMPHET UR QL: NOT DETECTED
DEPRECATED HCO3 PLAS-SCNC: 25 MMOL/L (ref 22–29)
EOSINOPHIL # BLD AUTO: 0.1 10E3/UL (ref 0–0.7)
EOSINOPHIL NFR BLD AUTO: 2 %
ERYTHROCYTE [DISTWIDTH] IN BLOOD BY AUTOMATED COUNT: 13.1 % (ref 10–15)
ETHANOL SERPL-MCNC: <0.01 G/DL
GFR SERPL CREATININE-BSD FRML MDRD: >90 ML/MIN/1.73M2
GLUCOSE SERPL-MCNC: 86 MG/DL (ref 70–99)
HCG UR QL: NEGATIVE
HCT VFR BLD AUTO: 42.4 % (ref 35–47)
HGB BLD-MCNC: 13.7 G/DL (ref 11.7–15.7)
IMM GRANULOCYTES # BLD: 0 10E3/UL
IMM GRANULOCYTES NFR BLD: 0 %
INR PPP: 0.99 (ref 0.85–1.15)
LYMPHOCYTES # BLD AUTO: 3.2 10E3/UL (ref 0.8–5.3)
LYMPHOCYTES NFR BLD AUTO: 40 %
MCH RBC QN AUTO: 28.2 PG (ref 26.5–33)
MCHC RBC AUTO-ENTMCNC: 32.3 G/DL (ref 31.5–36.5)
MCV RBC AUTO: 87 FL (ref 78–100)
METHADONE UR QL SCN: NOT DETECTED
MONOCYTES # BLD AUTO: 0.5 10E3/UL (ref 0–1.3)
MONOCYTES NFR BLD AUTO: 7 %
NEUTROPHILS # BLD AUTO: 4.2 10E3/UL (ref 1.6–8.3)
NEUTROPHILS NFR BLD AUTO: 50 %
NRBC # BLD AUTO: 0 10E3/UL
NRBC BLD AUTO-RTO: 0 /100
OPIATES UR QL SCN: NOT DETECTED
OXYCODONE UR QL SCN: NOT DETECTED
PCP UR QL SCN: NOT DETECTED
PLATELET # BLD AUTO: 270 10E3/UL (ref 150–450)
POTASSIUM SERPL-SCNC: 3.9 MMOL/L (ref 3.4–5.3)
PROPOXYPH UR QL: NOT DETECTED
PROT SERPL-MCNC: 8.1 G/DL (ref 6.4–8.3)
RBC # BLD AUTO: 4.86 10E6/UL (ref 3.8–5.2)
SALICYLATES SERPL-MCNC: <0.3 MG/DL
SARS-COV-2 RNA RESP QL NAA+PROBE: NEGATIVE
SODIUM SERPL-SCNC: 138 MMOL/L (ref 136–145)
TRICYCLICS UR QL SCN: NOT DETECTED
TSH SERPL DL<=0.005 MIU/L-ACNC: 2.77 UIU/ML (ref 0.3–4.2)
WBC # BLD AUTO: 8.1 10E3/UL (ref 4–11)

## 2023-07-03 PROCEDURE — 36415 COLL VENOUS BLD VENIPUNCTURE: CPT | Performed by: PHYSICIAN ASSISTANT

## 2023-07-03 PROCEDURE — 80306 DRUG TEST PRSMV INSTRMNT: CPT | Performed by: PHYSICIAN ASSISTANT

## 2023-07-03 PROCEDURE — 99285 EMERGENCY DEPT VISIT HI MDM: CPT | Mod: 25 | Performed by: PHYSICIAN ASSISTANT

## 2023-07-03 PROCEDURE — 99285 EMERGENCY DEPT VISIT HI MDM: CPT | Performed by: PHYSICIAN ASSISTANT

## 2023-07-03 PROCEDURE — 250N000013 HC RX MED GY IP 250 OP 250 PS 637: Performed by: PHYSICIAN ASSISTANT

## 2023-07-03 PROCEDURE — 85004 AUTOMATED DIFF WBC COUNT: CPT | Performed by: PHYSICIAN ASSISTANT

## 2023-07-03 PROCEDURE — 80143 DRUG ASSAY ACETAMINOPHEN: CPT | Performed by: PHYSICIAN ASSISTANT

## 2023-07-03 PROCEDURE — 85610 PROTHROMBIN TIME: CPT | Performed by: PHYSICIAN ASSISTANT

## 2023-07-03 PROCEDURE — 81025 URINE PREGNANCY TEST: CPT | Performed by: PHYSICIAN ASSISTANT

## 2023-07-03 PROCEDURE — 80179 DRUG ASSAY SALICYLATE: CPT | Performed by: PHYSICIAN ASSISTANT

## 2023-07-03 PROCEDURE — 85730 THROMBOPLASTIN TIME PARTIAL: CPT | Performed by: PHYSICIAN ASSISTANT

## 2023-07-03 PROCEDURE — 80053 COMPREHEN METABOLIC PANEL: CPT | Performed by: PHYSICIAN ASSISTANT

## 2023-07-03 PROCEDURE — 90791 PSYCH DIAGNOSTIC EVALUATION: CPT

## 2023-07-03 PROCEDURE — 82077 ASSAY SPEC XCP UR&BREATH IA: CPT | Performed by: PHYSICIAN ASSISTANT

## 2023-07-03 PROCEDURE — 84443 ASSAY THYROID STIM HORMONE: CPT | Performed by: PHYSICIAN ASSISTANT

## 2023-07-03 PROCEDURE — 87635 SARS-COV-2 COVID-19 AMP PRB: CPT | Performed by: PHYSICIAN ASSISTANT

## 2023-07-03 RX ORDER — LORAZEPAM 1 MG/1
1 TABLET ORAL ONCE
Status: COMPLETED | OUTPATIENT
Start: 2023-07-03 | End: 2023-07-03

## 2023-07-03 RX ORDER — LORAZEPAM 1 MG/1
1 TABLET ORAL ONCE
Status: COMPLETED | OUTPATIENT
Start: 2023-07-03 | End: 2023-07-04

## 2023-07-03 RX ADMIN — LORAZEPAM 1 MG: 1 TABLET ORAL at 16:15

## 2023-07-03 ASSESSMENT — COLUMBIA-SUICIDE SEVERITY RATING SCALE - C-SSRS
1. HAVE YOU WISHED YOU WERE DEAD OR WISHED YOU COULD GO TO SLEEP AND NOT WAKE UP?: YES
TOTAL  NUMBER OF INTERRUPTED ATTEMPTS LIFETIME: NO
ATTEMPT LIFETIME: NO
1. IN THE PAST MONTH, HAVE YOU WISHED YOU WERE DEAD OR WISHED YOU COULD GO TO SLEEP AND NOT WAKE UP?: YES
REASONS FOR IDEATION PAST MONTH: MOSTLY TO END OR STOP THE PAIN (YOU COULDN'T GO ON LIVING WITH THE PAIN OR HOW YOU WERE FEELING)
2. HAVE YOU ACTUALLY HAD ANY THOUGHTS OF KILLING YOURSELF?: NO
TOTAL  NUMBER OF ABORTED OR SELF INTERRUPTED ATTEMPTS LIFETIME: NO

## 2023-07-03 ASSESSMENT — ACTIVITIES OF DAILY LIVING (ADL)
ADLS_ACUITY_SCORE: 35

## 2023-07-03 NOTE — ED NOTES
IP MH Referral Acuity Rating Score (RARS)    LMHP complete at referral to IP MH, with [facility]; and, daily while awaiting IP MH placement. Call score to PPS.  CRITERIA SCORING   New 72 HH and Involuntary for IP MH (not adolescent) 0/1   Boarding over 24 hours 0/1   Vulnerable adult at least 55+ with multiple co morbidities; or, Patient age 11 or under 0/1   Suicide ideation without relief of precipitating factors 1/1   Current plan for suicide 1/1   Current plan for homicide 0/1   Imminent risk or actual attempt to seriously harm another without relief of factors precipitating the attempt 1/1   Severe dysfunction in daily living (ex: complete neglect for self care, extreme disruption in vegetative function, extreme deterioration in social interactions) 1/1   Recent (last 2 weeks) or current physical aggression in the ED 0/1   Restraints or seclusion episode in ED 0/1   Verbal aggression, agitation, yelling, etc., while in the ED 0/1   Active psychosis with psychomotor agitation or catatonia 0/1   Need for constant or near constant redirection (from leaving, from others, etc).  0/1   Intrusive or disruptive behaviors 0/1   TOTAL Acuity Total Score: 2
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
## 2023-07-03 NOTE — ED NOTES
PT presents in the ED presents with anxiety. With history of anxiety over 5 years ago, and states that she was taken off her  her lexapro many years ago because it made her sick (She was taking this for 6 months).

## 2023-07-03 NOTE — PLAN OF CARE
Batool Louis  July 3, 2023  Plan of Care Hand-off Note     Patient Care Path: Inpatient Mental Health    Plan for Care:     Patient presented to the ED due to increased symptoms of depression, anxiety and suicidal ideation. Patient reports feeling hopeless and helpless. Patient reports she has been thinking about left over pain medicine she has at home. Patient continues to reports suicidal ideations and denies taking psych medications. She denies having healthy coping skills. Patient is unable to contract for safety and states she wants to get help. Patient denies having current outpatient mental health providers.    Due to the patient's increased symptoms of depression, anxiety, hopelessness and suicidal ideations,  This  is recommending inpatient psychiatric placement for further evaluation and stabilization.     Critical Safety Issues: Suicidal ideations. Has thought about overdosing on oxycodone. Patient has 10 tablets at home.     Overview:  This patient is a child/adolescent: No    This patient has additional special visitor precautions: No    Legal Status: Voluntary    Contacts:   Stefanie (Mother)- [phone]  Arcadio (Father)- [phone]    Psychiatry Consult:  Psychiatry Consult not requested because Referred to inpatient psych    Updated Attending Provider regarding plan of care.    BARRIE Meehan

## 2023-07-03 NOTE — MEDICATION SCRIBE - ADMISSION MEDICATION HISTORY
Medication Scribe Admission Medication History    Admission medication history is complete. The information provided in this note is only as accurate as the sources available at the time of the update.    Medication reconciliation/reorder completed by provider prior to medication history? No    Information Source(s): Patient via in-person    Pertinent Information: n/a    Changes made to PTA medication list:    Added: None    Deleted: albuterol inh, biotin, diflucan, flonase, D3    Changed: cetirizine changed to prn    Medication Affordability:  Not including over the counter (OTC) medications, was there a time in the past 3 months when you did not take your medications as prescribed because of cost?: No    Allergies reviewed with patient and updates made in EHR: yes    Medication History Completed By: HENRY CASTANEDA 7/3/2023 5:14 PM    Prior to Admission medications    Medication Sig Last Dose Taking? Auth Provider Long Term End Date   cetirizine (ZYRTEC) 10 MG tablet Take 10 mg by mouth daily as needed for allergies seasonal More than a month at unkn Yes Reported, Patient     levonorgestrel (KYLEENA) 19.5 MG IUD 1 each by Intrauterine route once Put in 8/12/2020  at current Yes Reported, Patient

## 2023-07-03 NOTE — ED PROVIDER NOTES
"  History     Chief Complaint   Patient presents with     Anxiety     HPI  Batool Louis is a 22 year old female who presents with her mother and father for evaluation of depression and anxiety.  Symptoms have been ongoing for many years.  Much worse in the past 1 year.  Previous to this, she felt like she was coping well.  5-6 years ago she was on Lexapro for 6 months or so.  She was having frequent URIs and did not feel well on the medication.  Her anxiety was improved but she still felt down and out of the medication.  She stopped the medication and stopped having side effects.  Her symptoms have been worse especially the past few weeks.  She states, \"I feel like I cannot handle what is around me.  I feel like someone is going to attack me.  \"She is having a really hard time sleeping.  She feels down and out and depressed.  Very anxious.  She states that she does feel paranoid that something bad will happen.  She feels guilty and remorseful for things that happened during the day.  She had a stressful event happen yesterday as she had a mutual break-up with her boyfriend.  They have been dating for a year.  She lives with her parents.  She feels like she has good support at home.  She only uses alcohol socially.  Last EtOH intake was last night with 2 drinks.  No street drug use.  She denies any recent OD or suicide attempt.  She does admit to suicidal ideation.  She states, \"I feel like the world is ending.  \"She has had thoughts of ending her life.  She does have leftover pain meds from her rib injury a month ago and she did have thoughts of taking all of the pain meds hoping that she would not wake up.  She states, \"I do not feel safe.  \"Unsure of her LMP.  She has had very irregular menses recently.  She does not think that she is pregnant.  Grandmother did have an admission to geriatric psychiatry for 4 weeks recently and is doing much better.  Parents wonder if this is on her mind as well.  Patient " denies any URI symptoms.  No chest or abdominal pain.  No lower extremity edema or calf pain.  No recent fever or chills.    Allergies:  Allergies   Allergen Reactions     Seasonal Allergies Other (See Comments)     Nasal congestion     Sulfamethoxazole-Trimethoprim Hives       Problem List:    Patient Active Problem List    Diagnosis Date Noted     Shellfish allergy 07/21/2021     Priority: Medium     IUD (intrauterine device) in place 08/12/2020     Priority: Medium     Kyleena placed 8/12/2020 for oligomenorrhea.  Due for removal Aug 2025       Chronic tonsillitis 05/13/2020     Priority: Medium     Added automatically from request for surgery [id number]       Allergic rhinitis 01/10/2012     Priority: Medium        Past Medical History:    Past Medical History:   Diagnosis Date     Amenorrhea, secondary        Past Surgical History:    Past Surgical History:   Procedure Laterality Date     NO HISTORY OF SURGERY         Family History:    Family History   Problem Relation Age of Onset     Anemia Mother         unknown reason     No Known Problems Father      Asthma Sister      Back Pain Maternal Grandmother      Alzheimer Disease Maternal Grandfather      No Known Problems Paternal Grandmother      Cancer Paternal Grandfather         pancreatic     Skin Cancer Paternal Great-Grandfather        Social History:  Marital Status:  Single [1]  Social History     Tobacco Use     Smoking status: Never     Smokeless tobacco: Never   Vaping Use     Vaping Use: Never used   Substance Use Topics     Alcohol use: Yes     Comment: socially     Drug use: No        Medications:    cetirizine (ZYRTEC) 10 MG tablet  levonorgestrel (KYLEENA) 19.5 MG IUD          Review of Systems   All other systems reviewed and are negative.      Physical Exam   BP: (!) 140/107  Pulse: (!) 122  Temp: 98.7  F (37.1  C)  Resp: 16  SpO2: 99 %      Physical Exam  Vitals and nursing note reviewed.   Constitutional:       General: She is not in acute  distress.     Appearance: She is not ill-appearing or diaphoretic.      Comments: tearful   HENT:      Head: Normocephalic and atraumatic.      Right Ear: Tympanic membrane, ear canal and external ear normal.      Left Ear: Tympanic membrane, ear canal and external ear normal.      Nose: Nose normal.      Mouth/Throat:      Pharynx: No oropharyngeal exudate.   Eyes:      General: No scleral icterus.        Right eye: No discharge.         Left eye: No discharge.      Conjunctiva/sclera: Conjunctivae normal.      Pupils: Pupils are equal, round, and reactive to light.   Neck:      Thyroid: No thyromegaly.   Cardiovascular:      Rate and Rhythm: Normal rate and regular rhythm.      Heart sounds: Normal heart sounds. No murmur heard.  Pulmonary:      Effort: Pulmonary effort is normal. No respiratory distress.      Breath sounds: Normal breath sounds. No wheezing or rales.   Chest:      Chest wall: No tenderness.   Abdominal:      General: Bowel sounds are normal. There is no distension.      Palpations: Abdomen is soft. There is no mass.      Tenderness: There is no abdominal tenderness. There is no guarding or rebound.   Musculoskeletal:         General: No tenderness or deformity. Normal range of motion.      Cervical back: Normal range of motion and neck supple.   Lymphadenopathy:      Cervical: No cervical adenopathy.   Skin:     General: Skin is warm and dry.      Capillary Refill: Capillary refill takes less than 2 seconds.      Findings: No erythema or rash.   Neurological:      General: No focal deficit present.      Mental Status: She is alert and oriented to person, place, and time.      Cranial Nerves: No cranial nerve deficit.      Sensory: No sensory deficit.      Motor: No weakness.      Coordination: Coordination normal.      Gait: Gait normal.      Deep Tendon Reflexes: Reflexes normal.   Psychiatric:         Attention and Perception: Attention and perception normal.         Mood and Affect: Mood is  depressed. Affect is tearful.         Speech: Speech normal.         Behavior: Behavior normal.         Thought Content: Thought content is not paranoid. Thought content includes suicidal ideation. Thought content does not include homicidal or suicidal plan.         Cognition and Memory: Cognition and memory normal.         Judgment: Judgment is impulsive.         ED Course       Suicide assessment completed by mental health (D.E.C., LCSW, etc.)       Procedures              Critical Care time:  none               Results for orders placed or performed during the hospital encounter of 07/03/23 (from the past 24 hour(s))   CBC with platelets differential    Narrative    The following orders were created for panel order CBC with platelets differential.  Procedure                               Abnormality         Status                     ---------                               -----------         ------                     CBC with platelets and d...[[id number]]                      Final result                 Please view results for these tests on the individual orders.   Comprehensive metabolic panel   Result Value Ref Range    Sodium 138 136 - 145 mmol/L    Potassium 3.9 3.4 - 5.3 mmol/L    Chloride 100 98 - 107 mmol/L    Carbon Dioxide (CO2) 25 22 - 29 mmol/L    Anion Gap 13 7 - 15 mmol/L    Urea Nitrogen 13.1 6.0 - 20.0 mg/dL    Creatinine 0.75 0.51 - 0.95 mg/dL    Calcium 9.7 8.6 - 10.0 mg/dL    Glucose 86 70 - 99 mg/dL    Alkaline Phosphatase 81 35 - 104 U/L    AST 29 0 - 45 U/L    ALT 53 (H) 0 - 50 U/L    Protein Total 8.1 6.4 - 8.3 g/dL    Albumin 4.8 3.5 - 5.2 g/dL    Bilirubin Total 0.6 <=1.2 mg/dL    GFR Estimate >90 >60 mL/min/1.73m2   INR   Result Value Ref Range    INR 0.99 0.85 - 1.15   PTT   Result Value Ref Range    aPTT 28 22 - 38 Seconds   Alcohol ethyl   Result Value Ref Range    Alcohol ethyl <0.01 <=0.01 g/dL   Salicylate level   Result Value Ref Range    Salicylate <0.3   mg/dL   Acetaminophen  level   Result Value Ref Range    Acetaminophen <5.0 (L) 10.0 - 30.0 ug/mL   TSH   Result Value Ref Range    TSH 2.77 0.30 - 4.20 uIU/mL   CBC with platelets and differential   Result Value Ref Range    WBC Count 8.1 4.0 - 11.0 10e3/uL    RBC Count 4.86 3.80 - 5.20 10e6/uL    Hemoglobin 13.7 11.7 - 15.7 g/dL    Hematocrit 42.4 35.0 - 47.0 %    MCV 87 78 - 100 fL    MCH 28.2 26.5 - 33.0 pg    MCHC 32.3 31.5 - 36.5 g/dL    RDW 13.1 10.0 - 15.0 %    Platelet Count 270 150 - 450 10e3/uL    % Neutrophils 50 %    % Lymphocytes 40 %    % Monocytes 7 %    % Eosinophils 2 %    % Basophils 1 %    % Immature Granulocytes 0 %    NRBCs per 100 WBC 0 <1 /100    Absolute Neutrophils 4.2 1.6 - 8.3 10e3/uL    Absolute Lymphocytes 3.2 0.8 - 5.3 10e3/uL    Absolute Monocytes 0.5 0.0 - 1.3 10e3/uL    Absolute Eosinophils 0.1 0.0 - 0.7 10e3/uL    Absolute Basophils 0.1 0.0 - 0.2 10e3/uL    Absolute Immature Granulocytes 0.0 <=0.4 10e3/uL    Absolute NRBCs 0.0 10e3/uL   HCG qualitative urine   Result Value Ref Range    hCG Urine Qualitative Negative Negative   Urine Drugs of Abuse Screen    Narrative    The following orders were created for panel order Urine Drugs of Abuse Screen.  Procedure                               Abnormality         Status                     ---------                               -----------         ------                     Urine Drugs of Abuse Scr...[[id number]]  Normal              Final result                 Please view results for these tests on the individual orders.   Urine Drugs of Abuse Screen Panel 13   Result Value Ref Range    Cannabinoids (72-ona-0-carboxy-9-THC) Not Detected Not Detected, Indeterminate    Phencyclidine Not Detected Not Detected, Indeterminate    Cocaine (Benzoylecgonine) Not Detected Not Detected, Indeterminate    Methamphetamine (d-Methamphetamine) Not Detected Not Detected, Indeterminate    Opiates (Morphine) Not Detected Not Detected, Indeterminate    Amphetamine  (d-Amphetamine) Not Detected Not Detected, Indeterminate    Benzodiazepines (Nordiazepam) Not Detected Not Detected, Indeterminate    Tricyclic Antidepressants (Desipramine) Not Detected Not Detected, Indeterminate    Methadone Not Detected Not Detected, Indeterminate    Barbiturates (Butalbital) Not Detected Not Detected, Indeterminate    Oxycodone Not Detected Not Detected, Indeterminate    Propoxyphene (Norpropoxyphene) Not Detected Not Detected, Indeterminate    Buprenorphine Not Detected Not Detected, Indeterminate   Asymptomatic COVID-19 Virus (Coronavirus) by PCR Nose    Specimen: Nose; Swab   Result Value Ref Range    SARS CoV2 PCR Negative Negative    Narrative    Testing was performed using the Xpert Xpress SARS-CoV-2 Assay on the Cepheid Gene-Xpert Instrument Systems. Additional information about this Emergency Use Authorization (EUA) assay can be found via the Lab Guide. This test should be ordered for the detection of SARS-CoV-2 in individuals who meet SARS-CoV-2 clinical and/or epidemiological criteria as well as from individuals without symptoms or other reasons to suspect COVID-19. Test performance for asymptomatic patients has only been established in anterior nasal swab specimens. This test is for in vitro diagnostic use under the FDA EUA for laboratories certified under CLIA to perform high complexity testing. This test has not been FDA cleared or approved. A negative result does not rule out the presence of PCR inhibitors in the specimen or target RNA concentration below the limit of detection for the assay. The possibility of a false negative should be considered if the patient's recent exposure or clinical presentation suggests COVID-19. This test was validated by the [facility] and [facility]. This laboratory is certified under the Clinical Laboratory Improvement Amendments (CLIA) as qualified to perform high complexity laboratory testing.         Medications    LORazepam (ATIVAN) tablet 1 mg (1 mg Oral $Given 7/3/23 0459)       Assessments & Plan (with Medical Decision Making)     Depression with anxiety  Suicidal ideation     22 year old female presents for evaluation of depression and anxiety symptoms.  Worsening over the past year.  Stressful event with breaking up with her boyfriend yesterday.  She has had suicidal thoughts recently and considered overdosing on leftover pain medication.  She admits to not feeling safe currently.  Last EtOH use was last night with 2 drinks.  No EtOH today and she only uses this socially.  No street drug use.  Denies any chance of pregnancy.  See HPI above for details.  On exam blood pressure 140/107, temperature 98.7, pulse 122, respirations 16, oxygen saturation 99% on room air.  Patient is tearful.  Flat and depressed affect.  Neurologically intact.  Cardiopulmonary exam normal.  No sign of trauma.  Abdomen soft and nontender.  Remainder of the exam is otherwise normal.  For safety reasons, I did place her on a health officer hold given her suicidal statements.  We did have her assessed by the DEC  they evaluated the patient and felt that the patient required inpatient psychiatry evaluation.  Please see the DEC  note for further details.  Laboratory levels were all reassuring.  No acute abnormality noted.  COVID-negative.  Patient is medically clear at this time.  9:54 PM -at this time, beds are not available for transfer this evening.  Patient is stating that she is having return of anxiety and is concerned that she would not be able to sleep.  Therefore, we will redose the Ativan.  Dr. Tripp has kindly agreed to accept this patient's care at shift change.  Please see his note for further details.     I have reviewed the nursing notes.    I have reviewed the findings, diagnosis, plan and need for follow up with the patient.           Medical Decision Making  The patient's presentation was of high complexity (an  acute health issue posing potential threat to life or bodily function).    The patient's evaluation involved:  ordering and/or review of 3+ test(s) in this encounter (see separate area of note for details)    The patient's management necessitated high risk (a decision regarding hospitalization).        New Prescriptions    No medications on file       Final diagnoses:   Depression with anxiety   Suicidal ideation     Disclaimer: This note consists of symbols derived from keyboarding, dictation and/or voice recognition software. As a result, there may be errors in the script that have gone undetected. Please consider this when interpreting information found in this chart.        7/3/2023   [facility] EMERGENCY DEPT     Alan Rod PA-C  07/03/23 1826       Alan Rod PA-C  07/03/23 2155       Alan Rod PA-C  07/03/23 2227

## 2023-07-03 NOTE — ED TRIAGE NOTES
Pt presents with anxiety . Pt states was taken off her lexapro many years ago because it made her sick. Pt having anxiety attacks now. Mom states has been building for one year. Relationship ended. Pt working a ton. Pt is not suicidal today but has had thoughts in the last two weeks.      Triage Assessment       Row Name 07/03/23 1520       Triage Assessment (Adult)    Airway WDL WDL       Respiratory WDL    Respiratory WDL WDL       Skin Circulation/Temperature WDL    Skin Circulation/Temperature WDL WDL       Cardiac WDL    Cardiac WDL WDL       Peripheral/Neurovascular WDL    Peripheral Neurovascular WDL WDL       Cognitive/Neuro/Behavioral WDL    Cognitive/Neuro/Behavioral WDL WDL

## 2023-07-04 ENCOUNTER — HOSPITAL ENCOUNTER (INPATIENT)
Age: 23
End: 2023-07-04
Payer: COMMERCIAL

## 2023-07-04 ENCOUNTER — TELEPHONE (OUTPATIENT)
Dept: BEHAVIORAL HEALTH | Facility: CLINIC | Age: 23
End: 2023-07-04
Payer: COMMERCIAL

## 2023-07-04 ENCOUNTER — HOSPITAL ENCOUNTER (INPATIENT)
Facility: HOSPITAL | Age: 23
LOS: 8 days | Discharge: HOME | DRG: 885 | End: 2023-07-12
Attending: STUDENT IN AN ORGANIZED HEALTH CARE EDUCATION/TRAINING PROGRAM | Admitting: STUDENT IN AN ORGANIZED HEALTH CARE EDUCATION/TRAINING PROGRAM
Payer: COMMERCIAL

## 2023-07-04 VITALS
SYSTOLIC BLOOD PRESSURE: 117 MMHG | RESPIRATION RATE: 18 BRPM | OXYGEN SATURATION: 98 % | DIASTOLIC BLOOD PRESSURE: 66 MMHG | HEART RATE: 80 BPM | TEMPERATURE: 98.7 F

## 2023-07-04 DIAGNOSIS — F33.2 SEVERE RECURRENT MAJOR DEPRESSION WITHOUT PSYCHOTIC FEATURES (H): Primary | ICD-10-CM

## 2023-07-04 PROBLEM — R45.89 SUICIDAL BEHAVIOR: Status: ACTIVE | Noted: 2023-07-04

## 2023-07-04 PROCEDURE — 99223 1ST HOSP IP/OBS HIGH 75: CPT | Mod: AI | Performed by: PSYCHIATRY & NEUROLOGY

## 2023-07-04 PROCEDURE — 124N000001 HC R&B MH

## 2023-07-04 PROCEDURE — 250N000013 HC RX MED GY IP 250 OP 250 PS 637: Performed by: PSYCHIATRY & NEUROLOGY

## 2023-07-04 RX ORDER — OLANZAPINE 10 MG/1
10 TABLET ORAL 3 TIMES DAILY PRN
Status: DISCONTINUED | OUTPATIENT
Start: 2023-07-04 | End: 2023-07-12 | Stop reason: HOSPADM

## 2023-07-04 RX ORDER — CETIRIZINE HYDROCHLORIDE 5 MG/1
5 TABLET ORAL DAILY PRN
Status: DISCONTINUED | OUTPATIENT
Start: 2023-07-04 | End: 2023-07-12 | Stop reason: HOSPADM

## 2023-07-04 RX ORDER — POLYETHYLENE GLYCOL 3350 17 G/17G
17 POWDER, FOR SOLUTION ORAL DAILY PRN
Status: CANCELLED | OUTPATIENT
Start: 2023-07-04

## 2023-07-04 RX ORDER — TRAZODONE HYDROCHLORIDE 50 MG/1
50 TABLET, FILM COATED ORAL AT BEDTIME
Status: DISCONTINUED | OUTPATIENT
Start: 2023-07-04 | End: 2023-07-07

## 2023-07-04 RX ORDER — HYDROXYZINE HYDROCHLORIDE 25 MG/1
25 TABLET, FILM COATED ORAL EVERY 4 HOURS PRN
Status: DISCONTINUED | OUTPATIENT
Start: 2023-07-04 | End: 2023-07-12 | Stop reason: HOSPADM

## 2023-07-04 RX ORDER — NICOTINE 21 MG/24HR
1 PATCH, TRANSDERMAL 24 HOURS TRANSDERMAL DAILY
Status: DISCONTINUED | OUTPATIENT
Start: 2023-07-04 | End: 2023-07-04

## 2023-07-04 RX ORDER — LANOLIN ALCOHOL/MO/W.PET/CERES
3 CREAM (GRAM) TOPICAL
Status: DISCONTINUED | OUTPATIENT
Start: 2023-07-04 | End: 2023-07-12 | Stop reason: HOSPADM

## 2023-07-04 RX ORDER — TRAZODONE HYDROCHLORIDE 50 MG/1
50 TABLET, FILM COATED ORAL
Status: DISCONTINUED | OUTPATIENT
Start: 2023-07-04 | End: 2023-07-04

## 2023-07-04 RX ORDER — IBUPROFEN 600 MG/1
600 TABLET, FILM COATED ORAL EVERY 6 HOURS PRN
Status: DISCONTINUED | OUTPATIENT
Start: 2023-07-04 | End: 2023-07-04

## 2023-07-04 RX ORDER — OLANZAPINE 10 MG/2ML
10 INJECTION, POWDER, FOR SOLUTION INTRAMUSCULAR 3 TIMES DAILY PRN
Status: CANCELLED | OUTPATIENT
Start: 2023-07-04

## 2023-07-04 RX ORDER — OLANZAPINE 10 MG/2ML
10 INJECTION, POWDER, FOR SOLUTION INTRAMUSCULAR 3 TIMES DAILY PRN
Status: DISCONTINUED | OUTPATIENT
Start: 2023-07-04 | End: 2023-07-12 | Stop reason: HOSPADM

## 2023-07-04 RX ORDER — MAGNESIUM HYDROXIDE/ALUMINUM HYDROXICE/SIMETHICONE 120; 1200; 1200 MG/30ML; MG/30ML; MG/30ML
30 SUSPENSION ORAL EVERY 4 HOURS PRN
Status: DISCONTINUED | OUTPATIENT
Start: 2023-07-04 | End: 2023-07-12 | Stop reason: HOSPADM

## 2023-07-04 RX ORDER — MAGNESIUM HYDROXIDE/ALUMINUM HYDROXICE/SIMETHICONE 120; 1200; 1200 MG/30ML; MG/30ML; MG/30ML
30 SUSPENSION ORAL EVERY 4 HOURS PRN
Status: CANCELLED | OUTPATIENT
Start: 2023-07-04

## 2023-07-04 RX ORDER — HYDROXYZINE HYDROCHLORIDE 10 MG/1
25 TABLET, FILM COATED ORAL EVERY 4 HOURS PRN
Status: CANCELLED | OUTPATIENT
Start: 2023-07-04

## 2023-07-04 RX ORDER — ACETAMINOPHEN 325 MG/1
650 TABLET ORAL EVERY 4 HOURS PRN
Status: CANCELLED | OUTPATIENT
Start: 2023-07-04

## 2023-07-04 RX ORDER — AMOXICILLIN 250 MG
1 CAPSULE ORAL 2 TIMES DAILY PRN
Status: DISCONTINUED | OUTPATIENT
Start: 2023-07-04 | End: 2023-07-12 | Stop reason: HOSPADM

## 2023-07-04 RX ORDER — FLUOXETINE 10 MG/1
10 CAPSULE ORAL DAILY
Status: DISCONTINUED | OUTPATIENT
Start: 2023-07-04 | End: 2023-07-05

## 2023-07-04 RX ORDER — ACETAMINOPHEN 325 MG/1
650 TABLET ORAL EVERY 4 HOURS PRN
Status: DISCONTINUED | OUTPATIENT
Start: 2023-07-04 | End: 2023-07-12 | Stop reason: HOSPADM

## 2023-07-04 RX ORDER — CETIRIZINE HYDROCHLORIDE 5 MG/1
5 TABLET ORAL DAILY
Status: DISCONTINUED | OUTPATIENT
Start: 2023-07-04 | End: 2023-07-04

## 2023-07-04 RX ADMIN — TRAZODONE HYDROCHLORIDE 50 MG: 50 TABLET ORAL at 22:27

## 2023-07-04 RX ADMIN — FLUOXETINE 10 MG: 10 CAPSULE ORAL at 12:50

## 2023-07-04 ASSESSMENT — ACTIVITIES OF DAILY LIVING (ADL)
DRESS: SCRUBS (BEHAVIORAL HEALTH)
CONCENTRATING,_REMEMBERING_OR_MAKING_DECISIONS_DIFFICULTY: NO
ADLS_ACUITY_SCORE: 28
ADLS_ACUITY_SCORE: 28
DRESSING/BATHING_DIFFICULTY: NO
ADLS_ACUITY_SCORE: 35
ADLS_ACUITY_SCORE: 28
ADLS_ACUITY_SCORE: 28
ADLS_ACUITY_SCORE: 35
HYGIENE/GROOMING: INDEPENDENT
FALL_HISTORY_WITHIN_LAST_SIX_MONTHS: NO
WALKING_OR_CLIMBING_STAIRS_DIFFICULTY: NO
ADLS_ACUITY_SCORE: 28
LAUNDRY: UNABLE TO COMPLETE
WEAR_GLASSES_OR_BLIND: NO
TOILETING_ISSUES: NO
ADLS_ACUITY_SCORE: 28
ADLS_ACUITY_SCORE: 28
DOING_ERRANDS_INDEPENDENTLY_DIFFICULTY: NO
DIFFICULTY_EATING/SWALLOWING: NO
ORAL_HYGIENE: INDEPENDENT
CHANGE_IN_FUNCTIONAL_STATUS_SINCE_ONSET_OF_CURRENT_ILLNESS/INJURY: NO
ADLS_ACUITY_SCORE: 35

## 2023-07-04 NOTE — PROGRESS NOTES
07/04/23 0939   Patient Belongings   Did you bring any home meds/supplements to the hospital?  Yes   Disposition of meds  Sent to security/pharmacy per site process   Patient Belongings locker;sent to security per site process   Patient Belongings Put in Hospital Secure Location (Security or Locker, etc.) clothing;cash/credit card;cell phone/electronics;purse/wallet   Belongings Search Yes   Clothing Search Yes   Second Staff Lalita   Comment Orange scrubs, black pair pants, black shirt, grey pair sandals, orange purse, deoderant, 2 pens, 2 pencils, papers, card, nose vapor, 2 chapesticks, 12 erick pins, 2 hair ties, anklet bracelet, nail clipper, 7 laundry coins, recipets, 4 pictures, , black bra, blue pair of underwear.   List items sent to safe: Checkbook, [place] drivers ID, 2 Covid cards, [employer] card, social ID card, college ID, afa card, Kwik card, raul's card, speedy card, library card, ulta card, Iphone w/green case couple of scratches, holiday card, 4 debt cards, caribou card, Erik Ashutosh brown wallet, 94 cents Total, 3 nickels 1 quarter, 14 pennies.    All other belongings put in assigned cubby in belongings room.     Brought in on 7/6 were 3 books.... The Dmitry One, This never Happened,and Midnight Sun  I have reviewed my belongings list on admission and verify that it is correct.     Patient signature_______________________________    Second staff witness (if patient unable to sign) ______________________________       I have received all my belongings at discharge.    Patient signature________________________________    Zara moore  7/4/2023  9:43 AM

## 2023-07-04 NOTE — PLAN OF CARE
"           Problem: Adult Behavioral Health Plan of Care  Goal: Patient-Specific Goal (Individualization)  Description: Patient will sleep 6 to 8 hours per night  Patient will eat at least 50% of meals  Patient will attend at least 50% of groups  Patient will comply with recommendations of treatment team  Patient will remain medication compliant    Outcome: Progressing     Problem: Suicide Risk  Goal: Absence of Self-Harm  Description: Patient will remain free of self harm while on the unit.  Patient will be able to list 2 coping skills by discharge.  Patient will inform staff of suicidal thoughts while on the unit.   Outcome: Progressing   Goal Outcome Evaluation:    Plan of Care Reviewed With: patient        Per report, patient went to the ED for evaluation of anxiety and depression with her parents. She had been on Lexapro, but stopped taking it 6 months ago. She has been feeling paranoid that someone is going to attack her. She has also been experiencing difficulties sleeping. She just had a mutual break up with her boyfriend of 1 year. She had SI with no plan, but was considering taking some old pain killers from a previous rib injury, and doesn't feel safe.     ADMISSION NOTE    Reason for admission suicidal ideation, anxiety and depression.  Safety concerns none.  Risk for or history of violence none.   Full skin assessment: done, unremarkable.    Patient arrived on unit from [facility] accompanied by ambulance staff and security on 7/4/2023  08:50 AM.   Status on arrival: sad, tearful, cooperative.  /82   Pulse 85   Temp 96.8  F (36  C) (Tympanic)   Resp 16   Ht 1.727 m (5' 8\")   Wt 114 kg (251 lb 6.4 oz)   SpO2 99%   BMI 38.23 kg/m    Patient given tour of unit and Welcome to  unit papers given to patient, wanding completed, belongings inventoried, and admission assessment completed.   Patient's legal status on arrival is Voluntary. Appropriate legal rights discussed with and copy " given to patient. Patient Bill of Rights discussed with and copy given to patient.   Patient denies SI, HI, and thoughts of self harm and contracts for safety while on unit.      Lalita Wolf RN  7/4/2023  11:04 AM    Patient denies SI, HI. Affect is flat, she is sad and tearful at times, and is anxious and depressed. States this is her first inpatient hospitalization on a  unit. She was given breakfast, she showered, and spent time sitting in the rocking chair looking out the window. She denies taking any medications, but states she would use the [facility] pharmacy on discharge.  Writer spoke to Mom, gave Mom update on unit rules and ipad visits. Patient given information on setting up an ipad visit, along with ID and passcode.    Patient was given education handouts on Prozac and Trazodone.   1425-patient in the lounge working on a craft project with a peer.  Face to face end of shift report communicated to oncchichi RN.     Lalita Wolf RN  7/4/2023  2:26 PM

## 2023-07-04 NOTE — TELEPHONE ENCOUNTER
R: Patient cleared and ready for behavioral bed placement: Yes    1:26am Intake called [facility] to review this pt's chart. Intake will receive a call back.     2:56am Intake received a call from [facility] accepting [facility] pt [facility].    4:20am Intake called [facility] ED and provided the HUC with placement information.

## 2023-07-04 NOTE — ED PROVIDER NOTES
"I assumed patient's care at change of shift.  She is in with depression and anxiety as well as suicidal ideation.  Has an opioid prescription and plan to overdose in hopes of not waking up.  Stated she \"does not feel safe\".  Lives with her parents after recently breaking up with her boyfriend.  Apparently was mutual.  She has had increased anxiety for the past year but its been less.  Next couple of weeks.  She has received a couple doses of Ativan which helps.  DEC evaluated her and felt she should be admitted.  We are waiting for an inpatient psychiatric bed to open up.  Sounds like she will at least be boarding in the ED through the night.  She was placed on a  health officer hold initially and at this point is voluntary.  She would be holdable if she elected to leave.    4:37 AM:  [facility] has a bed and Dr Henderson has accepted her in transfer.  Her health officer hold has .  She was placed on a 72-hour hold for the transfer.     Brock Parra MD  23 0438    "

## 2023-07-04 NOTE — ED NOTES
Contacted  intake to inquire about status of beds available.  According to intake they are working on it and do not see anything available tonight. Planning to staff for 1:1 pt attendant for the night

## 2023-07-04 NOTE — H&P
"  [facility] PSYCHIATRY  -  HISTORY AND PHYSICAL     ADMISSION DATA     Batool Louis MRN# [id number]   Age: 22 year old YOB: 2000     Date of Admission: 7/4/2023  Primary Physician: Nancy Donnelly   Referral Area: Referral Area: Outside Emergency Department       CHIEF COMPLAINT   Reason for Admit/Consult: Suicidal ideation or attempt / self harm and Depressive symptoms       HISTORY OF PRESENT ILLNESS   Batool Louis is a 22 year old female with a past psychiatric history notable for generalized anxiety disorder, depression. No prior inpatient psychiatric hospitalizations. No prior suicide attempts. No prior CD treatments.  Presents to outside hospital at the recommendation of her mother and father for worsening depression and anxiety symptoms with increased frequency and intensity of suicidal ideation. Patient was subsequently transferred and accepted for inpatient psychiatric hospitalization at [facility] for further safety and further stabilization.     Prior to seeing the patient, I had the opportunity to review the medical record. Per outside ED, \"Batool Louis is a 22 year old female who presents with her mother and father for evaluation of depression and anxiety.  Symptoms have been ongoing for many years.  Much worse in the past 1 year.  Previous to this, she felt like she was coping well.  5-6 years ago she was on Lexapro for 6 months or so.  She was having frequent URIs and did not feel well on the medication.  Her anxiety was improved but she still felt down and out of the medication.  She stopped the medication and stopped having side effects.  Her symptoms have been worse especially the past few weeks.  She states, \"I feel like I cannot handle what is around me.  I feel like someone is going to attack me.  \"She is having a really hard time sleeping.  She feels down and out and depressed.  Very anxious.  She states that she " "does feel paranoid that something bad will happen.  She feels guilty and remorseful for things that happened during the day.  She had a stressful event happen yesterday as she had a mutual break-up with her boyfriend.  They have been dating for a year.  She lives with her parents.  She feels like she has good support at home.  She only uses alcohol socially.  Last EtOH intake was last night with 2 drinks.  No street drug use.  She denies any recent OD or suicide attempt.  She does admit to suicidal ideation.  She states, \"I feel like the world is ending.  \"She has had thoughts of ending her life.  She does have leftover pain meds from her rib injury a month ago and she did have thoughts of taking all of the pain meds hoping that she would not wake up.  She states, \"I do not feel safe.  \"Unsure of her LMP.  She has had very irregular menses recently.  She does not think that she is pregnant.  Grandmother did have an admission to geriatric psychiatry for 4 weeks recently and is doing much better.  Parents wonder if this is on her mind as well.  Patient denies any URI symptoms.  No chest or abdominal pain.  No lower extremity edema or calf pain.  No recent fever or chills\"    On psychiatric interview, she states \"I have been thinking about hurting myself and my mom and dad had encouraged me to come in because they are worried, I haven't hurt myself or anything, just been a frequent thought in my brain\".  Reports this thought has been going on for a few months.  She reports feelings of hopelessness, helplessness, and worthlessness.  Reports amotivation, anehdonia and impairment in sleep (difficulty falling asleep).  She states that she is working \"a lot\" to avoid her stressors.  She reports that she recently broke up with her boyfriend two days ago. She also states that she found out last March that another man she had been dating who was much older than her was exhibiting predatory behavior and dating women that were " "much younger than him, including herself and she does not feel good about this situation.  She reports active and passive SI and states she came to the emergency department at the recommendation. She reports she stopped taking escitalopram several months ago. States she was taking it for \"anxiety\". States she did not tolerate it (side effects reported listed below).  She is willing to trial a new medication but is hesitant. Explained the side effects, benefits and complications of fluoxetine. She consents.        REVIEW OF PSYCHIATRIC SYSTEMS   I do not elicit symptoms consistent with christy, agoraphobia, social anxiety, panic disorder, OCD, PTSD, ADHD, substance use, an eating disorder and pain     REVIEW OF MEDICAL SYSTEMS   14-point medical review of systems is negative other than noted in the HPI.     PSYCHIATRIC HISTORY   Prior psychiatric diagnoses: anxiety  Psychiatric Hospitalizations: denies  Chemical Dependency Treatments: denies  Prior Psychiatric Prescriber: denies  Prior/Current Therapist: Prabha Linda at [facility]  Past Psychotropic Medication Trials: escitalopram for a couple months \"then it made me sick\" - kept having cold symptoms (cough and sneezing) - made me feel \"messed with blood pressure\"  History of Suicide Attempts: denies  History of Self-injurious Behavior: yes, one time in middle school and then mother saw and felt bad so I did not do it again     FAMILY HISTORY   Family History   Problem Relation Age of Onset     Anemia Mother         unknown reason     No Known Problems Father      Asthma Sister      Back Pain Maternal Grandmother      Alzheimer Disease Maternal Grandfather      No Known Problems Paternal Grandmother      Cancer Paternal Grandfather         pancreatic     Skin Cancer Paternal Great-Grandfather          SUBSTANCE USE HISTORY   History   Drug Use No       Social History    Substance and Sexual Activity      Alcohol use: Yes        Comment: socially      History " "  Smoking Status     Never   Smokeless Tobacco     Never     denies       SOCIAL HISTORY   Social History     Socioeconomic History     Marital status: Single     Spouse name: Not on file     Number of children: Not on file     Years of education: Not on file     Highest education level: Not on file   Occupational History     Occupation: student     Comment: [employer]- theater/music     Occupation:    Tobacco Use     Smoking status: Never     Smokeless tobacco: Never   Vaping Use     Vaping Use: Never used   Substance and Sexual Activity     Alcohol use: Yes     Comment: socially     Drug use: No     Sexual activity: Yes     Partners: Male     Birth control/protection: I.U.D.     Comment: has had both sex partners, used condoms with last partner   Other Topics Concern     Parent/sibling w/ CABG, MI or angioplasty before 65F 55M? Not Asked   Social History Narrative     Not on file     Social Determinants of Health     Financial Resource Strain: Not on file   Food Insecurity: Not on file   Transportation Needs: Not on file   Physical Activity: Sufficiently Active (9/17/2019)    Exercise Vital Sign      Days of Exercise per Week: 7 days      Minutes of Exercise per Session: 30 min   Stress: Not on file   Social Connections: Not on file   Intimate Partner Violence: Not on file   Housing Stability: Not on file     Currently lives in [place], [place]. Lives at home with mom and dad, sister.  Pets: three dogs.  Recently terminated a relationship 2 days ago \"but it was mutual and knew it was coming\".  Currently employed teaches dance and theater.         PAST MEDICAL HISTORY   Past Medical History:   Diagnosis Date     Amenorrhea, secondary        Past Surgical History:   Procedure Laterality Date     NO HISTORY OF SURGERY         Seasonal allergies and Sulfamethoxazole-trimethoprim     PHYSICAL EXAM   I have reviewed the physical exam as documented by the medical team and agree with findings and assessment " "and have no additional findings to add at this time.  General: Awake and alert, NAD  HEENT: EOMI, no scleral icterus, no injection of conjunctivae, moist mucus membranes  Respiratory: Breathing comfortably   Extremities: No cyanosis, clubbing, or edema   Skin: No gross rash, no bruising  Neuro: CN II-XII intact, no focal deficits      VITALS   Vitals: /82   Pulse 85   Temp 96.8  F (36  C) (Tympanic)   Resp 16   Ht 1.727 m (5' 8\")   Wt 114 kg (251 lb 6.4 oz)   SpO2 99%   BMI 38.23 kg/m       MENTAL STATUS EXAM   Appearance:  awake, alert, adequately groomed, dressed in hospital scrubs, and appeared as age stated  Attitude:  cooperative  Eye Contact:  good  Mood:  depressed  Affect:  mood congruent  Speech:  clear, coherent  Psychomotor Behavior:  no evidence of tardive dyskinesia, dystonia, or tics  Thought Process:  logical, linear, and goal oriented  Associations:  no loose associations  Thought Content:  passive suicidal ideation present  Insight:  limited  Judgment:  limited  Oriented to:  time, person, and place  Attention Span and Concentration:  intact  Recent and Remote Memory:  intact  Gait and Station: Normal      LABS   Recent Results (from the past 24 hour(s))   Comprehensive metabolic panel    Collection Time: 07/03/23  4:10 PM   Result Value Ref Range    Sodium 138 136 - 145 mmol/L    Potassium 3.9 3.4 - 5.3 mmol/L    Chloride 100 98 - 107 mmol/L    Carbon Dioxide (CO2) 25 22 - 29 mmol/L    Anion Gap 13 7 - 15 mmol/L    Urea Nitrogen 13.1 6.0 - 20.0 mg/dL    Creatinine 0.75 0.51 - 0.95 mg/dL    Calcium 9.7 8.6 - 10.0 mg/dL    Glucose 86 70 - 99 mg/dL    Alkaline Phosphatase 81 35 - 104 U/L    AST 29 0 - 45 U/L    ALT 53 (H) 0 - 50 U/L    Protein Total 8.1 6.4 - 8.3 g/dL    Albumin 4.8 3.5 - 5.2 g/dL    Bilirubin Total 0.6 <=1.2 mg/dL    GFR Estimate >90 >60 mL/min/1.73m2   INR    Collection Time: 07/03/23  4:10 PM   Result Value Ref Range    INR 0.99 0.85 - 1.15   PTT    Collection Time: " 07/03/23  4:10 PM   Result Value Ref Range    aPTT 28 22 - 38 Seconds   Alcohol ethyl    Collection Time: 07/03/23  4:10 PM   Result Value Ref Range    Alcohol ethyl <0.01 <=0.01 g/dL   Salicylate level    Collection Time: 07/03/23  4:10 PM   Result Value Ref Range    Salicylate <0.3   mg/dL   Acetaminophen level    Collection Time: 07/03/23  4:10 PM   Result Value Ref Range    Acetaminophen <5.0 (L) 10.0 - 30.0 ug/mL   TSH    Collection Time: 07/03/23  4:10 PM   Result Value Ref Range    TSH 2.77 0.30 - 4.20 uIU/mL   CBC with platelets and differential    Collection Time: 07/03/23  4:10 PM   Result Value Ref Range    WBC Count 8.1 4.0 - 11.0 10e3/uL    RBC Count 4.86 3.80 - 5.20 10e6/uL    Hemoglobin 13.7 11.7 - 15.7 g/dL    Hematocrit 42.4 35.0 - 47.0 %    MCV 87 78 - 100 fL    MCH 28.2 26.5 - 33.0 pg    MCHC 32.3 31.5 - 36.5 g/dL    RDW 13.1 10.0 - 15.0 %    Platelet Count 270 150 - 450 10e3/uL    % Neutrophils 50 %    % Lymphocytes 40 %    % Monocytes 7 %    % Eosinophils 2 %    % Basophils 1 %    % Immature Granulocytes 0 %    NRBCs per 100 WBC 0 <1 /100    Absolute Neutrophils 4.2 1.6 - 8.3 10e3/uL    Absolute Lymphocytes 3.2 0.8 - 5.3 10e3/uL    Absolute Monocytes 0.5 0.0 - 1.3 10e3/uL    Absolute Eosinophils 0.1 0.0 - 0.7 10e3/uL    Absolute Basophils 0.1 0.0 - 0.2 10e3/uL    Absolute Immature Granulocytes 0.0 <=0.4 10e3/uL    Absolute NRBCs 0.0 10e3/uL   HCG qualitative urine    Collection Time: 07/03/23  5:11 PM   Result Value Ref Range    hCG Urine Qualitative Negative Negative   Urine Drugs of Abuse Screen Panel 13    Collection Time: 07/03/23  5:11 PM   Result Value Ref Range    Cannabinoids (99-wrh-8-carboxy-9-THC) Not Detected Not Detected, Indeterminate    Phencyclidine Not Detected Not Detected, Indeterminate    Cocaine (Benzoylecgonine) Not Detected Not Detected, Indeterminate    Methamphetamine (d-Methamphetamine) Not Detected Not Detected, Indeterminate    Opiates (Morphine) Not Detected Not  Detected, Indeterminate    Amphetamine (d-Amphetamine) Not Detected Not Detected, Indeterminate    Benzodiazepines (Nordiazepam) Not Detected Not Detected, Indeterminate    Tricyclic Antidepressants (Desipramine) Not Detected Not Detected, Indeterminate    Methadone Not Detected Not Detected, Indeterminate    Barbiturates (Butalbital) Not Detected Not Detected, Indeterminate    Oxycodone Not Detected Not Detected, Indeterminate    Propoxyphene (Norpropoxyphene) Not Detected Not Detected, Indeterminate    Buprenorphine Not Detected Not Detected, Indeterminate   Asymptomatic COVID-19 Virus (Coronavirus) by PCR Nose    Collection Time: 07/03/23  5:14 PM    Specimen: Nose; Swab   Result Value Ref Range    SARS CoV2 PCR Negative Negative         ASSESSMENT   Summary: Batool Louis is a 22 year old female with a past psychiatric history notable for generalized anxiety disorder, depression. No prior inpatient psychiatric hospitalizations. No prior suicide attempts. No prior CD treatments.  Presents to outside hospital at the recommendation of her mother and father for worsening depression and anxiety symptoms with increased frequency and intensity of suicidal ideation. Patient was subsequently transferred and accepted for inpatient psychiatric hospitalization at [facility] for further safety and further stabilization.        DIAGNOSTIC FORMULATION   #Major Depressive Disorder, Single Episode, Severe  #Generalized Anxiety Disorder  #Suicidal Ideation     PLAN   Admit patient to [facility], Location: Unit 5     Legal Status: voluntary    Safety Assessment:    Behavioral Orders   Procedures     Code 1 - Restrict to Unit     Routine Programming     As clinically indicated     Status 15     Every 15 minutes.      Imminent risk of harm in a setting less restrictive than an inpatient psychiatric facility is determined to be medium to high.       PTA medications  held:   -none    PTA medications continued/changed:   -zyrtec PRN    New medications initiated:   -trazodone PRN at bedtime   -fluoxetine 10 mg q daily    Programming: Patient will be treated in a therapeutic milieu with appropriate individual and group therapies. Education will be provided on diagnoses, medications, and treatments.     Medical diagnoses:  Per medicine    Diagnostic studies and consultations:  No additional studies or tests recommended on admission.     Level of care required: Acute psychiatric hospitalization    Justification for hospitalization and estimated length of stay: reasons for hospitalization include potential safety risk to self or others within the last week, decreased functioning in outpatient setting and in the setting of no outpatient management, need for highly structured inpatient management for stabilization of psychiatric symptoms, need for psychiatric medication initiation and stabilization.  Estimated length of stay is 4-7 days.      Total time: 80 minutes       ATTESTATION    Dustin Blackwood MD  [facility]   Psychiatry    Video Visit: Patient has given verbal consent for video visit?: Yes  Type of Service: video visit for mental health treatment  Reason for Video Visit: COVID-19 [place]  Originating Site (patient location): [facility]  Distant Site (provider location): Remote Location  Mode of Communication: Video Conference via [employer]  Time of Service: Date: July 4, 2023 , Start: 11:00 end: 12:00

## 2023-07-05 PROCEDURE — 124N000001 HC R&B MH

## 2023-07-05 PROCEDURE — 99232 SBSQ HOSP IP/OBS MODERATE 35: CPT | Mod: VID | Performed by: PSYCHIATRY & NEUROLOGY

## 2023-07-05 PROCEDURE — 250N000013 HC RX MED GY IP 250 OP 250 PS 637: Performed by: NURSE PRACTITIONER

## 2023-07-05 PROCEDURE — 250N000013 HC RX MED GY IP 250 OP 250 PS 637: Performed by: PSYCHIATRY & NEUROLOGY

## 2023-07-05 RX ADMIN — FLUOXETINE 10 MG: 10 CAPSULE ORAL at 08:21

## 2023-07-05 RX ADMIN — TRAZODONE HYDROCHLORIDE 50 MG: 50 TABLET ORAL at 22:02

## 2023-07-05 RX ADMIN — HYDROXYZINE HYDROCHLORIDE 25 MG: 25 TABLET, FILM COATED ORAL at 16:27

## 2023-07-05 ASSESSMENT — ACTIVITIES OF DAILY LIVING (ADL)
ADLS_ACUITY_SCORE: 28
HYGIENE/GROOMING: INDEPENDENT
ADLS_ACUITY_SCORE: 28
ORAL_HYGIENE: INDEPENDENT
ADLS_ACUITY_SCORE: 28
DRESS: SCRUBS (BEHAVIORAL HEALTH)

## 2023-07-05 NOTE — PROGRESS NOTES
"  [facility] PSYCHIATRY  -  PROGRESS NOTE     ID   Name: Batool Louis  MRN#: [id number]     SUBJECTIVE   Prior to interviewing the patient, I met with nursing and reviewed patient's clinical condition. We discussed clinical care both before and after the interview. I have reviewed the patient's clinical course by review of records including previous notes, labs, and vital signs.     Per nursing, the patient had the following behavioral events over the last 24-hours: periods of increased tearfulness, some hesitancy surrounding psychotropic medications.     On psychiatric interview, Dodie is met on the unit. She states yesterday was \"Difficult\". Reports she had several panic attacks.  She states that she did end up taking the trazodone last night and reports it did help with sleep. She states \"I do not know if the other one did anything\". psychoeducation provided regarding antidepressants and the time it takes for them to begin working and that it is not atypical to not notice anything at all after one dose of medication. She denies any side effects. She is willing to go to groups.         MEDICATIONS   Scheduled Meds:    FLUoxetine  10 mg Oral Daily     traZODone  50 mg Oral At Bedtime     PRN Meds:.acetaminophen, alum & mag hydroxide-simethicone, cetirizine, hydrOXYzine, melatonin, nicotine polacrilex, OLANZapine **OR** OLANZapine, senna-docusate     ALLERGIES   Allergies   Allergen Reactions     Seasonal Allergies Other (See Comments)     Nasal congestion     Sulfamethoxazole-Trimethoprim Hives        VITALS   Vitals: /53   Pulse 90   Temp 98.1  F (36.7  C) (Tympanic)   Resp 18   Ht 1.727 m (5' 8\")   Wt 114 kg (251 lb 6.4 oz)   SpO2 97%   BMI 38.23 kg/m       MENTAL STATUS EXAM   Appearance:  awake, alert, adequately groomed, dressed in hospital scrubs, and appeared as age stated  Attitude:  cooperative  Eye Contact:  good  Mood:  depressed  Affect:  flat   Speech:  clear, " coherent  Psychomotor Behavior:  no evidence of tardive dyskinesia, dystonia, or tics  Thought Process:  logical, linear, and goal oriented  Associations:  no loose associations  Thought Content:  passive suicidal ideation present  Insight:  limited  Judgment:  limited  Oriented to:  time, person, and place  Attention Span and Concentration:  intact  Recent and Remote Memory:  intact  Gait and Station: Normal        LABS   No results found for this or any previous visit (from the past 24 hour(s)).      ASSESSMENT   Batool Louis is a 22 year old female with a past psychiatric history notable for generalized anxiety disorder, depression. No prior inpatient psychiatric hospitalizations. No prior suicide attempts. No prior CD treatments.  Presents to outside hospital at the recommendation of her mother and father for worsening depression and anxiety symptoms with increased frequency and intensity of suicidal ideation. Patient was subsequently transferred and accepted for inpatient psychiatric hospitalization at [facility] for further safety and further stabilization.     Daily Progress: adjusted to the unit with some hesitancy. Was able to take fluoxetine and trazodone yesterday. Remains fairly withdrawn.       DIAGNOSTIC FORMULATION   #Major Depressive Disorder, Single Episode, Severe  #Generalized Anxiety Disorder  #Suicidal Ideation     PLAN     Location: Unit 5  Legal Status: voluntary     Safety Assessment:    Behavioral Orders   Procedures     Code 1 - Restrict to Unit     Routine Programming     As clinically indicated     Status 15     Every 15 minutes.        PTA medications held:   -none     PTA medications continued/changed:   -zyrtec PRN     New medications initiated:   -trazodone PRN at bedtime   -fluoxetine 10 mg q daily    Today's Changes:  -continue fluoxetine and trazodone, encourage group involvement    Programming: Patient will be treated in a therapeutic  milieu with appropriate individual and group therapies. Education will be provided on diagnoses, medications, and treatments. Encouraged behavioral activation and participation in group programming.     Medical diagnoses:  Per medicine    Disposition: pending clinical course        TREATMENT TEAM CARE PLAN     Progress: Continued symptoms.    Continued Stay Criteria/Rationale: Continued symptoms without sufficient improvement/resolution.    Medical/Physical: See above.    Precautions: See above.     Plan: Continue inpatient care with unit support and medication management.    Rationale for change in precautions or plan: NA due to no change.    Participants: Dustin Blackwood MD, Nursing, SW, OT.    The patient's care was discussed with the treatment team and chart notes were reviewed.       ATTESTATION    Dustin Blackwood MD  [facility]   Psychiatry    Video Visit: Patient has given verbal consent for video visit?: Yes  Type of Service: video visit for mental health treatment  Reason for Video Visit: COVID-19 [place]  Originating Site (patient location): [facility]  Distant Site (provider location): Remote Location  Mode of Communication: Video Conference via [employer]  Time of Service: Date: 07/05/2023 , Start: 08:00 end: 08:30

## 2023-07-05 NOTE — PLAN OF CARE
"Face to face end of shift report communicated to oncoming shift.     Maddy Vale RN  7/5/2023  11:07 PM    Problem: Adult Behavioral Health Plan of Care  Goal: Patient-Specific Goal (Individualization)  Description: Patient will sleep 6 to 8 hours per night  Patient will eat at least 50% of meals  Patient will attend at least 50% of groups  Patient will comply with recommendations of treatment team  Patient will remain medication compliant    Outcome: Progressing  Note: Patient in group at the start of this writer's shift.   Patient reports feeling \"I'm going to experience a panic attack\"    Patient questioning how long it will take to notice effects from the new medication she is taking, discussed with patient.     Patient is tearful, attempts to express in more detail how she is feeling and begins to cry.  \"I just don't know, I know I'm safe here but it's just I still feel so panic or paranoid, it's hard to explain\"   PRN:  Hydroxyzine 25 mg given @ 1627    Patient eats 100% of dinner.  Patient showered this shift.  Minimally social with peers.  Spends time in [employer] working on puzzle.     Patient expresses the hydroxyzine was helpful \"yet it made me drowsy\"  \"it's not something I want to be on long-term, \"I'm hoping the Prozac kicks in and I feel normal\"    Patient remains quiet and tearful in conversation, blunt/flat affect.     Noted redness in patient's eyes bilaterally, left eye swollen, patient reports \"it's from crying they usually do this\"  Patient will request eye drops if feels necessary.      Goal Outcome Evaluation:                        "

## 2023-07-05 NOTE — PLAN OF CARE
"Face to face end of shift report communicated to oncoming shift.     Maddy Vale RN  7/4/2023  11:05 PM       Problem: Adult Behavioral Health Plan of Care  Goal: Patient-Specific Goal (Individualization)  Description: Patient will sleep 6 to 8 hours per night  Patient will eat at least 50% of meals  Patient will attend at least 50% of groups  Patient will comply with recommendations of treatment team  Patient will remain medication compliant    Outcome: Not Progressing  Note: Patient spends this shift out in the [facility] working on a puzzle.  Patient attends groups for a total of 45 minutes.  Patient lets needs be known.  Patient endorses SI thoughts, reports \"feeling safe on the unit\"    Patient denies HI, AH/VH and pain.    Patient eats 75% of dinner.    Patient is soft spoken, socializes with peers on the unit.      Offered patient Trazodone for sleep as scheduled,  Patient states \"I'll wait closer to 10\"  Offered patient medication at 10.  Patient refuses at this time, appears hesitant to take the medication.  Informed patient that it's her choice and to let this writer know if it's needed.  Patient agrees to this.    2225: Patient requests to take medication at this time.      2240:  Patient out in [facility] crying, asked if patient needed anything, patient states \"I just need to be alone\"    Informed patient staff is here if she needs anything.             Goal Outcome Evaluation:                        "

## 2023-07-05 NOTE — PLAN OF CARE
Problem: Adult Behavioral Health Plan of Care  Goal: Patient-Specific Goal (Individualization)  Description: Patient will sleep 6 to 8 hours per night  Patient will eat at least 50% of meals  Patient will attend at least 50% of groups  Patient will comply with recommendations of treatment team  Patient will remain medication compliant    Outcome: Progressing     Problem: Suicide Risk  Goal: Absence of Self-Harm  Description: Patient will remain free of self harm while on the unit.  Patient will be able to list 2 coping skills by discharge.  Patient will inform staff of suicidal thoughts while on the unit.   Outcome: Progressing   Goal Outcome Evaluation:       Pt. Up and about on unit, appetite is good, taking prescribed medications, cooperative with cares and treatment team recommendations, spending time working on puzzles in dayroom, slept 7 hours last night, has been free from harm/injury, denies suicidal ideation and pain, encouraged to attend group therapy sessions, will continue to monitor progress.    Face to face end of shift report will be communicated to oncoming afternoon shift RN.     Cecily Agosto RN  7/5/2023  9:29 AM

## 2023-07-05 NOTE — DISCHARGE INSTRUCTIONS
Behavioral Discharge Planning and Instructions    Summary: 22 year old female who presented to the ED with her mother and father for evaluation of suicidal ideations, depression and anxiety.    Main Diagnosis: #Major Depressive Disorder, Single Episode, Severe  #Generalized Anxiety Disorder  #Suicidal Ideation    Health Care Follow-up:    [facility]- PCP- Dr. Yosef Berg July 20th @1:45pm   [address] [place] [address] [place], [address]  [phone]    [facility]-  July 19th @ 8am   [address] [facility]  [place] [address]  [phone]      Attend all scheduled appointments with your outpatient providers. Call at least 24 hours in advance if you need to reschedule an appointment to ensure continued access to your outpatient providers.     Major Treatments, Procedures and Findings:  You were provided with: a psychiatric assessment, assessed for medical stability, medication evaluation and/or management, group therapy, family therapy, individual therapy, milieu management, and medical interventions    Symptoms to Report: feeling more aggressive, increased confusion, losing more sleep, mood getting worse, or thoughts of suicide    Early warning signs can include: increased depression or anxiety sleep disturbances increased thoughts or behaviors of suicide or self-harm  increased unusual thinking, such as paranoia or hearing voices    Safety and Wellness:  Take all medicines as directed.  Make no changes unless your doctor suggests them.      Follow treatment recommendations.  Refrain from alcohol and non-prescribed drugs.  Ask your support system to help you reduce your access to items that could harm yourself or others. Items could include:  Firearms  Medicines (both prescribed and over-the-counter)  Knives and other sharp objects  Ropes and like materials  Car keys  If there is a concern for safety, call 911. If there is a concern for safety, call  "911.    Resources:   Crisis Intervention: 746.453.7858 or 291-823-7009 (TTY: 451.399.3874).  Call anytime for help.  National [place] on Mental Illness (www.[place].samantha.org): 696.578.1418 or 471-017-5936.  [place] Association for Children's Mental Health (www.mac.org): 847.210.1622.  Alcoholics Anonymous (www.alcoholics-anonymous.org): Check your phone book for your local chapter.  Suicide Awareness Voices of Education (SAVE) (www.save.org): 669-544-YUQF (8059)  National Suicide Prevention Line (www.mentalhealthmn.org): 339-172-RRRY (8682)  Mental Health Consumer/Survivor Network of [place] (www.mhcsn.net): 509.444.6144 or 354-066-4458  Mental Health Association of [place] (www.mentalhealth.org): 989.924.9148 or 124-387-3269  Self- Management and Recovery Training., SMART-- Toll free: 778.636.8722  www.[employer]  Text 4 Life: txt \"LIFE\" to 11115 for immediate support and crisis intervention  Crisis text line: Text \"[place]\" to 648405. Free, confidential, 24/7.  Crisis Intervention: 205.267.5215 or 291-172-7439. Call anytime for help.     General Medication Instructions:   See your medication sheet(s) for instructions.   Take all medicines as directed.  Make no changes unless your doctor suggests them.   Go to all your doctor visits.  Be sure to have all your required lab tests. This way, your medicines can be refilled on time.  Do not use any drugs not prescribed by your doctor.  Avoid alcohol.    Advance Directives:   Scanned document on file with [employer]? No scanned doc  Is document scanned? Pt states no documents  Honoring Choices Your Rights Handout: Informed and given  Was more information offered? Pt declined    The Treatment team has appreciated the opportunity to work with you. If you have any questions or concerns about your recent admission, you can contact the unit which can receive your call 24 hours a day, 7 days a week. They will be able to get in touch with a Provider if needed. The unit number is [phone] " .
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
## 2023-07-05 NOTE — PLAN OF CARE
Problem: Adult Behavioral Health Plan of Care  Goal: Patient-Specific Goal (Individualization)  Description: Patient will sleep 6 to 8 hours per night  Patient will eat at least 50% of meals  Patient will attend at least 50% of groups  Patient will comply with recommendations of treatment team  Patient will remain medication compliant  Outcome: Progressing     Problem: Suicide Risk  Goal: Absence of Self-Harm  Description: Patient will remain free of self harm while on the unit.  Patient will be able to list 2 coping skills by discharge.  Patient will inform staff of suicidal thoughts while on the unit.   Outcome: Progressing     Face to face shift report received from Maddy. Rounding completed, pt observed laying in bed, appeared to be sleeping, non-labored even respirations noted.    Patient appeared to be sleeping for approximately 6.25 hours since 2345.    Patient had no reported or observed suicidal behavior or self harm this shift.     Face to face report will be communicated to oncoming RN.    Margareth Durand RN  7/5/2023  6:45 AM

## 2023-07-05 NOTE — MEDICATION SCRIBE - ADMISSION MEDICATION HISTORY
Medication Scribe Admission Medication History    Admission medication history is complete. The information provided in this note is only as accurate as the sources available at the time of the update.    Medication reconciliation/reorder completed by provider prior to medication history? Yes    Information Source(s): Patient and CareEverywhere/SureScripts via phone    Pertinent Information:   Pt reports only using zyrtec PRN and has kyleena IUD. Patient denies taking any prescribed medications, nor is supposed to be taking any prescribed medications, at this time (scheduled and/or PRN). Patient denies taking any OTC vitamins, supplements or analgesics on a regular basis. No conflicting data from any available outside sources.     Changes made to PTA medication list:    Added: None    Deleted: None    Changed: None    Medication Affordability:  Not including over the counter (OTC) medications, was there a time in the past 3 months when you did not take your medications as prescribed because of cost?: No    Allergies reviewed with patient and updates made in EHR: yes    Medication History Completed By: Bonita Isidro 7/5/2023 12:15 PM    Prior to Admission medications    Medication Sig Last Dose Taking? Auth Provider Long Term End Date   cetirizine (ZYRTEC) 10 MG tablet Take 10 mg by mouth daily as needed for allergies seasonal More than a month Yes Reported, Patient     levonorgestrel (KYLEENA) 19.5 MG IUD 1 each by Intrauterine route continuous Put in 8/12/2020 7/5/2023 Yes Reported, Patient

## 2023-07-06 PROCEDURE — 250N000013 HC RX MED GY IP 250 OP 250 PS 637: Performed by: PSYCHIATRY & NEUROLOGY

## 2023-07-06 PROCEDURE — 124N000001 HC R&B MH

## 2023-07-06 PROCEDURE — 99232 SBSQ HOSP IP/OBS MODERATE 35: CPT | Mod: VID | Performed by: PSYCHIATRY & NEUROLOGY

## 2023-07-06 RX ADMIN — TRAZODONE HYDROCHLORIDE 50 MG: 50 TABLET ORAL at 22:51

## 2023-07-06 RX ADMIN — FLUOXETINE 20 MG: 20 CAPSULE ORAL at 08:06

## 2023-07-06 ASSESSMENT — ACTIVITIES OF DAILY LIVING (ADL)
ADLS_ACUITY_SCORE: 28
DRESS: SCRUBS (BEHAVIORAL HEALTH)
ADLS_ACUITY_SCORE: 28
HYGIENE/GROOMING: INDEPENDENT
ADLS_ACUITY_SCORE: 28
ORAL_HYGIENE: INDEPENDENT
ADLS_ACUITY_SCORE: 28

## 2023-07-06 NOTE — PROGRESS NOTES
"  [facility]  -  PROGRESS NOTE     ID   Name: Batool Louis  MRN#: [id number]     SUBJECTIVE   Prior to interviewing the patient, I met with nursing and reviewed patient's clinical condition. We discussed clinical care both before and after the interview. I have reviewed the patient's clinical course by review of records including previous notes, labs, and vital signs.     Per nursing, the patient had the following behavioral events over the last 24-hours: waxing and waning level of distress.  Reporting some paranoia.     On psychiatric interview, Dodie is met within her room. She states yesterday was a \"better\" day. She states that she is having \"less paranoia\" today. Reports she slept better than prior to admission, but woke up a couple of times last night. No reported side effects from medications. Reports her parents are coming to visit today and looking forward to this.      MEDICATIONS   Scheduled Meds:    FLUoxetine  20 mg Oral Daily     traZODone  50 mg Oral At Bedtime     PRN Meds:.acetaminophen, alum & mag hydroxide-simethicone, cetirizine, hydrOXYzine, melatonin, nicotine polacrilex, OLANZapine **OR** OLANZapine, senna-docusate     ALLERGIES   Allergies   Allergen Reactions     Shellfish-Derived Products Nausea and Vomiting     Possible allergy- pt unsure.      Sulfamethoxazole-Trimethoprim Hives     Seasonal Allergies Other (See Comments)     Nasal congestion        VITALS   Vitals: /57   Pulse 68   Temp 97.6  F (36.4  C) (Tympanic)   Resp 18   Ht 1.727 m (5' 8\")   Wt 114 kg (251 lb 6.4 oz)   SpO2 97%   BMI 38.23 kg/m       MENTAL STATUS EXAM   Appearance:  awake, alert, adequately groomed, dressed in hospital scrubs, and appeared as age stated  Attitude:  cooperative  Eye Contact:  good  Mood:  \"a little better\"  Affect:  flat   Speech:  clear, coherent  Psychomotor Behavior:  no evidence of tardive dyskinesia, dystonia, or tics  Thought Process:  logical, linear, and " goal oriented  Associations:  no loose associations  Thought Content:  passive suicidal ideation present  Insight:  limited  Judgment:  limited  Oriented to:  time, person, and place  Attention Span and Concentration:  intact  Recent and Remote Memory:  intact  Gait and Station: Normal        LABS   No results found for this or any previous visit (from the past 24 hour(s)).      ASSESSMENT   Batool Louis is a 22 year old female with a past psychiatric history notable for generalized anxiety disorder, depression. No prior inpatient psychiatric hospitalizations. No prior suicide attempts. No prior CD treatments.  Presents to outside hospital at the recommendation of her mother and father for worsening depression and anxiety symptoms with increased frequency and intensity of suicidal ideation. Patient was subsequently transferred and accepted for inpatient psychiatric hospitalization at [facility] for further safety and further stabilization.     Daily Progress: slightly brighter today. Tolerating medications without side effects. [facility] Continue group involvement.        DIAGNOSTIC FORMULATION   #Major Depressive Disorder, Single Episode, Severe  #Generalized Anxiety Disorder  #Suicidal Ideation     PLAN     Location: Unit 5  Legal Status: voluntary     Safety Assessment:    Behavioral Orders   Procedures     Code 1 - Restrict to Unit     Routine Programming     As clinically indicated     Status 15     Every 15 minutes.        PTA medications held:   -none     PTA medications continued/changed:   -zyrtec PRN     New medications initiated:   -trazodone PRN at bedtime   -fluoxetine 10 mg q daily --> increase to 20 mg q yadira y(7/7/23)    Today's Changes:  -no changes    Programming: Patient will be treated in a therapeutic milieu with appropriate individual and group therapies. Education will be provided on diagnoses, medications, and treatments.  Encouraged behavioral activation and participation in group programming.     Medical diagnoses:  Per medicine    Disposition: pending clinical course        TREATMENT TEAM CARE PLAN     Progress: Continued symptoms.    Continued Stay Criteria/Rationale: Continued symptoms without sufficient improvement/resolution.    Medical/Physical: See above.    Precautions: See above.     Plan: Continue inpatient care with unit support and medication management.    Rationale for change in precautions or plan: NA due to no change.    Participants: Dustin Blackwood MD, Nursing, SW, OT.    The patient's care was discussed with the treatment team and chart notes were reviewed.       ATTESTATION    Dustin Blackwood MD  [facility]   Psychiatry    Video Visit: Patient has given verbal consent for video visit?: Yes  Type of Service: video visit for mental health treatment  Reason for Video Visit: COVID-19 [place]  Originating Site (patient location): [facility]  Distant Site (provider location): Remote Location  Mode of Communication: Video Conference via [employer]  Time of Service: Date: 07/06/2023 , Start: 08:30 end: 09:00

## 2023-07-06 NOTE — PLAN OF CARE
Problem: Adult Behavioral Health Plan of Care  Goal: Patient-Specific Goal (Individualization)  Description: Patient will sleep 6 to 8 hours per night  Patient will eat at least 50% of meals  Patient will attend at least 50% of groups  Patient will comply with recommendations of treatment team  Patient will remain medication compliant    Outcome: Progressing     Problem: Suicide Risk  Goal: Absence of Self-Harm  Description: Patient will remain free of self harm while on the unit.  Patient will be able to list 2 coping skills by discharge.  Patient will inform staff of suicidal thoughts while on the unit.   Outcome: Progressing   Goal Outcome Evaluation:       Pt. Was awake and ate breakfast in dayroom, appetite good, ate 100%, taking prescribed medication, denies suicidal ideation, has remained free from harm/injury, compliant with treatment team recommendations, went right back to bed after eating, will continue to monitor progress.    Face to face end of shift report will be communicated to oncoming afternoon shift RN.     Cecily Agosto RN  7/6/2023  11:30 AM

## 2023-07-06 NOTE — PLAN OF CARE
Problem: Adult Behavioral Health Plan of Care  Goal: Patient-Specific Goal (Individualization)  Description: Patient will sleep 6 to 8 hours per night  Patient will eat at least 50% of meals  Patient will attend at least 50% of groups  Patient will comply with recommendations of treatment team  Patient will remain medication compliant  Outcome: Progressing     Problem: Suicide Risk  Goal: Absence of Self-Harm  Description: Patient will remain free of self harm while on the unit.  Patient will be able to list 2 coping skills by discharge.  Patient will inform staff of suicidal thoughts while on the unit.   Outcome: Progressing     Face to face shift report received from Maddy. Rounding completed, pt observed laying in bed, appeared to be sleeping, non-labored even respirations noted.    Patient appeared to be sleeping for approximately 6.75 hours since 2330.    Patient had no reported or observed suicidal behavior or self harm this shift.      Face to face report will be communicated to oncoming RN.    Margareth Durand RN  7/6/2023  6:23 AM

## 2023-07-07 PROCEDURE — 250N000013 HC RX MED GY IP 250 OP 250 PS 637: Performed by: PSYCHIATRY & NEUROLOGY

## 2023-07-07 PROCEDURE — 99232 SBSQ HOSP IP/OBS MODERATE 35: CPT | Mod: VID | Performed by: PSYCHIATRY & NEUROLOGY

## 2023-07-07 PROCEDURE — 124N000001 HC R&B MH

## 2023-07-07 RX ORDER — TRAZODONE HYDROCHLORIDE 100 MG/1
100 TABLET ORAL AT BEDTIME
Status: DISCONTINUED | OUTPATIENT
Start: 2023-07-07 | End: 2023-07-08

## 2023-07-07 RX ADMIN — TRAZODONE HYDROCHLORIDE 100 MG: 100 TABLET ORAL at 22:23

## 2023-07-07 RX ADMIN — FLUOXETINE 20 MG: 20 CAPSULE ORAL at 08:25

## 2023-07-07 ASSESSMENT — ACTIVITIES OF DAILY LIVING (ADL)
LAUNDRY: UNABLE TO COMPLETE
ADLS_ACUITY_SCORE: 28
HYGIENE/GROOMING: INDEPENDENT
ADLS_ACUITY_SCORE: 28
ADLS_ACUITY_SCORE: 28
DRESS: SCRUBS (BEHAVIORAL HEALTH)
ADLS_ACUITY_SCORE: 28
HYGIENE/GROOMING: INDEPENDENT
LAUNDRY: UNABLE TO COMPLETE
ORAL_HYGIENE: INDEPENDENT
ADLS_ACUITY_SCORE: 28
ADLS_ACUITY_SCORE: 28
DRESS: SCRUBS (BEHAVIORAL HEALTH);INDEPENDENT
ORAL_HYGIENE: INDEPENDENT
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
## 2023-07-07 NOTE — PLAN OF CARE
Problem: Adult Behavioral Health Plan of Care  Goal: Patient-Specific Goal (Individualization)  Description: Patient will sleep 6 to 8 hours per night  Patient will eat at least 50% of meals  Patient will attend at least 50% of groups  Patient will comply with recommendations of treatment team  Patient will remain medication compliant  Outcome: Progressing     Problem: Suicide Risk  Goal: Absence of Self-Harm  Description: Patient will remain free of self harm while on the unit.  Patient will be able to list 2 coping skills by discharge.  Patient will inform staff of suicidal thoughts while on the unit.   Outcome: Progressing     Face to face shift report received from Sandi Almendarez completed, pt observed in the lounge working on a puzzle.    Patient appeared to be sleeping for approximately 6.25 hours since 0045.    Patient had no reported or observed suicidal behavior or self harm this shift.      Patient's morning vitals as follows; Blood Pressure 110/53  Temp 97.1 Pulse 71 Respirations 16 SpO2 97% RA    Face to face report will be communicated to oncoming RN.    Margareth Durand RN  7/7/2023  6:26 AM
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
## 2023-07-07 NOTE — PLAN OF CARE
Problem: Suicide Risk  Goal: Absence of Self-Harm  Description: Patient will remain free of self harm while on the unit.  Patient will be able to list 2 coping skills by discharge.  Patient will inform staff of suicidal thoughts while on the unit.   Outcome: Progressing     Problem: Adult Behavioral Health Plan of Care  Goal: Patient-Specific Goal (Individualization)  Description: Patient will sleep 6 to 8 hours per night  Patient will eat at least 50% of meals  Patient will attend at least 50% of groups  Patient will comply with recommendations of treatment team  Patient will remain medication compliant    Outcome: Progressing     Patient has been calm, cooperative, and medication compliant this shift.  She attended all groups and is social with peers.  Admits to feeling depressed and anxious but feels her medications are helping.  Denies suicide intent and states she will be safe while on the unit.  Affect is flat and sad.  No complaints of pain.  Vs WNL.  Face to face end of shift report communicated to evening shift RN.     Sharee Martinez RN  7/7/2023  1:42 PM

## 2023-07-08 PROCEDURE — 124N000001 HC R&B MH

## 2023-07-08 PROCEDURE — 250N000013 HC RX MED GY IP 250 OP 250 PS 637: Performed by: PSYCHIATRY & NEUROLOGY

## 2023-07-08 PROCEDURE — 99232 SBSQ HOSP IP/OBS MODERATE 35: CPT | Mod: VID | Performed by: PSYCHIATRY & NEUROLOGY

## 2023-07-08 RX ORDER — MIRTAZAPINE 15 MG/1
15 TABLET, FILM COATED ORAL AT BEDTIME
Status: DISCONTINUED | OUTPATIENT
Start: 2023-07-08 | End: 2023-07-12 | Stop reason: HOSPADM

## 2023-07-08 RX ORDER — TRAZODONE HYDROCHLORIDE 100 MG/1
100 TABLET ORAL
Status: DISCONTINUED | OUTPATIENT
Start: 2023-07-08 | End: 2023-07-12 | Stop reason: HOSPADM

## 2023-07-08 RX ORDER — DOXEPIN HYDROCHLORIDE 10 MG/1
10 CAPSULE ORAL AT BEDTIME
Status: DISCONTINUED | OUTPATIENT
Start: 2023-07-08 | End: 2023-07-08

## 2023-07-08 RX ADMIN — MIRTAZAPINE 15 MG: 15 TABLET, FILM COATED ORAL at 22:06

## 2023-07-08 RX ADMIN — FLUOXETINE 20 MG: 20 CAPSULE ORAL at 08:03

## 2023-07-08 ASSESSMENT — ACTIVITIES OF DAILY LIVING (ADL)
ADLS_ACUITY_SCORE: 28

## 2023-07-08 NOTE — PLAN OF CARE
"Face to face end of shift report communicated to oncoming shift.     Maddy Vale RN  7/8/2023  3:01 PM        Problem: Adult Behavioral Health Plan of Care  Goal: Patient-Specific Goal (Individualization)  Description: Patient will sleep 6 to 8 hours per night  Patient will eat at least 50% of meals  Patient will attend at least 50% of groups  Patient will comply with recommendations of treatment team  Patient will remain medication compliant    Outcome: Progressing  Note: Patient takes AM medication as prescribed, eats breakfast in room, eats 100%.      Patient talks quietly but is open and honest regarding feelings.    \"I took the increased dose of Trazodone and still didn't sleep good, I kept waking up and it was difficult to fall back to sleep\"  Informed patient to speak with provider regarding this, patient agrees.     Patient attends all groups.    Spends time in lounge socializing with peers and working on puzzle.  Patient lets needs be known.  Patient denies SI/HI, AH/VH.  Endorses anxiety and depression are present.  \"I just feel in a fog, hopeful these meds start to work and I sleep better tonight\"        Goal Outcome Evaluation:                        "

## 2023-07-08 NOTE — PROGRESS NOTES
"    [facility] PSYCHIATRY  -  PROGRESS NOTE     ID   Name: Batool Louis  MRN#: [id number]     SUBJECTIVE   Prior to interviewing the patient, I met with nursing and reviewed patient's clinical condition. We discussed clinical care both before and after the interview. I have reviewed the patient's clinical course by review of records including previous notes, labs, and vital signs.     Per nursing, the patient had the following behavioral events over the last 24-hours: difficulty sleeping overnight    On psychiatric interview, Dodie is met within her room. She states she is \"better\". Notes she had a hard time falling asleep. Reports she had a \"breakdown\" yesterday but overall is starting to feel better. Explained side effects, benefits and complications of medication mirtazapine, she consents.  Denies any physical pain.      MEDICATIONS   Scheduled Meds:    FLUoxetine  20 mg Oral Daily     mirtazapine  15 mg Oral At Bedtime     PRN Meds:.acetaminophen, alum & mag hydroxide-simethicone, cetirizine, hydrOXYzine, melatonin, nicotine polacrilex, OLANZapine **OR** OLANZapine, senna-docusate, traZODone     ALLERGIES   Allergies   Allergen Reactions     Shellfish-Derived Products Nausea and Vomiting     Possible allergy- pt unsure.      Sulfamethoxazole-Trimethoprim Hives     Seasonal Allergies Other (See Comments)     Nasal congestion        VITALS   Vitals: /52   Pulse 71   Temp 97.4  F (36.3  C) (Tympanic)   Resp 18   Ht 1.727 m (5' 8\")   Wt 115 kg (253 lb 9.6 oz)   SpO2 96%   BMI 38.56 kg/m       MENTAL STATUS EXAM   Appearance:  awake, alert, adequately groomed, dressed in hospital scrubs, and appeared as age stated  Attitude:  cooperative  Eye Contact:  good  Mood:  \"better\"  Affect: less  restricted   Speech:  clear, coherent  Psychomotor Behavior:  no evidence of tardive dyskinesia, dystonia, or tics  Thought Process:  logical, linear, and goal oriented  Associations:  no loose " associations  Thought Content:  passive suicidal ideation present  Insight:  limited  Judgment:  limited  Oriented to:  time, person, and place  Attention Span and Concentration:  intact  Recent and Remote Memory:  intact  Gait and Station: Normal        LABS   No results found for this or any previous visit (from the past 24 hour(s)).      ASSESSMENT   Batool Louis is a 22 year old female with a past psychiatric history notable for generalized anxiety disorder, depression. No prior inpatient psychiatric hospitalizations. No prior suicide attempts. No prior CD treatments.  Presents to outside hospital at the recommendation of her mother and father for worsening depression and anxiety symptoms with increased frequency and intensity of suicidal ideation. Patient was subsequently transferred and accepted for inpatient psychiatric hospitalization at [facility] for further safety and further stabilization.     Daily Progress: less restrcited today. Will add mirtazapine 15 mg at bedtime to target mood and sleep. Move trazodone to PRN       DIAGNOSTIC FORMULATION   #Major Depressive Disorder, Single Episode, Severe  #Generalized Anxiety Disorder  #Suicidal Ideation     PLAN     Location: Unit [facility]  Legal Status: voluntary     Safety Assessment:    Behavioral Orders   Procedures     Code 1 - Restrict to Unit     Routine Programming     As clinically indicated     Status 15     Every 15 minutes.        PTA medications held:   -none     PTA medications continued/changed:   -zyrtec PRN     New medications initiated:   -trazodone PRN at bedtime   -fluoxetine 10 mg q daily --> increase to 20 mg q daily (7/7/23)    Today's Changes:  -initiating mirtazapine 15 mg at bedtime  -move trazodone to PRN    Programming: Patient will be treated in a therapeutic milieu with appropriate individual and group therapies. Education will be provided on diagnoses, medications, and treatments. Encouraged  behavioral activation and participation in group programming.     Medical diagnoses:  Per medicine    Disposition: pending clinical course        TREATMENT TEAM CARE PLAN     Progress: Continued symptoms.    Continued Stay Criteria/Rationale: Continued symptoms without sufficient improvement/resolution.    Medical/Physical: See above.    Precautions: See above.     Plan: Continue inpatient care with unit support and medication management.    Rationale for change in precautions or plan: NA due to no change.    Participants: Dustin Blackwood MD, Nursing, SW, OT.    The patient's care was discussed with the treatment team and chart notes were reviewed.       ATTESTATION    Dustin Blackwood MD  [facility]   Psychiatry    Video Visit: Patient has given verbal consent for video visit?: Yes  Type of Service: video visit for mental health treatment  Reason for Video Visit: COVID-19 [place]  Originating Site (patient location): [facility]  Distant Site (provider location): Remote Location  Mode of Communication: Video Conference via [employer]  Time of Service: Date: 07/08/2023 , Start: 08:00 end: 08:30

## 2023-07-08 NOTE — PLAN OF CARE
"  Problem: Adult Behavioral Health Plan of Care  Goal: Patient-Specific Goal (Individualization)  Description: Patient will sleep 6 to 8 hours per night  Patient will eat at least 50% of meals  Patient will attend at least 50% of groups  Patient will comply with recommendations of treatment team  Patient will remain medication compliant    Note: Pt was in room crying..  She started reading this book she got today and it hit home with her.  \"I only read to page 5 and it's a lot. Maybe I just did too much today.\"  She said she's been working on packets and worksheets she's been given.  Pt has a quiet demeanor, however is open about feelings and home life if asked about it.  She reports anxiety and depression.  Writer spoke with pt about the book.  Dinner came and she ate.  When writer checked in with pt later, she reports feeling better.  Writer and pt talked about strategies when she goes back home to same environment.  Talked about boundaries.  Pt said she is willing to try.  She is mindful and aware of her situation.      Problem: Suicide Risk  Goal: Absence of Self-Harm  Description: Patient will remain free of self harm while on the unit.  Patient will be able to list 2 coping skills by discharge.  Patient will inform staff of suicidal thoughts while on the unit.   Note: Pt struggled at beginning of shift, but felt better mid keyana shift.       NOCs:   Rounding complete.  Pt observed sleeping with regular and unlabored respirations.      Pt has been in bed with eyes closed and regular respirations.  15 minute and PRN checks all night.  No complaints offered.  Will continue to monitor.     Pt slept 8 hours    Face to face end of shift communicated to oncchichi PEREZ.     Dilcia SON  July 8, 2023  6:49 AM                   "

## 2023-07-09 PROCEDURE — 250N000013 HC RX MED GY IP 250 OP 250 PS 637: Performed by: PSYCHIATRY & NEUROLOGY

## 2023-07-09 PROCEDURE — 99232 SBSQ HOSP IP/OBS MODERATE 35: CPT | Mod: VID | Performed by: PSYCHIATRY & NEUROLOGY

## 2023-07-09 PROCEDURE — 124N000001 HC R&B MH

## 2023-07-09 RX ADMIN — MIRTAZAPINE 15 MG: 15 TABLET, FILM COATED ORAL at 20:45

## 2023-07-09 RX ADMIN — FLUOXETINE 20 MG: 20 CAPSULE ORAL at 08:55

## 2023-07-09 ASSESSMENT — ACTIVITIES OF DAILY LIVING (ADL)
ADLS_ACUITY_SCORE: 28

## 2023-07-09 NOTE — PLAN OF CARE
"Face to face end of shift report communicated to oncoming shift.     Maddy Vale RN  7/9/2023  3:02 PM    Problem: Adult Behavioral Health Plan of Care  Goal: Patient-Specific Goal (Individualization)  Description: Patient will sleep 6 to 8 hours per night  Patient will eat at least 50% of meals  Patient will attend at least 50% of groups  Patient will comply with recommendations of treatment team  Patient will remain medication compliant    Outcome: Progressing  Note: Patient remains in bed this shift.  Eats breakfast in room, eats 100%.  Patient up in lounge for lunch.  Patient eats 100%.    Patient endorses feelings of depression and letting her family down.  Yet not feeling \"great\"    Patient's affect is flat and blunt.  Patient takes medications as prescribed.  Patient lets needs be known.      Patient plays cards in lounge with peers, brighter affect during interaction with peers.   Patient denies SI/HI, AH/VH and pain.     Goal Outcome Evaluation:                        "

## 2023-07-09 NOTE — PROGRESS NOTES
"    [facility] PSYCHIATRY  -  PROGRESS NOTE     ID   Name: Batool Louis  MRN#: [id number]     SUBJECTIVE   Prior to interviewing the patient, I met with nursing and reviewed patient's clinical condition. We discussed clinical care both before and after the interview. I have reviewed the patient's clinical course by review of records including previous notes, labs, and vital signs.     Per nursing, the patient had the following behavioral events over the last 24-hours: \"She talked with writer about how bad she feels for causing her sisters sadness.  She has a lot of guilt as she appears to be a people pleaser, sacrificing herself.  She talked about [place].  She planned on attending and then committing suicide afterward.  She was speaking of this in guilt as she screwed up the 4th of July plans.  Writer reminded pt that missing a 4th of July isn't important and she would miss all 4th of July's if she had completed suicide\"    On psychiatric interview, Dodie is met within her room. She states she is grateful as last night she was able to sleep. She reports she feels much more rested after starting mirtazapine. Does not feel groggy.  Feels her mood continues to make subtle improvements each day.  Discussed she is feeling overwhelmed by the burden she feels she is placing on her family.  Denies active SI. Denies physical pain.      MEDICATIONS   Scheduled Meds:    FLUoxetine  20 mg Oral Daily     mirtazapine  15 mg Oral At Bedtime     PRN Meds:.acetaminophen, alum & mag hydroxide-simethicone, cetirizine, hydrOXYzine, melatonin, nicotine polacrilex, OLANZapine **OR** OLANZapine, senna-docusate, traZODone     ALLERGIES   Allergies   Allergen Reactions     Shellfish-Derived Products Nausea and Vomiting     Possible allergy- pt unsure.      Sulfamethoxazole-Trimethoprim Hives     Seasonal Allergies Other (See Comments)     Nasal congestion    " "    VITALS   Vitals: /58   Pulse 63   Temp 97.1  F (36.2  C) (Tympanic)   Resp 18   Ht 1.727 m (5' 8\")   Wt 115 kg (253 lb 9.6 oz)   SpO2 97%   BMI 38.56 kg/m       MENTAL STATUS EXAM   Appearance:  awake, alert, adequately groomed, dressed in hospital scrubs, and appeared as age stated  Attitude:  cooperative  Eye Contact:  good  Mood:  \"better\"  Affect: less  restricted   Speech:  clear, coherent  Psychomotor Behavior:  no evidence of tardive dyskinesia, dystonia, or tics  Thought Process:  logical, linear, and goal oriented  Associations:  no loose associations  Thought Content:  passive suicidal ideation present  Insight:  limited  Judgment:  limited  Oriented to:  time, person, and place  Attention Span and Concentration:  intact  Recent and Remote Memory:  intact  Gait and Station: Normal        LABS   No results found for this or any previous visit (from the past 24 hour(s)).      ASSESSMENT   Batool Louis is a 22 year old female with a past psychiatric history notable for generalized anxiety disorder, depression. No prior inpatient psychiatric hospitalizations. No prior suicide attempts. No prior CD treatments.  Presents to outside hospital at the recommendation of her mother and father for worsening depression and anxiety symptoms with increased frequency and intensity of suicidal ideation. Patient was subsequently transferred and accepted for inpatient psychiatric hospitalization at [facility] for further safety and further stabilization.     Daily Progress: less restrcited today. Will add mirtazapine 15 mg at bedtime to target mood and sleep. Move trazodone to PRN       DIAGNOSTIC FORMULATION   #Major Depressive Disorder, Single Episode, Severe  #Generalized Anxiety Disorder  #Suicidal Ideation     PLAN     Location: Unit [facility]  Legal Status: voluntary     Safety Assessment:    Behavioral Orders   Procedures     Code 1 - Restrict to Unit     Routine " Programming     As clinically indicated     Status 15     Every 15 minutes.        PTA medications held:   -none     PTA medications continued/changed:   -zyrtec PRN     New medications initiated:   -trazodone PRN at bedtime   -fluoxetine 10 mg q daily --> increase to 20 mg q daily (7/7/23)    Today's Changes:  -continue  mirtazapine 15 mg at bedtime    Programming: Patient will be treated in a therapeutic milieu with appropriate individual and group therapies. Education will be provided on diagnoses, medications, and treatments. Encouraged behavioral activation and participation in group programming.     Medical diagnoses:  Per medicine    Disposition: pending clinical course        TREATMENT TEAM CARE PLAN     Progress: Continued symptoms.    Continued Stay Criteria/Rationale: Continued symptoms without sufficient improvement/resolution.    Medical/Physical: See above.    Precautions: See above.     Plan: Continue inpatient care with unit support and medication management.    Rationale for change in precautions or plan: NA due to no change.    Participants: Dustin Blackwood MD, Nursing, SW, OT.    The patient's care was discussed with the treatment team and chart notes were reviewed.       ATTESTATION    Dustin Blackwood MD  [facility]   Psychiatry    Video Visit: Patient has given verbal consent for video visit?: Yes  Type of Service: video visit for mental health treatment  Reason for Video Visit: COVID-19 [place]  Originating Site (patient location): [facility]  Distant Site (provider location): Remote Location  Mode of Communication: Video Conference via [employer]  Time of Service: Date: 07/09/2023 , Start: 08:00 end: 08:30
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
## 2023-07-09 NOTE — PLAN OF CARE
Face to face end of shift report received from Maddy GUTIERREZ RN. Rounding completed and patient observed lying in bed with eyes closed, breathing regular and unlabored.     Goal Outcome Evaluation: Patient has been pleasant and cooperative. She engaged in conversations with peers and staff. She attended groups. She endorsed some anxiety and depression. Her affect was bright when talking on the phone. She denied HI/SI. She denied pain.     Face to face end of shift report communicated to oncoming RN.    Problem: Adult Behavioral Health Plan of Care  Goal: Patient-Specific Goal (Individualization)  Description: Patient will sleep 6 to 8 hours per night  Patient will eat at least 50% of meals  Patient will attend at least 50% of groups  Patient will comply with recommendations of treatment team  Patient will remain medication compliant    Outcome: Progressing     Problem: Suicide Risk  Goal: Absence of Self-Harm  Description: Patient will remain free of self harm while on the unit.  Patient will be able to list 2 coping skills by discharge.  Patient will inform staff of suicidal thoughts while on the unit.   Outcome: Progressing

## 2023-07-10 PROCEDURE — 250N000013 HC RX MED GY IP 250 OP 250 PS 637: Performed by: PSYCHIATRY & NEUROLOGY

## 2023-07-10 PROCEDURE — 99232 SBSQ HOSP IP/OBS MODERATE 35: CPT | Mod: VID | Performed by: PSYCHIATRY & NEUROLOGY

## 2023-07-10 PROCEDURE — 250N000013 HC RX MED GY IP 250 OP 250 PS 637: Performed by: NURSE PRACTITIONER

## 2023-07-10 PROCEDURE — 124N000001 HC R&B MH

## 2023-07-10 RX ADMIN — MIRTAZAPINE 15 MG: 15 TABLET, FILM COATED ORAL at 20:24

## 2023-07-10 RX ADMIN — FLUOXETINE 20 MG: 20 CAPSULE ORAL at 08:09

## 2023-07-10 RX ADMIN — HYDROXYZINE HYDROCHLORIDE 25 MG: 25 TABLET, FILM COATED ORAL at 15:24

## 2023-07-10 ASSESSMENT — ACTIVITIES OF DAILY LIVING (ADL)
ADLS_ACUITY_SCORE: 28

## 2023-07-10 NOTE — PLAN OF CARE
Writer scheduled appointments for PCP as pt did not have one and was interested in having one. Med management referral was mentioned for the appointment.

## 2023-07-10 NOTE — PLAN OF CARE
Problem: Adult Behavioral Health Plan of Care  Goal: Patient-Specific Goal (Individualization)  Description: Patient will sleep 6 to 8 hours per night  Patient will eat at least 50% of meals  Patient will attend at least 50% of groups  Patient will comply with recommendations of treatment team  Patient will remain medication compliant    Outcome: Progressing     Problem: Suicide Risk  Goal: Absence of Self-Harm  Description: Patient will remain free of self harm while on the unit.  Patient will be able to list 2 coping skills by discharge.  Patient will inform staff of suicidal thoughts while on the unit.   Outcome: Progressing     Face to face shift report received from Carmen PEREZ. Rounding completed, pt observed.     Pt appeared to sleep most of this shift. Pt did not have any noted episodes of self harm this shift.    Face to face report will be communicated to oncchichi PEREZ.    Arelis Le RN  7/10/2023  6:21 AM

## 2023-07-10 NOTE — PLAN OF CARE
"Face to face shift report received from CHRIS Infante.       Problem: Adult Behavioral Health Plan of Care  Goal: Patient-Specific Goal (Individualization)  Description: Patient will sleep 6 to 8 hours per night  Patient will eat at least 50% of meals  Patient will attend at least 50% of groups  Patient will comply with recommendations of treatment team  Patient will remain medication compliant  Outcome: Progressing   Calm and cooperative. Medication compliant. Denies thoughts of harming self or others. Reports \"I don't feel ready to deal with my mom just yet.\" Pt reports she lives with her mother currently and has a stressful relationship with her. Pt reports \"I think she has BPD.\" Tearful at times when speaking of this relationship with her mother. Spends majority of shift in lounge area. Social with others. No reports of pain.     Problem: Suicide Risk  Goal: Absence of Self-Harm  Description: Patient will remain free of self harm while on the unit.  Patient will be able to list 2 coping skills by discharge.  Patient will inform staff of suicidal thoughts while on the unit.   Outcome: Progressing   Free from self harm or injury this shift.       Face to face end of shift report to be communicated to oncoming RN.         "

## 2023-07-10 NOTE — PROGRESS NOTES
"    [facility] PSYCHIATRY  -  PROGRESS NOTE     ID   Name: Batool Louis  MRN#: [id number]     SUBJECTIVE   Prior to interviewing the patient, I met with nursing and reviewed patient's clinical condition. We discussed clinical care both before and after the interview. I have reviewed the patient's clinical course by review of records including previous notes, labs, and vital signs.     Per nursing, the patient had the following behavioral events over the last 24-hours:    On psychiatric interview, Dodie is met within her room. She states she is \"Good\". Psycho-education provided medications. She asks whether or not she needs to be on them for the rest of her life. We talked about focusing on immediate stabilization. She reports she is attending groups. Reports she is continuing to talk to family and feels less guilty about her being in the hospital. Tolerating medications without side effect. Denies active SI today. States \"I feel more prepared to leave but still some hesitation\".       MEDICATIONS   Scheduled Meds:    FLUoxetine  20 mg Oral Daily     mirtazapine  15 mg Oral At Bedtime     PRN Meds:.acetaminophen, alum & mag hydroxide-simethicone, cetirizine, hydrOXYzine, melatonin, nicotine polacrilex, OLANZapine **OR** OLANZapine, senna-docusate, traZODone     ALLERGIES   Allergies   Allergen Reactions     Shellfish-Derived Products Nausea and Vomiting     Possible allergy- pt unsure.      Sulfamethoxazole-Trimethoprim Hives     Seasonal Allergies Other (See Comments)     Nasal congestion        VITALS   Vitals: /67   Pulse 69   Temp 96.9  F (36.1  C) (Tympanic)   Resp 18   Ht 1.727 m (5' 8\")   Wt 115 kg (253 lb 9.6 oz)   SpO2 98%   BMI 38.56 kg/m       MENTAL STATUS EXAM   Appearance:  awake, alert, adequately groomed, dressed in hospital scrubs, and appeared as age stated  Attitude:  cooperative  Eye Contact:  good  Mood:  \"good\"  Affect: less  restricted   Speech:  clear, " coherent  Psychomotor Behavior:  no evidence of tardive dyskinesia, dystonia, or tics  Thought Process:  logical, linear, and goal oriented  Associations:  no loose associations  Thought Content:  passive suicidal ideation present  Insight:  limited  Judgment:  limited  Oriented to:  time, person, and place  Attention Span and Concentration:  intact  Recent and Remote Memory:  intact  Gait and Station: Normal        LABS   No results found for this or any previous visit (from the past 24 hour(s)).      ASSESSMENT   Batool Louis is a 22 year old female with a past psychiatric history notable for generalized anxiety disorder, depression. No prior inpatient psychiatric hospitalizations. No prior suicide attempts. No prior CD treatments.  Presents to outside hospital at the recommendation of her mother and father for worsening depression and anxiety symptoms with increased frequency and intensity of suicidal ideation. Patient was subsequently transferred and accepted for inpatient psychiatric hospitalization at [facility] for further safety and further stabilization.     Daily Progress: affect is brighter today, she smiles. Continue current medications without alteration.        DIAGNOSTIC FORMULATION   #Major Depressive Disorder, Single Episode, Severe  #Generalized Anxiety Disorder  #Suicidal Ideation     PLAN     Location: Unit 5  Legal Status: voluntary     Safety Assessment:    Behavioral Orders   Procedures     Code 1 - Restrict to Unit     Routine Programming     As clinically indicated     Status 15     Every 15 minutes.        PTA medications held:   -none     PTA medications continued/changed:   -zyrtec PRN     New medications initiated:   -trazodone PRN at bedtime   -fluoxetine 10 mg q daily --> increase to 20 mg q daily (7/7/23)    Today's Changes:  -continue  mirtazapine 15 mg at bedtime    Programming: Patient will be treated in a therapeutic milieu with  appropriate individual and group therapies. Education will be provided on diagnoses, medications, and treatments. Encouraged behavioral activation and participation in group programming.     Medical diagnoses:  Per medicine    Disposition: pending clinical course        TREATMENT TEAM CARE PLAN     Progress: Continued symptoms.    Continued Stay Criteria/Rationale: Continued symptoms without sufficient improvement/resolution.    Medical/Physical: See above.    Precautions: See above.     Plan: Continue inpatient care with unit support and medication management.    Rationale for change in precautions or plan: NA due to no change.    Participants: Dustin Blackwood MD, Nursing, SW, OT.    The patient's care was discussed with the treatment team and chart notes were reviewed.       ATTESTATION    Dustin Blackwood MD  [facility]   Psychiatry    Video Visit: Patient has given verbal consent for video visit?: Yes  Type of Service: video visit for mental health treatment  Reason for Video Visit: COVID-19 [place]  Originating Site (patient location): [facility]  Distant Site (provider location): Remote Location  Mode of Communication: Video Conference via [employer]  Time of Service: Date: 07/10/2023 , Start: 11:00 end: 11:30

## 2023-07-10 NOTE — PLAN OF CARE
Face to face end of shift report received from Karen LEPE RN. Rounding completed and patient observed in her room awake and working on her papers. No requests at this time.     Goal Outcome Evaluation: Patient was tearful at the beginning of the shift after she had talked to her mom. She said they had an argument. Patient said she didn't understand how to set boundaries with a person like her mom and also how to not let herself get stuck in her thoughts after arguments. Patient seemed insightful and did say although it is a hard situation, she still feels hopeful. Patient denied pain. She endorsed some anxiety and depression. She did say she felt even less ready to go home after the argument. Patient has been attending groups. She has been writing in her journal and working on her WRAP plan.     Face to face end of shift report communicated to oncoming RN.    Plan of Care Reviewed With: patient      Problem: Adult Behavioral Health Plan of Care  Goal: Patient-Specific Goal (Individualization)  Description: Patient will sleep 6 to 8 hours per night  Patient will eat at least 50% of meals  Patient will attend at least 50% of groups  Patient will comply with recommendations of treatment team  Patient will remain medication compliant    Outcome: Progressing     Problem: Suicide Risk  Goal: Absence of Self-Harm  Description: Patient will remain free of self harm while on the unit.  Patient will be able to list 2 coping skills by discharge.  Patient will inform staff of suicidal thoughts while on the unit.   Outcome: Progressing

## 2023-07-11 PROCEDURE — 124N000001 HC R&B MH

## 2023-07-11 PROCEDURE — 99232 SBSQ HOSP IP/OBS MODERATE 35: CPT | Mod: VID | Performed by: PSYCHIATRY & NEUROLOGY

## 2023-07-11 PROCEDURE — 250N000013 HC RX MED GY IP 250 OP 250 PS 637: Performed by: PSYCHIATRY & NEUROLOGY

## 2023-07-11 RX ADMIN — MIRTAZAPINE 15 MG: 15 TABLET, FILM COATED ORAL at 21:37

## 2023-07-11 RX ADMIN — FLUOXETINE 20 MG: 20 CAPSULE ORAL at 08:37

## 2023-07-11 ASSESSMENT — ACTIVITIES OF DAILY LIVING (ADL)
ADLS_ACUITY_SCORE: 28

## 2023-07-11 NOTE — PLAN OF CARE
Face to face shift report received from CHRIS Torres.       Problem: Adult Behavioral Health Plan of Care  Goal: Patient-Specific Goal (Individualization)  Description: Patient will sleep 6 to 8 hours per night  Patient will eat at least 50% of meals  Patient will attend at least 50% of groups  Patient will comply with recommendations of treatment team  Patient will remain medication compliant  Outcome: Progressing   Calm and cooperative. Medication compliant. Denies thoughts of harming self or others. Reports depression and anxiety. Withdrawn to bed unless attending groups. No reports of pain.     Problem: Suicide Risk  Goal: Absence of Self-Harm  Description: Patient will remain free of self harm while on the unit.  Patient will be able to list 2 coping skills by discharge.  Patient will inform staff of suicidal thoughts while on the unit.   Outcome: Progressing   Free from self harm or injury this shift.       Face to face end of shift report to be communicated to oncoming RN. '

## 2023-07-11 NOTE — PROGRESS NOTES
"    [facility] PSYCHIATRY  -  PROGRESS NOTE     ID   Name: Batool Louis  MRN#: [id number]     SUBJECTIVE   Prior to interviewing the patient, I met with nursing and reviewed patient's clinical condition. We discussed clinical care both before and after the interview. I have reviewed the patient's clinical course by review of records including previous notes, labs, and vital signs.     Per nursing, the patient had the following behavioral events over the last 24-hours: none    On psychiatric interview, Dodie is met within her room.  She reports \"I slept better last night\".  She spends her time today talking about difficult conversation she had with her mother. Reports she is realizing that who her mother is and who she wants to be are not aligned. She states that this conflict has been going on for some time and that is one of the reasons she was throwing herself into work, however at the expense of managing her own well being.  She reports hydroxyzine PRN is working well for her. No issues with fluoxetine or mirtazapine. She is thinking she would like to discharge tomorrow. Denies active SI, no plan, no intent.        MEDICATIONS   Scheduled Meds:    FLUoxetine  20 mg Oral Daily     mirtazapine  15 mg Oral At Bedtime     PRN Meds:.acetaminophen, alum & mag hydroxide-simethicone, cetirizine, hydrOXYzine, melatonin, nicotine polacrilex, OLANZapine **OR** OLANZapine, senna-docusate, traZODone     ALLERGIES   Allergies   Allergen Reactions     Shellfish-Derived Products Nausea and Vomiting     Possible allergy- pt unsure.      Sulfamethoxazole-Trimethoprim Hives     Seasonal Allergies Other (See Comments)     Nasal congestion        VITALS   Vitals: /51 (BP Location: Left arm)   Pulse 64   Temp 97.8  F (36.6  C) (Tympanic)   Resp 14   Ht 1.727 m (5' 8\")   Wt 115 kg (253 lb 9.6 oz)   SpO2 96%   BMI 38.56 kg/m       MENTAL STATUS EXAM   Appearance:  awake, alert, adequately groomed, dressed in " "hospital scrubs, and appeared as age stated  Attitude:  cooperative  Eye Contact:  good  Mood:  \"better\"  Affect: less withdrawn, smiles  Speech:  clear, coherent  Psychomotor Behavior:  no evidence of tardive dyskinesia, dystonia, or tics  Thought Process:  logical, linear, and goal oriented  Associations:  no loose associations  Thought Content:  passive suicidal ideation present  Insight:  limited  Judgment:  limited  Oriented to:  time, person, and place  Attention Span and Concentration:  intact  Recent and Remote Memory:  intact  Gait and Station: Normal        LABS   No results found for this or any previous visit (from the past 24 hour(s)).      ASSESSMENT   Batool Louis is a 22 year old female with a past psychiatric history notable for generalized anxiety disorder, depression. No prior inpatient psychiatric hospitalizations. No prior suicide attempts. No prior CD treatments.  Presents to outside hospital at the recommendation of her mother and father for worsening depression and anxiety symptoms with increased frequency and intensity of suicidal ideation. Patient was subsequently transferred and accepted for inpatient psychiatric hospitalization at [facility] for further safety and further stabilization.     Daily Progress: anticipate dismissal tomorrow     DIAGNOSTIC FORMULATION   #Major Depressive Disorder, Single Episode, Severe  #Generalized Anxiety Disorder  #Suicidal Ideation     PLAN     Location: Unit 5  Legal Status: voluntary     Safety Assessment:    Behavioral Orders   Procedures     Code 1 - Restrict to Unit     Routine Programming     As clinically indicated     Status 15     Every 15 minutes.        PTA medications held:   -none     PTA medications continued/changed:   -zyrtec PRN     New medications initiated:   -trazodone PRN at bedtime   -fluoxetine 10 mg q daily --> increase to 20 mg q daily (7/7/23)    Today's Changes:  -no changes "     Programming: Patient will be treated in a therapeutic milieu with appropriate individual and group therapies. Education will be provided on diagnoses, medications, and treatments. Encouraged behavioral activation and participation in group programming.     Medical diagnoses:  Per medicine    Disposition: pending clinical course        TREATMENT TEAM CARE PLAN     Progress: Continued symptoms.    Continued Stay Criteria/Rationale: Continued symptoms without sufficient improvement/resolution.    Medical/Physical: See above.    Precautions: See above.     Plan: Continue inpatient care with unit support and medication management.    Rationale for change in precautions or plan: NA due to no change.    Participants: Dustin Blackwood MD, Nursing, SW, OT.    The patient's care was discussed with the treatment team and chart notes were reviewed.       ATTESTATION    Dustin Blackwood MD  [facility]   Psychiatry    Video Visit: Patient has given verbal consent for video visit?: Yes  Type of Service: video visit for mental health treatment  Reason for Video Visit: COVID-19 [place]  Originating Site (patient location): [facility]  Distant Site (provider location): Remote Location  Mode of Communication: Video Conference via [employer]  Time of Service: Date: 07/11/2023 , Start: 09:00 end: 09:30

## 2023-07-11 NOTE — PLAN OF CARE
Problem: Suicide Risk  Goal: Absence of Self-Harm  Description: Patient will remain free of self harm while on the unit.  Patient will be able to list 2 coping skills by discharge.  Patient will inform staff of suicidal thoughts while on the unit.   Outcome: Progressing     Problem: Adult Behavioral Health Plan of Care  Goal: Patient-Specific Goal (Individualization)  Description: Patient will sleep 6 to 8 hours per night  Patient will eat at least 50% of meals  Patient will attend at least 50% of groups  Patient will comply with recommendations of treatment team  Patient will remain medication compliant    Outcome: Progressing     Face to Face report received from CHRIS Morales.  Rounding completed. Patient observed in bed with eyes closed appearing to sleep all night with even & unlabored respirations noted. 15 minute and PRN checks all night. Patient was free from falls and self-harm. No complaints offered. Will continue to monitor.     Face to face end of shift report communicated to [facility] day shift RN.     Melissa Haro RN  7/11/2023  2:59 AM

## 2023-07-11 NOTE — PLAN OF CARE
"Face to face end of shift report received from Jessica LEPE RN. Rounding completed and patient observed in her room, lying in bed and reading a book. No requests at this time.       Goal Outcome Evaluation:  Patient reported she is trying to read a book called \"stop walking on egg shells\" and said she wanted to finish it before she leaves. She said she things she'll be ready to discharge tomorrow. Patient continues to have insight. she still feels hopeful. Patient denied pain. She endorsed some anxiety and depression. She did say she felt even less ready to go home after the argument. Patient has been attending groups. She has been writing in her journal and working on her WRAP plan.      Face to face end of shift report communicated to oncoming RN.     Plan of Care Reviewed With: patient          Problem: Adult Behavioral Health Plan of Care  Goal: Patient-Specific Goal (Individualization)  Description: Patient will sleep 6 to 8 hours per night  Patient will eat at least 50% of meals  Patient will attend at least 50% of groups  Patient will comply with recommendations of treatment team  Patient will remain medication compliant    Outcome: Progressing     Problem: Suicide Risk  Goal: Absence of Self-Harm  Description: Patient will remain free of self harm while on the unit.  Patient will be able to list 2 coping skills by discharge.  Patient will inform staff of suicidal thoughts while on the unit.   Outcome: Progressing              "

## 2023-07-12 VITALS
OXYGEN SATURATION: 95 % | SYSTOLIC BLOOD PRESSURE: 122 MMHG | HEIGHT: 68 IN | RESPIRATION RATE: 14 BRPM | BODY MASS INDEX: 38.43 KG/M2 | DIASTOLIC BLOOD PRESSURE: 62 MMHG | HEART RATE: 60 BPM | WEIGHT: 253.6 LBS | TEMPERATURE: 97.6 F

## 2023-07-12 PROCEDURE — 250N000013 HC RX MED GY IP 250 OP 250 PS 637: Performed by: PSYCHIATRY & NEUROLOGY

## 2023-07-12 PROCEDURE — 99239 HOSP IP/OBS DSCHRG MGMT >30: CPT | Mod: VID | Performed by: PSYCHIATRY & NEUROLOGY

## 2023-07-12 RX ORDER — MIRTAZAPINE 15 MG/1
15 TABLET, FILM COATED ORAL AT BEDTIME
Qty: 30 TABLET | Refills: 0 | Status: SHIPPED | OUTPATIENT
Start: 2023-07-12 | End: 2023-08-28 | Stop reason: ALTCHOICE

## 2023-07-12 RX ORDER — HYDROXYZINE HYDROCHLORIDE 25 MG/1
25 TABLET, FILM COATED ORAL 2 TIMES DAILY PRN
Qty: 60 TABLET | Refills: 0 | Status: SHIPPED | OUTPATIENT
Start: 2023-07-12 | End: 2023-08-11

## 2023-07-12 RX ADMIN — FLUOXETINE 20 MG: 20 CAPSULE ORAL at 08:16

## 2023-07-12 ASSESSMENT — ACTIVITIES OF DAILY LIVING (ADL)
ADLS_ACUITY_SCORE: 28

## 2023-07-12 NOTE — DISCHARGE SUMMARY
[facility] PSYCHIATRY  DISCHARGE SUMMARY     DISCHARGE DATA     Batool Louis MRN# [id number]   Age: 22 year old YOB: 2000     Date of Admission: 7/4/2023  Date of Discharge: July 12, 2023  Discharge Provider: Dustin Blackwood MD       REASON FOR ADMISSION   Batool Louis is a 22 year old female with a past psychiatric history notable for generalized anxiety disorder, depression. No prior inpatient psychiatric hospitalizations. No prior suicide attempts. No prior CD treatments.  Presents to outside hospital at the recommendation of her mother and father for worsening depression and anxiety symptoms with increased frequency and intensity of suicidal ideation. Patient was subsequently transferred and accepted for inpatient psychiatric hospitalization at [facility] for further safety and further stabilization.       DISCHARGE DIAGNOSES   #Major Depressive Disorder, Single Episode, Severe  #Generalized Anxiety Disorder  #Suicidal Ideation     CONSULTS     OT       HOSPITAL COURSE   Patient was admitted to unit 5 due to the aforementioned presentation. The patient was placed under 15 minute checks to ensure patient safety. The patient participated in unit programming and groups as able.    Legal status during hospitalization was voluntary .Ms. Louis did not require seclusion/restraint during hospitalization.     We reviewed with Ms. Louis current and past medication trials including duration, dose, response and side effects. During this hospitalization, the following changes to the patient's psychotropic medications were made:    PTA medications held:   -none     PTA medications continued/changed:   -zyrtec PRN     New medications initiated:   -trazodone PRN at bedtime   -fluoxetine 10 mg q daily --> increase to 20 mg q daily (7/7/23)  -mirtazapine 15 mg at bedtime     Ms. Louis progressed gradually related to response to  medication changes, struggle with acceptance of psychosocial stressors, difficulty accepting illness, urgency to leave due to family and work pressure, confidence in skills to manage symptoms, establishment of a supportive community network and psychosocial stressors outside hospital  . By the time of discharge, her symptoms had improved adequately.  Depression improved with increased confidence in ability to manage symptoms., Anxiety improved with increased confidence in ability to manage symptoms., Suicidal ideation resolved with adequate outpatient plan in place.  and Psychotropic medications utilized were found to be helpful without significant adverse side effects. The treatment goals (long-term goal/discharge criteria) were reached.     With the aforementioned changes and supports the patient noticed improvement in their symptoms and felt sufficiently ready for discharge. As a result, Batool Louis was discharged. At the time of discharge, Batool Louis was determined to not be a danger to self or others. The patient was also medically stable for discharge. At the current time of discharge, the patient does not meet criteria for involuntary hospitalization. On the day of discharge, the patient reports that they do not have suicidal or homicidal ideation. Steps taken to minimize risk include: assessing patient s behavior and thought process daily during hospital stay, discharging patient with adequate plan for follow up for mental and physical health and discussing safety plan of returning to the hospital should the patient ever have thoughts of harming themselves or others. Therefore, based on all available evidence including the factors cited above, the patient does not appear to be at imminent risk for self-harm, and is appropriate for outpatient level of care. The acute crisis is resolved and Batool is discharging in improved condition. Though Batool's acute crisis has resolved, there  "remains a higher risk of suicide over the long term compared to the general population. Factors that may be associated with relapse include worsening of depressive symptoms, alcohol use, illicit substance use, not taking medications, lack of mental health follow-up appointments and work/family/relationship stressors. Batool Louis has a plan in place to mitigate these stressors, is hopeful about the future ,and is not assessed to be a imminent risk to self or anyone else and thus is not assessed to be holdable.  Batool has received maximal benefit from the current hospital stay. Batool WEAVER Markiedeannnbsania set to discharge.        DISCHARGE MEDICATIONS     Discharge Medication List as of 7/12/2023 12:17 PM      START taking these medications    Details   FLUoxetine (PROZAC) 20 MG capsule Take 1 capsule (20 mg) by mouth daily for 30 days, Disp-30 capsule, R-0, E-Prescribe      hydrOXYzine (ATARAX) 25 MG tablet Take 1 tablet (25 mg) by mouth 2 times daily as needed for anxiety, Disp-60 tablet, R-0, E-Prescribe      mirtazapine (REMERON) 15 MG tablet Take 1 tablet (15 mg) by mouth At Bedtime for 30 days, Disp-30 tablet, R-0, E-Prescribe         CONTINUE these medications which have NOT CHANGED    Details   cetirizine (ZYRTEC) 10 MG tablet Take 10 mg by mouth daily as needed for allergies seasonal, Historical      levonorgestrel (KYLEENA) 19.5 MG IUD 1 each by Intrauterine route continuous Put in 8/12/2020, Historical              VITALS   Vitals: /62 (BP Location: Left arm)   Pulse 60   Temp 97.6  F (36.4  C) (Tympanic)   Resp 14   Ht 1.727 m (5' 8\")   Wt 115 kg (253 lb 9.6 oz)   SpO2 95%   BMI 38.56 kg/m       MENTAL STATUS EXAM   Appearance: Alert, oriented, dressed in hospital scrubs, appears stated age   Attitude: Cooperative   Eye Contact: Good  Mood: \"Better\"  Affect: Full range of affect  Speech: Normal rate and rhythm   Psychomotor Behavior: No tremor, rigidity, or psychomotor abnormality "   Thought Process: Logical, goal directed   Associations: No loose associations   Thought Content: Denies SI or plan. No SIB. Denies A/V hallucinations. No evidence of delusional thought.  Insight: Good  Judgment: Good  Oriented to: Person, place, and time  Attention Span and Concentration: Intact  Recent and Remote Memory: Intact  Language: English with appropriate syntax and vocabulary  Fund of Knowledge: Average  Muscle Strength and Tone: Grossly normal  Gait and Station: Grossly normal       DISCHARGE PLAN     1.  Education given regarding diagnostic and treatment options with risks, benefits and alternatives with adequate verbalization of understanding.  2.  Discharge to home. Upon detailed review of risk factors, patient amenable for release.   3.  Continue aforementioned medications and associated medication changes with follow-up by outpatient provider.  4.  Crisis management planning in place.    5.  Nursing and  to review further discharge recommendations.   6.  Active issues: No active medical issues requiring immediate follow-up care.  7.  Patient is being discharged with the following appointments as detailed below:    See AVS       DISCHARGE SERVICES PROVIDED     80 minutes spent on discharge services, including:  Final examination of patient.  Review and discussion of hospital stay.  Instructions for continued outpatient care/goals.  Preparation of discharge records.  Preparation of medications refills and new prescriptions.  Preparation of applicable referral forms.        ATTESTATION   Dustin Blackwood MD  [facility]   Psychiatry    Video Visit: Patient has given verbal consent for video visit?: Yes  Type of Service: video visit for mental health treatment  Reason for Video Visit: COVID-19 [place]  Originating Site (patient location): [facility]  Distant Site (provider location): Remote Location  Mode of Communication: Video Conference via  [employer]  Time of Service: Date: July 12, 2023 , Start:10:00 end: 10:30     LABS THIS ADMISSION     No results found for any visits on 07/04/23.

## 2023-07-12 NOTE — PLAN OF CARE
Pt is discharging at the recommendation of the treatment team. Pt is discharging to care of parents transported by parents. Pt denies having any thoughts of hurting themself or anyone else. Pt denies anxiety or depression. Pt has follow up with PCP w/ referral for med management and therapy. Discharge instructions, including; demographic sheet, psychiatric evaluation, discharge summary, and AVS were faxed to these next level of care providers.

## 2023-07-12 NOTE — PLAN OF CARE
Problem: Suicide Risk  Goal: Absence of Self-Harm  Description: Patient will remain free of self harm while on the unit.  Patient will be able to list 2 coping skills by discharge.  Patient will inform staff of suicidal thoughts while on the unit.   Outcome: Progressing     Problem: Adult Behavioral Health Plan of Care  Goal: Patient-Specific Goal (Individualization)  Description: Patient will sleep 6 to 8 hours per night  Patient will eat at least 50% of meals  Patient will attend at least 50% of groups  Patient will comply with recommendations of treatment team  Patient will remain medication compliant    Outcome: Progressing     Face to Face report received from CHRIS Morales.  Rounding completed. Patient observed in bed with eyes closed appearing to sleep all night with even & unlabored respirations noted. 15 minute and PRN checks all night. Patient was free from falls and self-harm. No complaints offered. Will continue to monitor.     Face to face end of shift report communicated to [facility] day shift RN.     Melissa Haro RN  7/12/2023  3:30 AM

## 2023-07-12 NOTE — PLAN OF CARE
Face to face shift report received from Mleissa PEREZ. Rounding completed, pt observed laying in bed at the start of the shift.    Patient in lounge throughout the day. Attending groups and socializing with other peers. Alert and making needs known. Pleasant during conversation with this writer. Compliant with scheduled medication and nursing assessment. Endorses anxiety related to discharging this afternoon stating she feels as ready as she can be. Denies depression, hallucinations, SI/HI, and pain.     Discharge Note    Patient Discharged to home on 7/12/2023 1:58 PM via Private Car accompanied by parents.     Patient informed of discharge instructions in AVS. patient verbalizes understanding and denies having any questions pertaining to AVS. Patient stable at time of discharge. Patient denies SI, HI, and thoughts of self harm at time of discharge. All personal belongings returned to patient. Discharge prescriptions sent to [facility] via electronic communication. Picked up and brought to unit. Psych evaluation, history and physical, AVS, and discharge summary faxed to next level of care per social work.     Marguerite Castelan RN  7/12/2023    Problem: Adult Behavioral Health Plan of Care  Goal: Patient-Specific Goal (Individualization)  Description: Patient will sleep 6 to 8 hours per night  Patient will eat at least 50% of meals  Patient will attend at least 50% of groups  Patient will comply with recommendations of treatment team  Patient will remain medication compliant    Outcome: Progressing     Problem: Suicide Risk  Goal: Absence of Self-Harm  Description: Patient will remain free of self harm while on the unit.  Patient will be able to list 2 coping skills by discharge.  Patient will inform staff of suicidal thoughts while on the unit.   Outcome: Progressing    Face to face report will be communicated to oncoming RN.    Marguerite Castelan RN  7/12/2023

## 2023-07-13 ENCOUNTER — TELEPHONE (OUTPATIENT)
Dept: BEHAVIORAL HEALTH | Facility: HOSPITAL | Age: 23
End: 2023-07-13

## 2023-07-13 NOTE — TELEPHONE ENCOUNTER
"[facility]: Post-Hospital Discharge Note     Situation   Post hospital discharge call placed 07/13/23.      Batool did not answer. A message was left.  Messages are left with a call back number should they need to reach staff with any questions, need for additional resources, or need assistance setting up a post hospitalization follow up appointments, if unable to do so independently.    Inpatient Mental Health Admission Information:  Admission Date: 7/4/23  Admission Reason: Batool Louis is a 22 year old female with a past psychiatric history notable for generalized anxiety disorder, depression. No prior inpatient psychiatric hospitalizations. No prior suicide attempts. No prior CD treatments.  Presents to outside hospital at the recommendation of her mother and father for worsening depression and anxiety symptoms with increased frequency and intensity of suicidal ideation. Patient was subsequently transferred and accepted for inpatient psychiatric hospitalization at [facility] for further safety and further stabilization.      Inpatient Mental Health Discharge Information:  Discharge Date: 7/12/23  Discharged to: Home/self care  Discharge Diagnosis: #Major Depressive Disorder, Single Episode, Severe  #Generalized Anxiety Disorder  #Suicidal Ideation    Background    The following information is obtained from the hospital after visit summary and inpatient provider notes.     \"Patient was admitted to unit 5 due to the aforementioned presentation. The patient was placed under 15 minute checks to ensure patient safety. The patient participated in unit programming and groups as able.     Legal status during hospitalization was voluntary .Ms. Louis did not require seclusion/restraint during hospitalization.      We reviewed with Ms. Louis current and past medication trials including duration, dose, response and side effects. During this hospitalization, the " following changes to the patient's psychotropic medications were made:     PTA medications held:   -none     PTA medications continued/changed:   -zyrtec PRN     New medications initiated:   -trazodone PRN at bedtime   -fluoxetine 10 mg q daily --> increase to 20 mg q daily (7/7/23)  -mirtazapine 15 mg at bedtime      Ms. Louis progressed gradually related to response to medication changes, struggle with acceptance of psychosocial stressors, difficulty accepting illness, urgency to leave due to family and work pressure, confidence in skills to manage symptoms, establishment of a supportive community network and psychosocial stressors outside hospital  . By the time of discharge, her symptoms had improved adequately.  Depression improved with increased confidence in ability to manage symptoms., Anxiety improved with increased confidence in ability to manage symptoms., Suicidal ideation resolved with adequate outpatient plan in place.  and Psychotropic medications utilized were found to be helpful without significant adverse side effects. The treatment goals (long-term goal/discharge criteria) were reached.      With the aforementioned changes and supports the patient noticed improvement in their symptoms and felt sufficiently ready for discharge. As a result, Batool Louis was discharged. At the time of discharge, Batool Louis was determined to not be a danger to self or others. The patient was also medically stable for discharge. At the current time of discharge, the patient does not meet criteria for involuntary hospitalization. On the day of discharge, the patient reports that they do not have suicidal or homicidal ideation. Steps taken to minimize risk include: assessing patient s behavior and thought process daily during hospital stay, discharging patient with adequate plan for follow up for mental and physical health and discussing safety plan of returning to the hospital should the  patient ever have thoughts of harming themselves or others. Therefore, based on all available evidence including the factors cited above, the patient does not appear to be at imminent risk for self-harm, and is appropriate for outpatient level of care. The acute crisis is resolved and Batool is discharging in improved condition. Though Batool's acute crisis has resolved, there remains a higher risk of suicide over the long term compared to the general population. Factors that may be associated with relapse include worsening of depressive symptoms, alcohol use, illicit substance use, not taking medications, lack of mental health follow-up appointments and work/family/relationship stressors. Batool Louis has a plan in place to mitigate these stressors, is hopeful about the future ,and is not assessed to be a imminent risk to self or anyone else and thus is not assessed to be holdable.  Batool has received maximal benefit from the current hospital stay. Batool Louis set to discharge.   Post Discharge Assessment   How have your symptoms been since being discharged from the hospital? Unable to reach patient  Do you have your discharge instructions/after visit summary? N/A  Do you have any questions related to your discharge instructions? N/A    Discharge Medication Assessment   Medications were reviewed in full on discharge, including: Medications to be started, medications to be stopped, medications to be continued from preadmission and any side effects.      Prescriptions were e-scribed or sent to their preferred pharmacy at discharge and were able to be filled: Yes  Do you have any questions about your medications? N/A    Outpatient Plan   Health [facility]  [facility] Dr. Yosef Berg July 20th @1:45pm  [address] [place], [address]  [phone]  [facility] Therapy- K. July 19th @ 8am  [address] [facility] [address] [facility] [address] [facility] [place]  [address]  [phone]    Future Appointments:  Discharge follow up appointment scheduled within 14 days of discharging from hospital? Yes   Discharge follow up appointment scheduled with Dr Yosef Berg at [facility] on July 20th at 1:45 PM.    Further information, barriers, or follow up this writer has addressed: N/A

## 2023-07-16 NOTE — PROGRESS NOTES
Problem: Pain  Goal: Acceptable pain/comfort level is achieved/maintained at rest (based on Pain Behaviors Scale)  Description: This goal is used for patients who are not able to self-report pain and are assessed for pain using the Pain Behaviors Scale  Outcome: Outcome Met, Complete Goal      Assessment & Plan       ICD-10-CM    1. Chronic tonsillitis  J35.01 Adult ENT  Referral   2. Screen for STD (sexually transmitted disease)  Z11.3 NEISSERIA GONORRHOEA PCR     CHLAMYDIA TRACHOMATIS PCR     HIV Antigen Antibody Combo     Hepatitis C antibody     Treponema Abs w Reflex to RPR and Titer     NEISSERIA GONORRHOEA PCR     CHLAMYDIA TRACHOMATIS PCR     HIV Antigen Antibody Combo     Hepatitis C antibody     Treponema Abs w Reflex to RPR and Titer   3. Acne, unspecified acne type  L70.9         Recurrent tonsillitis.  Refer to ENT  Sexually active and desires STD testing.  Ordered.  Acne therapies discussed.  Good face washing.  Avoid irritating cosmetics.  Trial of over-the-counter benzyl peroxide products.  If not helpful consider minocycline or topical clindamycin, or tretinoin    No follow-ups on file.    Anil Zimmer MD  [facility] HUMBERTO Renae is a 21 year old female who presents to clinic today for the following health issues     HPI     Answers for HPI/ROS submitted by the patient on 7/13/2022  How many servings of fruits and vegetables do you eat daily?: 2-3  On average, how many sweetened beverages do you drink each day (Examples: soda, juice, sweet tea, etc.  Do NOT count diet or artificially sweetened beverages)?: 0  How many minutes a day do you exercise enough to make your heart beat faster?: 30 to 60  How many days a week do you exercise enough to make your heart beat faster?: 4  How many days per week do you miss taking your medication?: 0  What is the reason for your visit today?: Tonsils, rash, std testing and acne  When did your symptoms begin?: 1-2 weeks ago  What are your symptoms?: Swollen and inflamed tonsils, redness for rash, acne on face  How would you describe these symptoms?: Moderate  Are your symptoms:: Worsening  Have you had these symptoms before?: Yes  Have you tried or received treatment for these symptoms before?: Yes  Did  that treatment work? : Yes  Please describe the treatment you had:: I was given an antibiotic  Is there anything that makes you feel worse?: Speaking sometimes and vigorous exercise  Is there anything that makes you feel better?: Warm liquids and rest      Potentially exposed to sti  Has tonsillitis over and over again  More acne, wondering about options        Review of Systems   Constitutional, HEENT, cardiovascular, pulmonary, gi and gu systems are negative, except as otherwise noted.      Objective    /70 (BP Location: Right arm, Patient Position: Sitting, Cuff Size: Adult Regular)   Pulse 83   Temp 97.3  F (36.3  C) (Temporal)   Resp 16   Wt 92.3 kg (203 lb 6.4 oz)   SpO2 97%   BMI 30.93 kg/m       Physical Exam     Mild facial erythematous papules and pustules present bilateral cheeks

## 2023-07-19 ENCOUNTER — VIRTUAL VISIT (OUTPATIENT)
Dept: PSYCHOLOGY | Facility: CLINIC | Age: 23
End: 2023-07-19
Payer: COMMERCIAL

## 2023-07-19 DIAGNOSIS — F41.1 GENERALIZED ANXIETY DISORDER: Primary | ICD-10-CM

## 2023-07-19 PROCEDURE — 90837 PSYTX W PT 60 MINUTES: CPT | Mod: VID | Performed by: COUNSELOR

## 2023-07-19 NOTE — PROGRESS NOTES
[facility]                                     Progress Note    Patient Name: Batool Louis  Date: 7/19/23         Service Type: Individual      Session Start Time: 8:10am  Session End Time: 9:05am     Session Length: 55    Session #: 54    Attendees: Client    Service Modality:  Video Visit:      Provider verified identity through the following two step process.  Patient provided:  Patient is known previously to provider    Telemedicine Visit: The patient's condition can be safely assessed and treated via synchronous audio and visual telemedicine encounter.      Reason for Telemedicine Visit: Services only offered telehealth    Originating Site (Patient Location): Patient's home    Distant Site (Provider Location): Provider Remote Setting- Home Office    Consent:  The patient/guardian has verbally consented to: the potential risks and benefits of telemedicine (video visit) versus in person care; bill my insurance or make self-payment for services provided; and responsibility for payment of non-covered services.     Patient would like the video invitation sent by:  Send to e-mail at: [email or web address]    Mode of Communication:  Video Conference via well    As the provider I attest to compliance with applicable laws and regulations related to telemedicine.    DATA  Interactive Complexity: No  Crisis: No        Progress Since Last Session (Related to Symptoms / Goals / Homework):   Symptoms: No change .    Homework: Achieved / completed to satisfaction      Episode of Care Goals: Satisfactory progress - ACTION (Actively working towards change); Intervened by reinforcing change plan / affirming steps taken     Current / Ongoing Stressors and Concerns:  The client stated she forgot about today's appointment.   Client and boyfriend broke up.   Client is quitting one of her jobs  Recently was in the hospital for SI.      Treatment Objective(s) Addressed in This Session:   use  thought-stopping strategy daily to reduce intrusive thoughts       Intervention:   Talked about how the client was working 5 jobs at one time, wasn't sleeping well and was feeling very anxious/paranoid. Wouldn't be able to fall asleep until around 4am and then would have to get up to leave at 5am with her boyfriend at the time.  She was staying with him the majority of her time to be gone from home. Stated moving back home after the breakup was hard. She realized while in the hospital that her mom has BPD.   She's on a new medications: Prozac, hydroxyzine, Remeron.   [place] in the hospital that she needs to take breaks from doing things (work), needs to unplug from phone (she deleted all of her social media).     Assessments completed prior to visit:  The following assessments were completed by patient for this visit:  PHQ9:       8/13/2018     9:26 AM 3/12/2020     3:07 PM 4/30/2020    11:50 AM 5/22/2020     3:25 PM 7/10/2020     9:57 AM 2/1/2021     8:45 AM 3/21/2022     9:28 AM   PHQ-9 SCORE   PHQ-9 Total Score MyChart 7 (Mild depression)   15 (Moderately severe depression)  4 (Minimal depression)    PHQ-9 Total Score 7 10 6 15 14 4 8     GAD7:       3/1/2018     7:08 AM 6/11/2018    10:28 AM 8/13/2018     9:27 AM 3/12/2020     3:07 PM 5/22/2020     3:24 PM 2/1/2021     8:45 AM 11/14/2022     9:36 AM   SHELBY-7 SCORE   Total Score   6 (mild anxiety)  16 (severe anxiety) 10 (moderate anxiety) 16 (severe anxiety)   Total Score 5 4 6 7 16 10 16     PROMIS 10-Global Health (all questions and answers displayed):       12/21/2021    10:06 AM 11/14/2022     9:37 AM 2/13/2023    10:23 AM 2/27/2023    10:07 AM   PROMIS 10   In general, would you say your health is: Very good Fair Fair Fair   In general, would you say your quality of life is: Very good Good Good Good   In general, how would you rate your physical health? Very good Good Fair Fair   In general, how would you rate your mental health, including your mood and  your ability to think? Very good Fair Fair Good   In general, how would you rate your satisfaction with your social activities and relationships? Very good Fair Fair Fair   In general, please rate how well you carry out your usual social activities and roles Very good Fair Good Fair   To what extent are you able to carry out your everyday physical activities such as walking, climbing stairs, carrying groceries, or moving a chair? Completely Completely Completely Completely   In the past 7 days, how often have you been bothered by emotional problems such as feeling anxious, depressed, or irritable? Rarely Often Sometimes Often   In the past 7 days, how would you rate your fatigue on average? Mild Moderate Mild Mild   In the past 7 days, how would you rate your pain on average, where 0 means no pain, and 10 means worst imaginable pain? 1 2 3 0   In general, would you say your health is: 4 2 2 2   In general, would you say your quality of life is: 4 3 3 3   In general, how would you rate your physical health? 4 3 2 2   In general, how would you rate your mental health, including your mood and your ability to think? 4 2 2 3   In general, how would you rate your satisfaction with your social activities and relationships? 4 2 2 2   In general, please rate how well you carry out your usual social activities and roles. (This includes activities at home, at work and in your community, and responsibilities as a parent, child, spouse, employee, friend, etc.) 4 2 3 2   To what extent are you able to carry out your everyday physical activities such as walking, climbing stairs, carrying groceries, or moving a chair? 5 5 5 5   In the past 7 days, how often have you been bothered by emotional problems such as feeling anxious, depressed, or irritable? 2 4 3 4   In the past 7 days, how would you rate your fatigue on average? 2 3 2 2   In the past 7 days, how would you rate your pain on average, where 0 means no pain, and 10 means  worst imaginable pain? 1 2 3 0   Global Mental Health Score 16 9 10 10   Global Physical Health Score 17 15 15 16   PROMIS TOTAL - SUBSCORES 33 24 25 26         ASSESSMENT: Current Emotional / Mental Status (status of significant symptoms):   Risk status (Self / Other harm or suicidal ideation)   Patient denies current fears or concerns for personal safety.   Patient denies current or recent suicidal ideation or behaviors.   Patient denies current or recent homicidal ideation or behaviors.   Patient denies current or recent self injurious behavior or ideation.   Patient denies other safety concerns.   Patient reports there has been no change in risk factors since their last session.     Patient reports there has been no change in protective factors since their last session.     Recommended that patient call 911 or go to the local ED should there be a change in any of these risk factors.     Appearance:   Appropriate    Eye Contact:   Good    Psychomotor Behavior: Normal    Attitude:   Cooperative    Orientation:   All   Speech    Rate / Production: Normal/ Responsive    Volume:  Normal    Mood:    Normal   Affect:    Appropriate    Thought Content:  Clear    Thought Form:  Coherent  Logical    Insight:    Good      Medication Review:   No current psychiatric medications prescribed     Medication Compliance:   NA     Changes in Health Issues:   None reported     Chemical Use Review:   Substance Use: Chemical use reviewed, no active concerns identified      Tobacco Use: No current tobacco use.      Diagnosis:  1. Generalized anxiety disorder        Collateral Reports Completed:   Not Applicable    PLAN: (Patient Tasks / Therapist Tasks / Other)  Client to continue to practice stress reduction skills and follow safety plan from the hospital.         [facility]a Linda [facility]                                                         ______________________________________________________________________    Individual Treatment  Plan    Patient's Name: Batool Louis  YOB: 2000    Date of Creation: 3/23/22  Date Treatment Plan Last Reviewed/Revised: 4/24/23    DSM5 Diagnoses: 300.02 (F41.1) Generalized Anxiety Disorder  Psychosocial / Contextual Factors: college student, living at home, family conflict  PROMIS (reviewed every 90 days):     Referral / Collaboration:  Referral to another professional/service is not indicated at this time..    Anticipated number of session for this episode of care: on-going  Anticipation frequency of session: Every other week  Anticipated Duration of each session: 38-52 minutes  Treatment plan will be reviewed in 90 days or when goals have been changed.       MeasurableTreatment Goal(s) related to diagnosis / functional impairment(s)    MeasurableTreatment Goal(s) related to diagnosis / functional impairment(s)  Goal 1: Client will increase emotion regulation skills/decrease panic attacks    Objective #A (Client Action)    Client will identify 2-4 fears / thoughts that contribute to feeling anxious  use positive self-affirmations daily.  Status: Continued - Date(s): 4/24/23    Intervention(s)  Therapist will teach emotional regulation skills. distress tolerance skills, interpersonal effectiveness skills, emotion regluation skills, mindfulness skills, radical acceptance. Therapist will teach client how to ID body cues for anxiety, anxiety reduction techniques, how to ID triggers for depression and anxiety- decrease reactivity/eliminate, lifestyle changes to reduce depression and anxiety, communication skills, explore cognitive beliefs and help client to develop healthy cognitive patterns and beliefs.      Objective #B  Client will use thought-stopping strategy daily to reduce intrusive thoughts.  Status: Continued - Date(s): 4/24/23    Intervention(s)  Therapist will teach emotional regulation skills. distress tolerance skills, interpersonal effectiveness skills, emotion regluation skills,  "mindfulness skills, radical acceptance. Therapist will teach client how to ID body cues for anxiety, anxiety reduction techniques, how to ID triggers for depression and anxiety- decrease reactivity/eliminate, lifestyle changes to reduce depression and anxiety, communication skills, explore cognitive beliefs and help client to develop healthy cognitive patterns and beliefs.    Objective #C (Client Action)    Status: Continued - Date: 4/24/23    Client will use \"worry time\" each day for 15 minutes of scheduled worry and then defer obsessive or anxious thinking until the next structured worry time.    Intervention(s)  Therapist will teach emotional regulation skills. work through trauma using somatic experiencing techniques to discharge the anxiety.    Goal 2: Client will work on increasing self esteem and self worth     Objective #A (Client Action)    Status: Continued - Date(s): 4/24/23    Client will identify two areas of life that you would like to have improved functioning.    Intervention(s)  Therapist will teach emotional regulation skills. work through trauma using somatic experiencing techniques to discharge the anxiety.      Client has reviewed and agreed to the above plan.      [facility]    "

## 2023-07-26 ENCOUNTER — VIRTUAL VISIT (OUTPATIENT)
Dept: PSYCHOLOGY | Facility: CLINIC | Age: 23
End: 2023-07-26
Payer: COMMERCIAL

## 2023-07-26 DIAGNOSIS — F41.1 GENERALIZED ANXIETY DISORDER: Primary | ICD-10-CM

## 2023-07-26 PROCEDURE — 90834 PSYTX W PT 45 MINUTES: CPT | Mod: VID | Performed by: COUNSELOR

## 2023-07-26 NOTE — PROGRESS NOTES
[facility]                                     Progress Note    Patient Name: Batool Louis  Date: 7/26/23         Service Type: Individual      Session Start Time: 7:05am  Session End Time: 7:55am     Session Length: 50    Session #: 55    Attendees: Client    Service Modality:  Video Visit:      Provider verified identity through the following two step process.  Patient provided:  Patient is known previously to provider    Telemedicine Visit: The patient's condition can be safely assessed and treated via synchronous audio and visual telemedicine encounter.      Reason for Telemedicine Visit: Services only offered telehealth    Originating Site (Patient Location): Patient's home    Distant Site (Provider Location): Provider Remote Setting- Home Office    Consent:  The patient/guardian has verbally consented to: the potential risks and benefits of telemedicine (video visit) versus in person care; bill my insurance or make self-payment for services provided; and responsibility for payment of non-covered services.     Patient would like the video invitation sent by:  Send to e-mail at: [email or web address]    Mode of Communication:  Video Conference via [employer]well    As the provider I attest to compliance with applicable laws and regulations related to telemedicine.    DATA  Interactive Complexity: No  Crisis: No        Progress Since Last Session (Related to Symptoms / Goals / Homework):   Symptoms: No change .    Homework: Achieved / completed to satisfaction      Episode of Care Goals: Satisfactory progress - ACTION (Actively working towards change); Intervened by reinforcing change plan / affirming steps taken     Current / Ongoing Stressors and Concerns:  The client stated she quit her job on Monday.   Has had some conversations with her ex. Feels they are on good terms.   Discussed her relationship with her mom currently.      Treatment Objective(s) Addressed in This Session:   use  thought-stopping strategy daily to reduce intrusive thoughts       Intervention:   Talked about how she's been feeling lately. She stated she missed her follow up med appointment because she overslept due to the medication. Encouraged her to find a psychiatrist/talk to her doctor about the medication.   Talked about her relationship with her ex and boundaries she needs to have with him. She stated he's been talking every night with his roommate about their breakup and the roommate has been coming to her about it. Talked about current issues with her mom and their relationship.     Assessments completed prior to visit:  The following assessments were completed by patient for this visit:  PHQ9:       8/13/2018     9:26 AM 3/12/2020     3:07 PM 4/30/2020    11:50 AM 5/22/2020     3:25 PM 7/10/2020     9:57 AM 2/1/2021     8:45 AM 3/21/2022     9:28 AM   PHQ-9 SCORE   PHQ-9 Total Score MyChart 7 (Mild depression)   15 (Moderately severe depression)  4 (Minimal depression)    PHQ-9 Total Score 7 10 6 15 14 4 8     GAD7:       3/1/2018     7:08 AM 6/11/2018    10:28 AM 8/13/2018     9:27 AM 3/12/2020     3:07 PM 5/22/2020     3:24 PM 2/1/2021     8:45 AM 11/14/2022     9:36 AM   SHELBY-7 SCORE   Total Score   6 (mild anxiety)  16 (severe anxiety) 10 (moderate anxiety) 16 (severe anxiety)   Total Score 5 4 6 7 16 10 16     PROMIS 10-Global Health (all questions and answers displayed):       12/21/2021    10:06 AM 11/14/2022     9:37 AM 2/13/2023    10:23 AM 2/27/2023    10:07 AM   PROMIS 10   In general, would you say your health is: Very good Fair Fair Fair   In general, would you say your quality of life is: Very good Good Good Good   In general, how would you rate your physical health? Very good Good Fair Fair   In general, how would you rate your mental health, including your mood and your ability to think? Very good Fair Fair Good   In general, how would you rate your satisfaction with your social activities and  relationships? Very good Fair Fair Fair   In general, please rate how well you carry out your usual social activities and roles Very good Fair Good Fair   To what extent are you able to carry out your everyday physical activities such as walking, climbing stairs, carrying groceries, or moving a chair? Completely Completely Completely Completely   In the past 7 days, how often have you been bothered by emotional problems such as feeling anxious, depressed, or irritable? Rarely Often Sometimes Often   In the past 7 days, how would you rate your fatigue on average? Mild Moderate Mild Mild   In the past 7 days, how would you rate your pain on average, where 0 means no pain, and 10 means worst imaginable pain? 1 2 3 0   In general, would you say your health is: 4 2 2 2   In general, would you say your quality of life is: 4 3 3 3   In general, how would you rate your physical health? 4 3 2 2   In general, how would you rate your mental health, including your mood and your ability to think? 4 2 2 3   In general, how would you rate your satisfaction with your social activities and relationships? 4 2 2 2   In general, please rate how well you carry out your usual social activities and roles. (This includes activities at home, at work and in your community, and responsibilities as a parent, child, spouse, employee, friend, etc.) 4 2 3 2   To what extent are you able to carry out your everyday physical activities such as walking, climbing stairs, carrying groceries, or moving a chair? 5 5 5 5   In the past 7 days, how often have you been bothered by emotional problems such as feeling anxious, depressed, or irritable? 2 4 3 4   In the past 7 days, how would you rate your fatigue on average? 2 3 2 2   In the past 7 days, how would you rate your pain on average, where 0 means no pain, and 10 means worst imaginable pain? 1 2 3 0   Global Mental Health Score 16 9 10 10   Global Physical Health Score 17 15 15 16   PROMIS TOTAL -  SUBSCORES 33 24 25 26         ASSESSMENT: Current Emotional / Mental Status (status of significant symptoms):   Risk status (Self / Other harm or suicidal ideation)   Patient denies current fears or concerns for personal safety.   Patient denies current or recent suicidal ideation or behaviors.   Patient denies current or recent homicidal ideation or behaviors.   Patient denies current or recent self injurious behavior or ideation.   Patient denies other safety concerns.   Patient reports there has been no change in risk factors since their last session.     Patient reports there has been no change in protective factors since their last session.     Recommended that patient call 911 or go to the local ED should there be a change in any of these risk factors.     Appearance:   Appropriate    Eye Contact:   Good    Psychomotor Behavior: Normal    Attitude:   Cooperative    Orientation:   All   Speech    Rate / Production: Normal/ Responsive    Volume:  Normal    Mood:    Normal   Affect:    Appropriate    Thought Content:  Clear    Thought Form:  Coherent  Logical    Insight:    Good      Medication Review:   No current psychiatric medications prescribed     Medication Compliance:   NA     Changes in Health Issues:   None reported     Chemical Use Review:   Substance Use: Chemical use reviewed, no active concerns identified      Tobacco Use: No current tobacco use.      Diagnosis:  1. Generalized anxiety disorder        Collateral Reports Completed:   Not Applicable    PLAN: (Patient Tasks / Therapist Tasks / Other)  Client to continue to practice stress reduction skills and follow up with doctor.         [facility]                                                         ______________________________________________________________________    Individual Treatment Plan    Patient's Name: Batool Louis  YOB: 2000    Date of Creation: 3/23/22  Date Treatment Plan Last Reviewed/Revised:  7/26/23    DSM5 Diagnoses: 300.02 (F41.1) Generalized Anxiety Disorder  Psychosocial / Contextual Factors: college student, living at home, family conflict  PROMIS (reviewed every 90 days):     Referral / Collaboration:  Referral to another professional/service is not indicated at this time..    Anticipated number of session for this episode of care: on-going  Anticipation frequency of session: Every other week  Anticipated Duration of each session: 38-52 minutes  Treatment plan will be reviewed in 90 days or when goals have been changed.       MeasurableTreatment Goal(s) related to diagnosis / functional impairment(s)    MeasurableTreatment Goal(s) related to diagnosis / functional impairment(s)  Goal 1: Client will increase emotion regulation skills/decrease panic attacks    Objective #A (Client Action)    Client will identify 2-4 fears / thoughts that contribute to feeling anxious  use positive self-affirmations daily.  Status: Continued - Date(s): 7/26/23    Intervention(s)  Therapist will teach emotional regulation skills. distress tolerance skills, interpersonal effectiveness skills, emotion regluation skills, mindfulness skills, radical acceptance. Therapist will teach client how to ID body cues for anxiety, anxiety reduction techniques, how to ID triggers for depression and anxiety- decrease reactivity/eliminate, lifestyle changes to reduce depression and anxiety, communication skills, explore cognitive beliefs and help client to develop healthy cognitive patterns and beliefs.      Objective #B  Client will use thought-stopping strategy daily to reduce intrusive thoughts.  Status: Continued - Date(s): 7/26/23    Intervention(s)  Therapist will teach emotional regulation skills. distress tolerance skills, interpersonal effectiveness skills, emotion regluation skills, mindfulness skills, radical acceptance. Therapist will teach client how to ID body cues for anxiety, anxiety reduction techniques, how to ID  "triggers for depression and anxiety- decrease reactivity/eliminate, lifestyle changes to reduce depression and anxiety, communication skills, explore cognitive beliefs and help client to develop healthy cognitive patterns and beliefs.    Objective #C (Client Action)    Status: Continued - Date: 7/26/23    Client will use \"worry time\" each day for 15 minutes of scheduled worry and then defer obsessive or anxious thinking until the next structured worry time.    Intervention(s)  Therapist will teach emotional regulation skills. work through trauma using somatic experiencing techniques to discharge the anxiety.    Goal 2: Client will work on increasing self esteem and self worth     Objective #A (Client Action)    Status: Continued - Date(s): 7/26/23    Client will identify two areas of life that you would like to have improved functioning.    Intervention(s)  Therapist will teach emotional regulation skills. work through trauma using somatic experiencing techniques to discharge the anxiety .      Client has reviewed and agreed to the above plan.      [facility]    "

## 2023-08-02 ENCOUNTER — VIRTUAL VISIT (OUTPATIENT)
Dept: PSYCHOLOGY | Facility: CLINIC | Age: 23
End: 2023-08-02
Payer: COMMERCIAL

## 2023-08-02 DIAGNOSIS — F41.1 GENERALIZED ANXIETY DISORDER: Primary | ICD-10-CM

## 2023-08-02 PROCEDURE — 90834 PSYTX W PT 45 MINUTES: CPT | Mod: VID | Performed by: COUNSELOR

## 2023-08-02 NOTE — PROGRESS NOTES
[facility]                                     Progress Note    Patient Name: Batool Louis  Date: 8/2/23         Service Type: Individual      Session Start Time: 9:05am  Session End Time: 9:55am     Session Length: 50    Session #: 56    Attendees: Client    Service Modality:  Video Visit:      Provider verified identity through the following two step process.  Patient provided:  Patient is known previously to provider    Telemedicine Visit: The patient's condition can be safely assessed and treated via synchronous audio and visual telemedicine encounter.      Reason for Telemedicine Visit: Services only offered telehealth    Originating Site (Patient Location): Patient's home    Distant Site (Provider Location): Provider Remote Setting- Home Office    Consent:  The patient/guardian has verbally consented to: the potential risks and benefits of telemedicine (video visit) versus in person care; bill my insurance or make self-payment for services provided; and responsibility for payment of non-covered services.     Patient would like the video invitation sent by:  Send to e-mail at: [email or web address]    Mode of Communication:  Video Conference via well    As the provider I attest to compliance with applicable laws and regulations related to telemedicine.    DATA  Interactive Complexity: No  Crisis: No        Progress Since Last Session (Related to Symptoms / Goals / Homework):   Symptoms: No change .    Homework: Achieved / completed to satisfaction      Episode of Care Goals: Satisfactory progress - ACTION (Actively working towards change); Intervened by reinforcing change plan / affirming steps taken     Current / Ongoing Stressors and Concerns:  The client stated her job is over in a few days which is a relief.   Thinking she's more of an all or nothing person.      Treatment Objective(s) Addressed in This Session:   use thought-stopping strategy daily to reduce intrusive  thoughts       Intervention:   Talked about how she's been working through this theme of all or nothing and how she's trying to find balance. Talked more about her relationship with her mom. Discussed how her parents talked with her about finances while she was in the hospital too and how hurtful this was for her. She stated they have a family meeting on Tuesday and she's really nervous about this. Worked on how she can be prepared more for the meeting and how she gets to request to take breaks if she's getting too activated.        Assessments completed prior to visit:  The following assessments were completed by patient for this visit:  PHQ9:       8/13/2018     9:26 AM 3/12/2020     3:07 PM 4/30/2020    11:50 AM 5/22/2020     3:25 PM 7/10/2020     9:57 AM 2/1/2021     8:45 AM 3/21/2022     9:28 AM   PHQ-9 SCORE   PHQ-9 Total Score MyChart 7 (Mild depression)   15 (Moderately severe depression)  4 (Minimal depression)    PHQ-9 Total Score 7 10 6 15 14 4 8     GAD7:       3/1/2018     7:08 AM 6/11/2018    10:28 AM 8/13/2018     9:27 AM 3/12/2020     3:07 PM 5/22/2020     3:24 PM 2/1/2021     8:45 AM 11/14/2022     9:36 AM   SHELBY-7 SCORE   Total Score   6 (mild anxiety)  16 (severe anxiety) 10 (moderate anxiety) 16 (severe anxiety)   Total Score 5 4 6 7 16 10 16     PROMIS 10-Global Health (all questions and answers displayed):       12/21/2021    10:06 AM 11/14/2022     9:37 AM 2/13/2023    10:23 AM 2/27/2023    10:07 AM   PROMIS 10   In general, would you say your health is: Very good Fair Fair Fair   In general, would you say your quality of life is: Very good Good Good Good   In general, how would you rate your physical health? Very good Good Fair Fair   In general, how would you rate your mental health, including your mood and your ability to think? Very good Fair Fair Good   In general, how would you rate your satisfaction with your social activities and relationships? Very good Fair Fair Fair   In general,  please rate how well you carry out your usual social activities and roles Very good Fair Good Fair   To what extent are you able to carry out your everyday physical activities such as walking, climbing stairs, carrying groceries, or moving a chair? Completely Completely Completely Completely   In the past 7 days, how often have you been bothered by emotional problems such as feeling anxious, depressed, or irritable? Rarely Often Sometimes Often   In the past 7 days, how would you rate your fatigue on average? Mild Moderate Mild Mild   In the past 7 days, how would you rate your pain on average, where 0 means no pain, and 10 means worst imaginable pain? 1 2 3 0   In general, would you say your health is: 4 2 2 2   In general, would you say your quality of life is: 4 3 3 3   In general, how would you rate your physical health? 4 3 2 2   In general, how would you rate your mental health, including your mood and your ability to think? 4 2 2 3   In general, how would you rate your satisfaction with your social activities and relationships? 4 2 2 2   In general, please rate how well you carry out your usual social activities and roles. (This includes activities at home, at work and in your community, and responsibilities as a parent, child, spouse, employee, friend, etc.) 4 2 3 2   To what extent are you able to carry out your everyday physical activities such as walking, climbing stairs, carrying groceries, or moving a chair? 5 5 5 5   In the past 7 days, how often have you been bothered by emotional problems such as feeling anxious, depressed, or irritable? 2 4 3 4   In the past 7 days, how would you rate your fatigue on average? 2 3 2 2   In the past 7 days, how would you rate your pain on average, where 0 means no pain, and 10 means worst imaginable pain? 1 2 3 0   Global Mental Health Score 16 9 10 10   Global Physical Health Score 17 15 15 16   PROMIS TOTAL - SUBSCORES 33 24 25 26         ASSESSMENT: Current  Emotional / Mental Status (status of significant symptoms):   Risk status (Self / Other harm or suicidal ideation)   Patient denies current fears or concerns for personal safety.   Patient denies current or recent suicidal ideation or behaviors.   Patient denies current or recent homicidal ideation or behaviors.   Patient denies current or recent self injurious behavior or ideation.   Patient denies other safety concerns.   Patient reports there has been no change in risk factors since their last session.     Patient reports there has been no change in protective factors since their last session.     Recommended that patient call 911 or go to the local ED should there be a change in any of these risk factors.     Appearance:   Appropriate    Eye Contact:   Good    Psychomotor Behavior: Normal    Attitude:   Cooperative    Orientation:   All   Speech    Rate / Production: Normal/ Responsive    Volume:  Normal    Mood:    Normal   Affect:    Appropriate    Thought Content:  Clear    Thought Form:  Coherent  Logical    Insight:    Good      Medication Review:   No current psychiatric medications prescribed     Medication Compliance:   NA     Changes in Health Issues:   None reported     Chemical Use Review:   Substance Use: Chemical use reviewed, no active concerns identified      Tobacco Use: No current tobacco use.      Diagnosis:  1. Generalized anxiety disorder        Collateral Reports Completed:   Not Applicable    PLAN: (Patient Tasks / Therapist Tasks / Other)  Client to continue to practice stress reduction skills.        [facility]                                                         ______________________________________________________________________    Individual Treatment Plan    Patient's Name: Batool Louis  YOB: 2000    Date of Creation: 3/23/22  Date Treatment Plan Last Reviewed/Revised: 7/26/23    DSM5 Diagnoses: 300.02 (F41.1) Generalized Anxiety  Disorder  Psychosocial / Contextual Factors: college student, living at home, family conflict  PROMIS (reviewed every 90 days):     Referral / Collaboration:  Referral to another professional/service is not indicated at this time..    Anticipated number of session for this episode of care: on-going  Anticipation frequency of session: Every other week  Anticipated Duration of each session: 38-52 minutes  Treatment plan will be reviewed in 90 days or when goals have been changed.       MeasurableTreatment Goal(s) related to diagnosis / functional impairment(s)    MeasurableTreatment Goal(s) related to diagnosis / functional impairment(s)  Goal 1: Client will increase emotion regulation skills/decrease panic attacks    Objective #A (Client Action)    Client will identify 2-4 fears / thoughts that contribute to feeling anxious  use positive self-affirmations daily.  Status: Continued - Date(s): 7/26/23    Intervention(s)  Therapist will teach emotional regulation skills. distress tolerance skills, interpersonal effectiveness skills, emotion regluation skills, mindfulness skills, radical acceptance. Therapist will teach client how to ID body cues for anxiety, anxiety reduction techniques, how to ID triggers for depression and anxiety- decrease reactivity/eliminate, lifestyle changes to reduce depression and anxiety, communication skills, explore cognitive beliefs and help client to develop healthy cognitive patterns and beliefs.      Objective #B  Client will use thought-stopping strategy daily to reduce intrusive thoughts.  Status: Continued - Date(s): 7/26/23    Intervention(s)  Therapist will teach emotional regulation skills. distress tolerance skills, interpersonal effectiveness skills, emotion regluation skills, mindfulness skills, radical acceptance. Therapist will teach client how to ID body cues for anxiety, anxiety reduction techniques, how to ID triggers for depression and anxiety- decrease  "reactivity/eliminate, lifestyle changes to reduce depression and anxiety, communication skills, explore cognitive beliefs and help client to develop healthy cognitive patterns and beliefs.    Objective #C (Client Action)    Status: Continued - Date: 7/26/23    Client will use \"worry time\" each day for 15 minutes of scheduled worry and then defer obsessive or anxious thinking until the next structured worry time.    Intervention(s)  Therapist will teach emotional regulation skills. work through trauma using somatic experiencing techniques to discharge the anxiety.    Goal 2: Client will work on increasing self esteem and self worth     Objective #A (Client Action)    Status: Continued - Date(s): 7/26/23    Client will identify two areas of life that you would like to have improved functioning.    Intervention(s)  Therapist will teach emotional regulation skills. work through trauma using somatic experiencing techniques to discharge the anxiety .      Client has reviewed and agreed to the above plan.      [facility]    "

## 2023-08-09 ENCOUNTER — VIRTUAL VISIT (OUTPATIENT)
Dept: PSYCHOLOGY | Facility: CLINIC | Age: 23
End: 2023-08-09
Payer: COMMERCIAL

## 2023-08-09 DIAGNOSIS — F33.9 MAJOR DEPRESSIVE DISORDER, RECURRENT EPISODE WITH ANXIOUS DISTRESS (H): ICD-10-CM

## 2023-08-09 DIAGNOSIS — F41.1 GENERALIZED ANXIETY DISORDER: Primary | ICD-10-CM

## 2023-08-09 PROCEDURE — 90834 PSYTX W PT 45 MINUTES: CPT | Mod: VID | Performed by: COUNSELOR
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
## 2023-08-16 ENCOUNTER — VIRTUAL VISIT (OUTPATIENT)
Dept: PSYCHOLOGY | Facility: CLINIC | Age: 23
End: 2023-08-16
Payer: COMMERCIAL

## 2023-08-16 DIAGNOSIS — F41.1 GENERALIZED ANXIETY DISORDER: Primary | ICD-10-CM

## 2023-08-16 DIAGNOSIS — F33.9 MAJOR DEPRESSIVE DISORDER, RECURRENT EPISODE WITH ANXIOUS DISTRESS (H): ICD-10-CM

## 2023-08-16 PROCEDURE — 90834 PSYTX W PT 45 MINUTES: CPT | Mod: VID | Performed by: COUNSELOR

## 2023-08-16 ASSESSMENT — ANXIETY QUESTIONNAIRES
6. BECOMING EASILY ANNOYED OR IRRITABLE: SEVERAL DAYS
7. FEELING AFRAID AS IF SOMETHING AWFUL MIGHT HAPPEN: SEVERAL DAYS
7. FEELING AFRAID AS IF SOMETHING AWFUL MIGHT HAPPEN: SEVERAL DAYS
5. BEING SO RESTLESS THAT IT IS HARD TO SIT STILL: MORE THAN HALF THE DAYS
3. WORRYING TOO MUCH ABOUT DIFFERENT THINGS: MORE THAN HALF THE DAYS
GAD7 TOTAL SCORE: 12
6. BECOMING EASILY ANNOYED OR IRRITABLE: SEVERAL DAYS
IF YOU CHECKED OFF ANY PROBLEMS ON THIS QUESTIONNAIRE, HOW DIFFICULT HAVE THESE PROBLEMS MADE IT FOR YOU TO DO YOUR WORK, TAKE CARE OF THINGS AT HOME, OR GET ALONG WITH OTHER PEOPLE: SOMEWHAT DIFFICULT
IF YOU CHECKED OFF ANY PROBLEMS ON THIS QUESTIONNAIRE, HOW DIFFICULT HAVE THESE PROBLEMS MADE IT FOR YOU TO DO YOUR WORK, TAKE CARE OF THINGS AT HOME, OR GET ALONG WITH OTHER PEOPLE: SOMEWHAT DIFFICULT
3. WORRYING TOO MUCH ABOUT DIFFERENT THINGS: MORE THAN HALF THE DAYS
4. TROUBLE RELAXING: MORE THAN HALF THE DAYS
GAD7 TOTAL SCORE: 12
1. FEELING NERVOUS, ANXIOUS, OR ON EDGE: MORE THAN HALF THE DAYS
2. NOT BEING ABLE TO STOP OR CONTROL WORRYING: MORE THAN HALF THE DAYS
2. NOT BEING ABLE TO STOP OR CONTROL WORRYING: MORE THAN HALF THE DAYS
1. FEELING NERVOUS, ANXIOUS, OR ON EDGE: MORE THAN HALF THE DAYS
4. TROUBLE RELAXING: MORE THAN HALF THE DAYS
5. BEING SO RESTLESS THAT IT IS HARD TO SIT STILL: MORE THAN HALF THE DAYS
GAD7 TOTAL SCORE: 12
GAD7 TOTAL SCORE: 12

## 2023-08-16 NOTE — PROGRESS NOTES
Answers submitted by the patient for this visit:  SHELBY-7 (Submitted on 8/16/2023)  SHELBY 7 TOTAL SCORE: 12          [facility]                                     Progress Note    Patient Name: Batool Louis  Date: 8/16/23         Service Type: Individual      Session Start Time: 1:10pm  Session End Time: 2:00pm     Session Length: 50    Session #: 58    Attendees: Client    Service Modality:  Video Visit:      Provider verified identity through the following two step process.  Patient provided:  Patient is known previously to provider    Telemedicine Visit: The patient's condition can be safely assessed and treated via synchronous audio and visual telemedicine encounter.      Reason for Telemedicine Visit: Services only offered telehealth    Originating Site (Patient Location): Patient's home    Distant Site (Provider Location): Provider Remote Setting- Home Office    Consent:  The patient/guardian has verbally consented to: the potential risks and benefits of telemedicine (video visit) versus in person care; bill my insurance or make self-payment for services provided; and responsibility for payment of non-covered services.     Patient would like the video invitation sent by:  Send to e-mail at: [email or web address]    Mode of Communication:  Video Conference via well    As the provider I attest to compliance with applicable laws and regulations related to telemedicine.    DATA  Interactive Complexity: No  Crisis: No        Progress Since Last Session (Related to Symptoms / Goals / Homework):   Symptoms: No change .    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 Her last day is next  Friday.   She stated her ex called her about being ghosted by another girl. She stated he was very emotional.       Treatment Objective(s) Addressed in This Session:   use thought-stopping strategy daily to reduce intrusive thoughts       Intervention:   Discussed how the client can continue to reinforce boundaries with her ex. Talked about her journey through changing and how she's feeling lately.        Assessments completed prior to visit:  The following assessments were completed by patient for this visit:  PHQ9:       8/13/2018     9:26 AM 3/12/2020     3:07 PM 4/30/2020    11:50 AM 5/22/2020     3:25 PM 7/10/2020     9:57 AM 2/1/2021     8:45 AM 3/21/2022     9:28 AM   PHQ-9 SCORE   PHQ-9 Total Score MyChart 7 (Mild depression)   15 (Moderately severe depression)  4 (Minimal depression)    PHQ-9 Total Score 7 10 6 15 14 4 8     GAD7:       6/11/2018    10:28 AM 8/13/2018     9:27 AM 3/12/2020     3:07 PM 5/22/2020     3:24 PM 2/1/2021     8:45 AM 11/14/2022     9:36 AM 8/16/2023     1:08 PM   SHELBY-7 SCORE   Total Score  6 (mild anxiety)  16 (severe anxiety) 10 (moderate anxiety) 16 (severe anxiety) 12 (moderate anxiety)   Total Score 4 6 7 16 10 16 12    12     PROMIS 10-Global Health (all questions and answers displayed):       12/21/2021    10:06 AM 11/14/2022     9:37 AM 2/13/2023    10:23 AM 2/27/2023    10:07 AM 8/16/2023     1:09 PM   PROMIS 10   In general, would you say your health is: Very good Fair Fair Fair Good   In general, would you say your quality of life is: Very good Good Good Good Good   In general, how would you rate your physical health? Very good Good Fair Fair Fair   In general, how would you rate your mental health, including your mood and your ability to think? Very good Fair Fair Good Fair   In general, how would you rate your satisfaction with your social activities and relationships? Very good Fair Fair Fair Fair   In general, please rate how well you carry out your usual social  activities and roles Very good Fair Good Fair Fair   To what extent are you able to carry out your everyday physical activities such as walking, climbing stairs, carrying groceries, or moving a chair? Completely Completely Completely Completely Completely   In the past 7 days, how often have you been bothered by emotional problems such as feeling anxious, depressed, or irritable? Rarely Often Sometimes Often Often   In the past 7 days, how would you rate your fatigue on average? Mild Moderate Mild Mild Mild   In the past 7 days, how would you rate your pain on average, where 0 means no pain, and 10 means worst imaginable pain? 1 2 3 0 0   In general, would you say your health is: 4 2 2 2 3    3   In general, would you say your quality of life is: 4 3 3 3 3    3   In general, how would you rate your physical health? 4 3 2 2 2    2   In general, how would you rate your mental health, including your mood and your ability to think? 4 2 2 3 2    2   In general, how would you rate your satisfaction with your social activities and relationships? 4 2 2 2 2    2   In general, please rate how well you carry out your usual social activities and roles. (This includes activities at home, at work and in your community, and responsibilities as a parent, child, spouse, employee, friend, etc.) 4 2 3 2 2    2   To what extent are you able to carry out your everyday physical activities such as walking, climbing stairs, carrying groceries, or moving a chair? 5 5 5 5 5    5   In the past 7 days, how often have you been bothered by emotional problems such as feeling anxious, depressed, or irritable? 2 4 3 4 4    4   In the past 7 days, how would you rate your fatigue on average? 2 3 2 2 2    2   In the past 7 days, how would you rate your pain on average, where 0 means no pain, and 10 means worst imaginable pain? 1 2 3 0 0    0   Global Mental Health Score 16 9 10 10 9    9   Global Physical Health Score 17 15 15 16 16    16   PROMIS  TOTAL - SUBSCORES 33 24 25 26 25    25         ASSESSMENT: Current Emotional / Mental Status (status of significant symptoms):   Risk status (Self / Other harm or suicidal ideation)   Patient denies current fears or concerns for personal safety.   Patient denies current or recent suicidal ideation or behaviors.   Patient denies current or recent homicidal ideation or behaviors.   Patient denies current or recent self injurious behavior or ideation.   Patient denies other safety concerns.   Patient reports there has been no change in risk factors since their last session.     Patient reports there has been no change in protective factors since their last session.     Recommended that patient call 911 or go to the local ED should there be a change in any of these risk factors.     Appearance:   Appropriate    Eye Contact:   Good    Psychomotor Behavior: Normal    Attitude:   Cooperative    Orientation:   All   Speech    Rate / Production: Normal/ Responsive    Volume:  Normal    Mood:    Normal   Affect:    Appropriate    Thought Content:  Clear    Thought Form:  Coherent  Logical    Insight:    Good      Medication Review:   No current psychiatric medications prescribed     Medication Compliance:   NA     Changes in Health Issues:   None reported     Chemical Use Review:   Substance Use: Chemical use reviewed, no active concerns identified      Tobacco Use: No current tobacco use.      Diagnosis:  1. Generalized anxiety disorder    2. Major depressive disorder, recurrent episode with anxious distress (H)        Collateral Reports Completed:   Not Applicable    PLAN: (Patient Tasks / Therapist Tasks / Other)  Client to continue to practice stress reduction skills.        Celsa Linda [facility]                                                         ______________________________________________________________________    Individual Treatment Plan    Patient's Name: Batool WEAVER Markiedeannnbsania  YOB: 2000    Date  of Creation: 3/23/22  Date Treatment Plan Last Reviewed/Revised: 7/26/23    DSM5 Diagnoses: 300.02 (F41.1) Generalized Anxiety Disorder  Psychosocial / Contextual Factors: college student, living at home, family conflict  PROMIS (reviewed every 90 days):     Referral / Collaboration:  Referral to another professional/service is not indicated at this time..    Anticipated number of session for this episode of care: on-going  Anticipation frequency of session: Every other week  Anticipated Duration of each session: 38-52 minutes  Treatment plan will be reviewed in 90 days or when goals have been changed.       MeasurableTreatment Goal(s) related to diagnosis / functional impairment(s)    MeasurableTreatment Goal(s) related to diagnosis / functional impairment(s)  Goal 1: Client will increase emotion regulation skills/decrease panic attacks    Objective #A (Client Action)    Client will identify 2-4 fears / thoughts that contribute to feeling anxious  use positive self-affirmations daily.  Status: Continued - Date(s): 7/26/23    Intervention(s)  Therapist will teach emotional regulation skills. distress tolerance skills, interpersonal effectiveness skills, emotion regluation skills, mindfulness skills, radical acceptance. Therapist will teach client how to ID body cues for anxiety, anxiety reduction techniques, how to ID triggers for depression and anxiety- decrease reactivity/eliminate, lifestyle changes to reduce depression and anxiety, communication skills, explore cognitive beliefs and help client to develop healthy cognitive patterns and beliefs.      Objective #B  Client will use thought-stopping strategy daily to reduce intrusive thoughts.  Status: Continued - Date(s): 7/26/23    Intervention(s)  Therapist will teach emotional regulation skills. distress tolerance skills, interpersonal effectiveness skills, emotion regluation skills, mindfulness skills, radical acceptance. Therapist will teach client how to ID  "body cues for anxiety, anxiety reduction techniques, how to ID triggers for depression and anxiety- decrease reactivity/eliminate, lifestyle changes to reduce depression and anxiety, communication skills, explore cognitive beliefs and help client to develop healthy cognitive patterns and beliefs.    Objective #C (Client Action)    Status: Continued - Date: 7/26/23    Client will use \"worry time\" each day for 15 minutes of scheduled worry and then defer obsessive or anxious thinking until the next structured worry time.    Intervention(s)  Therapist will teach emotional regulation skills. work through trauma using somatic experiencing techniques to discharge the anxiety.    Goal 2: Client will work on increasing self esteem and self worth     Objective #A (Client Action)    Status: Continued - Date(s): 7/26/23    Client will identify two areas of life that you would like to have improved functioning.    Intervention(s)  Therapist will teach emotional regulation skills. work through trauma using somatic experiencing techniques to discharge the anxiety .      Client has reviewed and agreed to the above plan.      [facility] Linda [facility]    "

## 2023-08-23 ENCOUNTER — VIRTUAL VISIT (OUTPATIENT)
Dept: PSYCHOLOGY | Facility: CLINIC | Age: 23
End: 2023-08-23
Payer: COMMERCIAL

## 2023-08-23 DIAGNOSIS — F41.1 GENERALIZED ANXIETY DISORDER: Primary | ICD-10-CM

## 2023-08-23 DIAGNOSIS — F33.9 MAJOR DEPRESSIVE DISORDER, RECURRENT EPISODE WITH ANXIOUS DISTRESS (H): ICD-10-CM

## 2023-08-23 PROCEDURE — 90834 PSYTX W PT 45 MINUTES: CPT | Mod: VID | Performed by: COUNSELOR

## 2023-08-23 NOTE — PROGRESS NOTES
Answers submitted by the patient for this visit:  SHELBY-7 (Submitted on 8/16/2023)  SHELBY 7 TOTAL SCORE: 12  Answers submitted by the patient for this visit:  SHELBY-7 (Submitted on 8/16/2023)  SHELBY 7 TOTAL SCORE: 12          [facility]                                     Progress Note    Patient Name: Batool Louis  Date: 8/23/23         Service Type: Individual      Session Start Time: 9:05am  Session End Time: 9:55am     Session Length: 50    Session #: 59    Attendees: Client    Service Modality:  Video Visit:      Provider verified identity through the following two step process.  Patient provided:  Patient is known previously to provider    Telemedicine Visit: The patient's condition can be safely assessed and treated via synchronous audio and visual telemedicine encounter.      Reason for Telemedicine Visit: Services only offered telehealth    Originating Site (Patient Location): Patient's home    Distant Site (Provider Location): Provider Remote Setting- Home Office    Consent:  The patient/guardian has verbally consented to: the potential risks and benefits of telemedicine (video visit) versus in person care; bill my insurance or make self-payment for services provided; and responsibility for payment of non-covered services.     Patient would like the video invitation sent by:  Send to e-mail at: [email or web address]    Mode of Communication:  Video Conference via Amwell    As the provider I attest to compliance with applicable laws and regulations related to telemedicine.    DATA  Interactive Complexity: No  Crisis: No        Progress Since Last Session (Related to Symptoms / Goals / Homework):   Symptoms: No change .    Homework: Achieved / completed to satisfaction      Episode of Care Goals: Satisfactory progress - ACTION (Actively working towards change); Intervened by reinforcing change plan / affirming steps taken     Current / Ongoing Stressors and Concerns:  The client stated she is  applying for new jobs.   Stated she's upset with herself for gaining all the weight back that she had lost.   When she was in the hospital they talked with her about binge eating disorder and ASD because all of her triggers are in line with a person's with autism. However, a lot of her triggers are in line with someone who has experienced trauma responses. Her parents have now gone to 2 couples counseling sessions.        Treatment Objective(s) Addressed in This Session:   use thought-stopping strategy daily to reduce intrusive thoughts       Intervention:   Talked about her process with weight management and what she can do to get back on track with that. Educated the client about ASD vs. Trauma from living with someone with a personality disorder.        Assessments completed prior to visit:  The following assessments were completed by patient for this visit:  PHQ9:       8/13/2018     9:26 AM 3/12/2020     3:07 PM 4/30/2020    11:50 AM 5/22/2020     3:25 PM 7/10/2020     9:57 AM 2/1/2021     8:45 AM 3/21/2022     9:28 AM   PHQ-9 SCORE   PHQ-9 Total Score MyChart 7 (Mild depression)   15 (Moderately severe depression)  4 (Minimal depression)    PHQ-9 Total Score 7 10 6 15 14 4 8     GAD7:       6/11/2018    10:28 AM 8/13/2018     9:27 AM 3/12/2020     3:07 PM 5/22/2020     3:24 PM 2/1/2021     8:45 AM 11/14/2022     9:36 AM 8/16/2023     1:08 PM   SHELBY-7 SCORE   Total Score  6 (mild anxiety)  16 (severe anxiety) 10 (moderate anxiety) 16 (severe anxiety) 12 (moderate anxiety)   Total Score 4 6 7 16 10 16 12    12     PROMIS 10-Global Health (all questions and answers displayed):       12/21/2021    10:06 AM 11/14/2022     9:37 AM 2/13/2023    10:23 AM 2/27/2023    10:07 AM 8/16/2023     1:09 PM   PROMIS 10   In general, would you say your health is: Very good Fair Fair Fair Good   In general, would you say your quality of life is: Very good Good Good Good Good   In general, how would you rate your physical health?  Very good Good Fair Fair Fair   In general, how would you rate your mental health, including your mood and your ability to think? Very good Fair Fair Good Fair   In general, how would you rate your satisfaction with your social activities and relationships? Very good Fair Fair Fair Fair   In general, please rate how well you carry out your usual social activities and roles Very good Fair Good Fair Fair   To what extent are you able to carry out your everyday physical activities such as walking, climbing stairs, carrying groceries, or moving a chair? Completely Completely Completely Completely Completely   In the past 7 days, how often have you been bothered by emotional problems such as feeling anxious, depressed, or irritable? Rarely Often Sometimes Often Often   In the past 7 days, how would you rate your fatigue on average? Mild Moderate Mild Mild Mild   In the past 7 days, how would you rate your pain on average, where 0 means no pain, and 10 means worst imaginable pain? 1 2 3 0 0   In general, would you say your health is: 4 2 2 2 3    3   In general, would you say your quality of life is: 4 3 3 3 3    3   In general, how would you rate your physical health? 4 3 2 2 2    2   In general, how would you rate your mental health, including your mood and your ability to think? 4 2 2 3 2    2   In general, how would you rate your satisfaction with your social activities and relationships? 4 2 2 2 2    2   In general, please rate how well you carry out your usual social activities and roles. (This includes activities at home, at work and in your community, and responsibilities as a parent, child, spouse, employee, friend, etc.) 4 2 3 2 2    2   To what extent are you able to carry out your everyday physical activities such as walking, climbing stairs, carrying groceries, or moving a chair? 5 5 5 5 5    5   In the past 7 days, how often have you been bothered by emotional problems such as feeling anxious, depressed, or  irritable? 2 4 3 4 4    4   In the past 7 days, how would you rate your fatigue on average? 2 3 2 2 2    2   In the past 7 days, how would you rate your pain on average, where 0 means no pain, and 10 means worst imaginable pain? 1 2 3 0 0    0   Global Mental Health Score 16 9 10 10 9    9   Global Physical Health Score 17 15 15 16 16    16   PROMIS TOTAL - SUBSCORES 33 24 25 26 25    25         ASSESSMENT: Current Emotional / Mental Status (status of significant symptoms):   Risk status (Self / Other harm or suicidal ideation)   Patient denies current fears or concerns for personal safety.   Patient denies current or recent suicidal ideation or behaviors.   Patient denies current or recent homicidal ideation or behaviors.   Patient denies current or recent self injurious behavior or ideation.   Patient denies other safety concerns.   Patient reports there has been no change in risk factors since their last session.     Patient reports there has been no change in protective factors since their last session.     Recommended that patient call 911 or go to the local ED should there be a change in any of these risk factors.     Appearance:   Appropriate    Eye Contact:   Good    Psychomotor Behavior: Normal    Attitude:   Cooperative    Orientation:   All   Speech    Rate / Production: Normal/ Responsive    Volume:  Normal    Mood:    Normal   Affect:    Appropriate    Thought Content:  Clear    Thought Form:  Coherent  Logical    Insight:    Good      Medication Review:   No current psychiatric medications prescribed     Medication Compliance:   NA     Changes in Health Issues:   None reported     Chemical Use Review:   Substance Use: Chemical use reviewed, no active concerns identified      Tobacco Use: No current tobacco use.      Diagnosis:  1. Generalized anxiety disorder    2. Major depressive disorder, recurrent episode with anxious distress (H)        Collateral Reports Completed:   Not Applicable    PLAN:  (Patient Tasks / Therapist Tasks / Other)  Client to continue to practice stress reduction skills.        Celsa Linda, [facility]                                                         ______________________________________________________________________    Individual Treatment Plan    Patient's Name: Batool Louis  YOB: 2000    Date of Creation: 3/23/22  Date Treatment Plan Last Reviewed/Revised: 7/26/23    DSM5 Diagnoses: 300.02 (F41.1) Generalized Anxiety Disorder  Psychosocial / Contextual Factors: college student, living at home, family conflict  PROMIS (reviewed every 90 days):     Referral / Collaboration:  Referral to another professional/service is not indicated at this time..    Anticipated number of session for this episode of care: on-going  Anticipation frequency of session: Every other week  Anticipated Duration of each session: 38-52 minutes  Treatment plan will be reviewed in 90 days or when goals have been changed.       MeasurableTreatment Goal(s) related to diagnosis / functional impairment(s)    MeasurableTreatment Goal(s) related to diagnosis / functional impairment(s)  Goal 1: Client will increase emotion regulation skills/decrease panic attacks    Objective #A (Client Action)    Client will identify 2-4 fears / thoughts that contribute to feeling anxious  use positive self-affirmations daily.  Status: Continued - Date(s): 7/26/23    Intervention(s)  Therapist will teach emotional regulation skills. distress tolerance skills, interpersonal effectiveness skills, emotion regluation skills, mindfulness skills, radical acceptance. Therapist will teach client how to ID body cues for anxiety, anxiety reduction techniques, how to ID triggers for depression and anxiety- decrease reactivity/eliminate, lifestyle changes to reduce depression and anxiety, communication skills, explore cognitive beliefs and help client to develop healthy cognitive patterns and beliefs.      Objective  "#B  Client will use thought-stopping strategy daily to reduce intrusive thoughts.  Status: Continued - Date(s): 7/26/23    Intervention(s)  Therapist will teach emotional regulation skills. distress tolerance skills, interpersonal effectiveness skills, emotion regluation skills, mindfulness skills, radical acceptance. Therapist will teach client how to ID body cues for anxiety, anxiety reduction techniques, how to ID triggers for depression and anxiety- decrease reactivity/eliminate, lifestyle changes to reduce depression and anxiety, communication skills, explore cognitive beliefs and help client to develop healthy cognitive patterns and beliefs.    Objective #C (Client Action)    Status: Continued - Date: 7/26/23    Client will use \"worry time\" each day for 15 minutes of scheduled worry and then defer obsessive or anxious thinking until the next structured worry time.    Intervention(s)  Therapist will teach emotional regulation skills. work through trauma using somatic experiencing techniques to discharge the anxiety.    Goal 2: Client will work on increasing self esteem and self worth     Objective #A (Client Action)    Status: Continued - Date(s): 7/26/23    Client will identify two areas of life that you would like to have improved functioning.    Intervention(s)  Therapist will teach emotional regulation skills. work through trauma using somatic experiencing techniques to discharge the anxiety .      Client has reviewed and agreed to the above plan.      JAGDEEP Camacho    "

## 2023-08-28 ENCOUNTER — VIRTUAL VISIT (OUTPATIENT)
Dept: FAMILY MEDICINE | Facility: OTHER | Age: 23
End: 2023-08-28
Payer: COMMERCIAL

## 2023-08-28 DIAGNOSIS — R63.2 BINGE EATING: ICD-10-CM

## 2023-08-28 DIAGNOSIS — F41.9 ANXIETY: ICD-10-CM

## 2023-08-28 DIAGNOSIS — F33.2 SEVERE RECURRENT MAJOR DEPRESSION WITHOUT PSYCHOTIC FEATURES (H): ICD-10-CM

## 2023-08-28 DIAGNOSIS — R45.89 SUICIDAL BEHAVIOR WITHOUT ATTEMPTED SELF-INJURY: Primary | ICD-10-CM

## 2023-08-28 PROCEDURE — 99214 OFFICE O/P EST MOD 30 MIN: CPT | Mod: VID | Performed by: PHYSICIAN ASSISTANT

## 2023-08-28 RX ORDER — FLUOXETINE 40 MG/1
40 CAPSULE ORAL DAILY
Qty: 30 CAPSULE | Refills: 1 | Status: SHIPPED | OUTPATIENT
Start: 2023-08-28 | End: 2023-10-07

## 2023-08-28 RX ORDER — HYDROXYZINE HYDROCHLORIDE 25 MG/1
25-50 TABLET, FILM COATED ORAL
COMMUNITY
Start: 2023-08-28 | End: 2023-12-27

## 2023-08-28 ASSESSMENT — PATIENT HEALTH QUESTIONNAIRE - PHQ9
SUM OF ALL RESPONSES TO PHQ QUESTIONS 1-9: 8
10. IF YOU CHECKED OFF ANY PROBLEMS, HOW DIFFICULT HAVE THESE PROBLEMS MADE IT FOR YOU TO DO YOUR WORK, TAKE CARE OF THINGS AT HOME, OR GET ALONG WITH OTHER PEOPLE: NOT DIFFICULT AT ALL
SUM OF ALL RESPONSES TO PHQ QUESTIONS 1-9: 8

## 2023-08-28 NOTE — LETTER
My Depression Action Plan  Name: Batool Louis   Date of Birth 2000  Date: 8/28/2023    My doctor: Nancy Donnelly   My clinic: [facility]  [address]  [place] [address]  [phone]            GREEN    ZONE   Good Control    What it looks like:   Things are going generally well. You have normal ups and downs. You may even feel depressed from time to time, but bad moods usually last less than a day.   What you need to do:  Continue to care for yourself (see self care plan)  Check your depression survival kit and update it as needed  Follow your physician s recommendations including any medication.  Do not stop taking medication unless you consult with your physician first.             YELLOW         ZONE Getting Worse    What it looks like:   Depression is starting to interfere with your life.   It may be hard to get out of bed; you may be starting to isolate yourself from others.  Symptoms of depression are starting to last most all day and this has happened for several days.   You may have suicidal thoughts but they are not constant.   What you need to do:     Call your care team. Your response to treatment will improve if you keep your care team informed of your progress. Yellow periods are signs an adjustment may need to be made.     Continue your self-care.  Just get dressed and ready for the day.  Don't give yourself time to talk yourself out of it.    Talk to someone in your support network.    Open up your Depression Self-Care Plan/Wellness Kit.             RED    ZONE Medical Alert - Get Help    What it looks like:   Depression is seriously interfering with your life.   You may experience these or other symptoms: You can t get out of bed most days, can t work or engage in other necessary activities, you have trouble taking care of basic hygiene, or basic responsibilities, thoughts of suicide or death that will not go away, self-injurious  behavior.     What you need to do:  Call your care team and request a same-day appointment. If they are not available (weekends or after hours) call your local crisis line, emergency room or 911.          Depression Self-Care Plan / Wellness Kit    Many people find that medication and therapy are helpful treatments for managing depression. In addition, making small changes to your everyday life can help to boost your mood and improve your wellbeing. Below are some tips for you to consider. Be sure to talk with your medical provider and/or behavioral health consultant if your symptoms are worsening or not improving.     Sleep   Sleep hygiene  means all of the habits that support good, restful sleep. It includes maintaining a consistent bedtime and wake time, using your bedroom only for sleeping or sex, and keeping the bedroom dark and free of distractions like a computer, smartphone, or television.     Develop a Healthy Routine  Maintain good hygiene. Get out of bed in the morning, make your bed, brush your teeth, take a shower, and get dressed. Don t spend too much time viewing media that makes you feel stressed. Find time to relax each day.    Exercise  Get some form of exercise every day. This will help reduce pain and release endorphins, the  feel good  chemicals in your brain. It can be as simple as just going for a walk or doing some gardening, anything that will get you moving.      Diet  Strive to eat healthy foods, including fruits and vegetables. Drink plenty of water. Avoid excessive sugar, caffeine, alcohol, and other mood-altering substances.     Stay Connected with Others  Stay in touch with friends and family members.    Manage Your Mood  Try deep breathing, massage therapy, biofeedback, or meditation. Take part in fun activities when you can. Try to find something to smile about each day.     Psychotherapy  Be open to working with a therapist if your provider recommends it.     Medication  Be sure to  take your medication as prescribed. Most anti-depressants need to be taken every day. It usually takes several weeks for medications to work. Not all medicines work for all people. It is important to follow-up with your provider to make sure you have a treatment plan that is working for you. Do not stop your medication abruptly without first discussing it with your provider.    Crisis Resources   These hotlines are for both adults and children. They and are open 24 hours a day, 7 days a week unless noted otherwise.    National Suicide Prevention Lifeline   988 or 8-069-161-EPSF (4197)    Crisis Text Line    www.crisistextline.org  Text HOME to 622761 from anywhere in the [place], anytime, about any type of crisis. A live, trained crisis counselor will receive the text and respond quickly.    [facility] Lifeline for LGBTQ Youth  A national crisis intervention and suicide lifeline for LGBTQ youth under 25. Provides a safe place to talk without judgement. Call 1-725.759.8653; text START to 188614 or visit www.thetrevorproject.org to talk to a trained counselor.    For [facility] crisis numbers, visit the [facility] website at:  https://[place].gov/dhs/people-we-serve/adults/health-care/mental-health/resources/crisis-contacts.jsp

## 2023-08-28 NOTE — PROGRESS NOTES
Dodie is a 23 year old who is being evaluated via a billable video visit.      How [facility]  If the video visit is dropped, the invitation should be resent by: Text to cell phone: [phone]  Will anyone else be joining your video visit? No    Assessment & Plan     1. Suicidal behavior without attempted self-injury    2. Severe recurrent major depression without psychotic features (H)    3. Anxiety    4. Binge eating      Patient is a 23 year old female who presents today for follow up after hospitalization this past July 2023. She was admitted due to suicidal thinking and urges to act on these thoughts. While inpatient she was started on fluoxetine and remeron as well as given PRN hydroxyzine. Since discharge she has continued these medications without missing dose or adverse effect. She has also been working with a therapist to address ongoing mental health concerns.     Feels that the fluoxetine has been very helpful in improving both anxiety and depression. Despite this, symptoms still present daily and anxiety increasing. This is partially due to being let go of her current place of employment. She is feel some anxiety about financial obligations and her ability to pursue her dreams to return to school. As such we talked about a dose adjustment. She was receptive to this and will begin 40mg fluoxetine daily. Pending response can continue this or adjust further. She will reach out if not tolerating. Depression action plan sent to her mychart.     She has found that the remeron is too sedating and that she was sleeping several hours into the day with the full dose as well as feeling groggy/sedated throughout the day with a 1/2 dose. We discussed decreasing the dose further, but the patient would like to try alternative sleep aid. As she was given PRN hydroxyzine on discharge, I will have her use 1-2 tabs (25mg-50mg) as needed at bedtime. She was advised to monitor for  oversedation or other adverse effects. She was also encouraged to discuss sleep hygiene and strategies for improving sleep with therapist. I have attached sleep hygiene tips to her AVS.     Patient informs me that she was given education on binge eating and other anxiety based eating disorders while in the hospital. She is interested in joining support program for this and is looking for nearby options. We discussed [facility] and [facility] as [place] two most prominent eating disorder programs. However, both of these are in the cities which patient would prefer to avoid. We also discussed that [facility] may have programs within their system to offer treatment for this condition. I have placed care coordination referral to help patient locate and enroll into one of these programs.     - FLUoxetine (PROZAC) 40 MG capsule; Take 1 capsule (40 mg) by mouth daily  - Primary Care - Care Coordination Referral; Future     Vipul Haley PA-C  [facility] ALVARO Stoll is a 23 year old, presenting for the following health issues:  Recheck Medication      8/28/2023     2:00 PM   Additional Questions   Roomed by keyana   Accompanied by self         8/28/2023     2:00 PM   Patient Reported Additional Medications   Patient reports taking the following new medications hydroxyzine, prozac, remeron       History of Present Illness       Mental Health Follow-up:  Patient presents to follow-up on Depression.Patient's depression since last visit has been:  No change  The patient is not having other symptoms associated with depression.      Any significant life events: relationship concerns, job concerns and financial concerns  Patient is feeling anxious or having panic attacks.  Patient has no concerns about alcohol or drug use.      Pt is having sleeplessness nights and anxiousness      Review of Systems   Constitutional, HEENT, cardiovascular, pulmonary, gi and gu systems are negative, except as  otherwise noted.      Objective    Vitals - Patient Reported  Pain Score: No Pain (0)        Physical Exam   GENERAL: Healthy, alert and no distress  EYES: Eyes grossly normal to inspection.  No discharge or erythema, or obvious scleral/conjunctival abnormalities.  RESP: No audible wheeze, cough, or visible cyanosis.  No visible retractions or increased work of breathing.    SKIN: Visible skin clear. No significant rash, abnormal pigmentation or lesions.  NEURO: Cranial nerves grossly intact.  Mentation and speech appropriate for age.  PSYCH: Mentation appears normal, affect normal/bright, judgement and insight intact, normal speech and appearance well-groomed.          Video-Visit Details    Type of service:  Video Visit     Joined the call at 8/28/2023, 2:07:16 pm.  Left the call at 8/28/2023, 2:28:14 pm.  You were on the call for 20 minutes 57 seconds .    Originating Location (pt. Location): Home    Distant Location (provider location):  Off-site  Platform used for Video Visit: Tim

## 2023-08-29 ENCOUNTER — OFFICE VISIT (OUTPATIENT)
Dept: URGENT CARE | Facility: URGENT CARE | Age: 23
End: 2023-08-29
Payer: COMMERCIAL

## 2023-08-29 VITALS
HEART RATE: 83 BPM | DIASTOLIC BLOOD PRESSURE: 77 MMHG | BODY MASS INDEX: 39.84 KG/M2 | TEMPERATURE: 98.5 F | WEIGHT: 262 LBS | RESPIRATION RATE: 17 BRPM | OXYGEN SATURATION: 97 % | SYSTOLIC BLOOD PRESSURE: 115 MMHG

## 2023-08-29 DIAGNOSIS — H65.93 OME (OTITIS MEDIA WITH EFFUSION), BILATERAL: Primary | ICD-10-CM

## 2023-08-29 PROCEDURE — 99213 OFFICE O/P EST LOW 20 MIN: CPT

## 2023-08-29 NOTE — PROGRESS NOTES
"URGENT CARE  Assessment & Plan   Assessment:   Batool Louis is a 23 year old female who's clinical presentation today is consistent with:   1. OME (otitis media with effusion), bilateral  Plan:  Will treat patient's middle ear effusion/ fluid collection (without acute signs of infection) today,  symptomatically and supportively. This will include: warm packs, avoidance of allergens, using antihistamines and decongestants  Educated patient's parent that should child's OME become persistent, it is reasonable to follow up with PCP or ENT specialist for speech and hearing evaluation to avoid any complications, additionally to rule out any eustachian tube dysfunction or need for myringotomy tubes  We also discussed if symptoms do not improve after starting today's treatment plan (or if symptoms worsen) to follow up in 10-14} days.  No alarm signs or symptoms present   Differential Diagnoses for this patient's chief complaint that I considered include:  AOM, OME, otitis externa, viral URI, other bacterial pathology, ceruminosis, eustachian tube dysfunction     Patient is agreeable to treatment plan and state they will follow-up if symptoms do not improve and/or if symptoms worsen   see patient's AVS 'monitor for' section for specific patient instructions given and discussed regarding what to watch for and when to follow up    RONNY Crowell [facility] CARE [place]      ______________________________________________________________________      Subjective     HPI: Batool Louis \"Dodie\"} is a 23 year old  female who presents today for evaluation the following concerns:   Patient presents today  endorsing ear pain on both sides, patient states the pain started a few days ago, but she  endorses she has been sick with a uri/cold for over a week  Symptoms include  plugged sensation}, muffled hearing and dull pain   Additionally patient  endorses:  congestion   Patient  deny any: " fever/chills, malaise, vomiting, diarrhea, or headaches   Patient  state prior history of ear infections: she had a rupture ear drum in  her past   Patient denies} external ears are tender to touch.     Review of Systems:  Pertinent review of systems as reflected in HPI, otherwise negative.     Objective    Physical Exam:  Vitals:    08/29/23 1035   BP: 115/77   Pulse: 83   Resp: 17   Temp: 98.5  F (36.9  C)   TempSrc: Tympanic   SpO2: 97%   Weight: 118.8 kg (262 lb)      General:   alert and oriented, no acute distress, non ill-appearing   Vital signs reviewed: afebrile and normotensive  ears:   Bilateral} TMs intact, with no erythremia  Slight bulging noted bilaterally} but with translucency preserved   no signs of acute otitis media infection, consistent with middle ear fluid/congestion   Bilateral- Canals are without swelling, mild cerumen, No impaction      ______________________________________________________________________    I explained my diagnostic considerations and recommendations to the patient, who voiced understanding and agreement with the treatment plan.   All questions were answered.   We discussed potential side effects, risks and benefits of any prescribed or recommended therapies, as well as expectations for response to treatments.  Please see AVS for any patient instructions & handouts given.   Patient was advised to contact the Nurse Care Line, their Primary Care provider, Urgent Care, or the Emergency Department if there are new or worsening symptoms, or call 911 for emergencies.

## 2023-08-30 ENCOUNTER — PATIENT OUTREACH (OUTPATIENT)
Dept: CARE COORDINATION | Facility: CLINIC | Age: 23
End: 2023-08-30
Payer: COMMERCIAL

## 2023-08-30 ENCOUNTER — VIRTUAL VISIT (OUTPATIENT)
Dept: PSYCHOLOGY | Facility: CLINIC | Age: 23
End: 2023-08-30
Payer: COMMERCIAL

## 2023-08-30 DIAGNOSIS — F33.9 MAJOR DEPRESSIVE DISORDER, RECURRENT EPISODE WITH ANXIOUS DISTRESS (H): ICD-10-CM

## 2023-08-30 DIAGNOSIS — F41.1 GENERALIZED ANXIETY DISORDER: Primary | ICD-10-CM

## 2023-08-30 PROCEDURE — 90837 PSYTX W PT 60 MINUTES: CPT | Mod: VID | Performed by: COUNSELOR

## 2023-08-30 NOTE — PROGRESS NOTES
Clinic Care Coordination Contact  [facility]/Voicemail       Clinical Data: Care Coordinator Outreach  Outreach attempted x 1.  Left message on patient's voicemail with call back information and requested return call.  Plan: Care Coordinator will try to reach patient again in 1-2 business days.    LUPE Soares  [facility] Care Coordination  [facility]  [phone]

## 2023-08-30 NOTE — PROGRESS NOTES
Answers submitted by the patient for this visit:  SHELBY-7 (Submitted on 8/16/2023)  SHELBY 7 TOTAL SCORE: 12  Answers submitted by the patient for this visit:  SHELBY-7 (Submitted on 8/16/2023)  SHELBY 7 TOTAL SCORE: 12          [facility]                                     Progress Note    Patient Name: Batool Louis  Date: 8/30/23         Service Type: Individual      Session Start Time: 1:00pm  Session End Time:  2:00pm    Session Length: 60    Session #: 60    Attendees: Client    Service Modality:  Video Visit:      Provider verified identity through the following two step process.  Patient provided:  Patient is known previously to provider    Telemedicine Visit: The patient's condition can be safely assessed and treated via synchronous audio and visual telemedicine encounter.      Reason for Telemedicine Visit: Services only offered telehealth    Originating Site (Patient Location): Patient's home    Distant Site (Provider Location): Provider Remote Setting- Home Office    Consent:  The patient/guardian has verbally consented to: the potential risks and benefits of telemedicine (video visit) versus in person care; bill my insurance or make self-payment for services provided; and responsibility for payment of non-covered services.     Patient would like the video invitation sent by:  Send to e-mail at: [email or web address]    Mode of Communication:  Video Conference via [employer]    As the provider I attest to compliance with applicable laws and regulations related to telemedicine.    DATA  Interactive Complexity: No  Crisis: No        Progress Since Last Session (Related to Symptoms / Goals / Homework):   Symptoms: No change .    Homework: Achieved / completed to satisfaction      Episode of Care Goals: Satisfactory progress - ACTION (Actively working towards change); Intervened by reinforcing change plan / affirming steps taken     Current / Ongoing Stressors and Concerns:  Stressed trying to find  a job and with living at home.   Had a fun time with her mom the other day at a concert.      Treatment Objective(s) Addressed in This Session:   use thought-stopping strategy daily to reduce intrusive thoughts       Intervention:   Increased her antianxiety (prozac), took her off the Remeron. Keeping the hydroxyzine.     The client stated she has been applying for new jobs but hasn't gotten one. Has been looking into working as a para or at a school.  Has been more anxious lately and recently had to take more antianxiety medication the other day when she went to a concert. She stated she doesn't want to see people that she knows potentially because she doesn't like how she looks right now.   Client feels like she might have binge eating disorder- has made herself throw up before because she ate to the point of feeling sick, has hid food before, lied about what what she's ordered if she's ordered food, eats alone in her car, feels like her relationship with food is all she can think about.      Assessments completed prior to visit:  The following assessments were completed by patient for this visit:  PHQ9:       3/12/2020     3:07 PM 4/30/2020    11:50 AM 5/22/2020     3:25 PM 7/10/2020     9:57 AM 2/1/2021     8:45 AM 3/21/2022     9:28 AM 8/28/2023     1:57 PM   PHQ-9 SCORE   PHQ-9 Total Score MyChart   15 (Moderately severe depression)  4 (Minimal depression)  8 (Mild depression)   PHQ-9 Total Score 10 6 15 14 4 8 8     GAD7:       6/11/2018    10:28 AM 8/13/2018     9:27 AM 3/12/2020     3:07 PM 5/22/2020     3:24 PM 2/1/2021     8:45 AM 11/14/2022     9:36 AM 8/16/2023     1:08 PM   SHELBY-7 SCORE   Total Score  6 (mild anxiety)  16 (severe anxiety) 10 (moderate anxiety) 16 (severe anxiety) 12 (moderate anxiety)   Total Score 4 6 7 16 10 16 12    12     PROMIS 10-Global Health (all questions and answers displayed):       12/21/2021    10:06 AM 11/14/2022     9:37 AM 2/13/2023    10:23 AM 2/27/2023    10:07 AM  8/16/2023     1:09 PM 8/30/2023    12:47 PM   PROMIS 10   In general, would you say your health is: Very good Fair Fair Fair Good Fair   In general, would you say your quality of life is: Very good Good Good Good Good Good   In general, how would you rate your physical health? Very good Good Fair Fair Fair Fair   In general, how would you rate your mental health, including your mood and your ability to think? Very good Fair Fair Good Fair Good   In general, how would you rate your satisfaction with your social activities and relationships? Very good Fair Fair Fair Fair Poor   In general, please rate how well you carry out your usual social activities and roles Very good Fair Good Fair Fair Fair   To what extent are you able to carry out your everyday physical activities such as walking, climbing stairs, carrying groceries, or moving a chair? Completely Completely Completely Completely Completely Mostly   In the past 7 days, how often have you been bothered by emotional problems such as feeling anxious, depressed, or irritable? Rarely Often Sometimes Often Often Sometimes   In the past 7 days, how would you rate your fatigue on average? Mild Moderate Mild Mild Mild Mild   In the past 7 days, how would you rate your pain on average, where 0 means no pain, and 10 means worst imaginable pain? 1 2 3 0 0 0   In general, would you say your health is: 4 2 2 2 3    3 2   In general, would you say your quality of life is: 4 3 3 3 3    3 3   In general, how would you rate your physical health? 4 3 2 2 2    2 2   In general, how would you rate your mental health, including your mood and your ability to think? 4 2 2 3 2    2 3   In general, how would you rate your satisfaction with your social activities and relationships? 4 2 2 2 2    2 1   In general, please rate how well you carry out your usual social activities and roles. (This includes activities at home, at work and in your community, and responsibilities as a parent,  child, spouse, employee, friend, etc.) 4 2 3 2 2    2 2   To what extent are you able to carry out your everyday physical activities such as walking, climbing stairs, carrying groceries, or moving a chair? 5 5 5 5 5    5 4   In the past 7 days, how often have you been bothered by emotional problems such as feeling anxious, depressed, or irritable? 2 4 3 4 4    4 3   In the past 7 days, how would you rate your fatigue on average? 2 3 2 2 2    2 2   In the past 7 days, how would you rate your pain on average, where 0 means no pain, and 10 means worst imaginable pain? 1 2 3 0 0    0 0   Global Mental Health Score 16 9 10 10 9    9 10   Global Physical Health Score 17 15 15 16 16    16 15   PROMIS TOTAL - SUBSCORES 33 24 25 26 25    25 25         ASSESSMENT: Current Emotional / Mental Status (status of significant symptoms):   Risk status (Self / Other harm or suicidal ideation)   Patient denies current fears or concerns for personal safety.   Patient denies current or recent suicidal ideation or behaviors.   Patient denies current or recent homicidal ideation or behaviors.   Patient denies current or recent self injurious behavior or ideation.   Patient denies other safety concerns.   Patient reports there has been no change in risk factors since their last session.     Patient reports there has been no change in protective factors since their last session.     Recommended that patient call 911 or go to the local ED should there be a change in any of these risk factors.     Appearance:   Appropriate    Eye Contact:   Good    Psychomotor Behavior: Normal    Attitude:   Cooperative    Orientation:   All   Speech    Rate / Production: Normal/ Responsive    Volume:  Normal    Mood:    Normal   Affect:    Appropriate    Thought Content:  Clear    Thought Form:  Coherent  Logical    Insight:    Good      Medication Review:   No current psychiatric medications prescribed     Medication Compliance:   NA     Changes in Health  Issues:   None reported     Chemical Use Review:   Substance Use: Chemical use reviewed, no active concerns identified      Tobacco Use: No current tobacco use.      Diagnosis:  1. Generalized anxiety disorder    2. Major depressive disorder, recurrent episode with anxious distress (H)        Collateral Reports Completed:   Not Applicable    PLAN: (Patient Tasks / Therapist Tasks / Other)  Client to continue to practice stress reduction skills.        Celsa , [facility]                                                         ______________________________________________________________________    Individual Treatment Plan    Patient's Name: Batool Louis  YOB: 2000    Date of Creation: 3/23/22  Date Treatment Plan Last Reviewed/Revised: 7/26/23    DSM5 Diagnoses: 300.02 (F41.1) Generalized Anxiety Disorder  Psychosocial / Contextual Factors: college student, living at home, family conflict  PROMIS (reviewed every 90 days):     Referral / Collaboration:  Referral to another professional/service is not indicated at this time..    Anticipated number of session for this episode of care: on-going  Anticipation frequency of session: Every other week  Anticipated Duration of each session: 38-52 minutes  Treatment plan will be reviewed in 90 days or when goals have been changed.       MeasurableTreatment Goal(s) related to diagnosis / functional impairment(s)    MeasurableTreatment Goal(s) related to diagnosis / functional impairment(s)  Goal 1: Client will increase emotion regulation skills/decrease panic attacks    Objective #A (Client Action)    Client will identify 2-4 fears / thoughts that contribute to feeling anxious  use positive self-affirmations daily.  Status: Continued - Date(s): 7/26/23    Intervention(s)  Therapist will teach emotional regulation skills. distress tolerance skills, interpersonal effectiveness skills, emotion regluation skills, mindfulness skills, radical acceptance.  "Therapist will teach client how to ID body cues for anxiety, anxiety reduction techniques, how to ID triggers for depression and anxiety- decrease reactivity/eliminate, lifestyle changes to reduce depression and anxiety, communication skills, explore cognitive beliefs and help client to develop healthy cognitive patterns and beliefs.      Objective #B  Client will use thought-stopping strategy daily to reduce intrusive thoughts.  Status: Continued - Date(s): 7/26/23    Intervention(s)  Therapist will teach emotional regulation skills. distress tolerance skills, interpersonal effectiveness skills, emotion regluation skills, mindfulness skills, radical acceptance. Therapist will teach client how to ID body cues for anxiety, anxiety reduction techniques, how to ID triggers for depression and anxiety- decrease reactivity/eliminate, lifestyle changes to reduce depression and anxiety, communication skills, explore cognitive beliefs and help client to develop healthy cognitive patterns and beliefs.    Objective #C (Client Action)    Status: Continued - Date: 7/26/23    Client will use \"worry time\" each day for 15 minutes of scheduled worry and then defer obsessive or anxious thinking until the next structured worry time.    Intervention(s)  Therapist will teach emotional regulation skills. work through trauma using somatic experiencing techniques to discharge the anxiety.    Goal 2: Client will work on increasing self esteem and self worth     Objective #A (Client Action)    Status: Continued - Date(s): 7/26/23    Client will identify two areas of life that you would like to have improved functioning.    Intervention(s)  Therapist will teach emotional regulation skills. work through trauma using somatic experiencing techniques to discharge the anxiety .      Client has reviewed and agreed to the above plan.      Celsa [facility]    "

## 2023-08-31 NOTE — PROGRESS NOTES
Clinic Care Coordination Contact  Community Health Worker Initial Outreach    Spoke to Patient (Dodie)  CHW Introduced intent of call regarding PCP referral.  Patients reports that she is doing well. Further indicating that she was interested in support for binge eating habits. Further requesting a list of programs within her area that Patient can participate in.   CHW introduce Clinic Care Coordination and Patient responded that she would like to get connected with [facility].   CHW acknowledged and scheduled Patient for an [facility] Phone Visit with [facility] for an [facility] Assessment on 09/06/2023 at 2:30 PM. Patient acknowledged.   CHW acknowledged and provided Patient with CHW contact information for additional support needed.     CHW Initial Information Gathering:  Referral Source: PCP  Current living arrangement:: I live in a private home with family  Type of residence:: Private home - stairs  Community Resources: None  Informal Support system:: Family, Friends  No PCP office visit in Past Year: No  Transportation means:: Regular car  CHW Additional Questions  If ED/Hospital discharge, follow-up appointment scheduled as recommended?: N/A  Medication changes made following ED/Hospital discharge?: N/A  MyChart active?: Yes  Patient sent Social Determinants of Health questionnaire?: Yes    Patient accepts CC: Yes. Patient scheduled for assessment with [facility] on 09/06/2023 at 2:30 PM. Patient noted desire to discuss interested in support for binge eating habits. Further requesting a list of programs within her area that Patient can participate in.     LUPE Cuellar  Clinic Care Coordination   [facility]   Phone: [phone]  [email or web address]

## 2023-09-05 ENCOUNTER — OFFICE VISIT (OUTPATIENT)
Dept: OBGYN | Facility: CLINIC | Age: 23
End: 2023-09-05
Payer: COMMERCIAL

## 2023-09-05 VITALS
RESPIRATION RATE: 18 BRPM | TEMPERATURE: 98.9 F | DIASTOLIC BLOOD PRESSURE: 81 MMHG | HEART RATE: 93 BPM | WEIGHT: 266 LBS | BODY MASS INDEX: 39.4 KG/M2 | HEIGHT: 69 IN | SYSTOLIC BLOOD PRESSURE: 123 MMHG

## 2023-09-05 DIAGNOSIS — E66.812 CLASS 2 OBESITY DUE TO EXCESS CALORIES WITHOUT SERIOUS COMORBIDITY WITH BODY MASS INDEX (BMI) OF 39.0 TO 39.9 IN ADULT: Primary | ICD-10-CM

## 2023-09-05 DIAGNOSIS — Z30.432 ENCOUNTER FOR REMOVAL OF INTRAUTERINE CONTRACEPTIVE DEVICE: ICD-10-CM

## 2023-09-05 DIAGNOSIS — Z00.00 ANNUAL PHYSICAL EXAM: ICD-10-CM

## 2023-09-05 DIAGNOSIS — E66.09 CLASS 2 OBESITY DUE TO EXCESS CALORIES WITHOUT SERIOUS COMORBIDITY WITH BODY MASS INDEX (BMI) OF 39.0 TO 39.9 IN ADULT: Primary | ICD-10-CM

## 2023-09-05 PROCEDURE — 58301 REMOVE INTRAUTERINE DEVICE: CPT | Performed by: ADVANCED PRACTICE MIDWIFE

## 2023-09-05 PROCEDURE — 99395 PREV VISIT EST AGE 18-39: CPT | Mod: 25 | Performed by: ADVANCED PRACTICE MIDWIFE
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
## 2023-09-05 NOTE — NURSING NOTE
"Initial /81 (BP Location: Left arm, Patient Position: Chair, Cuff Size: Adult Large)   Pulse 93   Temp 98.9  F (37.2  C) (Tympanic)   Resp 18   Ht 1.753 m (5' 9\")   Wt 120.7 kg (266 lb)   BMI 39.28 kg/m   Estimated body mass index is 39.28 kg/m  as calculated from the following:    Height as of this encounter: 1.753 m (5' 9\").    Weight as of this encounter: 120.7 kg (266 lb). .    "

## 2023-09-06 ENCOUNTER — PATIENT OUTREACH (OUTPATIENT)
Dept: CARE COORDINATION | Facility: CLINIC | Age: 23
End: 2023-09-06

## 2023-09-06 ENCOUNTER — VIRTUAL VISIT (OUTPATIENT)
Dept: PSYCHOLOGY | Facility: CLINIC | Age: 23
End: 2023-09-06
Payer: COMMERCIAL

## 2023-09-06 DIAGNOSIS — F41.1 GENERALIZED ANXIETY DISORDER: Primary | ICD-10-CM

## 2023-09-06 DIAGNOSIS — F33.9 MAJOR DEPRESSIVE DISORDER, RECURRENT EPISODE WITH ANXIOUS DISTRESS (H): ICD-10-CM

## 2023-09-06 PROCEDURE — 90834 PSYTX W PT 45 MINUTES: CPT | Mod: VID | Performed by: COUNSELOR

## 2023-09-06 NOTE — PROGRESS NOTES
Clinic Care Coordination Contact  [facility]/Voicemail       Clinical Data: Care Coordinator Outreach  Outreach attempted x 2 for today's scheduled appt.  There was no answer and no voice mail .   Plan: Care Coordinator will try to reach patient again in 1-2 business days.    [facility] - Care Coordinator   9/6/2023   2:49 PM  [phone]

## 2023-09-06 NOTE — PROGRESS NOTES
Answers submitted by the patient for this visit:  SHELBY-7 (Submitted on 8/16/2023)  SHELBY 7 TOTAL SCORE: 12  Answers submitted by the patient for this visit:  SHELBY-7 (Submitted on 8/16/2023)  SHELBY 7 TOTAL SCORE: 12          [facility] Counseling                                     Progress Note    Patient Name: Batool Louis  Date: 9/6/23         Service Type: Individual      Session Start Time: 9:05am  Session End Time:  9:55am    Session Length: 50    Session #: 61    Attendees: Client    Service Modality:  Video Visit:      Provider verified identity through the following two step process.  Patient provided:  Patient is known previously to provider    Telemedicine Visit: The patient's condition can be safely assessed and treated via synchronous audio and visual telemedicine encounter.      Reason for Telemedicine Visit: Services only offered telehealth    Originating Site (Patient Location): Patient's home    Distant Site (Provider Location): Provider Remote Setting- Home Office    Consent:  The patient/guardian has verbally consented to: the potential risks and benefits of telemedicine (video visit) versus in person care; bill my insurance or make self-payment for services provided; and responsibility for payment of non-covered services.     Patient would like the video invitation sent by:  Send to e-mail at: [email or web address]    Mode of Communication:  Video Conference via [employer]    As the provider I attest to compliance with applicable laws and regulations related to telemedicine.    DATA  Interactive Complexity: No  Crisis: No        Progress Since Last Session (Related to Symptoms / Goals / Homework):   Symptoms: No change .    Homework: Achieved / completed to satisfaction      Episode of Care Goals: Satisfactory progress - ACTION (Actively working towards change); Intervened by reinforcing change plan / affirming steps taken     Current / Ongoing Stressors and Concerns:  Client stated she went to  "[place] last night with her family and she doesn't believe in God, she stated. She stated since going out in public she had a surge of energy and it felt good to be out in public.  She continues to work on going through her things and cleaning.      Treatment Objective(s) Addressed in This Session:   use thought-stopping strategy daily to reduce intrusive thoughts       Intervention:   Increased her antianxiety (prozac), took her off the Remeron. Keeping the hydroxyzine.     Talked about how the client has had \"end of the world\" thoughts since she was in 7th grade. Client stated she didn't tell anyone about this back then and it was debilitating too just like before she went into the hospital.   She stated she didn't realize things she wasn't talking about with people until she was in the hospital. Validated the anxiety she's felt over time and discussed it's possible origins.       Assessments completed prior to visit:  The following assessments were completed by patient for this visit:  PHQ9:       3/12/2020     3:07 PM 4/30/2020    11:50 AM 5/22/2020     3:25 PM 7/10/2020     9:57 AM 2/1/2021     8:45 AM 3/21/2022     9:28 AM 8/28/2023     1:57 PM   PHQ-9 SCORE   PHQ-9 Total Score MyChart   15 (Moderately severe depression)  4 (Minimal depression)  8 (Mild depression)   PHQ-9 Total Score 10 6 15 14 4 8 8     GAD7:       6/11/2018    10:28 AM 8/13/2018     9:27 AM 3/12/2020     3:07 PM 5/22/2020     3:24 PM 2/1/2021     8:45 AM 11/14/2022     9:36 AM 8/16/2023     1:08 PM   SHELBY-7 SCORE   Total Score  6 (mild anxiety)  16 (severe anxiety) 10 (moderate anxiety) 16 (severe anxiety) 12 (moderate anxiety)   Total Score 4 6 7 16 10 16 12    12     PROMIS 10-Global Health (all questions and answers displayed):       12/21/2021    10:06 AM 11/14/2022     9:37 AM 2/13/2023    10:23 AM 2/27/2023    10:07 AM 8/16/2023     1:09 PM 8/30/2023    12:47 PM   PROMIS 10   In general, would you say your health is: Very good Fair Fair " Fair Good Fair   In general, would you say your quality of life is: Very good Good Good Good Good Good   In general, how would you rate your physical health? Very good Good Fair Fair Fair Fair   In general, how would you rate your mental health, including your mood and your ability to think? Very good Fair Fair Good Fair Good   In general, how would you rate your satisfaction with your social activities and relationships? Very good Fair Fair Fair Fair Poor   In general, please rate how well you carry out your usual social activities and roles Very good Fair Good Fair Fair Fair   To what extent are you able to carry out your everyday physical activities such as walking, climbing stairs, carrying groceries, or moving a chair? Completely Completely Completely Completely Completely Mostly   In the past 7 days, how often have you been bothered by emotional problems such as feeling anxious, depressed, or irritable? Rarely Often Sometimes Often Often Sometimes   In the past 7 days, how would you rate your fatigue on average? Mild Moderate Mild Mild Mild Mild   In the past 7 days, how would you rate your pain on average, where 0 means no pain, and 10 means worst imaginable pain? 1 2 3 0 0 0   In general, would you say your health is: 4 2 2 2 3    3 2   In general, would you say your quality of life is: 4 3 3 3 3    3 3   In general, how would you rate your physical health? 4 3 2 2 2    2 2   In general, how would you rate your mental health, including your mood and your ability to think? 4 2 2 3 2    2 3   In general, how would you rate your satisfaction with your social activities and relationships? 4 2 2 2 2    2 1   In general, please rate how well you carry out your usual social activities and roles. (This includes activities at home, at work and in your community, and responsibilities as a parent, child, spouse, employee, friend, etc.) 4 2 3 2 2    2 2   To what extent are you able to carry out your everyday physical  activities such as walking, climbing stairs, carrying groceries, or moving a chair? 5 5 5 5 5    5 4   In the past 7 days, how often have you been bothered by emotional problems such as feeling anxious, depressed, or irritable? 2 4 3 4 4    4 3   In the past 7 days, how would you rate your fatigue on average? 2 3 2 2 2    2 2   In the past 7 days, how would you rate your pain on average, where 0 means no pain, and 10 means worst imaginable pain? 1 2 3 0 0    0 0   Global Mental Health Score 16 9 10 10 9    9 10   Global Physical Health Score 17 15 15 16 16    16 15   PROMIS TOTAL - SUBSCORES 33 24 25 26 25    25 25         ASSESSMENT: Current Emotional / Mental Status (status of significant symptoms):   Risk status (Self / Other harm or suicidal ideation)   Patient denies current fears or concerns for personal safety.   Patient denies current or recent suicidal ideation or behaviors.   Patient denies current or recent homicidal ideation or behaviors.   Patient denies current or recent self injurious behavior or ideation.   Patient denies other safety concerns.   Patient reports there has been no change in risk factors since their last session.     Patient reports there has been no change in protective factors since their last session.     Recommended that patient call 911 or go to the local ED should there be a change in any of these risk factors.     Appearance:   Appropriate    Eye Contact:   Good    Psychomotor Behavior: Normal    Attitude:   Cooperative    Orientation:   All   Speech    Rate / Production: Normal/ Responsive    Volume:  Normal    Mood:    Normal   Affect:    Appropriate    Thought Content:  Clear    Thought Form:  Coherent  Logical    Insight:    Good      Medication Review:   No current psychiatric medications prescribed     Medication Compliance:   NA     Changes in Health Issues:   None reported     Chemical Use Review:   Substance Use: Chemical use reviewed, no active concerns identified       Tobacco Use: No current tobacco use.      Diagnosis:  1. Generalized anxiety disorder    2. Major depressive disorder, recurrent episode with anxious distress (H)        Collateral Reports Completed:   Not Applicable    PLAN: (Patient Tasks / Therapist Tasks / Other)  Client to continue to practice stress reduction skills.        Celsa , [facility]                                                         ______________________________________________________________________    Individual Treatment Plan    Patient's Name: Batool Louis  YOB: 2000    Date of Creation: 3/23/22  Date Treatment Plan Last Reviewed/Revised: 7/26/23    DSM5 Diagnoses: 300.02 (F41.1) Generalized Anxiety Disorder  Psychosocial / Contextual Factors: college student, living at home, family conflict  PROMIS (reviewed every 90 days):     Referral / Collaboration:  Referral to another professional/service is not indicated at this time..    Anticipated number of session for this episode of care: on-going  Anticipation frequency of session: Every other week  Anticipated Duration of each session: 38-52 minutes  Treatment plan will be reviewed in 90 days or when goals have been changed.       MeasurableTreatment Goal(s) related to diagnosis / functional impairment(s)    MeasurableTreatment Goal(s) related to diagnosis / functional impairment(s)  Goal 1: Client will increase emotion regulation skills/decrease panic attacks    Objective #A (Client Action)    Client will identify 2-4 fears / thoughts that contribute to feeling anxious  use positive self-affirmations daily.  Status: Continued - Date(s): 7/26/23    Intervention(s)  Therapist will teach emotional regulation skills. distress tolerance skills, interpersonal effectiveness skills, emotion regluation skills, mindfulness skills, radical acceptance. Therapist will teach client how to ID body cues for anxiety, anxiety reduction techniques, how to ID triggers for  "depression and anxiety- decrease reactivity/eliminate, lifestyle changes to reduce depression and anxiety, communication skills, explore cognitive beliefs and help client to develop healthy cognitive patterns and beliefs.      Objective #B  Client will use thought-stopping strategy daily to reduce intrusive thoughts.  Status: Continued - Date(s): 7/26/23    Intervention(s)  Therapist will teach emotional regulation skills. distress tolerance skills, interpersonal effectiveness skills, emotion regluation skills, mindfulness skills, radical acceptance. Therapist will teach client how to ID body cues for anxiety, anxiety reduction techniques, how to ID triggers for depression and anxiety- decrease reactivity/eliminate, lifestyle changes to reduce depression and anxiety, communication skills, explore cognitive beliefs and help client to develop healthy cognitive patterns and beliefs.    Objective #C (Client Action)    Status: Continued - Date: 7/26/23    Client will use \"worry time\" each day for 15 minutes of scheduled worry and then defer obsessive or anxious thinking until the next structured worry time.    Intervention(s)  Therapist will teach emotional regulation skills. work through trauma using somatic experiencing techniques to discharge the anxiety.    Goal 2: Client will work on increasing self esteem and self worth     Objective #A (Client Action)    Status: Continued - Date(s): 7/26/23    Client will identify two areas of life that you would like to have improved functioning.    Intervention(s)  Therapist will teach emotional regulation skills. work through trauma using somatic experiencing techniques to discharge the anxiety .      Client has reviewed and agreed to the above plan.      Celsa [facility]    "

## 2023-09-07 SDOH — ECONOMIC STABILITY: FOOD INSECURITY: WITHIN THE PAST 12 MONTHS, THE FOOD YOU BOUGHT JUST DIDN'T LAST AND YOU DIDN'T HAVE MONEY TO GET MORE.: NEVER TRUE

## 2023-09-07 SDOH — ECONOMIC STABILITY: TRANSPORTATION INSECURITY
IN THE PAST 12 MONTHS, HAS THE LACK OF TRANSPORTATION KEPT YOU FROM MEDICAL APPOINTMENTS OR FROM GETTING MEDICATIONS?: NO

## 2023-09-07 SDOH — ECONOMIC STABILITY: FOOD INSECURITY: WITHIN THE PAST 12 MONTHS, YOU WORRIED THAT YOUR FOOD WOULD RUN OUT BEFORE YOU GOT MONEY TO BUY MORE.: NEVER TRUE

## 2023-09-07 SDOH — ECONOMIC STABILITY: TRANSPORTATION INSECURITY
IN THE PAST 12 MONTHS, HAS LACK OF TRANSPORTATION KEPT YOU FROM MEETINGS, WORK, OR FROM GETTING THINGS NEEDED FOR DAILY LIVING?: NO

## 2023-09-07 ASSESSMENT — SOCIAL DETERMINANTS OF HEALTH (SDOH): HOW HARD IS IT FOR YOU TO PAY FOR THE VERY BASICS LIKE FOOD, HOUSING, MEDICAL CARE, AND HEATING?: NOT HARD AT ALL

## 2023-09-07 ASSESSMENT — ACTIVITIES OF DAILY LIVING (ADL): DEPENDENT_IADLS:: INDEPENDENT

## 2023-09-07 NOTE — PROGRESS NOTES
Clinic Care Coordination Contact  Clinic Care Coordination Contact  OUTREACH    Referral Information:  Referral Source: PCP    Primary Diagnosis: Behavioral Health    Chief Complaint   Patient presents with    Clinic Care Coordination - Initial     SW CC        [place] Utilization: as below  Clinic Utilization  Difficulty keeping appointments:: No  Compliance Concerns: No  No-Show Concerns: No  No PCP office visit in Past Year: No  Utilization      Hospital Admissions  1             ED Visits  2             No Show Count (past year)  4                    Current as of: 9/6/2023 11:57 PM                Clinical Concerns:  Current Medical Concerns:  pt had no concerns.    Current Behavioral Concerns: pt noted she is looking for an eating disorder program within [place].  Her counselor said there was one.  Reviewed specialty programs and reached out to team members for inquiry and there is not one within [place].     Education Provided to patient: reviewed options of calling her insurance to see who is in network and checking out [facility] programs.       Health Maintenance Reviewed: Due/Overdue   Health Maintenance Due   Topic Date Due    COVID-19 Vaccine (4 - Pfizer series) 05/16/2022    INFLUENZA VACCINE (1) 09/01/2023       Clinical Pathway: None    Medication Management:  Medication review status: Medications reviewed and no changes reported per patient.        Pt had no questions regarding her medications.     Functional Status:  Dependent ADLs:: Independent  Dependent IADLs:: Independent  Bed or wheelchair confined:: No  Mobility Status: Independent  Fallen 2 or more times in the past year?: No  Any fall with injury in the past year?: No    Living Situation:  Current living arrangement:: I live in a private home with family  Type of residence:: Private home - stairs    Lifestyle & Psychosocial Needs:    Social Determinants of Health     Tobacco Use: Low Risk  (9/5/2023)    Patient History     Smoking  Tobacco Use: Never     Smokeless Tobacco Use: Never     Passive Exposure: Not on file   Alcohol Use: Unknown (7/10/2020)    AUDIT-C     Frequency of Alcohol Consumption: Not on file     Average Number of Drinks: Not on file     Frequency of Binge Drinking: Monthly   Financial Resource Strain: Low Risk  (9/7/2023)    Overall Financial Resource Strain (CARDIA)     Difficulty of Paying Living Expenses: Not hard at all   Food Insecurity: No Food Insecurity (9/7/2023)    Hunger Vital Sign     Worried About Running Out of Food in the Last Year: Never true     Ran Out of Food in the Last Year: Never true   Transportation Needs: No Transportation Needs (9/7/2023)    PRAPARE - Transportation     Lack of Transportation (Medical): No     Lack of Transportation (Non-Medical): No   Physical Activity: Sufficiently Active (9/17/2019)    Exercise Vital Sign     Days of Exercise per Week: 7 days     Minutes of Exercise per Session: 30 min   Stress: Not on file   Social Connections: Not on file   Intimate Partner Violence: Not on file   Depression: Not at risk (9/5/2023)    PHQ-2     PHQ-2 Score: 0   Housing Stability: Not on file     Diet:: Regular  Tube Feeding: No  Inadequate activity/exercise (GOAL):: No  Significant changes in sleep pattern (GOAL): No  Transportation means:: Regular car     Gnosticism or spiritual beliefs that impact treatment:: No  Mental health DX:: Yes  Mental health DX how managed:: Medication, Outpatient Counseling  Mental health management concern (GOAL):: No  Chemical Dependency Status: No Current Concerns  Informal Support system:: Family, Friends        Pt denied any other needs at this time. She was only looking for information at this time.      Resources and Interventions:  Current Resources:      Community Resources: None  Supplies Currently Used at Home: None  Equipment Currently Used at Home: none            Advance Care Plan/Directive  Advanced Care Plans/Directives on file:: No            Care  Plan:  N/a    Patient/Caregiver understanding: n/a       Future Appointments                In 6 days [facility], [facility] [place]    In 2 weeks [facility], [facility] EL RIVMIKE    In 1 month [facility], [facility] ELDANK HUSTON    In 1 month [facility], [facility] ELK RIVE            Plan: pt to check on in network eating disorder programs to identify which would be best for her.  Will send CC information and contact numbers for future needs to pt via [employer].     AMAN Cerda  [facility] - Care Coordinator   9/7/2023   11:23 AM  [phone]

## 2023-09-13 ENCOUNTER — VIRTUAL VISIT (OUTPATIENT)
Dept: PSYCHOLOGY | Facility: CLINIC | Age: 23
End: 2023-09-13
Payer: COMMERCIAL

## 2023-09-13 DIAGNOSIS — F33.9 MAJOR DEPRESSIVE DISORDER, RECURRENT EPISODE WITH ANXIOUS DISTRESS (H): ICD-10-CM

## 2023-09-13 DIAGNOSIS — F41.1 GENERALIZED ANXIETY DISORDER: Primary | ICD-10-CM

## 2023-09-13 PROCEDURE — 90834 PSYTX W PT 45 MINUTES: CPT | Mod: VID | Performed by: COUNSELOR
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
## 2023-09-18 ENCOUNTER — VIRTUAL VISIT (OUTPATIENT)
Dept: PSYCHOLOGY | Facility: CLINIC | Age: 23
End: 2023-09-18
Payer: COMMERCIAL

## 2023-09-18 DIAGNOSIS — F33.9 MAJOR DEPRESSIVE DISORDER, RECURRENT EPISODE WITH ANXIOUS DISTRESS (H): ICD-10-CM

## 2023-09-18 DIAGNOSIS — F41.1 GENERALIZED ANXIETY DISORDER: Primary | ICD-10-CM

## 2023-09-18 PROCEDURE — 90834 PSYTX W PT 45 MINUTES: CPT | Mod: VID | Performed by: COUNSELOR

## 2023-09-18 NOTE — PROGRESS NOTES
Answers submitted by the patient for this visit:  SHELBY-7 (Submitted on 8/16/2023)  SHELBY 7 TOTAL SCORE: 12  Answers submitted by the patient for this visit:  SHELBY-7 (Submitted on 8/16/2023)  SHELBY 7 TOTAL SCORE: 12          [facility]                                     Progress Note    Patient Name: Batool Louis  Date: 9/18/23         Service Type: Individual      Session Start Time: 8:35am  Session End Time:  9:25am    Session Length: 50    Session #: 63    Attendees: Client    Service Modality:  Video Visit:      Provider verified identity through the following two step process.  Patient provided:  Patient is known previously to provider    Telemedicine Visit: The patient's condition can be safely assessed and treated via synchronous audio and visual telemedicine encounter.      Reason for Telemedicine Visit: Services only offered telehealth    Originating Site (Patient Location): Patient's home    Distant Site (Provider Location): Provider Remote Setting- Home Office    Consent:  The patient/guardian has verbally consented to: the potential risks and benefits of telemedicine (video visit) versus in person care; bill my insurance or make self-payment for services provided; and responsibility for payment of non-covered services.     Patient would like the video invitation sent by:  Send to e-mail at: [email or web address]    Mode of Communication:  Video Conference via Amwell    As the provider I attest to compliance with applicable laws and regulations related to telemedicine.    DATA  Interactive Complexity: No  Crisis: No        Progress Since Last Session (Related to Symptoms / Goals / Homework):   Symptoms: No change .    Homework: Achieved / completed to satisfaction      Episode of Care Goals: Satisfactory progress - ACTION (Actively working towards change); Intervened by reinforcing change plan / affirming steps taken     Current / Ongoing Stressors and Concerns:  Client stated she just  got back from [place] from visiting her sister. She didn't go to the wedding.  She stated she realized that she is lately asking if she said anything wrong or did anything wrong after a social interaction.   Starting as a sub para tomorrow.      Treatment Objective(s) Addressed in This Session:   use thought-stopping strategy daily to reduce intrusive thoughts       Intervention:   Talked about an interaction the client had with her sister and her sister's friends regarding things she shared about her experiences. The client stated she feels like she might overshare and feels like she constantly checks in about this with others. Discussed how the client did not overshare with the example she gave and that the feedback she received from her sister is likely about her sister's own discomforts and not the client's.           Assessments completed prior to visit:  The following assessments were completed by patient for this visit:  PHQ9:       3/12/2020     3:07 PM 4/30/2020    11:50 AM 5/22/2020     3:25 PM 7/10/2020     9:57 AM 2/1/2021     8:45 AM 3/21/2022     9:28 AM 8/28/2023     1:57 PM   PHQ-9 SCORE   PHQ-9 Total Score MyChart   15 (Moderately severe depression)  4 (Minimal depression)  8 (Mild depression)   PHQ-9 Total Score 10 6 15 14 4 8 8     GAD7:       6/11/2018    10:28 AM 8/13/2018     9:27 AM 3/12/2020     3:07 PM 5/22/2020     3:24 PM 2/1/2021     8:45 AM 11/14/2022     9:36 AM 8/16/2023     1:08 PM   SHELBY-7 SCORE   Total Score  6 (mild anxiety)  16 (severe anxiety) 10 (moderate anxiety) 16 (severe anxiety) 12 (moderate anxiety)   Total Score 4 6 7 16 10 16 12    12     PROMIS 10-Global Health (all questions and answers displayed):       12/21/2021    10:06 AM 11/14/2022     9:37 AM 2/13/2023    10:23 AM 2/27/2023    10:07 AM 8/16/2023     1:09 PM 8/30/2023    12:47 PM   PROMIS 10   In general, would you say your health is: Very good Fair Fair Fair Good Fair   In general, would you say your quality of life  is: Very good Good Good Good Good Good   In general, how would you rate your physical health? Very good Good Fair Fair Fair Fair   In general, how would you rate your mental health, including your mood and your ability to think? Very good Fair Fair Good Fair Good   In general, how would you rate your satisfaction with your social activities and relationships? Very good Fair Fair Fair Fair Poor   In general, please rate how well you carry out your usual social activities and roles Very good Fair Good Fair Fair Fair   To what extent are you able to carry out your everyday physical activities such as walking, climbing stairs, carrying groceries, or moving a chair? Completely Completely Completely Completely Completely Mostly   In the past 7 days, how often have you been bothered by emotional problems such as feeling anxious, depressed, or irritable? Rarely Often Sometimes Often Often Sometimes   In the past 7 days, how would you rate your fatigue on average? Mild Moderate Mild Mild Mild Mild   In the past 7 days, how would you rate your pain on average, where 0 means no pain, and 10 means worst imaginable pain? 1 2 3 0 0 0   In general, would you say your health is: 4 2 2 2 3    3 2   In general, would you say your quality of life is: 4 3 3 3 3    3 3   In general, how would you rate your physical health? 4 3 2 2 2    2 2   In general, how would you rate your mental health, including your mood and your ability to think? 4 2 2 3 2    2 3   In general, how would you rate your satisfaction with your social activities and relationships? 4 2 2 2 2    2 1   In general, please rate how well you carry out your usual social activities and roles. (This includes activities at home, at work and in your community, and responsibilities as a parent, child, spouse, employee, friend, etc.) 4 2 3 2 2    2 2   To what extent are you able to carry out your everyday physical activities such as walking, climbing stairs, carrying groceries,  or moving a chair? 5 5 5 5 5    5 4   In the past 7 days, how often have you been bothered by emotional problems such as feeling anxious, depressed, or irritable? 2 4 3 4 4    4 3   In the past 7 days, how would you rate your fatigue on average? 2 3 2 2 2    2 2   In the past 7 days, how would you rate your pain on average, where 0 means no pain, and 10 means worst imaginable pain? 1 2 3 0 0    0 0   Global Mental Health Score 16 9 10 10 9    9 10   Global Physical Health Score 17 15 15 16 16    16 15   PROMIS TOTAL - SUBSCORES 33 24 25 26 25    25 25         ASSESSMENT: Current Emotional / Mental Status (status of significant symptoms):   Risk status (Self / Other harm or suicidal ideation)   Patient denies current fears or concerns for personal safety.   Patient denies current or recent suicidal ideation or behaviors.   Patient denies current or recent homicidal ideation or behaviors.   Patient denies current or recent self injurious behavior or ideation.   Patient denies other safety concerns.   Patient reports there has been no change in risk factors since their last session.     Patient reports there has been no change in protective factors since their last session.     Recommended that patient call 911 or go to the local ED should there be a change in any of these risk factors.     Appearance:   Appropriate    Eye Contact:   Good    Psychomotor Behavior: Normal    Attitude:   Cooperative    Orientation:   All   Speech    Rate / Production: Normal/ Responsive    Volume:  Normal    Mood:    Normal   Affect:    Appropriate    Thought Content:  Clear    Thought Form:  Coherent  Logical    Insight:    Good      Medication Review:   No current psychiatric medications prescribed     Medication Compliance:   NA     Changes in Health Issues:   None reported     Chemical Use Review:   Substance Use: Chemical use reviewed, no active concerns identified      Tobacco Use: No current tobacco use.      Diagnosis:  1.  Generalized anxiety disorder    2. Major depressive disorder, recurrent episode with anxious distress (H)        Collateral Reports Completed:   Not Applicable    PLAN: (Patient Tasks / Therapist Tasks / Other)  Client to continue to practice self care skills and mindfulness skills.         Celsa Linda, [facility]                                                         ______________________________________________________________________    Individual Treatment Plan    Patient's Name: Batool Louis  YOB: 2000    Date of Creation: 3/23/22  Date Treatment Plan Last Reviewed/Revised: 7/26/23    DSM5 Diagnoses: 300.02 (F41.1) Generalized Anxiety Disorder  Psychosocial / Contextual Factors: college student, living at home, family conflict  PROMIS (reviewed every 90 days):     Referral / Collaboration:  Referral to another professional/service is not indicated at this time..    Anticipated number of session for this episode of care: on-going  Anticipation frequency of session: Every other week  Anticipated Duration of each session: 38-52 minutes  Treatment plan will be reviewed in 90 days or when goals have been changed.       MeasurableTreatment Goal(s) related to diagnosis / functional impairment(s)    MeasurableTreatment Goal(s) related to diagnosis / functional impairment(s)  Goal 1: Client will increase emotion regulation skills/decrease panic attacks    Objective #A (Client Action)    Client will identify 2-4 fears / thoughts that contribute to feeling anxious  use positive self-affirmations daily.  Status: Continued - Date(s): 7/26/23    Intervention(s)  Therapist will teach emotional regulation skills. distress tolerance skills, interpersonal effectiveness skills, emotion regluation skills, mindfulness skills, radical acceptance. Therapist will teach client how to ID body cues for anxiety, anxiety reduction techniques, how to ID triggers for depression and anxiety- decrease  "reactivity/eliminate, lifestyle changes to reduce depression and anxiety, communication skills, explore cognitive beliefs and help client to develop healthy cognitive patterns and beliefs.      Objective #B  Client will use thought-stopping strategy daily to reduce intrusive thoughts.  Status: Continued - Date(s): 7/26/23    Intervention(s)  Therapist will teach emotional regulation skills. distress tolerance skills, interpersonal effectiveness skills, emotion regluation skills, mindfulness skills, radical acceptance. Therapist will teach client how to ID body cues for anxiety, anxiety reduction techniques, how to ID triggers for depression and anxiety- decrease reactivity/eliminate, lifestyle changes to reduce depression and anxiety, communication skills, explore cognitive beliefs and help client to develop healthy cognitive patterns and beliefs.    Objective #C (Client Action)    Status: Continued - Date: 7/26/23    Client will use \"worry time\" each day for 15 minutes of scheduled worry and then defer obsessive or anxious thinking until the next structured worry time.    Intervention(s)  Therapist will teach emotional regulation skills. work through trauma using somatic experiencing techniques to discharge the anxiety.    Goal 2: Client will work on increasing self esteem and self worth     Objective #A (Client Action)    Status: Continued - Date(s): 7/26/23    Client will identify two areas of life that you would like to have improved functioning.    Intervention(s)  Therapist will teach emotional regulation skills. work through trauma using somatic experiencing techniques to discharge the anxiety .      Client has reviewed and agreed to the above plan.      Celsa Linda [facility]    "

## 2023-10-03 ENCOUNTER — MYC MEDICAL ADVICE (OUTPATIENT)
Dept: FAMILY MEDICINE | Facility: OTHER | Age: 23
End: 2023-10-03
Payer: COMMERCIAL

## 2023-10-06 DIAGNOSIS — F33.2 SEVERE RECURRENT MAJOR DEPRESSION WITHOUT PSYCHOTIC FEATURES (H): ICD-10-CM

## 2023-10-07 RX ORDER — FLUOXETINE 40 MG/1
40 CAPSULE ORAL DAILY
Qty: 30 CAPSULE | Refills: 1 | Status: SHIPPED | OUTPATIENT
Start: 2023-10-07 | End: 2023-12-28

## 2023-10-25 ENCOUNTER — VIRTUAL VISIT (OUTPATIENT)
Dept: PSYCHOLOGY | Facility: CLINIC | Age: 23
End: 2023-10-25
Payer: COMMERCIAL

## 2023-10-25 DIAGNOSIS — F33.9 MAJOR DEPRESSIVE DISORDER, RECURRENT EPISODE WITH ANXIOUS DISTRESS (H): ICD-10-CM

## 2023-10-25 DIAGNOSIS — F41.1 GENERALIZED ANXIETY DISORDER: Primary | ICD-10-CM

## 2023-10-25 PROCEDURE — 90834 PSYTX W PT 45 MINUTES: CPT | Mod: VID | Performed by: COUNSELOR

## 2023-10-25 NOTE — PROGRESS NOTES
Answers submitted by the patient for this visit:  SHELBY-7 (Submitted on 8/16/2023)  SHELBY 7 TOTAL SCORE: 12  Answers submitted by the patient for this visit:  SHELBY-7 (Submitted on 8/16/2023)  SHELBY 7 TOTAL SCORE: 12          [facility]                                     Progress Note    Patient Name: Batool Louis  Date: 10/25/23         Service Type: Individual      Session Start Time: 1:00pm Session End Time:  1:50pm    Session Length: 50    Session #: 64    Attendees: Client    Service Modality:  Video Visit:      Provider verified identity through the following two step process.  Patient provided:  Patient is known previously to provider    Telemedicine Visit: The patient's condition can be safely assessed and treated via synchronous audio and visual telemedicine encounter.      Reason for Telemedicine Visit: Services only offered telehealth    Originating Site (Patient Location): Patient's home    Distant Site (Provider Location): Provider Remote Setting- Home Office    Consent:  The patient/guardian has verbally consented to: the potential risks and benefits of telemedicine (video visit) versus in person care; bill my insurance or make self-payment for services provided; and responsibility for payment of non-covered services.     Patient would like the video invitation sent by:  Send to e-mail at: [email or web address]    Mode of Communication:  Video Conference via Amwell    As the provider I attest to compliance with applicable laws and regulations related to telemedicine.    DATA  Interactive Complexity: No  Crisis: No        Progress Since Last Session (Related to Symptoms / Goals / Homework):   Symptoms: No change .    Homework: Achieved / completed to satisfaction      Episode of Care Goals: Satisfactory progress - ACTION (Actively working towards change); Intervened by reinforcing change plan / affirming steps taken     Current / Ongoing Stressors and Concerns:  Client stated she went  to the [facility]. Did an intake with them and some other tests. 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 Has been working with a doctor around her relationship with food. She's realizing a lot more about herself with regards to her emotions and self confidence, etc.   Started night classes. Got her room cleaned up more and is feeling better about this.      Treatment Objective(s) Addressed in This Session:   use thought-stopping strategy daily to reduce intrusive thoughts       Intervention:   Talked about her experiences so far with the [facility] and how this is impacting her. She feels like she's gaining much more insight into herself but also about things with her mom. She's feeling a lot better being on medications and feels they are helping her mental health a lot.           Assessments completed prior to visit:  The following assessments were completed by patient for this visit:  PHQ9:       3/12/2020     3:07 PM 4/30/2020    11:50 AM 5/22/2020     3:25 PM 7/10/2020     9:57 AM 2/1/2021     8:45 AM 3/21/2022     9:28 AM 8/28/2023     1:57 PM   PHQ-9 SCORE   PHQ-9 Total Score MyChart   15 (Moderately severe depression)  4 (Minimal depression)  8 (Mild depression)   PHQ-9 Total Score 10 6 15 14 4 8 8     GAD7:       6/11/2018    10:28 AM 8/13/2018     9:27 AM 3/12/2020     3:07 PM 5/22/2020     3:24 PM 2/1/2021     8:45 AM 11/14/2022     9:36 AM 8/16/2023     1:08 PM   SHELBY-7 SCORE   Total Score  6 (mild anxiety)  16 (severe anxiety) 10 (moderate anxiety) 16 (severe anxiety) 12 (moderate anxiety)   Total Score 4 6 7 16 10 16 12    12     PROMIS 10-Global Health (all questions and answers displayed):       12/21/2021    10:06 AM 11/14/2022     9:37 AM 2/13/2023    10:23 AM 2/27/2023    10:07 AM 8/16/2023     1:09 PM 8/30/2023    12:47 PM   PROMIS 10   In general, would you say your health is: Very good Fair Fair Fair Good Fair   In general, would you say your quality  of life is: Very good Good Good Good Good Good   In general, how would you rate your physical health? Very good Good Fair Fair Fair Fair   In general, how would you rate your mental health, including your mood and your ability to think? Very good Fair Fair Good Fair Good   In general, how would you rate your satisfaction with your social activities and relationships? Very good Fair Fair Fair Fair Poor   In general, please rate how well you carry out your usual social activities and roles Very good Fair Good Fair Fair Fair   To what extent are you able to carry out your everyday physical activities such as walking, climbing stairs, carrying groceries, or moving a chair? Completely Completely Completely Completely Completely Mostly   In the past 7 days, how often have you been bothered by emotional problems such as feeling anxious, depressed, or irritable? Rarely Often Sometimes Often Often Sometimes   In the past 7 days, how would you rate your fatigue on average? Mild Moderate Mild Mild Mild Mild   In the past 7 days, how would you rate your pain on average, where 0 means no pain, and 10 means worst imaginable pain? 1 2 3 0 0 0   In general, would you say your health is: 4 2 2 2 3    3 2   In general, would you say your quality of life is: 4 3 3 3 3    3 3   In general, how would you rate your physical health? 4 3 2 2 2    2 2   In general, how would you rate your mental health, including your mood and your ability to think? 4 2 2 3 2    2 3   In general, how would you rate your satisfaction with your social activities and relationships? 4 2 2 2 2    2 1   In general, please rate how well you carry out your usual social activities and roles. (This includes activities at home, at work and in your community, and responsibilities as a parent, child, spouse, employee, friend, etc.) 4 2 3 2 2    2 2   To what extent are you able to carry out your everyday physical activities such as walking, climbing stairs, carrying  groceries, or moving a chair? 5 5 5 5 5    5 4   In the past 7 days, how often have you been bothered by emotional problems such as feeling anxious, depressed, or irritable? 2 4 3 4 4    4 3   In the past 7 days, how would you rate your fatigue on average? 2 3 2 2 2    2 2   In the past 7 days, how would you rate your pain on average, where 0 means no pain, and 10 means worst imaginable pain? 1 2 3 0 0    0 0   Global Mental Health Score 16 9 10 10 9    9 10   Global Physical Health Score 17 15 15 16 16    16 15   PROMIS TOTAL - SUBSCORES 33 24 25 26 25    25 25         ASSESSMENT: Current Emotional / Mental Status (status of significant symptoms):   Risk status (Self / Other harm or suicidal ideation)   Patient denies current fears or concerns for personal safety.   Patient denies current or recent suicidal ideation or behaviors.   Patient denies current or recent homicidal ideation or behaviors.   Patient denies current or recent self injurious behavior or ideation.   Patient denies other safety concerns.   Patient reports there has been no change in risk factors since their last session.     Patient reports there has been no change in protective factors since their last session.     Recommended that patient call 911 or go to the local ED should there be a change in any of these risk factors.     Appearance:   Appropriate    Eye Contact:   Good    Psychomotor Behavior: Normal    Attitude:   Cooperative    Orientation:   All   Speech    Rate / Production: Normal/ Responsive    Volume:  Normal    Mood:    Normal   Affect:    Appropriate    Thought Content:  Clear    Thought Form:  Coherent  Logical    Insight:    Good      Medication Review:   No current psychiatric medications prescribed     Medication Compliance:   NA     Changes in Health Issues:   None reported     Chemical Use Review:   Substance Use: Chemical use reviewed, no active concerns identified      Tobacco Use: No current tobacco use.       Diagnosis:  1. Generalized anxiety disorder    2. Major depressive disorder, recurrent episode with anxious distress (H24)        Collateral Reports Completed:   Not Applicable    PLAN: (Patient Tasks / Therapist Tasks / Other)  Client to continue to practice self care skills and mindfulness skills.         Celsa , [facility]                                                         ______________________________________________________________________    Individual Treatment Plan    Patient's Name: Batool Louis  YOB: 2000    Date of Creation: 3/23/22  Date Treatment Plan Last Reviewed/Revised: 10/25/23    DSM5 Diagnoses: 300.02 (F41.1) Generalized Anxiety Disorder  Psychosocial / Contextual Factors:  living at home, family conflict  PROMIS (reviewed every 90 days):     Referral / Collaboration:  Referral to another professional/service is not indicated at this time..    Anticipated number of session for this episode of care: on-going  Anticipation frequency of session: Every other week  Anticipated Duration of each session: 38-52 minutes  Treatment plan will be reviewed in 90 days or when goals have been changed.       MeasurableTreatment Goal(s) related to diagnosis / functional impairment(s)    MeasurableTreatment Goal(s) related to diagnosis / functional impairment(s)  Goal 1: Client will increase emotion regulation skills/decrease panic attacks    Objective #A (Client Action)    Client will identify 2-4 fears / thoughts that contribute to feeling anxious  use positive self-affirmations daily.  Status: Continued - Date(s):10/25/23    Intervention(s)  Therapist will teach emotional regulation skills. distress tolerance skills, interpersonal effectiveness skills, emotion regluation skills, mindfulness skills, radical acceptance. Therapist will teach client how to ID body cues for anxiety, anxiety reduction techniques, how to ID triggers for depression and anxiety- decrease  "reactivity/eliminate, lifestyle changes to reduce depression and anxiety, communication skills, explore cognitive beliefs and help client to develop healthy cognitive patterns and beliefs.      Objective #B  Client will use thought-stopping strategy daily to reduce intrusive thoughts.  Status: Continued - Date(s): 10/25/23    Intervention(s)  Therapist will teach emotional regulation skills. distress tolerance skills, interpersonal effectiveness skills, emotion regluation skills, mindfulness skills, radical acceptance. Therapist will teach client how to ID body cues for anxiety, anxiety reduction techniques, how to ID triggers for depression and anxiety- decrease reactivity/eliminate, lifestyle changes to reduce depression and anxiety, communication skills, explore cognitive beliefs and help client to develop healthy cognitive patterns and beliefs.    Objective #C (Client Action)    Status: Continued - Date: 10/25/23    Client will use \"worry time\" each day for 15 minutes of scheduled worry and then defer obsessive or anxious thinking until the next structured worry time.    Intervention(s)  Therapist will teach emotional regulation skills. work through trauma using somatic experiencing techniques to discharge the anxiety.    Goal 2: Client will work on increasing self esteem and self worth     Objective #A (Client Action)    Status: Continued - Date(s): 10/25/23    Client will identify two areas of life that you would like to have improved functioning.    Intervention(s)  Therapist will teach emotional regulation skills. work through trauma using somatic experiencing techniques to discharge the anxiety .      Client has reviewed and agreed to the above plan.      Celsa Linda [facility]    "

## 2023-11-02 ENCOUNTER — VIRTUAL VISIT (OUTPATIENT)
Dept: PSYCHOLOGY | Facility: CLINIC | Age: 23
End: 2023-11-02
Payer: COMMERCIAL

## 2023-11-02 DIAGNOSIS — F41.1 GENERALIZED ANXIETY DISORDER: Primary | ICD-10-CM

## 2023-11-02 DIAGNOSIS — F33.9 MAJOR DEPRESSIVE DISORDER, RECURRENT EPISODE WITH ANXIOUS DISTRESS (H): ICD-10-CM

## 2023-11-02 PROCEDURE — 90834 PSYTX W PT 45 MINUTES: CPT | Mod: VID | Performed by: COUNSELOR

## 2023-11-02 NOTE — PROGRESS NOTES
Answers submitted by the patient for this visit:  SHELBY-7 (Submitted on 8/16/2023)  SHELBY 7 TOTAL SCORE: 12  Answers submitted by the patient for this visit:  SHELBY-7 (Submitted on 8/16/2023)  SHELBY 7 TOTAL SCORE: 12          [facility]                                     Progress Note    Patient Name: Batool Louis  Date: 11/2/23         Service Type: Individual      Session Start Time: 1:00pm Session End Time:  1:50pm    Session Length: 50    Session #: 65    Attendees: Client    Service Modality:  Video Visit:      Provider verified identity through the following two step process.  Patient provided:  Patient is known previously to provider    Telemedicine Visit: The patient's condition can be safely assessed and treated via synchronous audio and visual telemedicine encounter.      Reason for Telemedicine Visit: Services only offered telehealth    Originating Site (Patient Location): Patient's home    Distant Site (Provider Location): Provider Remote Setting- Home Office    Consent:  The patient/guardian has verbally consented to: the potential risks and benefits of telemedicine (video visit) versus in person care; bill my insurance or make self-payment for services provided; and responsibility for payment of non-covered services.     Patient would like the video invitation sent by:  Send to e-mail at: [email or web address]    Mode of Communication:  Video Conference via Amwell    As the provider I attest to compliance with applicable laws and regulations related to telemedicine.    DATA  Interactive Complexity: No  Crisis: No        Progress Since Last Session (Related to Symptoms / Goals / Homework):   Symptoms: No change .    Homework: Achieved / completed to satisfaction      Episode of Care Goals: Satisfactory progress - ACTION (Actively working towards change); Intervened by reinforcing change plan / affirming steps taken     Current / Ongoing Stressors and Concerns:  Client stated she had a  "panic attack last night. She has forgotten to take her anxiety medications the last two days. She said her family \"came down on her hard this week\" for not cleaning the house fast enough and things like that. Had a Halloween party over the weekend.   Went on a date recently. Had a really good time.   Paternal grandmother is mentally ill, paternal uncle has parkinson's.      Treatment Objective(s) Addressed in This Session:   use thought-stopping strategy daily to reduce intrusive thoughts       Intervention:   Educated the client about healthy boundaries with her family. Worked on her negative cognitive beliefs and correcting/refaming. Encouraged the dating and talked about red flags. Reminded her that she's getting to know him and also getting to know herself during the dating process.           Assessments completed prior to visit:  The following assessments were completed by patient for this visit:  PHQ9:       3/12/2020     3:07 PM 4/30/2020    11:50 AM 5/22/2020     3:25 PM 7/10/2020     9:57 AM 2/1/2021     8:45 AM 3/21/2022     9:28 AM 8/28/2023     1:57 PM   PHQ-9 SCORE   PHQ-9 Total Score MyChart   15 (Moderately severe depression)  4 (Minimal depression)  8 (Mild depression)   PHQ-9 Total Score 10 6 15 14 4 8 8     GAD7:       6/11/2018    10:28 AM 8/13/2018     9:27 AM 3/12/2020     3:07 PM 5/22/2020     3:24 PM 2/1/2021     8:45 AM 11/14/2022     9:36 AM 8/16/2023     1:08 PM   SHELBY-7 SCORE   Total Score  6 (mild anxiety)  16 (severe anxiety) 10 (moderate anxiety) 16 (severe anxiety) 12 (moderate anxiety)   Total Score 4 6 7 16 10 16 12    12     PROMIS 10-Global Health (all questions and answers displayed):       12/21/2021    10:06 AM 11/14/2022     9:37 AM 2/13/2023    10:23 AM 2/27/2023    10:07 AM 8/16/2023     1:09 PM 8/30/2023    12:47 PM   PROMIS 10   In general, would you say your health is: Very good Fair Fair Fair Good Fair   In general, would you say your quality of life is: Very good Good " Good Good Good Good   In general, how would you rate your physical health? Very good Good Fair Fair Fair Fair   In general, how would you rate your mental health, including your mood and your ability to think? Very good Fair Fair Good Fair Good   In general, how would you rate your satisfaction with your social activities and relationships? Very good Fair Fair Fair Fair Poor   In general, please rate how well you carry out your usual social activities and roles Very good Fair Good Fair Fair Fair   To what extent are you able to carry out your everyday physical activities such as walking, climbing stairs, carrying groceries, or moving a chair? Completely Completely Completely Completely Completely Mostly   In the past 7 days, how often have you been bothered by emotional problems such as feeling anxious, depressed, or irritable? Rarely Often Sometimes Often Often Sometimes   In the past 7 days, how would you rate your fatigue on average? Mild Moderate Mild Mild Mild Mild   In the past 7 days, how would you rate your pain on average, where 0 means no pain, and 10 means worst imaginable pain? 1 2 3 0 0 0   In general, would you say your health is: 4 2 2 2 3    3 2   In general, would you say your quality of life is: 4 3 3 3 3    3 3   In general, how would you rate your physical health? 4 3 2 2 2    2 2   In general, how would you rate your mental health, including your mood and your ability to think? 4 2 2 3 2    2 3   In general, how would you rate your satisfaction with your social activities and relationships? 4 2 2 2 2    2 1   In general, please rate how well you carry out your usual social activities and roles. (This includes activities at home, at work and in your community, and responsibilities as a parent, child, spouse, employee, friend, etc.) 4 2 3 2 2    2 2   To what extent are you able to carry out your everyday physical activities such as walking, climbing stairs, carrying groceries, or moving a chair?  5 5 5 5 5    5 4   In the past 7 days, how often have you been bothered by emotional problems such as feeling anxious, depressed, or irritable? 2 4 3 4 4    4 3   In the past 7 days, how would you rate your fatigue on average? 2 3 2 2 2    2 2   In the past 7 days, how would you rate your pain on average, where 0 means no pain, and 10 means worst imaginable pain? 1 2 3 0 0    0 0   Global Mental Health Score 16 9 10 10 9    9 10   Global Physical Health Score 17 15 15 16 16    16 15   PROMIS TOTAL - SUBSCORES 33 24 25 26 25    25 25         ASSESSMENT: Current Emotional / Mental Status (status of significant symptoms):   Risk status (Self / Other harm or suicidal ideation)   Patient denies current fears or concerns for personal safety.   Patient denies current or recent suicidal ideation or behaviors.   Patient denies current or recent homicidal ideation or behaviors.   Patient denies current or recent self injurious behavior or ideation.   Patient denies other safety concerns.   Patient reports there has been no change in risk factors since their last session.     Patient reports there has been no change in protective factors since their last session.     Recommended that patient call 911 or go to the local ED should there be a change in any of these risk factors.     Appearance:   Appropriate    Eye Contact:   Good    Psychomotor Behavior: Normal    Attitude:   Cooperative    Orientation:   All   Speech    Rate / Production: Normal/ Responsive    Volume:  Normal    Mood:    Normal   Affect:    Appropriate    Thought Content:  Clear    Thought Form:  Coherent  Logical    Insight:    Good      Medication Review:   No current psychiatric medications prescribed     Medication Compliance:   NA     Changes in Health Issues:   None reported     Chemical Use Review:   Substance Use: Chemical use reviewed, no active concerns identified      Tobacco Use: No current tobacco use.      Diagnosis:  1. Generalized anxiety  disorder    2. Major depressive disorder, recurrent episode with anxious distress (H24)        Collateral Reports Completed:   Not Applicable    PLAN: (Patient Tasks / Therapist Tasks / Other)  Client to continue to practice self care skills and mindfulness skills.         Celsa Linda, [facility]                                                         ______________________________________________________________________    Individual Treatment Plan    Patient's Name: Batool Louis  YOB: 2000    Date of Creation: 3/23/22  Date Treatment Plan Last Reviewed/Revised: 10/25/23    DSM5 Diagnoses: 300.02 (F41.1) Generalized Anxiety Disorder  Psychosocial / Contextual Factors:  living at home, family conflict  PROMIS (reviewed every 90 days):     Referral / Collaboration:  Referral to another professional/service is not indicated at this time..    Anticipated number of session for this episode of care: on-going  Anticipation frequency of session: Every other week  Anticipated Duration of each session: 38-52 minutes  Treatment plan will be reviewed in 90 days or when goals have been changed.       MeasurableTreatment Goal(s) related to diagnosis / functional impairment(s)    MeasurableTreatment Goal(s) related to diagnosis / functional impairment(s)  Goal 1: Client will increase emotion regulation skills/decrease panic attacks    Objective #A (Client Action)    Client will identify 2-4 fears / thoughts that contribute to feeling anxious  use positive self-affirmations daily.  Status: Continued - Date(s):10/25/23    Intervention(s)  Therapist will teach emotional regulation skills. distress tolerance skills, interpersonal effectiveness skills, emotion regluation skills, mindfulness skills, radical acceptance. Therapist will teach client how to ID body cues for anxiety, anxiety reduction techniques, how to ID triggers for depression and anxiety- decrease reactivity/eliminate, lifestyle changes to reduce  "depression and anxiety, communication skills, explore cognitive beliefs and help client to develop healthy cognitive patterns and beliefs.      Objective #B  Client will use thought-stopping strategy daily to reduce intrusive thoughts.  Status: Continued - Date(s): 10/25/23    Intervention(s)  Therapist will teach emotional regulation skills. distress tolerance skills, interpersonal effectiveness skills, emotion regluation skills, mindfulness skills, radical acceptance. Therapist will teach client how to ID body cues for anxiety, anxiety reduction techniques, how to ID triggers for depression and anxiety- decrease reactivity/eliminate, lifestyle changes to reduce depression and anxiety, communication skills, explore cognitive beliefs and help client to develop healthy cognitive patterns and beliefs.    Objective #C (Client Action)    Status: Continued - Date: 10/25/23    Client will use \"worry time\" each day for 15 minutes of scheduled worry and then defer obsessive or anxious thinking until the next structured worry time.    Intervention(s)  Therapist will teach emotional regulation skills. work through trauma using somatic experiencing techniques to discharge the anxiety.    Goal 2: Client will work on increasing self esteem and self worth     Objective #A (Client Action)    Status: Continued - Date(s): 10/25/23    Client will identify two areas of life that you would like to have improved functioning.    Intervention(s)  Therapist will teach emotional regulation skills. work through trauma using somatic experiencing techniques to discharge the anxiety .      Client has reviewed and agreed to the above plan.      Celsa Linda [facility]    "

## 2023-11-09 ENCOUNTER — VIRTUAL VISIT (OUTPATIENT)
Dept: PSYCHOLOGY | Facility: CLINIC | Age: 23
End: 2023-11-09
Payer: COMMERCIAL

## 2023-11-09 DIAGNOSIS — F33.9 MAJOR DEPRESSIVE DISORDER, RECURRENT EPISODE WITH ANXIOUS DISTRESS (H): ICD-10-CM

## 2023-11-09 DIAGNOSIS — F41.1 GENERALIZED ANXIETY DISORDER: Primary | ICD-10-CM

## 2023-11-09 PROCEDURE — 90834 PSYTX W PT 45 MINUTES: CPT | Mod: VID | Performed by: COUNSELOR

## 2023-11-09 NOTE — PROGRESS NOTES
Answers submitted by the patient for this visit:  SHELBY-7 (Submitted on 8/16/2023)  SHELBY 7 TOTAL SCORE: 12  Answers submitted by the patient for this visit:  SHELBY-7 (Submitted on 8/16/2023)  SHELBY 7 TOTAL SCORE: 12          [facility]                                     Progress Note    Patient Name: Batool Louis  Date: 11/9/23         Service Type: Individual      Session Start Time: 1:05pm Session End Time:  1:55pm    Session Length: 50    Session #: 66    Attendees: Client    Service Modality:  Video Visit:      Provider verified identity through the following two step process.  Patient provided:  Patient is known previously to provider    Telemedicine Visit: The patient's condition can be safely assessed and treated via synchronous audio and visual telemedicine encounter.      Reason for Telemedicine Visit: Services only offered telehealth    Originating Site (Patient Location): Patient's home    Distant Site (Provider Location): Provider Remote Setting- Home Office    Consent:  The patient/guardian has verbally consented to: the potential risks and benefits of telemedicine (video visit) versus in person care; bill my insurance or make self-payment for services provided; and responsibility for payment of non-covered services.     Patient would like the video invitation sent by:  Send to e-mail at: [email or web address]    Mode of Communication:  Video Conference via Amwell    As the provider I attest to compliance with applicable laws and regulations related to telemedicine.    DATA  Interactive Complexity: No  Crisis: No        Progress Since Last Session (Related to Symptoms / Goals / Homework):   Symptoms: No change .    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   Has been on several dates with a new lyndon, Santos. She's been noticing a lot of her own insecurities coming up for her.     Treatment Objective(s) Addressed in This Session:   use thought-stopping strategy daily to reduce intrusive thoughts       Intervention:   Explored healthy boundaries with this new lyndon and how she can work on decreasing her insecurities. Discussed where they might be coming from and what she can do to work through them in the moment. Talked about her writing them down and checking facts. Recommend some literature to the client to help with self talk/self esteem- Joceline Rosas; You are a badass.           Assessments completed prior to visit:  The following assessments were completed by patient for this visit:  PHQ9:       3/12/2020     3:07 PM 4/30/2020    11:50 AM 5/22/2020     3:25 PM 7/10/2020     9:57 AM 2/1/2021     8:45 AM 3/21/2022     9:28 AM 8/28/2023     1:57 PM   PHQ-9 SCORE   PHQ-9 Total Score MyChart   15 (Moderately severe depression)  4 (Minimal depression)  8 (Mild depression)   PHQ-9 Total Score 10 6 15 14 4 8 8     GAD7:       6/11/2018    10:28 AM 8/13/2018     9:27 AM 3/12/2020     3:07 PM 5/22/2020     3:24 PM 2/1/2021     8:45 AM 11/14/2022     9:36 AM 8/16/2023     1:08 PM   SHELBY-7 SCORE   Total Score  6 (mild anxiety)  16 (severe anxiety) 10 (moderate anxiety) 16 (severe anxiety) 12 (moderate anxiety)   Total Score 4 6 7 16 10 16 12    12     PROMIS 10-Global Health (all questions and answers displayed):       12/21/2021    10:06 AM 11/14/2022     9:37 AM 2/13/2023    10:23 AM 2/27/2023    10:07 AM 8/16/2023     1:09 PM 8/30/2023    12:47 PM   PROMIS 10   In general, would you say your health is: Very good Fair Fair Fair Good Fair   In general, would you say your quality of life is: Very good Good Good Good Good Good   In general, how would you rate your physical health? Very good Good Fair Fair Fair Fair   In general, how would you rate your  mental health, including your mood and your ability to think? Very good Fair Fair Good Fair Good   In general, how would you rate your satisfaction with your social activities and relationships? Very good Fair Fair Fair Fair Poor   In general, please rate how well you carry out your usual social activities and roles Very good Fair Good Fair Fair Fair   To what extent are you able to carry out your everyday physical activities such as walking, climbing stairs, carrying groceries, or moving a chair? Completely Completely Completely Completely Completely Mostly   In the past 7 days, how often have you been bothered by emotional problems such as feeling anxious, depressed, or irritable? Rarely Often Sometimes Often Often Sometimes   In the past 7 days, how would you rate your fatigue on average? Mild Moderate Mild Mild Mild Mild   In the past 7 days, how would you rate your pain on average, where 0 means no pain, and 10 means worst imaginable pain? 1 2 3 0 0 0   In general, would you say your health is: 4 2 2 2 3    3 2   In general, would you say your quality of life is: 4 3 3 3 3    3 3   In general, how would you rate your physical health? 4 3 2 2 2    2 2   In general, how would you rate your mental health, including your mood and your ability to think? 4 2 2 3 2    2 3   In general, how would you rate your satisfaction with your social activities and relationships? 4 2 2 2 2    2 1   In general, please rate how well you carry out your usual social activities and roles. (This includes activities at home, at work and in your community, and responsibilities as a parent, child, spouse, employee, friend, etc.) 4 2 3 2 2    2 2   To what extent are you able to carry out your everyday physical activities such as walking, climbing stairs, carrying groceries, or moving a chair? 5 5 5 5 5    5 4   In the past 7 days, how often have you been bothered by emotional problems such as feeling anxious, depressed, or irritable? 2 4  3 4 4    4 3   In the past 7 days, how would you rate your fatigue on average? 2 3 2 2 2    2 2   In the past 7 days, how would you rate your pain on average, where 0 means no pain, and 10 means worst imaginable pain? 1 2 3 0 0    0 0   Global Mental Health Score 16 9 10 10 9    9 10   Global Physical Health Score 17 15 15 16 16    16 15   PROMIS TOTAL - SUBSCORES 33 24 25 26 25    25 25         ASSESSMENT: Current Emotional / Mental Status (status of significant symptoms):   Risk status (Self / Other harm or suicidal ideation)   Patient denies current fears or concerns for personal safety.   Patient denies current or recent suicidal ideation or behaviors.   Patient denies current or recent homicidal ideation or behaviors.   Patient denies current or recent self injurious behavior or ideation.   Patient denies other safety concerns.   Patient reports there has been no change in risk factors since their last session.     Patient reports there has been no change in protective factors since their last session.     Recommended that patient call 911 or go to the local ED should there be a change in any of these risk factors.     Appearance:   Appropriate    Eye Contact:   Good    Psychomotor Behavior: Normal    Attitude:   Cooperative    Orientation:   All   Speech    Rate / Production: Normal/ Responsive    Volume:  Normal    Mood:    Normal   Affect:    Appropriate    Thought Content:  Clear    Thought Form:  Coherent  Logical    Insight:    Good      Medication Review:   No current psychiatric medications prescribed     Medication Compliance:   NA     Changes in Health Issues:   None reported     Chemical Use Review:   Substance Use: Chemical use reviewed, no active concerns identified      Tobacco Use: No current tobacco use.      Diagnosis:  1. Generalized anxiety disorder    2. Major depressive disorder, recurrent episode with anxious distress (H24)        Collateral Reports Completed:   Not Applicable    PLAN:  (Patient Tasks / Therapist Tasks / Other)  Client to continue to practice self care skills and mindfulness skills.         Celsa Linda, [facility]                                                         ______________________________________________________________________    Individual Treatment Plan    Patient's Name: Batool Louis  YOB: 2000    Date of Creation: 3/23/22  Date Treatment Plan Last Reviewed/Revised: 10/25/23    DSM5 Diagnoses: 300.02 (F41.1) Generalized Anxiety Disorder  Psychosocial / Contextual Factors:  living at home, family conflict  PROMIS (reviewed every 90 days):     Referral / Collaboration:  Referral to another professional/service is not indicated at this time..    Anticipated number of session for this episode of care: on-going  Anticipation frequency of session: Every other week  Anticipated Duration of each session: 38-52 minutes  Treatment plan will be reviewed in 90 days or when goals have been changed.       MeasurableTreatment Goal(s) related to diagnosis / functional impairment(s)    MeasurableTreatment Goal(s) related to diagnosis / functional impairment(s)  Goal 1: Client will increase emotion regulation skills/decrease panic attacks    Objective #A (Client Action)    Client will identify 2-4 fears / thoughts that contribute to feeling anxious  use positive self-affirmations daily.  Status: Continued - Date(s):10/25/23    Intervention(s)  Therapist will teach emotional regulation skills. distress tolerance skills, interpersonal effectiveness skills, emotion regluation skills, mindfulness skills, radical acceptance. Therapist will teach client how to ID body cues for anxiety, anxiety reduction techniques, how to ID triggers for depression and anxiety- decrease reactivity/eliminate, lifestyle changes to reduce depression and anxiety, communication skills, explore cognitive beliefs and help client to develop healthy cognitive patterns and beliefs.      Objective  "#B  Client will use thought-stopping strategy daily to reduce intrusive thoughts.  Status: Continued - Date(s): 10/25/23    Intervention(s)  Therapist will teach emotional regulation skills. distress tolerance skills, interpersonal effectiveness skills, emotion regluation skills, mindfulness skills, radical acceptance. Therapist will teach client how to ID body cues for anxiety, anxiety reduction techniques, how to ID triggers for depression and anxiety- decrease reactivity/eliminate, lifestyle changes to reduce depression and anxiety, communication skills, explore cognitive beliefs and help client to develop healthy cognitive patterns and beliefs.    Objective #C (Client Action)    Status: Continued - Date: 10/25/23    Client will use \"worry time\" each day for 15 minutes of scheduled worry and then defer obsessive or anxious thinking until the next structured worry time.    Intervention(s)  Therapist will teach emotional regulation skills. work through trauma using somatic experiencing techniques to discharge the anxiety.    Goal 2: Client will work on increasing self esteem and self worth     Objective #A (Client Action)    Status: Continued - Date(s): 10/25/23    Client will identify two areas of life that you would like to have improved functioning.    Intervention(s)  Therapist will teach emotional regulation skills. work through trauma using somatic experiencing techniques to discharge the anxiety .      Client has reviewed and agreed to the above plan.      JAGDEEP Camacho    "

## 2023-11-16 ENCOUNTER — VIRTUAL VISIT (OUTPATIENT)
Dept: PSYCHOLOGY | Facility: CLINIC | Age: 23
End: 2023-11-16
Payer: COMMERCIAL

## 2023-11-16 DIAGNOSIS — F33.9 MAJOR DEPRESSIVE DISORDER, RECURRENT EPISODE WITH ANXIOUS DISTRESS (H): ICD-10-CM

## 2023-11-16 DIAGNOSIS — F41.1 GENERALIZED ANXIETY DISORDER: Primary | ICD-10-CM

## 2023-11-16 PROCEDURE — 90834 PSYTX W PT 45 MINUTES: CPT | Mod: VID | Performed by: COUNSELOR

## 2023-11-16 NOTE — PROGRESS NOTES
Answers submitted by the patient for this visit:  SHELBY-7 (Submitted on 8/16/2023)  SHELBY 7 TOTAL SCORE: 12  Answers submitted by the patient for this visit:  SHELBY-7 (Submitted on 8/16/2023)  SHELBY 7 TOTAL SCORE: 12          [facility]                                     Progress Note    Patient Name: Batool Louis  Date: 11/16/23         Service Type: Individual      Session Start Time: 7:05am Session End Time:  7:55am    Session Length: 50    Session #: 67    Attendees: Client    Service Modality:  Video Visit:      Provider verified identity through the following two step process.  Patient provided:  Patient is known previously to provider    Telemedicine Visit: The patient's condition can be safely assessed and treated via synchronous audio and visual telemedicine encounter.      Reason for Telemedicine Visit: Services only offered telehealth    Originating Site (Patient Location): Patient's home    Distant Site (Provider Location): Provider Remote Setting- Home Office    Consent:  The patient/guardian has verbally consented to: the potential risks and benefits of telemedicine (video visit) versus in person care; bill my insurance or make self-payment for services provided; and responsibility for payment of non-covered services.     Patient would like the video invitation sent by:  Send to e-mail at: [email or web address]    Mode of Communication:  Video Conference via Amwell    As the provider I attest to compliance with applicable laws and regulations related to telemedicine.    DATA  Interactive Complexity: No  Crisis: No        Progress Since Last Session (Related to Symptoms / Goals / Homework):   Symptoms: No change .    Homework: Achieved / completed to satisfaction      Episode of Care Goals: Satisfactory progress - ACTION (Actively working towards change); Intervened by reinforcing change plan / affirming steps taken     Current / Ongoing Stressors and Concerns:  Client stated she had a  conversation with her mom about the lyndon, Santos, she was seeing and her mom said she was desperate.   She stated this led to her binge eat and not go to a work out class.   Applied to more full time jobs lately.   Remembers feeling free and happy when she was at school or a friends house and away from her family members.      Treatment Objective(s) Addressed in This Session:   use thought-stopping strategy daily to reduce intrusive thoughts       Intervention:   Processed her week and how she's been feeling. Validated her feelings and discussed skills she can utilize next time she's feeling overwhelmed. Talked about why she's lied to people about going to the work out class. Discussed the dynamics currently going on between her and her family.           Assessments completed prior to visit:  The following assessments were completed by patient for this visit:  PHQ9:       3/12/2020     3:07 PM 4/30/2020    11:50 AM 5/22/2020     3:25 PM 7/10/2020     9:57 AM 2/1/2021     8:45 AM 3/21/2022     9:28 AM 8/28/2023     1:57 PM   PHQ-9 SCORE   PHQ-9 Total Score MyChart   15 (Moderately severe depression)  4 (Minimal depression)  8 (Mild depression)   PHQ-9 Total Score 10 6 15 14 4 8 8     GAD7:       6/11/2018    10:28 AM 8/13/2018     9:27 AM 3/12/2020     3:07 PM 5/22/2020     3:24 PM 2/1/2021     8:45 AM 11/14/2022     9:36 AM 8/16/2023     1:08 PM   SHELBY-7 SCORE   Total Score  6 (mild anxiety)  16 (severe anxiety) 10 (moderate anxiety) 16 (severe anxiety) 12 (moderate anxiety)   Total Score 4 6 7 16 10 16 12    12     PROMIS 10-Global Health (all questions and answers displayed):       12/21/2021    10:06 AM 11/14/2022     9:37 AM 2/13/2023    10:23 AM 2/27/2023    10:07 AM 8/16/2023     1:09 PM 8/30/2023    12:47 PM   PROMIS 10   In general, would you say your health is: Very good Fair Fair Fair Good Fair   In general, would you say your quality of life is: Very good Good Good Good Good Good   In general, how would  you rate your physical health? Very good Good Fair Fair Fair Fair   In general, how would you rate your mental health, including your mood and your ability to think? Very good Fair Fair Good Fair Good   In general, how would you rate your satisfaction with your social activities and relationships? Very good Fair Fair Fair Fair Poor   In general, please rate how well you carry out your usual social activities and roles Very good Fair Good Fair Fair Fair   To what extent are you able to carry out your everyday physical activities such as walking, climbing stairs, carrying groceries, or moving a chair? Completely Completely Completely Completely Completely Mostly   In the past 7 days, how often have you been bothered by emotional problems such as feeling anxious, depressed, or irritable? Rarely Often Sometimes Often Often Sometimes   In the past 7 days, how would you rate your fatigue on average? Mild Moderate Mild Mild Mild Mild   In the past 7 days, how would you rate your pain on average, where 0 means no pain, and 10 means worst imaginable pain? 1 2 3 0 0 0   In general, would you say your health is: 4 2 2 2 3    3 2   In general, would you say your quality of life is: 4 3 3 3 3    3 3   In general, how would you rate your physical health? 4 3 2 2 2    2 2   In general, how would you rate your mental health, including your mood and your ability to think? 4 2 2 3 2    2 3   In general, how would you rate your satisfaction with your social activities and relationships? 4 2 2 2 2    2 1   In general, please rate how well you carry out your usual social activities and roles. (This includes activities at home, at work and in your community, and responsibilities as a parent, child, spouse, employee, friend, etc.) 4 2 3 2 2    2 2   To what extent are you able to carry out your everyday physical activities such as walking, climbing stairs, carrying groceries, or moving a chair? 5 5 5 5 5    5 4   In the past 7 days, how  often have you been bothered by emotional problems such as feeling anxious, depressed, or irritable? 2 4 3 4 4    4 3   In the past 7 days, how would you rate your fatigue on average? 2 3 2 2 2    2 2   In the past 7 days, how would you rate your pain on average, where 0 means no pain, and 10 means worst imaginable pain? 1 2 3 0 0    0 0   Global Mental Health Score 16 9 10 10 9    9 10   Global Physical Health Score 17 15 15 16 16    16 15   PROMIS TOTAL - SUBSCORES 33 24 25 26 25    25 25         ASSESSMENT: Current Emotional / Mental Status (status of significant symptoms):   Risk status (Self / Other harm or suicidal ideation)   Patient denies current fears or concerns for personal safety.   Patient denies current or recent suicidal ideation or behaviors.   Patient denies current or recent homicidal ideation or behaviors.   Patient denies current or recent self injurious behavior or ideation.   Patient denies other safety concerns.   Patient reports there has been no change in risk factors since their last session.     Patient reports there has been no change in protective factors since their last session.     Recommended that patient call 911 or go to the local ED should there be a change in any of these risk factors.     Appearance:   Appropriate    Eye Contact:   Good    Psychomotor Behavior: Normal    Attitude:   Cooperative    Orientation:   All   Speech    Rate / Production: Normal/ Responsive    Volume:  Normal    Mood:    Normal   Affect:    Appropriate    Thought Content:  Clear    Thought Form:  Coherent  Logical    Insight:    Good      Medication Review:   No current psychiatric medications prescribed     Medication Compliance:   NA     Changes in Health Issues:   None reported     Chemical Use Review:   Substance Use: Chemical use reviewed, no active concerns identified      Tobacco Use: No current tobacco use.      Diagnosis:  1. Generalized anxiety disorder    2. Major depressive disorder,  recurrent episode with anxious distress (H24)        Collateral Reports Completed:   Not Applicable    PLAN: (Patient Tasks / Therapist Tasks / Other)  Client to continue to practice self care skills and mindfulness skills.         Celsa Linda, [facility]                                                         ______________________________________________________________________    Individual Treatment Plan    Patient's Name: Batool Louis  YOB: 2000    Date of Creation: 3/23/22  Date Treatment Plan Last Reviewed/Revised: 10/25/23    DSM5 Diagnoses: 300.02 (F41.1) Generalized Anxiety Disorder  Psychosocial / Contextual Factors:  living at home, family conflict  PROMIS (reviewed every 90 days):     Referral / Collaboration:  Referral to another professional/service is not indicated at this time..    Anticipated number of session for this episode of care: on-going  Anticipation frequency of session: Every other week  Anticipated Duration of each session: 38-52 minutes  Treatment plan will be reviewed in 90 days or when goals have been changed.       MeasurableTreatment Goal(s) related to diagnosis / functional impairment(s)    MeasurableTreatment Goal(s) related to diagnosis / functional impairment(s)  Goal 1: Client will increase emotion regulation skills/decrease panic attacks    Objective #A (Client Action)    Client will identify 2-4 fears / thoughts that contribute to feeling anxious  use positive self-affirmations daily.  Status: Continued - Date(s):10/25/23    Intervention(s)  Therapist will teach emotional regulation skills. distress tolerance skills, interpersonal effectiveness skills, emotion regluation skills, mindfulness skills, radical acceptance. Therapist will teach client how to ID body cues for anxiety, anxiety reduction techniques, how to ID triggers for depression and anxiety- decrease reactivity/eliminate, lifestyle changes to reduce depression and anxiety, communication  "skills, explore cognitive beliefs and help client to develop healthy cognitive patterns and beliefs.      Objective #B  Client will use thought-stopping strategy daily to reduce intrusive thoughts.  Status: Continued - Date(s): 10/25/23    Intervention(s)  Therapist will teach emotional regulation skills. distress tolerance skills, interpersonal effectiveness skills, emotion regluation skills, mindfulness skills, radical acceptance. Therapist will teach client how to ID body cues for anxiety, anxiety reduction techniques, how to ID triggers for depression and anxiety- decrease reactivity/eliminate, lifestyle changes to reduce depression and anxiety, communication skills, explore cognitive beliefs and help client to develop healthy cognitive patterns and beliefs.    Objective #C (Client Action)    Status: Continued - Date: 10/25/23    Client will use \"worry time\" each day for 15 minutes of scheduled worry and then defer obsessive or anxious thinking until the next structured worry time.    Intervention(s)  Therapist will teach emotional regulation skills. work through trauma using somatic experiencing techniques to discharge the anxiety.    Goal 2: Client will work on increasing self esteem and self worth     Objective #A (Client Action)    Status: Continued - Date(s): 10/25/23    Client will identify two areas of life that you would like to have improved functioning.    Intervention(s)  Therapist will teach emotional regulation skills. work through trauma using somatic experiencing techniques to discharge the anxiety .      Client has reviewed and agreed to the above plan.      Celsa Linda [facility]    "

## 2023-12-07 ENCOUNTER — FCC EXTENDED DOCUMENTATION (OUTPATIENT)
Dept: PSYCHOLOGY | Facility: CLINIC | Age: 23
End: 2023-12-07
Payer: COMMERCIAL

## 2023-12-07 NOTE — PROGRESS NOTES
Provider attempted to reach the patient for their appointment. The patient did not answer the phone calls. The provider LVM reminding the patient of their next appointment and the attendance policy.

## 2023-12-21 ENCOUNTER — VIRTUAL VISIT (OUTPATIENT)
Dept: PSYCHOLOGY | Facility: CLINIC | Age: 23
End: 2023-12-21
Payer: COMMERCIAL

## 2023-12-21 DIAGNOSIS — F41.1 GENERALIZED ANXIETY DISORDER: Primary | ICD-10-CM

## 2023-12-21 DIAGNOSIS — F33.9 MAJOR DEPRESSIVE DISORDER, RECURRENT EPISODE WITH ANXIOUS DISTRESS (H): ICD-10-CM

## 2023-12-21 PROCEDURE — 90837 PSYTX W PT 60 MINUTES: CPT | Mod: VID | Performed by: COUNSELOR

## 2023-12-21 NOTE — PROGRESS NOTES
[facility]                                     Progress Note    Patient Name: Batool Louis  Date: 12/21/23         Service Type: Individual      Session Start Time: 1:05pm Session End Time:  2:00pm    Session Length: 55    Session #: 68    Attendees: Client    Service Modality:  Video Visit:      Provider verified identity through the following two step process.  Patient provided:  Patient is known previously to provider    Telemedicine Visit: The patient's condition can be safely assessed and treated via synchronous audio and visual telemedicine encounter.      Reason for Telemedicine Visit: Services only offered telehealth    Originating Site (Patient Location): Patient's home    Distant Site (Provider Location): Provider Remote Setting- Home Office    Consent:  The patient/guardian has verbally consented to: the potential risks and benefits of telemedicine (video visit) versus in person care; bill my insurance or make self-payment for services provided; and responsibility for payment of non-covered services.     Patient would like the video invitation sent by:  Send to e-mail at: [email or web address]    Mode of Communication:  Video Conference via Amwell    As the provider I attest to compliance with applicable laws and regulations related to telemedicine.    DATA  Interactive Complexity: No  Crisis: No        Progress Since Last Session (Related to Symptoms / Goals / Homework):   Symptoms: No change .    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 She stated she's struggling with wanting to do anything lately.   One of her best friends just got engaged and the client is one of her  "bridesmaids.   Her sleep schedule has been off.   Stayed with her grandma for awhile because her family's septic failed. Her mom got upset about her staying a few days with grandma after the septic was fixed. Her parents also have expressed concern about how she's been \"different.\" The client feels like she's been not as emotional or reactive.   Looking into going to school and living on campus. Looking to staying in the arts.   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      Treatment Objective(s) Addressed in This Session:   use thought-stopping strategy daily to reduce intrusive thoughts       Intervention:   Processed her talk with her parents from last night and how she feels stagnant. Talked about why she hasn't been going to work. Talked about if she feels her medications or working and her feelings about her progress. Validated all the work she is doing and how cumbersome it is.            Assessments completed prior to visit:  The following assessments were completed by patient for this visit:  PHQ9:       3/12/2020     3:07 PM 4/30/2020    11:50 AM 5/22/2020     3:25 PM 7/10/2020     9:57 AM 2/1/2021     8:45 AM 3/21/2022     9:28 AM 8/28/2023     1:57 PM   PHQ-9 SCORE   PHQ-9 Total Score MyChart   15 (Moderately severe depression)  4 (Minimal depression)  8 (Mild depression)   PHQ-9 Total Score 10 6 15 14 4 8 8     GAD7:       6/11/2018    10:28 AM 8/13/2018     9:27 AM 3/12/2020     3:07 PM 5/22/2020     3:24 PM 2/1/2021     8:45 AM 11/14/2022     9:36 AM 8/16/2023     1:08 PM   SHELBY-7 SCORE   Total Score  6 (mild anxiety)  16 (severe anxiety) 10 (moderate anxiety) 16 (severe anxiety) 12 (moderate anxiety)   Total Score 4 6 7 16 10 16 12    12     PROMIS 10-Global Health (all questions and answers displayed):       12/21/2021    10:06 AM 11/14/2022     9:37 AM 2/13/2023    10:23 AM 2/27/2023    10:07 AM 8/16/2023     1:09 PM 8/30/2023    12:47 PM 12/21/2023     1:05 PM   PROMIS 10   In general, would " you say your health is: Very good Fair Fair Fair Good Fair Fair   In general, would you say your quality of life is: Very good Good Good Good Good Good Good   In general, how would you rate your physical health? Very good Good Fair Fair Fair Fair Fair   In general, how would you rate your mental health, including your mood and your ability to think? Very good Fair Fair Good Fair Good Fair   In general, how would you rate your satisfaction with your social activities and relationships? Very good Fair Fair Fair Fair Poor Fair   In general, please rate how well you carry out your usual social activities and roles Very good Fair Good Fair Fair Fair Fair   To what extent are you able to carry out your everyday physical activities such as walking, climbing stairs, carrying groceries, or moving a chair? Completely Completely Completely Completely Completely Mostly Mostly   In the past 7 days, how often have you been bothered by emotional problems such as feeling anxious, depressed, or irritable? Rarely Often Sometimes Often Often Sometimes Sometimes   In the past 7 days, how would you rate your fatigue on average? Mild Moderate Mild Mild Mild Mild Moderate   In the past 7 days, how would you rate your pain on average, where 0 means no pain, and 10 means worst imaginable pain? 1 2 3 0 0 0 0   In general, would you say your health is: 4 2 2 2 3 2 2   In general, would you say your quality of life is: 4 3 3 3 3 3 3   In general, how would you rate your physical health? 4 3 2 2 2 2 2   In general, how would you rate your mental health, including your mood and your ability to think? 4 2 2 3 2 3 2   In general, how would you rate your satisfaction with your social activities and relationships? 4 2 2 2 2 1 2   In general, please rate how well you carry out your usual social activities and roles. (This includes activities at home, at work and in your community, and responsibilities as a parent, child, spouse, employee, friend,  etc.) 4 2 3 2 2 2 2   To what extent are you able to carry out your everyday physical activities such as walking, climbing stairs, carrying groceries, or moving a chair? 5 5 5 5 5 4 4   In the past 7 days, how often have you been bothered by emotional problems such as feeling anxious, depressed, or irritable? 2 4 3 4 4 3 3   In the past 7 days, how would you rate your fatigue on average? 2 3 2 2 2 2 3   In the past 7 days, how would you rate your pain on average, where 0 means no pain, and 10 means worst imaginable pain? 1 2 3 0 0 0 0   Global Mental Health Score 16 9 10 10 9    9 10 10    10   Global Physical Health Score 17 15 15 16 16    16 15 14    14   PROMIS TOTAL - SUBSCORES 33 24 25 26 25    25 25 24    24         ASSESSMENT: Current Emotional / Mental Status (status of significant symptoms):   Risk status (Self / Other harm or suicidal ideation)   Patient denies current fears or concerns for personal safety.   Patient denies current or recent suicidal ideation or behaviors.   Patient denies current or recent homicidal ideation or behaviors.   Patient denies current or recent self injurious behavior or ideation.   Patient denies other safety concerns.   Patient reports there has been no change in risk factors since their last session.     Patient reports there has been no change in protective factors since their last session.     Recommended that patient call 911 or go to the local ED should there be a change in any of these risk factors.     Appearance:   Appropriate    Eye Contact:   Good    Psychomotor Behavior: Normal    Attitude:   Cooperative    Orientation:   All   Speech    Rate / Production: Normal/ Responsive    Volume:  Normal    Mood:    Normal   Affect:    Appropriate    Thought Content:  Clear    Thought Form:  Coherent  Logical    Insight:    Good      Medication Review:   No current psychiatric medications prescribed     Medication Compliance:   NA     Changes in Health Issues:   None  reported     Chemical Use Review:   Substance Use: Chemical use reviewed, no active concerns identified      Tobacco Use: No current tobacco use.      Diagnosis:  1. Generalized anxiety disorder    2. Major depressive disorder, recurrent episode with anxious distress (H24)        Collateral Reports Completed:   Not Applicable    PLAN: (Patient Tasks / Therapist Tasks / Other)  Client to continue to practice self care skills and mindfulness skills.         Celsa , [facility]                                                         ______________________________________________________________________    Individual Treatment Plan    Patient's Name: Batool Louis  YOB: 2000    Date of Creation: 3/23/22  Date Treatment Plan Last Reviewed/Revised: 10/25/23    DSM5 Diagnoses: 300.02 (F41.1) Generalized Anxiety Disorder  Psychosocial / Contextual Factors:  living at home, family conflict  PROMIS (reviewed every 90 days):     Referral / Collaboration:  Referral to another professional/service is not indicated at this time..    Anticipated number of session for this episode of care: on-going  Anticipation frequency of session: Every other week  Anticipated Duration of each session: 38-52 minutes  Treatment plan will be reviewed in 90 days or when goals have been changed.       MeasurableTreatment Goal(s) related to diagnosis / functional impairment(s)    MeasurableTreatment Goal(s) related to diagnosis / functional impairment(s)  Goal 1: Client will increase emotion regulation skills/decrease panic attacks    Objective #A (Client Action)    Client will identify 2-4 fears / thoughts that contribute to feeling anxious  use positive self-affirmations daily.  Status: Continued - Date(s):10/25/23    Intervention(s)  Therapist will teach emotional regulation skills. distress tolerance skills, interpersonal effectiveness skills, emotion regluation skills, mindfulness skills, radical acceptance. Therapist  "will teach client how to ID body cues for anxiety, anxiety reduction techniques, how to ID triggers for depression and anxiety- decrease reactivity/eliminate, lifestyle changes to reduce depression and anxiety, communication skills, explore cognitive beliefs and help client to develop healthy cognitive patterns and beliefs.      Objective #B  Client will use thought-stopping strategy daily to reduce intrusive thoughts.  Status: Continued - Date(s): 10/25/23    Intervention(s)  Therapist will teach emotional regulation skills. distress tolerance skills, interpersonal effectiveness skills, emotion regluation skills, mindfulness skills, radical acceptance. Therapist will teach client how to ID body cues for anxiety, anxiety reduction techniques, how to ID triggers for depression and anxiety- decrease reactivity/eliminate, lifestyle changes to reduce depression and anxiety, communication skills, explore cognitive beliefs and help client to develop healthy cognitive patterns and beliefs.    Objective #C (Client Action)    Status: Continued - Date: 10/25/23    Client will use \"worry time\" each day for 15 minutes of scheduled worry and then defer obsessive or anxious thinking until the next structured worry time.    Intervention(s)  Therapist will teach emotional regulation skills. work through trauma using somatic experiencing techniques to discharge the anxiety.    Goal 2: Client will work on increasing self esteem and self worth     Objective #A (Client Action)    Status: Continued - Date(s): 10/25/23    Client will identify two areas of life that you would like to have improved functioning.    Intervention(s)  Therapist will teach emotional regulation skills. work through trauma using somatic experiencing techniques to discharge the anxiety .      Client has reviewed and agreed to the above plan.      Celsa Linda [facility]    "

## 2023-12-23 ENCOUNTER — TELEPHONE (OUTPATIENT)
Dept: FAMILY MEDICINE | Facility: OTHER | Age: 23
End: 2023-12-23
Payer: COMMERCIAL

## 2023-12-23 DIAGNOSIS — F33.2 SEVERE RECURRENT MAJOR DEPRESSION WITHOUT PSYCHOTIC FEATURES (H): ICD-10-CM

## 2023-12-27 ENCOUNTER — MYC REFILL (OUTPATIENT)
Dept: FAMILY MEDICINE | Facility: CLINIC | Age: 23
End: 2023-12-27
Payer: COMMERCIAL

## 2023-12-27 DIAGNOSIS — F33.2 SEVERE RECURRENT MAJOR DEPRESSION WITHOUT PSYCHOTIC FEATURES (H): Primary | ICD-10-CM

## 2023-12-27 RX ORDER — HYDROXYZINE HYDROCHLORIDE 25 MG/1
25-50 TABLET, FILM COATED ORAL
Qty: 60 TABLET | Refills: 0 | Status: SHIPPED | OUTPATIENT
Start: 2023-12-27 | End: 2023-12-28

## 2023-12-28 ENCOUNTER — VIRTUAL VISIT (OUTPATIENT)
Dept: PSYCHOLOGY | Facility: CLINIC | Age: 23
End: 2023-12-28
Payer: COMMERCIAL

## 2023-12-28 ENCOUNTER — MYC REFILL (OUTPATIENT)
Dept: FAMILY MEDICINE | Facility: CLINIC | Age: 23
End: 2023-12-28
Payer: COMMERCIAL

## 2023-12-28 DIAGNOSIS — F33.9 MAJOR DEPRESSIVE DISORDER, RECURRENT EPISODE WITH ANXIOUS DISTRESS (H): ICD-10-CM

## 2023-12-28 DIAGNOSIS — F41.1 GENERALIZED ANXIETY DISORDER: Primary | ICD-10-CM

## 2023-12-28 DIAGNOSIS — F33.2 SEVERE RECURRENT MAJOR DEPRESSION WITHOUT PSYCHOTIC FEATURES (H): ICD-10-CM

## 2023-12-28 PROCEDURE — 90837 PSYTX W PT 60 MINUTES: CPT | Mod: VID | Performed by: COUNSELOR

## 2023-12-28 RX ORDER — FLUOXETINE 40 MG/1
40 CAPSULE ORAL DAILY
Qty: 30 CAPSULE | Refills: 0 | Status: SHIPPED | OUTPATIENT
Start: 2023-12-28 | End: 2024-01-22

## 2023-12-28 NOTE — TELEPHONE ENCOUNTER
Refilled 1 time only, please call patient to schedule for follow-up with PCP for any further refills.

## 2023-12-28 NOTE — PROGRESS NOTES
[facility]                                     Progress Note    Patient Name: Batool Louis  Date: 12/28/23         Service Type: Individual      Session Start Time: 1:05pm Session End Time:  2:00pm    Session Length: 55    Session #: 69    Attendees: Client    Service Modality:  Video Visit:      Provider verified identity through the following two step process.  Patient provided:  Patient is known previously to provider    Telemedicine Visit: The patient's condition can be safely assessed and treated via synchronous audio and visual telemedicine encounter.      Reason for Telemedicine Visit: Services only offered telehealth    Originating Site (Patient Location): Patient's home    Distant Site (Provider Location): Provider Remote Setting- Home Office    Consent:  The patient/guardian has verbally consented to: the potential risks and benefits of telemedicine (video visit) versus in person care; bill my insurance or make self-payment for services provided; and responsibility for payment of non-covered services.     Patient would like the video invitation sent by:  Send to e-mail at: [email or web address]    Mode of Communication:  Video Conference via [employer]well    As the provider I attest to compliance with applicable laws and regulations related to telemedicine.    DATA  Interactive Complexity: No  Crisis: No        Progress Since Last Session (Related to Symptoms / Goals / Homework):   Symptoms: No change .    Homework: Achieved / completed to satisfaction      Episode of Care Goals: Satisfactory progress - ACTION (Actively working towards change); Intervened by reinforcing change plan / affirming steps taken     Current / Ongoing Stressors and Concerns:  Client stated her parents forgave some of the debt that she owed them for J[employer].   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   Has been researching schools and jobs.      Treatment Objective(s) Addressed in This Session:   use thought-stopping strategy daily to reduce intrusive thoughts       Intervention:   Talked about her future plans that she is excited for. Discussed how to effectively think about goals and objectives related to her future. Discussed DBT pros and cons list she could utilize.            Assessments completed prior to visit:  The following assessments were completed by patient for this visit:  PHQ9:       3/12/2020     3:07 PM 4/30/2020    11:50 AM 5/22/2020     3:25 PM 7/10/2020     9:57 AM 2/1/2021     8:45 AM 3/21/2022     9:28 AM 8/28/2023     1:57 PM   PHQ-9 SCORE   PHQ-9 Total Score MyChart   15 (Moderately severe depression)  4 (Minimal depression)  8 (Mild depression)   PHQ-9 Total Score 10 6 15 14 4 8 8     GAD7:       6/11/2018    10:28 AM 8/13/2018     9:27 AM 3/12/2020     3:07 PM 5/22/2020     3:24 PM 2/1/2021     8:45 AM 11/14/2022     9:36 AM 8/16/2023     1:08 PM   SHELBY-7 SCORE   Total Score  6 (mild anxiety)  16 (severe anxiety) 10 (moderate anxiety) 16 (severe anxiety) 12 (moderate anxiety)   Total Score 4 6 7 16 10 16 12    12     PROMIS 10-Global Health (all questions and answers displayed):       12/21/2021    10:06 AM 11/14/2022     9:37 AM 2/13/2023    10:23 AM 2/27/2023    10:07 AM 8/16/2023     1:09 PM 8/30/2023    12:47 PM 12/21/2023     1:05 PM   PROMIS 10   In general, would you say your health is: Very good Fair Fair Fair Good Fair Fair   In general, would you say your quality of life is: Very good Good Good Good Good Good Good   In general, how would you rate your physical health? Very good Good Fair Fair Fair Fair Fair   In general, how would you rate your mental health, including your mood and your ability to think? Very good Fair Fair Good Fair Good Fair    In general, how would you rate your satisfaction with your social activities and relationships? Very good Fair Fair Fair Fair Poor Fair   In general, please rate how well you carry out your usual social activities and roles Very good Fair Good Fair Fair Fair Fair   To what extent are you able to carry out your everyday physical activities such as walking, climbing stairs, carrying groceries, or moving a chair? Completely Completely Completely Completely Completely Mostly Mostly   In the past 7 days, how often have you been bothered by emotional problems such as feeling anxious, depressed, or irritable? Rarely Often Sometimes Often Often Sometimes Sometimes   In the past 7 days, how would you rate your fatigue on average? Mild Moderate Mild Mild Mild Mild Moderate   In the past 7 days, how would you rate your pain on average, where 0 means no pain, and 10 means worst imaginable pain? 1 2 3 0 0 0 0   In general, would you say your health is: 4 2 2 2 3 2 2   In general, would you say your quality of life is: 4 3 3 3 3 3 3   In general, how would you rate your physical health? 4 3 2 2 2 2 2   In general, how would you rate your mental health, including your mood and your ability to think? 4 2 2 3 2 3 2   In general, how would you rate your satisfaction with your social activities and relationships? 4 2 2 2 2 1 2   In general, please rate how well you carry out your usual social activities and roles. (This includes activities at home, at work and in your community, and responsibilities as a parent, child, spouse, employee, friend, etc.) 4 2 3 2 2 2 2   To what extent are you able to carry out your everyday physical activities such as walking, climbing stairs, carrying groceries, or moving a chair? 5 5 5 5 5 4 4   In the past 7 days, how often have you been bothered by emotional problems such as feeling anxious, depressed, or irritable? 2 4 3 4 4 3 3   In the past 7 days, how would you rate your fatigue on average? 2 3  2 2 2 2 3   In the past 7 days, how would you rate your pain on average, where 0 means no pain, and 10 means worst imaginable pain? 1 2 3 0 0 0 0   Global Mental Health Score 16 9 10 10 9    9 10 10    10   Global Physical Health Score 17 15 15 16 16    16 15 14    14   PROMIS TOTAL - SUBSCORES 33 24 25 26 25    25 25 24    24         ASSESSMENT: Current Emotional / Mental Status (status of significant symptoms):   Risk status (Self / Other harm or suicidal ideation)   Patient denies current fears or concerns for personal safety.   Patient denies current or recent suicidal ideation or behaviors.   Patient denies current or recent homicidal ideation or behaviors.   Patient denies current or recent self injurious behavior or ideation.   Patient denies other safety concerns.   Patient reports there has been no change in risk factors since their last session.     Patient reports there has been no change in protective factors since their last session.     Recommended that patient call 911 or go to the local ED should there be a change in any of these risk factors.     Appearance:   Appropriate    Eye Contact:   Good    Psychomotor Behavior: Normal    Attitude:   Cooperative    Orientation:   All   Speech    Rate / Production: Normal/ Responsive    Volume:  Normal    Mood:    Normal   Affect:    Appropriate    Thought Content:  Clear    Thought Form:  Coherent  Logical    Insight:    Good      Medication Review:   No current psychiatric medications prescribed     Medication Compliance:   NA     Changes in Health Issues:   None reported     Chemical Use Review:   Substance Use: Chemical use reviewed, no active concerns identified      Tobacco Use: No current tobacco use.      Diagnosis:  1. Generalized anxiety disorder    2. Major depressive disorder, recurrent episode with anxious distress (H24)        Collateral Reports Completed:   Not Applicable    PLAN: (Patient Tasks / Therapist Tasks / Other)  Client to continue to  take her time thinking about what she wants for her future goals.         Celsa , [facility]                                                         ______________________________________________________________________    Individual Treatment Plan    Patient's Name: Batool Louis  YOB: 2000    Date of Creation: 3/23/22  Date Treatment Plan Last Reviewed/Revised: 10/25/23    DSM5 Diagnoses: 300.02 (F41.1) Generalized Anxiety Disorder  Psychosocial / Contextual Factors:  living at home, family conflict  PROMIS (reviewed every 90 days):     Referral / Collaboration:  Referral to another professional/service is not indicated at this time..    Anticipated number of session for this episode of care: on-going  Anticipation frequency of session: Every other week  Anticipated Duration of each session: 38-52 minutes  Treatment plan will be reviewed in 90 days or when goals have been changed.       MeasurableTreatment Goal(s) related to diagnosis / functional impairment(s)    MeasurableTreatment Goal(s) related to diagnosis / functional impairment(s)  Goal 1: Client will increase emotion regulation skills/decrease panic attacks    Objective #A (Client Action)    Client will identify 2-4 fears / thoughts that contribute to feeling anxious  use positive self-affirmations daily.  Status: Continued - Date(s):10/25/23    Intervention(s)  Therapist will teach emotional regulation skills. distress tolerance skills, interpersonal effectiveness skills, emotion regluation skills, mindfulness skills, radical acceptance. Therapist will teach client how to ID body cues for anxiety, anxiety reduction techniques, how to ID triggers for depression and anxiety- decrease reactivity/eliminate, lifestyle changes to reduce depression and anxiety, communication skills, explore cognitive beliefs and help client to develop healthy cognitive patterns and beliefs.      Objective #B  Client will use thought-stopping strategy  "daily to reduce intrusive thoughts.  Status: Continued - Date(s): 10/25/23    Intervention(s)  Therapist will teach emotional regulation skills. distress tolerance skills, interpersonal effectiveness skills, emotion regluation skills, mindfulness skills, radical acceptance. Therapist will teach client how to ID body cues for anxiety, anxiety reduction techniques, how to ID triggers for depression and anxiety- decrease reactivity/eliminate, lifestyle changes to reduce depression and anxiety, communication skills, explore cognitive beliefs and help client to develop healthy cognitive patterns and beliefs.    Objective #C (Client Action)    Status: Continued - Date: 10/25/23    Client will use \"worry time\" each day for 15 minutes of scheduled worry and then defer obsessive or anxious thinking until the next structured worry time.    Intervention(s)  Therapist will teach emotional regulation skills. work through trauma using somatic experiencing techniques to discharge the anxiety.    Goal 2: Client will work on increasing self esteem and self worth     Objective #A (Client Action)    Status: Continued - Date(s): 10/25/23    Client will identify two areas of life that you would like to have improved functioning.    Intervention(s)  Therapist will teach emotional regulation skills. work through trauma using somatic experiencing techniques to discharge the anxiety .      Client has reviewed and agreed to the above plan.      JAGDEEP Camacho    "

## 2023-12-29 RX ORDER — HYDROXYZINE HYDROCHLORIDE 25 MG/1
25-50 TABLET, FILM COATED ORAL
Qty: 60 TABLET | Refills: 0 | Status: SHIPPED | OUTPATIENT
Start: 2023-12-29 | End: 2024-06-17

## 2024-01-13 NOTE — PROGRESS NOTES
Does Batool have a CPAP/Bipap?  No     [place] Sleep Scale: 2  BMI: 29.8    Dodie is a 22 year old who is being evaluated via a billable video visit.      How would you like to obtain your AVS? MyChart  If the video visit is dropped, the invitation should be resent by: Text to cell phone: [phone]  Will anyone else be joining your video visit? No        Vitals:  No vitals were obtained today due to virtual visit.      Video-Visit Details    Type of service:  Video Visit     Originating Location (pt. Location): Home    Distant Location (provider location):  On-site  Platform used for Video Visit: [facility]         Outpatient Sleep Medicine Consultation:      Name: Batool Louis MRN# [id number]   Age: 22 year old YOB: 2000     Date of Consultation: January 31, 2023  Consultation is requested by: Sebastian Olson MD  [address] [place]  [place] [address] Sebastian Olson  Primary care provider: Nancy Donnelly       Reason for Sleep Consult:     Batool Louis is sent by Sebastian Olson for a sleep consultation regarding insomnia, sleep apnea. .    Patient s Reason for visit  Batool Louis main reason for visit: Trouble falling asleep, light sleeper, snoring  Patient states problem(s) started: I have been snoring since i was in [facility], trouble falinh asleep has been [facility] [place]  Batool Louis's goals for this visit: Set up a sleep study, i potentially need to get my tonisls removed           Assessment and Plan:     Summary Sleep Diagnoses:  Suspected sleep apnea- snoring, witnessed apneas.   Insomnia- sleep onset, early awakenings.     Comorbid Diagnoses:  Chronic tonsillitis      Summary Recommendations:  Home study for suspected sleep apnea. We discussed a regular sleep schedule and avoiding laying in bed awake. Consider sleep psychology referral depending upon results of study.    No orders of the defined types were placed in this encounter.        Summary Counseling:   Labor Progress Note - Renita Christianson 16 y.o. female MRN: [id number]    Unit/Bed#: -01 Encounter: [id number]       Contraction Frequency (minutes): 1.5-3  Contraction Intensity: Mild  Uterine Activity Characteristics: Irritability  Cervical Dilation: 4        Cervical Effacement: 70  Fetal Station: -3  Baseline Rate (FHR): 140 bpm  Fetal Heart Rate (FHT): 148 BPM  FHR Category: 1               Vital Signs:   Vitals:    01/13/24 0023   BP:    Pulse: 60   Resp:    Temp:    SpO2: 98%       Notes/comments:   Patient is comfortable. Ramirez balloon out. Category 1 tracing. Contractions are more regular spaced at 1-5 mins. Will start pitocin at 2:15am            Honey Stinson MD 1/13/2024 1:35 AM         Sleep Testing Reviewed  Obstructive Sleep Apnea Reviewed  Complications of Untreated Sleep Apnea Reviewed      Medical Decision-making:   Educational materials provided in instructions    Total time spent reviewing medical records, history and physical examination, review of previous testing and interpretation as well as documentation on this date:40 min    CC: Sebastian Olson          History of Present Illness:     Past Sleep Evaluations:    SLEEP-WAKE SCHEDULE:     Work/School Days: Patient goes to school/work: Yes   Usually gets into bed at 10pm  Takes patient about I usually have koticed i fall asleep around 1-3am to fall asleep  Has trouble falling asleep 4-5 noghts a week nights per week  Wakes up in the middle of the night 3 times times.  Wakes up due to Snorting self awake;External stimuli (bed partner, pets, noise, etc);Anxiety;Nightmares  She has trouble falling back asleep 1-2 times a week.   It usually takes Awhile, sometimes i dont fall back asleep to get back to sleep  Patient is usually up at [place] from 6-9am  Uses alarm: Yes    Weekends/Non-work Days/All Other Days:  Usually gets into bed at 10pm   Takes patient about A few hours to fall asleep  Patient is usually up at 6-9am  Uses alarm: Yes    Sleep Need  Patient gets  I hope 8 hours but probably more like 6-7 sleep on average   Patient thinks she needs about 8 sleep    Batool Louis prefers to sleep in this position(s): Side;Stomach   Patient states they do the following activities in bed: Read;Use phone, computer, or tablet    Naps  Patient takes a purposeful nap Never times a week and naps are usually NA in duration  She feels better after a nap: No  She dozes off unintentionally Never days per week  Patient has had a driving accident or near-miss due to sleepiness/drowsiness: No      SLEEP DISRUPTIONS:    Breathing/Snoring  Patient snores:Yes  Other people complain about her snoring: Yes  Patient has been told she stops breathing in  her sleep:Yes  She has issues with the following: Morning mouth dryness;Stuffy nose when you wake up;Heartburn or reflux at night    Movement:  Patient gets pain, discomfort, with an urge to move:  Yes  It happens when she is resting:  Yes  It happens more at night:  Yes  Patient has been told she kicks her legs at night:  No     Behaviors in Sleep:  Batool Louis has experienced the following behaviors while sleeping: Recurring Nightmares;Teeth grinding;Night terrors (screaming,yelling or acting afraid but not recalling event)  She has experienced sudden muscle weakness during the day: No      Is there anything else you would like your sleep provider to know:          CAFFEINE AND OTHER SUBSTANCES:    Patient consumes caffeinated beverages per day:  Maybe 1 coffee in the morning  Last caffeine use is usually: 10am  List of any prescribed or over the counter stimulants that patient takes:    List of any prescribed or over the counter sleep medication patient takes:    List of previous sleep medications that patient has tried:    Patient drinks alcohol to help them sleep: No  Patient drinks alcohol near bedtime: Yes    Family History:  Patient has a family member been diagnosed with a sleep disorder: Yes            SCALES:    EPWORTH SLEEPINESS SCALE      [place] Sleepiness Scale ( BONI Mandujano  [id number]<br>ESS - [place]/English - Final version - 21 Nov 07 - [facility] [place].) 1/31/2023   Sitting and reading Slight chance of dozing   Watching TV Slight chance of dozing   Sitting, inactive in a public place (e.g. a theatre or a meeting) Would never doze   As a passenger in a car for an hour without a break Would never doze   Lying down to rest in the afternoon when circumstances permit Would never doze   Sitting and talking to someone Would never doze   Sitting quietly after a lunch without alcohol Would never doze   In a car, while stopped for a few minutes in traffic Would never doze   [place] Score (MC) 2    [place] Score (Sleep) 2         INSOMNIA SEVERITY INDEX (LES)      Insomnia Severity Index (LES) 1/31/2023   Difficulty falling asleep 3   Difficulty staying asleep 2   Problems waking up too early 0   How SATISFIED/DISSATISFIED are you with your CURRENT sleep pattern? 3   How NOTICEABLE to others do you think your sleep problem is in terms of impairing the quality of your life? 2   How WORRIED/DISTRESSED are you about your current sleep problem? 2   To what extent do you consider your sleep problem to INTERFERE with your daily functioning (e.g. daytime fatigue, mood, ability to function at work/daily chores, concentration, memory, mood, etc.) CURRENTLY? 2   LES Total Score 14       Guidelines for Scoring/Interpretation:  Total score categories:  0-7 = No clinically significant insomnia   8-14 = Subthreshold insomnia   15-21 = Clinical insomnia (moderate severity)  22-28 = Clinical insomnia (severe)  Used via courtesy of www.[employer] energy.va.gov with permission from Anthony Charles PhD., [facility]      STOP BANG     STOP BANG Questionnaire (  2008, the American Society of Anesthesiologists, Inc. [employer] 1/31/2023   1. Snoring - Do you snore loudly (louder than talking or loud enough to be heard through closed doors)? Yes   2. Tired - Do you often feel tired, fatigued, or sleepy during daytime? Yes   3. Observed - Has anyone observed you stop breathing during your sleep? Yes   4. Blood pressure - Do you have or are you being treated for high blood pressure? No   5. BMI - BMI more than 35 kg/m2? No   6. Age - Age over 50 yr old? No   7. Neck circumference - Neck circumference greater than 40 cm? No   8. Gender - Gender male? No   STOP BANG Score (MC): 4 (High risk of THOR)   B/P Clinic: -   BMI Clinic: -         GAD7    SHELBY-7  11/14/2022   1. Feeling nervous, anxious, or on edge 3   2. Not being able to stop or control worrying 3   3. Worrying too much about different things 2   4.  "Trouble relaxing 3   5. Being so restless that it is hard to sit still 0   6. Becoming easily annoyed or irritable 3   7. Feeling afraid, as if something awful might happen 2   SHELBY-7 Total Score 16   If you checked any problems, how difficult have they made it for you to do your work, take care of things at home, or get along with other people? Very difficult         CAGE-AID    No flowsheet data found.    CAGE-AID reprinted with permission from the [facility] Journal, SHANIA Olson. and ISIDRO Vu, \"Conjoint screening questionnaires for alcohol and drug abuse\" [place] Medical Journal 94: 135-140, 1995.      PATIENT HEALTH QUESTIONNAIRE-9 (PHQ - 9)    PHQ-9 ([employer]) 3/21/2022   1.  Little interest or pleasure in doing things 1   2.  Feeling down, depressed, or hopeless 0   3.  Trouble falling or staying asleep, or sleeping too much 1   4.  Feeling tired or having little energy 1   5.  Poor appetite or overeating 2   6.  Feeling bad about yourself 2   7.  Trouble concentrating 0   8.  Moving slowly or restless 1   9.  Suicidal or self-harm thoughts 0   PHQ-9 Total Score 8   Difficulty at work, home, or with people Somewhat difficult   1.  Little interest or pleasure in doing things -   2.  Feeling down, depressed, or hopeless -   3.  Trouble falling or staying asleep, or sleeping too much -   4.  Feeling tired or having little energy -   5.  Poor appetite or overeating -   6.  Feeling bad about yourself -   7.  Trouble concentrating -   8.  Moving slowly or restless -   9.  Suicidal or self-harm thoughts -   PHQ-9 via [employer] TOTAL SCORE-----> -   Difficulty at work, home, or with people -       Developed by Marcio Plok, Terrie Chang, Edenilson Haskins and colleagues, with an educational avelino from [employer]. No permission required to reproduce, translate, display or distribute.        Allergies:    Allergies   Allergen Reactions     Sulfamethoxazole-Trimethoprim Hives       Medications:  "   Current Outpatient Medications   Medication Sig Dispense Refill     albuterol (PROAIR HFA/PROVENTIL HFA/VENTOLIN HFA) 108 (90 Base) MCG/ACT inhaler INHALE 2 PUFFS INTO THE LUNGS EVERY 6 HOURS 18 g 0     Biotin 10 MG CAPS        cetirizine (ZYRTEC) 10 MG tablet Take 10 mg by mouth daily        fluconazole (DIFLUCAN) 150 MG tablet Take 1 tablet (150 mg) by mouth daily 2 tablet 0     fluticasone (FLONASE) 50 MCG/ACT nasal spray Spray 2 sprays into both nostrils daily 15.8 mL 1     levonorgestrel (KYLEENA) 19.5 MG IUD 1 each by Intrauterine route once Put in 8/12/2020       vitamin D3 (CHOLECALCIFEROL) 125 MCG (5000 UT) tablet Reported on 3/7/2017         Problem List:  Patient Active Problem List    Diagnosis Date Noted     Shellfish allergy 07/21/2021     Priority: Medium     IUD (intrauterine device) in place 08/12/2020     Priority: Medium     Kyleena placed 8/12/2020 for oligomenorrhea.  Due for removal Aug 2025       Chronic tonsillitis 05/13/2020     Priority: Medium     Added automatically from request for surgery [id number]       Allergic rhinitis 01/10/2012     Priority: Medium        Past Medical/Surgical History:  Past Medical History:   Diagnosis Date     Amenorrhea, secondary      Past Surgical History:   Procedure Laterality Date     NO HISTORY OF SURGERY         Social History:  Social History     Socioeconomic History     Marital status: Single     Spouse name: Not on file     Number of children: Not on file     Years of education: Not on file     Highest education level: Not on file   Occupational History     Occupation: student     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     Comment: has had both  sex partners, used condoms with last partner   Other Topics Concern     Parent/sibling w/ CABG, MI or angioplasty before 65F 55M? Not Asked   Social History Narrative     Not on file     Social Determinants of Health     Financial Resource Strain: Not on file   Food Insecurity: Not on file   Transportation Needs: Not on file   Physical Activity: Not on file   Stress: Not on file   Social Connections: Not on file   Intimate Partner Violence: Not on file   Housing Stability: Not on file       Family History:  Family History   Problem Relation Age of Onset     Anemia Mother         unknown reason     No Known Problems Father      Asthma Sister      Back Pain Maternal Grandmother      Alzheimer Disease Maternal Grandfather      No Known Problems Paternal Grandmother      Cancer Paternal Grandfather         pancreatic     Skin Cancer Paternal Great-Grandfather        Review of Systems:  A complete review of systems reviewed by me is negative with the exeption of what has been mentioned in the history of present illness.  In the last TWO WEEKS have you experienced any of the following symptoms?  Fevers: No  Night Sweats: No  Weight Gain: Yes  Pain at Night: Yes  Double Vision: No  Changes in Vision: No  Difficulty Breathing through Nose: No  Sore Throat in Morning: Yes  Dry Mouth in the Morning: Yes  Shortness of Breath Lying Flat: No  Shortness of Breath With Activity: No  Awakening with Shortness of Breath: No  Increased Cough: No  Heart Racing at Night: Yes  Swelling in Feet or Legs: No  Diarrhea at Night: No  Heartburn at Night: Yes  Urinating More than Once at Night: No  Losing Control of Urine at Night: No  Joint Pains at Night: Yes  Headaches in Morning: No  Weakness in Arms or Legs: No  Depressed Mood: No  Anxiety: Yes     Physical Examination:  Vitals: There were no vitals taken for this visit.  BMI= There is no height or weight on file to calculate BMI.                Data: All pertinent previous laboratory data  reviewed     Recent Labs   Lab Test 07/10/20  0959 04/03/19  0907   NA  --  141   POTASSIUM  --  4.1   CHLORIDE  --  108   CO2  --  27   ANIONGAP  --  6   GLC 92 88   BUN  --  14   CR  --  0.74   REDD  --  9.0       Recent Labs   Lab Test 07/03/22  1041   WBC 9.1   RBC 4.35   HGB 12.6   HCT 38.4   MCV 88   MCH 29.0   MCHC 32.8   RDW 12.9          Recent Labs   Lab Test 04/03/19  0907   PROTTOTAL 7.8   ALBUMIN 4.1   BILITOTAL 0.4   ALKPHOS 82   AST 18   ALT 35       TSH (mU/L)   Date Value   07/10/2020 2.37   04/03/2019 1.79       No results found for: UAMP, UBARB, BENZODIAZEUR, UCANN, UCOC, OPIT, UPCP    No results found for: IRONSAT, [id number], AMY    No results found for: PH, PHARTERIAL, PO2, [id number], SAT, PCO2, HCO3, BASEEXCESS, BRITTNY, BEB    @LABRCNTIPR(phv:4,pco2v:4,po2v:4,hco3v:4,leeroy:4,o2per:4)@    Echocardiology: No results found for this or any previous visit (from the past 4320 hour(s)).    Chest x-ray: No results found for this or any previous visit from the past 365 days.      Chest CT: No results found for this or any previous visit from the past 365 days.      PFT: Most Recent [place] Pulmonary Function Testing    No results found for: 20001  No results found for: 20002  No results found for: 20003  No results found for: 20015  No results found for: 20016  No results found for: 20027  No results found for: 20028  No results found for: 20029  No results found for: 20079  No results found for: 20080  No results found for: 20081  No results found for: 20335  No results found for: 20105  No results found for: 20053  No results found for: 20054  No results found for: 20055      Lary Brink, [facility] 1/31/2023

## 2024-01-22 ENCOUNTER — VIRTUAL VISIT (OUTPATIENT)
Dept: FAMILY MEDICINE | Facility: OTHER | Age: 24
End: 2024-01-22
Payer: COMMERCIAL

## 2024-01-22 DIAGNOSIS — F33.2 SEVERE RECURRENT MAJOR DEPRESSION WITHOUT PSYCHOTIC FEATURES (H): ICD-10-CM

## 2024-01-22 PROCEDURE — 99207 PR NO CHARGE LOS: CPT | Mod: 95 | Performed by: PHYSICIAN ASSISTANT

## 2024-01-22 RX ORDER — FLUOXETINE 40 MG/1
40 CAPSULE ORAL DAILY
Qty: 90 CAPSULE | Refills: 0 | Status: SHIPPED | OUTPATIENT
Start: 2024-01-22 | End: 2024-04-20

## 2024-01-22 ASSESSMENT — ENCOUNTER SYMPTOMS: NERVOUS/ANXIOUS: 1

## 2024-01-22 ASSESSMENT — ANXIETY QUESTIONNAIRES
7. FEELING AFRAID AS IF SOMETHING AWFUL MIGHT HAPPEN: SEVERAL DAYS
5. BEING SO RESTLESS THAT IT IS HARD TO SIT STILL: NOT AT ALL
3. WORRYING TOO MUCH ABOUT DIFFERENT THINGS: SEVERAL DAYS
7. FEELING AFRAID AS IF SOMETHING AWFUL MIGHT HAPPEN: SEVERAL DAYS
GAD7 TOTAL SCORE: 5
GAD7 TOTAL SCORE: 5
1. FEELING NERVOUS, ANXIOUS, OR ON EDGE: SEVERAL DAYS
2. NOT BEING ABLE TO STOP OR CONTROL WORRYING: NOT AT ALL
8. IF YOU CHECKED OFF ANY PROBLEMS, HOW DIFFICULT HAVE THESE MADE IT FOR YOU TO DO YOUR WORK, TAKE CARE OF THINGS AT HOME, OR GET ALONG WITH OTHER PEOPLE?: SOMEWHAT DIFFICULT
GAD7 TOTAL SCORE: 5
4. TROUBLE RELAXING: SEVERAL DAYS
6. BECOMING EASILY ANNOYED OR IRRITABLE: SEVERAL DAYS
IF YOU CHECKED OFF ANY PROBLEMS ON THIS QUESTIONNAIRE, HOW DIFFICULT HAVE THESE PROBLEMS MADE IT FOR YOU TO DO YOUR WORK, TAKE CARE OF THINGS AT HOME, OR GET ALONG WITH OTHER PEOPLE: SOMEWHAT DIFFICULT

## 2024-01-22 ASSESSMENT — PATIENT HEALTH QUESTIONNAIRE - PHQ9
10. IF YOU CHECKED OFF ANY PROBLEMS, HOW DIFFICULT HAVE THESE PROBLEMS MADE IT FOR YOU TO DO YOUR WORK, TAKE CARE OF THINGS AT HOME, OR GET ALONG WITH OTHER PEOPLE: SOMEWHAT DIFFICULT
SUM OF ALL RESPONSES TO PHQ QUESTIONS 1-9: 10
SUM OF ALL RESPONSES TO PHQ QUESTIONS 1-9: 10

## 2024-02-09 ENCOUNTER — VIRTUAL VISIT (OUTPATIENT)
Dept: PSYCHOLOGY | Facility: CLINIC | Age: 24
End: 2024-02-09
Payer: COMMERCIAL

## 2024-02-09 DIAGNOSIS — F33.9 MAJOR DEPRESSIVE DISORDER, RECURRENT EPISODE WITH ANXIOUS DISTRESS (H): ICD-10-CM

## 2024-02-09 DIAGNOSIS — F41.1 GENERALIZED ANXIETY DISORDER: Primary | ICD-10-CM

## 2024-02-09 PROCEDURE — 90837 PSYTX W PT 60 MINUTES: CPT | Mod: 95 | Performed by: COUNSELOR

## 2024-02-09 NOTE — PROGRESS NOTES
[facility]                                     Progress Note    Patient Name: Batool Louis  Date: 2/9/24         Service Type: Individual      Session Start Time: 9:05am Session End Time:  10:00am    Session Length: 55    Session #: 70    Attendees: Client    Service Modality:  Video Visit:      Provider verified identity through the following two step process.  Patient provided:  Patient is known previously to provider    Telemedicine Visit: The patient's condition can be safely assessed and treated via synchronous audio and visual telemedicine encounter.      Reason for Telemedicine Visit: Services only offered telehealth    Originating Site (Patient Location): Patient's home    Distant Site (Provider Location): Provider Remote Setting- Home Office    Consent:  The patient/guardian has verbally consented to: the potential risks and benefits of telemedicine (video visit) versus in person care; bill my insurance or make self-payment for services provided; and responsibility for payment of non-covered services.     Patient would like the video invitation sent by:  Send to e-mail at: [email or web address]    Mode of Communication:  Video Conference via well    As the provider I attest to compliance with applicable laws and regulations related to telemedicine.    DATA  Interactive Complexity: No  Crisis: No        Progress Since Last Session (Related to Symptoms / Goals / Homework):   Symptoms: No change .    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   Has been really mindful about her social media lately.   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 It's helped her with her sleep  schedule.   [facility] did a full metabolic scan on her and found that she has Alpha 1 antitrypsin deficiency- means that she is more prone to liver, lung, and skin diseases. Isn't drinking or using any substances anymore.   Started school for music production. Is going to put out an EP this summer and scheduled a photo shoot.   Paternal grandmother went to inpatient care for mental health issues and the client's parents then have moved in with her for the time being to help out. Therefore, the client and her sister have been living at the house alone.      Treatment Objective(s) Addressed in This Session:   use thought-stopping strategy daily to reduce intrusive thoughts       Intervention:   Processed her feelings about her family. Talked about the incidents she's had with her siblings. Talked about what's underneath what they are saying to doing with her (fear, confusion). Talked about how she can be more reflective with them which will help to diffuse the situation and help her protect herself from taking their words personally.  Encouraged the mindfulness she's employing for her social media. Celebrated in her also putting out an EP for herself. Talked through her calling her ex a few times and processed what motivated her towards this.           Assessments completed prior to visit:  The following assessments were completed by patient for this visit:  PHQ9:       4/30/2020    11:50 AM 5/22/2020     3:25 PM 7/10/2020     9:57 AM 2/1/2021     8:45 AM 3/21/2022     9:28 AM 8/28/2023     1:57 PM 1/22/2024     3:09 PM   PHQ-9 SCORE   PHQ-9 Total Score MyChart  15 (Moderately severe depression)  4 (Minimal depression)  8 (Mild depression) 10 (Moderate depression)   PHQ-9 Total Score 6 15 14 4 8 8 10     GAD7:       8/13/2018     9:27 AM 3/12/2020     3:07 PM 5/22/2020     3:24 PM 2/1/2021     8:45 AM 11/14/2022     9:36 AM 8/16/2023     1:08 PM 1/22/2024     3:13 PM   SHELBY-7 SCORE   Total Score 6 (mild  anxiety)  16 (severe anxiety) 10 (moderate anxiety) 16 (severe anxiety) 12 (moderate anxiety) 5 (mild anxiety)   Total Score 6 7 16 10 16 12    12 5     PROMIS 10-Global Health (all questions and answers displayed):       12/21/2021    10:06 AM 11/14/2022     9:37 AM 2/13/2023    10:23 AM 2/27/2023    10:07 AM 8/16/2023     1:09 PM 8/30/2023    12:47 PM 12/21/2023     1:05 PM   PROMIS 10   In general, would you say your health is: Very good Fair Fair Fair Good Fair Fair   In general, would you say your quality of life is: Very good Good Good Good Good Good Good   In general, how would you rate your physical health? Very good Good Fair Fair Fair Fair Fair   In general, how would you rate your mental health, including your mood and your ability to think? Very good Fair Fair Good Fair Good Fair   In general, how would you rate your satisfaction with your social activities and relationships? Very good Fair Fair Fair Fair Poor Fair   In general, please rate how well you carry out your usual social activities and roles Very good Fair Good Fair Fair Fair Fair   To what extent are you able to carry out your everyday physical activities such as walking, climbing stairs, carrying groceries, or moving a chair? Completely Completely Completely Completely Completely Mostly Mostly   In the past 7 days, how often have you been bothered by emotional problems such as feeling anxious, depressed, or irritable? Rarely Often Sometimes Often Often Sometimes Sometimes   In the past 7 days, how would you rate your fatigue on average? Mild Moderate Mild Mild Mild Mild Moderate   In the past 7 days, how would you rate your pain on average, where 0 means no pain, and 10 means worst imaginable pain? 1 2 3 0 0 0 0   In general, would you say your health is: 4 2 2 2 3 2 2   In general, would you say your quality of life is: 4 3 3 3 3 3 3   In general, how would you rate your physical health? 4 3 2 2 2 2 2   In general, how would you rate your  mental health, including your mood and your ability to think? 4 2 2 3 2 3 2   In general, how would you rate your satisfaction with your social activities and relationships? 4 2 2 2 2 1 2   In general, please rate how well you carry out your usual social activities and roles. (This includes activities at home, at work and in your community, and responsibilities as a parent, child, spouse, employee, friend, etc.) 4 2 3 2 2 2 2   To what extent are you able to carry out your everyday physical activities such as walking, climbing stairs, carrying groceries, or moving a chair? 5 5 5 5 5 4 4   In the past 7 days, how often have you been bothered by emotional problems such as feeling anxious, depressed, or irritable? 2 4 3 4 4 3 3   In the past 7 days, how would you rate your fatigue on average? 2 3 2 2 2 2 3   In the past 7 days, how would you rate your pain on average, where 0 means no pain, and 10 means worst imaginable pain? 1 2 3 0 0 0 0   Global Mental Health Score 16 9 10 10 9    9 10 10    10   Global Physical Health Score 17 15 15 16 16    16 15 14    14   PROMIS TOTAL - SUBSCORES 33 24 25 26 25    25 25 24    24         ASSESSMENT: Current Emotional / Mental Status (status of significant symptoms):   Risk status (Self / Other harm or suicidal ideation)   Patient denies current fears or concerns for personal safety.   Patient denies current or recent suicidal ideation or behaviors.   Patient denies current or recent homicidal ideation or behaviors.   Patient denies current or recent self injurious behavior or ideation.   Patient denies other safety concerns.   Patient reports there has been no change in risk factors since their last session.     Patient reports there has been no change in protective factors since their last session.     Recommended that patient call 911 or go to the local ED should there be a change in any of these risk factors.     Appearance:   Appropriate    Eye Contact:   Good    Psychomotor  Behavior: Normal    Attitude:   Cooperative    Orientation:   All   Speech    Rate / Production: Normal/ Responsive    Volume:  Normal    Mood:    Normal   Affect:    Appropriate    Thought Content:  Clear    Thought Form:  Coherent  Logical    Insight:    Good      Medication Review:   No current psychiatric medications prescribed     Medication Compliance:   NA     Changes in Health Issues:   None reported     Chemical Use Review:   Substance Use: Chemical use reviewed, no active concerns identified      Tobacco Use: No current tobacco use.      Diagnosis:  1. Generalized anxiety disorder    2. Major depressive disorder, recurrent episode with anxious distress (H24)        Collateral Reports Completed:   Not Applicable    PLAN: (Patient Tasks / Therapist Tasks / Other)  Client to work on communication skills with her siblings and reflecting back to them.         Celsa Linda, [facility]                                                         ______________________________________________________________________    Individual Treatment Plan    Patient's Name: Batool Louis  YOB: 2000    Date of Creation: 3/23/22  Date Treatment Plan Last Reviewed/Revised: 2/9/24    DSM5 Diagnoses: 300.02 (F41.1) Generalized Anxiety Disorder  Psychosocial / Contextual Factors:  living at home, family conflict  PROMIS (reviewed every 90 days):     Referral / Collaboration:  Referral to another professional/service is not indicated at this time..    Anticipated number of session for this episode of care: on-going  Anticipation frequency of session: Every other week  Anticipated Duration of each session: 38-52 minutes  Treatment plan will be reviewed in 90 days or when goals have been changed.       MeasurableTreatment Goal(s) related to diagnosis / functional impairment(s)    MeasurableTreatment Goal(s) related to diagnosis / functional impairment(s)  Goal 1: Client will increase emotion regulation skills/decrease  "panic attacks    Objective #A (Client Action)    Client will identify 2-4 fears / thoughts that contribute to feeling anxious  use positive self-affirmations daily.  Status: Continued - Date(s): 2/9/24    Intervention(s)  Therapist will teach emotional regulation skills. distress tolerance skills, interpersonal effectiveness skills, emotion regluation skills, mindfulness skills, radical acceptance. Therapist will teach client how to ID body cues for anxiety, anxiety reduction techniques, how to ID triggers for depression and anxiety- decrease reactivity/eliminate, lifestyle changes to reduce depression and anxiety, communication skills, explore cognitive beliefs and help client to develop healthy cognitive patterns and beliefs.      Objective #B  Client will use thought-stopping strategy daily to reduce intrusive thoughts.  Status: Continued - Date(s): 2/9/24    Intervention(s)  Therapist will teach emotional regulation skills. distress tolerance skills, interpersonal effectiveness skills, emotion regluation skills, mindfulness skills, radical acceptance. Therapist will teach client how to ID body cues for anxiety, anxiety reduction techniques, how to ID triggers for depression and anxiety- decrease reactivity/eliminate, lifestyle changes to reduce depression and anxiety, communication skills, explore cognitive beliefs and help client to develop healthy cognitive patterns and beliefs.    Objective #C (Client Action)    Status: Continued - Date: 2/9/24    Client will use \"worry time\" each day for 15 minutes of scheduled worry and then defer obsessive or anxious thinking until the next structured worry time.    Intervention(s)  Therapist will teach emotional regulation skills. work through trauma using somatic experiencing techniques to discharge the anxiety.    Goal 2: Client will work on increasing self esteem and self worth     Objective #A (Client Action)    Status: Continued - Date(s): 2/9/24    Client will " identify two areas of life that you would like to have improved functioning.    Intervention(s)  Therapist will teach emotional regulation skills. work through trauma using somatic experiencing techniques to discharge the anxiety .      Client has reviewed and agreed to the above plan.      JAGDEEP Camacho

## 2024-02-19 ENCOUNTER — OFFICE VISIT (OUTPATIENT)
Dept: OBGYN | Facility: CLINIC | Age: 24
End: 2024-02-19
Payer: COMMERCIAL

## 2024-02-19 VITALS
DIASTOLIC BLOOD PRESSURE: 82 MMHG | SYSTOLIC BLOOD PRESSURE: 156 MMHG | BODY MASS INDEX: 39.99 KG/M2 | RESPIRATION RATE: 18 BRPM | WEIGHT: 270 LBS | TEMPERATURE: 98.7 F | HEIGHT: 69 IN | HEART RATE: 117 BPM

## 2024-02-19 DIAGNOSIS — R03.0 ELEVATED BLOOD PRESSURE READING WITHOUT DIAGNOSIS OF HYPERTENSION: ICD-10-CM

## 2024-02-19 DIAGNOSIS — N92.6 IRREGULAR PERIODS: Primary | ICD-10-CM

## 2024-02-19 PROCEDURE — 99213 OFFICE O/P EST LOW 20 MIN: CPT | Performed by: ADVANCED PRACTICE MIDWIFE

## 2024-02-19 NOTE — NURSING NOTE
"Initial BP (!) 156/82 (BP Location: Right arm, Patient Position: Chair, Cuff Size: Adult Regular)   Pulse 117   Temp 98.7  F (37.1  C) (Tympanic)   Resp 18   Ht 1.753 m (5' 9\")   Wt 122.5 kg (270 lb)   LMP 02/06/2024   BMI 39.87 kg/m   Estimated body mass index is 39.87 kg/m  as calculated from the following:    Height as of this encounter: 1.753 m (5' 9\").    Weight as of this encounter: 122.5 kg (270 lb). .    "

## 2024-02-19 NOTE — PROGRESS NOTES
S:  She had a Mirena IUD placed due to Amenorrhea.  She did not like it and had it removed.  Since being removed, she reports have her period 2 times per months.  She saw a provider in the past and was told that she did not have PCOS.  She is in treatment right now for an eating disorder. She reports facial hair.  She does not report a history of hypertension.    O:  Appears well, no distress  Body mass index is 39.87 kg/m .  /82  A/P  (N92.6) Irregular periods  (primary encounter diagnosis)  Plan: US Pelvic Complete with Transvaginal  Discussed probable PCOS, discussed using hormonal birth control to manage bleeding - she declines.  Offered a trial of Metformin or progesterone.  Offered pelvic US and MD appt to further assess for PCOS and treatment.  She would like the US and to further assess with an MD.    Encouraged healthy diet and activity.    (R03.0) Elevated blood pressure reading without diagnosis of hypertension  Plan: Discussed the dangers of uncontrolled hypertension.  Please follow up with PCP regarding BP.

## 2024-02-29 ENCOUNTER — VIRTUAL VISIT (OUTPATIENT)
Dept: PSYCHOLOGY | Facility: CLINIC | Age: 24
End: 2024-02-29
Payer: COMMERCIAL

## 2024-02-29 DIAGNOSIS — F33.9 MAJOR DEPRESSIVE DISORDER, RECURRENT EPISODE WITH ANXIOUS DISTRESS (H): ICD-10-CM

## 2024-02-29 DIAGNOSIS — F41.1 GENERALIZED ANXIETY DISORDER: Primary | ICD-10-CM

## 2024-02-29 PROCEDURE — 90837 PSYTX W PT 60 MINUTES: CPT | Mod: 95 | Performed by: COUNSELOR

## 2024-02-29 NOTE — PROGRESS NOTES
"[facility]                                     Progress Note    Patient Name: Batool Louis  Date: 2/2/24         Service Type: Individual      Session Start Time: 2:06pm Session End Time:  3:00pm    Session Length: 54    Session #: 71    Attendees: Client    Service Modality:  Video Visit:      Provider verified identity through the following two step process.  Patient provided:  Patient is known previously to provider    Telemedicine Visit: The patient's condition can be safely assessed and treated via synchronous audio and visual telemedicine encounter.      Reason for Telemedicine Visit: Services only offered telehealth    Originating Site (Patient Location): Patient's home    Distant Site (Provider Location): Provider Remote Setting- Home Office    Consent:  The patient/guardian has verbally consented to: the potential risks and benefits of telemedicine (video visit) versus in person care; bill my insurance or make self-payment for services provided; and responsibility for payment of non-covered services.     Patient would like the video invitation sent by:  Send to e-mail at: [email or web address]    Mode of Communication:  Video Conference via Amwell    As the provider I attest to compliance with applicable laws and regulations related to telemedicine.    DATA  Interactive Complexity: No  Crisis: No        Progress Since Last Session (Related to Symptoms / Goals / Homework):   Symptoms: No change .    Homework: Achieved / completed to satisfaction      Episode of Care Goals: Satisfactory progress - ACTION (Actively working towards change); Intervened by reinforcing change plan / affirming steps taken     Current / Ongoing Stressors and Concerns:  Client stated she is still having issues with her youngest sister.   Her parents are still living with grandma right now but mom will come home and if something isn't \"right\" or clean enough at the house she will text the kids and " berate them about it.   Applying to colleges right now: [employer], and [employer] is her number 1.      Treatment Objective(s) Addressed in This Session:   use thought-stopping strategy daily to reduce intrusive thoughts       Intervention:   Processed her feelings about her family. Talked about the incidents she's had with her siblings. Talked about what's underneath what they are saying to doing with her (fear, confusion). Continued to talk about how she can communicate with them and detach herself from taking what they say or do personally. Talked about her plans and the progress she's making with them.            Assessments completed prior to visit:  The following assessments were completed by patient for this visit:  PHQ9:       4/30/2020    11:50 AM 5/22/2020     3:25 PM 7/10/2020     9:57 AM 2/1/2021     8:45 AM 3/21/2022     9:28 AM 8/28/2023     1:57 PM 1/22/2024     3:09 PM   PHQ-9 SCORE   PHQ-9 Total Score MyChart  15 (Moderately severe depression)  4 (Minimal depression)  8 (Mild depression) 10 (Moderate depression)   PHQ-9 Total Score 6 15 14 4 8 8 10     GAD7:       8/13/2018     9:27 AM 3/12/2020     3:07 PM 5/22/2020     3:24 PM 2/1/2021     8:45 AM 11/14/2022     9:36 AM 8/16/2023     1:08 PM 1/22/2024     3:13 PM   SHELBY-7 SCORE   Total Score 6 (mild anxiety)  16 (severe anxiety) 10 (moderate anxiety) 16 (severe anxiety) 12 (moderate anxiety) 5 (mild anxiety)   Total Score 6 7 16 10 16 12    12 5     PROMIS 10-Global Health (all questions and answers displayed):       12/21/2021    10:06 AM 11/14/2022     9:37 AM 2/13/2023    10:23 AM 2/27/2023    10:07 AM 8/16/2023     1:09 PM 8/30/2023    12:47 PM 12/21/2023     1:05 PM   PROMIS 10   In general, would you say your health is: Very good Fair Fair Fair Good Fair Fair   In general, would you say your quality of life is: Very good Good Good Good Good Good Good   In general, how would you rate your physical health?  Very good Good Fair Fair Fair Fair Fair   In general, how would you rate your mental health, including your mood and your ability to think? Very good Fair Fair Good Fair Good Fair   In general, how would you rate your satisfaction with your social activities and relationships? Very good Fair Fair Fair Fair Poor Fair   In general, please rate how well you carry out your usual social activities and roles Very good Fair Good Fair Fair Fair Fair   To what extent are you able to carry out your everyday physical activities such as walking, climbing stairs, carrying groceries, or moving a chair? Completely Completely Completely Completely Completely Mostly Mostly   In the past 7 days, how often have you been bothered by emotional problems such as feeling anxious, depressed, or irritable? Rarely Often Sometimes Often Often Sometimes Sometimes   In the past 7 days, how would you rate your fatigue on average? Mild Moderate Mild Mild Mild Mild Moderate   In the past 7 days, how would you rate your pain on average, where 0 means no pain, and 10 means worst imaginable pain? 1 2 3 0 0 0 0   In general, would you say your health is: 4 2 2 2 3 2 2   In general, would you say your quality of life is: 4 3 3 3 3 3 3   In general, how would you rate your physical health? 4 3 2 2 2 2 2   In general, how would you rate your mental health, including your mood and your ability to think? 4 2 2 3 2 3 2   In general, how would you rate your satisfaction with your social activities and relationships? 4 2 2 2 2 1 2   In general, please rate how well you carry out your usual social activities and roles. (This includes activities at home, at work and in your community, and responsibilities as a parent, child, spouse, employee, friend, etc.) 4 2 3 2 2 2 2   To what extent are you able to carry out your everyday physical activities such as walking, climbing stairs, carrying groceries, or moving a chair? 5 5 5 5 5 4 4   In the past 7 days, how often  have you been bothered by emotional problems such as feeling anxious, depressed, or irritable? 2 4 3 4 4 3 3   In the past 7 days, how would you rate your fatigue on average? 2 3 2 2 2 2 3   In the past 7 days, how would you rate your pain on average, where 0 means no pain, and 10 means worst imaginable pain? 1 2 3 0 0 0 0   Global Mental Health Score 16 9 10 10 9    9 10 10    10   Global Physical Health Score 17 15 15 16 16    16 15 14    14   PROMIS TOTAL - SUBSCORES 33 24 25 26 25    25 25 24    24         ASSESSMENT: Current Emotional / Mental Status (status of significant symptoms):   Risk status (Self / Other harm or suicidal ideation)   Patient denies current fears or concerns for personal safety.   Patient denies current or recent suicidal ideation or behaviors.   Patient denies current or recent homicidal ideation or behaviors.   Patient denies current or recent self injurious behavior or ideation.   Patient denies other safety concerns.   Patient reports there has been no change in risk factors since their last session.     Patient reports there has been no change in protective factors since their last session.     Recommended that patient call 911 or go to the local ED should there be a change in any of these risk factors.     Appearance:   Appropriate    Eye Contact:   Good    Psychomotor Behavior: Normal    Attitude:   Cooperative    Orientation:   All   Speech    Rate / Production: Normal/ Responsive    Volume:  Normal    Mood:    Normal   Affect:    Appropriate    Thought Content:  Clear    Thought Form:  Coherent  Logical    Insight:    Good      Medication Review:   No current psychiatric medications prescribed     Medication Compliance:   NA     Changes in Health Issues:   None reported     Chemical Use Review:   Substance Use: Chemical use reviewed, no active concerns identified      Tobacco Use: No current tobacco use.      Diagnosis:  1. Generalized anxiety disorder    2. Major depressive  disorder, recurrent episode with anxious distress (H24)        Collateral Reports Completed:   Not Applicable    PLAN: (Patient Tasks / Therapist Tasks / Other)  Client to work on communication skills with her siblings and reflecting back to them.         Celsa Linda, [facility]                                                         ______________________________________________________________________    Individual Treatment Plan    Patient's Name: Batool Louis  YOB: 2000    Date of Creation: 3/23/22  Date Treatment Plan Last Reviewed/Revised: 2/9/24    DSM5 Diagnoses: 300.02 (F41.1) Generalized Anxiety Disorder  Psychosocial / Contextual Factors:  living at home, family conflict  PROMIS (reviewed every 90 days):     Referral / Collaboration:  Referral to another professional/service is not indicated at this time..    Anticipated number of session for this episode of care: on-going  Anticipation frequency of session: Every other week  Anticipated Duration of each session: 38-52 minutes  Treatment plan will be reviewed in 90 days or when goals have been changed.       MeasurableTreatment Goal(s) related to diagnosis / functional impairment(s)    MeasurableTreatment Goal(s) related to diagnosis / functional impairment(s)  Goal 1: Client will increase emotion regulation skills/decrease panic attacks    Objective #A (Client Action)    Client will identify 2-4 fears / thoughts that contribute to feeling anxious  use positive self-affirmations daily.  Status: Continued - Date(s): 2/9/24    Intervention(s)  Therapist will teach emotional regulation skills. distress tolerance skills, interpersonal effectiveness skills, emotion regluation skills, mindfulness skills, radical acceptance. Therapist will teach client how to ID body cues for anxiety, anxiety reduction techniques, how to ID triggers for depression and anxiety- decrease reactivity/eliminate, lifestyle changes to reduce depression and anxiety,  "communication skills, explore cognitive beliefs and help client to develop healthy cognitive patterns and beliefs.      Objective #B  Client will use thought-stopping strategy daily to reduce intrusive thoughts.  Status: Continued - Date(s): 2/9/24    Intervention(s)  Therapist will teach emotional regulation skills. distress tolerance skills, interpersonal effectiveness skills, emotion regluation skills, mindfulness skills, radical acceptance. Therapist will teach client how to ID body cues for anxiety, anxiety reduction techniques, how to ID triggers for depression and anxiety- decrease reactivity/eliminate, lifestyle changes to reduce depression and anxiety, communication skills, explore cognitive beliefs and help client to develop healthy cognitive patterns and beliefs.    Objective #C (Client Action)    Status: Continued - Date: 2/9/24    Client will use \"worry time\" each day for 15 minutes of scheduled worry and then defer obsessive or anxious thinking until the next structured worry time.    Intervention(s)  Therapist will teach emotional regulation skills. work through trauma using somatic experiencing techniques to discharge the anxiety.    Goal 2: Client will work on increasing self esteem and self worth     Objective #A (Client Action)    Status: Continued - Date(s): 2/9/24    Client will identify two areas of life that you would like to have improved functioning.    Intervention(s)  Therapist will teach emotional regulation skills. work through trauma using somatic experiencing techniques to discharge the anxiety .      Client has reviewed and agreed to the above plan.      Celsa Linda [facility]    "

## 2024-03-05 ENCOUNTER — VIRTUAL VISIT (OUTPATIENT)
Dept: PSYCHOLOGY | Facility: OTHER | Age: 24
End: 2024-03-05
Payer: COMMERCIAL

## 2024-03-05 DIAGNOSIS — F41.1 GENERALIZED ANXIETY DISORDER: Primary | ICD-10-CM

## 2024-03-05 DIAGNOSIS — F33.9 MAJOR DEPRESSIVE DISORDER, RECURRENT EPISODE WITH ANXIOUS DISTRESS (H): ICD-10-CM

## 2024-03-05 PROCEDURE — 90837 PSYTX W PT 60 MINUTES: CPT | Mod: 95 | Performed by: COUNSELOR

## 2024-03-05 NOTE — PROGRESS NOTES
[facility]                                     Progress Note    Patient Name: Batool Louis  Date: 3/5/24         Service Type: Individual      Session Start Time: 2:02pm Session End Time:  3:00pm    Session Length: 58    Session #: 72    Attendees: Client    Service Modality:  Video Visit:      Provider verified identity through the following two step process.  Patient provided:  Patient is known previously to provider    Telemedicine Visit: The patient's condition can be safely assessed and treated via synchronous audio and visual telemedicine encounter.      Reason for Telemedicine Visit: Services only offered telehealth    Originating Site (Patient Location): Patient's home    Distant Site (Provider Location): Provider Remote Setting- Home Office    Consent:  The patient/guardian has verbally consented to: the potential risks and benefits of telemedicine (video visit) versus in person care; bill my insurance or make self-payment for services provided; and responsibility for payment of non-covered services.     Patient would like the video invitation sent by:  Send to e-mail at: [email or web address]    Mode of Communication:  Video Conference via well    As the provider I attest to compliance with applicable laws and regulations related to telemedicine.    DATA  Interactive Complexity: No  Crisis: No        Progress Since Last Session (Related to Symptoms / Goals / Homework):   Symptoms: Improving .    Homework: Achieved / completed to satisfaction      Episode of Care Goals: Satisfactory progress - ACTION (Actively working towards change); Intervened by reinforcing change plan / affirming steps taken     Current / Ongoing Stressors and Concerns:  Client stated she is feeling stressed out because her college applications are due soon.    Continued stress with family members.      Treatment Objective(s) Addressed in This Session:   use thought-stopping strategy daily to reduce  intrusive thoughts       Intervention:   Discussed the origins of some of the issues she's having with family members which helped the client gain more insight into why she has done some of the things she's done in her past that have created unhelpful patterns interpersonally for her. Talked about interpersonal skills and what she feels she wants to improve on.         Assessments completed prior to visit:  The following assessments were completed by patient for this visit:  PHQ9:       4/30/2020    11:50 AM 5/22/2020     3:25 PM 7/10/2020     9:57 AM 2/1/2021     8:45 AM 3/21/2022     9:28 AM 8/28/2023     1:57 PM 1/22/2024     3:09 PM   PHQ-9 SCORE   PHQ-9 Total Score MyChart  15 (Moderately severe depression)  4 (Minimal depression)  8 (Mild depression) 10 (Moderate depression)   PHQ-9 Total Score 6 15 14 4 8 8 10     GAD7:       8/13/2018     9:27 AM 3/12/2020     3:07 PM 5/22/2020     3:24 PM 2/1/2021     8:45 AM 11/14/2022     9:36 AM 8/16/2023     1:08 PM 1/22/2024     3:13 PM   SHELBY-7 SCORE   Total Score 6 (mild anxiety)  16 (severe anxiety) 10 (moderate anxiety) 16 (severe anxiety) 12 (moderate anxiety) 5 (mild anxiety)   Total Score 6 7 16 10 16 12    12 5     PROMIS 10-Global Health (all questions and answers displayed):       12/21/2021    10:06 AM 11/14/2022     9:37 AM 2/13/2023    10:23 AM 2/27/2023    10:07 AM 8/16/2023     1:09 PM 8/30/2023    12:47 PM 12/21/2023     1:05 PM   PROMIS 10   In general, would you say your health is: Very good Fair Fair Fair Good Fair Fair   In general, would you say your quality of life is: Very good Good Good Good Good Good Good   In general, how would you rate your physical health? Very good Good Fair Fair Fair Fair Fair   In general, how would you rate your mental health, including your mood and your ability to think? Very good Fair Fair Good Fair Good Fair   In general, how would you rate your satisfaction with your social activities and relationships? Very good  Fair Fair Fair Fair Poor Fair   In general, please rate how well you carry out your usual social activities and roles Very good Fair Good Fair Fair Fair Fair   To what extent are you able to carry out your everyday physical activities such as walking, climbing stairs, carrying groceries, or moving a chair? Completely Completely Completely Completely Completely Mostly Mostly   In the past 7 days, how often have you been bothered by emotional problems such as feeling anxious, depressed, or irritable? Rarely Often Sometimes Often Often Sometimes Sometimes   In the past 7 days, how would you rate your fatigue on average? Mild Moderate Mild Mild Mild Mild Moderate   In the past 7 days, how would you rate your pain on average, where 0 means no pain, and 10 means worst imaginable pain? 1 2 3 0 0 0 0   In general, would you say your health is: 4 2 2 2 3 2 2   In general, would you say your quality of life is: 4 3 3 3 3 3 3   In general, how would you rate your physical health? 4 3 2 2 2 2 2   In general, how would you rate your mental health, including your mood and your ability to think? 4 2 2 3 2 3 2   In general, how would you rate your satisfaction with your social activities and relationships? 4 2 2 2 2 1 2   In general, please rate how well you carry out your usual social activities and roles. (This includes activities at home, at work and in your community, and responsibilities as a parent, child, spouse, employee, friend, etc.) 4 2 3 2 2 2 2   To what extent are you able to carry out your everyday physical activities such as walking, climbing stairs, carrying groceries, or moving a chair? 5 5 5 5 5 4 4   In the past 7 days, how often have you been bothered by emotional problems such as feeling anxious, depressed, or irritable? 2 4 3 4 4 3 3   In the past 7 days, how would you rate your fatigue on average? 2 3 2 2 2 2 3   In the past 7 days, how would you rate your pain on average, where 0 means no pain, and 10 means  worst imaginable pain? 1 2 3 0 0 0 0   Global Mental Health Score 16 9 10 10 9    9 10 10    10   Global Physical Health Score 17 15 15 16 16    16 15 14    14   PROMIS TOTAL - SUBSCORES 33 24 25 26 25    25 25 24    24         ASSESSMENT: Current Emotional / Mental Status (status of significant symptoms):   Risk status (Self / Other harm or suicidal ideation)   Patient denies current fears or concerns for personal safety.   Patient denies current or recent suicidal ideation or behaviors.   Patient denies current or recent homicidal ideation or behaviors.   Patient denies current or recent self injurious behavior or ideation.   Patient denies other safety concerns.   Patient reports there has been no change in risk factors since their last session.     Patient reports there has been no change in protective factors since their last session.     Recommended that patient call 911 or go to the local ED should there be a change in any of these risk factors.     Appearance:   Appropriate    Eye Contact:   Good    Psychomotor Behavior: Normal    Attitude:   Cooperative    Orientation:   All   Speech    Rate / Production: Normal/ Responsive    Volume:  Normal    Mood:    Normal   Affect:    Appropriate    Thought Content:  Clear    Thought Form:  Coherent  Logical    Insight:    Good      Medication Review:   No current psychiatric medications prescribed     Medication Compliance:   NA     Changes in Health Issues:   None reported     Chemical Use Review:   Substance Use: Chemical use reviewed, no active concerns identified      Tobacco Use: No current tobacco use.      Diagnosis:  1. Generalized anxiety disorder    2. Major depressive disorder, recurrent episode with anxious distress (H24)        Collateral Reports Completed:   Not Applicable    PLAN: (Patient Tasks / Therapist Tasks / Other)  Client to work on interpersonal effectiveness skills.        Celsa Linda [facility]                                                          ______________________________________________________________________    Individual Treatment Plan    Patient's Name: Batool Louis  YOB: 2000    Date of Creation: 3/23/22  Date Treatment Plan Last Reviewed/Revised: 2/9/24    DSM5 Diagnoses: 300.02 (F41.1) Generalized Anxiety Disorder  Psychosocial / Contextual Factors:  living at home, family conflict  PROMIS (reviewed every 90 days):     Referral / Collaboration:  Referral to another professional/service is not indicated at this time..    Anticipated number of session for this episode of care: on-going  Anticipation frequency of session: Every other week  Anticipated Duration of each session: 38-52 minutes  Treatment plan will be reviewed in 90 days or when goals have been changed.       MeasurableTreatment Goal(s) related to diagnosis / functional impairment(s)    MeasurableTreatment Goal(s) related to diagnosis / functional impairment(s)  Goal 1: Client will increase emotion regulation skills/decrease panic attacks    Objective #A (Client Action)    Client will identify 2-4 fears / thoughts that contribute to feeling anxious  use positive self-affirmations daily.  Status: Continued - Date(s): 2/9/24    Intervention(s)  Therapist will teach emotional regulation skills. distress tolerance skills, interpersonal effectiveness skills, emotion regluation skills, mindfulness skills, radical acceptance. Therapist will teach client how to ID body cues for anxiety, anxiety reduction techniques, how to ID triggers for depression and anxiety- decrease reactivity/eliminate, lifestyle changes to reduce depression and anxiety, communication skills, explore cognitive beliefs and help client to develop healthy cognitive patterns and beliefs.      Objective #B  Client will use thought-stopping strategy daily to reduce intrusive thoughts.  Status: Continued - Date(s): 2/9/24    Intervention(s)  Therapist will teach emotional regulation skills.  "distress tolerance skills, interpersonal effectiveness skills, emotion regluation skills, mindfulness skills, radical acceptance. Therapist will teach client how to ID body cues for anxiety, anxiety reduction techniques, how to ID triggers for depression and anxiety- decrease reactivity/eliminate, lifestyle changes to reduce depression and anxiety, communication skills, explore cognitive beliefs and help client to develop healthy cognitive patterns and beliefs.    Objective #C (Client Action)    Status: Continued - Date: 2/9/24    Client will use \"worry time\" each day for 15 minutes of scheduled worry and then defer obsessive or anxious thinking until the next structured worry time.    Intervention(s)  Therapist will teach emotional regulation skills. work through trauma using somatic experiencing techniques to discharge the anxiety.    Goal 2: Client will work on increasing self esteem and self worth     Objective #A (Client Action)    Status: Continued - Date(s): 2/9/24    Client will identify two areas of life that you would like to have improved functioning.    Intervention(s)  Therapist will teach emotional regulation skills. work through trauma using somatic experiencing techniques to discharge the anxiety .      Client has reviewed and agreed to the above plan.      Celsa Linda [facility]    "

## 2024-03-12 ENCOUNTER — OFFICE VISIT (OUTPATIENT)
Dept: PSYCHOLOGY | Facility: OTHER | Age: 24
End: 2024-03-12
Payer: COMMERCIAL

## 2024-03-12 DIAGNOSIS — F33.9 MAJOR DEPRESSIVE DISORDER, RECURRENT EPISODE WITH ANXIOUS DISTRESS (H): ICD-10-CM

## 2024-03-12 DIAGNOSIS — F41.1 GENERALIZED ANXIETY DISORDER: Primary | ICD-10-CM

## 2024-03-12 PROCEDURE — 90837 PSYTX W PT 60 MINUTES: CPT | Performed by: COUNSELOR

## 2024-03-15 NOTE — PROGRESS NOTES
[facility]                                     Progress Note    Patient Name: Batool Louis  Date: 3/12/24         Service Type: Individual      Session Start Time: 1:05pm Session End Time:  2:00pm    Session Length: 55    Session #: 73    Attendees: Client    Service Modality:  in person    DATA  Interactive Complexity: No  Crisis: No        Progress Since Last Session (Related to Symptoms / Goals / Homework):   Symptoms: Improving .    Homework: Achieved / completed to satisfaction      Episode of Care Goals: Satisfactory progress - ACTION (Actively working towards change); Intervened by reinforcing change plan / affirming steps taken     Current / Ongoing Stressors and Concerns:  Client stated she's been really thinking about if she wants to go to school or not.   Working on her EP to be released this summer.   Continued family stress.      Treatment Objective(s) Addressed in This Session:   use thought-stopping strategy daily to reduce intrusive thoughts       Intervention:   Discussed family stressors and processed through emotions around these. Worked with the client on changing her negative self talk in vivo through redirection. Helped the client find some self validation/ self compassion statements she can practice throughout the week to replace the negative self talk.         Assessments completed prior to visit:  The following assessments were completed by patient for this visit:  PHQ9:       4/30/2020    11:50 AM 5/22/2020     3:25 PM 7/10/2020     9:57 AM 2/1/2021     8:45 AM 3/21/2022     9:28 AM 8/28/2023     1:57 PM 1/22/2024     3:09 PM   PHQ-9 SCORE   PHQ-9 Total Score MyChart  15 (Moderately severe depression)  4 (Minimal depression)  8 (Mild depression) 10 (Moderate depression)   PHQ-9 Total Score 6 15 14 4 8 8 10     GAD7:       8/13/2018     9:27 AM 3/12/2020     3:07 PM 5/22/2020     3:24 PM 2/1/2021     8:45 AM 11/14/2022     9:36 AM 8/16/2023     1:08 PM  1/22/2024     3:13 PM   SHELBY-7 SCORE   Total Score 6 (mild anxiety)  16 (severe anxiety) 10 (moderate anxiety) 16 (severe anxiety) 12 (moderate anxiety) 5 (mild anxiety)   Total Score 6 7 16 10 16 12    12 5     PROMIS 10-Global Health (all questions and answers displayed):       12/21/2021    10:06 AM 11/14/2022     9:37 AM 2/13/2023    10:23 AM 2/27/2023    10:07 AM 8/16/2023     1:09 PM 8/30/2023    12:47 PM 12/21/2023     1:05 PM   PROMIS 10   In general, would you say your health is: Very good Fair Fair Fair Good Fair Fair   In general, would you say your quality of life is: Very good Good Good Good Good Good Good   In general, how would you rate your physical health? Very good Good Fair Fair Fair Fair Fair   In general, how would you rate your mental health, including your mood and your ability to think? Very good Fair Fair Good Fair Good Fair   In general, how would you rate your satisfaction with your social activities and relationships? Very good Fair Fair Fair Fair Poor Fair   In general, please rate how well you carry out your usual social activities and roles Very good Fair Good Fair Fair Fair Fair   To what extent are you able to carry out your everyday physical activities such as walking, climbing stairs, carrying groceries, or moving a chair? Completely Completely Completely Completely Completely Mostly Mostly   In the past 7 days, how often have you been bothered by emotional problems such as feeling anxious, depressed, or irritable? Rarely Often Sometimes Often Often Sometimes Sometimes   In the past 7 days, how would you rate your fatigue on average? Mild Moderate Mild Mild Mild Mild Moderate   In the past 7 days, how would you rate your pain on average, where 0 means no pain, and 10 means worst imaginable pain? 1 2 3 0 0 0 0   In general, would you say your health is: 4 2 2 2 3 2 2   In general, would you say your quality of life is: 4 3 3 3 3 3 3   In general, how would you rate your physical  health? 4 3 2 2 2 2 2   In general, how would you rate your mental health, including your mood and your ability to think? 4 2 2 3 2 3 2   In general, how would you rate your satisfaction with your social activities and relationships? 4 2 2 2 2 1 2   In general, please rate how well you carry out your usual social activities and roles. (This includes activities at home, at work and in your community, and responsibilities as a parent, child, spouse, employee, friend, etc.) 4 2 3 2 2 2 2   To what extent are you able to carry out your everyday physical activities such as walking, climbing stairs, carrying groceries, or moving a chair? 5 5 5 5 5 4 4   In the past 7 days, how often have you been bothered by emotional problems such as feeling anxious, depressed, or irritable? 2 4 3 4 4 3 3   In the past 7 days, how would you rate your fatigue on average? 2 3 2 2 2 2 3   In the past 7 days, how would you rate your pain on average, where 0 means no pain, and 10 means worst imaginable pain? 1 2 3 0 0 0 0   Global Mental Health Score 16 9 10 10 9    9 10 10    10   Global Physical Health Score 17 15 15 16 16    16 15 14    14   PROMIS TOTAL - SUBSCORES 33 24 25 26 25    25 25 24    24         ASSESSMENT: Current Emotional / Mental Status (status of significant symptoms):   Risk status (Self / Other harm or suicidal ideation)   Patient denies current fears or concerns for personal safety.   Patient denies current or recent suicidal ideation or behaviors.   Patient denies current or recent homicidal ideation or behaviors.   Patient denies current or recent self injurious behavior or ideation.   Patient denies other safety concerns.   Patient reports there has been no change in risk factors since their last session.     Patient reports there has been no change in protective factors since their last session.     Recommended that patient call 911 or go to the local ED should there be a change in any of these risk  factors.     Appearance:   Appropriate    Eye Contact:   Good    Psychomotor Behavior: Normal    Attitude:   Cooperative    Orientation:   All   Speech    Rate / Production: Normal/ Responsive    Volume:  Normal    Mood:    Normal   Affect:    Appropriate    Thought Content:  Clear    Thought Form:  Coherent  Logical    Insight:    Good      Medication Review:   No current psychiatric medications prescribed     Medication Compliance:   NA     Changes in Health Issues:   None reported     Chemical Use Review:   Substance Use: Chemical use reviewed, no active concerns identified      Tobacco Use: No current tobacco use.      Diagnosis:  1. Generalized anxiety disorder    2. Major depressive disorder, recurrent episode with anxious distress (H24)        Collateral Reports Completed:   Not Applicable    PLAN: (Patient Tasks / Therapist Tasks / Other)  Client to work on self validation and self compassion.         Celsa Linda, [facility]                                                         ______________________________________________________________________    Individual Treatment Plan    Patient's Name: Batool Louis  YOB: 2000    Date of Creation: 3/23/22  Date Treatment Plan Last Reviewed/Revised: 2/9/24    DSM5 Diagnoses: 300.02 (F41.1) Generalized Anxiety Disorder  Psychosocial / Contextual Factors:  living at home, family conflict  PROMIS (reviewed every 90 days):     Referral / Collaboration:  Referral to another professional/service is not indicated at this time..    Anticipated number of session for this episode of care: on-going  Anticipation frequency of session: Every other week  Anticipated Duration of each session: 38-52 minutes  Treatment plan will be reviewed in 90 days or when goals have been changed.       MeasurableTreatment Goal(s) related to diagnosis / functional impairment(s)    MeasurableTreatment Goal(s) related to diagnosis / functional impairment(s)  Goal 1: Client will  "increase emotion regulation skills/decrease panic attacks    Objective #A (Client Action)    Client will identify 2-4 fears / thoughts that contribute to feeling anxious  use positive self-affirmations daily.  Status: Continued - Date(s): 2/9/24    Intervention(s)  Therapist will teach emotional regulation skills. distress tolerance skills, interpersonal effectiveness skills, emotion regluation skills, mindfulness skills, radical acceptance. Therapist will teach client how to ID body cues for anxiety, anxiety reduction techniques, how to ID triggers for depression and anxiety- decrease reactivity/eliminate, lifestyle changes to reduce depression and anxiety, communication skills, explore cognitive beliefs and help client to develop healthy cognitive patterns and beliefs.      Objective #B  Client will use thought-stopping strategy daily to reduce intrusive thoughts.  Status: Continued - Date(s): 2/9/24    Intervention(s)  Therapist will teach emotional regulation skills. distress tolerance skills, interpersonal effectiveness skills, emotion regluation skills, mindfulness skills, radical acceptance. Therapist will teach client how to ID body cues for anxiety, anxiety reduction techniques, how to ID triggers for depression and anxiety- decrease reactivity/eliminate, lifestyle changes to reduce depression and anxiety, communication skills, explore cognitive beliefs and help client to develop healthy cognitive patterns and beliefs.    Objective #C (Client Action)    Status: Continued - Date: 2/9/24    Client will use \"worry time\" each day for 15 minutes of scheduled worry and then defer obsessive or anxious thinking until the next structured worry time.    Intervention(s)  Therapist will teach emotional regulation skills. work through trauma using somatic experiencing techniques to discharge the anxiety.    Goal 2: Client will work on increasing self esteem and self worth     Objective #A (Client Action)    Status: " Continued - Date(s): 2/9/24    Client will identify two areas of life that you would like to have improved functioning.    Intervention(s)  Therapist will teach emotional regulation skills. work through trauma using somatic experiencing techniques to discharge the anxiety .      Client has reviewed and agreed to the above plan.      Celsa Linda [facility]

## 2024-03-26 ENCOUNTER — VIRTUAL VISIT (OUTPATIENT)
Dept: PSYCHOLOGY | Facility: OTHER | Age: 24
End: 2024-03-26
Payer: COMMERCIAL

## 2024-03-26 DIAGNOSIS — F41.1 GENERALIZED ANXIETY DISORDER: Primary | ICD-10-CM

## 2024-03-26 DIAGNOSIS — F33.9 MAJOR DEPRESSIVE DISORDER, RECURRENT EPISODE WITH ANXIOUS DISTRESS (H): ICD-10-CM

## 2024-03-26 PROCEDURE — 90834 PSYTX W PT 45 MINUTES: CPT | Mod: 95 | Performed by: COUNSELOR
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
## 2024-04-01 ENCOUNTER — HOSPITAL ENCOUNTER (OUTPATIENT)
Dept: ULTRASOUND IMAGING | Facility: CLINIC | Age: 24
Discharge: HOME | End: 2024-04-01
Attending: ADVANCED PRACTICE MIDWIFE | Admitting: ADVANCED PRACTICE MIDWIFE
Payer: COMMERCIAL

## 2024-04-01 DIAGNOSIS — N92.6 IRREGULAR PERIODS: ICD-10-CM

## 2024-04-01 PROCEDURE — 76856 US EXAM PELVIC COMPLETE: CPT

## 2024-04-01 PROCEDURE — 76830 TRANSVAGINAL US NON-OB: CPT

## 2024-04-04 ENCOUNTER — OFFICE VISIT (OUTPATIENT)
Dept: OBGYN | Facility: CLINIC | Age: 24
End: 2024-04-04
Payer: COMMERCIAL

## 2024-04-04 VITALS
HEIGHT: 69 IN | DIASTOLIC BLOOD PRESSURE: 78 MMHG | HEART RATE: 96 BPM | WEIGHT: 271 LBS | SYSTOLIC BLOOD PRESSURE: 120 MMHG | BODY MASS INDEX: 40.14 KG/M2 | TEMPERATURE: 97.6 F | RESPIRATION RATE: 18 BRPM

## 2024-04-04 DIAGNOSIS — Z11.3 ROUTINE SCREENING FOR STI (SEXUALLY TRANSMITTED INFECTION): ICD-10-CM

## 2024-04-04 DIAGNOSIS — Z30.430 ENCOUNTER FOR INSERTION OF MIRENA IUD: ICD-10-CM

## 2024-04-04 DIAGNOSIS — N83.201 CYST OF RIGHT OVARY: ICD-10-CM

## 2024-04-04 DIAGNOSIS — Z30.430 ENCOUNTER FOR INSERTION OF INTRAUTERINE CONTRACEPTIVE DEVICE: ICD-10-CM

## 2024-04-04 DIAGNOSIS — N93.9 ABNORMAL UTERINE BLEEDING (AUB): Primary | ICD-10-CM

## 2024-04-04 PROBLEM — Z97.5 IUD (INTRAUTERINE DEVICE) IN PLACE: Status: RESOLVED | Noted: 2020-08-12 | Resolved: 2024-04-04

## 2024-04-04 LAB
HBV SURFACE AG SERPL QL IA: NONREACTIVE
HCG UR QL: NEGATIVE
HCV AB SERPL QL IA: NONREACTIVE
HIV 1+2 AB+HIV1 P24 AG SERPL QL IA: NONREACTIVE
INTERNAL QC OK POCT: NORMAL
POCT KIT EXPIRATION DATE: NORMAL
POCT KIT LOT NUMBER: NORMAL
T PALLIDUM AB SER QL: NONREACTIVE

## 2024-04-04 PROCEDURE — 36415 COLL VENOUS BLD VENIPUNCTURE: CPT | Performed by: STUDENT IN AN ORGANIZED HEALTH CARE EDUCATION/TRAINING PROGRAM

## 2024-04-04 PROCEDURE — 99214 OFFICE O/P EST MOD 30 MIN: CPT | Mod: 25 | Performed by: STUDENT IN AN ORGANIZED HEALTH CARE EDUCATION/TRAINING PROGRAM

## 2024-04-04 PROCEDURE — 87340 HEPATITIS B SURFACE AG IA: CPT | Performed by: STUDENT IN AN ORGANIZED HEALTH CARE EDUCATION/TRAINING PROGRAM

## 2024-04-04 PROCEDURE — 86780 TREPONEMA PALLIDUM: CPT | Performed by: STUDENT IN AN ORGANIZED HEALTH CARE EDUCATION/TRAINING PROGRAM

## 2024-04-04 PROCEDURE — 58300 INSERT INTRAUTERINE DEVICE: CPT | Performed by: STUDENT IN AN ORGANIZED HEALTH CARE EDUCATION/TRAINING PROGRAM

## 2024-04-04 PROCEDURE — 87389 HIV-1 AG W/HIV-1&-2 AB AG IA: CPT | Performed by: STUDENT IN AN ORGANIZED HEALTH CARE EDUCATION/TRAINING PROGRAM

## 2024-04-04 PROCEDURE — 87591 N.GONORRHOEAE DNA AMP PROB: CPT | Performed by: STUDENT IN AN ORGANIZED HEALTH CARE EDUCATION/TRAINING PROGRAM

## 2024-04-04 PROCEDURE — 86803 HEPATITIS C AB TEST: CPT | Performed by: STUDENT IN AN ORGANIZED HEALTH CARE EDUCATION/TRAINING PROGRAM

## 2024-04-04 PROCEDURE — 99459 PELVIC EXAMINATION: CPT | Performed by: STUDENT IN AN ORGANIZED HEALTH CARE EDUCATION/TRAINING PROGRAM

## 2024-04-04 PROCEDURE — 81025 URINE PREGNANCY TEST: CPT | Performed by: STUDENT IN AN ORGANIZED HEALTH CARE EDUCATION/TRAINING PROGRAM

## 2024-04-04 PROCEDURE — 87491 CHLMYD TRACH DNA AMP PROBE: CPT | Performed by: STUDENT IN AN ORGANIZED HEALTH CARE EDUCATION/TRAINING PROGRAM
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
## 2024-04-04 NOTE — NURSING NOTE
"Initial /78 (BP Location: Right arm, Patient Position: Chair, Cuff Size: Adult Large)   Pulse 96   Temp 97.6  F (36.4  C) (Tympanic)   Resp 18   Ht 1.753 m (5' 9\")   Wt 122.9 kg (271 lb)   LMP 03/16/2024   BMI 40.02 kg/m   Estimated body mass index is 40.02 kg/m  as calculated from the following:    Height as of this encounter: 1.753 m (5' 9\").    Weight as of this encounter: 122.9 kg (271 lb). .    "

## 2024-04-05 LAB
C TRACH DNA SPEC QL PROBE+SIG AMP: NEGATIVE
N GONORRHOEA DNA SPEC QL NAA+PROBE: NEGATIVE

## 2024-04-09 ENCOUNTER — VIRTUAL VISIT (OUTPATIENT)
Dept: PSYCHOLOGY | Facility: OTHER | Age: 24
End: 2024-04-09
Payer: COMMERCIAL

## 2024-04-09 DIAGNOSIS — F41.1 GENERALIZED ANXIETY DISORDER: Primary | ICD-10-CM

## 2024-04-09 DIAGNOSIS — F33.9 MAJOR DEPRESSIVE DISORDER, RECURRENT EPISODE WITH ANXIOUS DISTRESS (H): ICD-10-CM

## 2024-04-09 PROCEDURE — 90837 PSYTX W PT 60 MINUTES: CPT | Mod: 95 | Performed by: COUNSELOR

## 2024-04-09 ASSESSMENT — ANXIETY QUESTIONNAIRES
7. FEELING AFRAID AS IF SOMETHING AWFUL MIGHT HAPPEN: SEVERAL DAYS
6. BECOMING EASILY ANNOYED OR IRRITABLE: MORE THAN HALF THE DAYS
7. FEELING AFRAID AS IF SOMETHING AWFUL MIGHT HAPPEN: SEVERAL DAYS
8. IF YOU CHECKED OFF ANY PROBLEMS, HOW DIFFICULT HAVE THESE MADE IT FOR YOU TO DO YOUR WORK, TAKE CARE OF THINGS AT HOME, OR GET ALONG WITH OTHER PEOPLE?: SOMEWHAT DIFFICULT
GAD7 TOTAL SCORE: 7
2. NOT BEING ABLE TO STOP OR CONTROL WORRYING: SEVERAL DAYS
1. FEELING NERVOUS, ANXIOUS, OR ON EDGE: MORE THAN HALF THE DAYS
GAD7 TOTAL SCORE: 7
4. TROUBLE RELAXING: NOT AT ALL
3. WORRYING TOO MUCH ABOUT DIFFERENT THINGS: SEVERAL DAYS
5. BEING SO RESTLESS THAT IT IS HARD TO SIT STILL: NOT AT ALL
IF YOU CHECKED OFF ANY PROBLEMS ON THIS QUESTIONNAIRE, HOW DIFFICULT HAVE THESE PROBLEMS MADE IT FOR YOU TO DO YOUR WORK, TAKE CARE OF THINGS AT HOME, OR GET ALONG WITH OTHER PEOPLE: SOMEWHAT DIFFICULT
GAD7 TOTAL SCORE: 7

## 2024-04-09 NOTE — PROGRESS NOTES
Answers submitted by the patient for this visit:  SHELBY-7 (Submitted on 4/9/2024)  SHELBY 7 TOTAL SCORE: 7          [facility]                                     Progress Note    Patient Name: Batool Louis  Date: 4/9/24         Service Type: Individual      Session Start Time: 2:06pm Session End Time:  3:00pm    Session Length: 54    Session #: 75    Attendees: Client    Service Modality: Video      Provider verified identity through the following two step process.  Patient provided:  Patient is known previously to provider     Telemedicine Visit: The patient's condition can be safely assessed and treated via synchronous audio and visual telemedicine encounter.       Reason for Telemedicine Visit: Services only offered telehealth     Originating Site (Patient Location): Patient's home     Distant Site (Provider Location): Provider Remote Setting- Home Office     Consent:  The patient/guardian has verbally consented to: the potential risks and benefits of telemedicine (video visit) versus in person care; bill my insurance or make self-payment for services provided; and responsibility for payment of non-covered services.        Mode of Communication:  Video Conference via [employer]     As the provider I attest to compliance with applicable laws and regulations related to telemedicine.     DATA  Interactive Complexity: No  Crisis: No        Progress Since Last Session (Related to Symptoms / Goals / Homework):   Symptoms: No change .    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    Has been helping her parents with her grandma who is going to be moving into a senior living unit. She stated this paternal grandmother was just put on a  Bipolar medication and it's helping.   Stated there's been more issues at home with mom and dad- mom having blow ups.      Treatment Objective(s) Addressed in This Session:   use thought-stopping strategy daily to reduce intrusive thoughts       Intervention:   Processed the recent interactions with her mom and her feelings around it. Talked about the guilt she's feeling with regards to her mom and also moving out once she goes to college. Normalized her feelings for the situation she's in with her mom. Talked about how she feels about her grandmother bring diagnosed with Bipolar.          Assessments completed prior to visit:  The following assessments were completed by patient for this visit:  PHQ9:       4/30/2020    11:50 AM 5/22/2020     3:25 PM 7/10/2020     9:57 AM 2/1/2021     8:45 AM 3/21/2022     9:28 AM 8/28/2023     1:57 PM 1/22/2024     3:09 PM   PHQ-9 SCORE   PHQ-9 Total Score MyChart  15 (Moderately severe depression)  4 (Minimal depression)  8 (Mild depression) 10 (Moderate depression)   PHQ-9 Total Score 6 15 14 4 8 8 10     GAD7:       3/12/2020     3:07 PM 5/22/2020     3:24 PM 2/1/2021     8:45 AM 11/14/2022     9:36 AM 8/16/2023     1:08 PM 1/22/2024     3:13 PM 4/9/2024     2:06 PM   SHELBY-7 SCORE   Total Score  16 (severe anxiety) 10 (moderate anxiety) 16 (severe anxiety) 12 (moderate anxiety) 5 (mild anxiety) 7 (mild anxiety)   Total Score 7 16 10 16 12    12 5 7     PROMIS 10-Global Health (all questions and answers displayed):       11/14/2022     9:37 AM 2/13/2023    10:23 AM 2/27/2023    10:07 AM 8/16/2023     1:09 PM 8/30/2023    12:47 PM 12/21/2023     1:05 PM 4/9/2024     2:06 PM   PROMIS 10   In general, would you say your health is: Fair Fair Fair Good Fair Fair Fair   In general, would you say your quality of life is: Good Good Good Good Good Good Good   In general, how would you rate your physical health? Good Fair Fair Fair Fair Fair Fair   In general, how would you rate your mental  health, including your mood and your ability to think? Fair Fair Good Fair Good Fair Fair   In general, how would you rate your satisfaction with your social activities and relationships? Fair Fair Fair Fair Poor Fair Fair   In general, please rate how well you carry out your usual social activities and roles Fair Good Fair Fair Fair Fair Fair   To what extent are you able to carry out your everyday physical activities such as walking, climbing stairs, carrying groceries, or moving a chair? Completely Completely Completely Completely Mostly Mostly Completely   In the past 7 days, how often have you been bothered by emotional problems such as feeling anxious, depressed, or irritable? Often Sometimes Often Often Sometimes Sometimes Sometimes   In the past 7 days, how would you rate your fatigue on average? Moderate Mild Mild Mild Mild Moderate Moderate   In the past 7 days, how would you rate your pain on average, where 0 means no pain, and 10 means worst imaginable pain? 2 3 0 0 0 0 0   In general, would you say your health is: 2 2 2 3 2 2 2   In general, would you say your quality of life is: 3 3 3 3 3 3 3   In general, how would you rate your physical health? 3 2 2 2 2 2 2   In general, how would you rate your mental health, including your mood and your ability to think? 2 2 3 2 3 2 2   In general, how would you rate your satisfaction with your social activities and relationships? 2 2 2 2 1 2 2   In general, please rate how well you carry out your usual social activities and roles. (This includes activities at home, at work and in your community, and responsibilities as a parent, child, spouse, employee, friend, etc.) 2 3 2 2 2 2 2   To what extent are you able to carry out your everyday physical activities such as walking, climbing stairs, carrying groceries, or moving a chair? 5 5 5 5 4 4 5   In the past 7 days, how often have you been bothered by emotional problems such as feeling anxious, depressed, or  irritable? 4 3 4 4 3 3 3   In the past 7 days, how would you rate your fatigue on average? 3 2 2 2 2 3 3   In the past 7 days, how would you rate your pain on average, where 0 means no pain, and 10 means worst imaginable pain? 2 3 0 0 0 0 0   Global Mental Health Score 9 10 10 9    9 10 10    10 10   Global Physical Health Score 15 15 16 16    16 15 14    14 15   PROMIS TOTAL - SUBSCORES 24 25 26 25    25 25 24    24 25         ASSESSMENT: Current Emotional / Mental Status (status of significant symptoms):   Risk status (Self / Other harm or suicidal ideation)   Patient denies current fears or concerns for personal safety.   Patient denies current or recent suicidal ideation or behaviors.   Patient denies current or recent homicidal ideation or behaviors.   Patient denies current or recent self injurious behavior or ideation.   Patient denies other safety concerns.   Patient reports there has been no change in risk factors since their last session.     Patient reports there has been no change in protective factors since their last session.     Recommended that patient call 911 or go to the local ED should there be a change in any of these risk factors.     Appearance:   Appropriate    Eye Contact:   Good    Psychomotor Behavior: Normal    Attitude:   Cooperative    Orientation:   All   Speech    Rate / Production: Normal/ Responsive    Volume:  Normal    Mood:    Normal   Affect:    Appropriate    Thought Content:  Clear    Thought Form:  Coherent  Logical    Insight:    Good      Medication Review:   No current psychiatric medications prescribed     Medication Compliance:   NA     Changes in Health Issues:   None reported     Chemical Use Review:   Substance Use: Chemical use reviewed, no active concerns identified      Tobacco Use: No current tobacco use.      Diagnosis:  1. Generalized anxiety disorder    2. Major depressive disorder, recurrent episode with anxious distress (H24)        Collateral Reports  Completed:   Not Applicable    PLAN: (Patient Tasks / Therapist Tasks / Other)  Client to work on self validation and self compassion. Work on communication skills with mom.         Celsa , [facility]                                                         ______________________________________________________________________    Individual Treatment Plan    Patient's Name: Batool Louis  YOB: 2000    Date of Creation: 3/23/22  Date Treatment Plan Last Reviewed/Revised: 2/9/24    DSM5 Diagnoses: 300.02 (F41.1) Generalized Anxiety Disorder  Psychosocial / Contextual Factors:  living at home, family conflict  PROMIS (reviewed every 90 days):     Referral / Collaboration:  Referral to another professional/service is not indicated at this time..    Anticipated number of session for this episode of care: on-going  Anticipation frequency of session: Every other week  Anticipated Duration of each session: 38-52 minutes  Treatment plan will be reviewed in 90 days or when goals have been changed.       MeasurableTreatment Goal(s) related to diagnosis / functional impairment(s)    MeasurableTreatment Goal(s) related to diagnosis / functional impairment(s)  Goal 1: Client will increase emotion regulation skills/decrease panic attacks    Objective #A (Client Action)    Client will identify 2-4 fears / thoughts that contribute to feeling anxious  use positive self-affirmations daily.  Status: Continued - Date(s): 2/9/24    Intervention(s)  Therapist will teach emotional regulation skills. distress tolerance skills, interpersonal effectiveness skills, emotion regluation skills, mindfulness skills, radical acceptance. Therapist will teach client how to ID body cues for anxiety, anxiety reduction techniques, how to ID triggers for depression and anxiety- decrease reactivity/eliminate, lifestyle changes to reduce depression and anxiety, communication skills, explore cognitive beliefs and help client to  "develop healthy cognitive patterns and beliefs.      Objective #B  Client will use thought-stopping strategy daily to reduce intrusive thoughts.  Status: Continued - Date(s): 2/9/24    Intervention(s)  Therapist will teach emotional regulation skills. distress tolerance skills, interpersonal effectiveness skills, emotion regluation skills, mindfulness skills, radical acceptance. Therapist will teach client how to ID body cues for anxiety, anxiety reduction techniques, how to ID triggers for depression and anxiety- decrease reactivity/eliminate, lifestyle changes to reduce depression and anxiety, communication skills, explore cognitive beliefs and help client to develop healthy cognitive patterns and beliefs.    Objective #C (Client Action)    Status: Continued - Date: 2/9/24    Client will use \"worry time\" each day for 15 minutes of scheduled worry and then defer obsessive or anxious thinking until the next structured worry time.    Intervention(s)  Therapist will teach emotional regulation skills. work through trauma using somatic experiencing techniques to discharge the anxiety.    Goal 2: Client will work on increasing self esteem and self worth     Objective #A (Client Action)    Status: Continued - Date(s): 2/9/24    Client will identify two areas of life that you would like to have improved functioning.    Intervention(s)  Therapist will teach emotional regulation skills. work through trauma using somatic experiencing techniques to discharge the anxiety .      Client has reviewed and agreed to the above plan.      JAGDEEP Camacho    "

## 2024-04-10 ENCOUNTER — MYC MEDICAL ADVICE (OUTPATIENT)
Dept: FAMILY MEDICINE | Facility: CLINIC | Age: 24
End: 2024-04-10
Payer: COMMERCIAL

## 2024-04-10 ENCOUNTER — TRANSCRIBE ORDERS (OUTPATIENT)
Dept: OTHER | Age: 24
End: 2024-04-10

## 2024-04-10 DIAGNOSIS — R79.89 ELEVATED LFTS: Primary | ICD-10-CM

## 2024-04-10 NOTE — TELEPHONE ENCOUNTER
I have not seen her since 2022.  Please check to see who her current PCP is and please have him or her to address this.

## 2024-04-10 NOTE — TELEPHONE ENCOUNTER
Lary Brink, KAREY  You49 minutes ago (12:51 PM)     TF  Please have Dr. Donnelly review and determine if he is willing to place new referrals. Pt was seen by ENT Dr. Olson in 2022 and he placed sleep referral. Is Dr. Donnelly ok giving referral to sleep to get the process started for the pt since they are scheduling months out and [place] location is closing? Otherwise pt can get a referral or just schedule with Dr. Pillai who is scheduling out to August already and he can place a sleep referral at that time. Lary Brink CMA

## 2024-04-19 DIAGNOSIS — F33.2 SEVERE RECURRENT MAJOR DEPRESSION WITHOUT PSYCHOTIC FEATURES (H): ICD-10-CM

## 2024-04-20 RX ORDER — FLUOXETINE 40 MG/1
40 CAPSULE ORAL DAILY
Qty: 90 CAPSULE | Refills: 0 | Status: SHIPPED | OUTPATIENT
Start: 2024-04-20 | End: 2024-06-17

## 2024-04-23 ENCOUNTER — VIRTUAL VISIT (OUTPATIENT)
Dept: PSYCHOLOGY | Facility: OTHER | Age: 24
End: 2024-04-23
Payer: COMMERCIAL

## 2024-04-23 DIAGNOSIS — F41.1 GENERALIZED ANXIETY DISORDER: Primary | ICD-10-CM

## 2024-04-23 DIAGNOSIS — F33.9 MAJOR DEPRESSIVE DISORDER, RECURRENT EPISODE WITH ANXIOUS DISTRESS (H): ICD-10-CM

## 2024-04-23 PROCEDURE — 90834 PSYTX W PT 45 MINUTES: CPT | Mod: 95 | Performed by: COUNSELOR
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
## 2024-05-07 ENCOUNTER — VIRTUAL VISIT (OUTPATIENT)
Dept: PSYCHOLOGY | Facility: OTHER | Age: 24
End: 2024-05-07
Payer: COMMERCIAL

## 2024-05-07 DIAGNOSIS — F33.9 MAJOR DEPRESSIVE DISORDER, RECURRENT EPISODE WITH ANXIOUS DISTRESS (H): ICD-10-CM

## 2024-05-07 DIAGNOSIS — F41.1 GENERALIZED ANXIETY DISORDER: Primary | ICD-10-CM

## 2024-05-07 PROCEDURE — 90834 PSYTX W PT 45 MINUTES: CPT | Mod: 95 | Performed by: COUNSELOR

## 2024-05-07 ASSESSMENT — ANXIETY QUESTIONNAIRES
6. BECOMING EASILY ANNOYED OR IRRITABLE: MORE THAN HALF THE DAYS
4. TROUBLE RELAXING: SEVERAL DAYS
7. FEELING AFRAID AS IF SOMETHING AWFUL MIGHT HAPPEN: SEVERAL DAYS
1. FEELING NERVOUS, ANXIOUS, OR ON EDGE: MORE THAN HALF THE DAYS
7. FEELING AFRAID AS IF SOMETHING AWFUL MIGHT HAPPEN: SEVERAL DAYS
2. NOT BEING ABLE TO STOP OR CONTROL WORRYING: SEVERAL DAYS
8. IF YOU CHECKED OFF ANY PROBLEMS, HOW DIFFICULT HAVE THESE MADE IT FOR YOU TO DO YOUR WORK, TAKE CARE OF THINGS AT HOME, OR GET ALONG WITH OTHER PEOPLE?: SOMEWHAT DIFFICULT
5. BEING SO RESTLESS THAT IT IS HARD TO SIT STILL: NOT AT ALL
IF YOU CHECKED OFF ANY PROBLEMS ON THIS QUESTIONNAIRE, HOW DIFFICULT HAVE THESE PROBLEMS MADE IT FOR YOU TO DO YOUR WORK, TAKE CARE OF THINGS AT HOME, OR GET ALONG WITH OTHER PEOPLE: SOMEWHAT DIFFICULT
GAD7 TOTAL SCORE: 8
3. WORRYING TOO MUCH ABOUT DIFFERENT THINGS: SEVERAL DAYS
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
## 2024-05-28 ENCOUNTER — VIRTUAL VISIT (OUTPATIENT)
Dept: PSYCHOLOGY | Facility: OTHER | Age: 24
End: 2024-05-28
Payer: COMMERCIAL

## 2024-05-28 DIAGNOSIS — F41.1 GENERALIZED ANXIETY DISORDER: Primary | ICD-10-CM

## 2024-05-28 DIAGNOSIS — F33.9 MAJOR DEPRESSIVE DISORDER, RECURRENT EPISODE WITH ANXIOUS DISTRESS (H): ICD-10-CM

## 2024-05-28 PROCEDURE — 90837 PSYTX W PT 60 MINUTES: CPT | Mod: 95 | Performed by: COUNSELOR
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
## 2024-06-07 ENCOUNTER — OFFICE VISIT (OUTPATIENT)
Dept: URGENT CARE | Facility: URGENT CARE | Age: 24
End: 2024-06-07
Payer: COMMERCIAL

## 2024-06-07 VITALS
DIASTOLIC BLOOD PRESSURE: 70 MMHG | WEIGHT: 282 LBS | TEMPERATURE: 102.5 F | SYSTOLIC BLOOD PRESSURE: 118 MMHG | OXYGEN SATURATION: 99 % | RESPIRATION RATE: 16 BRPM | BODY MASS INDEX: 41.64 KG/M2 | HEART RATE: 117 BPM

## 2024-06-07 DIAGNOSIS — R50.9 FEVER, UNSPECIFIED FEVER CAUSE: ICD-10-CM

## 2024-06-07 DIAGNOSIS — R07.0 THROAT PAIN: Primary | ICD-10-CM

## 2024-06-07 LAB
DEPRECATED S PYO AG THROAT QL EIA: NEGATIVE
GROUP A STREP BY PCR: NOT DETECTED

## 2024-06-07 PROCEDURE — 87651 STREP A DNA AMP PROBE: CPT | Performed by: PHYSICIAN ASSISTANT

## 2024-06-07 PROCEDURE — 99213 OFFICE O/P EST LOW 20 MIN: CPT | Performed by: PHYSICIAN ASSISTANT

## 2024-06-07 RX ORDER — IBUPROFEN 200 MG
800 TABLET ORAL ONCE
Status: COMPLETED | OUTPATIENT
Start: 2024-06-07 | End: 2024-06-07

## 2024-06-07 RX ADMIN — Medication 800 MG: at 12:53

## 2024-06-07 ASSESSMENT — ENCOUNTER SYMPTOMS
RESPIRATORY NEGATIVE: 1
COUGH: 0
ENDOCRINE NEGATIVE: 1
PALPITATIONS: 0
CHILLS: 0
CARDIOVASCULAR NEGATIVE: 1
MYALGIAS: 1
LIGHT-HEADEDNESS: 0
BACK PAIN: 0
FEVER: 0
EYES NEGATIVE: 1
JOINT SWELLING: 0
VOMITING: 0
NECK STIFFNESS: 0
WOUND: 0
DIARRHEA: 0
SORE THROAT: 1
NECK PAIN: 0
ARTHRALGIAS: 0
WEAKNESS: 0
NAUSEA: 0
RHINORRHEA: 0
SHORTNESS OF BREATH: 0
HEADACHES: 0
ALLERGIC/IMMUNOLOGIC NEGATIVE: 1
DIZZINESS: 0

## 2024-06-07 NOTE — PROGRESS NOTES
Chief Complaint:     Chief Complaint   Patient presents with    Throat Pain     X 2 days, dizzy, chill, shaky, shivering, voice change, throat pain on right side, had abscess about 2-3 years ago, was seen in [facility] but was negative for strep       Results for orders placed or performed in visit on 06/07/24   Streptococcus A Rapid Screen w/Reflex to PCR - Clinic Collect     Status: Normal    Specimen: Throat; Swab   Result Value Ref Range    Group A Strep antigen Negative Negative       Medical Decision Making:    Vital signs reviewed by Dogn Wilde PA-C  /70   Pulse 117   Temp (!) 102.5  F (39.2  C) (Tympanic)   Resp 16   Wt 127.9 kg (282 lb)   SpO2 99%   BMI 41.64 kg/m      Differential Diagnosis:  URI Adult/Peds:  Sinusitis, Strep pharyngitis, Tonsilitis, Viral pharyngitis, and Viral syndrome        ASSESSMENT    1. Throat pain    2. Fever, unspecified fever cause        PLAN    Patient is in no acute distress.    Temp is 102.5 in clinic today, lung sounds were clear, and O2 sats at 99% on RA.    RST was negative.  We will call with PCR results only if positive.  Patient given 800 Mg Ibuprofen PO in clinic today.  Rest, Push fluids, vaporizer, elevation of head of bed.  Ibuprofen and or Tylenol for any fever or body aches.  If symptoms worsen, recheck immediately otherwise follow up with your PCP in 1 week if symptoms are not improving.  Worrisome symptoms discussed with instructions to go to the ED.  Patient verbalized understanding and agreed with this plan.    Labs:    Results for orders placed or performed in visit on 06/07/24   Streptococcus A Rapid Screen w/Reflex to PCR - Clinic Collect     Status: Normal    Specimen: Throat; Swab   Result Value Ref Range    Group A Strep antigen Negative Negative        Vital signs reviewed by Dong Wilde PA-C  /70   Pulse 117   Temp (!) 102.5  F (39.2  C) (Tympanic)   Resp 16   Wt 127.9 kg (282 lb)   SpO2 99%   BMI 41.64 kg/m      Current  Meds      Current Outpatient Medications:     cetirizine (ZYRTEC) 10 MG tablet, Take 10 mg by mouth daily as needed for allergies seasonal, Disp: , Rfl:     FLUoxetine (PROZAC) 40 MG capsule, TAKE 1 CAPSULE(40 MG) BY MOUTH DAILY, Disp: 90 capsule, Rfl: 0    hydrOXYzine HCl (ATARAX) 25 MG tablet, Take 1-2 tablets (25-50 mg) by mouth nightly as needed for anxiety (and sleep), Disp: 60 tablet, Rfl: 0    levonorgestrel (MIRENA) 52 MG (20 mcg/day) IUD, 1 each by Intrauterine route once, Disp: , Rfl:     levonorgestrel (MIRENA) 52 MG (20 mcg/day) IUD, 1 each by Intrauterine route once, Disp: , Rfl:   No current facility-administered medications for this visit.      Respiratory History    occasional episodes of bronchitis      SUBJECTIVE    HPI: Batool Louis is an 23 year old female who presents with aching and sore throat.  Symptoms began 2  days ago and has gradually worsening.  There is no shortness of breath, wheezing, and chest pain.  Patient is eating and drinking well.  No fever, nausea, vomiting, or diarrhea.    Patient denies any recent travel or exposure to known COVID positive tested individual.      ROS:     Review of Systems   Constitutional:  Negative for chills and fever.   HENT:  Positive for sore throat. Negative for congestion, ear pain and rhinorrhea.    Eyes: Negative.    Respiratory: Negative.  Negative for cough and shortness of breath.    Cardiovascular: Negative.  Negative for chest pain and palpitations.   Gastrointestinal:  Negative for diarrhea, nausea and vomiting.   Endocrine: Negative.    Genitourinary: Negative.    Musculoskeletal:  Positive for myalgias. Negative for arthralgias, back pain, joint swelling, neck pain and neck stiffness.   Skin: Negative.  Negative for rash and wound.   Allergic/Immunologic: Negative.  Negative for immunocompromised state.   Neurological:  Negative for dizziness, weakness, light-headedness and headaches.         Family History   Family History    Problem Relation Age of Onset    Anemia Mother         unknown reason    No Known Problems Father     Asthma Sister     Back Pain Maternal Grandmother     Alzheimer Disease Maternal Grandfather     No Known Problems Paternal Grandmother     Cancer Paternal Grandfather         pancreatic    Skin Cancer Paternal Great-Grandfather         Problem history  Patient Active Problem List   Diagnosis    Allergic rhinitis    Chronic tonsillitis    Shellfish allergy    Suicidal behavior    Encounter for insertion of mirena IUD        Allergies  Allergies   Allergen Reactions    Shellfish-Derived Products Nausea and Vomiting     Possible allergy- pt unsure.     Sulfamethoxazole-Trimethoprim Hives    Seasonal Allergies Other (See Comments)     Nasal congestion        Social History  Social History     Socioeconomic History    Marital status: Single     Spouse name: Not on file    Number of children: Not on file    Years of education: Not on file    Highest education level: Not on file   Occupational History    Occupation: student     Comment: [employer]    Occupation:    Tobacco Use    Smoking status: Never    Smokeless tobacco: Never   Vaping Use    Vaping status: Never Used   Substance and Sexual Activity    Alcohol use: Yes     Comment: socially    Drug use: No    Sexual activity: Not Currently     Partners: Male     Comment: has had both sex partners, used condoms with last partner   Other Topics Concern    Parent/sibling w/ CABG, MI or angioplasty before 65F 55M? Not Asked   Social History Narrative    Not on file     Social Determinants of Health     Financial Resource Strain: Low Risk  (1/22/2024)    Financial Resource Strain     Within the past 12 months, have you or your family members you live with been unable to get utilities (heat, electricity) when it was really needed?: No   Food Insecurity: Low Risk  (1/22/2024)    Food Insecurity     Within the past 12 months, did you worry that  your food would run out before you got money to buy more?: No     Within the past 12 months, did the food you bought just not last and you didn t have money to get more?: No   Transportation Needs: Low Risk  (1/22/2024)    Transportation Needs     Within the past 12 months, has lack of transportation kept you from medical appointments, getting your medicines, non-medical meetings or appointments, work, or from getting things that you need?: No   Physical Activity: Sufficiently Active (9/17/2019)    Exercise Vital Sign     Days of Exercise per Week: 7 days     Minutes of Exercise per Session: 30 min   Stress: Not on file   Social Connections: Not on file   Interpersonal Safety: Not on file   Housing Stability: Low Risk  (1/22/2024)    Housing Stability     Do you have housing? : Yes     Are you worried about losing your housing?: No        OBJECTIVE     Vital signs reviewed by Dong Wilde PA-C  /70   Pulse 117   Temp (!) 102.5  F (39.2  C) (Tympanic)   Resp 16   Wt 127.9 kg (282 lb)   SpO2 99%   BMI 41.64 kg/m       Physical Exam  Vitals and nursing note reviewed.   Constitutional:       General: She is not in acute distress.     Appearance: She is well-developed. She is not ill-appearing, toxic-appearing or diaphoretic.   HENT:      Head: Normocephalic and atraumatic.      Right Ear: Hearing, tympanic membrane, ear canal and external ear normal. Tympanic membrane is not perforated, erythematous, retracted or bulging.      Left Ear: Hearing, tympanic membrane, ear canal and external ear normal. Tympanic membrane is not perforated, erythematous, retracted or bulging.      Nose: Congestion present. No mucosal edema or rhinorrhea.      Right Sinus: No maxillary sinus tenderness or frontal sinus tenderness.      Left Sinus: No maxillary sinus tenderness or frontal sinus tenderness.      Mouth/Throat:      Pharynx: Posterior oropharyngeal erythema present. No pharyngeal swelling, oropharyngeal exudate or  uvula swelling.      Tonsils: No tonsillar exudate or tonsillar abscesses. 0 on the right. 0 on the left.   Eyes:      General:         Right eye: No discharge.         Left eye: No discharge.      Pupils: Pupils are equal, round, and reactive to light.   Cardiovascular:      Rate and Rhythm: Normal rate and regular rhythm.      Heart sounds: Normal heart sounds. No murmur heard.     No friction rub. No gallop.   Pulmonary:      Effort: Pulmonary effort is normal. No respiratory distress.      Breath sounds: Normal breath sounds. No decreased breath sounds, wheezing, rhonchi or rales.   Chest:      Chest wall: No tenderness.   Abdominal:      General: Bowel sounds are normal. There is no distension.      Palpations: Abdomen is soft. There is no mass.      Tenderness: There is no abdominal tenderness. There is no guarding.   Musculoskeletal:      Cervical back: Normal range of motion and neck supple.   Lymphadenopathy:      Head:      Right side of head: No submental, submandibular, tonsillar, preauricular or posterior auricular adenopathy.      Left side of head: No submental, submandibular, tonsillar, preauricular or posterior auricular adenopathy.      Cervical: No cervical adenopathy.      Right cervical: No superficial or posterior cervical adenopathy.     Left cervical: No superficial or posterior cervical adenopathy.   Skin:     General: Skin is warm and dry.      Findings: No rash.   Neurological:      Mental Status: She is alert and oriented to person, place, and time.      Cranial Nerves: No cranial nerve deficit.      Deep Tendon Reflexes: Reflexes are normal and symmetric.   Psychiatric:         Behavior: Behavior normal. Behavior is cooperative.         Thought Content: Thought content normal.         Judgment: Judgment normal.           Dong Wilde PA-C  6/7/2024, 12:45 PM

## 2024-06-17 ENCOUNTER — VIRTUAL VISIT (OUTPATIENT)
Dept: FAMILY MEDICINE | Facility: CLINIC | Age: 24
End: 2024-06-17
Payer: COMMERCIAL

## 2024-06-17 DIAGNOSIS — F33.1 MODERATE EPISODE OF RECURRENT MAJOR DEPRESSIVE DISORDER (H): ICD-10-CM

## 2024-06-17 DIAGNOSIS — J35.01 CHRONIC TONSILLITIS: Primary | ICD-10-CM

## 2024-06-17 DIAGNOSIS — R06.83 LOUD SNORING: ICD-10-CM

## 2024-06-17 DIAGNOSIS — J35.1 HYPERTROPHY OF TONSILS: ICD-10-CM

## 2024-06-17 DIAGNOSIS — F41.1 GENERALIZED ANXIETY DISORDER: ICD-10-CM

## 2024-06-17 PROBLEM — F50.89 OTHER SPECIFIED EATING DISORDER: Status: ACTIVE | Noted: 2023-09-11

## 2024-06-17 PROBLEM — R79.89 ELEVATED LFTS: Status: ACTIVE | Noted: 2023-10-02

## 2024-06-17 PROBLEM — E88.01 AAT (ALPHA-1-ANTITRYPSIN) DEFICIENCY (H): Status: ACTIVE | Noted: 2023-12-14

## 2024-06-17 PROBLEM — R45.89 SUICIDAL BEHAVIOR: Status: RESOLVED | Noted: 2023-07-04 | Resolved: 2024-06-17

## 2024-06-17 PROCEDURE — 99214 OFFICE O/P EST MOD 30 MIN: CPT | Mod: 95 | Performed by: FAMILY MEDICINE

## 2024-06-17 RX ORDER — FLUOXETINE 40 MG/1
40 CAPSULE ORAL DAILY
Qty: 90 CAPSULE | Refills: 1 | Status: SHIPPED | OUTPATIENT
Start: 2024-06-17

## 2024-06-17 RX ORDER — HYDROXYZINE HYDROCHLORIDE 25 MG/1
25-50 TABLET, FILM COATED ORAL
Qty: 180 TABLET | Refills: 2 | Status: SHIPPED | OUTPATIENT
Start: 2024-06-17

## 2024-06-17 ASSESSMENT — PATIENT HEALTH QUESTIONNAIRE - PHQ9
SUM OF ALL RESPONSES TO PHQ QUESTIONS 1-9: 10
SUM OF ALL RESPONSES TO PHQ QUESTIONS 1-9: 10
10. IF YOU CHECKED OFF ANY PROBLEMS, HOW DIFFICULT HAVE THESE PROBLEMS MADE IT FOR YOU TO DO YOUR WORK, TAKE CARE OF THINGS AT HOME, OR GET ALONG WITH OTHER PEOPLE: SOMEWHAT DIFFICULT

## 2024-06-17 NOTE — PROGRESS NOTES
Dodie is a 23 year old who is being evaluated via a billable video visit.    How would you like to obtain your AVS? MyChart  If the video visit is dropped, the invitation should be resent by: Text to cell phone: [phone]  Will anyone else be joining your video visit? No      Assessment & Plan     (J35.01) Chronic tonsillitis  (primary encounter diagnosis  (J35.1) Hypertrophy of tonsils  Comment: Known to have tonsillar hypertrophy with history of chronic tonsillitis.  Was seen in the right evaluated by ENT several years ago, recommended tonsillectomy.  Procedure however was canceled due to COVID.  Although she has not been having much of the tonsillitis something, she displayed significant sleep apnea symptoms.  Unable to examine due to virtual visit.  As per her request which I agree, I referred her to ENT for further regulation and treatment option.    Plan: Adult ENT  Referral            (R06.83) Loud snoring  Comment: Known to have tonsillar hypertrophy as above.  She also displayed significant symptoms for uncontrolled sleep apnea with snoring, feeling unrested in morning, and excessive daytime fatigue/sleepiness.  There is also a strong family history sleep apnea to both of her parents and her sister, on CPAP who displayed similar symptoms.  Not checking her current weight as she has been in treatment for eating disorder but her recent documented weight showed her BMI of 42.    Will refer her to ENT for further evaluation and treatment of tonsil hypertrophy as above.  Also emphasized on the importance of of healthy diet and weight management.  Will also refer her to sleep medicine for further evaluation.      Plan: Adult Sleep Eval & Management          Referral            (F33.1) Moderate episode of recurrent major depressive disorder (H)  (F41.1) Generalized anxiety disorder  Comment: Known to have depression and anxiety with history of suicidal behavior.  Stated overall she doing well  "with the Prozac and hydroxyzine.  Not seeing psychiatrist.  She seems counselor through a treatment program for eating disorder.  No suicidal or homicidal ideation.  No hallucination.  No drugs or alcohol.  She feels that her current dose of Prozac is effective.  She takes hydroxyzine rarely.    Her PHQ-9 score of 10 today.  Her SHELBY-7 score about a month ago was 8.    Discussed with her about treatment options.  She preferred to stay at the same dose for the Prozac and hydroxyzine which were refilled today.  She continues declining counseling or seeing psychiatry.  Symptoms that need to be seen or call in discussed.  Will continue to monitor closely.  She was instructed to call 911 or go to the emergency room if develops suicidal thoughts, plan or intention.    Plan: hydrOXYzine HCl (ATARAX) 25 MG tablet,         FLUoxetine (PROZAC) 40 MG capsule              BMI  Estimated body mass index is 41.64 kg/m  as calculated from the following:    Height as of 4/4/24: 1.753 m (5' 9\").    Weight as of 6/7/24: 127.9 kg (282 lb).   Weight management plan: Discussed healthy diet and exercise guidelines    Depression Screening Follow Up        Follow Up Actions Taken  Patient declined referral.       Work on weight loss  Regular exercise    Channing Stoll is a 23 year old, presenting for the following health issues:  Referral (ENT and other referrals)      6/17/2024     2:16 PM   Additional Questions   Roomed by Mary Lou HORNE     History of Present Illness       Reason for visit:  Need Referrals    She eats 2-3 servings of fruits and vegetables daily.She consumes 1 sweetened beverage(s) daily.She exercises with enough effort to increase her heart rate 9 or less minutes per day.  She exercises with enough effort to increase her heart rate 3 or less days per week.   She is taking medications regularly.     Batool was seen today for follow-up on tonsillar hypertrophy and sleep apnea.  Stated that she was seen by an ENT doctor " several years ago for chronic tonsillitis with tonsillar hypertrophy.  She was scheduled for a tonsillectomy but had to cancel due to COVID pandemic.  Has not follow-up with since then.  She also was seeing sleep medicine specialist who also recommended ENT evaluation.  She would like to see sleep medicine again for sleep study.  Stated that she snores with significant daytime fatigue and sleepiness.  More than not that she feels not rested in the morning despite of many hours of sleeping.  She believes that she wakes self up at times from loud snoring, stop breathing and/or air gasping.  She is also morbidly obese although she has not been monitoring her weight recently due to being in treatment for eating disorder.  Stated that both her parents and her sister are on CPAP for sleep apnea and they displayed similar symptoms.  She is interested to be referred to sleep medicine as well.    She is known to have depression and anxiety for years with history of suicidal behavior.  Currently in treatment for eating disorder.  She has been on Prozac 40 mg with hydroxyzine as needed which have been effective.  No side effect.  She feels that her depression and anxiety are controlled/tolerable.  No suicidal or homicidal ideation.  No hallucination.  No drugs or alcohol.  Needs refill of these medication.  Not in counseling by choice.  Not interested to see psychiatrist.  Overall she feels safe and comfortable of  taking care of herself.        Review of Systems  Constitutional, HEENT, cardiovascular, pulmonary, gi and gu systems are negative, except as otherwise noted.      Objective           Vitals:  No vitals were obtained today due to virtual visit.    Physical Exam   GENERAL: alert and no distress  EYES: Eyes grossly normal to inspection.  No discharge or erythema, or obvious scleral/conjunctival abnormalities.  RESP: No audible wheeze, cough, or visible cyanosis.    NEURO: Cranial nerves grossly intact.  Mentation and  speech appropriate for age.  PSYCH: Appropriate affect, tone, and pace of words    No results found for any visits on 06/17/24.      Video-Visit Details    Type of service:  Video Visit   Originating Location (pt. Location): Other treatment center    Distant Location (provider location):  On-site  Platform used for Video Visit: Vee  Signed Electronically by: Nancy Mcdowell Mai, MD

## 2024-06-27 ENCOUNTER — OFFICE VISIT (OUTPATIENT)
Dept: URGENT CARE | Facility: URGENT CARE | Age: 24
End: 2024-06-27
Payer: COMMERCIAL

## 2024-06-27 VITALS
WEIGHT: 280 LBS | BODY MASS INDEX: 41.35 KG/M2 | RESPIRATION RATE: 18 BRPM | HEART RATE: 89 BPM | DIASTOLIC BLOOD PRESSURE: 88 MMHG | TEMPERATURE: 97.8 F | SYSTOLIC BLOOD PRESSURE: 133 MMHG | OXYGEN SATURATION: 97 %

## 2024-06-27 DIAGNOSIS — B37.31 VAGINAL CANDIDIASIS: ICD-10-CM

## 2024-06-27 DIAGNOSIS — N76.0 BACTERIAL VAGINOSIS: Primary | ICD-10-CM

## 2024-06-27 DIAGNOSIS — B96.89 BACTERIAL VAGINOSIS: Primary | ICD-10-CM

## 2024-06-27 LAB
ALBUMIN UR-MCNC: NEGATIVE MG/DL
APPEARANCE UR: CLEAR
BILIRUB UR QL STRIP: NEGATIVE
CLUE CELLS: PRESENT
COLOR UR AUTO: YELLOW
GLUCOSE UR STRIP-MCNC: NEGATIVE MG/DL
HGB UR QL STRIP: ABNORMAL
KETONES UR STRIP-MCNC: NEGATIVE MG/DL
LEUKOCYTE ESTERASE UR QL STRIP: ABNORMAL
NITRATE UR QL: NEGATIVE
PH UR STRIP: 6 [PH] (ref 5–7)
RBC #/AREA URNS AUTO: ABNORMAL /HPF
SP GR UR STRIP: 1.02 (ref 1–1.03)
SQUAMOUS #/AREA URNS AUTO: ABNORMAL /LPF
TRICHOMONAS, WET PREP: ABNORMAL
UROBILINOGEN UR STRIP-ACNC: 0.2 E.U./DL
WBC #/AREA URNS AUTO: ABNORMAL /HPF
WBC'S/HIGH POWER FIELD, WET PREP: ABNORMAL
YEAST, WET PREP: PRESENT

## 2024-06-27 PROCEDURE — 99214 OFFICE O/P EST MOD 30 MIN: CPT

## 2024-06-27 PROCEDURE — 81001 URINALYSIS AUTO W/SCOPE: CPT

## 2024-06-27 PROCEDURE — 87210 SMEAR WET MOUNT SALINE/INK: CPT

## 2024-06-27 RX ORDER — FLUCONAZOLE 150 MG/1
TABLET ORAL
Qty: 2 TABLET | Refills: 0 | Status: ON HOLD | OUTPATIENT
Start: 2024-06-27 | End: 2024-10-05

## 2024-06-27 RX ORDER — METRONIDAZOLE 500 MG/1
500 TABLET ORAL 2 TIMES DAILY
Qty: 14 TABLET | Refills: 0 | Status: SHIPPED | OUTPATIENT
Start: 2024-06-27 | End: 2024-07-04

## 2024-06-27 NOTE — PATIENT INSTRUCTIONS
Diagnosis: vaginitis bacterial vaginosis and yeast}   Plan:   BV: antibiotic medications  Metronidazole /clindamycin  Limit alcohol intake while on medication at it can a disulfiram-like reaction  Which is flushing, sweating, elevated heart rate, palpitations, nausea, and vomiting }  Yeast: antifungal medications  Fluconazole single dose   Complicated once a day for three days (more than 4 episodes meka year}  Follow up with your pcp for persistent or recurrence and to discuss maintained /prevention therapy   No need to treat sexual partners   Prevention: avoid douching and irritants such as strong soaps   Avoid bubble baths  Can try to add probiotics to diet     Monitor for:   You have a fever of 101F  or higher, or as directed by your provider.  Your symptoms get worse, or they don't go away within a few days of starting treatment.  You have new pain in the lower belly or pelvic region.  You or any of your sex partners have new symptoms, such as a rash, joint pain, or sores.        Bacterial Vaginosis  You have a vaginal infection called bacterial vaginosis (BV).  Both good and bad bacteria are present in a healthy vagina.   Bacterial vaginosis (BV) occurs when these bacteria get out of balance.   The numbers of good bacteria decrease.   This allows the numbers of bad bacteria to increase and cause BV. In most cases, BV is not a serious problem.  BV is linked with sexual activity, but it's not a sexually transmitted infection (STI/D)   The cause of BV is not clear. Douching may lead to it. Having sex with a new partner or more than 1 partner makes it more likely.  Symptoms of BV vary for each woman. Some women have few symptoms or none at all. If symptoms are present, they can include:  Thin, milky white or gray or sometimes green discharge  Unpleasant  fishy  odor  Irritation, itching, and burning at opening of vagina. This may mean it's caused by more than one type of bacteria.   Burning or irritation with sex or  urination. This may mean it's caused by more than one type of bacteria.  Risks if not treated   Increased risk for  delivery if you're pregnant  Increased risk for complications to your reproductive organs  Possible increased risk for pelvic inflammatory disease (PID)}     Candidiasis _ yeast infection  You have a Candida vaginal infection.  This is also known as a yeast infection.   It's most often caused by a type of yeast (fungus) called Candida.   Candida are normally found in the vagina. But if they increase in number, this can lead to infection and cause symptoms.  Symptoms of a yeast infection can include:  Clumpy or thin, white discharge, which may look like cottage cheese  Itching or burning  Burning with urination  The cause is not fully understood but certain factors can make a yeast infection more likely.   These can include:  Taking certain medicines, such as antibiotics or birth control pills  Pregnancy  Diabetes  Weak immune system}

## 2024-06-27 NOTE — PROGRESS NOTES
"URGENT CARE  Assessment & Plan   Assessment:   Batool Louis is a 23 year old female who's clinical presentation today is consistent with:   1. Bacterial vaginosis- recurrent   - UA Macroscopic with reflex to Microscopic and Culture  - Wet preparation  - metroNIDAZOLE (FLAGYL) 500 MG tablet;   2. Vaginal candidiasis  - fluconazole (DIFLUCAN) 150 MG tablet  Plan:  Will treat patient for recurrent bacterial vaginosis and vaginal candidiasis} today; prescription sent and side effects of medication reviewed.     Additionally we discussed if symptoms do not improve after starting today's treatment to follow up in 7-10 days, sooner if symptoms worsen, return precautions given    No alarm signs or symptoms present   Differential Diagnoses for this patient's chief complaint that I considered include:  UTI,   Vulvovaginitis, atrophic vaginitis, contact vs allergic vaginitis vs inflammatory or irritative  cervicitis, lichen planus, Malignancy (vaginal, ovarian, cervical)     Patient is} agreeable to treatment plan and state they will follow-up if symptoms do not improve and/or if symptoms worsen   see patient's AVS 'monitor for' section for specific patient instructions given and discussed regarding what to watch for and when to follow up    RONNY Crowell [facility] [place]      ______________________________________________________________________      Subjective     HPI: Batool Louis \"Dodie\"} is a 23 year old  female who presents today for evaluation the following concerns:   Patient presents today with complains of: increased vaginal discharge and Vaginal itching, along with irritative voiding symptoms   Patient reports these symptoms started a few days ago   Patient endorses denies} vaginal skin/vulvar redness and irritation    Patient denies any abnormal vaginal bleeding  Patient endorses a history of both bv and yeast    Denies any significant pelvic pain, flank pain or " fevers.    Review of Systems:  Pertinent review of systems as reflected in HPI, otherwise negative.     Objective    Physical Exam:  Vitals:    06/27/24 1555   BP: 133/88   Pulse: 89   Resp: 18   Temp: 97.8  F (36.6  C)   TempSrc: Tympanic   SpO2: 97%   Weight: 127 kg (280 lb)      General: Alert and oriented, no acute distress, afebrile, normotensive  Psy/mental status: Nonanxious, cooperative  SKIN: Intact, no open areas  ABDOMEN:  soft, non-tender, non-distended    No flank pain or CVA tenderness   Pelvic/ :   Deferred    LABS:   Results for orders placed or performed in visit on 06/27/24   UA Macroscopic with reflex to Microscopic and Culture     Status: Abnormal    Specimen: Urine, Clean Catch   Result Value Ref Range    Color Urine Yellow Colorless, Straw, Light Yellow, Yellow    Appearance Urine Clear Clear    Glucose Urine Negative Negative mg/dL    Bilirubin Urine Negative Negative    Ketones Urine Negative Negative mg/dL    Specific Gravity Urine 1.025 1.003 - 1.035    Blood Urine Large (A) Negative    pH Urine 6.0 5.0 - 7.0    Protein Albumin Urine Negative Negative mg/dL    Urobilinogen Urine 0.2 0.2, 1.0 E.U./dL    Nitrite Urine Negative Negative    Leukocyte Esterase Urine Trace (A) Negative   UA Microscopic with Reflex to Culture     Status: Abnormal   Result Value Ref Range    RBC Urine 0-2 0-2 /HPF /HPF    WBC Urine 0-5 0-5 /HPF /HPF    Squamous Epithelials Urine Few (A) None Seen /LPF    Narrative    Urine Culture not indicated   Wet preparation     Status: Abnormal    Specimen: Vagina; Swab   Result Value Ref Range    Trichomonas Absent Absent    Yeast Present (A) Absent    Clue Cells Present (A) Absent    WBCs/high power field 3+ (A) None        ______________________________________________________________________    I explained my diagnostic considerations and recommendations to the patient, who voiced understanding and agreement with the treatment plan.   All questions were answered.   We  discussed potential side effects, risks and benefits of any prescribed or recommended therapies, as well as expectations for response to treatments.  Please see AVS for any patient instructions & handouts given.   Patient was advised to contact the Nurse Care Line, their Primary Care provider, Urgent Care, or the Emergency Department if there are new or worsening symptoms, or call 911 for emergencies.

## 2024-07-09 ENCOUNTER — VIRTUAL VISIT (OUTPATIENT)
Dept: PSYCHOLOGY | Facility: OTHER | Age: 24
End: 2024-07-09
Payer: COMMERCIAL

## 2024-07-09 DIAGNOSIS — F41.1 GENERALIZED ANXIETY DISORDER: Primary | ICD-10-CM

## 2024-07-09 DIAGNOSIS — F50.819 BINGE EATING DISORDER: ICD-10-CM

## 2024-07-09 DIAGNOSIS — F33.9 MAJOR DEPRESSIVE DISORDER, RECURRENT EPISODE WITH ANXIOUS DISTRESS (H): ICD-10-CM

## 2024-07-09 PROCEDURE — 90834 PSYTX W PT 45 MINUTES: CPT | Mod: 95 | Performed by: COUNSELOR

## 2024-07-09 NOTE — PROGRESS NOTES
[facility]                                     Progress Note    Patient Name: Batool Louis  Date: 7/9/24         Service Type: Individual      Session Start Time: 9:05am Session End Time:  9:55am    Session Length: 50    Session #: 79    Attendees: Client    Service Modality: Video      Provider verified identity through the following two step process.  Patient provided:  Patient is known previously to provider     Telemedicine Visit: The patient's condition can be safely assessed and treated via synchronous audio and visual telemedicine encounter.       Reason for Telemedicine Visit: Services only offered telehealth     Originating Site (Patient Location): Patient's home     Distant Site (Provider Location): Provider Remote Setting- Home Office     Consent:  The patient/guardian has verbally consented to: the potential risks and benefits of telemedicine (video visit) versus in person care; bill my insurance or make self-payment for services provided; and responsibility for payment of non-covered services.        Mode of Communication:  Video Conference via [employer]     As the provider I attest to compliance with applicable laws and regulations related to telemedicine.     DATA  Interactive Complexity: No  Crisis: No        Progress Since Last Session (Related to Symptoms / Goals / Homework):   Symptoms: No change .    Homework: Achieved / completed to satisfaction      Episode of Care Goals: Satisfactory progress - ACTION (Actively working towards change); Intervened by reinforcing change plan / affirming steps taken     Current / Ongoing Stressors and Concerns:  Client stated there was a family reunion in [facility] recently for her mom's side of the family.   Was going on dates but stopped because she was feeling like it wasn't a good fit.   Feeling a little stuck with life plans because of not getting into the schools she applied for. Has considered going to [employer] to finish  her 4 year.  Her uncle offered their extra room in [place], [place] if she wanted to live and work out there for a little bit.        Treatment Objective(s) Addressed in This Session:   use thought-stopping strategy daily to reduce intrusive thoughts       Intervention:   Talked through if going to live with her uncle in [place] would be a helpful move as the client stated she feels really stuck currently. Talked about her considering [employer] and finishing her degree or not. Discussed what success means to her and if that involves having a 4 year degree or not. Client is continuing to work on her record and loving doing that. Talked through the judgements she's gotten and felt from family about her dating.         Goal for this week is to  something in her room.     Assessments completed prior to visit:  The following assessments were completed by patient for this visit:  PHQ9:       5/22/2020     3:25 PM 7/10/2020     9:57 AM 2/1/2021     8:45 AM 3/21/2022     9:28 AM 8/28/2023     1:57 PM 1/22/2024     3:09 PM 6/17/2024     2:21 PM   PHQ-9 SCORE   PHQ-9 Total Score MyChart 15 (Moderately severe depression)  4 (Minimal depression)  8 (Mild depression) 10 (Moderate depression) 10 (Moderate depression)   PHQ-9 Total Score 15 14 4 8 8 10 10     GAD7:       5/22/2020     3:24 PM 2/1/2021     8:45 AM 11/14/2022     9:36 AM 8/16/2023     1:08 PM 1/22/2024     3:13 PM 4/9/2024     2:06 PM 5/7/2024     1:59 PM   SHELBY-7 SCORE   Total Score 16 (severe anxiety) 10 (moderate anxiety) 16 (severe anxiety) 12 (moderate anxiety) 5 (mild anxiety) 7 (mild anxiety) 8 (mild anxiety)   Total Score 16 10 16 12    12 5 7 8     PROMIS 10-Global Health (all questions and answers displayed):       2/13/2023    10:23 AM 2/27/2023    10:07 AM 8/16/2023     1:09 PM 8/30/2023    12:47 PM 12/21/2023     1:05 PM 4/9/2024     2:06 PM 5/7/2024     2:00 PM   PROMIS 10   In general, would you say your health is: Fair Fair Good Fair Fair Fair Fair    In general, would you say your quality of life is: Good Good Good Good Good Good Good   In general, how would you rate your physical health? Fair Fair Fair Fair Fair Fair Fair   In general, how would you rate your mental health, including your mood and your ability to think? Fair Good Fair Good Fair Fair Fair   In general, how would you rate your satisfaction with your social activities and relationships? Fair Fair Fair Poor Fair Fair Good   In general, please rate how well you carry out your usual social activities and roles Good Fair Fair Fair Fair Fair Fair   To what extent are you able to carry out your everyday physical activities such as walking, climbing stairs, carrying groceries, or moving a chair? Completely Completely Completely Mostly Mostly Completely Completely   In the past 7 days, how often have you been bothered by emotional problems such as feeling anxious, depressed, or irritable? Sometimes Often Often Sometimes Sometimes Sometimes Often   In the past 7 days, how would you rate your fatigue on average? Mild Mild Mild Mild Moderate Moderate Moderate   In the past 7 days, how would you rate your pain on average, where 0 means no pain, and 10 means worst imaginable pain? 3 0 0 0 0 0 0   In general, would you say your health is: 2 2 3 2 2 2 2   In general, would you say your quality of life is: 3 3 3 3 3 3 3   In general, how would you rate your physical health? 2 2 2 2 2 2 2   In general, how would you rate your mental health, including your mood and your ability to think? 2 3 2 3 2 2 2   In general, how would you rate your satisfaction with your social activities and relationships? 2 2 2 1 2 2 3   In general, please rate how well you carry out your usual social activities and roles. (This includes activities at home, at work and in your community, and responsibilities as a parent, child, spouse, employee, friend, etc.) 3 2 2 2 2 2 2   To what extent are you able to carry out your everyday physical  activities such as walking, climbing stairs, carrying groceries, or moving a chair? 5 5 5 4 4 5 5   In the past 7 days, how often have you been bothered by emotional problems such as feeling anxious, depressed, or irritable? 3 4 4 3 3 3 4   In the past 7 days, how would you rate your fatigue on average? 2 2 2 2 3 3 3   In the past 7 days, how would you rate your pain on average, where 0 means no pain, and 10 means worst imaginable pain? 3 0 0 0 0 0 0   Global Mental Health Score 10 10 9    9 10 10    10 10 10   Global Physical Health Score 15 16 16    16 15 14    14 15 15   PROMIS TOTAL - SUBSCORES 25 26 25    25 25 24    24 25 25         ASSESSMENT: Current Emotional / Mental Status (status of significant symptoms):   Risk status (Self / Other harm or suicidal ideation)   Patient denies current fears or concerns for personal safety.   Patient denies current or recent suicidal ideation or behaviors.   Patient denies current or recent homicidal ideation or behaviors.   Patient denies current or recent self injurious behavior or ideation.   Patient denies other safety concerns.   Patient reports there has been no change in risk factors since their last session.     Patient reports there has been no change in protective factors since their last session.     Recommended that patient call 911 or go to the local ED should there be a change in any of these risk factors.     Appearance:   Appropriate    Eye Contact:   Good    Psychomotor Behavior: Normal    Attitude:   Cooperative    Orientation:   All   Speech    Rate / Production: Normal/ Responsive    Volume:  Normal    Mood:    Normal   Affect:    Appropriate    Thought Content:  Clear    Thought Form:  Coherent  Logical    Insight:    Good      Medication Review:   No current psychiatric medications prescribed     Medication Compliance:   NA     Changes in Health Issues:   None reported     Chemical Use Review:   Substance Use: Chemical use reviewed, no active concerns  identified      Tobacco Use: No current tobacco use.      Diagnosis:  1. Generalized anxiety disorder    2. Major depressive disorder, recurrent episode with anxious distress (H24)    3. Binge eating disorder        Collateral Reports Completed:   Not Applicable    PLAN: (Patient Tasks / Therapist Tasks / Other)  Client to work on communication skills and boundary setting with family.         Celsa , [facility]                                                         ______________________________________________________________________    Individual Treatment Plan    Patient's Name: Batool Louis  YOB: 2000    Date of Creation: 3/23/22  Date Treatment Plan Last Reviewed/Revised: 5/7/24    DSM5 Diagnoses: 300.02 (F41.1) Generalized Anxiety Disorder  Psychosocial / Contextual Factors:  living at home, family conflict  PROMIS (reviewed every 90 days):     Referral / Collaboration:  Referral to another professional/service is not indicated at this time..    Anticipated number of session for this episode of care: on-going  Anticipation frequency of session: Every other week  Anticipated Duration of each session: 38-52 minutes  Treatment plan will be reviewed in 90 days or when goals have been changed.       MeasurableTreatment Goal(s) related to diagnosis / functional impairment(s)    MeasurableTreatment Goal(s) related to diagnosis / functional impairment(s)  Goal 1: Client will increase emotion regulation skills/decrease panic attacks    Objective #A (Client Action)    Client will identify 2-4 fears / thoughts that contribute to feeling anxious  use positive self-affirmations daily.  Status: Continued - Date(s): 5/7/24    Intervention(s)  Therapist will teach emotional regulation skills. distress tolerance skills, interpersonal effectiveness skills, emotion regluation skills, mindfulness skills, radical acceptance. Therapist will teach client how to ID body cues for anxiety, anxiety reduction  "techniques, how to ID triggers for depression and anxiety- decrease reactivity/eliminate, lifestyle changes to reduce depression and anxiety, communication skills, explore cognitive beliefs and help client to develop healthy cognitive patterns and beliefs.      Objective #B  Client will use thought-stopping strategy daily to reduce intrusive thoughts.  Status: Continued - Date(s): 5/7/24    Intervention(s)  Therapist will teach emotional regulation skills. distress tolerance skills, interpersonal effectiveness skills, emotion regluation skills, mindfulness skills, radical acceptance. Therapist will teach client how to ID body cues for anxiety, anxiety reduction techniques, how to ID triggers for depression and anxiety- decrease reactivity/eliminate, lifestyle changes to reduce depression and anxiety, communication skills, explore cognitive beliefs and help client to develop healthy cognitive patterns and beliefs.    Objective #C (Client Action)    Status: Continued - Date: 5/7/24    Client will use \"worry time\" each day for 15 minutes of scheduled worry and then defer obsessive or anxious thinking until the next structured worry time.    Intervention(s)  Therapist will teach emotional regulation skills. work through trauma using somatic experiencing techniques to discharge the anxiety.    Goal 2: Client will work on increasing self esteem and self worth     Objective #A (Client Action)    Status: Continued - Date(s): 5/7/24    Client will identify two areas of life that you would like to have improved functioning.    Intervention(s)  Therapist will teach emotional regulation skills. work through trauma using somatic experiencing techniques to discharge the anxiety .      Client has reviewed and agreed to the above plan.      Celsa Linda [facility]    "

## 2024-07-16 ENCOUNTER — OFFICE VISIT (OUTPATIENT)
Dept: FAMILY MEDICINE | Facility: CLINIC | Age: 24
End: 2024-07-16
Payer: COMMERCIAL

## 2024-07-16 VITALS
DIASTOLIC BLOOD PRESSURE: 86 MMHG | RESPIRATION RATE: 16 BRPM | OXYGEN SATURATION: 97 % | HEART RATE: 93 BPM | SYSTOLIC BLOOD PRESSURE: 120 MMHG | HEIGHT: 69 IN | TEMPERATURE: 98.8 F | BODY MASS INDEX: 41.65 KG/M2

## 2024-07-16 DIAGNOSIS — H91.8X3 ASYMMETRICAL HEARING LOSS: Primary | ICD-10-CM

## 2024-07-16 DIAGNOSIS — H65.93 MIDDLE EAR EFFUSION, BILATERAL: ICD-10-CM

## 2024-07-16 DIAGNOSIS — J30.2 ACUTE SEASONAL ALLERGIC RHINITIS: ICD-10-CM

## 2024-07-16 PROCEDURE — 91320 SARSCV2 VAC 30MCG TRS-SUC IM: CPT | Performed by: FAMILY MEDICINE

## 2024-07-16 PROCEDURE — 99213 OFFICE O/P EST LOW 20 MIN: CPT | Performed by: FAMILY MEDICINE

## 2024-07-16 PROCEDURE — 90480 ADMN SARSCOV2 VAC 1/ONLY CMP: CPT | Performed by: FAMILY MEDICINE

## 2024-07-16 RX ORDER — FLUTICASONE PROPIONATE 50 MCG
1 SPRAY, SUSPENSION (ML) NASAL DAILY
Qty: 16 G | Refills: 1 | Status: SHIPPED | OUTPATIENT
Start: 2024-07-16

## 2024-07-16 ASSESSMENT — PATIENT HEALTH QUESTIONNAIRE - PHQ9
10. IF YOU CHECKED OFF ANY PROBLEMS, HOW DIFFICULT HAVE THESE PROBLEMS MADE IT FOR YOU TO DO YOUR WORK, TAKE CARE OF THINGS AT HOME, OR GET ALONG WITH OTHER PEOPLE: SOMEWHAT DIFFICULT
SUM OF ALL RESPONSES TO PHQ QUESTIONS 1-9: 9
SUM OF ALL RESPONSES TO PHQ QUESTIONS 1-9: 9

## 2024-07-16 ASSESSMENT — PAIN SCALES - GENERAL: PAINLEVEL: NO PAIN (0)
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
## 2024-07-16 NOTE — PROGRESS NOTES
Assessment & Plan     Asymmetrical hearing loss  Suspect OME.   Use flonase for a few weeks. If not improved after 3-4 weeks, see ENT for further eval.   Will likely need audiology consult as well if not better.  - Adult ENT  Referral    Middle ear effusion, bilateral  No sign of bacterial infection.  Discussed causes, course and treatment.  Flonase use to reduce nasal congestion to open up ET.  Return precautions given.   - fluticasone (FLONASE) 50 MCG/ACT nasal spray  Dispense: 16 g; Refill: 1  - Adult ENT  Referral    Acute seasonal allergic rhinitis  Patient was advised on causes, course, treatment and possible complication of allergic rhinitis.  Discussed in detail current guidelines and first line treatment of the condition.  Patient agreed to start fluticasone  Observe for triggers and avoid if possible   - fluticasone (FLONASE) 50 MCG/ACT nasal spray  Dispense: 16 g; Refill: 1              There are no Patient Instructions on file for this visit.    Channing Stoll is a 23 year old, presenting for the following health issues:  Ear Problem (Hearing loss, inner ear itchiness, fluid draining from ear, no pain. Pt states she traveled recently and noticed the changes after plane landed on her way home two weeks ago. Pt has hx of perforated ear drum, states this feels similar. )        7/16/2024     1:47 PM   Additional Questions   Roomed by Shazia RIVERA MA   Accompanied by self         7/16/2024     1:47 PM   Patient Reported Additional Medications   Patient reports taking the following new medications Omeprazole 40mg one capsule daily     Ear Problem    History of Present Illness       Reason for visit:  Ear issues  Symptom onset:  1-2 weeks ago  Symptoms include:  Hearing loss, itching, fluid  Symptom intensity:  Moderate  Symptom progression:  Staying the same  Had these symptoms before:  Yes  Has tried/received treatment for these symptoms:  Yes  Previous treatment was successful:   "Yes  Prior treatment description:  I think it was pills and ear drops    She eats 0-1 servings of fruits and vegetables daily.She consumes 0 sweetened beverage(s) daily.She exercises with enough effort to increase her heart rate 9 or less minutes per day.  She exercises with enough effort to increase her heart rate 3 or less days per week.   She is taking medications regularly.         Acute Illness  Acute illness concerns: decreased hearing, left more than right  Turning up volume more, frequently asking people to repeat themselves.  Onset/Duration: 2 weeks  Symptoms:  Fever: No  Chills/Sweats: No  Headache (location?): No  Sinus Pressure: No  Conjunctivitis:  No  Ear Pain: no  Ear discharge: no  Rhinorrhea: YES- mild  Congestion: YES- nose, mild  Sore Throat: No  Cough: no frequent coughing  Wheeze: No  Decreased Appetite: No  Nausea: No  Vomiting: No  Fatigue/Achiness: No  Dizziness; no  Sick/Strep Exposure: No  Therapies tried and outcome: None    Patient reports hx of tympanic membrane rupture in 2019.      Review of Systems  Constitutional, HEENT, cardiovascular, pulmonary, GI, , musculoskeletal, neuro, skin, endocrine and psych systems are negative, except as otherwise noted.      Objective    /86 (BP Location: Right arm, Patient Position: Sitting, Cuff Size: Adult Regular)   Pulse 93   Temp 98.8  F (37.1  C) (Tympanic)   Resp 16   Ht 1.746 m (5' 8.75\")   SpO2 97%   BMI 41.65 kg/m    Body mass index is 41.65 kg/m .  Physical Exam   GENERAL: morbidly obese, ambulatory w/o assist, alert and no distress  EYES: Eyes grossly normal to inspection, extraocular movements - intact, and PERRL  HENT: Ears - tympanic membrane intact and nonerythematous with visible effusion bilaterally; Nose - pale swollen turbinates, small amt of clcear nasal mucus prsent, no sinus tenderness bilaterally; throat nonerythematous  NECK: nontender anterior cervical lymphadenopathy present.  SKIN:no rashes   NEURO: no " ataxia    No results found for any visits on 07/16/24.        Signed Electronically by: Charles Ramos MD

## 2024-07-23 ENCOUNTER — VIRTUAL VISIT (OUTPATIENT)
Dept: PSYCHOLOGY | Facility: OTHER | Age: 24
End: 2024-07-23
Payer: COMMERCIAL

## 2024-07-23 DIAGNOSIS — F50.819 BINGE EATING DISORDER: ICD-10-CM

## 2024-07-23 DIAGNOSIS — F33.9 MAJOR DEPRESSIVE DISORDER, RECURRENT EPISODE WITH ANXIOUS DISTRESS (H): ICD-10-CM

## 2024-07-23 DIAGNOSIS — F41.1 GENERALIZED ANXIETY DISORDER: Primary | ICD-10-CM

## 2024-07-23 PROCEDURE — 90837 PSYTX W PT 60 MINUTES: CPT | Mod: 95 | Performed by: COUNSELOR

## 2024-07-23 NOTE — PROGRESS NOTES
[facility]                                     Progress Note    Patient Name: Batool Louis  Date: 7/23/24         Service Type: Individual      Session Start Time: 9:07am Session End Time:  10:00am    Session Length: 53    Session #: 80    Attendees: Client    Service Modality: Video      Provider verified identity through the following two step process.  Patient provided:  Patient is known previously to provider     Telemedicine Visit: The patient's condition can be safely assessed and treated via synchronous audio and visual telemedicine encounter.       Reason for Telemedicine Visit: Services only offered telehealth     Originating Site (Patient Location): Patient's home     Distant Site (Provider Location): Provider Remote Setting- Home Office     Consent:  The patient/guardian has verbally consented to: the potential risks and benefits of telemedicine (video visit) versus in person care; bill my insurance or make self-payment for services provided; and responsibility for payment of non-covered services.        Mode of Communication:  Video Conference via [employer]     As the provider I attest to compliance with applicable laws and regulations related to telemedicine.     DATA  Interactive Complexity: No  Crisis: No        Progress Since Last Session (Related to Symptoms / Goals / Homework):   Symptoms: Improving .    Homework: Achieved / completed to satisfaction      Episode of Care Goals: Satisfactory progress - ACTION (Actively working towards change); Intervened by reinforcing change plan / affirming steps taken     Current / Ongoing Stressors and Concerns:  Client stated she recently got sick and ever since she can't hear well out of her left ear.   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   Finished her application with [employer] and got accepted. Is going to start this fall. Going to study music, business and production. Has transfer student apartments  she can live in but thinking about moving in with one of her good friends.        Treatment Objective(s) Addressed in This Session:   use thought-stopping strategy daily to reduce intrusive thoughts       Intervention:   Talked about her decision to go to school in the fall and discussed her worries regarding moving in with her friend. Talked about how she and this friend can communicate about these fears together. Discussed personal boundaries regarding her schedule and if she's feeling like she can handle what she does have on her plate. She is feeling like she has more capacity right now. Cautioned against doing anything more as not to spread herself thin.          Assessments completed prior to visit:  The following assessments were completed by patient for this visit:  PHQ9:       7/10/2020     9:57 AM 2/1/2021     8:45 AM 3/21/2022     9:28 AM 8/28/2023     1:57 PM 1/22/2024     3:09 PM 6/17/2024     2:21 PM 7/16/2024     1:38 PM   PHQ-9 SCORE   PHQ-9 Total Score MyChart  4 (Minimal depression)  8 (Mild depression) 10 (Moderate depression) 10 (Moderate depression) 9 (Mild depression)   PHQ-9 Total Score 14 4 8 8 10 10 9     GAD7:       5/22/2020     3:24 PM 2/1/2021     8:45 AM 11/14/2022     9:36 AM 8/16/2023     1:08 PM 1/22/2024     3:13 PM 4/9/2024     2:06 PM 5/7/2024     1:59 PM   SHELBY-7 SCORE   Total Score 16 (severe anxiety) 10 (moderate anxiety) 16 (severe anxiety) 12 (moderate anxiety) 5 (mild anxiety) 7 (mild anxiety) 8 (mild anxiety)   Total Score 16 10 16 12    12 5 7 8     PROMIS 10-Global Health (all questions and answers displayed):       2/13/2023    10:23 AM 2/27/2023    10:07 AM 8/16/2023     1:09 PM 8/30/2023    12:47 PM 12/21/2023     1:05 PM 4/9/2024     2:06 PM 5/7/2024     2:00 PM   PROMIS 10   In general, would you say your health is: Fair Fair Good Fair Fair Fair Fair   In general, would you say your quality of life is: Good Good Good Good Good Good Good   In general, how would you  rate your physical health? Fair Fair Fair Fair Fair Fair Fair   In general, how would you rate your mental health, including your mood and your ability to think? Fair Good Fair Good Fair Fair Fair   In general, how would you rate your satisfaction with your social activities and relationships? Fair Fair Fair Poor Fair Fair Good   In general, please rate how well you carry out your usual social activities and roles Good Fair Fair Fair Fair Fair Fair   To what extent are you able to carry out your everyday physical activities such as walking, climbing stairs, carrying groceries, or moving a chair? Completely Completely Completely Mostly Mostly Completely Completely   In the past 7 days, how often have you been bothered by emotional problems such as feeling anxious, depressed, or irritable? Sometimes Often Often Sometimes Sometimes Sometimes Often   In the past 7 days, how would you rate your fatigue on average? Mild Mild Mild Mild Moderate Moderate Moderate   In the past 7 days, how would you rate your pain on average, where 0 means no pain, and 10 means worst imaginable pain? 3 0 0 0 0 0 0   In general, would you say your health is: 2 2 3 2 2 2 2   In general, would you say your quality of life is: 3 3 3 3 3 3 3   In general, how would you rate your physical health? 2 2 2 2 2 2 2   In general, how would you rate your mental health, including your mood and your ability to think? 2 3 2 3 2 2 2   In general, how would you rate your satisfaction with your social activities and relationships? 2 2 2 1 2 2 3   In general, please rate how well you carry out your usual social activities and roles. (This includes activities at home, at work and in your community, and responsibilities as a parent, child, spouse, employee, friend, etc.) 3 2 2 2 2 2 2   To what extent are you able to carry out your everyday physical activities such as walking, climbing stairs, carrying groceries, or moving a chair? 5 5 5 4 4 5 5   In the past 7  days, how often have you been bothered by emotional problems such as feeling anxious, depressed, or irritable? 3 4 4 3 3 3 4   In the past 7 days, how would you rate your fatigue on average? 2 2 2 2 3 3 3   In the past 7 days, how would you rate your pain on average, where 0 means no pain, and 10 means worst imaginable pain? 3 0 0 0 0 0 0   Global Mental Health Score 10 10 9    9 10 10    10 10 10   Global Physical Health Score 15 16 16    16 15 14    14 15 15   PROMIS TOTAL - SUBSCORES 25 26 25    25 25 24    24 25 25         ASSESSMENT: Current Emotional / Mental Status (status of significant symptoms):   Risk status (Self / Other harm or suicidal ideation)   Patient denies current fears or concerns for personal safety.   Patient denies current or recent suicidal ideation or behaviors.   Patient denies current or recent homicidal ideation or behaviors.   Patient denies current or recent self injurious behavior or ideation.   Patient denies other safety concerns.   Patient reports there has been no change in risk factors since their last session.     Patient reports there has been no change in protective factors since their last session.     Recommended that patient call 911 or go to the local ED should there be a change in any of these risk factors.     Appearance:   Appropriate    Eye Contact:   Good    Psychomotor Behavior: Normal    Attitude:   Cooperative    Orientation:   All   Speech    Rate / Production: Normal/ Responsive    Volume:  Normal    Mood:    Normal   Affect:    Appropriate    Thought Content:  Clear    Thought Form:  Coherent  Logical    Insight:    Good      Medication Review:   No current psychiatric medications prescribed     Medication Compliance:   NA     Changes in Health Issues:   None reported     Chemical Use Review:   Substance Use: Chemical use reviewed, no active concerns identified      Tobacco Use: No current tobacco use.      Diagnosis:  1. Generalized anxiety disorder    2. Major  depressive disorder, recurrent episode with anxious distress (H24)    3. Binge eating disorder        Collateral Reports Completed:   Not Applicable    PLAN: (Patient Tasks / Therapist Tasks / Other)  Client to work on communication skills and boundary setting with family.         Celsa , [facility]                                                         ______________________________________________________________________    Individual Treatment Plan    Patient's Name: Batool Louis  YOB: 2000    Date of Creation: 3/23/22  Date Treatment Plan Last Reviewed/Revised: 5/7/24    DSM5 Diagnoses: 300.02 (F41.1) Generalized Anxiety Disorder  Psychosocial / Contextual Factors:  living at home, family conflict  PROMIS (reviewed every 90 days):     Referral / Collaboration:  Referral to another professional/service is not indicated at this time..    Anticipated number of session for this episode of care: on-going  Anticipation frequency of session: Every other week  Anticipated Duration of each session: 38-52 minutes  Treatment plan will be reviewed in 90 days or when goals have been changed.       MeasurableTreatment Goal(s) related to diagnosis / functional impairment(s)    MeasurableTreatment Goal(s) related to diagnosis / functional impairment(s)  Goal 1: Client will increase emotion regulation skills/decrease panic attacks    Objective #A (Client Action)    Client will identify 2-4 fears / thoughts that contribute to feeling anxious  use positive self-affirmations daily.  Status: Continued - Date(s): 5/7/24    Intervention(s)  Therapist will teach emotional regulation skills. distress tolerance skills, interpersonal effectiveness skills, emotion regluation skills, mindfulness skills, radical acceptance. Therapist will teach client how to ID body cues for anxiety, anxiety reduction techniques, how to ID triggers for depression and anxiety- decrease reactivity/eliminate, lifestyle changes to  "reduce depression and anxiety, communication skills, explore cognitive beliefs and help client to develop healthy cognitive patterns and beliefs.      Objective #B  Client will use thought-stopping strategy daily to reduce intrusive thoughts.  Status: Continued - Date(s): 5/7/24    Intervention(s)  Therapist will teach emotional regulation skills. distress tolerance skills, interpersonal effectiveness skills, emotion regluation skills, mindfulness skills, radical acceptance. Therapist will teach client how to ID body cues for anxiety, anxiety reduction techniques, how to ID triggers for depression and anxiety- decrease reactivity/eliminate, lifestyle changes to reduce depression and anxiety, communication skills, explore cognitive beliefs and help client to develop healthy cognitive patterns and beliefs.    Objective #C (Client Action)    Status: Continued - Date: 5/7/24    Client will use \"worry time\" each day for 15 minutes of scheduled worry and then defer obsessive or anxious thinking until the next structured worry time.    Intervention(s)  Therapist will teach emotional regulation skills. work through trauma using somatic experiencing techniques to discharge the anxiety.    Goal 2: Client will work on increasing self esteem and self worth     Objective #A (Client Action)    Status: Continued - Date(s): 5/7/24    Client will identify two areas of life that you would like to have improved functioning.    Intervention(s)  Therapist will teach emotional regulation skills. work through trauma using somatic experiencing techniques to discharge the anxiety .      Client has reviewed and agreed to the above plan.      Celsa Linda [facility]    "

## 2024-08-13 ENCOUNTER — VIRTUAL VISIT (OUTPATIENT)
Dept: PSYCHOLOGY | Facility: OTHER | Age: 24
End: 2024-08-13
Payer: COMMERCIAL

## 2024-08-13 DIAGNOSIS — F50.819 BINGE EATING DISORDER: ICD-10-CM

## 2024-08-13 DIAGNOSIS — F41.1 GENERALIZED ANXIETY DISORDER: Primary | ICD-10-CM

## 2024-08-13 DIAGNOSIS — F33.9 MAJOR DEPRESSIVE DISORDER, RECURRENT EPISODE WITH ANXIOUS DISTRESS (H): ICD-10-CM

## 2024-08-13 PROCEDURE — 90837 PSYTX W PT 60 MINUTES: CPT | Mod: 95 | Performed by: COUNSELOR

## 2024-08-13 ASSESSMENT — ANXIETY QUESTIONNAIRES
6. BECOMING EASILY ANNOYED OR IRRITABLE: SEVERAL DAYS
IF YOU CHECKED OFF ANY PROBLEMS ON THIS QUESTIONNAIRE, HOW DIFFICULT HAVE THESE PROBLEMS MADE IT FOR YOU TO DO YOUR WORK, TAKE CARE OF THINGS AT HOME, OR GET ALONG WITH OTHER PEOPLE: VERY DIFFICULT
1. FEELING NERVOUS, ANXIOUS, OR ON EDGE: SEVERAL DAYS
GAD7 TOTAL SCORE: 7
7. FEELING AFRAID AS IF SOMETHING AWFUL MIGHT HAPPEN: MORE THAN HALF THE DAYS
GAD7 TOTAL SCORE: 7
2. NOT BEING ABLE TO STOP OR CONTROL WORRYING: SEVERAL DAYS
5. BEING SO RESTLESS THAT IT IS HARD TO SIT STILL: NOT AT ALL
3. WORRYING TOO MUCH ABOUT DIFFERENT THINGS: SEVERAL DAYS
GAD7 TOTAL SCORE: 7
4. TROUBLE RELAXING: SEVERAL DAYS
8. IF YOU CHECKED OFF ANY PROBLEMS, HOW DIFFICULT HAVE THESE MADE IT FOR YOU TO DO YOUR WORK, TAKE CARE OF THINGS AT HOME, OR GET ALONG WITH OTHER PEOPLE?: VERY DIFFICULT
7. FEELING AFRAID AS IF SOMETHING AWFUL MIGHT HAPPEN: MORE THAN HALF THE DAYS

## 2024-08-13 ASSESSMENT — PATIENT HEALTH QUESTIONNAIRE - PHQ9
SUM OF ALL RESPONSES TO PHQ QUESTIONS 1-9: 16
SUM OF ALL RESPONSES TO PHQ QUESTIONS 1-9: 16
10. IF YOU CHECKED OFF ANY PROBLEMS, HOW DIFFICULT HAVE THESE PROBLEMS MADE IT FOR YOU TO DO YOUR WORK, TAKE CARE OF THINGS AT HOME, OR GET ALONG WITH OTHER PEOPLE: VERY DIFFICULT

## 2024-08-13 NOTE — PROGRESS NOTES
Answers submitted by the patient for this visit:  Patient Health Questionnaire (Submitted on 8/13/2024)  If you checked off any problems, how difficult have these problems made it for you to do your work, take care of things at home, or get along with other people?: Very difficult  PHQ9 TOTAL SCORE: 16  SHELBY-7 (Submitted on 8/13/2024)  SHELBY 7 TOTAL SCORE: 7          [facility]                                     Progress Note    Patient Name: Batool Louis  Date: 8/13/24         Service Type: Individual      Session Start Time: 4:06pm Session End Time:  5:05pm    Session Length: 59    Session #: 81    Attendees: Client    Service Modality: Video      Provider verified identity through the following two step process.  Patient provided:  Patient is known previously to provider     Telemedicine Visit: The patient's condition can be safely assessed and treated via synchronous audio and visual telemedicine encounter.       Reason for Telemedicine Visit: Services only offered telehealth     Originating Site (Patient Location): Patient's home     Distant Site (Provider Location): Provider Remote Setting- Home Office     Consent:  The patient/guardian has verbally consented to: the potential risks and benefits of telemedicine (video visit) versus in person care; bill my insurance or make self-payment for services provided; and responsibility for payment of non-covered services.        Mode of Communication:  Video Conference via [employer]     As the provider I attest to compliance with applicable laws and regulations related to telemedicine.     DATA  Interactive Complexity: No  Crisis: No        Progress Since Last Session (Related to Symptoms / Goals / Homework):   Symptoms: Worsening .    Homework: Achieved / completed to satisfaction      Episode of Care Goals: Satisfactory progress - ACTION (Actively working towards change); Intervened by reinforcing change plan / affirming steps taken     Current  / Ongoing Stressors and Concerns:  Client stated she's been really stressed lately. School is starting soon. Client is worried about moving out, had her birthday and now is off her parents insurance which is stressful to think about.   Hasn't been good about taking her medications regularly the last few weeks.   All of her siblings are moved out now.   Moves to school in 2 weeks and really doesn't want to.        Treatment Objective(s) Addressed in This Session:   use thought-stopping strategy daily to reduce intrusive thoughts       Intervention:   Identified and processed through feelings of guilt, embarrassment, sadness, fear, etc. Discussed multiple contributing factors. Validated her fears and frustrations. Worked to reframe them and also give herself permission to feel her feelings and not stay in them. Talked about the client not taking her medications and what is going to help her get back to taking them. Validated reasons why she stopped taking them and encouraged her to get back on a routine.          Assessments completed prior to visit:  The following assessments were completed by patient for this visit:  PHQ9:       2/1/2021     8:45 AM 3/21/2022     9:28 AM 8/28/2023     1:57 PM 1/22/2024     3:09 PM 6/17/2024     2:21 PM 7/16/2024     1:38 PM 8/13/2024     4:06 PM   PHQ-9 SCORE   PHQ-9 Total Score MyChart 4 (Minimal depression)  8 (Mild depression) 10 (Moderate depression) 10 (Moderate depression) 9 (Mild depression) 16 (Moderately severe depression)   PHQ-9 Total Score 4 8 8 10 10 9 16     GAD7:       2/1/2021     8:45 AM 11/14/2022     9:36 AM 8/16/2023     1:08 PM 1/22/2024     3:13 PM 4/9/2024     2:06 PM 5/7/2024     1:59 PM 8/13/2024     4:07 PM   SHELBY-7 SCORE   Total Score 10 (moderate anxiety) 16 (severe anxiety) 12 (moderate anxiety) 5 (mild anxiety) 7 (mild anxiety) 8 (mild anxiety) 7 (mild anxiety)   Total Score 10 16 12    12 5 7 8 7     PROMIS 10-Global Health (all questions and answers  displayed):       2/27/2023    10:07 AM 8/16/2023     1:09 PM 8/30/2023    12:47 PM 12/21/2023     1:05 PM 4/9/2024     2:06 PM 5/7/2024     2:00 PM 8/13/2024     4:07 PM   PROMIS 10   In general, would you say your health is: Fair Good Fair Fair Fair Fair Fair   In general, would you say your quality of life is: Good Good Good Good Good Good Good   In general, how would you rate your physical health? Fair Fair Fair Fair Fair Fair Fair   In general, how would you rate your mental health, including your mood and your ability to think? Good Fair Good Fair Fair Fair Fair   In general, how would you rate your satisfaction with your social activities and relationships? Fair Fair Poor Fair Fair Good Good   In general, please rate how well you carry out your usual social activities and roles Fair Fair Fair Fair Fair Fair Good   To what extent are you able to carry out your everyday physical activities such as walking, climbing stairs, carrying groceries, or moving a chair? Completely Completely Mostly Mostly Completely Completely Mostly   In the past 7 days, how often have you been bothered by emotional problems such as feeling anxious, depressed, or irritable? Often Often Sometimes Sometimes Sometimes Often Often   In the past 7 days, how would you rate your fatigue on average? Mild Mild Mild Moderate Moderate Moderate Severe   In the past 7 days, how would you rate your pain on average, where 0 means no pain, and 10 means worst imaginable pain? 0 0 0 0 0 0 0   In general, would you say your health is: 2 3 2 2 2 2 2   In general, would you say your quality of life is: 3 3 3 3 3 3 3   In general, how would you rate your physical health? 2 2 2 2 2 2 2   In general, how would you rate your mental health, including your mood and your ability to think? 3 2 3 2 2 2 2   In general, how would you rate your satisfaction with your social activities and relationships? 2 2 1 2 2 3 3   In general, please rate how well you carry out  your usual social activities and roles. (This includes activities at home, at work and in your community, and responsibilities as a parent, child, spouse, employee, friend, etc.) 2 2 2 2 2 2 3   To what extent are you able to carry out your everyday physical activities such as walking, climbing stairs, carrying groceries, or moving a chair? 5 5 4 4 5 5 4   In the past 7 days, how often have you been bothered by emotional problems such as feeling anxious, depressed, or irritable? 4 4 3 3 3 4 4   In the past 7 days, how would you rate your fatigue on average? 2 2 2 3 3 3 4   In the past 7 days, how would you rate your pain on average, where 0 means no pain, and 10 means worst imaginable pain? 0 0 0 0 0 0 0   Global Mental Health Score 10 9    9 10 10    10 10 10 10   Global Physical Health Score 16 16    16 15 14    14 15 15 13   PROMIS TOTAL - SUBSCORES 26 25    25 25 24    24 25 25 23         ASSESSMENT: Current Emotional / Mental Status (status of significant symptoms):   Risk status (Self / Other harm or suicidal ideation)   Patient denies current fears or concerns for personal safety.   Patient denies current or recent suicidal ideation or behaviors.   Patient denies current or recent homicidal ideation or behaviors.   Patient denies current or recent self injurious behavior or ideation.   Patient denies other safety concerns.   Patient reports there has been no change in risk factors since their last session.     Patient reports there has been no change in protective factors since their last session.     Recommended that patient call 911 or go to the local ED should there be a change in any of these risk factors.     Appearance:   Appropriate    Eye Contact:   Good    Psychomotor Behavior: Normal    Attitude:   Cooperative    Orientation:   All   Speech    Rate / Production: Normal/ Responsive    Volume:  Normal    Mood:    Normal   Affect:    Appropriate    Thought Content:  Clear    Thought Form:  Coherent   Logical    Insight:    Good      Medication Review:   No current psychiatric medications prescribed     Medication Compliance:   NA     Changes in Health Issues:   None reported     Chemical Use Review:   Substance Use: Chemical use reviewed, no active concerns identified      Tobacco Use: No current tobacco use.      Diagnosis:  1. Generalized anxiety disorder    2. Major depressive disorder, recurrent episode with anxious distress (H24)    3. Binge eating disorder        Collateral Reports Completed:   Not Applicable    PLAN: (Patient Tasks / Therapist Tasks / Other)  Client to work on communication skills and boundary setting with family.   Get back to taking her medications.         Celsa Linda, [facility]                                                         ______________________________________________________________________    Individual Treatment Plan    Patient's Name: Batool Louis  YOB: 2000    Date of Creation: 3/23/22  Date Treatment Plan Last Reviewed/Revised: 8/13/24    DSM5 Diagnoses: 300.02 (F41.1) Generalized Anxiety Disorder  Psychosocial / Contextual Factors:  living at home, family conflict  PROMIS (reviewed every 90 days):     Referral / Collaboration:  Referral to another professional/service is not indicated at this time..    Anticipated number of session for this episode of care: on-going  Anticipation frequency of session: Every other week  Anticipated Duration of each session: 38-52 minutes  Treatment plan will be reviewed in 90 days or when goals have been changed.       MeasurableTreatment Goal(s) related to diagnosis / functional impairment(s)    MeasurableTreatment Goal(s) related to diagnosis / functional impairment(s)  Goal 1: Client will increase emotion regulation skills/decrease panic attacks    Objective #A (Client Action)    Client will identify 2-4 fears / thoughts that contribute to feeling anxious  use positive self-affirmations daily.  Status:  "Continued - Date(s): 8/13/24    Intervention(s)  Therapist will teach emotional regulation skills. distress tolerance skills, interpersonal effectiveness skills, emotion regluation skills, mindfulness skills, radical acceptance. Therapist will teach client how to ID body cues for anxiety, anxiety reduction techniques, how to ID triggers for depression and anxiety- decrease reactivity/eliminate, lifestyle changes to reduce depression and anxiety, communication skills, explore cognitive beliefs and help client to develop healthy cognitive patterns and beliefs.      Objective #B  Client will use thought-stopping strategy daily to reduce intrusive thoughts.  Status: Continued - Date(s): 8/13/24    Intervention(s)  Therapist will teach emotional regulation skills. distress tolerance skills, interpersonal effectiveness skills, emotion regluation skills, mindfulness skills, radical acceptance. Therapist will teach client how to ID body cues for anxiety, anxiety reduction techniques, how to ID triggers for depression and anxiety- decrease reactivity/eliminate, lifestyle changes to reduce depression and anxiety, communication skills, explore cognitive beliefs and help client to develop healthy cognitive patterns and beliefs.    Objective #C (Client Action)    Status: Continued - Date: 8/13/24    Client will use \"worry time\" each day for 15 minutes of scheduled worry and then defer obsessive or anxious thinking until the next structured worry time.    Intervention(s)  Therapist will teach emotional regulation skills. work through trauma using somatic experiencing techniques to discharge the anxiety.    Goal 2: Client will work on increasing self esteem and self worth     Objective #A (Client Action)    Status: Continued - Date(s): 8/13/24    Client will identify two areas of life that you would like to have improved functioning.    Intervention(s)  Therapist will teach emotional regulation skills. work through trauma using " somatic experiencing techniques to discharge the anxiety .      Client has reviewed and agreed to the above plan.      Celsa Linda [facility]

## 2024-09-26 NOTE — CONFIDENTIAL NOTE
DIAGNOSIS:   Elevated LFTs    Appt Date:  11.06.2024    NOTES STATUS DETAILS   OFFICE NOTE from referring provider Care Everywhere 04.10.2024  Selam Dhaliwal MD [facility]   MEDICATION LIST Internal    IMAGING     ULTRASOUND LIVER Care Everywhere 01.02.2024  Abd [facility]   LABS     COMPLETE METABOLIC PANEL Care Everywhere 09.25.2023   COMPLETE BLOOD COUNT (CBC) Care Everywhere 09.25.2023   INTERNATIONAL NORMALIZED RATIO (INR) Internal 07.03.2023   HEPATITIS C ANTIBODY Internal 04.04.2024   HEPATITIS B SURFACE ANTIGEN Internal 04.04.2024   HEPATITIS B SURFACE ANTIBODY Care Everywhere 10.04.2023   HEPATITIS B CORE ANTIBODY Care Everywhere 10.04.2023     Action 09.26.2024 RM   Action Taken Sent fax to [facility] for images.     Action 09.26.2024 RM   Action Taken Image received and uploaded to PACS.

## 2024-09-27 NOTE — PROGRESS NOTES
Progress Note    Client Name: Batool Louis  Date: 9/9/20          Service Type: Individual      Session Start Time: 9:15am   Session End Time: 10:00am      Session Length: 45 mins     Session #: 17     Attendees: Client    Treatment Plan Last Reviewed: 9/9/20   PHQ-9 / SHELBY-7 : see chart    Telemedicine Visit: The patient's condition can be safely assessed and treated via synchronous audio and visual telemedicine encounter.      Reason for Telemedicine Visit: Services only offered telehealth    Originating Site (Patient Location): Patient's home    Distant Site (Provider Location): Provider Remote Setting    Consent:  The patient/guardian has verbally consented to: the potential risks and benefits of telemedicine (video visit) versus in person care; bill my insurance or make self-payment for services provided; and responsibility for payment of non-covered services.     Mode of Communication:  Video Conference via [employer]/telephone    As the provider I attest to compliance with applicable laws and regulations related to telemedicine.     DATA      Progress Since Last Session (Related to Symptoms / Goals / Homework):   Symptoms: somewhat improved    Homework: Completed in session      Episode of Care Goals: Satisfactory progress - PREPARATION (Decided to change - considering how); Intervened by negotiating a change plan and determining options / strategies for behavior change, identifying triggers, exploring social supports, and working towards setting a date to begin behavior change     Current / Ongoing Stressors and Concerns:   Dodie stated she thought her appointment was at 10am. Client stated she's been trying to work on eating better and improving her self esteem. She stated her family members are also changing the way they are eating to also lose weight. Client stated her motivation for this is her job and that she isn't doing some of the things she used  "to do because she's gained weight.  Client stated she signed up for online dating when she felt really good. She stated she has already deleted it because she \"doesn't feel good anymore, or confident anymore.\"       Treatment Objective(s) Addressed in This Session:   use at least 2 coping skills for anxiety management in the next 2 weeks  identify 5 traits or charcteristics you like about yourself       Intervention:   Worked with client on how she can work to increase her self confidence and self esteem. Worked on positive self affirmations she can tell herself everyday. Helped client with different techniques she can use to help her with her eating habits. Suggested different resources she can look into for more support and coaching.       ASSESSMENT: Current Emotional / Mental Status (status of significant symptoms):   Risk status (Self / Other harm or suicidal ideation)   Client denies current fears or concerns for personal safety.   Client denies current or recent suicidal ideation or behaviors.   Client denies current or recent homicidal ideation or behaviors.   Client denies current or recent self injurious behavior or ideation.   Client denies other safety concerns.   A safety and risk management plan has not been developed at this time, however client was given the after-hours number / 911 should there be a change in any of these risk factors.     Appearance:   Appropriate    Eye Contact:   Good    Psychomotor Behavior: Normal    Attitude:   Cooperative    Orientation:   All   Speech    Rate / Production: Normal     Volume:  Normal    Mood:    Normal   Affect:    Appropriate    Thought Content:  Clear    Thought Form:  Coherent  Logical    Insight:    Good      Medication Review:   No changes to current psychiatric medication(s)     Medication Compliance:   Yes     Changes in Health Issues:   None reported : client is on birth control again     Chemical Use Review:   Substance Use: Chemical use reviewed, no " active concerns identified      Tobacco Use: No current tobacco use.       Collateral Reports Completed:   Not Applicable    Diagnoses: Generalized Anxiety Disorder      PLAN: (Client Tasks / Therapist Tasks / Other)  Client will practice self affirmations daily and look into dieting resources         Celsa Linda, [facility]                                                         ________________________________________________________________________    Treatment Plan    Client's Name: Batool Louis  YOB: 2000    Date: 9/9/20     DSM-V Diagnoses: Adjustment Disorders  309.28 (F43.23) With mixed anxiety and depressed mood  Psychosocial / Contextual Factors: COVID, living back at home, deaths of loved ones within the past few years. Sexual identity issues.     WHODAS: 12    Referral / Collaboration:  Referral to another professional/service is not indicated at this time.    Anticipated number of session or this episode of care: 5+      MeasurableTreatment Goal(s) related to diagnosis / functional impairment(s)  Goal 1: Client will increase emotion regulation skills/decrease panic attacks    Objective #A (Client Action)    Client will identify 2-4 fears / thoughts that contribute to feeling anxious  use positive self-affirmations daily.  Status: Continued - Date(s): 9/9/20       Intervention(s)  Therapist will teach emotional regulation skills. distress tolerance skills, interpersonal effectiveness skills, emotion regluation skills, mindfulness skills, radical acceptance. Therapist will teach client how to ID body cues for anxiety, anxiety reduction techniques, how to ID triggers for depression and anxiety- decrease reactivity/eliminate, lifestyle changes to reduce depression and anxiety, communication skills, explore cognitive beliefs and help client to develop healthy cognitive patterns and beliefs.      Objective #B  Client will use thought-stopping strategy daily to reduce intrusive  "thoughts.  Status: Continued - Date(s): 9/9/20       Intervention(s)  Therapist will teach emotional regulation skills. distress tolerance skills, interpersonal effectiveness skills, emotion regluation skills, mindfulness skills, radical acceptance. Therapist will teach client how to ID body cues for anxiety, anxiety reduction techniques, how to ID triggers for depression and anxiety- decrease reactivity/eliminate, lifestyle changes to reduce depression and anxiety, communication skills, explore cognitive beliefs and help client to develop healthy cognitive patterns and beliefs.    Objective #C (Client Action)    Status: New - Date: 9/9/20     Client will use \"worry time\" each day for 15 minutes of scheduled worry and then defer obsessive or anxious thinking until the next structured worry time.    Intervention(s)  Therapist will teach emotional regulation skills. work through trauma using somatic experiencing techniques to discharge the anxiety.    Goal 2: Client will work on increasing self esteem and self worth     Objective #A (Client Action)    Status: Continued - Date(s):9/9/20     Client will identify two areas of life that you would like to have improved functioning.    Intervention(s)  Therapist will teach emotional regulation skills. work through trauma using somatic experiencing techniques to discharge the anxiety.      Client has reviewed and agreed to the above plan.      Celsa Linda [facility]    " Name band; No

## 2024-10-03 ENCOUNTER — APPOINTMENT (OUTPATIENT)
Dept: ULTRASOUND IMAGING | Facility: CLINIC | Age: 24
End: 2024-10-03
Attending: EMERGENCY MEDICINE
Payer: MEDICAID

## 2024-10-03 ENCOUNTER — APPOINTMENT (OUTPATIENT)
Dept: CT IMAGING | Facility: CLINIC | Age: 24
End: 2024-10-03
Attending: EMERGENCY MEDICINE
Payer: MEDICAID

## 2024-10-03 ENCOUNTER — ANESTHESIA (OUTPATIENT)
Dept: SURGERY | Facility: CLINIC | Age: 24
End: 2024-10-03
Payer: MEDICAID

## 2024-10-03 ENCOUNTER — ANESTHESIA EVENT (OUTPATIENT)
Dept: SURGERY | Facility: CLINIC | Age: 24
End: 2024-10-03
Payer: MEDICAID

## 2024-10-03 ENCOUNTER — NURSE TRIAGE (OUTPATIENT)
Dept: NURSING | Facility: CLINIC | Age: 24
End: 2024-10-03
Payer: MEDICAID

## 2024-10-03 ENCOUNTER — HOSPITAL ENCOUNTER (INPATIENT)
Facility: CLINIC | Age: 24
Setting detail: SURGERY ADMIT
End: 2024-10-03
Attending: STUDENT IN AN ORGANIZED HEALTH CARE EDUCATION/TRAINING PROGRAM | Admitting: STUDENT IN AN ORGANIZED HEALTH CARE EDUCATION/TRAINING PROGRAM
Payer: MEDICAID

## 2024-10-03 ENCOUNTER — HOSPITAL ENCOUNTER (INPATIENT)
Facility: CLINIC | Age: 24
LOS: 2 days | Discharge: HOME | End: 2024-10-05
Attending: EMERGENCY MEDICINE | Admitting: FAMILY MEDICINE
Payer: MEDICAID

## 2024-10-03 DIAGNOSIS — N83.519 OVARIAN TORSION: Primary | ICD-10-CM

## 2024-10-03 DIAGNOSIS — R10.31 RLQ ABDOMINAL PAIN: ICD-10-CM

## 2024-10-03 DIAGNOSIS — N83.511 TORSION OF RIGHT OVARY: ICD-10-CM

## 2024-10-03 DIAGNOSIS — Z98.890 STATUS POST LAPAROTOMY: ICD-10-CM

## 2024-10-03 LAB
ABO/RH(D): NORMAL
AFP SERPL-MCNC: 2.1 NG/ML
ALBUMIN SERPL BCG-MCNC: 4.4 G/DL (ref 3.5–5.2)
ALP SERPL-CCNC: 73 U/L (ref 40–150)
ALT SERPL W P-5'-P-CCNC: 86 U/L (ref 0–50)
ANION GAP SERPL CALCULATED.3IONS-SCNC: 11 MMOL/L (ref 7–15)
ANTIBODY SCREEN: NEGATIVE
AST SERPL W P-5'-P-CCNC: 59 U/L (ref 0–45)
BASOPHILS # BLD AUTO: 0.1 10E3/UL (ref 0–0.2)
BASOPHILS NFR BLD AUTO: 1 %
BILIRUB SERPL-MCNC: 0.7 MG/DL
BUN SERPL-MCNC: 13.9 MG/DL (ref 6–20)
CALCIUM SERPL-MCNC: 9.2 MG/DL (ref 8.8–10.4)
CANCER AG125 SERPL-ACNC: 6 U/ML
CEA SERPL-MCNC: 0.9 NG/ML
CHLORIDE SERPL-SCNC: 105 MMOL/L (ref 98–107)
CREAT SERPL-MCNC: 0.66 MG/DL (ref 0.51–0.95)
EGFRCR SERPLBLD CKD-EPI 2021: >90 ML/MIN/1.73M2
EOSINOPHIL # BLD AUTO: 0.1 10E3/UL (ref 0–0.7)
EOSINOPHIL NFR BLD AUTO: 2 %
ERYTHROCYTE [DISTWIDTH] IN BLOOD BY AUTOMATED COUNT: 14.1 % (ref 10–15)
GLUCOSE SERPL-MCNC: 122 MG/DL (ref 70–99)
HCG SERPL QL: NEGATIVE
HCO3 SERPL-SCNC: 21 MMOL/L (ref 22–29)
HCT VFR BLD AUTO: 39.7 % (ref 35–47)
HGB BLD-MCNC: 12.7 G/DL (ref 11.7–15.7)
IMM GRANULOCYTES # BLD: 0 10E3/UL
IMM GRANULOCYTES NFR BLD: 0 %
LACTATE SERPL-SCNC: 1.8 MMOL/L (ref 0.7–2)
LACTATE SERPL-SCNC: 2.3 MMOL/L (ref 0.7–2)
LDH SERPL L TO P-CCNC: 171 U/L (ref 0–250)
LYMPHOCYTES # BLD AUTO: 4.5 10E3/UL (ref 0.8–5.3)
LYMPHOCYTES NFR BLD AUTO: 49 %
MCH RBC QN AUTO: 27.6 PG (ref 26.5–33)
MCHC RBC AUTO-ENTMCNC: 32 G/DL (ref 31.5–36.5)
MCV RBC AUTO: 86 FL (ref 78–100)
MONOCYTES # BLD AUTO: 0.5 10E3/UL (ref 0–1.3)
MONOCYTES NFR BLD AUTO: 6 %
NEUTROPHILS # BLD AUTO: 3.9 10E3/UL (ref 1.6–8.3)
NEUTROPHILS NFR BLD AUTO: 43 %
NRBC # BLD AUTO: 0 10E3/UL
NRBC BLD AUTO-RTO: 0 /100
PLATELET # BLD AUTO: 275 10E3/UL (ref 150–450)
POTASSIUM SERPL-SCNC: 3.9 MMOL/L (ref 3.4–5.3)
PROT SERPL-MCNC: 7.7 G/DL (ref 6.4–8.3)
RAD FLAG-ADDENDUM: ABNORMAL
RBC # BLD AUTO: 4.6 10E6/UL (ref 3.8–5.2)
SODIUM SERPL-SCNC: 137 MMOL/L (ref 135–145)
SPECIMEN EXPIRATION DATE: NORMAL
WBC # BLD AUTO: 9.1 10E3/UL (ref 4–11)

## 2024-10-03 PROCEDURE — 84702 CHORIONIC GONADOTROPIN TEST: CPT | Performed by: STUDENT IN AN ORGANIZED HEALTH CARE EDUCATION/TRAINING PROGRAM

## 2024-10-03 PROCEDURE — 86301 IMMUNOASSAY TUMOR CA 19-9: CPT | Performed by: STUDENT IN AN ORGANIZED HEALTH CARE EDUCATION/TRAINING PROGRAM

## 2024-10-03 PROCEDURE — 250N000011 HC RX IP 250 OP 636: Performed by: NURSE ANESTHETIST, CERTIFIED REGISTERED

## 2024-10-03 PROCEDURE — 82105 ALPHA-FETOPROTEIN SERUM: CPT | Performed by: STUDENT IN AN ORGANIZED HEALTH CARE EDUCATION/TRAINING PROGRAM

## 2024-10-03 PROCEDURE — 58720 REMOVAL OF OVARY/TUBE(S): CPT | Performed by: ANESTHESIOLOGY

## 2024-10-03 PROCEDURE — 36415 COLL VENOUS BLD VENIPUNCTURE: CPT | Performed by: EMERGENCY MEDICINE

## 2024-10-03 PROCEDURE — 258N000003 HC RX IP 258 OP 636: Performed by: STUDENT IN AN ORGANIZED HEALTH CARE EDUCATION/TRAINING PROGRAM

## 2024-10-03 PROCEDURE — 250N000009 HC RX 250: Performed by: NURSE ANESTHETIST, CERTIFIED REGISTERED

## 2024-10-03 PROCEDURE — 96374 THER/PROPH/DIAG INJ IV PUSH: CPT | Mod: 59 | Performed by: EMERGENCY MEDICINE

## 2024-10-03 PROCEDURE — 999N000141 HC STATISTIC PRE-PROCEDURE NURSING ASSESSMENT: Performed by: STUDENT IN AN ORGANIZED HEALTH CARE EDUCATION/TRAINING PROGRAM

## 2024-10-03 PROCEDURE — 88112 CYTOPATH CELL ENHANCE TECH: CPT | Mod: TC | Performed by: STUDENT IN AN ORGANIZED HEALTH CARE EDUCATION/TRAINING PROGRAM

## 2024-10-03 PROCEDURE — 58720 REMOVAL OF OVARY/TUBE(S): CPT | Performed by: NURSE ANESTHETIST, CERTIFIED REGISTERED

## 2024-10-03 PROCEDURE — 250N000011 HC RX IP 250 OP 636

## 2024-10-03 PROCEDURE — 88305 TISSUE EXAM BY PATHOLOGIST: CPT | Mod: TC | Performed by: STUDENT IN AN ORGANIZED HEALTH CARE EDUCATION/TRAINING PROGRAM

## 2024-10-03 PROCEDURE — 370N000017 HC ANESTHESIA TECHNICAL FEE, PER MIN: Performed by: STUDENT IN AN ORGANIZED HEALTH CARE EDUCATION/TRAINING PROGRAM

## 2024-10-03 PROCEDURE — 88305 TISSUE EXAM BY PATHOLOGIST: CPT | Mod: 26 | Performed by: PATHOLOGY

## 2024-10-03 PROCEDURE — 86336 INHIBIN A: CPT | Performed by: STUDENT IN AN ORGANIZED HEALTH CARE EDUCATION/TRAINING PROGRAM

## 2024-10-03 PROCEDURE — 0UT50ZZ RESECTION OF RIGHT FALLOPIAN TUBE, OPEN APPROACH: ICD-10-PCS | Performed by: STUDENT IN AN ORGANIZED HEALTH CARE EDUCATION/TRAINING PROGRAM

## 2024-10-03 PROCEDURE — 86304 IMMUNOASSAY TUMOR CA 125: CPT | Performed by: STUDENT IN AN ORGANIZED HEALTH CARE EDUCATION/TRAINING PROGRAM

## 2024-10-03 PROCEDURE — 83605 ASSAY OF LACTIC ACID: CPT | Performed by: EMERGENCY MEDICINE

## 2024-10-03 PROCEDURE — 99207 US PELVIS COMPLETE W TRANSVAGINAL AND DOPPLER LIMITED: CPT | Mod: 26 | Performed by: RADIOLOGY

## 2024-10-03 PROCEDURE — 74177 CT ABD & PELVIS W/CONTRAST: CPT

## 2024-10-03 PROCEDURE — 93976 VASCULAR STUDY: CPT

## 2024-10-03 PROCEDURE — 83615 LACTATE (LD) (LDH) ENZYME: CPT | Performed by: STUDENT IN AN ORGANIZED HEALTH CARE EDUCATION/TRAINING PROGRAM

## 2024-10-03 PROCEDURE — 250N000011 HC RX IP 250 OP 636: Performed by: EMERGENCY MEDICINE

## 2024-10-03 PROCEDURE — 74177 CT ABD & PELVIS W/CONTRAST: CPT | Mod: 26 | Performed by: RADIOLOGY

## 2024-10-03 PROCEDURE — 99285 EMERGENCY DEPT VISIT HI MDM: CPT | Mod: 25 | Performed by: EMERGENCY MEDICINE

## 2024-10-03 PROCEDURE — 82378 CARCINOEMBRYONIC ANTIGEN: CPT | Performed by: STUDENT IN AN ORGANIZED HEALTH CARE EDUCATION/TRAINING PROGRAM

## 2024-10-03 PROCEDURE — 99222 1ST HOSP IP/OBS MODERATE 55: CPT | Mod: 57 | Performed by: STUDENT IN AN ORGANIZED HEALTH CARE EDUCATION/TRAINING PROGRAM

## 2024-10-03 PROCEDURE — 84703 CHORIONIC GONADOTROPIN ASSAY: CPT | Performed by: EMERGENCY MEDICINE

## 2024-10-03 PROCEDURE — 360N000075 HC SURGERY LEVEL 2, PER MIN: Performed by: STUDENT IN AN ORGANIZED HEALTH CARE EDUCATION/TRAINING PROGRAM

## 2024-10-03 PROCEDURE — 120N000002 HC R&B MED SURG/OB UMMC

## 2024-10-03 PROCEDURE — 86900 BLOOD TYPING SEROLOGIC ABO: CPT | Performed by: FAMILY MEDICINE

## 2024-10-03 PROCEDURE — 88302 TISSUE EXAM BY PATHOLOGIST: CPT | Mod: 26 | Performed by: PATHOLOGY

## 2024-10-03 PROCEDURE — 36415 COLL VENOUS BLD VENIPUNCTURE: CPT | Performed by: STUDENT IN AN ORGANIZED HEALTH CARE EDUCATION/TRAINING PROGRAM

## 2024-10-03 PROCEDURE — 76856 US EXAM PELVIC COMPLETE: CPT | Mod: 26 | Performed by: RADIOLOGY

## 2024-10-03 PROCEDURE — 82040 ASSAY OF SERUM ALBUMIN: CPT | Performed by: EMERGENCY MEDICINE

## 2024-10-03 PROCEDURE — 250N000011 HC RX IP 250 OP 636: Performed by: STUDENT IN AN ORGANIZED HEALTH CARE EDUCATION/TRAINING PROGRAM

## 2024-10-03 PROCEDURE — 86850 RBC ANTIBODY SCREEN: CPT | Performed by: FAMILY MEDICINE

## 2024-10-03 PROCEDURE — 0UT00ZZ RESECTION OF RIGHT OVARY, OPEN APPROACH: ICD-10-PCS | Performed by: STUDENT IN AN ORGANIZED HEALTH CARE EDUCATION/TRAINING PROGRAM

## 2024-10-03 PROCEDURE — 76830 TRANSVAGINAL US NON-OB: CPT | Mod: 26 | Performed by: RADIOLOGY

## 2024-10-03 PROCEDURE — 258N000003 HC RX IP 258 OP 636: Performed by: NURSE ANESTHETIST, CERTIFIED REGISTERED

## 2024-10-03 PROCEDURE — 96375 TX/PRO/DX INJ NEW DRUG ADDON: CPT | Performed by: EMERGENCY MEDICINE

## 2024-10-03 PROCEDURE — 88112 CYTOPATH CELL ENHANCE TECH: CPT | Mod: 26 | Performed by: PATHOLOGY

## 2024-10-03 PROCEDURE — 99285 EMERGENCY DEPT VISIT HI MDM: CPT | Performed by: EMERGENCY MEDICINE

## 2024-10-03 PROCEDURE — 250N000011 HC RX IP 250 OP 636: Performed by: ANESTHESIOLOGY

## 2024-10-03 PROCEDURE — 96361 HYDRATE IV INFUSION ADD-ON: CPT | Performed by: EMERGENCY MEDICINE

## 2024-10-03 PROCEDURE — 250N000025 HC SEVOFLURANE, PER MIN: Performed by: STUDENT IN AN ORGANIZED HEALTH CARE EDUCATION/TRAINING PROGRAM

## 2024-10-03 PROCEDURE — 85025 COMPLETE CBC W/AUTO DIFF WBC: CPT | Performed by: EMERGENCY MEDICINE

## 2024-10-03 PROCEDURE — 258N000003 HC RX IP 258 OP 636: Performed by: EMERGENCY MEDICINE

## 2024-10-03 PROCEDURE — 96376 TX/PRO/DX INJ SAME DRUG ADON: CPT | Performed by: EMERGENCY MEDICINE

## 2024-10-03 PROCEDURE — 58720 REMOVAL OF OVARY/TUBE(S): CPT | Mod: GC | Performed by: STUDENT IN AN ORGANIZED HEALTH CARE EDUCATION/TRAINING PROGRAM

## 2024-10-03 PROCEDURE — 710N000010 HC RECOVERY PHASE 1, LEVEL 2, PER MIN: Performed by: STUDENT IN AN ORGANIZED HEALTH CARE EDUCATION/TRAINING PROGRAM

## 2024-10-03 PROCEDURE — 272N000001 HC OR GENERAL SUPPLY STERILE: Performed by: STUDENT IN AN ORGANIZED HEALTH CARE EDUCATION/TRAINING PROGRAM

## 2024-10-03 RX ORDER — PROPOFOL 10 MG/ML
INJECTION, EMULSION INTRAVENOUS PRN
Status: DISCONTINUED | OUTPATIENT
Start: 2024-10-03 | End: 2024-10-03

## 2024-10-03 RX ORDER — SODIUM CHLORIDE, SODIUM LACTATE, POTASSIUM CHLORIDE, CALCIUM CHLORIDE 600; 310; 30; 20 MG/100ML; MG/100ML; MG/100ML; MG/100ML
INJECTION, SOLUTION INTRAVENOUS
Status: COMPLETED
Start: 2024-10-03 | End: 2024-10-03

## 2024-10-03 RX ORDER — FENTANYL CITRATE 50 UG/ML
INJECTION, SOLUTION INTRAMUSCULAR; INTRAVENOUS PRN
Status: DISCONTINUED | OUTPATIENT
Start: 2024-10-03 | End: 2024-10-03

## 2024-10-03 RX ORDER — HYDROMORPHONE HYDROCHLORIDE 1 MG/ML
0.5 INJECTION, SOLUTION INTRAMUSCULAR; INTRAVENOUS; SUBCUTANEOUS EVERY 30 MIN PRN
Status: DISCONTINUED | OUTPATIENT
Start: 2024-10-03 | End: 2024-10-03

## 2024-10-03 RX ORDER — BUPIVACAINE HYDROCHLORIDE 2.5 MG/ML
INJECTION, SOLUTION INFILTRATION; PERINEURAL PRN
Status: DISCONTINUED | OUTPATIENT
Start: 2024-10-03 | End: 2024-10-03 | Stop reason: HOSPADM

## 2024-10-03 RX ORDER — AMOXICILLIN 250 MG
1 CAPSULE ORAL 2 TIMES DAILY
Status: DISCONTINUED | OUTPATIENT
Start: 2024-10-03 | End: 2024-10-05 | Stop reason: HOSPADM

## 2024-10-03 RX ORDER — PROPOFOL 10 MG/ML
INJECTION, EMULSION INTRAVENOUS CONTINUOUS PRN
Status: DISCONTINUED | OUTPATIENT
Start: 2024-10-03 | End: 2024-10-03

## 2024-10-03 RX ORDER — ONDANSETRON 2 MG/ML
4 INJECTION INTRAMUSCULAR; INTRAVENOUS EVERY 30 MIN PRN
Status: DISCONTINUED | OUTPATIENT
Start: 2024-10-03 | End: 2024-10-03

## 2024-10-03 RX ORDER — OXYCODONE HYDROCHLORIDE 10 MG/1
10 TABLET ORAL EVERY 4 HOURS PRN
Status: DISCONTINUED | OUTPATIENT
Start: 2024-10-03 | End: 2024-10-05 | Stop reason: HOSPADM

## 2024-10-03 RX ORDER — ONDANSETRON 4 MG/1
4 TABLET, ORALLY DISINTEGRATING ORAL EVERY 30 MIN PRN
Status: DISCONTINUED | OUTPATIENT
Start: 2024-10-03 | End: 2024-10-03 | Stop reason: HOSPADM

## 2024-10-03 RX ORDER — OXYCODONE HYDROCHLORIDE 5 MG/1
5 TABLET ORAL EVERY 4 HOURS PRN
Status: DISCONTINUED | OUTPATIENT
Start: 2024-10-03 | End: 2024-10-05 | Stop reason: HOSPADM

## 2024-10-03 RX ORDER — SODIUM CHLORIDE, SODIUM LACTATE, POTASSIUM CHLORIDE, CALCIUM CHLORIDE 600; 310; 30; 20 MG/100ML; MG/100ML; MG/100ML; MG/100ML
INJECTION, SOLUTION INTRAVENOUS CONTINUOUS PRN
Status: DISCONTINUED | OUTPATIENT
Start: 2024-10-03 | End: 2024-10-03

## 2024-10-03 RX ORDER — CEFAZOLIN SODIUM/WATER 3 G/30 ML
3 SYRINGE (ML) INTRAVENOUS SEE ADMIN INSTRUCTIONS
Status: DISCONTINUED | OUTPATIENT
Start: 2024-10-03 | End: 2024-10-03 | Stop reason: HOSPADM

## 2024-10-03 RX ORDER — HYDROMORPHONE HCL IN WATER/PF 6 MG/30 ML
0.2 PATIENT CONTROLLED ANALGESIA SYRINGE INTRAVENOUS
Status: DISCONTINUED | OUTPATIENT
Start: 2024-10-03 | End: 2024-10-05 | Stop reason: HOSPADM

## 2024-10-03 RX ORDER — SODIUM CHLORIDE, SODIUM LACTATE, POTASSIUM CHLORIDE, CALCIUM CHLORIDE 600; 310; 30; 20 MG/100ML; MG/100ML; MG/100ML; MG/100ML
INJECTION, SOLUTION INTRAVENOUS CONTINUOUS
Status: DISCONTINUED | OUTPATIENT
Start: 2024-10-03 | End: 2024-10-04

## 2024-10-03 RX ORDER — HYDROXYZINE HYDROCHLORIDE 25 MG/1
25 TABLET, FILM COATED ORAL EVERY 6 HOURS PRN
Status: DISCONTINUED | OUTPATIENT
Start: 2024-10-03 | End: 2024-10-04

## 2024-10-03 RX ORDER — HYDROMORPHONE HYDROCHLORIDE 1 MG/ML
0.2 INJECTION, SOLUTION INTRAMUSCULAR; INTRAVENOUS; SUBCUTANEOUS EVERY 5 MIN PRN
Status: DISCONTINUED | OUTPATIENT
Start: 2024-10-03 | End: 2024-10-03 | Stop reason: HOSPADM

## 2024-10-03 RX ORDER — FENTANYL CITRATE 50 UG/ML
50 INJECTION, SOLUTION INTRAMUSCULAR; INTRAVENOUS EVERY 5 MIN PRN
Status: DISCONTINUED | OUTPATIENT
Start: 2024-10-03 | End: 2024-10-03 | Stop reason: HOSPADM

## 2024-10-03 RX ORDER — MAGNESIUM HYDROXIDE/ALUMINUM HYDROXICE/SIMETHICONE 120; 1200; 1200 MG/30ML; MG/30ML; MG/30ML
30 SUSPENSION ORAL EVERY 4 HOURS PRN
Status: DISCONTINUED | OUTPATIENT
Start: 2024-10-03 | End: 2024-10-05 | Stop reason: HOSPADM

## 2024-10-03 RX ORDER — HYDROMORPHONE HYDROCHLORIDE 1 MG/ML
0.4 INJECTION, SOLUTION INTRAMUSCULAR; INTRAVENOUS; SUBCUTANEOUS EVERY 5 MIN PRN
Status: DISCONTINUED | OUTPATIENT
Start: 2024-10-03 | End: 2024-10-03 | Stop reason: HOSPADM

## 2024-10-03 RX ORDER — SODIUM PHOSPHATE,MONO-DIBASIC 19G-7G/118
1 ENEMA (ML) RECTAL
Status: DISCONTINUED | OUTPATIENT
Start: 2024-10-03 | End: 2024-10-05 | Stop reason: HOSPADM

## 2024-10-03 RX ORDER — DEXAMETHASONE SODIUM PHOSPHATE 4 MG/ML
INJECTION, SOLUTION INTRA-ARTICULAR; INTRALESIONAL; INTRAMUSCULAR; INTRAVENOUS; SOFT TISSUE PRN
Status: DISCONTINUED | OUTPATIENT
Start: 2024-10-03 | End: 2024-10-03

## 2024-10-03 RX ORDER — ONDANSETRON 2 MG/ML
INJECTION INTRAMUSCULAR; INTRAVENOUS PRN
Status: DISCONTINUED | OUTPATIENT
Start: 2024-10-03 | End: 2024-10-03

## 2024-10-03 RX ORDER — HYDROMORPHONE HCL IN WATER/PF 6 MG/30 ML
0.4 PATIENT CONTROLLED ANALGESIA SYRINGE INTRAVENOUS
Status: DISCONTINUED | OUTPATIENT
Start: 2024-10-03 | End: 2024-10-05 | Stop reason: HOSPADM

## 2024-10-03 RX ORDER — SODIUM CHLORIDE, SODIUM LACTATE, POTASSIUM CHLORIDE, CALCIUM CHLORIDE 600; 310; 30; 20 MG/100ML; MG/100ML; MG/100ML; MG/100ML
INJECTION, SOLUTION INTRAVENOUS CONTINUOUS
Status: DISCONTINUED | OUTPATIENT
Start: 2024-10-03 | End: 2024-10-03 | Stop reason: HOSPADM

## 2024-10-03 RX ORDER — KETOROLAC TROMETHAMINE 15 MG/ML
15 INJECTION, SOLUTION INTRAMUSCULAR; INTRAVENOUS EVERY 6 HOURS
Status: DISCONTINUED | OUTPATIENT
Start: 2024-10-03 | End: 2024-10-05 | Stop reason: HOSPADM

## 2024-10-03 RX ORDER — KETOROLAC TROMETHAMINE 30 MG/ML
INJECTION, SOLUTION INTRAMUSCULAR; INTRAVENOUS PRN
Status: DISCONTINUED | OUTPATIENT
Start: 2024-10-03 | End: 2024-10-03

## 2024-10-03 RX ORDER — FENTANYL CITRATE 50 UG/ML
25 INJECTION, SOLUTION INTRAMUSCULAR; INTRAVENOUS EVERY 5 MIN PRN
Status: DISCONTINUED | OUTPATIENT
Start: 2024-10-03 | End: 2024-10-03 | Stop reason: HOSPADM

## 2024-10-03 RX ORDER — ONDANSETRON 2 MG/ML
4 INJECTION INTRAMUSCULAR; INTRAVENOUS EVERY 6 HOURS PRN
Status: DISCONTINUED | OUTPATIENT
Start: 2024-10-03 | End: 2024-10-05 | Stop reason: HOSPADM

## 2024-10-03 RX ORDER — POLYETHYLENE GLYCOL 3350 17 G/17G
17 POWDER, FOR SOLUTION ORAL DAILY PRN
Status: DISCONTINUED | OUTPATIENT
Start: 2024-10-04 | End: 2024-10-05 | Stop reason: HOSPADM

## 2024-10-03 RX ORDER — CEFAZOLIN SODIUM/WATER 3 G/30 ML
3 SYRINGE (ML) INTRAVENOUS
Status: DISCONTINUED | OUTPATIENT
Start: 2024-10-03 | End: 2024-10-03 | Stop reason: HOSPADM

## 2024-10-03 RX ORDER — IOPAMIDOL 755 MG/ML
135 INJECTION, SOLUTION INTRAVASCULAR ONCE
Status: COMPLETED | OUTPATIENT
Start: 2024-10-03 | End: 2024-10-03

## 2024-10-03 RX ORDER — ONDANSETRON 4 MG/1
4 TABLET, ORALLY DISINTEGRATING ORAL EVERY 6 HOURS PRN
Status: DISCONTINUED | OUTPATIENT
Start: 2024-10-03 | End: 2024-10-05 | Stop reason: HOSPADM

## 2024-10-03 RX ORDER — PROCHLORPERAZINE MALEATE 10 MG
10 TABLET ORAL EVERY 6 HOURS PRN
Status: DISCONTINUED | OUTPATIENT
Start: 2024-10-03 | End: 2024-10-05 | Stop reason: HOSPADM

## 2024-10-03 RX ORDER — LIDOCAINE HYDROCHLORIDE 20 MG/ML
INJECTION, SOLUTION INFILTRATION; PERINEURAL PRN
Status: DISCONTINUED | OUTPATIENT
Start: 2024-10-03 | End: 2024-10-03

## 2024-10-03 RX ORDER — ACETAMINOPHEN 325 MG/1
975 TABLET ORAL ONCE
Status: COMPLETED | OUTPATIENT
Start: 2024-10-03 | End: 2024-10-03

## 2024-10-03 RX ORDER — DEXAMETHASONE SODIUM PHOSPHATE 4 MG/ML
4 INJECTION, SOLUTION INTRA-ARTICULAR; INTRALESIONAL; INTRAMUSCULAR; INTRAVENOUS; SOFT TISSUE
Status: DISCONTINUED | OUTPATIENT
Start: 2024-10-03 | End: 2024-10-03 | Stop reason: HOSPADM

## 2024-10-03 RX ORDER — ONDANSETRON 2 MG/ML
4 INJECTION INTRAMUSCULAR; INTRAVENOUS EVERY 30 MIN PRN
Status: DISCONTINUED | OUTPATIENT
Start: 2024-10-03 | End: 2024-10-03 | Stop reason: HOSPADM

## 2024-10-03 RX ORDER — BISACODYL 10 MG
10 SUPPOSITORY, RECTAL RECTAL DAILY PRN
Status: DISCONTINUED | OUTPATIENT
Start: 2024-10-03 | End: 2024-10-05 | Stop reason: HOSPADM

## 2024-10-03 RX ORDER — NALOXONE HYDROCHLORIDE 0.4 MG/ML
0.1 INJECTION, SOLUTION INTRAMUSCULAR; INTRAVENOUS; SUBCUTANEOUS
Status: DISCONTINUED | OUTPATIENT
Start: 2024-10-03 | End: 2024-10-03 | Stop reason: HOSPADM

## 2024-10-03 RX ORDER — LIDOCAINE 40 MG/G
CREAM TOPICAL
Status: DISCONTINUED | OUTPATIENT
Start: 2024-10-03 | End: 2024-10-05 | Stop reason: HOSPADM

## 2024-10-03 RX ADMIN — SODIUM CHLORIDE, POTASSIUM CHLORIDE, SODIUM LACTATE AND CALCIUM CHLORIDE: 600; 310; 30; 20 INJECTION, SOLUTION INTRAVENOUS at 17:15

## 2024-10-03 RX ADMIN — HYDROMORPHONE HYDROCHLORIDE 0.5 MG: 1 INJECTION, SOLUTION INTRAMUSCULAR; INTRAVENOUS; SUBCUTANEOUS at 07:00

## 2024-10-03 RX ADMIN — MIDAZOLAM 2 MG: 1 INJECTION INTRAMUSCULAR; INTRAVENOUS at 15:20

## 2024-10-03 RX ADMIN — PROPOFOL 50 MCG/KG/MIN: 10 INJECTION, EMULSION INTRAVENOUS at 15:45

## 2024-10-03 RX ADMIN — LIDOCAINE HYDROCHLORIDE 80 MG: 20 INJECTION, SOLUTION INFILTRATION; PERINEURAL at 15:29

## 2024-10-03 RX ADMIN — KETOROLAC TROMETHAMINE 15 MG: 15 INJECTION, SOLUTION INTRAMUSCULAR; INTRAVENOUS at 23:01

## 2024-10-03 RX ADMIN — HYDROMORPHONE HYDROCHLORIDE 0.2 MG: 1 INJECTION, SOLUTION INTRAMUSCULAR; INTRAVENOUS; SUBCUTANEOUS at 19:26

## 2024-10-03 RX ADMIN — Medication 3 G: at 15:38

## 2024-10-03 RX ADMIN — DEXAMETHASONE SODIUM PHOSPHATE 8 MG: 4 INJECTION, SOLUTION INTRAMUSCULAR; INTRAVENOUS at 15:46

## 2024-10-03 RX ADMIN — SODIUM CHLORIDE, POTASSIUM CHLORIDE, SODIUM LACTATE AND CALCIUM CHLORIDE 1000 ML: 600; 310; 30; 20 INJECTION, SOLUTION INTRAVENOUS at 22:24

## 2024-10-03 RX ADMIN — SUGAMMADEX 200 MG: 100 INJECTION, SOLUTION INTRAVENOUS at 18:04

## 2024-10-03 RX ADMIN — KETOROLAC TROMETHAMINE 30 MG: 30 INJECTION, SOLUTION INTRAMUSCULAR at 17:01

## 2024-10-03 RX ADMIN — ONDANSETRON 4 MG: 2 INJECTION INTRAMUSCULAR; INTRAVENOUS at 17:01

## 2024-10-03 RX ADMIN — SODIUM CHLORIDE 1000 ML: 9 INJECTION, SOLUTION INTRAVENOUS at 07:45

## 2024-10-03 RX ADMIN — SODIUM CHLORIDE, POTASSIUM CHLORIDE, SODIUM LACTATE AND CALCIUM CHLORIDE: 600; 310; 30; 20 INJECTION, SOLUTION INTRAVENOUS at 15:08

## 2024-10-03 RX ADMIN — FENTANYL CITRATE 50 MCG: 50 INJECTION INTRAMUSCULAR; INTRAVENOUS at 18:58

## 2024-10-03 RX ADMIN — HYDROMORPHONE HYDROCHLORIDE 0.2 MG: 1 INJECTION, SOLUTION INTRAMUSCULAR; INTRAVENOUS; SUBCUTANEOUS at 19:14

## 2024-10-03 RX ADMIN — HYDROMORPHONE HYDROCHLORIDE 0.5 MG: 1 INJECTION, SOLUTION INTRAMUSCULAR; INTRAVENOUS; SUBCUTANEOUS at 10:48

## 2024-10-03 RX ADMIN — PROPOFOL 200 MG: 10 INJECTION, EMULSION INTRAVENOUS at 15:29

## 2024-10-03 RX ADMIN — FENTANYL CITRATE 100 MCG: 50 INJECTION INTRAMUSCULAR; INTRAVENOUS at 15:23

## 2024-10-03 RX ADMIN — MIDAZOLAM 2 MG: 1 INJECTION INTRAMUSCULAR; INTRAVENOUS at 15:08

## 2024-10-03 RX ADMIN — HYDROMORPHONE HYDROCHLORIDE 0.5 MG: 1 INJECTION, SOLUTION INTRAMUSCULAR; INTRAVENOUS; SUBCUTANEOUS at 16:03

## 2024-10-03 RX ADMIN — ONDANSETRON 4 MG: 2 INJECTION INTRAMUSCULAR; INTRAVENOUS at 19:59

## 2024-10-03 RX ADMIN — Medication 50 MG: at 15:31

## 2024-10-03 RX ADMIN — FENTANYL CITRATE 25 MCG: 50 INJECTION INTRAMUSCULAR; INTRAVENOUS at 18:53

## 2024-10-03 RX ADMIN — Medication 20 MG: at 16:34

## 2024-10-03 RX ADMIN — PROPOFOL 50 MG: 10 INJECTION, EMULSION INTRAVENOUS at 15:32

## 2024-10-03 RX ADMIN — HYDROMORPHONE HYDROCHLORIDE 0.5 MG: 1 INJECTION, SOLUTION INTRAMUSCULAR; INTRAVENOUS; SUBCUTANEOUS at 17:18

## 2024-10-03 RX ADMIN — IOPAMIDOL 135 ML: 755 INJECTION, SOLUTION INTRAVENOUS at 08:53

## 2024-10-03 RX ADMIN — ONDANSETRON 4 MG: 2 INJECTION INTRAMUSCULAR; INTRAVENOUS at 06:59

## 2024-10-03 ASSESSMENT — ACTIVITIES OF DAILY LIVING (ADL)
ADLS_ACUITY_SCORE: 35
ADLS_ACUITY_SCORE: 37
ADLS_ACUITY_SCORE: 35
ADLS_ACUITY_SCORE: 37
ADLS_ACUITY_SCORE: 35
ADLS_ACUITY_SCORE: 37
ADLS_ACUITY_SCORE: 24
ADLS_ACUITY_SCORE: 35

## 2024-10-03 ASSESSMENT — COLUMBIA-SUICIDE SEVERITY RATING SCALE - C-SSRS
2. HAVE YOU ACTUALLY HAD ANY THOUGHTS OF KILLING YOURSELF IN THE PAST MONTH?: NO
6. HAVE YOU EVER DONE ANYTHING, STARTED TO DO ANYTHING, OR PREPARED TO DO ANYTHING TO END YOUR LIFE?: NO
1. IN THE PAST MONTH, HAVE YOU WISHED YOU WERE DEAD OR WISHED YOU COULD GO TO SLEEP AND NOT WAKE UP?: NO

## 2024-10-03 NOTE — PROGRESS NOTES
PACU to Inpatient Nursing Handoff    Patient Batool Louis is a 24 year old female who speaks English.   Procedure Procedure(s):  Right OOPHORECTOMY, OPEN; Possible right salpingectomy pelvic exam under anesthesia; pelvic washings   Surgeon(s) Primary: Winifred Calderon MD  Resident - Assisting: Amelia Blue MD     Allergies   Allergen Reactions    Shellfish-Derived Products Nausea and Vomiting     Possible allergy- pt unsure.     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    Anesthesia General   Dermatome Level     Preop Meds Not applicable   Nerve block Not applicable   Intraop Meds dexamethasone (Decadron)  fentanyl (Sublimaze): 100 mcg total  hydromorphone (Dilaudid): 0.5 mg total  ketorolac (Toradol): last given at 1701  ondansetron (Zofran): last given at 1701  Versed 2mg   Local Meds No   Antibiotics cefazolin (Ancef) - last given at 1538     Pain Patient Currently in Pain: yes   PACU meds  fentanyl (Sublimaze): 75 mcg (total dose) last given at 1858   hydromorphone (Dilaudid): 0.4 mg (total dose) last given at 1926    PCA / epidural No   Capnography     Telemetry     Inpatient Telemetry Monitor Ordered? No        Labs Glucose Lab Results   Component Value Date     10/03/2024    GLC 92 07/10/2020       Hgb Lab Results   Component Value Date    HGB 12.7 10/03/2024    HGB 13.0 04/03/2019       INR Lab Results   Component Value Date    INR 0.99 07/03/2023      PACU Imaging Not applicable     Wound/Incision Incision/Surgical Site 10/03/24 Lower Abdomen (Active)   Incision Assessment UTV 10/03/24 1900   Dressing Per Plan of Care 10/03/24 1900   Shira-Incision Assessment UTV 10/03/24 1900   Closure Other (Comment) 10/03/24  1900   Incision Drainage Amount None 10/03/24 1900   Dressing Intervention Clean, dry, intact 10/03/24 1900   Number of days: 0      CMS        Equipment Not applicable   Other LDA         IV Access Peripheral IV 10/03/24 Left Antecubital fossa (Active)   Site Assessment WDL 10/03/24 1830   Line Status Infusing 10/03/24 1830   Dressing Transparent 10/03/24 1352   Dressing Status clean;dry;intact 10/03/24 1830   Dressing Intervention New dressing  10/03/24 0659   Phlebitis Scale 0-->no symptoms 10/03/24 1830   Infiltration? no 10/03/24 0659   Number of days: 0      Blood Products Not applicable EBL  mL   Intake/Output Date 10/03/24 0700 - 10/04/24 0659   Shift 4482-9676 0849-1820 2383-8862 24 Hour Total   INTAKE   I.V.  1000  1000   Shift Total(mL/kg)  1000(7.87)  1000(7.87)   OUTPUT   Shift Total(mL/kg)       Weight (kg) 127.01 127.01 127.01 127.01      Drains / Sorto Urethral Catheter 10/03/24 Double-lumen;Straight-tip;Non-latex 16 fr (Active)   Tube Description Other (Comment) 10/03/24 1900   Catheter Care Done 10/03/24 1900   Collection Container Standard;Patent 10/03/24 1900   Securement Method Securing device (Describe) 10/03/24 1900   Rationale for Continued Use Surgical procedure 10/03/24 1900   Number of days: 0      Time of void PreOp Time of Void Prior to Procedure: 1356 (10/03/24 1355)    PostOp      Diapered? No   Bladder Scan     PO    PACU     Vitals    B/P: 134/84  T: 98.1  F (36.7  C)    Temp src: Axillary  P:  Pulse: 108 (10/03/24 1945)          R: 21  O2:  SpO2: 95 %    O2 Device: Oxymask (10/03/24 1900)              Family/support present mother and father   Patient belongings     Patient transported on cart   DC meds/scripts (obs/outpt) Not applicable   Inpatient Pain Meds Released? No       Special needs/considerations None   Tasks needing completion Plan:   - To OR for open right oophorectomy, pelvic exam under anesthesia, pelvic washings. Discussed this plan, along with risks and benefits,  with the patients, and she is agreeable with plan. All questions addressed.   - Follow up tumor marker labs  - Pain: Post-operative scheduled Ibuprofen (avoid Tylenol due to alpha-1 antitrypsin), and PRN oxycodone/dilaudid while not tolerating PO.  - NPO pre-op; general diet post op  - : Monitor I&Os  - Repeat CBC in AM  - ID: Shira-operative Ancef; monitor for fevers  - PPX: SCDs for DVT prophylaxis     Dispo: Admit overnight, anticipate discharge in 1-2 days.       Csai Haas RN  [facility]

## 2024-10-03 NOTE — ED TRIAGE NOTES
Pt woke up with RLQ pain with nausea and vomiting, tender on palpation. Pt also reporting chills. Pt has had no abdominal surgeries and denies possibility of pregnancy (IUD in place). Pt received 4mg ODT zofran and 75mcg fentanyl IM right deltoid. +guarding RLQ

## 2024-10-03 NOTE — ED NOTES
Bed: ED04  Expected date:   Expected time:   Means of arrival:   Comments:  Hold for [facility]

## 2024-10-03 NOTE — ED PROVIDER NOTES
ED Provider Note  [facility]      History     Chief Complaint   Patient presents with    Abdominal Pain     RLQ     HPI  Batool Louis is a 24 year old female who presents show right lower quadrant pain.  Started approximate 4:45 AM and woke her from sleep.  Is severe and located in the right lower quadrant.  She is brought in by EMS.  Slightly radiates into her back.  Has been urinating without issue.  No dysuria, vaginal discharge, bleeding, or any other symptoms.  Denies any fevers although she does endorse chills.    Past Medical History  Past Medical History:   Diagnosis Date    Amenorrhea, secondary      Past Surgical History:   Procedure Laterality Date    NO HISTORY OF SURGERY       cetirizine (ZYRTEC) 10 MG tablet  fluconazole (DIFLUCAN) 150 MG tablet  FLUoxetine (PROZAC) 40 MG capsule  fluticasone (FLONASE) 50 MCG/ACT nasal spray  hydrOXYzine HCl (ATARAX) 25 MG tablet  levonorgestrel (MIRENA) 52 MG (20 mcg/day) IUD      Allergies   Allergen Reactions    Shellfish-Derived Products Nausea and Vomiting     Possible allergy- pt unsure.     Sulfamethoxazole-Trimethoprim Hives    Seasonal Allergies Other (See Comments)     Nasal congestion     Family History  Family History   Problem Relation Age of Onset    Anemia Mother         unknown reason    No Known Problems Father     Asthma Sister     Back Pain Maternal Grandmother     Alzheimer Disease Maternal Grandfather     No Known Problems Paternal Grandmother     Cancer Paternal Grandfather         pancreatic    Skin Cancer Paternal Great-Grandfather      Social History   Social History     Tobacco Use    Smoking status: Never    Smokeless tobacco: Never   Vaping Use    Vaping status: Never Used   Substance Use Topics    Alcohol use: Yes     Comment: socially    Drug use: No      A medically appropriate review of systems was performed with pertinent positives and negatives noted in the HPI, and all other systems  "negative.    Physical Exam   BP: (!) 142/80  Pulse: 84  Temp: 98.8  F (37.1  C)  Resp: 20  Height: 175.3 cm (5' 9\")  Weight: 127 kg (280 lb)  SpO2: 100 %  Physical Exam  Vitals and nursing note reviewed.   Constitutional:       General: She is not in acute distress.     Appearance: Normal appearance.   HENT:      Head: Normocephalic.      Nose: Nose normal.   Eyes:      Pupils: Pupils are equal, round, and reactive to light.   Cardiovascular:      Rate and Rhythm: Normal rate and regular rhythm.   Pulmonary:      Effort: Pulmonary effort is normal.   Abdominal:      General: There is no distension.      Palpations: Abdomen is soft.      Tenderness: There is abdominal tenderness.      Comments: Severe tenderness palpation in the right lower quadrant.  Guarding present.  No rebound tenderness.  Abdomen soft and nondistended.   Musculoskeletal:         General: No deformity. Normal range of motion.      Cervical back: Normal range of motion.   Skin:     General: Skin is warm.   Neurological:      Mental Status: She is alert and oriented to person, place, and time.   Psychiatric:         Mood and Affect: Mood normal.         ED Course, Procedures, & Data        No results found for any visits on 10/03/24.  Medications   HYDROmorphone (PF) (DILAUDID) injection 0.5 mg (has no administration in time range)   ondansetron (ZOFRAN) injection 4 mg (has no administration in time range)     Labs Ordered and Resulted from Time of ED Arrival to Time of ED Departure - No data to display  CT Abdomen Pelvis w Contrast    (Results Pending)          Critical care was not performed.     Medical Decision Making  The patient's presentation was of moderate complexity (an acute illness with systemic symptoms).    The patient's evaluation involved:  ordering and/or review of 3+ test(s) in this encounter (see separate area of note for details)    The patient's management necessitated further care after sign-out to Dr Paris (see their note " for further management).    Assessment & Plan    Patient presents the ED for evaluation of right lower quadrant pain.  On arrival, she is very uncomfortable due to her level of pain.  Severe tenderness in the right lower quadrant without obvious signs of peritonitis.  Differential diagnosis includes appendicitis, ureterolithiasis, pyelonephritis, ovarian torsion.      plan for CT abdomen pelvis with IV contrast, CBC, CMP, UA, urine hCG.  Will treat with hydromorphone and Zofran.  Plan to sign out to the morning provider plan to follow-up on lab/imaging and disposition accordingly.    I have reviewed the nursing notes. I have reviewed the findings, diagnosis, plan and need for follow up with the patient.    New Prescriptions    No medications on file       Final diagnoses:   RLQ abdominal pain       Darwin Chapman DO  [facility] EMERGENCY DEPARTMENT  10/3/2024     Darwin Chapman DO  10/03/24 0653

## 2024-10-03 NOTE — H&P
Gynecology History & Physical     Batool Louis  MRN: [id number]  : 2000    Date of Admission: 10/3/2024  Primary Care Provider: Nancy Donnelly      Chief Complaint: RLQ pain    History of Present Illness:   Batool Louis is a 24 year old  with no significant medical history presenting with RLQ pain. Sudden onset around 4:45 in the morning, which woke her up. The pain was uncontrollable and she went to the ED. Her pain is well-controlled now. She has not eaten anything since last night.    History of dermoid cyst noted in 2024 ultrasound.    She noted some associated nausea which is now improved. No emesis, diarrhea, constipation. No vaginal bleeding or discharge. No prior abdominal surgical history.    Gyn Hx:  Contraception: Mirena IUD H/o STIs: Did not ask, parents in room.    OB Hx:     ROS: negative unless stated in HPI    Past Medical History:  Alpha-1 antitrypsin deficiency: Heterozygote, having a phenotype of  Pi MZ   Past Medical History:   Diagnosis Date    Amenorrhea, secondary          Past Surgical History:   Past Surgical History:   Procedure Laterality Date    NO HISTORY OF SURGERY           Social History:  Social History     Tobacco Use    Smoking status: Never    Smokeless tobacco: Never   Vaping Use    Vaping status: Never Used   Substance Use Topics    Alcohol use: Yes     Comment: socially    Drug use: No       Family History:   Family History   Problem Relation Age of Onset    Anemia Mother         unknown reason    No Known Problems Father     Asthma Sister     Back Pain Maternal Grandmother     Alzheimer Disease Maternal Grandfather     No Known Problems Paternal Grandmother     Cancer Paternal Grandfather         pancreatic    Skin Cancer Paternal Great-Grandfather      No family history of  cancers (ovarian, cervical, uterine, breast or colon). No family h/o VTE.     Allergies:     Allergies   Allergen Reactions    Shellfish-Derived Products Nausea  and Vomiting     Possible allergy- pt unsure.     Sulfamethoxazole-Trimethoprim Hives    Seasonal Allergies Other (See Comments)     Nasal congestion           Physical Exam:  Temp: 98.8  F (37.1  C) Temp  Min: 98.8  F (37.1  C)  Max: 98.8  F (37.1  C)  Resp: 20 Resp  Min: 20  Max: 20  SpO2: 97 % SpO2  Min: 95 %  Max: 100 %  Pulse: 84 Pulse  Min: 84  Max: 84    No data recorded  BP: (!) 144/83 Systolic (24hrs), Av , Min:114 , Max:147  Diastolic (24hrs), Av, Min:66, Max:91   General: NAD, appears comfortable  Resp: no respiratory distress,  CV: RRR  Abdomen: soft, nondistended, tender in RLQ. No rigidity. No organomegaly  :Deferred  Ext: Warm, well perfused, no edema  Neuro: appears intact grossly moving all 4 extremities equally.  Skin: No rashes or ecchymoses noted.      Labs:     Date Value Ref Range Status   10/03/2024 6 <=38 U/mL Final     Lactic Acid   Date Value Ref Range Status   10/03/2024 1.8 0.7 - 2.0 mmol/L Final     Lactic Acid, Initial   Date Value Ref Range Status   10/03/2024 2.3 (H) 0.7 - 2.0 mmol/L Final     AFP tumor marker   Date Value Ref Range Status   10/03/2024 2.1 <=8.3 ng/mL Final     Lactate Dehydrogenase   Date Value Ref Range Status   10/03/2024 171 0 - 250 U/L Final     CEA   Date Value Ref Range Status   10/03/2024 0.9 ng/mL Final     Comment:     Nonsmoker (past/never) <=5.0 ng/mL   Current smoker <=6.5 ng/mL     This result is obtained using the Roche Elecsys CEA method on the karuna e801 immunoassay analyzer. Results obtained with different assay methods or kits cannot be used interchangeably.     Hemoglobin   Date Value Ref Range Status   10/03/2024 12.7 11.7 - 15.7 g/dL Final   2019 13.0 11.7 - 15.7 g/dL Final     WBC   Date Value Ref Range Status   2019 6.8 4.0 - 11.0 10e9/L Final     WBC Count   Date Value Ref Range Status   10/03/2024 9.1 4.0 - 11.0 10e3/uL Final     Platelet Count   Date Value Ref Range Status   10/03/2024 275 150 - 450 10e3/uL Final    04/03/2019 278 150 - 450 10e9/L Final         Imaging:  EXAMINATION: US PELVIS COMPLETE W TRANSVAGINAL AND DOPPLER LIMITED,  10/3/2024 11:28 AM      COMPARISON: Pelvic ultrasound 4/1/2024 and same day CT abdomen and  pelvis.     HISTORY: Pelvic pain large right ovarian cyst???evaluate for torsion     TECHNIQUE: The pelvis was scanned in standard fashion with  transabdominal and transvaginal transducer(s) using both grey scale  and color Doppler techniques.     FINDINGS:  The uterus measures 8.2 x 5.3 x 3.4, and there is no evidence of a  focal fibroid.  The endometrium is within normal limits and measures 4  mm. There is no free fluid in the pelvis. IUD is appropriately placed  within the endometrial cavity.     The right ovary measures 10.4 x 14.7 x 10.1 and the left ovary  measures 3.6 x 2.4 x 3.  There is normal blood flow to the left ovary.  The right ovary has normal arterial flow, however does not exclude the  torsion. Venous flow is not detected but is often difficult to detect.  Anechoic, cystic lesion within the right ovary measuring 9.2 x 12.7 x  8.8 cm (underestimated based on CT), previously measuring up to 8.7 cm  in 4/2024. This lesion demonstrates multiple loculations with thick  septations with low level echoes.  There is an echogenic focus  consistent with fat.                                                                      IMPRESSION:   1. Exam remains concerning for right ovarian torsion given the  increase in size and clinical presentation in the setting of a  teratoma.  While arterial flow is detected, this does not rule out  torsion, as the ovary can torse and de-torse and has two separate  arterial supplies which can disguise torsion.  A lack of venous flow  which is more specific for torsion was not detected however venous  flow is often difficult to detect.  Ultimately, the diagnosis is a  clinical judgement.       2. Complex cystic lesion within the right ovary, with focal  fat  suggestive of a teratoma.     I have personally reviewed the examination and initial interpretation  and I agree with the findings.     DELIA CORONADO MD     EXAMINATION: CT ABDOMEN PELVIS W CONTRAST, 10/3/2024 9:08 AM     INDICATION: RLQ pain     COMPARISON STUDY: Pelvic ultrasound 4/1/2024     TECHNIQUE: CT scan of the abdomen and pelvis was performed on  multidetector CT scanner using volumetric acquisition technique and  images were reconstructed in multiple planes with variable thickness  and reviewed on dedicated workstations.      CONTRAST: 135cc of isovue 370 injected IV without oral contrast     CT scan radiation dose is optimized to minimum requisite dose using  automated dose modulation techniques.     FINDINGS:     Support devices: None.     Lower thorax: Normal.     Liver: No focal mass lesions. No intrahepatic biliary ductal  dilatation. Hypoattenuation of the hepatic parenchyma represent  hepatic steatosis.     Gallbladder and bile ducts: No stones, gallbladder wall thickening, or  pericholecystic fluid. No extrahepatic biliary ductal dilation.     Spleen: Unremarkable.     Pancreas: Normal CT appearance.      Adrenals: No nodules.      Kidneys and ureters: No solid lesions. No calculi. No  hydroureteronephrosis.     Bladder: Unremarkable.     Reproductive: IUD in place within the uterus. Within the right  anterior pelvis there is a 15.1 x 7.8 x 12.8 cm complex cystic lesion  (series 4 image 248, series 6 image 50), which demonstrates a few thin  septations in the superior aspect as well as nodular component. There  is a focal area of fat within the superior/posterior aspect measuring  approximately 8 mm (series 4 image 247). This lesion likely originates  from the right ovary. On ultrasound 4/1/2024 there was a complex  cystic lesion which measured 7.8 x 6.8 x 7.5 cm.     Bowel: Unremarkable.     Appendix: Normal, seen within the right lower quadrant.     Mesentery/Peritoneum:  Unremarkable.     Lymph nodes: Scattered small abdominal nodes, none meeting CT criteria  for pathologic enlargement.     Vasculature: Normal, without aneurysm or significant atherosclerotic  disease.     Bone windows: No aggressive osseous abnormalities.     Soft tissues: Unremarkable.                                                                      IMPRESSION:   1. Possible right ovarian torsion - Enlarged complex pelvic lesion  likely originating from the right ovary, measuring up to 15.1 cm. 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  Given the clinical  presentation and the substantial increase in size since prior  ultrasound 2024, this is concerning for possible torsion.     2. Otherwise, no acute abnormality of the abdomen or pelvis.  3. Hypoattenuation of the hepatic parenchyma, likely to represent  hepatic steatosis.     I have personally reviewed the examination and initial interpretation  and I agree with the findings.     DELIA CORONADO MD     Impression:  Batool Louis is a 24 year old  female presenting with RLQ pain in the setting of right ovarian torsion 2/2 likely dermoid cyst (9.2 x 12.7 x 8.8 cm). Available tumor markers within normal limits.    Plan:   - To OR for open right oophorectomy, pelvic exam under anesthesia, pelvic washings. Discussed this plan, along with risks and benefits, with the patients, and she is agreeable with plan. All questions addressed.   - Follow up tumor marker labs  - Pain: Post-operative scheduled Ibuprofen (avoid Tylenol due to alpha-1 antitrypsin), and PRN oxycodone/dilaudid while not tolerating PO.  - NPO pre-op; general diet post op  - : Monitor I&Os  - Repeat CBC in AM  - ID: Shira-operative Ancef; monitor for fevers  - PPX: SCDs for DVT prophylaxis    Dispo: Admit overnight, anticipate discharge in 1-2 days.    Patient seen and care plan discussed under supervision of Dr. Calderon.    Amelia Blue MD PGY-1  10/03/24  2:02 PM     OBGYN Attending Attestation    I, Winifred Calderon, saw Batool Louis with the resident, Dr. Blue, and agree with their findings and plan of care as documented in the above note.     Key findings:   - 15cm complex right ovarian cyst, significantly increased in size from imaging in 4/2024.  - Discussed with Gyn Onc Fellow who feels it overall looks benign and is reasonable for benign gynecology to take her to the OR.  - Tumor markers obtained and plan for pelvic washings.     Plan to proceed to the Or urgently.    Winifred Calderon MD  [facility], Ob/Gyn  10/04/2024 4:41 PM

## 2024-10-03 NOTE — ED NOTES
Report given from [place], RN and EMS. Informed pt to change into gown. No family currently present. Pt has IV placed from [facility]. Pt speaking with OR currently. Pre-Op called, they will put in transport to get pt. Pain management good and denies nausea per pt.

## 2024-10-03 NOTE — LETTER
[facility] [address]  [address]  [facility] [address]  Phone: [phone]  Fax: [phone]    October 5, 2024        Batool Louis  [address] [place] [address]          To whom it may concern:    RE: Batool Louis    Dodie was seen by our service for surgery on 10/3/24.    Dodie may return to school 6 weeks post operatively on November 14th, 2024. She will be unable to perform strenuous activities or lift, carry, push, and pull no more than 15 lbs until at least November 14th, 2024. She will be unable to drive for around 1-2 weeks.    Please contact me for questions or concerns.      Sincerely,    Amelia Blue MD PGY-1  10/05/24 1:31 PM

## 2024-10-03 NOTE — ANESTHESIA PREPROCEDURE EVALUATION
Anesthesia Pre-Procedure Evaluation    Patient: Batool Louis   MRN: [id number] : 2000        Procedure : Procedure(s):  Right OOPHORECTOMY, OPEN; Possible right salpingectomy pelvic exam under anesthesia; pelvic washings          Past Medical History:   Diagnosis Date    Amenorrhea, secondary       Past Surgical History:   Procedure Laterality Date    NO HISTORY OF SURGERY        Allergies   Allergen Reactions    Shellfish-Derived Products Nausea and Vomiting     Possible allergy- pt unsure.     Sulfamethoxazole-Trimethoprim Hives    Seasonal Allergies Other (See Comments)     Nasal congestion      Social History     Tobacco Use    Smoking status: Never    Smokeless tobacco: Never   Substance Use Topics    Alcohol use: Yes     Comment: socially      Wt Readings from Last 1 Encounters:   10/03/24 127 kg (280 lb)        Anesthesia Evaluation   Pt has not had prior anesthetic         ROS/MED HX  ENT/Pulmonary:     (+) sleep apnea (pt for sleep study on Monday. She anticipates requireing CPAP),    THOR risk factors, snores loudly,  obese,  observed stopped breathing,                              Neurologic:  - neg neurologic ROS     Cardiovascular:  - neg cardiovascular ROS     METS/Exercise Tolerance:     Hematologic:  - neg hematologic  ROS     Musculoskeletal:  - neg musculoskeletal ROS     GI/Hepatic: Comment: Alpha-1 antitrypsin deficiency    (+)             liver disease,       Renal/Genitourinary:       Endo:  - neg endo ROS     Psychiatric/Substance Use:       Infectious Disease:  - neg infectious disease ROS     Malignancy:  - neg malignancy ROS     Other: Comment: Per patient had been treated for an eating disorder in the past which is under control currently.            Physical Exam    Airway        Mallampati: IV   TM distance: < 3 FB   Neck ROM: full   Mouth opening: > 3 cm    Respiratory Devices and Support         Dental       (+) Completely normal teeth      Cardiovascular    cardiovascular exam normal          Pulmonary   pulmonary exam normal                OUTSIDE LABS:  CBC:   Lab Results   Component Value Date    WBC 9.1 10/03/2024    WBC 8.1 07/03/2023    HGB 12.7 10/03/2024    HGB 13.7 07/03/2023    HCT 39.7 10/03/2024    HCT 42.4 07/03/2023     10/03/2024     07/03/2023     BMP:   Lab Results   Component Value Date     10/03/2024     07/03/2023    POTASSIUM 3.9 10/03/2024    POTASSIUM 3.9 07/03/2023    CHLORIDE 105 10/03/2024    CHLORIDE 100 07/03/2023    CO2 21 (L) 10/03/2024    CO2 25 07/03/2023    BUN 13.9 10/03/2024    BUN 13.1 07/03/2023    CR 0.66 10/03/2024    CR 0.75 07/03/2023     (H) 10/03/2024    GLC 86 07/03/2023     COAGS:   Lab Results   Component Value Date    PTT 28 07/03/2023    INR 0.99 07/03/2023     POC:   Lab Results   Component Value Date    HCG Negative 04/04/2024    HCGS Negative 10/03/2024     HEPATIC:   Lab Results   Component Value Date    ALBUMIN 4.4 10/03/2024    PROTTOTAL 7.7 10/03/2024    ALT 86 (H) 10/03/2024    AST 59 (H) 10/03/2024    ALKPHOS 73 10/03/2024    BILITOTAL 0.7 10/03/2024     OTHER:   Lab Results   Component Value Date    LACT 1.8 10/03/2024    REDD 9.2 10/03/2024    TSH 2.77 07/03/2023       Anesthesia Plan    ASA Status:  2    NPO Status:  NPO Appropriate    Anesthesia Type: General.     - Airway: ETT   Induction: Intravenous.   Maintenance: Balanced.   Techniques and Equipment:     - Airway: Video-Laryngoscope       Consents    Anesthesia Plan(s) and associated risks, benefits, and realistic alternatives discussed. Questions answered and patient/representative(s) expressed understanding.     - Discussed:     - Discussed with:  Patient, Parent (Mother and/or Father)      - Extended Intubation/Ventilatory Support Discussed: No.      - Patient is DNR/DNI Status: No     Use of blood products discussed: No .     Postoperative Care    Pain management: IV analgesics, Oral pain medications, Multi-modal  "analgesia.   PONV prophylaxis: Ondansetron (or other 5HT-3), Dexamethasone or Solumedrol     Comments:               Kristel Zheng MD    I have reviewed the pertinent notes and labs in the chart from the past 30 days and (re)examined the patient.  Any updates or changes from those notes are reflected in this note.                      # Severe Obesity: Estimated body mass index is 41.35 kg/m  as calculated from the following:    Height as of this encounter: 1.753 m (5' 9\").    Weight as of this encounter: 127 kg (280 lb).             "

## 2024-10-03 NOTE — ANESTHESIA CARE TRANSFER NOTE
Patient: Batool Louis    Procedure: Procedure(s):  Right OOPHORECTOMY, OPEN; right salpingectomy pelvic exam under anesthesia; pelvic washings       Diagnosis: RLQ abdominal pain [R10.31]  Ovarian torsion [N83.519]  Diagnosis Additional Information: No value filed.    Anesthesia Type:   General     Note:    Oropharynx: oropharynx clear of all foreign objects, nasal airway in place and spontaneously breathing  Level of Consciousness: drowsy  Oxygen Supplementation: face mask  Level of Supplemental Oxygen (L/min / FiO2): 8  Independent Airway: airway patency satisfactory and stable  Dentition: dentition unchanged  Vital Signs Stable: post-procedure vital signs reviewed and stable  Report to RN Given: handoff report given  Patient transferred to: PACU    Handoff Report: Identifed the Patient, Identified the Reponsible Provider, Reviewed the pertinent medical history, Discussed the surgical course, Reviewed Intra-OP anesthesia mangement and issues during anesthesia, Set expectations for post-procedure period and Allowed opportunity for questions and acknowledgement of understanding  Vitals:  Vitals Value Taken Time   /71 10/03/24 1830   Temp     Pulse 110 10/03/24 1838   Resp 19 10/03/24 1838   SpO2 94 % 10/03/24 1838   Vitals shown include unfiled device data.    Electronically Signed By: RONNY Tripp CRNA  October 3, 2024  6:38 PM

## 2024-10-03 NOTE — TELEPHONE ENCOUNTER
"Severe RLQ pain x > 1 hr  Hard to sit, painful to walk    PLAN: ED now. Have another adult drive pt to [facility] ED. Pt verbalized understanding.    Damaris Palacio RN/[place] Nurse Advisor        Reason for Disposition   [1] SEVERE pain (e.g., excruciating) AND [2] present > 1 hour    Additional Information   Negative: Shock suspected (e.g., cold/pale/clammy skin, too weak to stand, low BP, rapid pulse)   Negative: Difficult to awaken or acting confused (e.g., disoriented, slurred speech)   Negative: Passed out (i.e., lost consciousness, collapsed and was not responding)   Negative: Sounds like a life-threatening emergency to the triager   Negative: Followed an abdomen (stomach) injury   Negative: Chest pain   Negative: [1] Abdominal pain AND [2] pregnant < 20 weeks   Negative: [1] Abdominal pain AND [2] pregnant 20 or more weeks   Negative: [1] Abdominal pain AND [2] postpartum (from 0 to 6 weeks after delivery)   Negative: Pain is mainly in upper abdomen  (if needed ask: \"is it mainly above the belly button?\")   Negative: Abdomen bloating or swelling are main symptoms    Protocols used: Abdominal Pain - Female-A-AH    "

## 2024-10-03 NOTE — ANESTHESIA PROCEDURE NOTES
Airway       Patient location during procedure: OR       Procedure Start/Stop Times: 10/3/2024 3:34 PM  Staff -        CRNA: Cole Munoz APRN CRNA       Performed By: CRNA  Consent for Airway        Urgency: elective  Indications and Patient Condition       Indications for airway management: vitaliy-procedural       Induction type:intravenous       Mask difficulty assessment: 2 - vent by mask + OA or adjuvant +/- NMBA    Final Airway Details       Final airway type: endotracheal airway       Successful airway: ETT - single and Oral  Endotracheal Airway Details        ETT size (mm): 7.0       Cuffed: yes       Successful intubation technique: video laryngoscopy       VL Blade Size: MAC D Blade       Grade View of Cords: 1       Adjucts: stylet       Position: Right       Measured from: lips       Secured at (cm): 22       Bite block used: None    Post intubation assessment        Placement verified by: capnometry, equal breath sounds and chest rise        Number of attempts at approach: 1       Number of other approaches attempted: 0       Secured with: tape       Ease of procedure: easy       Dentition: Intact and Unchanged    Medication(s) Administered   Medication Administration Time: 10/3/2024 3:34 PM

## 2024-10-03 NOTE — ED PROVIDER NOTES
"     Emergency Department Patient Sign-out       Brief HPI:  This is a 24 year old female signed out to me by Dr. Paris .  See initial ED Provider note for details of the presentation.            Significant Events prior to my assuming care: Presented to the [facility] emergency department at the [place] complaining of acute severe right lower quadrant abdominal pain.  She had an extensive workup at the other campus significant for a large ovarian cyst on the right with normal arterial call but still with concern for torsion.  Transferred to the [facility] for OB/GYN consult and likely to go to the OR.  Patient is NPO since last night and states her pain currently is improved after receiving analgesics prior to transfer.      Exam:   Patient Vitals for the past 24 hrs:   BP Temp Temp src Pulse Resp SpO2 Height Weight   10/03/24 1231 (!) 144/83 -- -- -- -- 97 % -- --   10/03/24 1201 (!) 141/69 -- -- -- -- 97 % -- --   10/03/24 1130 (!) 147/76 -- -- -- -- 97 % -- --   10/03/24 1030 138/77 -- -- -- -- 97 % -- --   10/03/24 1000 (!) 140/91 -- -- -- -- 98 % -- --   10/03/24 0915 137/83 -- -- -- -- 95 % -- --   10/03/24 0913 -- -- -- -- -- 98 % -- --   10/03/24 0800 114/66 -- -- -- -- 99 % -- --   10/03/24 0747 118/66 -- -- -- -- 98 % -- --   10/03/24 0640 (!) 142/80 98.8  F (37.1  C) Oral 84 20 100 % 1.753 m (5' 9\") 127 kg (280 lb)       EXAM:  HEENT: Normal.  Oropharynx clear and moist.  Neck: Supple, trachea midline, normal voice  Chest:  No respiratory distress, speaks in complete sentences, chest wall nontender, lungs clear in all fields  CV: Regular rate and rhythm, no murmur, normal pulse, no jugular venous distention  Abdomen: Nondistended, bowel sounds present.  Tender in right lower quadrant with some voluntary guarding.  Extremities: No edema or tenderness        ED RESULTS:   Results for orders placed or performed during the hospital encounter of 10/03/24 (from the past 24 hour(s))   CBC with " platelets differential     Status: None    Collection Time: 10/03/24  6:55 AM    Narrative    The following orders were created for panel order CBC with platelets differential.  Procedure                               Abnormality         Status                     ---------                               -----------         ------                     CBC with platelets and d...[[id number]]                      Final result                 Please view results for these tests on the individual orders.   Comprehensive metabolic panel     Status: Abnormal    Collection Time: 10/03/24  6:55 AM   Result Value Ref Range    Sodium 137 135 - 145 mmol/L    Potassium 3.9 3.4 - 5.3 mmol/L    Carbon Dioxide (CO2) 21 (L) 22 - 29 mmol/L    Anion Gap 11 7 - 15 mmol/L    Urea Nitrogen 13.9 6.0 - 20.0 mg/dL    Creatinine 0.66 0.51 - 0.95 mg/dL    GFR Estimate >90 >60 mL/min/1.73m2    Calcium 9.2 8.8 - 10.4 mg/dL    Chloride 105 98 - 107 mmol/L    Glucose 122 (H) 70 - 99 mg/dL    Alkaline Phosphatase 73 40 - 150 U/L    AST 59 (H) 0 - 45 U/L    ALT 86 (H) 0 - 50 U/L    Protein Total 7.7 6.4 - 8.3 g/dL    Albumin 4.4 3.5 - 5.2 g/dL    Bilirubin Total 0.7 <=1.2 mg/dL   Lactic acid whole blood with 1x repeat in 2 hr when >2     Status: Abnormal    Collection Time: 10/03/24  6:55 AM   Result Value Ref Range    Lactic Acid, Initial 2.3 (H) 0.7 - 2.0 mmol/L   HCG qualitative Blood     Status: Normal    Collection Time: 10/03/24  6:55 AM   Result Value Ref Range    hCG Serum Qualitative Negative Negative   CBC with platelets and differential     Status: None    Collection Time: 10/03/24  6:55 AM   Result Value Ref Range    WBC Count 9.1 4.0 - 11.0 10e3/uL    RBC Count 4.60 3.80 - 5.20 10e6/uL    Hemoglobin 12.7 11.7 - 15.7 g/dL    Hematocrit 39.7 35.0 - 47.0 %    MCV 86 78 - 100 fL    MCH 27.6 26.5 - 33.0 pg    MCHC 32.0 31.5 - 36.5 g/dL    RDW 14.1 10.0 - 15.0 %    Platelet Count 275 150 - 450 10e3/uL    % Neutrophils 43 %    % Lymphocytes 49  %    % Monocytes 6 %    % Eosinophils 2 %    % Basophils 1 %    % Immature Granulocytes 0 %    NRBCs per 100 WBC 0 <1 /100    Absolute Neutrophils 3.9 1.6 - 8.3 10e3/uL    Absolute Lymphocytes 4.5 0.8 - 5.3 10e3/uL    Absolute Monocytes 0.5 0.0 - 1.3 10e3/uL    Absolute Eosinophils 0.1 0.0 - 0.7 10e3/uL    Absolute Basophils 0.1 0.0 - 0.2 10e3/uL    Absolute Immature Granulocytes 0.0 <=0.4 10e3/uL    Absolute NRBCs 0.0 10e3/uL   CEA     Status: None    Collection Time: 10/03/24  6:55 AM   Result Value Ref Range    CEA 0.9 ng/mL   Lactate Dehydrogenase     Status: Normal    Collection Time: 10/03/24  6:55 AM   Result Value Ref Range    Lactate Dehydrogenase 171 0 - 250 U/L   AFP tumor marker     Status: Normal    Collection Time: 10/03/24  6:55 AM   Result Value Ref Range    AFP tumor marker 2.1 <=8.3 ng/mL    Narrative    This result is obtained using the Roche Elecsys AFP method on  the karuna e801 immunoassay analyzer. Results obtained with different assay methods or kits cannot be used interchangeably.  Reference ranges apply to non-pregnant females only.   CT Abdomen Pelvis w Contrast     Status: Abnormal    Collection Time: 10/03/24  9:08 AM   Result Value Ref Range    Rad Flag-Addendum Possible right ovarian torsion (Urgent)     Addendum: 10/3/2024      [Urgent Result: Possible right ovarian torsion]    Finding was identified on 10/3/2024 11:01 AM.     Dr. Paris was contacted by Dr. Mcneill at 10/3/2024 11:01 AM and  verbalized understanding of the urgent finding.     DELIA CORONADO MD         SYSTEM ID:  [id number]      Narrative    EXAMINATION: CT ABDOMEN PELVIS W CONTRAST, 10/3/2024 9:08 AM    INDICATION: RLQ pain    COMPARISON STUDY: Pelvic ultrasound 4/1/2024    TECHNIQUE: CT scan of the abdomen and pelvis was performed on  multidetector CT scanner using volumetric acquisition technique and  images were reconstructed in multiple planes with variable thickness  and reviewed on dedicated workstations.      CONTRAST: 135cc of isovue 370 injected IV without oral contrast    CT scan radiation dose is optimized to minimum requisite dose using  automated dose modulation techniques.    FINDINGS:    Support devices: None.    Lower thorax: Normal.    Liver: No focal mass lesions. No intrahepatic biliary ductal  dilatation. Hypoattenuation of the hepatic parenchyma represent  hepatic steatosis.    Gallbladder and bile ducts: No stones, gallbladder wall thickening, or  pericholecystic fluid. No extrahepatic biliary ductal dilation.    Spleen: Unremarkable.    Pancreas: Normal CT appearance.     Adrenals: No nodules.     Kidneys and ureters: No solid lesions. No calculi. No  hydroureteronephrosis.    Bladder: Unremarkable.    Reproductive: IUD in place within the uterus. Within the right  anterior pelvis there is a 15.1 x 7.8 x 12.8 cm complex cystic lesion  (series 4 image 248, series 6 image 50), which demonstrates a few thin  septations in the superior aspect as well as nodular component. There  is a focal area of fat within the superior/posterior aspect measuring  approximately 8 mm (series 4 image 247). This lesion likely originates  from the right ovary. On ultrasound 4/1/2024 there was a complex  cystic lesion which measured 7.8 x 6.8 x 7.5 cm.    Bowel: Unremarkable.    Appendix: Normal, seen within the right lower quadrant.    Mesentery/Peritoneum: Unremarkable.    Lymph nodes: Scattered small abdominal nodes, none meeting CT criteria  for pathologic enlargement.    Vasculature: Normal, without aneurysm or significant atherosclerotic  disease.    Bone windows: No aggressive osseous abnormalities.    Soft tissues: Unremarkable.      Impression    IMPRESSION:   1. Possible right ovarian torsion - Enlarged complex pelvic lesion  likely originating from the right ovary, measuring up to 15.1 cm. 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  Given the clinical  presentation and the substantial increase in size  since prior  ultrasound 4/1/2024, this is concerning for possible torsion.     2. Otherwise, no acute abnormality of the abdomen or pelvis.  3. Hypoattenuation of the hepatic parenchyma, likely to represent  hepatic steatosis.    I have personally reviewed the examination and initial interpretation  and I agree with the findings.    DELIA CORONADO MD         SYSTEM ID:  [id number]   Lactic acid whole blood     Status: Normal    Collection Time: 10/03/24  9:30 AM   Result Value Ref Range    Lactic Acid 1.8 0.7 - 2.0 mmol/L   US Pelvis Cmplt w Transvag & Doppler LmtPel Duplex Limited     Status: None    Collection Time: 10/03/24 11:28 AM    Narrative    EXAMINATION: US PELVIS COMPLETE W TRANSVAGINAL AND DOPPLER LIMITED,  10/3/2024 11:28 AM     COMPARISON: Pelvic ultrasound 4/1/2024 and same day CT abdomen and  pelvis.    HISTORY: Pelvic pain large right ovarian cyst???evaluate for torsion    TECHNIQUE: The pelvis was scanned in standard fashion with  transabdominal and transvaginal transducer(s) using both grey scale  and color Doppler techniques.    FINDINGS:  The uterus measures 8.2 x 5.3 x 3.4, and there is no evidence of a  focal fibroid.  The endometrium is within normal limits and measures 4  mm. There is no free fluid in the pelvis. IUD is appropriately placed  within the endometrial cavity.    The right ovary measures 10.4 x 14.7 x 10.1 and the left ovary  measures 3.6 x 2.4 x 3.  There is normal blood flow to the left ovary.  The right ovary has normal arterial flow, however does not exclude the  torsion. Venous flow is not detected but is often difficult to detect.  Anechoic, cystic lesion within the right ovary measuring 9.2 x 12.7 x  8.8 cm (underestimated based on CT), previously measuring up to 8.7 cm  in 4/2024. This lesion demonstrates multiple loculations with thick  septations with low level echoes.  There is an echogenic focus  consistent with fat.      Impression    IMPRESSION:   1. Exam remains  concerning for right ovarian torsion given the  increase in size and clinical presentation in the setting of a  teratoma.  While arterial flow is detected, this does not rule out  torsion, as the ovary can torse and de-torse and has two separate  arterial supplies which can disguise torsion.  A lack of venous flow  which is more specific for torsion was not detected however venous  flow is often difficult to detect.  Ultimately, the diagnosis is a  clinical judgement.      2. Complex cystic lesion within the right ovary, with focal fat  suggestive of a teratoma.    I have personally reviewed the examination and initial interpretation  and I agree with the findings.    DELIA CORONADO MD         SYSTEM ID:  [id number]        Status: Normal    Collection Time: 10/03/24 11:33 AM   Result Value Ref Range     6 <=38 U/mL    Narrative    This result is obtained using the Roche Elecsys  II method on the karuna e801 immunoassay analyzer. Results obtained with different assay methods or kits cannot be used interchangeably.       ED MEDICATIONS:   Medications   HYDROmorphone (PF) (DILAUDID) injection 0.5 mg (0.5 mg Intravenous $Given 10/3/24 1048)   ondansetron (ZOFRAN) injection 4 mg (4 mg Intravenous $Given 10/3/24 0659)   acetaminophen (TYLENOL) tablet 975 mg (has no administration in time range)   ceFAZolin (ANCEF) 3 g in  mL intermittent infusion (has no administration in time range)   ceFAZolin (ANCEF) 3 g in  mL intermittent infusion (has no administration in time range)   sodium chloride 0.9% BOLUS 1,000 mL (0 mLs Intravenous Stopped 10/3/24 0924)   iopamidol (ISOVUE-370) solution 135 mL (135 mLs Intravenous $Given 10/3/24 0853)   sodium chloride (PF) 0.9% PF flush 84 mL (84 mLs Intravenous $Given 10/3/24 0853)         Impression:    ICD-10-CM    1. Ovarian torsion  N83.519 Case Request: OOPHORECTOMY, OPEN; pelvic exam under anesthesia; pevlic washings     Case Request: OOPHORECTOMY,  OPEN; pelvic exam under anesthesia; pevlic washings      2. RLQ abdominal pain  R10.31 Case Request: OOPHORECTOMY, OPEN; pelvic exam under anesthesia; pevlic washings     Case Request: OOPHORECTOMY, OPEN; pelvic exam under anesthesia; pevlic washings          Plan:    Pending studies include none.  OB/GYN consult.  Plan to take patient to the OR.  She was given IV fluids and a type and screen was added to her labs after discussion with OB.        MD Mike Whelan David, MD  10/03/24 [id number]

## 2024-10-03 NOTE — ED PROVIDER NOTES
Patient received as sign-out at change of shift.  Please see initial note for complete details.  24 year old female who presented to the ED with right lower quadrant abdominal pain..  Awaiting labs, urine, and CT  Acute events during this shift:   Results for orders placed or performed during the hospital encounter of 10/03/24   CT Abdomen Pelvis w Contrast     Status: Abnormal   Result Value Ref Range    Rad Flag-Addendum Possible right ovarian torsion (Urgent)     Narrative    EXAMINATION: CT ABDOMEN PELVIS W CONTRAST, 10/3/2024 9:08 AM    INDICATION: RLQ pain    COMPARISON STUDY: Pelvic ultrasound 4/1/2024    TECHNIQUE: CT scan of the abdomen and pelvis was performed on  multidetector CT scanner using volumetric acquisition technique and  images were reconstructed in multiple planes with variable thickness  and reviewed on dedicated workstations.     CONTRAST: 135cc of isovue 370 injected IV without oral contrast    CT scan radiation dose is optimized to minimum requisite dose using  automated dose modulation techniques.    FINDINGS:    Support devices: None.    Lower thorax: Normal.    Liver: No focal mass lesions. No intrahepatic biliary ductal  dilatation. Hypoattenuation of the hepatic parenchyma represent  hepatic steatosis.    Gallbladder and bile ducts: No stones, gallbladder wall thickening, or  pericholecystic fluid. No extrahepatic biliary ductal dilation.    Spleen: Unremarkable.    Pancreas: Normal CT appearance.     Adrenals: No nodules.     Kidneys and ureters: No solid lesions. No calculi. No  hydroureteronephrosis.    Bladder: Unremarkable.    Reproductive: IUD in place within the uterus. Within the right  anterior pelvis there is a 15.1 x 7.8 x 12.8 cm complex cystic lesion  (series 4 image 248, series 6 image 50), which demonstrates a few thin  septations in the superior aspect as well as nodular component. There  is a focal area of fat within the superior/posterior aspect  measuring  approximately 8 mm (series 4 image 247). This lesion likely originates  from the right ovary. On ultrasound 4/1/2024 there was a complex  cystic lesion which measured 7.8 x 6.8 x 7.5 cm.    Bowel: Unremarkable.    Appendix: Normal, seen within the right lower quadrant.    Mesentery/Peritoneum: Unremarkable.    Lymph nodes: Scattered small abdominal nodes, none meeting CT criteria  for pathologic enlargement.    Vasculature: Normal, without aneurysm or significant atherosclerotic  disease.    Bone windows: No aggressive osseous abnormalities.    Soft tissues: Unremarkable.      Impression    IMPRESSION:   1. Possible right ovarian torsion - Enlarged complex pelvic lesion  likely originating from the right ovary, measuring up to 15.1 cm. 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  Given the clinical  presentation and the substantial increase in size since prior  ultrasound 4/1/2024, this is concerning for possible torsion.     2. Otherwise, no acute abnormality of the abdomen or pelvis.  3. Hypoattenuation of the hepatic parenchyma, likely to represent  hepatic steatosis.    I have personally reviewed the examination and initial interpretation  and I agree with the findings.    DELIA CORONADO MD         SYSTEM ID:  [id number]   US Pelvis Cmplt w Transvag & Doppler LmtPel Duplex Limited     Status: None    Narrative    EXAMINATION: US PELVIS COMPLETE W TRANSVAGINAL AND DOPPLER LIMITED,  10/3/2024 11:28 AM     COMPARISON: Pelvic ultrasound 4/1/2024 and same day CT abdomen and  pelvis.    HISTORY: Pelvic pain large right ovarian cyst???evaluate for torsion    TECHNIQUE: The pelvis was scanned in standard fashion with  transabdominal and transvaginal transducer(s) using both grey scale  and color Doppler techniques.    FINDINGS:  The uterus measures 8.2 x 5.3 x 3.4, and there is no evidence of a  focal fibroid.  The endometrium is within normal limits and measures 4  mm. There is no free fluid  in the pelvis. IUD is appropriately placed  within the endometrial cavity.    The right ovary measures 10.4 x 14.7 x 10.1 and the left ovary  measures 3.6 x 2.4 x 3.  There is normal blood flow to the left ovary.  The right ovary has normal arterial flow, however does not exclude the  torsion. Venous flow is not detected but is often difficult to detect.  Anechoic, cystic lesion within the right ovary measuring 9.2 x 12.7 x  8.8 cm (underestimated based on CT), previously measuring up to 8.7 cm  in 4/2024. This lesion demonstrates multiple loculations with thick  septations with low level echoes.  There is an echogenic focus  consistent with fat.      Impression    IMPRESSION:   1. Exam remains concerning for right ovarian torsion given the  increase in size and clinical presentation in the setting of a  teratoma.  While arterial flow is detected, this does not rule out  torsion, as the ovary can torse and de-torse and has two separate  arterial supplies which can disguise torsion.  A lack of venous flow  which is more specific for torsion was not detected however venous  flow is often difficult to detect.  Ultimately, the diagnosis is a  clinical judgement.      2. Complex cystic lesion within the right ovary, with focal fat  suggestive of a teratoma.    I have personally reviewed the examination and initial interpretation  and I agree with the findings.    DELIA CORONADO MD         SYSTEM ID:  [id number]   Comprehensive metabolic panel     Status: Abnormal   Result Value Ref Range    Sodium 137 135 - 145 mmol/L    Potassium 3.9 3.4 - 5.3 mmol/L    Carbon Dioxide (CO2) 21 (L) 22 - 29 mmol/L    Anion Gap 11 7 - 15 mmol/L    Urea Nitrogen 13.9 6.0 - 20.0 mg/dL    Creatinine 0.66 0.51 - 0.95 mg/dL    GFR Estimate >90 >60 mL/min/1.73m2    Calcium 9.2 8.8 - 10.4 mg/dL    Chloride 105 98 - 107 mmol/L    Glucose 122 (H) 70 - 99 mg/dL    Alkaline Phosphatase 73 40 - 150 U/L    AST 59 (H) 0 - 45 U/L    ALT 86 (H) 0 - 50  U/L    Protein Total 7.7 6.4 - 8.3 g/dL    Albumin 4.4 3.5 - 5.2 g/dL    Bilirubin Total 0.7 <=1.2 mg/dL   Lactic acid whole blood with 1x repeat in 2 hr when >2     Status: Abnormal   Result Value Ref Range    Lactic Acid, Initial 2.3 (H) 0.7 - 2.0 mmol/L   HCG qualitative Blood     Status: Normal   Result Value Ref Range    hCG Serum Qualitative Negative Negative   CBC with platelets and differential     Status: None   Result Value Ref Range    WBC Count 9.1 4.0 - 11.0 10e3/uL    RBC Count 4.60 3.80 - 5.20 10e6/uL    Hemoglobin 12.7 11.7 - 15.7 g/dL    Hematocrit 39.7 35.0 - 47.0 %    MCV 86 78 - 100 fL    MCH 27.6 26.5 - 33.0 pg    MCHC 32.0 31.5 - 36.5 g/dL    RDW 14.1 10.0 - 15.0 %    Platelet Count 275 150 - 450 10e3/uL    % Neutrophils 43 %    % Lymphocytes 49 %    % Monocytes 6 %    % Eosinophils 2 %    % Basophils 1 %    % Immature Granulocytes 0 %    NRBCs per 100 WBC 0 <1 /100    Absolute Neutrophils 3.9 1.6 - 8.3 10e3/uL    Absolute Lymphocytes 4.5 0.8 - 5.3 10e3/uL    Absolute Monocytes 0.5 0.0 - 1.3 10e3/uL    Absolute Eosinophils 0.1 0.0 - 0.7 10e3/uL    Absolute Basophils 0.1 0.0 - 0.2 10e3/uL    Absolute Immature Granulocytes 0.0 <=0.4 10e3/uL    Absolute NRBCs 0.0 10e3/uL   Lactic acid whole blood     Status: Normal   Result Value Ref Range    Lactic Acid 1.8 0.7 - 2.0 mmol/L        Status: Normal   Result Value Ref Range     6 <=38 U/mL    Narrative    This result is obtained using the Roche Elecsys  II method on the karuna e801 immunoassay analyzer. Results obtained with different assay methods or kits cannot be used interchangeably.   CEA     Status: None   Result Value Ref Range    CEA 0.9 ng/mL   Lactate Dehydrogenase     Status: Normal   Result Value Ref Range    Lactate Dehydrogenase 171 0 - 250 U/L   AFP tumor marker     Status: Normal   Result Value Ref Range    AFP tumor marker 2.1 <=8.3 ng/mL    Narrative    This result is obtained using the Roche Elecsys AFP method on   the karuna e801 immunoassay analyzer. Results obtained with different assay methods or kits cannot be used interchangeably.  Reference ranges apply to non-pregnant females only.   CBC with platelets differential     Status: None    Narrative    The following orders were created for panel order CBC with platelets differential.  Procedure                               Abnormality         Status                     ---------                               -----------         ------                     CBC with platelets and d...[[id number]]                      Final result                 Please view results for these tests on the individual orders.      CT remarkable for increased size of right adnexal cystic mass/structure concerning for teratoma.  The ultrasound and the Dopplers ordered.  Discussed with GYN/ONC and gynecology.  After ultrasound was performed, gynecology did asked that patient be transported emergently to the [facility] emergency department for gynecology consultation and evaluation.  Follow-up ultrasound is concerning for a large 9.2 x 12.7 x 8.8 cm right ovarian cyst with normal arterial blood flow.  Concern for intermittent ovarian torsion persists.  Patient transported emergently to the [facility] emergency department for gynecology evaluation.     Jordan Paris MD  10/03/24 [id number]

## 2024-10-03 NOTE — BRIEF OP NOTE
Brief Operative Note     Name: Batool Louis    MRN: [id number]    : 2000    Date of Surgery: 10/03/2024    Preoperative diagnosis:    - Suspected right ovarian torsion  - 15cm right ovarian complex cyst, concerning for dermoid    Postoperative diagnosis:    - Same, right ovary with ~15cm cyst, torsed x2    Procedure:   Exploratory laparotomy via vertical  midline skin incision, right salpingo-oophorectomy      Surgeon:   Winifred Calderon MD    Assistant:  Cristina Blue MD, PGY-1    Ashish Castillo, MS3      Anesthesia:  GETA    EBL:  30 mL    IVF:  1600 mL crystalloid     UOP:  250 mL    Medications: Ancef 2g    Drains: Sorto catheter    Specimens:  Pelvic washings, right ovary with cyst, right fallopian tube    Complications: None apparent    Findings: On abdominal entry,  large ~15 cm complex cyst visualized and elevated out of abdomen. Mostly fluid filled, but some solid components palpated at base. Normal appearing uterus and normal  appearing left fallopian tube and left ovary. Right tube and ovary removed in their entirety with no spillage. Excellent hemostasis noted. Right ureter palpated well below surgical field.     Winifred Calderon MD  [facility], Ob/Gyn  10/03/2024 6:15 PM

## 2024-10-03 NOTE — ANESTHESIA PROCEDURE NOTES
Airway         Procedure Start/Stop Times: 10/3/2024 3:34 PM  Staff -        CRNA: Cole Munoz APRN CRNA       Performed By: CRNA  Consent for Airway        Urgency: elective  Indications and Patient Condition       Indications for airway management: vitaliy-procedural       Induction type:intravenous       Mask difficulty assessment: 1 - vent by mask    Final Airway Details       Final airway type: endotracheal airway       Successful airway: ETT - single  Endotracheal Airway Details        ETT size (mm): 7.0       Successful intubation technique: video laryngoscopy       VL Blade Size: MAC D Blade       Grade View of Cords: 1       Adjucts: stylet       Position: Right       Secured at (cm): 22       Bite block used: None    Post intubation assessment        Placement verified by: capnometry and equal breath sounds        Number of attempts at approach: 1       Ease of procedure: easy       Dentition: Intact and Unchanged    Medication(s) Administered   Medication Administration Time: 10/3/2024 3:34 PM

## 2024-10-04 LAB
GLUCOSE BLDC GLUCOMTR-MCNC: 101 MG/DL (ref 70–99)
HCG-TM SERPL-ACNC: <3 IU/L
HGB BLD-MCNC: 11.5 G/DL (ref 11.7–15.7)
HOLD SPECIMEN: NORMAL

## 2024-10-04 PROCEDURE — 250N000013 HC RX MED GY IP 250 OP 250 PS 637

## 2024-10-04 PROCEDURE — 250N000011 HC RX IP 250 OP 636

## 2024-10-04 PROCEDURE — 999N000157 HC STATISTIC RCP TIME EA 10 MIN

## 2024-10-04 PROCEDURE — 85018 HEMOGLOBIN: CPT

## 2024-10-04 PROCEDURE — 94660 CPAP INITIATION&MGMT: CPT

## 2024-10-04 PROCEDURE — 36415 COLL VENOUS BLD VENIPUNCTURE: CPT

## 2024-10-04 PROCEDURE — 120N000002 HC R&B MED SURG/OB UMMC

## 2024-10-04 RX ORDER — ENOXAPARIN SODIUM 100 MG/ML
40 INJECTION SUBCUTANEOUS EVERY 12 HOURS
Status: DISCONTINUED | OUTPATIENT
Start: 2024-10-04 | End: 2024-10-04

## 2024-10-04 RX ORDER — ENOXAPARIN SODIUM 100 MG/ML
40 INJECTION SUBCUTANEOUS EVERY 12 HOURS
Status: DISCONTINUED | OUTPATIENT
Start: 2024-10-04 | End: 2024-10-05 | Stop reason: HOSPADM

## 2024-10-04 RX ORDER — CYCLOBENZAPRINE HCL 5 MG
5-10 TABLET ORAL EVERY 8 HOURS PRN
Status: DISCONTINUED | OUTPATIENT
Start: 2024-10-04 | End: 2024-10-05 | Stop reason: HOSPADM

## 2024-10-04 RX ORDER — LIDOCAINE 4 G/G
1 PATCH TOPICAL
Status: DISCONTINUED | OUTPATIENT
Start: 2024-10-05 | End: 2024-10-05 | Stop reason: HOSPADM

## 2024-10-04 RX ADMIN — IBUPROFEN 800 MG: 200 TABLET, FILM COATED ORAL at 13:00

## 2024-10-04 RX ADMIN — OXYCODONE HYDROCHLORIDE 10 MG: 10 TABLET ORAL at 23:07

## 2024-10-04 RX ADMIN — FLUOXETINE HYDROCHLORIDE 40 MG: 20 CAPSULE ORAL at 09:01

## 2024-10-04 RX ADMIN — ENOXAPARIN SODIUM 40 MG: 40 INJECTION SUBCUTANEOUS at 20:23

## 2024-10-04 RX ADMIN — SENNOSIDES AND DOCUSATE SODIUM 1 TABLET: 50; 8.6 TABLET ORAL at 05:05

## 2024-10-04 RX ADMIN — HYDROXYZINE HYDROCHLORIDE 25 MG: 25 TABLET, FILM COATED ORAL at 01:26

## 2024-10-04 RX ADMIN — OXYCODONE HYDROCHLORIDE 5 MG: 5 TABLET ORAL at 18:55

## 2024-10-04 RX ADMIN — KETOROLAC TROMETHAMINE 15 MG: 15 INJECTION, SOLUTION INTRAMUSCULAR; INTRAVENOUS at 05:05

## 2024-10-04 RX ADMIN — SENNOSIDES AND DOCUSATE SODIUM 1 TABLET: 50; 8.6 TABLET ORAL at 18:47

## 2024-10-04 RX ADMIN — IBUPROFEN 800 MG: 200 TABLET, FILM COATED ORAL at 18:48

## 2024-10-04 ASSESSMENT — ACTIVITIES OF DAILY LIVING (ADL)
ADLS_ACUITY_SCORE: 25
ADLS_ACUITY_SCORE: 26
ADLS_ACUITY_SCORE: 25
ADLS_ACUITY_SCORE: 26
ADLS_ACUITY_SCORE: 25
ADLS_ACUITY_SCORE: 26
ADLS_ACUITY_SCORE: 25
ADLS_ACUITY_SCORE: 24
ADLS_ACUITY_SCORE: 25
ADLS_ACUITY_SCORE: 26
ADLS_ACUITY_SCORE: 25
ADLS_ACUITY_SCORE: 26

## 2024-10-04 NOTE — PROGRESS NOTES
"On Call Dr. Bravo for \"PACU 6, [place]. pt of Dr. Calderon. Need post op CPAP order intermittent please! [place], [place] [phone]\"  "

## 2024-10-04 NOTE — ANESTHESIA POSTPROCEDURE EVALUATION
Patient: Batool Louis    Procedure: Procedure(s):  Right OOPHORECTOMY, OPEN; right salpingectomy pelvic exam under anesthesia; pelvic washings       Anesthesia Type:  General    Note:  Disposition: Inpatient   Postop Pain Control: Uneventful            Sign Out: Well controlled pain   PONV: No   Neuro/Psych: Uneventful            Sign Out: Acceptable/Baseline neuro status   Airway/Respiratory: Uneventful            Sign Out: Acceptable/Baseline resp. status   CV/Hemodynamics: Uneventful            Sign Out: Acceptable CV status; No obvious hypovolemia; No obvious fluid overload   Other NRE:    DID A NON-ROUTINE EVENT OCCUR?     Event details/Postop Comments:  Has severe THOR. Admitted for observation           Last vitals:  Vitals Value Taken Time   /80 10/03/24 2015   Temp 37.1  C (98.8  F) 10/03/24 2000   Pulse 86 10/03/24 2021   Resp 11 10/03/24 2021   SpO2 98 % 10/03/24 2025   Vitals shown include unfiled device data.    Electronically Signed By: Kiah Briscoe MD  October 3, 2024  11:21 PM

## 2024-10-04 NOTE — PLAN OF CARE
"Goal Outcome Evaluation:8MS ADMISSION    D: Patient admitted/transferred from PACU via bed  for post oophorectomy.     I: Upon arrival to the unit patient was oriented to room, unit, and call light. Patient s height, weight, and vital signs were obtained. Provider notified of patient s arrival on the unit. Adult AVS completed. Head to toe assessment completed. Education assessment completed. Care plan initiated.    A: Vital signs stable upon admission. Patient rates pain at 7/10. Two RN skin check completed with CHRIS Witt. Significant Skin Findings include bruise on R shin; surgical incision on abdomen (dressing CDI).   P: Continue to monitor patient s pain and respiratory status and intervene as needed. Continue with plan of care. Notify provider with any concerns or changes in patient status.       Plan of Care Reviewed With: patient    Overall Patient Progress: improvingOverall Patient Progress: improving         VS: /76   Pulse 96   Temp 98.8  F (37.1  C) (Axillary)   Resp 20   Ht 1.753 m (5' 9\")   Wt 127 kg (280 lb)   SpO2 97%   BMI 41.35 kg/m       O2: Pt had CPAP on until 0127; occasionally de sating as low as 88% with CPAP and requested it be removed due to feeling claustrophobic. Oxymask re applied at 5LPM and tolerating well sats holding well above mid 90%s.    Output: Sorto removed at 0500 with 2350 output. Shortly after, Pt ambulated to bathroom and voided in toilet.    Last BM: Pt reports last BM 10/2   Activity: Pt stayed in bed all night. Repositioned as tolerated. Ambulated to bathroom with Ax1.    Skin:  Abdominal surgical incision; CDI.    Pain: Pt reports pain with movement or when coughing at 7/10. Pillow given to brace abdomen while coughing and repositioning. Pt states once she stops moving the pain is tolerable. Ice packs applied on abdomen. Abdominal binder in place for comfort.   Neuro: A/O x4. Calm, pleasant and cooperative.    Dressing: Abdomen; CDI.    Diet: Regular. " Tolerating oral intake. Ate a box lunch, gingerale and crackers.   Pt ordered breakfast.    LDA: L PIV; infusing 75ml/hr LR.    Equipment: IV pole. PCDs.    Plan: Continue plan of care.    Additional Info: Pt slept well overnight. No acute concerns. Call light within reach, bed at lowest position, HOB elevated, and bed alarm on.

## 2024-10-04 NOTE — PROGRESS NOTES
Gynecologic Oncology   Postoperative Check  10/3/2024    S:   Patient reports she is doing well postoperatively. Pain is well controlled with current pain regimen Was just transferred upstairs, has odom in place, has not ambulated yet. Tolerating crackers and water without nausea or vomiting. Denies chest pain, shortness of breath, dizziness, or other concerns at this time.     O:  Vitals:    10/03/24 2025 10/03/24 2030 10/03/24 2035 10/03/24 2040   BP:   135/80    Pulse:       Resp:       Temp:       TempSrc:       SpO2: 98% 96% 96% 97%   Weight:       Height:           Gen: NAD  Cardio: RRR, S1/S2, no murmurs  Resp: CTAB, no wheezing or crackles  Abdomen: soft, appropriately tender, non distended, VML incision covered with pressure bandage which is c/d/I and no shadowing seen  Extremities: Non-tender, trace edema, SCDs in place    A/P:   24 year old POD#0 s/p XL RSO for dermoid cyst. Progressing as expected for early postoperative period.     # postoperative state  Pain: Well-controlled with toradol> ibuprofen, and oxycodone PRN  GI:  Scheduled bowel regimen, PRN anti-emetics  : Odom in place, remove AM of POD#1  Hgb: 12.7>EBL 30> AM Hemoglobin pending. Asymptomatic from blood loss anemia; will discharge with oral iron if <10.   Ppx:  SCDs in place, consider LVX POD#1  Medically Ready for Discharge: Anticipated in 2-4 Days      Mary Russo MD  Obstetrics and Gynecology, PGY2  10/03/2024, 10:20 PM

## 2024-10-04 NOTE — OP NOTE
[facility]  Operative Note    Date: 10/03/2024  Preoperative diagnosis:    - Suspected right ovarian torsion; 15cm right ovarian complex cyst, concerning for dermoid  - Rapid interval growth from 2024 to now, 7cm to 15cm    Postoperative diagnosis:  Same, right ovary with ~15 cm cyst, torsed x2    Procedure:  Exploratory laparotomy via vertical midline skin incision, right salpingo-oophorectomy   Surgeon:   Winifred Calderon MD  Assistant:  Amelia Blue MD, PGY-1  Ashish Fitch MS3    Anesthesia:  GETA  Specimens:  Right fallopian tube, right ovary with cyst, pelvic washings   Complications: None apparent  EBL:  30 mL  IVF:  1600 mL crystalloid   UOP:  250 mL  Drains: Sorto    Findings: On abdominal entry, large ~15 cm complex cyst visualized and elevated out of abdomen. Mostly fluid filled, but some solid components palpated at base. Torsed x2. Normal appearing uterus and normal appearing left fallopian tube and left ovary. Right tube and ovary removed in their entirety with no spillage. Excellent hemostasis noted. Right ureter palpated well below surgical field.      Indications: Batool Louis is a 24 year old  who presented with acute RLQ pain and was found to have suspected right ovarian torsion and 15 cm right ovarian complex cyst on imaging, consistent with dermoid. She was counseled on management options and elected to proceed with surgical management. Risks, benefits and alternatives were reviewed and written consent was signed on the day of surgery.     Procedure:  The patient was taken to the operating room where she was positioned, prepped and draped in the usual sterile fashion. GETA was obtained and found to be adequate. A safety time out was performed.    Sorto placed. Attention was then turned to the abdomen. A vertical midline abdominal incision, approximately 10 cm length was made in the usual fashion. The incision was carried through the subcutaneous  tissue to the fascia which was incised and extended with the Bovie on coagulation. The rectus muscle diastasis was identified, seperated and the peritoneum grasped and tented. Once noted to be free of bowel, the peritoneum was incised and the opening extended with care to avoid the bladder. The pelvis was examined with the above noted findings. The bowels were packed and the Bookwalter retractor was placed.     The right cyst with ovary was then lifted out of the abdomen and detorsed. Pelvic washings were then obtained and sent to pathology. The ovary and cyst were then excised utilizing LigaSure between the base of the cyst and the fallopian tube. The cyst was removed without spillage and sent to pathology. Attention was then turned to the right fallopian tube: a Timtohy was used to elevated the right fallopian tube and the LigaSure was then used to cauterize, cut and excise fallopian tube by taking serial bites along mesosalpinx until the cornua was reached. The right fallopian tube was removed and sent to pathology.    The pelvis was copiously irrigated and noted to be hemostatic. The Bookwalter and packing were then removed. Fascia and muscles were inspected and noted to be hemostatic. The fascia was closed with looped 0 PDS, two sutures meeting in the middle. The subcutaneous tissue was re-approximated with 3-0 vicryl in two layers of running subcutaneous suture, followed by a running subdermal layer. The skin was closed with 4-0 Monocryl. Incision was then anesthetized with 15 mL of 0.25% marcaine plain. Steri strips, a pressure bandage, and abdominal binder were placed.    Instrument, needle, and sponge counts were correct times 2. The patient was transferred to the PACU in stable condition.    Dr. Calderon was present and scrubbed for the entire case.    Amelia Blue MD PGY-1  10/03/24 7:34 PM     OBGYN Attending Attestation     I, Winifred Calderon, was scrubbed and present for the entire procedure. I have  reviewed Dr. Blue's operative report and edited where necessary. I agree with the documentation of findings.     Winifred Calderon MD  [facility], Ob/Gyn  10/04/2024 4:45 PM

## 2024-10-04 NOTE — PROGRESS NOTES
Gynecologic Progress Note  10/4/2024    S: Patient reports she is doing well postoperatively. Pain is well controlled with current pain regimen. Voiding spontaneously. Ambulating without dizziness. Tolerating crackers and water without nausea or vomiting. Denies chest pain, shortness of breath, dizziness, or other concerns at this time.     O:  Vitals:    10/04/24 0300 10/04/24 0330 10/04/24 0400 10/04/24 0754   BP: 125/75  114/76 113/60   BP Location:    Right arm   Pulse:    95   Resp:    16   Temp:    98.1  F (36.7  C)   TempSrc:    Oral   SpO2: 94% 97% 97% 93%   Weight:       Height:           Gen: NAD  Cardio: RRR, no murmurs  Resp: CTAB, no wheezing or crackles  Abdomen: soft, appropriately tender, incision c/d/I with pressure dressing.  Extremities: Non-tender, no LE edema    A: 24 year old POD#1 XL, RSO (complex cyst); nl tumor markers. Doing well this AM and meeting post operative goals.    # Post-operative state  - Dz: Complex cyst, likely dermoid; Intervall imaging showed a doubling in size to 15 cm since April. Imaging was suggestive of a dermoid. Tumor markers were normal.  - Regular diet  -Delaney Tylenol, ibuprofen. PRN oxy; added flexeril and lidocaine patches for improved pain control  -Delaney bowel reg. PRN antiemetics, simethincone.      Susp THOR - Intermittent CPAP  Heterozygous alpha-1 antitrypsin - No Tylenol  PPX: SCDs, IS, Lovenox if staying today      Dispo: Pending postoperative goals  Medically Ready for Discharge: Anticipated Today      Taj Zuniga MD MPH  Obstetrics and Gyncology, PGY-4  October 4, 2024 , 9:13 AM     OBGYN Attending Attestation    Batool Louis was seen and examined by me separately from the team.  I have reviewed and agree with the above note by Dr. Zuniga and edited as necessary.    I personally reviewed the following and key findings are: POD#1 from XL, RSO via VML for 15cm complex right ovarian cyst. Vitals normal and meeting postoperative goals, working on pain  control.      Possible discharge this evening, versus tomorrow pending pain control.    Winifred Calderon MD  [facility], Ob/Gyn  10/04/2024 11:53 AM

## 2024-10-05 VITALS
RESPIRATION RATE: 18 BRPM | TEMPERATURE: 97.9 F | WEIGHT: 292.77 LBS | SYSTOLIC BLOOD PRESSURE: 119 MMHG | HEART RATE: 85 BPM | DIASTOLIC BLOOD PRESSURE: 56 MMHG | BODY MASS INDEX: 43.36 KG/M2 | OXYGEN SATURATION: 93 % | HEIGHT: 69 IN

## 2024-10-05 LAB — CANCER AG19-9 SERPL IA-ACNC: 6 U/ML

## 2024-10-05 PROCEDURE — 94660 CPAP INITIATION&MGMT: CPT

## 2024-10-05 PROCEDURE — 250N000013 HC RX MED GY IP 250 OP 250 PS 637

## 2024-10-05 PROCEDURE — 999N000157 HC STATISTIC RCP TIME EA 10 MIN

## 2024-10-05 PROCEDURE — 250N000013 HC RX MED GY IP 250 OP 250 PS 637: Performed by: STUDENT IN AN ORGANIZED HEALTH CARE EDUCATION/TRAINING PROGRAM

## 2024-10-05 PROCEDURE — 250N000011 HC RX IP 250 OP 636

## 2024-10-05 RX ORDER — NALOXONE HYDROCHLORIDE 0.4 MG/ML
0.4 INJECTION, SOLUTION INTRAMUSCULAR; INTRAVENOUS; SUBCUTANEOUS
Status: DISCONTINUED | OUTPATIENT
Start: 2024-10-05 | End: 2024-10-05 | Stop reason: HOSPADM

## 2024-10-05 RX ORDER — IBUPROFEN 800 MG/1
800 TABLET, FILM COATED ORAL EVERY 6 HOURS PRN
Qty: 30 TABLET | Refills: 0 | Status: SHIPPED | OUTPATIENT
Start: 2024-10-05 | End: 2024-10-18

## 2024-10-05 RX ORDER — NALOXONE HYDROCHLORIDE 0.4 MG/ML
0.2 INJECTION, SOLUTION INTRAMUSCULAR; INTRAVENOUS; SUBCUTANEOUS
Status: DISCONTINUED | OUTPATIENT
Start: 2024-10-05 | End: 2024-10-05 | Stop reason: HOSPADM

## 2024-10-05 RX ORDER — CYCLOBENZAPRINE HCL 5 MG
5-10 TABLET ORAL EVERY 8 HOURS PRN
Qty: 20 TABLET | Refills: 0 | Status: SHIPPED | OUTPATIENT
Start: 2024-10-05 | End: 2024-10-18

## 2024-10-05 RX ORDER — AMOXICILLIN 250 MG
1 CAPSULE ORAL 2 TIMES DAILY
Qty: 30 TABLET | Refills: 1 | Status: SHIPPED | OUTPATIENT
Start: 2024-10-05 | End: 2024-10-18

## 2024-10-05 RX ORDER — OXYCODONE HYDROCHLORIDE 5 MG/1
5 TABLET ORAL EVERY 4 HOURS PRN
Qty: 12 TABLET | Refills: 0 | Status: SHIPPED | OUTPATIENT
Start: 2024-10-05 | End: 2024-10-18

## 2024-10-05 RX ADMIN — IBUPROFEN 800 MG: 200 TABLET, FILM COATED ORAL at 00:32

## 2024-10-05 RX ADMIN — CYCLOBENZAPRINE HYDROCHLORIDE 10 MG: 5 TABLET, FILM COATED ORAL at 00:32

## 2024-10-05 RX ADMIN — OXYCODONE HYDROCHLORIDE 10 MG: 10 TABLET ORAL at 13:15

## 2024-10-05 RX ADMIN — ENOXAPARIN SODIUM 40 MG: 40 INJECTION SUBCUTANEOUS at 08:17

## 2024-10-05 RX ADMIN — IBUPROFEN 800 MG: 200 TABLET, FILM COATED ORAL at 11:51

## 2024-10-05 RX ADMIN — IBUPROFEN 800 MG: 200 TABLET, FILM COATED ORAL at 06:37

## 2024-10-05 RX ADMIN — FLUOXETINE HYDROCHLORIDE 40 MG: 20 CAPSULE ORAL at 08:18

## 2024-10-05 RX ADMIN — SENNOSIDES AND DOCUSATE SODIUM 1 TABLET: 50; 8.6 TABLET ORAL at 06:37

## 2024-10-05 ASSESSMENT — ACTIVITIES OF DAILY LIVING (ADL)
ADLS_ACUITY_SCORE: 25

## 2024-10-05 NOTE — PLAN OF CARE
"VS:     /60 (BP Location: Left arm)   Pulse 78   Temp 97.5  F (36.4  C) (Oral)   Resp 18   Ht 1.753 m (5' 9\")   Wt 132.8 kg (292 lb 12.3 oz)   SpO2 96%   BMI 43.23 kg/m       Output:     Voids spontaneously without   difficulty in the toilet     Activity:       Assist of one with walker and gait belt.      Skin: Abdominal surgical incision with steri strips.      Pain:     PRN and scheduled pain medication is available.    CMS:     Alert and oriented x4. Denies numbness and tingling in all extremities.      Dressing:     CDI   Diet:       Pt is on a regular diet and appetite was fair this shift.       LDA:       PIV is patent on LUE     Equipment:     Walker, gait belt      Plan:       Pt is able to make needs known and the call light is within the pt's reach. Continue to monitor.       Additional Info:          Goal Outcome Evaluation:                        "

## 2024-10-05 NOTE — PLAN OF CARE
"  VS: /73 (BP Location: Right arm)   Pulse 101   Temp 98.9  F (37.2  C) (Oral)   Resp 16   Ht 1.753 m (5' 9\")   Wt 132.8 kg (292 lb 12.3 oz)   SpO2 94%   BMI 43.23 kg/m       O2: On RA during the day   Output: Urinating on toilet without difficulty, dysuria or urgency   Last BM: 10/2/24   Activity: A1 with walker and gait belt   Up for meals? Yes sits up in bed    Skin: Surgical incision to Mid ABD, DRSG removed and is now WALESKA with steri strips.  ABD pad covering for comfort with ABD band in place   Pain: 7/10  PRN oxycodone given at 1855 and would like reminders every 4 hours.  Schedualed IBU and Ice packs throughout day.     CMS: A&O x 4    Dressing: Steri strips to ABD incision    Diet: Regular, thin, whole.      LDA: L PIV   Equipment: Walker, gait belt    Plan: Continue plan of care,  reminders for PRN pain medications    Additional Info:    Goal Outcome Evaluation:                        "

## 2024-10-05 NOTE — PROGRESS NOTES
Overall patient progress: improved    A/O x 4 and makes needs known. ABD brace intact. Patient requires assist x 1 with getting oob and ambulating to restroom. Pain managed with scheduled and prn medication.     Discharged home at 1800 via family transport. All personal belongings, discharge paperwork and meds sent with.

## 2024-10-05 NOTE — PROGRESS NOTES
Brief Progress Note:    Patient has been resting today. Pain is improved while laying in bed. Pain is worse with ambulation. Has only ambulated a minimal amount today. Given limited mobility today recommend ambulation this PM with plan for discharge in AM  (10/5). Patient is also agreeable to lovenox for blood clot prophylaxis.    Taj Zuniga MD, MPH  Ob/Gyn Resident, PGY-4  10/04/24 7:25 PM

## 2024-10-05 NOTE — PROGRESS NOTES
Gynecologic Progress Note  10/5/2024    S: Patient reports she is doing well. Having some abdominal pain, which is well controlled with current pain regimen, Oxycodone x3 needed in past 24 hours. Voiding spontaneously. Ambulating without dizziness. Tolerating meals without nausea or vomiting. Passing flatus, has not had a BM. Denies chest pain, shortness of breath, dizziness, or other concerns at this time.     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    O:  Vitals:    10/04/24 2028 10/05/24 0058 10/05/24 0536 10/05/24 0815   BP: 137/80  125/60    BP Location: Left arm  Left arm    Pulse: 87  78 78   Resp: 16  18 18   Temp: 98.5  F (36.9  C)  97.5  F (36.4  C) 97.6  F (36.4  C)   TempSrc: Oral  Oral Oral   SpO2: 95% 96%  93%   Weight:       Height:           Gen: NAD  Cardio: well perfused  Resp: unlabored respirations  Abdomen: soft, appropriately tender, incision c/d/I with pressure dressing.  Extremities: Non-tender, no LE edema    A: 24 year old POD#2 XL, RSO (complex cyst); nl tumor markers. Meeting postop goals, ready for discharge.    # POD#2 from above procedure  - Dz: Complex cyst, likely dermoid; Intervall imaging showed a doubling in size to 15 cm since April. Imaging was suggestive of a dermoid. Tumor markers were normal.  - Regular diet  - Delaney ibuprofen. PRN oxy; flexeril and lidocaine patches for improved pain control  - Delaney bowel reg. PRN antiemetics, simethincone.  - Discussed activity restrictions post-op, will provide note for work.  - Follow up pathology outpatient    Susp THOR - Intermittent CPAP  Heterozygous alpha-1 antitrypsin - No Tylenol  PPX: Lovenox 40 mg BID      Dispo: Home with family today  Medically Ready for Discharge: Anticipated Today    Amelia Blue MD PGY-1  10/05/24 1:24 PM     Appreciate Dr. Blue's note above, patient also seen and examined by me. I agree with the note above.   Florence Huffman MD

## 2024-10-05 NOTE — DISCHARGE SUMMARY
[facility]  Gynecology Discharge Summary    Admission Date:  10/3/24  Discharge Date:  10/05/24     Admission Attending: Winifred Calderon MD  Discharge Attending: Dr. Florence Huffman MD    Admission Diagnosis:  - Ovarian Torsion  - 15cm right ovarian complex cyst, concerning for dermoid     Discharge Diagnosis:  - Same as above s/p below procedure    Procedures Performed:  - Exploratory laparotomy via vertical midline skin incision, right salpingo-oophorectomy     Admission History:  Batool Louis is a 24 year old  who presented with acute RLQ pain and was found to have suspected right ovarian torsion and 15 cm right ovarian complex cyst on imaging, consistent with dermoid. She was counseled on management options and elected to proceed with surgical management. The risks, benefits, and alternatives of surgery were discussed, and she agreed to proceed.    Operative Course:  She underwent a Exploratory laparotomy via vertical midline skin incision, right salpingo-oophorectomy , which was uncomplicated. EBL was 30 cc. See operative report for further details.    Operative Findings:   On abdominal entry, large ~15 cm complex cyst visualized and elevated out of abdomen. Mostly fluid filled, but some solid components palpated at base. Torsed x2. Normal appearing uterus and normal appearing left fallopian tube and left ovary. Right tube and ovary removed in their entirety with no spillage. Excellent hemostasis noted. Right ureter palpated well below surgical field.      Hospital Course:  Her postoperative course was uncomplicated. She was initially maintained on IV fluids with IV pain medications and odom catheter in place. On POD#1, diet was advanced, odom catheter was removed, and she was transitioned to PO pain medications. On POD#2 she was tolerating regular diet, ambulating without difficulty, voiding spontaneously, and controlling pain with PO medications, and she was  deemed stable for discharge. Hemoglobin at the time of discharge was 11.5.    Discharge Plans:  - The patient was discharged to home  - PO Activity:   - No lifting >15 lbs or strenuous exercise for 6 weeks   - No driving while taking narcotics or until you can slam on the breaks without pain  - She was instructed to call or return to ED if she has any of the following:    - Temperature greater than 100.4F   - Pain not controlled by pain medications   - Uncontrolled nausea/vomiting   - Foul-smelling vaginal discharge   - Vaginal bleeding soaking 1 pad per hour for 2 hours in a row  - She will follow up with Dr. Calderon in 4-6 weeks or sooner PRN.    - Discharge medications include:     Review of your medicines        START taking        Dose / Directions   cyclobenzaprine 5 MG tablet  Commonly known as: FLEXERIL  Used for: Status post laparotomy      Dose: 5-10 mg  Take 1-2 tablets (5-10 mg) by mouth every 8 hours as needed for muscle spasms.  Quantity: 20 tablet  Refills: 0     ibuprofen 800 MG tablet  Commonly known as: ADVIL/MOTRIN  Used for: Status post laparotomy      Dose: 800 mg  Take 1 tablet (800 mg) by mouth every 6 hours as needed for pain.  Quantity: 30 tablet  Refills: 0     oxyCODONE 5 MG tablet  Commonly known as: ROXICODONE  Used for: Status post laparotomy      Dose: 5 mg  Take 1 tablet (5 mg) by mouth every 4 hours as needed for moderate pain.  Quantity: 12 tablet  Refills: 0     senna-docusate 8.6-50 MG tablet  Commonly known as: SENOKOT-S/PERICOLACE  Used for: Status post laparotomy      Dose: 1 tablet  Take 1 tablet by mouth 2 times daily.  Quantity: 30 tablet  Refills: 1            CONTINUE these medicines which have NOT CHANGED        Dose / Directions   cetirizine 10 MG tablet  Commonly known as: zyrTEC      Dose: 10 mg  Take 10 mg by mouth daily as needed for allergies seasonal  Refills: 0     FLUoxetine 40 MG capsule  Commonly known as: PROzac  Used for: Moderate episode of recurrent major  depressive disorder (H), Generalized anxiety disorder      Dose: 40 mg  Take 1 capsule (40 mg) by mouth daily  Quantity: 90 capsule  Refills: 1     fluticasone 50 MCG/ACT nasal spray  Commonly known as: FLONASE  Used for: Middle ear effusion, bilateral, Acute seasonal allergic rhinitis      Dose: 1 spray  Spray 1 spray into both nostrils daily  Quantity: 16 g  Refills: 1     hydrOXYzine HCl 25 MG tablet  Commonly known as: ATARAX  Used for: Generalized anxiety disorder      Dose: 25-50 mg  Take 1-2 tablets (25-50 mg) by mouth nightly as needed for anxiety (and sleep)  Quantity: 180 tablet  Refills: 2     levonorgestrel 52 MG (20 mcg/day) IUD  Commonly known as: MIRENA      Dose: 1 each  1 each by Intrauterine route once  Refills: 0            STOP taking      fluconazole 150 MG tablet  Commonly known as: DIFLUCAN                  Where to get your medicines        These medications were sent to [facility] [address]  [address] [address]      Phone: [phone]   cyclobenzaprine 5 MG tablet  ibuprofen 800 MG tablet  oxyCODONE 5 MG tablet  senna-docusate 8.6-50 MG tablet         Amelia Blue MD PGY-1  10/05/24 10/05/24     I saw and evaluated this patient prior to discharge. I discussed the patient with the resident and agree with plan of care as documented in the resident note.   I personally reviewed vital signs, medications, labs, and imaging.   I personally spent 20 minutes on discharge activities.  Florence Huffman MD

## 2024-10-07 ENCOUNTER — TELEPHONE (OUTPATIENT)
Dept: OBGYN | Facility: CLINIC | Age: 24
End: 2024-10-07

## 2024-10-07 ENCOUNTER — OFFICE VISIT (OUTPATIENT)
Dept: SLEEP MEDICINE | Facility: CLINIC | Age: 24
End: 2024-10-07
Attending: FAMILY MEDICINE
Payer: MEDICAID

## 2024-10-07 VITALS
WEIGHT: 287.5 LBS | DIASTOLIC BLOOD PRESSURE: 81 MMHG | SYSTOLIC BLOOD PRESSURE: 127 MMHG | OXYGEN SATURATION: 95 % | BODY MASS INDEX: 42.58 KG/M2 | HEIGHT: 69 IN | HEART RATE: 85 BPM | RESPIRATION RATE: 18 BRPM

## 2024-10-07 DIAGNOSIS — R06.83 SNORING: Primary | ICD-10-CM

## 2024-10-07 DIAGNOSIS — E66.01 MORBID OBESITY (H): ICD-10-CM

## 2024-10-07 DIAGNOSIS — G47.19 EXCESSIVE DAYTIME SLEEPINESS: ICD-10-CM

## 2024-10-07 LAB
INHIBIN A SERPL-MCNC: 6.1 PG/ML
INHIBIN B SERPL IA-MCNC: 45 PG/ML
PATH REPORT.COMMENTS IMP SPEC: NORMAL
PATH REPORT.COMMENTS IMP SPEC: NORMAL
PATH REPORT.FINAL DX SPEC: NORMAL
PATH REPORT.GROSS SPEC: NORMAL
PATH REPORT.MICROSCOPIC SPEC OTHER STN: NORMAL
PATH REPORT.RELEVANT HX SPEC: NORMAL

## 2024-10-07 PROCEDURE — 99215 OFFICE O/P EST HI 40 MIN: CPT

## 2024-10-07 ASSESSMENT — SLEEP AND FATIGUE QUESTIONNAIRES
HOW LIKELY ARE YOU TO NOD OFF OR FALL ASLEEP WHILE WATCHING TV: MODERATE CHANCE OF DOZING
HOW LIKELY ARE YOU TO NOD OFF OR FALL ASLEEP WHILE SITTING AND TALKING TO SOMEONE: WOULD NEVER DOZE
HOW LIKELY ARE YOU TO NOD OFF OR FALL ASLEEP WHILE SITTING QUIETLY AFTER LUNCH WITHOUT ALCOHOL: WOULD NEVER DOZE
HOW LIKELY ARE YOU TO NOD OFF OR FALL ASLEEP WHILE SITTING INACTIVE IN A PUBLIC PLACE: MODERATE CHANCE OF DOZING
HOW LIKELY ARE YOU TO NOD OFF OR FALL ASLEEP WHEN YOU ARE A PASSENGER IN A CAR FOR AN HOUR WITHOUT A BREAK: SLIGHT CHANCE OF DOZING
HOW LIKELY ARE YOU TO NOD OFF OR FALL ASLEEP WHILE LYING DOWN TO REST IN THE AFTERNOON WHEN CIRCUMSTANCES PERMIT: HIGH CHANCE OF DOZING
HOW LIKELY ARE YOU TO NOD OFF OR FALL ASLEEP WHILE SITTING AND READING: HIGH CHANCE OF DOZING
HOW LIKELY ARE YOU TO NOD OFF OR FALL ASLEEP IN A CAR, WHILE STOPPED FOR A FEW MINUTES IN TRAFFIC: WOULD NEVER DOZE

## 2024-10-07 NOTE — PROGRESS NOTES
"Sleep Apnea - Follow-up Visit:    Impression/Plan:  (R06.83) Snoring (primary encounter diagnosis) (E66.01) Morbid obesity (H) (G47.19) Excessive daytime sleepiness  Comment: Batool \"Dodie\" OWEN Louis is a 24-year-old female who was sent to the sleep clinic for an evaluation for suspected apnea in the setting of loud snoring, non restorative sleep, witnessed apneic spells, and daytime sleepiness/fatigue. She also has a strong family history of sleep apnea including both of her parents, grandparents, and her sister. She was previously seen by Sam Osorio PA-C on 01/31/2023 for insomnia, snoring, and witnessed apneic spells. A home sleep study was ordered but never completed. Dodie had surgery on 10/3/2024 for ovarian torsion. She had a right salpingo-oophorectomy. She was placed on CPAP in the postoperative period and during her hospital stay. She felt better with CPAP use. Dodie does have tonsillar hypertrophy and plans to get a tonsillectomy in the near future. Her STOP-BANG is 4/8- snoring, excessive daytime sleepiness, observed apnea, and BMI >35 (42).    Plan: HST-Home Sleep Apnea Test - Noxturnal Returnable  oDdie remains at intermediate risk for obstructive sleep apnea. Recommended a home sleep study. We reviewed treatment options for obstructive sleep apnea including PAP therapy, mandibular advancement device, positional therapy, surgery, weight management, and hypoglossal nerve stimulator.     Patient will follow up approximately 3 months after sleep study. Results of the study will be reviewed via MyChart and treatment will be initiated prior to the follow up visit.     Total time spent reviewing medical records, history and physical examination, review of previous testing and interpretation as well as documentation on this date: 41 minutes     CC: Nancy Donnelly MD    History of Present Illness:  Chief Complaint   Patient presents with    Consult     Concern of THOR, Daytime tiredness, Family members with " THOR      Batool Louis was sent to the sleep clinic for an evaluation for suspected apnea in the setting of loud snoring, non restorative sleep, witnessed apneic spells, and daytime sleepiness/fatigue. She also has a strong family history of sleep apnea including both of her parents and her sister. She was previously seen by Sam Osorio PA-C on 01/31/2023 for insomnia, snoring, and witnessed apneic spells. A home sleep study was ordered but never completed. Dodie had surgery on 10/3/2024 for ovarian torsion. She had a right salpingo-oophorectomy. She was placed on CPAP in the postoperative period and during her hospital stay. She felt better with CPAP use.      PMH is significant for morbid obesity, chronic tonsillitis, SHELBY, and depression.     She is planning to get a tonsillectomy in the near future.     Do you use a CPAP Machine at home: No    What is your typical bedtime: 11-11:30 PM She won't actually fall asleep until around 1 AM.   What time do you typically get out of bed for the day: 8:45-9:00 AM  She wakes 2-3 times per night for uncertain reasons. She does not wake routinely to go to the bathroom.   Do you feel well rested in the morning: No    She does take purposeful naps.     Pt denies bruxism, sleep talking, sleep walking, and dream enactment behavior. Pt denies sleep paralysis, hypnagogue and cataplexy.    History of restless legs when she first started taking her anxiety medications.     She used to take hydroxyzine at night, but she hasn't been taking it recently.     Social History: She goes to school at [employer].     EPWORTH SLEEPINESS SCALE         10/7/2024     7:42 AM    [place] Sleepiness Scale ( BONI Mandujano  [id number]<br>ESS - [place]/English - Final version - 21 Nov 07 - [facility] [employer].)   Sitting and reading High chance of dozing   Watching TV Moderate chance of dozing   Sitting, inactive in a public place (e.g. a theatre or a meeting) Moderate chance of dozing   As a  passenger in a car for an hour without a break Slight chance of dozing   Lying down to rest in the afternoon when circumstances permit High chance of dozing   Sitting and talking to someone Would never doze   Sitting quietly after a lunch without alcohol Would never doze   In a car, while stopped for a few minutes in traffic Would never doze   [place] Score (MC) 11   [place] Score (Sleep) 11       INSOMNIA SEVERITY INDEX (LES)          10/7/2024     7:41 AM   Insomnia Severity Index (LES)   Difficulty falling asleep 3   Difficulty staying asleep 3   Problems waking up too early 1   How SATISFIED/DISSATISFIED are you with your CURRENT sleep pattern? 3   How NOTICEABLE to others do you think your sleep problem is in terms of impairing the quality of your life? 4   How WORRIED/DISTRESSED are you about your current sleep problem? 3   To what extent do you consider your sleep problem to INTERFERE with your daily functioning (e.g. daytime fatigue, mood, ability to function at work/daily chores, concentration, memory, mood, etc.) CURRENTLY? 3   LES Total Score 20       Guidelines for Scoring/Interpretation:  Total score categories:  0-7 = No clinically significant insomnia   8-14 = Subthreshold insomnia   15-21 = Clinical insomnia (moderate severity)  22-28 = Clinical insomnia (severe)  Used via courtesy of www.[employer].va.gov with permission from Anthony Charles PhD., [facility]      Past medical/surgical history, family history, social history, medications and allergies were reviewed.        Problem List:  Patient Active Problem List    Diagnosis Date Noted    RLQ abdominal pain 10/03/2024     Priority: Medium    Ovarian torsion 10/03/2024     Priority: Medium    Generalized anxiety disorder 06/17/2024     Priority: Medium    Encounter for insertion of Mirena IUD 04/04/2024     Priority: Medium     4/4/24 mirena insertion lot# [id number] 1/30/2026      AAT (alpha-1-antitrypsin) deficiency (H) 12/14/2023      "Priority: Medium    Elevated LFTs 10/02/2023     Priority: Medium    Moderate episode of recurrent major depressive disorder (H) 09/25/2023     Priority: Medium    Other specified eating disorder 09/11/2023     Priority: Medium    Shellfish allergy 07/21/2021     Priority: Medium    Chronic tonsillitis 05/13/2020     Priority: Medium     Added automatically from request for surgery [id number]      Allergic rhinitis 01/10/2012     Priority: Medium        /81   Pulse 85   Resp 18   Ht 1.753 m (5' 9\")   Wt 130.4 kg (287 lb 8 oz)   SpO2 95%   BMI 42.46 kg/m      Ari Solomon, APRN CNP  "

## 2024-10-07 NOTE — PATIENT INSTRUCTIONS
"          MY TREATMENT INFORMATION FOR SLEEP APNEA-  [place]    DOCTOR : RONNY Ramirez CNP    Am I having a sleep study at a sleep center?  --->Due to normal delays, you will be contacted within 2-4 weeks to schedule    Am I having a home sleep study?  --->Watch the video for the device you are using:    -/drop off device- https://www.Counsyl.com/watch?v=yGGFBdELGhk    Frequently asked questions:  1. What is Obstructive Sleep Apnea (THOR)? THOR is the most common type of sleep apnea. Apnea means, \"without breath.\"  Apnea is most often caused by narrowing or collapse of the upper airway as muscles relax during sleep.   Almost everyone has occasional apneas. Most people with sleep apnea have had brief interruptions at night frequently for many years.  The severity of sleep apnea is related to how frequent and severe the events are.   2. What are the consequences of THOR? Symptoms include: feeling sleepy during the day, snoring loudly, gasping or stopping of breathing, trouble sleeping, and occasionally morning headaches or heartburn at night. Sleepiness can be serious and even increase the risk of falling asleep while driving. Other health consequences may include development of high blood pressure and other cardiovascular disease in persons who are susceptible. Untreated THOR can contribute to heart disease, stroke and diabetes.   3. What are the treatment options? In most situations, sleep apnea is a lifelong disease that must be managed with daily therapy. Medications are not effective for sleep apnea and surgery is generally not considered until other therapies have been tried. Your treatment is your choice. Continuous Positive Airway (CPAP) works right away and is the therapy that is effective in nearly everyone. An oral device to hold your jaw forward is usually the next most reliable option. Other options include postioning devices (to keep you off your back), weight loss, and surgery " including a tongue pacing device. There is more detail about some of these options below.  4. Are my sleep studies covered by insurance? Although we will request verification of coverage, we advise you also check in advance of the study to ensure there is coverage.    Important tips for those choosing CPAP and similar devices  REMEMBER-IF YOU RECEIVE A CALL FROM [phone]--> IT IS TO SETUP A DEVICE  For new devices, sign up for device PAT to monitor your device for your followup visits  We encourage you to utilize the [employer] pat or website (https://HapBoo/) to monitor your therapy progress and share the data with your healthcare team when you discuss your sleep apnea.                                                    Know your equipment:  CPAP is continuous positive airway pressure that prevents obstructive sleep apnea by keeping the throat from collapsing while you are sleeping. In most cases, the device is  smart  and can slowly self-adjusts if your throat collapses and keeps a record every day of how well you are treated-this information is available to you and your care team.  BPAP is bilevel positive airway pressure that keeps your throat open and also assists each breath with a pressure boost to maintain adequate breathing.  Special kinds of BPAP are used in patients who have inadequate breathing from lung or heart disease. In most cases, the device is  smart  and can slowly self-adjusts to assist breathing. Like CPAP, the device keeps a record of how well you are treated.  Your mask is your connection to the device. You get to choose what feels most comfortable and the staff will help to make sure if fits. Here: are some examples of the different masks that are available: Magnetic mask aids may assist with use but there are safety issues that should be addressed when considering with magnets* ( see end of discussion).       Key points to remember on your journey with sleep apnea:  Sleep  study.  PAP devices often need to be adjusted during a sleep study to show that they are effective and adjusted right.  Good tips to remember: Try wearing just the mask during a quiet time during the day so your body adapts to wearing it. A humidifier is recommended for comfort in most cases to prevent drying of your nose and throat. Allergy medication from your provider may help you if you are having nasal congestion.  Getting settled-in. It takes more than one night for most of us to get used to wearing a mask. Try wearing just the mask during a quiet time during the day so your body adapts to wearing it. A humidifier is recommended for comfort in most cases. Our team will work with you carefully on the first day and will be in contact within 4 days and again at 2 and 4 weeks for advice and remote device adjustments. Your therapy is evaluated by the device each day.   Use it every night. The more you are able to sleep naturally for 7-8 hours, the more likely you will have good sleep and to prevent health risks or symptoms from sleep apnea. Even if you use it 4 hours it helps. Occasionally all of us are unable to use a medical therapy, in sleep apnea, it is not dangerous to miss one night.   Communicate. Call our skilled team on the number provided on the first day if your visit for problems that make it difficult to wear the device. Over 2 out of 3 patients can learn to wear the device long-term with help from our team. Remember to call our team or your sleep providers if you are unable to wear the device as we may have other solutions for those who cannot adapt to mask CPAP therapy. It is recommended that you sleep your sleep provider within the first 3 months and yearly after that if you are not having problems.   Use it for your health. We encourage use of CPAP masks during daytime quiet periods to allow your face and brain to adapt to the sensation of CPAP so that it will be a more natural sensation to awaken  to at night or during naps. This can be very useful during the first few weeks or months of adapting to CPAP though it does not help medically to wear CPAP during wakefulness and  should not be used as a strategy just to meet guidelines.  Take care of your equipment. Make sure you clean your mask and tubing using directions every day and that your filter and mask are replaced as recommended or if they are not working.     *Masks with magnets:  Updated Contraindications  Masks with magnetic components are contraindicated for use by patients where they, or anyone in close physical contact while using the mask, have the following:   Active medical implants that interact with magnets (i.e., pacemakers, implantable cardioverter defibrillators (ICD), neurostimulators, cerebrospinal fluid (CSF) shunts, insulin/infusion pumps)   Metallic implants/objects containing ferromagnetic material (i.e., aneurysm clips/flow disruption devices, embolic coils, stents, valves, electrodes, implants to restore hearing or balance with implanted magnets, ocular implants, metallic splinters in the eye)  Updated Warning  Keep the mask magnets at a safe distance of at least 6 inches (150 mm) away from implants or medical devices that may be adversely affected by magnetic interference. This warning applies to you or anyone in close physical contact with your mask. The magnets are in the frame and lower headgear clips, with a magnetic field strength of up to 400mT. When worn, they connect to secure the mask but may inadvertently detach while asleep.  Implants/medical devices, including those listed within contraindications, may be adversely affected if they change function under external magnetic fields or contain ferromagnetic materials that attract/repel to magnetic fields (some metallic implants, e.g., contact lenses with metal, dental implants, metallic cranial plates, screws, samara hole covers, and bone substitute devices). Consult your  physician and  of your implant / other medical device for information on the potential adverse effects of magnetic fields.    BESIDES CPAP, WHAT OTHER THERAPIES ARE THERE?    Positioning Device  Positioning devices are generally used when sleep apnea is mild and only occurs on your back.This example shows a pillow that straps around the waist. It may be appropriate for those whose sleep study shows milder sleep apnea that occurs primarily when lying flat on one's back. Preliminary studies have shown benefit but effectiveness at home may need to be verified by a home sleep test. These devices are generally not covered by medical insurance.  Examples of devices that maintain sleeping on the back to prevent snoring and mild sleep apnea.    Belt type body positioner  http://[employer].Piethis.com/    Electronic reminder  http://nightshifttherapy.com/            Oral Appliance  What is oral appliance therapy?  An oral appliance device fits on your teeth at night like a retainer used after having braces. The device is made by a specialized dentist and requires several visits over 1-2 months before a manufactured device is made to fit your teeth and is adjusted to prevent your sleep apnea. Once an oral device is working properly, snoring should be improved. A home sleep test may be recommended at that time if to determine whether the sleep apnea is adequately treated.       Some things to remember:  -Oral devices are often, but not always, covered by your medical insurance. Be sure to check with your insurance provider.   -If you are referred for oral therapy, you will be given a list of specialized dentists to consider or you may choose to visit the Web site of the American Academy of Dental Sleep Medicine  -Oral devices are less likely to work if you have severe sleep apnea or are extremely overweight.     More detailed information  An oral appliance is a small acrylic device that fits over the upper and lower teeth   (similar to a retainer or a mouth guard). This device slightly moves jaw forward, which moves the base of the tongue forward, opens the airway, improves breathing for effective treat snoring and obstructive sleep apnea in perhaps 7 out of 10 people .  The best working devices are custom-made by a dental device  after a mold is made of the teeth 1, 2, 3.  When is an oral appliance indicated?  Oral appliance therapy is recommended as a first-line treatment for patients with primary snoring, mild sleep apnea, and for patients with moderate sleep apnea who prefer appliance therapy to use of CPAP4, 5. Severity of sleep apnea is determined by sleep testing and is based on the number of respiratory events per hour of sleep.   How successful is oral appliance therapy?  The success rate of oral appliance therapy in patients with mild sleep apnea is 75-80% while in patients with moderate sleep apnea it is 50-70%. The chance of success in patients with severe sleep apnea is 40-50%. The research also shows that oral appliances have a beneficial effect on the cardiovascular health of THOR patients at the same magnitude as CPAP therapy7.  Oral appliances should be a second-line treatment in cases of severe sleep apnea, but if not completely successful then a combination therapy utilizing CPAP plus oral appliance therapy may be effective. Oral appliances tend to be effective in a broad range of patients although studies show that the patients who have the highest success are females, younger patients, those with milder disease, and less severe obesity. 3, 6.   Finding a dentist that practices dental sleep medicine  Specific training is available through the American Academy of Dental Sleep Medicine for dentists interested in working in the field of sleep. To find a dentist who is educated in the field of sleep and the use of oral appliances, near you, visit the Web site of the American Academy of Dental Sleep  Medicine.    References  1. Michaelle et al. Objectively measured vs self-reported compliance during oral appliance therapy for sleep-disordered breathing. Chest 2013; 144(5): 2119-7303.  2. Bryan et al. Objective measurement of compliance during oral appliance therapy for sleep-disordered breathing. Thorax 2013; 68(1): 91-96.  3. Sourav et al. Mandibular advancement devices in 620 men and women with THOR and snoring: tolerability and predictors of treatment success. Chest 2004; 125: 1896-3954.  4. Miguel et al. Oral appliances for snoring and THOR: a review. Sleep 2006; 29: 244-262.  5. Marciano et al. Oral appliance treatment for THOR: an update. J Clin Sleep Med 2014; 10(2): 215-227.  6. Xochilt et al. Predictors of OSAH treatment outcome. J Dent Res 2007; 86: 2978-8692.      Weight Loss: Your Body mass index is 42.46 kg/m .    Being overweight does not necessarily mean you will have health consequences.  Those who have BMI over 35 or over 27 with existing medical conditions carries greater risk.   Weight loss decreases severity of sleep apnea in most people with obesity. For those with mild obesity who have developed snoring with weight gain, even 15-30 pound weight loss can improve and occasionally milder eliminate sleep apnea.  Structured and life-long dietary and health habits are necessary to lose weight and keep healthier weight levels.     The Comprehensive Weight loss program offers all aspects of weight loss strategies including two Non-Surgical Weight Loss Programs: Medical Weight Management and our 24 Week Healthy Lifestyle Program:    Medical Weight Management: You will meet with a Medical Weight Management Provider, as well as a Registered Dietician. The program may include medication therapy, dietary education, recommended exercise and physical therapy programs, monthly support group meetings, and possible psychological counseling. Follow up visits with the provider or dietician are  scheduled based on your progress and needs.    24 Week Healthy Lifestyle Program: This unique program is designed to give you the support of weekly appointments and activities thru a 24-week period. It may include all of the components of the basic program (above), with the addition of 11 individual Health  Visits, 24-week access to the [employer] website for over 700 online classes, and monthly support group meetings. This program has an out-of-pocket expense of $499 to cover the items that can not be billed to insurance (health coaches and [employer] access), and is non-refundable/non-transferable (you may be able to use a Health Savings Account; ask your HSA provider). There may be an optional meal replacement plan prescribed as well.   Surgical management achieves meaningful long-term weight loss and improvement in health risks in most patients with more severe obesity.      Sleep Apnea Surgery:    Surgery for obstructive sleep apnea is considered generally only when other therapies fail to work. Surgery may be discussed with you if you are having a difficult time tolerating CPAP and or when there is an abnormal structure that requires surgical correction.  Nose and throat surgeries often enlarge the airway to prevent collapse.  Most of these surgeries create pain for 1-2 weeks and up to half of the most common surgeries are not effective throughout life.  You should carefully discuss the benefits and drawbacks to surgery with your sleep provider and surgeon to determine if it is the best solution for you.   More information  Surgery for THOR is directed at areas that are responsible for narrowing or complete obstruction of the airway during sleep.  There are a wide range of procedures available to enlarge and/or stabilize the airway to prevent blockage of breathing in the three major areas where it can occur: the palate, tongue, and nasal regions.  Successful surgical treatment depends on the accurate  identification of the factors responsible for obstructive sleep apnea in each person.  A personalized approach is required because there is no single treatment that works well for everyone.  Because of anatomic variation, consultation with an examination by a sleep surgeon is a critical first step in determining what surgical options are best for each patient.  In some cases, examination during sedation may be recommended in order to guide the selection of procedures.  Patients will be counseled about risks and benefits as well as the typical recovery course after surgery. Surgery is typically not a cure for a person s THOR.  However, surgery will often significantly improve one s THOR severity (termed  success rate ).  Even in the absence of a cure, surgery will decrease the cardiovascular risk associated with OSA7; improve overall quality of life8 (sleepiness, functionality, sleep quality, etc).    Palate Procedures:  Patients with THOR often have narrowing of their airway in the region of their tonsils and uvula.  The goals of palate procedures are to widen the airway in this region as well as to help the tissues resist collapse.  Modern palate procedure techniques focus on tissue conservation and soft tissue rearrangement, rather than tissue removal.  Often the uvula is preserved in this procedure. Residual sleep apnea is common in patient after pharyngoplasty with an average reduction in sleep apnea events of 33%2.      Tongue Procedures:  While patients are awake, the muscles that surround the throat are active and keep this region open for breathing. These muscles relax during sleep, allowing the tongue and other structures to collapse and block breathing.  There are several different tongue procedures available.  Selection of a tongue base procedure depends on characteristics seen on physical exam.  Generally, procedures are aimed at removing bulky tissues in this area or preventing the back of the tongue from  falling back during sleep.  Success rates for tongue surgery range from 50-62%3.    Hypoglossal Nerve Stimulation:  Hypoglossal nerve stimulation has recently received approval from the [place] Food and Drug Administration for the treatment of obstructive sleep apnea.  This is based on research showing that the system was safe and effective in treating sleep apnea6.  Results showed that the median AHI score decreased 68%, from 29.3 to 9.0. This therapy uses an implant system that senses breathing patterns and delivers mild stimulation to airway muscles, which keeps the airway open during sleep.  The system consists of three fully implanted components: a small generator (similar in size to a pacemaker), a breathing sensor, and a stimulation lead.  Using a small handheld remote, a patient turns the therapy on before bed and off upon awakening.    Candidates for this device must be greater than 18 years of age, have moderate to severe obstructive sleep apnea with less than 25% central events  (AHI between 15-65), BMI less than 35, have tried CPAP/oral appliance for at least 8 weeks without success, and have appropriate upper airway anatomy (determined by a sleep endoscopy performed by Dr. Pindea Larose or Dr. Brandon Pillai).    Nasal Procedures:  Nasal obstruction can interfere with nasal breathing during the day and night.  Studies have shown that relief of nasal obstruction can improve the ability of some patients to tolerate positive airway pressure therapy for obstructive sleep apnea1.  Treatment options include medications such as nasal saline, topical corticosteroid and antihistamine sprays, and oral medications such as antihistamines or decongestants. Non-surgical treatments can include external nasal dilators for selected patients. If these are not successful by themselves, surgery can improve the nasal airway either alone or in combination with these other options.        Combination  Procedures:  Combination of surgical procedures and other treatments may be recommended, particularly if patients have more than one area of narrowing or persistent positional disease.  The success rate of combination surgery ranges from 66-80%2,3.    References  Quintin JERRY. The Role of the Nose in Snoring and Obstructive Sleep Apnoea: An Update.  Eur Arch Otorhinolaryngol. 2011; 268: 1365-73.   Gricel SM; Steve JA; Marsha JR; Pallanch JF; Ira MB; Henry SG; Jenni MCCONNELL. Surgical modifications of the upper airway for obstructive sleep apnea in adults: a systematic review and meta-analysis. SLEEP 2010;33(10):2432-8248. Mitchell MCKEON. Hypopharyngeal surgery in obstructive sleep apnea: an evidence-based medicine review.  Arch Otolaryngol Head Neck Surg. 2006 Feb;132(2):206-13.  Fred YH1, Harley Y, Adam FRANCIS. The efficacy of anatomically based multilevel surgery for obstructive sleep apnea. Otolaryngol Head Neck Surg. 2003 Oct;129(4):327-35.  Kezirian E, Goldberg A. Hypopharyngeal Surgery in Obstructive Sleep Apnea: An Evidence-Based Medicine Review. Arch Otolaryngol Head Neck Surg. 2006 Feb;132(2):206-13.  Derrick PJ et al. Upper-Airway Stimulation for Obstructive Sleep Apnea.  N Engl J Med. 2014 Jan 9;370(2):139-49.  Huy Y et al. Increased Incidence of Cardiovascular Disease in Middle-aged Men with Obstructive Sleep Apnea. Am J Respir Crit Care Med; 2002 166: 159-165  Stout EM et al. Studying Life Effects and Effectiveness of Palatopharyngoplasty (SLEEP) study: Subjective Outcomes of Isolated Uvulopalatopharyngoplasty. Otolaryngol Head Neck Surg. 2011; 144: 623-631.  WHAT IF I ONLY HAVE SNORING?  Mandibular advancement devices, lateral sleep positioning, long-term weight loss and treatment of nasal allergies have been shown to improve snoring.  Exercising tongue muscles with a game (https://apps.[employer]/us/pat/soundly-reduce-snoring/ym4338679367) or stimulating the tongue during the day with a device  (https://doi.org/10.3390/jtq56224729) have improved snoring in some individuals.  https://www.[employer].com/  https://www.sleepfoundation.org/best-anti-snoring-mouthpieces-and-mouthguards    Remember to Drive Safe... Drive Alive     Sleep health profoundly affects your health, mood, and your safety.  Thirty three percent of the population (one in three of us) is not getting enough sleep and many have a sleep disorder. Not getting enough sleep or having an untreated / undertreated sleep condition may make us sleepy without even knowing it. In fact, our driving could be dramatically impaired due to our sleep health. As your provider, here are some things I would like you to know about driving:     Here are some warning signs for impairment and dangerous drowsy driving:              -Having been awake more than 16 hours               -Looking tired               -Eyelid drooping              -Head nodding (it could be too late at this point)              -Driving for more than 30 minutes     Some things you could do to make the driving safer if you are experiencing some drowsiness:              -Stop driving and rest              -Call for transportation              -Make sure your sleep disorder is adequately treated     Some things that have been shown NOT to work when experiencing drowsiness while driving:              -Turning on the radio              -Opening windows              -Eating any  distracting  /  entertaining  foods (e.g., sunflower seeds, candy, or any other)              -Talking on the phone      Your decision may not only impact your life, but also the life of others. Please, remember to drive safe for yourself and all of us.

## 2024-10-07 NOTE — NURSING NOTE
"Chief Complaint   Patient presents with    Consult     Concern of THOR, Daytime tiredness, Family members with THOR        Initial /81   Pulse 85   Resp 18   Ht 1.753 m (5' 9\")   Wt 130.4 kg (287 lb 8 oz)   SpO2 95%   BMI 42.46 kg/m   Estimated body mass index is 42.46 kg/m  as calculated from the following:    Height as of this encounter: 1.753 m (5' 9\").    Weight as of this encounter: 130.4 kg (287 lb 8 oz).    Medication Reconciliation: complete    Neck circumference: 15 inches / 38 centimeters.    DME: Unknown    [facility]   Clinic Assistant  [facility]      "

## 2024-10-07 NOTE — TELEPHONE ENCOUNTER
----- Message from Winifred Calderon sent at 10/4/2024  9:59 PM CDT -----  Can you schedule this patient for a postop visit in ~4 weeks with me?    Best,  Winifred

## 2024-10-08 ENCOUNTER — OFFICE VISIT (OUTPATIENT)
Dept: SLEEP MEDICINE | Facility: CLINIC | Age: 24
End: 2024-10-08
Payer: MEDICAID

## 2024-10-08 DIAGNOSIS — R06.83 SNORING: ICD-10-CM

## 2024-10-08 DIAGNOSIS — E66.01 MORBID OBESITY (H): ICD-10-CM

## 2024-10-08 DIAGNOSIS — G47.19 EXCESSIVE DAYTIME SLEEPINESS: ICD-10-CM

## 2024-10-08 LAB
PATH REPORT.COMMENTS IMP SPEC: NORMAL
PATH REPORT.COMMENTS IMP SPEC: NORMAL
PATH REPORT.FINAL DX SPEC: NORMAL
PATH REPORT.GROSS SPEC: NORMAL
PATH REPORT.RELEVANT HX SPEC: NORMAL
PHOTO IMAGE: NORMAL

## 2024-10-08 PROCEDURE — G0399 HOME SLEEP TEST/TYPE 3 PORTA: HCPCS | Performed by: INTERNAL MEDICINE

## 2024-10-09 ENCOUNTER — DOCUMENTATION ONLY (OUTPATIENT)
Dept: SLEEP MEDICINE | Facility: CLINIC | Age: 24
End: 2024-10-09
Payer: MEDICAID

## 2024-10-10 NOTE — PROGRESS NOTES
This HSAT was performed using a Noxturnal T3 device which recorded snore, sound, movement activity, body position, nasal pressure, oronasal thermal airflow, pulse, oximetry and both chest and abdominal respiratory effort. HSAT data was restricted to the time patient states they were in bed.     HSAT was scored using 1B 4% hypopnea rule.     HST AHI (Non-PAT): 79.8  Snoring was reported as moderate and loud.  Time with SpO2 below 89% was 123.4 minutes.   Overall signal quality was good     Pt will follow up with sleep provider to determine appropriate therapy.

## 2024-10-11 NOTE — PROGRESS NOTES
HST POST-STUDY QUESTIONNAIRE    What time did you go to bed?  12:30  How long do you think it took to fall asleep?  About 30min-hr  What time did you wake up to start the day?  0715  Did you get up during the night at all?  I woke up but didn't get up   If you woke up, do you remember approximately what time(s)? No sorry  Did you have any difficulty with the equipment?  No  Did you us any type of treatment with this study?  None  Was the head of the bed elevated? No  Did you sleep in a recliner?  No  Did you stop using CPAP at least 3 days before this test?  Yes  Any other information you'd like us to know? -

## 2024-10-13 NOTE — PROCEDURES
Home Sleep Study Interpretation   Patient: Batool Louis  MRN: [id number]  YOB: 2000  Study Date: 10/8/2024  PCP/Referring Provider: Nancy Donnelly;  Ordering Provider: THALIA Sherman    Indications for Home Study:  Batool Louis is a 24 year old non-smoking female with a history of alpha 1 antitrypsin deficiency (MZ), generalized anxiety disorder and recurrent depression who presents with symptoms suggestive of obstructive sleep apnea. She recently used CPAP after salpingo-oophorectomy for ovarian torsion.   Weight: 287.0 lbs  BMI: 42.4o  Malin:   STOP BAN/8    Data: A full night home sleep study was performed recording the standard physiologic parameters including body position, movement, sound, nasal pressure, thermal oral airflow, chest and abdominal movements with respiratory inductance plethysmography, and oxygen saturation by pulse oximetry. Pulse rate was estimated by oximetry recording. This study was considered adequate based on > 4 hours of quality oximetry and respiratory recording. As specified by the AASM Manual for the Scoring of Sleep and Associated events, version 2.3, Rule VIII.D 1B, 4% oxygen desaturation scoring for hypopneas is used as a standard of care on all home sleep apnea testing.    Analysis Time: 403 minutes    Respiration:  Sleep Associated Hypoxemia: Sustained hypoxemia was present. Baseline oxygen saturation was 92%. Time with saturation less than 89% was 123.4 minutes. Time with saturation less than 90% was 145.8 minutes. The lowest oxygen saturation was 75.0%.  Snoring: Snoring was present 7% of the time with an average level of 70 dB. Duration of time snoring above 70 dB was 25.8 minutes.    Respiratory events: The home study revealed a presence of 254 obstructive apneas and 0 mixed and central apneas. There were 282 hypopneas resulting in a combined apnea/hypopnea index [AHI] of 80 events per hour with  100 per hour supine, 0  per  hour prone, 0 per hour upright, 0  per hour left side, and 16 per hour right side.    Position: Percent of time spent: Supine 77%, prone 0%, upright 0%, on left 6%, on right 16%.    Heart Rate: By Pulse Oximetry normal rate was noted.    Assessment:  Severe obstructive sleep apnea.  Sleep associated hypoxemia was present and hypoventilation cannot be excluded.    Recommendations:  Consider auto-CPAP at 6-20 cmH2O with virtual coordinated care and overnight oximetry on therapy.  Suggest optimizing sleep hygiene and avoiding sleep deprivation  Weight management    Diagnosis Code(s): Obstructive Sleep Apnea G47.33, Hypoxemia G47.36, Snoring R06.83    Electronically signed by: Pedro Haddad MD   Diplomate, American Board of Internal Medicine, Sleep Medicine

## 2024-10-14 ENCOUNTER — MYC MEDICAL ADVICE (OUTPATIENT)
Dept: SLEEP MEDICINE | Facility: CLINIC | Age: 24
End: 2024-10-14
Payer: MEDICAID

## 2024-10-14 ENCOUNTER — MYC MEDICAL ADVICE (OUTPATIENT)
Dept: FAMILY MEDICINE | Facility: CLINIC | Age: 24
End: 2024-10-14
Payer: MEDICAID

## 2024-10-14 DIAGNOSIS — G47.33 OSA (OBSTRUCTIVE SLEEP APNEA): Primary | ICD-10-CM

## 2024-10-18 ENCOUNTER — VIRTUAL VISIT (OUTPATIENT)
Dept: FAMILY MEDICINE | Facility: CLINIC | Age: 24
End: 2024-10-18
Payer: MEDICAID

## 2024-10-18 DIAGNOSIS — F33.1 MODERATE EPISODE OF RECURRENT MAJOR DEPRESSIVE DISORDER (H): Primary | ICD-10-CM

## 2024-10-18 DIAGNOSIS — F50.89 OTHER SPECIFIED EATING DISORDER: ICD-10-CM

## 2024-10-18 DIAGNOSIS — F41.1 GENERALIZED ANXIETY DISORDER: ICD-10-CM

## 2024-10-18 PROCEDURE — 99441 PR PHYSICIAN TELEPHONE EVALUATION 5-10 MIN: CPT | Mod: 93 | Performed by: FAMILY MEDICINE

## 2024-10-18 ASSESSMENT — ANXIETY QUESTIONNAIRES
7. FEELING AFRAID AS IF SOMETHING AWFUL MIGHT HAPPEN: NOT AT ALL
4. TROUBLE RELAXING: NOT AT ALL
3. WORRYING TOO MUCH ABOUT DIFFERENT THINGS: NOT AT ALL
1. FEELING NERVOUS, ANXIOUS, OR ON EDGE: NOT AT ALL
GAD7 TOTAL SCORE: 0
6. BECOMING EASILY ANNOYED OR IRRITABLE: NOT AT ALL
GAD7 TOTAL SCORE: 0
7. FEELING AFRAID AS IF SOMETHING AWFUL MIGHT HAPPEN: NOT AT ALL
8. IF YOU CHECKED OFF ANY PROBLEMS, HOW DIFFICULT HAVE THESE MADE IT FOR YOU TO DO YOUR WORK, TAKE CARE OF THINGS AT HOME, OR GET ALONG WITH OTHER PEOPLE?: NOT DIFFICULT AT ALL
GAD7 TOTAL SCORE: 0
5. BEING SO RESTLESS THAT IT IS HARD TO SIT STILL: NOT AT ALL
IF YOU CHECKED OFF ANY PROBLEMS ON THIS QUESTIONNAIRE, HOW DIFFICULT HAVE THESE PROBLEMS MADE IT FOR YOU TO DO YOUR WORK, TAKE CARE OF THINGS AT HOME, OR GET ALONG WITH OTHER PEOPLE: NOT DIFFICULT AT ALL
2. NOT BEING ABLE TO STOP OR CONTROL WORRYING: NOT AT ALL

## 2024-10-18 ASSESSMENT — PATIENT HEALTH QUESTIONNAIRE - PHQ9
SUM OF ALL RESPONSES TO PHQ QUESTIONS 1-9: 6
SUM OF ALL RESPONSES TO PHQ QUESTIONS 1-9: 6
10. IF YOU CHECKED OFF ANY PROBLEMS, HOW DIFFICULT HAVE THESE PROBLEMS MADE IT FOR YOU TO DO YOUR WORK, TAKE CARE OF THINGS AT HOME, OR GET ALONG WITH OTHER PEOPLE: SOMEWHAT DIFFICULT

## 2024-10-18 ASSESSMENT — ENCOUNTER SYMPTOMS: NERVOUS/ANXIOUS: 1

## 2024-10-18 NOTE — PROGRESS NOTES
Dodie is a 24 year old who is being evaluated via a billable telephone visit.    What phone number would you like to be contacted at? [phone]  How would you like to obtain your AVS? Todd  Originating Location (pt. Location): Home    Distant Location (provider location):  On-site    Assessment & Plan       ICD-10-CM    1. Moderate episode of recurrent major depressive disorder (H)  F33.1 FLUoxetine (PROZAC) 20 MG capsule      2. Generalized anxiety disorder  F41.1 FLUoxetine (PROZAC) 20 MG capsule      3. Other specified eating disorder  F50.89         Patient states that mentally, she has been doing very well and is ready to taper off the Prozac.  She feels her depression and anxiety are well-controlled.  Her eating disorder is also controlled with counseling.  No suicidal or homicidal ideation.  No hallucination.  No problems with anxiety or sleeping.  She is interested to taper off the Prozac.  Been on it for over a year for depression and anxiety which were thought due to excessive stress.  She is no longer having excessive stress.    Agree with tapering of the Prozac.  Will decrease to 20 mg for a month; if keeps doing well then will have her to stop it.  Recommended to resume the Prozac if her mental health is worsen with decreased the dose or stopped the medication.    Encouraged to continue working with the counselor closely.  Symptoms the need to be seen or call in discussed as well.  ER or go to the emergency room if develops suicidal thoughts, plan or intention.      Subjective   Dodie is a 24 year old, presenting for the following health issues:  Recheck Medication, Anxiety, and Depression      10/18/2024     2:17 PM   Additional Questions   Roomed by Shayla         10/18/2024     2:17 PM   Patient Reported Additional Medications   Patient reports taking the following new medications none     Anxiety    History of Present Illness       Mental Health Follow-up:  Patient presents to follow-up on  Depression & Anxiety.Patient's depression since last visit has been:  Good  The patient is not having other symptoms associated with depression.  Patient's anxiety since last visit has been:  Better  The patient is not having other symptoms associated with anxiety.  Any significant life events: health concerns and other  Patient is not feeling anxious or having panic attacks.  Patient has no concerns about alcohol or drug use.    She eats 2-3 servings of fruits and vegetables daily.She consumes 1 sweetened beverage(s) daily.She exercises with enough effort to increase her heart rate 30 to 60 minutes per day.  She exercises with enough effort to increase her heart rate 3 or less days per week. She is missing 7 dose(s) of medications per week.  She is not taking prescribed medications regularly due to other.       Medication Followup of prozac  Taking Medication as prescribed: NO  Side Effects:  restless legs in the beginning  Medication Helping Symptoms:  Yes, but feeling better now     Clovre was seen today for depression/anxiety follow-up.  She has been taking Prozac 40 mg for over a year for depression and anxiety which has been working well.  Her mental health was triggered due to excessive stress.  Stated that she has been feeling really good mentally and she would like to taper off the Prozac.  No longer having the anxiety and her depression has been well-controlled.  No suicidal or homicidal ideation.  No hallucination.  No drugs or alcohol.  No problem with  sleeping.  She also has been seeing counselor for her eating disorder which is controlled.  No other concern.        Review of Systems  Constitutional, HEENT, cardiovascular, pulmonary, gi and gu systems are negative, except as otherwise noted.      Objective           Vitals:  No vitals were obtained today due to virtual visit.    Physical Exam   General: Alert and no distress, speaking in full sentences.  Respiratory: No audible wheeze, cough, shortness  of breath, or labored breathing  Psychiatric:  Appropriate affect, tone, and pace of words      No results found for any visits on 10/18/24.      Phone call duration: 10 minutes  Signed Electronically by: Nancy Mcdowell Mai, MD

## 2024-10-20 ENCOUNTER — HEALTH MAINTENANCE LETTER (OUTPATIENT)
Age: 24
End: 2024-10-20

## 2024-10-29 ENCOUNTER — DOCUMENTATION ONLY (OUTPATIENT)
Dept: SLEEP MEDICINE | Facility: CLINIC | Age: 24
End: 2024-10-29
Payer: MEDICAID

## 2024-10-29 DIAGNOSIS — G47.33 OBSTRUCTIVE SLEEP APNEA: Primary | ICD-10-CM

## 2024-10-29 NOTE — PROGRESS NOTES
Patient was offered choice of vendor and chose [facility].  Patient Batool Louis was set up at [place] on October 29, 2024. Patient received a Resmed Airsense 10 Pressures were set at  6-20 cm H2O.   Patient s ramp is 5 cm H2O for Auto and FLEX/EPR is EPR, 2.  Patient received a Resmed Mask name: Airfit [id number]  Nasal mask size Small, heated tubing and heated humidifier.  Patient has the following compliance requirements: usage only.  Patient has a follow up on 1/24/25 with Colie Purpur APRN, FNP-C. Sam O Humes

## 2024-10-30 DIAGNOSIS — K76.0 HEPATIC STEATOSIS: Primary | ICD-10-CM

## 2024-10-30 NOTE — PROGRESS NOTES
[facility] Hepatology    New Patient Visit    Referring provider:  Selam Dhaliwal  Chief complaint:  Abnormal liver studies     Assessment  24 year old female with PMHx of obesity, hepatic steatosis and alpha-1 anti-trypsin (heterozygote, MZ). Past medical history of MDD, SHELBY and eating disorder.     #.  Abnormal liver studies  #.  Hepatic steatosis/MASLD - concern for advanced fibrosis  #.  Heterozygous alpha 1 antitrypsin, MZ  In July 2023 she had an elevated ALT to 53.  In October 2024 she had an elevated ALT to 86 and an AST of 59.  Her lab studies today are even more elevated than they have been in the past. INR is within normal limits suggestive of intact hepatic synthetic function.  However the patient underwent a FibroScan today that demonstrates diffuse hepatic steatosis and concern for F3 fibrosis.  Given this and her BMI greater than 40 I am concerned that an MR elastography will be insufficient to assess her degree of fibrosis thus we discussed a liver biopsy to evaluate for underlying hepatic steatosis, inflammation and fibrosis.     Pending additional studies including autoimmune studies to evaluate for other potential causes of abnormal liver studies. Iron studies without evidence of iron overload. Alpha-1 anti-trypsin (heterozygote, MZ) - In patients who have heterozygote alpha-1 antitrypsin with an MZ phenotype the risk of liver disease is slightly increased in the presence of other coexisting factors such as metabolic associated steatotic liver disease or alcohol overuse. Hepatitis B surface antigen negative & Hepatitis C antibody nonreactive.     We spent the majority of the time discussing the diagnosis of metabolic associated steatotic liver disease, fibrosis, reversibility of MASLD and lifestyle and dietary changes to promote a healthy weight    Plan  -- HgA1c and fasting lipids  -- Hep A IgG, Hep B studies, LAINE + SMA, TSH and Ttg IgA/IgG  -- Liver biopsy to assess for underlying liver  disease (suspect MASLD), fibrosis and steatosis  -- Lifestyle and dietary changes with the goal of 7-10% weight loss   * Recommend reconnecting with [facility] given eating disorder   * Limit portion sizes and exclude MASLD promoting components: carbohydrates, sugars, processed food, foods & beverages high in added fructose   * Recommend aerobic exercise and resistance training at least three days a week with gradual increase over time  * If inadequate response to lifestyle therapies and ongoing BMI >27 (with comorbid conditions) or BMI > 30, recommend weight loss medication initiation, such as semaglutide or liraglutide  * If BMI > 35 (and obesity related condition) or BMI > 40 despite lifestyle modifications and pharmacotherapy; recommend discussion of bariatric surgery   -- Recommend against any dietary supplements not prescribed by physician  -- Recommend against any alcohol at this time pending fibrosis assessment.  If there is no evidence of advanced fibrosis it would be okay for 1-2 drinks per week    RTC: PRN based on liver biopsy findings  * Recommend liver studies every 6 months and calculation of FIB-4  * Recommend FibroScan every 2 to 3 years  * If any signs of portal hypertension or hepatic synthetic dysfunction such as INR elevation, thrombocytopenia or splenomegaly recommend rereferral to hepatology    La Monaco MD (Lizzie)  Advanced & Transplant Hepatology  [facility]    I spent 60 minutes on this encounter performing the following: reviewing the patient's medical record (clinic visits, hospital records, lab results, imaging and procedural documentation), history taking, physical exam and documentation on the date of the encounter. I also spent part of the time in coordination of care and counseling.    HPI:  Patient presented alone    Recently underwent an exploratory laparotomy and right salpingo-oophorectomy for ovarian torsion and a right ovarian complex cyst  on 10/3/2024.  Since that time she has had lifting restrictions.    At this time she reports no significant concerns or complaints.  She has some sensations following her recent ovarian surgery but no abdominal pain    She currently follows with the [facility] for her eating disorder however since her birthday in [birth year] her insurance changed and she has not returned to the [facility] due to concerns that her insurance would not cover the [facility].  She reports that her weight has generally been stable; she does not weigh herself because of her eating disorder but she feels like her pants feel about the same.  She denies any nausea or vomiting.  She reports that she is eating well.  She denies any diarrhea or constipation    She denies any signs or symptoms of decompensated liver disease,    She denies any herbs or supplements not prescribed by a physician.  She stopped using alcohol when she was told that she had fatty changes in her liver.  Prior to stopping alcohol she never would have more than 2 drinks per occasion and she would rarely drink up to 2 drinks.    She has no family history of liver disease.    Medical hx Surgical hx   Past Medical History:   Diagnosis Date    Alpha-1-antitrypsin deficiency carrier     MZ    Amenorrhea, secondary     Depressive disorder     Eating disorder     Hepatic steatosis       Past Surgical History:   Procedure Laterality Date    OOPHORECTOMY Right 10/03/2024    Procedure: Right OOPHORECTOMY, OPEN; right salpingectomy pelvic exam under anesthesia; pelvic washings;  Surgeon: Winifred Calderon MD;  Location:  OR          Medications  Current Outpatient Medications   Medication Sig Dispense Refill    cetirizine (ZYRTEC) 10 MG tablet Take 10 mg by mouth daily as needed for allergies seasonal      FLUoxetine (PROZAC) 20 MG capsule Take 1 capsule (20 mg) by mouth daily. 30 capsule 1    levonorgestrel (MIRENA) 52 MG (20 mcg/day) IUD 1 each by Intrauterine route once    "      Allergies  Allergies   Allergen Reactions    Shellfish-Derived Products Nausea and Vomiting     Possible allergy- pt unsure.     Sulfamethoxazole-Trimethoprim Hives    Seasonal Allergies Other (See Comments)     Nasal congestion    Tylenol [Acetaminophen] Other (See Comments)     Told  not to take due to her genetic insufficiency       Family hx Social hx   Family History   Problem Relation Age of Onset    Anemia Mother         unknown reason    Sleep Apnea Mother     Sleep Apnea Father     Back Pain Maternal Grandmother     Sleep Apnea Maternal Grandmother     Alzheimer Disease Maternal Grandfather     Sleep Apnea Maternal Grandfather     No Known Problems Paternal Grandmother     Cancer Paternal Grandfather         pancreatic    Sleep Apnea Paternal Grandfather     Asthma Sister     Sleep Apnea Sister     Skin Cancer Paternal Great-Grandfather       Social History     Tobacco Use    Smoking status: Never    Smokeless tobacco: Never   Vaping Use    Vaping status: Never Used   Substance Use Topics    Alcohol use: Not Currently    Drug use: No     - Employment: goes to school, studying music  - Alcohol: not currently using any alcohol. Previously never > 2 drinks on an occasion.   - Tobacco: Denies  - Drugs: Denies     Review of systems  A 10-point review of systems was negative.    Examination  /87 (BP Location: Right arm, Patient Position: Sitting, Cuff Size: Adult Large)   Pulse 101   Temp 98.3  F (36.8  C) (Oral)   Resp 20   Ht 1.753 m (5' 9.02\")   Wt 127 kg (280 lb)   SpO2 97%   BMI 41.33 kg/m    Body mass index is 41.33 kg/m .    Gen-NAD  Eye- EOMI  ENT- MMM, normal oropharynx  CVS- RRR, no murmurs  RS- CTA bilaterally  Abd-overweight, soft, nontender.  Nondistended  Extr- 2+ radial pulses bilaterally, no lower extremity edema bilaterally  MS- hands without clubbing  Skin- no jaundice  Psych- normal mood    Laboratory  BMP  Recent Labs   Lab Test 11/06/24  0710 10/04/24  0735 10/03/24  0655 " 07/03/23  1610 07/10/20  0959 04/03/19  0907     --  137 138  --  141   POTASSIUM 3.9  --  3.9 3.9  --  4.1   CHLORIDE 102  --  105 100  --  108   REDD 9.2  --  9.2 9.7  --  9.0   CO2 23  --  21* 25  --  27   BUN 10.6  --  13.9 13.1  --  14   CR 0.62  --  0.66 0.75  --  0.74   * 101* 122* 86   < > 88    < > = values in this interval not displayed.     CBC  Recent Labs   Lab Test 11/06/24  0710 10/04/24  0613 10/03/24  0655 07/03/23  1610 07/03/22  1041   WBC 7.6  --  9.1 8.1 9.1   RBC 4.56  --  4.60 4.86 4.35   HGB 12.6   < > 12.7 13.7 12.6   HCT 39.6  --  39.7 42.4 38.4   MCV 87  --  86 87 88   MCH 27.6  --  27.6 28.2 29.0   MCHC 31.8  --  32.0 32.3 32.8   RDW 13.7  --  14.1 13.1 12.9     --  275 270 232    < > = values in this interval not displayed.     Liver Enzymes   Recent Labs   Lab Test 11/06/24  0710   PROTTOTAL 7.8   ALBUMIN 4.4   BILITOTAL 0.7   ALKPHOS 86   AST 98*   *      INR   INR   Date Value Ref Range Status   11/06/2024 1.02 0.85 - 1.15 Final         4/4/2024  Hepatitis B surface antigen negative  Hepatitis C antibody nonreactive    Radiology  CT abdomen and pelvis with contrast 10/3/2024  Support devices: None.     Lower thorax: Normal.     Liver: No focal mass lesions. No intrahepatic biliary ductal  dilatation. Hypoattenuation of the hepatic parenchyma represent  hepatic steatosis.     Gallbladder and bile ducts: No stones, gallbladder wall thickening, or  pericholecystic fluid. No extrahepatic biliary ductal dilation.     Spleen: Unremarkable.     Pancreas: Normal CT appearance.      Adrenals: No nodules.      Kidneys and ureters: No solid lesions. No calculi. No  hydroureteronephrosis.     Bladder: Unremarkable.     Reproductive: IUD in place within the uterus. Within the right  anterior pelvis there is a 15.1 x 7.8 x 12.8 cm complex cystic lesion  (series 4 image 248, series 6 image 50), which demonstrates a few thin  septations in the superior aspect as well as  nodular component. There  is a focal area of fat within the superior/posterior aspect measuring  approximately 8 mm (series 4 image 247). This lesion likely originates  from the right ovary. On ultrasound 4/1/2024 there was a complex  cystic lesion which measured 7.8 x 6.8 x 7.5 cm.     Bowel: Unremarkable.     Appendix: Normal, seen within the right lower quadrant.     Mesentery/Peritoneum: Unremarkable.     Lymph nodes: Scattered small abdominal nodes, none meeting CT criteria  for pathologic enlargement.     Vasculature: Normal, without aneurysm or significant atherosclerotic  disease.     Bone windows: No aggressive osseous abnormalities.     Soft tissues: Unremarkable.                                                                      IMPRESSION:   1. Possible right ovarian torsion - Enlarged complex pelvic lesion  likely originating from the right ovary, measuring up to 15.1 cm. 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  Given the clinical  presentation and the substantial increase in size since prior  ultrasound 4/1/2024, this is concerning for possible torsion.     2. Otherwise, no acute abnormality of the abdomen or pelvis.  3. Hypoattenuation of the hepatic parenchyma, likely to represent  hepatic steatosis.     Abdominal ultrasound 1/2/2024  1. Increased echogenicity of the liver consistent with diffuse hepatic steatosis.   2. Normal sonographic appearance of the gallbladder, no biliary dilatation.   3. Otherwise unremarkable right upper quadrant ultrasound.     Fibroscan:    IQR 21  10.1kPa 0.4 IQR

## 2024-11-01 ENCOUNTER — DOCUMENTATION ONLY (OUTPATIENT)
Dept: SLEEP MEDICINE | Facility: CLINIC | Age: 24
End: 2024-11-01
Payer: COMMERCIAL

## 2024-11-02 ENCOUNTER — MYC MEDICAL ADVICE (OUTPATIENT)
Dept: OBGYN | Facility: CLINIC | Age: 24
End: 2024-11-02
Payer: COMMERCIAL

## 2024-11-06 ENCOUNTER — LAB (OUTPATIENT)
Dept: LAB | Facility: CLINIC | Age: 24
End: 2024-11-06
Payer: COMMERCIAL

## 2024-11-06 ENCOUNTER — OFFICE VISIT (OUTPATIENT)
Dept: GASTROENTEROLOGY | Facility: CLINIC | Age: 24
End: 2024-11-06
Attending: INTERNAL MEDICINE
Payer: COMMERCIAL

## 2024-11-06 ENCOUNTER — PRE VISIT (OUTPATIENT)
Dept: GASTROENTEROLOGY | Facility: CLINIC | Age: 24
End: 2024-11-06

## 2024-11-06 VITALS
WEIGHT: 280 LBS | HEART RATE: 101 BPM | OXYGEN SATURATION: 97 % | DIASTOLIC BLOOD PRESSURE: 87 MMHG | HEIGHT: 69 IN | SYSTOLIC BLOOD PRESSURE: 127 MMHG | BODY MASS INDEX: 41.47 KG/M2 | RESPIRATION RATE: 20 BRPM | TEMPERATURE: 98.3 F

## 2024-11-06 DIAGNOSIS — E88.01 AAT (ALPHA-1-ANTITRYPSIN) DEFICIENCY (H): Primary | ICD-10-CM

## 2024-11-06 DIAGNOSIS — K76.0 HEPATIC STEATOSIS: ICD-10-CM

## 2024-11-06 DIAGNOSIS — R79.89 ELEVATED LFTS: ICD-10-CM

## 2024-11-06 DIAGNOSIS — K76.0 METABOLIC DYSFUNCTION-ASSOCIATED STEATOTIC LIVER DISEASE (MASLD): ICD-10-CM

## 2024-11-06 LAB
ALBUMIN SERPL BCG-MCNC: 4.4 G/DL (ref 3.5–5.2)
ALP SERPL-CCNC: 86 U/L (ref 40–150)
ALT SERPL W P-5'-P-CCNC: 143 U/L (ref 0–50)
ANION GAP SERPL CALCULATED.3IONS-SCNC: 12 MMOL/L (ref 7–15)
AST SERPL W P-5'-P-CCNC: 98 U/L (ref 0–45)
BILIRUB DIRECT SERPL-MCNC: <0.2 MG/DL (ref 0–0.3)
BILIRUB SERPL-MCNC: 0.7 MG/DL
BUN SERPL-MCNC: 10.6 MG/DL (ref 6–20)
CALCIUM SERPL-MCNC: 9.2 MG/DL (ref 8.8–10.4)
CHLORIDE SERPL-SCNC: 102 MMOL/L (ref 98–107)
CHOLEST SERPL-MCNC: 205 MG/DL
CREAT SERPL-MCNC: 0.62 MG/DL (ref 0.51–0.95)
EGFRCR SERPLBLD CKD-EPI 2021: >90 ML/MIN/1.73M2
ERYTHROCYTE [DISTWIDTH] IN BLOOD BY AUTOMATED COUNT: 13.7 % (ref 10–15)
EST. AVERAGE GLUCOSE BLD GHB EST-MCNC: 120 MG/DL
FERRITIN SERPL-MCNC: 111 NG/ML (ref 6–175)
GLUCOSE SERPL-MCNC: 119 MG/DL (ref 70–99)
HAV AB SER QL IA: REACTIVE
HBA1C MFR BLD: 5.8 %
HBV CORE AB SERPL QL IA: NONREACTIVE
HBV SURFACE AB SERPL IA-ACNC: <3.5 M[IU]/ML
HBV SURFACE AB SERPL IA-ACNC: NONREACTIVE M[IU]/ML
HCO3 SERPL-SCNC: 23 MMOL/L (ref 22–29)
HCT VFR BLD AUTO: 39.6 % (ref 35–47)
HDLC SERPL-MCNC: 32 MG/DL
HGB BLD-MCNC: 12.6 G/DL (ref 11.7–15.7)
IGA SERPL-MCNC: 272 MG/DL (ref 84–499)
INR PPP: 1.02 (ref 0.85–1.15)
IRON BINDING CAPACITY (ROCHE): 328 UG/DL (ref 240–430)
IRON SATN MFR SERPL: 31 % (ref 15–46)
IRON SERPL-MCNC: 103 UG/DL (ref 37–145)
LDLC SERPL CALC-MCNC: 145 MG/DL
MCH RBC QN AUTO: 27.6 PG (ref 26.5–33)
MCHC RBC AUTO-ENTMCNC: 31.8 G/DL (ref 31.5–36.5)
MCV RBC AUTO: 87 FL (ref 78–100)
NONHDLC SERPL-MCNC: 173 MG/DL
PLATELET # BLD AUTO: 244 10E3/UL (ref 150–450)
POTASSIUM SERPL-SCNC: 3.9 MMOL/L (ref 3.4–5.3)
PROT SERPL-MCNC: 7.8 G/DL (ref 6.4–8.3)
RBC # BLD AUTO: 4.56 10E6/UL (ref 3.8–5.2)
SODIUM SERPL-SCNC: 137 MMOL/L (ref 135–145)
TRIGL SERPL-MCNC: 138 MG/DL
TSH SERPL DL<=0.005 MIU/L-ACNC: 2.37 UIU/ML (ref 0.3–4.2)
WBC # BLD AUTO: 7.6 10E3/UL (ref 4–11)

## 2024-11-06 PROCEDURE — 83516 IMMUNOASSAY NONANTIBODY: CPT | Mod: 90 | Performed by: PATHOLOGY

## 2024-11-06 PROCEDURE — 86708 HEPATITIS A ANTIBODY: CPT | Performed by: INTERNAL MEDICINE

## 2024-11-06 PROCEDURE — 82728 ASSAY OF FERRITIN: CPT | Performed by: PATHOLOGY

## 2024-11-06 PROCEDURE — 83540 ASSAY OF IRON: CPT | Performed by: PATHOLOGY

## 2024-11-06 PROCEDURE — 86038 ANTINUCLEAR ANTIBODIES: CPT | Performed by: INTERNAL MEDICINE

## 2024-11-06 PROCEDURE — 81332 SERPINA1 GENE: CPT | Mod: 90 | Performed by: PATHOLOGY

## 2024-11-06 PROCEDURE — 99000 SPECIMEN HANDLING OFFICE-LAB: CPT | Performed by: PATHOLOGY

## 2024-11-06 PROCEDURE — G0480 DRUG TEST DEF 1-7 CLASSES: HCPCS | Mod: 90 | Performed by: PATHOLOGY

## 2024-11-06 PROCEDURE — 86704 HEP B CORE ANTIBODY TOTAL: CPT | Performed by: INTERNAL MEDICINE

## 2024-11-06 PROCEDURE — 36415 COLL VENOUS BLD VENIPUNCTURE: CPT | Performed by: PATHOLOGY

## 2024-11-06 PROCEDURE — 85610 PROTHROMBIN TIME: CPT | Performed by: PATHOLOGY

## 2024-11-06 PROCEDURE — 99205 OFFICE O/P NEW HI 60 MIN: CPT | Mod: 24 | Performed by: INTERNAL MEDICINE

## 2024-11-06 PROCEDURE — 82103 ALPHA-1-ANTITRYPSIN TOTAL: CPT | Mod: 90 | Performed by: PATHOLOGY

## 2024-11-06 PROCEDURE — 80061 LIPID PANEL: CPT | Performed by: PATHOLOGY

## 2024-11-06 PROCEDURE — 82784 ASSAY IGA/IGD/IGG/IGM EACH: CPT | Performed by: INTERNAL MEDICINE

## 2024-11-06 PROCEDURE — 86364 TISS TRNSGLTMNASE EA IG CLAS: CPT | Performed by: INTERNAL MEDICINE

## 2024-11-06 PROCEDURE — 84443 ASSAY THYROID STIM HORMONE: CPT | Performed by: PATHOLOGY

## 2024-11-06 PROCEDURE — G0463 HOSPITAL OUTPT CLINIC VISIT: HCPCS | Performed by: INTERNAL MEDICINE

## 2024-11-06 PROCEDURE — 85027 COMPLETE CBC AUTOMATED: CPT | Performed by: PATHOLOGY

## 2024-11-06 PROCEDURE — 91200 LIVER ELASTOGRAPHY: CPT | Mod: 26 | Performed by: PHYSICIAN ASSISTANT

## 2024-11-06 PROCEDURE — 83550 IRON BINDING TEST: CPT | Performed by: PATHOLOGY

## 2024-11-06 PROCEDURE — 83036 HEMOGLOBIN GLYCOSYLATED A1C: CPT | Performed by: INTERNAL MEDICINE

## 2024-11-06 PROCEDURE — 82248 BILIRUBIN DIRECT: CPT | Performed by: PATHOLOGY

## 2024-11-06 PROCEDURE — 80053 COMPREHEN METABOLIC PANEL: CPT | Performed by: PATHOLOGY

## 2024-11-06 PROCEDURE — 86706 HEP B SURFACE ANTIBODY: CPT | Performed by: INTERNAL MEDICINE

## 2024-11-06 ASSESSMENT — PAIN SCALES - GENERAL: PAINLEVEL_OUTOF10: NO PAIN (0)

## 2024-11-06 NOTE — LETTER
11/6/2024      Batool Louis  [address] [place] [address]      Dear Colleague,    Thank you for referring your patient, Batool Louis, to the [facility] HEPATOLOG[facility] [place]. Please see a copy of my visit note below.    [facility] Hepatology    New Patient Visit    Referring provider:  Selam Dhaliwal  Chief complaint:  Abnormal liver studies     Assessment  24 year old female with PMHx of obesity, hepatic steatosis and alpha-1 anti-trypsin (heterozygote, MZ). Past medical history of MDD, SHELBY and eating disorder.     #.  Abnormal liver studies  #.  Hepatic steatosis/MASLD - concern for advanced fibrosis  #.  Heterozygous alpha 1 antitrypsin, MZ  In July 2023 she had an elevated ALT to 53.  In October 2024 she had an elevated ALT to 86 and an AST of 59.  Her lab studies today are even more elevated than they have been in the past. INR is within normal limits suggestive of intact hepatic synthetic function.  However the patient underwent a FibroScan today that demonstrates diffuse hepatic steatosis and concern for F3 fibrosis.  Given this and her BMI greater than 40 I am concerned that an MR elastography will be insufficient to assess her degree of fibrosis thus we discussed a liver biopsy to evaluate for underlying hepatic steatosis, inflammation and fibrosis.     Pending additional studies including autoimmune studies to evaluate for other potential causes of abnormal liver studies. Iron studies without evidence of iron overload. Alpha-1 anti-trypsin (heterozygote, MZ) - In patients who have heterozygote alpha-1 antitrypsin with an MZ phenotype the risk of liver disease is slightly increased in the presence of other coexisting factors such as metabolic associated steatotic liver disease or alcohol overuse. Hepatitis B surface antigen negative & Hepatitis C antibody nonreactive.     We spent the majority of the time discussing the diagnosis of metabolic  associated steatotic liver disease, fibrosis, reversibility of MASLD and lifestyle and dietary changes to promote a healthy weight    Plan  -- HgA1c and fasting lipids  -- Hep A IgG, Hep B studies, LAINE + SMA, TSH and Ttg IgA/IgG  -- Liver biopsy to assess for underlying liver disease (suspect MASLD), fibrosis and steatosis  -- Lifestyle and dietary changes with the goal of 7-10% weight loss   * Recommend reconnecting with [facility] given eating disorder   * Limit portion sizes and exclude MASLD promoting components: carbohydrates, sugars, processed food, foods & beverages high in added fructose   * Recommend aerobic exercise and resistance training at least three days a week with gradual increase over time  * If inadequate response to lifestyle therapies and ongoing BMI >27 (with comorbid conditions) or BMI > 30, recommend weight loss medication initiation, such as semaglutide or liraglutide  * If BMI > 35 (and obesity related condition) or BMI > 40 despite lifestyle modifications and pharmacotherapy; recommend discussion of bariatric surgery   -- Recommend against any dietary supplements not prescribed by physician  -- Recommend against any alcohol at this time pending fibrosis assessment.  If there is no evidence of advanced fibrosis it would be okay for 1-2 drinks per week    RTC: PRN based on liver biopsy findings  * Recommend liver studies every 6 months and calculation of FIB-4  * Recommend FibroScan every 2 to 3 years  * If any signs of portal hypertension or hepatic synthetic dysfunction such as INR elevation, thrombocytopenia or splenomegaly recommend rereferral to hepatology    La Monaco MD (Lizzie)  Advanced & Transplant Hepatology  [facility]    I spent 60 minutes on this encounter performing the following: reviewing the patient's medical record (clinic visits, hospital records, lab results, imaging and procedural documentation), history taking, physical exam and  documentation on the date of the encounter. I also spent part of the time in coordination of care and counseling.    HPI:  Patient presented alone    Recently underwent an exploratory laparotomy and right salpingo-oophorectomy for ovarian torsion and a right ovarian complex cyst on 10/3/2024.  Since that time she has had lifting restrictions.    At this time she reports no significant concerns or complaints.  She has some sensations following her recent ovarian surgery but no abdominal pain    She currently follows with the [facility] for her eating disorder however since her birthday in [birth year] her insurance changed and she has not returned to the [facility] due to concerns that her insurance would not cover the [facility].  She reports that her weight has generally been stable; she does not weigh herself because of her eating disorder but she feels like her pants feel about the same.  She denies any nausea or vomiting.  She reports that she is eating well.  She denies any diarrhea or constipation    She denies any signs or symptoms of decompensated liver disease,    She denies any herbs or supplements not prescribed by a physician.  She stopped using alcohol when she was told that she had fatty changes in her liver.  Prior to stopping alcohol she never would have more than 2 drinks per occasion and she would rarely drink up to 2 drinks.    She has no family history of liver disease.    Medical hx Surgical hx   Past Medical History:   Diagnosis Date     Alpha-1-antitrypsin deficiency carrier     MZ     Amenorrhea, secondary      Depressive disorder      Eating disorder      Hepatic steatosis       Past Surgical History:   Procedure Laterality Date     OOPHORECTOMY Right 10/03/2024    Procedure: Right OOPHORECTOMY, OPEN; right salpingectomy pelvic exam under anesthesia; pelvic washings;  Surgeon: Winifred Calderon MD;  Location:  OR          Medications  Current Outpatient Medications   Medication Sig  "Dispense Refill     cetirizine (ZYRTEC) 10 MG tablet Take 10 mg by mouth daily as needed for allergies seasonal       FLUoxetine (PROZAC) 20 MG capsule Take 1 capsule (20 mg) by mouth daily. 30 capsule 1     levonorgestrel (MIRENA) 52 MG (20 mcg/day) IUD 1 each by Intrauterine route once         Allergies  Allergies   Allergen Reactions     Shellfish-Derived Products Nausea and Vomiting     Possible allergy- pt unsure.      Sulfamethoxazole-Trimethoprim Hives     Seasonal Allergies Other (See Comments)     Nasal congestion     Tylenol [Acetaminophen] Other (See Comments)     Told  not to take due to her genetic insufficiency       Family hx Social hx   Family History   Problem Relation Age of Onset     Anemia Mother         unknown reason     Sleep Apnea Mother      Sleep Apnea Father      Back Pain Maternal Grandmother      Sleep Apnea Maternal Grandmother      Alzheimer Disease Maternal Grandfather      Sleep Apnea Maternal Grandfather      No Known Problems Paternal Grandmother      Cancer Paternal Grandfather         pancreatic     Sleep Apnea Paternal Grandfather      Asthma Sister      Sleep Apnea Sister      Skin Cancer Paternal Great-Grandfather       Social History     Tobacco Use     Smoking status: Never     Smokeless tobacco: Never   Vaping Use     Vaping status: Never Used   Substance Use Topics     Alcohol use: Not Currently     Drug use: No     - Employment: goes to school, studying music  - Alcohol: not currently using any alcohol. Previously never > 2 drinks on an occasion.   - Tobacco: Denies  - Drugs: Denies     Review of systems  A 10-point review of systems was negative.    Examination  /87 (BP Location: Right arm, Patient Position: Sitting, Cuff Size: Adult Large)   Pulse 101   Temp 98.3  F (36.8  C) (Oral)   Resp 20   Ht 1.753 m (5' 9.02\")   Wt 127 kg (280 lb)   SpO2 97%   BMI 41.33 kg/m    Body mass index is 41.33 kg/m .    Gen-NAD  Eye- EOMI  ENT- MMM, normal oropharynx  CVS- " RRR, no murmurs  RS- CTA bilaterally  Abd-overweight, soft, nontender.  Nondistended  Extr- 2+ radial pulses bilaterally, no lower extremity edema bilaterally  MS- hands without clubbing  Skin- no jaundice  Psych- normal mood    Laboratory  BMP  Recent Labs   Lab Test 11/06/24  0710 10/04/24  0735 10/03/24  0655 07/03/23  1610 07/10/20  0959 04/03/19  0907     --  137 138  --  141   POTASSIUM 3.9  --  3.9 3.9  --  4.1   CHLORIDE 102  --  105 100  --  108   REDD 9.2  --  9.2 9.7  --  9.0   CO2 23  --  21* 25  --  27   BUN 10.6  --  13.9 13.1  --  14   CR 0.62  --  0.66 0.75  --  0.74   * 101* 122* 86   < > 88    < > = values in this interval not displayed.     CBC  Recent Labs   Lab Test 11/06/24  0710 10/04/24  0613 10/03/24  0655 07/03/23  1610 07/03/22  1041   WBC 7.6  --  9.1 8.1 9.1   RBC 4.56  --  4.60 4.86 4.35   HGB 12.6   < > 12.7 13.7 12.6   HCT 39.6  --  39.7 42.4 38.4   MCV 87  --  86 87 88   MCH 27.6  --  27.6 28.2 29.0   MCHC 31.8  --  32.0 32.3 32.8   RDW 13.7  --  14.1 13.1 12.9     --  275 270 232    < > = values in this interval not displayed.     Liver Enzymes   Recent Labs   Lab Test 11/06/24  0710   PROTTOTAL 7.8   ALBUMIN 4.4   BILITOTAL 0.7   ALKPHOS 86   AST 98*   *      INR   INR   Date Value Ref Range Status   11/06/2024 1.02 0.85 - 1.15 Final         4/4/2024  Hepatitis B surface antigen negative  Hepatitis C antibody nonreactive    Radiology  CT abdomen and pelvis with contrast 10/3/2024  Support devices: None.     Lower thorax: Normal.     Liver: No focal mass lesions. No intrahepatic biliary ductal  dilatation. Hypoattenuation of the hepatic parenchyma represent  hepatic steatosis.     Gallbladder and bile ducts: No stones, gallbladder wall thickening, or  pericholecystic fluid. No extrahepatic biliary ductal dilation.     Spleen: Unremarkable.     Pancreas: Normal CT appearance.      Adrenals: No nodules.      Kidneys and ureters: No solid lesions. No calculi.  No  hydroureteronephrosis.     Bladder: Unremarkable.     Reproductive: IUD in place within the uterus. Within the right  anterior pelvis there is a 15.1 x 7.8 x 12.8 cm complex cystic lesion  (series 4 image 248, series 6 image 50), which demonstrates a few thin  septations in the superior aspect as well as nodular component. There  is a focal area of fat within the superior/posterior aspect measuring  approximately 8 mm (series 4 image 247). This lesion likely originates  from the right ovary. On ultrasound 4/1/2024 there was a complex  cystic lesion which measured 7.8 x 6.8 x 7.5 cm.     Bowel: Unremarkable.     Appendix: Normal, seen within the right lower quadrant.     Mesentery/Peritoneum: Unremarkable.     Lymph nodes: Scattered small abdominal nodes, none meeting CT criteria  for pathologic enlargement.     Vasculature: Normal, without aneurysm or significant atherosclerotic  disease.     Bone windows: No aggressive osseous abnormalities.     Soft tissues: Unremarkable.                                                                      IMPRESSION:   1. Possible right ovarian torsion - Enlarged complex pelvic lesion  likely originating from the right ovary, measuring up to 15.1 cm. 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  Given the clinical  presentation and the substantial increase in size since prior  ultrasound 4/1/2024, this is concerning for possible torsion.     2. Otherwise, no acute abnormality of the abdomen or pelvis.  3. Hypoattenuation of the hepatic parenchyma, likely to represent  hepatic steatosis.     Abdominal ultrasound 1/2/2024  1. Increased echogenicity of the liver consistent with diffuse hepatic steatosis.   2. Normal sonographic appearance of the gallbladder, no biliary dilatation.   3. Otherwise unremarkable right upper quadrant ultrasound.     Fibroscan:    IQR 21  10.1kPa 0.4 IQR      Again, thank you for allowing me to participate in the care of your  patient.        Sincerely,        La Monaco MD

## 2024-11-06 NOTE — NURSING NOTE
"Chief Complaint   Patient presents with    Consult     Elevated LFT's and abnormal liver imaging      Vital signs:  Temp: 98.3  F (36.8  C) Temp src: Oral BP: 127/87 Pulse: 101   Resp: 20 SpO2: 97 %     Height: 175.3 cm (5' 9.02\") Weight: 127 kg (280 lb)  Estimated body mass index is 41.33 kg/m  as calculated from the following:    Height as of this encounter: 1.753 m (5' 9.02\").    Weight as of this encounter: 127 kg (280 lb).      Nafias Ha, [facility]  11/6/2024 7:56 AM    "

## 2024-11-07 ENCOUNTER — OFFICE VISIT (OUTPATIENT)
Dept: OBGYN | Facility: CLINIC | Age: 24
End: 2024-11-07
Attending: STUDENT IN AN ORGANIZED HEALTH CARE EDUCATION/TRAINING PROGRAM
Payer: COMMERCIAL

## 2024-11-07 VITALS
BODY MASS INDEX: 41.87 KG/M2 | DIASTOLIC BLOOD PRESSURE: 86 MMHG | WEIGHT: 282.7 LBS | HEART RATE: 101 BPM | HEIGHT: 69 IN | SYSTOLIC BLOOD PRESSURE: 128 MMHG

## 2024-11-07 DIAGNOSIS — N83.201 CYST OF RIGHT OVARY: Primary | ICD-10-CM

## 2024-11-07 LAB — ANA SER QL IF: NEGATIVE

## 2024-11-07 PROCEDURE — G0463 HOSPITAL OUTPT CLINIC VISIT: HCPCS | Performed by: STUDENT IN AN ORGANIZED HEALTH CARE EDUCATION/TRAINING PROGRAM

## 2024-11-07 PROCEDURE — 99024 POSTOP FOLLOW-UP VISIT: CPT | Performed by: STUDENT IN AN ORGANIZED HEALTH CARE EDUCATION/TRAINING PROGRAM

## 2024-11-07 NOTE — LETTER
"11/7/2024       RE: Batool Louis  [address] [place]  [place] [address]     Dear Colleague,    Thank you for referring your patient, Batool Louis, to the [facility]OMEN'S [facility] [place] at [facility]. Please see a copy of my visit note below.    [facility]  Post-Operative Visit    S: Had one period just after surgery, has not had a 2nd one. Voiding spontaneously, passing flatus and BM, pain is well controlled, tolerating food without nausea/vomiting, ambulating independently. No drainage from incisions. Lifted her car trunk the other day which was somewhat heavy and noticed a sharp pain in her LLQ that persisted for a few days. Has not been present the last 2 days.    O: /86 (BP Location: Left arm, Patient Position: Sitting, Cuff Size: Adult Large)   Pulse 101   Ht 1.753 m (5' 9\")   Wt 128.2 kg (282 lb 11.2 oz)   BMI 41.75 kg/m      Gen: Well-appearing, NAD  Cardio: Well-perfused  Pulm: Breathing comfortably  Abd: S/NT/ND, vertical midline skin incision well-approximated w/out drainage or erythema, well-healed. No tenderness in LLQ with deep palpation  Extrem: No edema, nontender    Pathology (10/3/2024)  Surgical Pathology Report                         Case: [id number]                                   Authorizing Provider:  Winifred Calderon MD       Collected:           10/03/2024 04:42 PM           Ordering Location:      MAIN OR                 Received:            10/04/2024 09:15 AM           Pathologist:           Barbie Santizo MD                                                           Specimens:   A) - Ovary, Right, Right Ovarian Cyst                                                                B) - Fallopian Tube, Right, Right Fallopian Tube                                          Final Diagnosis   A. Right Ovarian Cyst:  -Mature teratoma (2.0 cm)  -Mucinous cystadenoma (15.8 cm)     B. Right " Fallopian Tube:  -Fallopian tube, unremarkable       A/P:  Batool Louis is a 24 year old  here for 5 week post-operative visit following XL via VML, right salpingo-oophorectomy.    Post-operative visit  - Discussed pathology was benign.  - Likely pulled a muscle with opening car trunk - no signs of wound dehiscence. Okay to start normal activity next week (will be 6 weeks then)  - Discussed ovulation suppression with OCPs. Patient does have IUD in place, though this will not suppress ovulation. Discussed it is unlikely, though not impossible, for this to occur again with the left ovary. At this time, patient would like to hold off on starting OCPs which is reasonable.  - Pap smears up to date, due in .    Return to clinic in as needed.     Winifred Calderon MD  [facility], Ob/Gyn  2024 8:41 AM      Again, thank you for allowing me to participate in the care of your patient.      Sincerely,    Winifred Calderon MD

## 2024-11-07 NOTE — PROGRESS NOTES
"[facility]  Post-Operative Visit    S: Had one period just after surgery, has not had a 2nd one. Voiding spontaneously, passing flatus and BM, pain is well controlled, tolerating food without nausea/vomiting, ambulating independently. No drainage from incisions. Lifted her car trunk the other day which was somewhat heavy and noticed a sharp pain in her LLQ that persisted for a few days. Has not been present the last 2 days.    O: /86 (BP Location: Left arm, Patient Position: Sitting, Cuff Size: Adult Large)   Pulse 101   Ht 1.753 m (5' 9\")   Wt 128.2 kg (282 lb 11.2 oz)   BMI 41.75 kg/m      Gen: Well-appearing, NAD  Cardio: Well-perfused  Pulm: Breathing comfortably  Abd: S/NT/ND, vertical midline skin incision well-approximated w/out drainage or erythema, well-healed. No tenderness in LLQ with deep palpation  Extrem: No edema, nontender    Pathology (10/3/2024)  Surgical Pathology Report                         Case: [id number]                                   Authorizing Provider:  Winifred Calderon MD       Collected:           10/03/2024 04:42 PM           Ordering Location:     [facility] MAIN OR                 Received:            10/04/2024 09:15 AM           Pathologist:           Barbie Santizo MD                                                           Specimens:   A) - Ovary, Right, Right Ovarian Cyst                                                                B) - Fallopian Tube, Right, Right Fallopian Tube                                          Final Diagnosis   A. Right Ovarian Cyst:  -Mature teratoma (2.0 cm)  -Mucinous cystadenoma (15.8 cm)     B. Right Fallopian Tube:  -Fallopian tube, unremarkable       A/P:  Batool Louis is a 24 year old  here for 5 week post-operative visit following XL via VML, right salpingo-oophorectomy.    Post-operative visit  - Discussed pathology was benign.  - Likely pulled a muscle with opening car trunk - no signs of wound dehiscence. " Okay to start normal activity next week (will be 6 weeks then)  - Discussed ovulation suppression with OCPs. Patient does have IUD in place, though this will not suppress ovulation. Discussed it is unlikely, though not impossible, for this to occur again with the left ovary. At this time, patient would like to hold off on starting OCPs which is reasonable.  - Pap smears up to date, due in 2026.    Return to clinic in as needed.     Winifred Calderon MD  [facility], Ob/Gyn  11/07/2024 8:41 AM

## 2024-11-07 NOTE — PATIENT INSTRUCTIONS
Thank you for trusting us with your care!   Please be aware, if you are on Mychart, you may see your results prior to your providers review. If labs are abnormal, we will call or message you on [facility] with a follow up plan.    If you need to contact us for questions about:  Symptoms, Scheduling & Medical Questions; Non-urgent (2-3 day response) [facility] message, Urgent (needing response today) [phone] (if after 3:30pm next day response)   Prescriptions: Please call your Pharmacy   Billing: Gerardo [phone] or [facility]:[phone]

## 2024-11-08 LAB
LABORATORY REPORT: NORMAL
PETH INTERPRETATION: NORMAL
PLPETH BLD-MCNC: <10 NG/ML
POPETH BLD-MCNC: <10 NG/ML
SMA IGG SER-ACNC: 15 UNITS

## 2024-11-11 LAB
A1AT PHENOTYP SERPL-IMP: ABNORMAL
A1AT SERPL-MCNC: 96 MG/DL
A1AT SS SERPL-MCNC: NEGATIVE G/L
A1AT SZ SERPL-MCNC: ABNORMAL G/L
A1AT ZZ SERPL-MCNC: ABNORMAL G/L
SPECIMEN SOURCE: ABNORMAL
TTG IGA SER-ACNC: 0.8 U/ML
TTG IGG SER-ACNC: <0.6 U/ML

## 2024-11-12 ENCOUNTER — ANCILLARY PROCEDURE (OUTPATIENT)
Dept: GENERAL RADIOLOGY | Facility: CLINIC | Age: 24
End: 2024-11-12
Attending: PHYSICIAN ASSISTANT
Payer: COMMERCIAL

## 2024-11-12 ENCOUNTER — OFFICE VISIT (OUTPATIENT)
Dept: URGENT CARE | Facility: URGENT CARE | Age: 24
End: 2024-11-12
Payer: COMMERCIAL

## 2024-11-12 VITALS
DIASTOLIC BLOOD PRESSURE: 84 MMHG | HEART RATE: 104 BPM | TEMPERATURE: 98.2 F | WEIGHT: 285 LBS | OXYGEN SATURATION: 97 % | BODY MASS INDEX: 42.09 KG/M2 | RESPIRATION RATE: 16 BRPM | SYSTOLIC BLOOD PRESSURE: 144 MMHG

## 2024-11-12 DIAGNOSIS — H65.91 MIDDLE EAR EFFUSION, RIGHT: ICD-10-CM

## 2024-11-12 DIAGNOSIS — R05.1 ACUTE COUGH: Primary | ICD-10-CM

## 2024-11-12 DIAGNOSIS — R05.1 ACUTE COUGH: ICD-10-CM

## 2024-11-12 PROCEDURE — 99214 OFFICE O/P EST MOD 30 MIN: CPT | Performed by: PHYSICIAN ASSISTANT

## 2024-11-12 PROCEDURE — 71046 X-RAY EXAM CHEST 2 VIEWS: CPT | Mod: TC | Performed by: RADIOLOGY

## 2024-11-12 RX ORDER — ALBUTEROL SULFATE 90 UG/1
INHALANT RESPIRATORY (INHALATION)
Qty: 18 G | Refills: 0 | Status: SHIPPED | OUTPATIENT
Start: 2024-11-12

## 2024-11-12 RX ORDER — PREDNISONE 20 MG/1
40 TABLET ORAL DAILY
Qty: 10 TABLET | Refills: 0 | Status: SHIPPED | OUTPATIENT
Start: 2024-11-12 | End: 2024-11-17

## 2024-11-12 NOTE — PROGRESS NOTES
Assessment & Plan     Acute cough  Reassurance, chest x-ray is clear. Will treat with prednisone burst and albuterol every 4-6 hrs as needed. Continue with supportive care. Return to clinic if symptoms worsen or do not improve; otherwise follow up as needed       - XR Chest 2 Views; Future  - predniSONE (DELTASONE) 20 MG tablet; Take 2 tablets (40 mg) by mouth daily for 5 days.  - albuterol (PROAIR HFA/PROVENTIL HFA/VENTOLIN HFA) 108 (90 Base) MCG/ACT inhaler; Inhale 2 puffs every 4-6 hours as needed for cough, wheezing, or shortness of breath    Middle ear effusion, right  No infection at this time. Continue monitor. Can start over the counter antihistamine and flonase. Gave written Rx for Augmentin that can be filled if pain develops. Patient agrees with plan.     - amoxicillin-clavulanate (AUGMENTIN) 875-125 MG tablet; Take 1 tablet by mouth 2 times daily for 7 days.                Return in about 1 week (around 11/19/2024), or if symptoms worsen or fail to improve.          Subjective   Chief Complaint   Patient presents with    URI     X 2 weeks, deep cough, ears plugged, pt has AAT prone to bronchitis atleast once per year.       HPI     URI Adult    Onset of symptoms was 2 week(s) ago.  Course of illness is same.    Severity moderate  Current and Associated symptoms: cough, plugged ears   Treatment measures tried include Decongestants and OTC Cough med.  Predisposing factors include None.                      Objective    BP (!) 144/84   Pulse 104   Temp 98.2  F (36.8  C) (Tympanic)   Resp 16   Wt 129.3 kg (285 lb)   SpO2 97%   BMI 42.09 kg/m    Body mass index is 42.09 kg/m .  Physical Exam  Constitutional:       Appearance: She is well-developed.   HENT:      Head: Normocephalic.      Right Ear: Ear canal normal. A middle ear effusion is present. Tympanic membrane is not injected.      Left Ear: Tympanic membrane and ear canal normal.   Eyes:      Conjunctiva/sclera: Conjunctivae normal.    Cardiovascular:      Rate and Rhythm: Normal rate.      Heart sounds: Normal heart sounds.   Pulmonary:      Effort: Pulmonary effort is normal.      Breath sounds: Normal breath sounds.   Skin:     General: Skin is warm and dry.      Findings: No rash.   Psychiatric:         Behavior: Behavior normal.            CXR - Reviewed and interpreted by me: no acute infiltrate         Signed Electronically by: Marielena Heath PA-C

## 2024-11-12 NOTE — LETTER
November 12, 2024      Batool Louis  [address] [place]  [place] [address]        To Whom It May Concern:    Batool OWEN Adendeannbret  was seen and treated in clinic on 11/12/24.          Sincerely,            Marielena Heath PA-C

## 2024-11-13 ENCOUNTER — DOCUMENTATION ONLY (OUTPATIENT)
Dept: SLEEP MEDICINE | Facility: CLINIC | Age: 24
End: 2024-11-13
Payer: COMMERCIAL

## 2024-11-13 NOTE — PROGRESS NOTES
14 day Sleep therapy management telephone visit    Diagnostic AHI:    HST: 79.8        LEFT VOICE MESSAGE FOR PATIENT TO RETURN CALL      Objective measures: 14 day rolling measures   COMPLIANCE LEAK AHI AVERAGE USE IN MINUTES   21 % 0 0.9 73   GOAL >70% GOAL < 24 LPM GOAL <5 GOAL >240          Device settings:  CPAP MIN CPAP MAX EPR RESMED SOFT RESPONSE SETTING   6.0 cm  H20 20.0 cm  H20 TWO OFF         Patient has the following upcoming sleep appts:  Future Sleep Appointments         Provider Department    1/24/2025 12:00 PM (Arrive by 11:45 AM) Ari Solomon, RONNY [facility]            Replacement device: No  STM ordered by provider: Yes     Total time spent on accessing and  interpreting remote patient PAP therapy data  10 minutes    Total time spent counseling, coaching  and reviewing PAP therapy data with patient  0 minutes

## 2024-11-19 ENCOUNTER — MYC MEDICAL ADVICE (OUTPATIENT)
Dept: INTERVENTIONAL RADIOLOGY/VASCULAR | Facility: CLINIC | Age: 24
End: 2024-11-19
Payer: COMMERCIAL

## 2024-11-21 ENCOUNTER — HOSPITAL ENCOUNTER (OUTPATIENT)
Facility: CLINIC | Age: 24
Discharge: HOME | End: 2024-11-21
Attending: RADIOLOGY | Admitting: RADIOLOGY
Payer: COMMERCIAL

## 2024-11-21 ENCOUNTER — APPOINTMENT (OUTPATIENT)
Dept: INTERVENTIONAL RADIOLOGY/VASCULAR | Facility: CLINIC | Age: 24
End: 2024-11-21
Attending: INTERNAL MEDICINE
Payer: COMMERCIAL

## 2024-11-21 ENCOUNTER — APPOINTMENT (OUTPATIENT)
Dept: MEDSURG UNIT | Facility: CLINIC | Age: 24
End: 2024-11-21
Attending: RADIOLOGY
Payer: COMMERCIAL

## 2024-11-21 VITALS
BODY MASS INDEX: 41.66 KG/M2 | OXYGEN SATURATION: 97 % | HEART RATE: 82 BPM | WEIGHT: 282.1 LBS | SYSTOLIC BLOOD PRESSURE: 125 MMHG | DIASTOLIC BLOOD PRESSURE: 88 MMHG | RESPIRATION RATE: 16 BRPM | TEMPERATURE: 98.4 F

## 2024-11-21 DIAGNOSIS — R79.89 ELEVATED LFTS: ICD-10-CM

## 2024-11-21 PROCEDURE — 47000 NEEDLE BIOPSY OF LIVER PERQ: CPT | Performed by: PHYSICIAN ASSISTANT

## 2024-11-21 PROCEDURE — 99152 MOD SED SAME PHYS/QHP 5/>YRS: CPT | Performed by: PHYSICIAN ASSISTANT

## 2024-11-21 PROCEDURE — 88307 TISSUE EXAM BY PATHOLOGIST: CPT | Mod: 26 | Performed by: PATHOLOGY

## 2024-11-21 PROCEDURE — 272N000505 IR LIVER BIOPSY PERCUTANEOUS

## 2024-11-21 PROCEDURE — 250N000009 HC RX 250

## 2024-11-21 PROCEDURE — 999N000133 HC STATISTIC PP CARE STAGE 2

## 2024-11-21 PROCEDURE — 76942 ECHO GUIDE FOR BIOPSY: CPT | Mod: 26 | Performed by: PHYSICIAN ASSISTANT

## 2024-11-21 PROCEDURE — 88313 SPECIAL STAINS GROUP 2: CPT | Mod: 26 | Performed by: PATHOLOGY

## 2024-11-21 PROCEDURE — 99152 MOD SED SAME PHYS/QHP 5/>YRS: CPT

## 2024-11-21 PROCEDURE — 88313 SPECIAL STAINS GROUP 2: CPT | Mod: TC | Performed by: INTERNAL MEDICINE

## 2024-11-21 PROCEDURE — 999N000142 HC STATISTIC PROCEDURE PREP ONLY

## 2024-11-21 PROCEDURE — 250N000011 HC RX IP 250 OP 636

## 2024-11-21 RX ORDER — FLUMAZENIL 0.1 MG/ML
0.2 INJECTION, SOLUTION INTRAVENOUS
Status: DISCONTINUED | OUTPATIENT
Start: 2024-11-21 | End: 2024-11-21 | Stop reason: HOSPADM

## 2024-11-21 RX ORDER — NALOXONE HYDROCHLORIDE 0.4 MG/ML
0.4 INJECTION, SOLUTION INTRAMUSCULAR; INTRAVENOUS; SUBCUTANEOUS
Status: DISCONTINUED | OUTPATIENT
Start: 2024-11-21 | End: 2024-11-21 | Stop reason: HOSPADM

## 2024-11-21 RX ORDER — FENTANYL CITRATE 50 UG/ML
25-50 INJECTION, SOLUTION INTRAMUSCULAR; INTRAVENOUS EVERY 5 MIN PRN
Status: DISCONTINUED | OUTPATIENT
Start: 2024-11-21 | End: 2024-11-21 | Stop reason: HOSPADM

## 2024-11-21 RX ORDER — NALOXONE HYDROCHLORIDE 0.4 MG/ML
0.2 INJECTION, SOLUTION INTRAMUSCULAR; INTRAVENOUS; SUBCUTANEOUS
Status: DISCONTINUED | OUTPATIENT
Start: 2024-11-21 | End: 2024-11-21 | Stop reason: HOSPADM

## 2024-11-21 RX ORDER — LIDOCAINE 40 MG/G
CREAM TOPICAL
Status: DISCONTINUED | OUTPATIENT
Start: 2024-11-21 | End: 2024-11-21 | Stop reason: HOSPADM

## 2024-11-21 RX ORDER — ONDANSETRON 2 MG/ML
4 INJECTION INTRAMUSCULAR; INTRAVENOUS
Status: DISCONTINUED | OUTPATIENT
Start: 2024-11-21 | End: 2024-11-21 | Stop reason: HOSPADM

## 2024-11-21 RX ADMIN — FENTANYL CITRATE 50 MCG: 50 INJECTION, SOLUTION INTRAMUSCULAR; INTRAVENOUS at 13:22

## 2024-11-21 RX ADMIN — FENTANYL CITRATE 50 MCG: 50 INJECTION, SOLUTION INTRAMUSCULAR; INTRAVENOUS at 13:04

## 2024-11-21 RX ADMIN — LIDOCAINE HYDROCHLORIDE 15 ML: 10 INJECTION, SOLUTION EPIDURAL; INFILTRATION; INTRACAUDAL; PERINEURAL at 13:30

## 2024-11-21 RX ADMIN — MIDAZOLAM 1 MG: 1 INJECTION INTRAMUSCULAR; INTRAVENOUS at 13:03

## 2024-11-21 RX ADMIN — MIDAZOLAM 1 MG: 1 INJECTION INTRAMUSCULAR; INTRAVENOUS at 13:22

## 2024-11-21 ASSESSMENT — ACTIVITIES OF DAILY LIVING (ADL)
ADLS_ACUITY_SCORE: 0

## 2024-11-21 NOTE — PROCEDURES
[facility]    Procedure: IR Procedure Note    Date/Time: 11/21/2024 1:35 PM    Performed by: Hemanth Mendenhall PA-C  Authorized by: Hemanth Mendenhall PA-C  IR Fellow Physician:    Pre Procedure Diagnosis: fatty liver disease  Post Procedure Diagnosis: fatty liver disease    UNIVERSAL PROTOCOL   Site Marked: NA  Prior Images Obtained and Reviewed:  Yes  Required items: Required blood products, implants, devices and special equipment available    Patient identity confirmed:  Arm band, provided demographic data, hospital-assigned identification number and verbally with patient  Patient was reevaluated immediately before administering moderate or deep sedation or anesthesia  Confirmation Checklist:  Correct equipment/implants were available, procedure was appropriate and matched the consent or emergent situation, relevant allergies and patient's identity using two indicators  Time out: Immediately prior to the procedure a time out was called    Universal Protocol: the Joint Commission Universal Protocol was followed    Preparation: Patient was prepped and draped in usual sterile fashion       ANESTHESIA    Anesthesia:  Local infiltration  Local Anesthetic:  Lidocaine 1% without epinephrine  Anesthetic Total (mL):  15      SEDATION  Patient Sedated: Yes    Sedation Type:  Moderate (conscious) sedation  Sedation:  Fentanyl and midazolam  Vital signs: Vital signs monitored during sedation    See dictated procedure note for full details.  Findings: Moderate sedation for native liver biopsy - 2 mg of midazolam and 100 mcg of fentanyl given over 10 minutes. Patient tolerated the procedure well.     Specimens: core needle biopsy specimens sent for pathological analysis    Procedural Complications: None    Condition: Stable    Plan: 2 hours bedrest      PROCEDURE  Describe Procedure: Completed ultrasound-guided random native liver biopsy. 2 passes yielded 2 cores sent to  pathology in preservative. Gelfoam in tract. No immediate complication.      Patient Tolerance:  Patient tolerated the procedure well with no immediate complications  Length of time physician/provider present for 1:1 monitoring during sedation:  0-22 min

## 2024-11-21 NOTE — DISCHARGE INSTRUCTIONS
[facility]    Interventional Radiology  Patient Instructions Following Biopsy    AFTER YOU GO HOME  If you were given sedation, for the first 24 hours: we recommend that an adult stay with you, DO NOT drive or operate machinery at home or at work, DO NOT smoke, DO NOT make any important or legal decisions.  DO NOT drink alcoholic beverages the day of your procedure  Drink plenty of fluids   Resume your regular diet, unless otherwise instructed by your Primary Physician  Relax and take it easy for 48 hours  DO NOT do any strenuous exercise or lifting (> 10 lbs) for at least 3 days following your procedure  Keep the dressing dry and in place for 24 hours. Replace with Band aid for 2 days.  Never leave a wet dressing in place.  Do not take a shower for at least 24 hours following your procedure. No tub bath, hot tub, or swimming for 5 days.  There should be minimum drainage from the biopsy site    CALL THE PHYSICIAN IF:  You start bleeding from the procedure site.  If you do start to bleed from that site, lie down flat and hold pressure on the site for a minimum of 10 minutes.  Your physician will tell you if you need to return to the hospital  You develop nausea or vomiting  You have excessive swelling, redness, or tenderness at the site  You have drainage that looks like it is infected.  You experience severe pain  You develop hives or a rash or unexplained itching  You develop shortness of breath  You develop a temperature of 101 degrees F or greater  You develop bloody clots or red urine after you are discharged  You develop chest pain or cough up blood, lightheadedness or fainting    [facility] INTERVENTIONAL RADIOLOGY DEPARTMENT  Procedure Physician: Hemanth Mendenhall PA-C                                      Date of procedure: November 21, 2024  Telephone Numbers: [phone]      Monday-Friday 7:30 am to 4:00 pm  [phone] After 4:00 pm Monday-Friday, Weekends & Holidays.   Ask for the  Interventional Radiologist on call.  Someone is on call 24 hrs/day  [facility] toll free number: [phone] Monday-Friday 8:00 am to 4:30 pm  [facility] Emergency Dept: [phone]

## 2024-11-21 NOTE — PROGRESS NOTES
Patient prepped for liver biopsy. VSS. Consent signed. No new labs ordered, labs from 11-6 okay.  Appropriately NPO.  PIV in L FER Watts, mother with patient.

## 2024-11-21 NOTE — PRE-PROCEDURE
GENERAL PRE-PROCEDURE:   Procedure:  Random native livery biopsy with imaging guidance  Date/Time:  11/21/2024 10:59 AM    Verbal consent obtained?: Yes    Written consent obtained?: Yes    Risks and benefits: Risks, benefits and alternatives were discussed    DC Plan: Appropriate discharge home plan in place for patients who are going home after procedure   Consent given by:  Patient  Patient states understanding of procedure being performed: Yes    Patient's understanding of procedure matches consent: Yes    Procedure consent matches procedure scheduled: Yes    Expected level of sedation:  Moderate  Appropriately NPO:  Yes  ASA Class:  3  Mallampati  :  Grade 2- soft palate, base of uvula, tonsillar pillars, and portion of posterior pharyngeal wall visible  Lungs:  Lungs clear with good breath sounds bilaterally  Heart:  Normal heart sounds and rate  History & Physical reviewed:  History and physical reviewed and no updates needed  Statement of review:  I have reviewed the lab findings, diagnostic data, medications, and the plan for sedation

## 2024-11-21 NOTE — PROGRESS NOTES
Pt returned from IR s/p random liver biopsy. Pt alert and oriented. VSS. Sats >92% on RA. Pt denies any pain. Biopsy site on mid upper abdomen WNL. No bleeding or hematoma. Primapore in place. Bedrest for 2 hours. Continue to monitor pt status and notify MD with any changes or concerns.

## 2024-11-21 NOTE — PROGRESS NOTES
Patient completed bedrest. VSS. Midline abdominal procedure sight intact, covered with prima pore, no swelling or bleeding. Denies pain. Ambulating, eating, drinking, voiding. IV removed. Discharge instructions printed and gone over with patient. Escorted out with mom driving patient home.

## 2024-11-21 NOTE — IR NOTE
Patient Name: Batool Louis  Medical Record Number: [id number]  Today's Date: 11/21/2024    Procedure: Random Liver Biopsy  Proceduralist: Hemanth Mendenhall PA-C  Pathology present: no    Procedure Start: 1318  Procedure end: 1329  Sedation medications administered: 2 mg Midazolam, 100 mcg Fentanyl    Other Notes: Pt arrived to IR room 6 from . Consent reviewed. Pt denies any questions or concerns regarding procedure. Pt positioned supine and monitored per protocol. Pt tolerated procedure without any noted complications. Pt transferred back to .

## 2024-11-22 LAB
PATH REPORT.COMMENTS IMP SPEC: NORMAL
PATH REPORT.FINAL DX SPEC: NORMAL
PATH REPORT.GROSS SPEC: NORMAL
PATH REPORT.MICROSCOPIC SPEC OTHER STN: NORMAL
PATH REPORT.RELEVANT HX SPEC: NORMAL
PHOTO IMAGE: NORMAL

## 2024-12-03 ENCOUNTER — OFFICE VISIT (OUTPATIENT)
Dept: FAMILY MEDICINE | Facility: OTHER | Age: 24
End: 2024-12-03
Payer: COMMERCIAL

## 2024-12-03 VITALS
OXYGEN SATURATION: 97 % | HEIGHT: 70 IN | DIASTOLIC BLOOD PRESSURE: 78 MMHG | TEMPERATURE: 99.1 F | RESPIRATION RATE: 18 BRPM | SYSTOLIC BLOOD PRESSURE: 118 MMHG | WEIGHT: 288 LBS | BODY MASS INDEX: 41.23 KG/M2 | HEART RATE: 96 BPM

## 2024-12-03 DIAGNOSIS — E66.01 CLASS 3 SEVERE OBESITY DUE TO EXCESS CALORIES WITHOUT SERIOUS COMORBIDITY WITH BODY MASS INDEX (BMI) OF 40.0 TO 44.9 IN ADULT (H): ICD-10-CM

## 2024-12-03 DIAGNOSIS — Z00.00 ROUTINE GENERAL MEDICAL EXAMINATION AT A HEALTH CARE FACILITY: Primary | ICD-10-CM

## 2024-12-03 DIAGNOSIS — E66.813 CLASS 3 SEVERE OBESITY DUE TO EXCESS CALORIES WITHOUT SERIOUS COMORBIDITY WITH BODY MASS INDEX (BMI) OF 40.0 TO 44.9 IN ADULT (H): ICD-10-CM

## 2024-12-03 DIAGNOSIS — F33.1 MODERATE EPISODE OF RECURRENT MAJOR DEPRESSIVE DISORDER (H): ICD-10-CM

## 2024-12-03 DIAGNOSIS — Z11.3 SCREEN FOR STD (SEXUALLY TRANSMITTED DISEASE): ICD-10-CM

## 2024-12-03 DIAGNOSIS — F41.1 GENERALIZED ANXIETY DISORDER: ICD-10-CM

## 2024-12-03 LAB
ALBUMIN UR-MCNC: NEGATIVE MG/DL
APPEARANCE UR: CLEAR
BILIRUB UR QL STRIP: NEGATIVE
CLUE CELLS: ABNORMAL
COLOR UR AUTO: YELLOW
GLUCOSE UR STRIP-MCNC: NEGATIVE MG/DL
HGB UR QL STRIP: NEGATIVE
HIV 1+2 AB+HIV1 P24 AG SERPL QL IA: NONREACTIVE
HSV1 IGG SERPL QL IA: 0.42 INDEX
HSV1 IGG SERPL QL IA: NORMAL
HSV2 IGG SERPL QL IA: 0.1 INDEX
HSV2 IGG SERPL QL IA: NORMAL
KETONES UR STRIP-MCNC: NEGATIVE MG/DL
LEUKOCYTE ESTERASE UR QL STRIP: NEGATIVE
NITRATE UR QL: NEGATIVE
PH UR STRIP: 6 [PH] (ref 5–7)
SP GR UR STRIP: >=1.03 (ref 1–1.03)
T PALLIDUM AB SER QL: NONREACTIVE
TRICHOMONAS, WET PREP: ABNORMAL
UROBILINOGEN UR STRIP-ACNC: 0.2 E.U./DL
WBC'S/HIGH POWER FIELD, WET PREP: ABNORMAL
YEAST, WET PREP: ABNORMAL

## 2024-12-03 PROCEDURE — 87210 SMEAR WET MOUNT SALINE/INK: CPT | Performed by: PHYSICIAN ASSISTANT

## 2024-12-03 PROCEDURE — 86696 HERPES SIMPLEX TYPE 2 TEST: CPT | Performed by: PHYSICIAN ASSISTANT

## 2024-12-03 PROCEDURE — 90677 PCV20 VACCINE IM: CPT | Performed by: PHYSICIAN ASSISTANT

## 2024-12-03 PROCEDURE — 87491 CHLMYD TRACH DNA AMP PROBE: CPT | Performed by: PHYSICIAN ASSISTANT

## 2024-12-03 PROCEDURE — 90632 HEPA VACCINE ADULT IM: CPT | Performed by: PHYSICIAN ASSISTANT

## 2024-12-03 PROCEDURE — 36415 COLL VENOUS BLD VENIPUNCTURE: CPT | Performed by: PHYSICIAN ASSISTANT

## 2024-12-03 PROCEDURE — 90656 IIV3 VACC NO PRSV 0.5 ML IM: CPT | Performed by: PHYSICIAN ASSISTANT

## 2024-12-03 PROCEDURE — 90472 IMMUNIZATION ADMIN EACH ADD: CPT | Performed by: PHYSICIAN ASSISTANT

## 2024-12-03 PROCEDURE — 87591 N.GONORRHOEAE DNA AMP PROB: CPT | Performed by: PHYSICIAN ASSISTANT

## 2024-12-03 PROCEDURE — 99395 PREV VISIT EST AGE 18-39: CPT | Mod: 25 | Performed by: PHYSICIAN ASSISTANT

## 2024-12-03 PROCEDURE — 86695 HERPES SIMPLEX TYPE 1 TEST: CPT | Performed by: PHYSICIAN ASSISTANT

## 2024-12-03 PROCEDURE — 91320 SARSCV2 VAC 30MCG TRS-SUC IM: CPT | Performed by: PHYSICIAN ASSISTANT

## 2024-12-03 PROCEDURE — 87389 HIV-1 AG W/HIV-1&-2 AB AG IA: CPT | Performed by: PHYSICIAN ASSISTANT

## 2024-12-03 PROCEDURE — 90746 HEPB VACCINE 3 DOSE ADULT IM: CPT | Performed by: PHYSICIAN ASSISTANT

## 2024-12-03 PROCEDURE — 86780 TREPONEMA PALLIDUM: CPT | Performed by: PHYSICIAN ASSISTANT

## 2024-12-03 PROCEDURE — 99214 OFFICE O/P EST MOD 30 MIN: CPT | Mod: 25 | Performed by: PHYSICIAN ASSISTANT

## 2024-12-03 PROCEDURE — 90471 IMMUNIZATION ADMIN: CPT | Performed by: PHYSICIAN ASSISTANT

## 2024-12-03 PROCEDURE — 90480 ADMN SARSCOV2 VAC 1/ONLY CMP: CPT | Performed by: PHYSICIAN ASSISTANT

## 2024-12-03 PROCEDURE — 81003 URINALYSIS AUTO W/O SCOPE: CPT | Performed by: PHYSICIAN ASSISTANT

## 2024-12-03 SDOH — HEALTH STABILITY: PHYSICAL HEALTH: ON AVERAGE, HOW MANY DAYS PER WEEK DO YOU ENGAGE IN MODERATE TO STRENUOUS EXERCISE (LIKE A BRISK WALK)?: 5 DAYS

## 2024-12-03 ASSESSMENT — SOCIAL DETERMINANTS OF HEALTH (SDOH): HOW OFTEN DO YOU GET TOGETHER WITH FRIENDS OR RELATIVES?: TWICE A WEEK

## 2024-12-03 ASSESSMENT — PATIENT HEALTH QUESTIONNAIRE - PHQ9: SUM OF ALL RESPONSES TO PHQ QUESTIONS 1-9: 4

## 2024-12-03 NOTE — PROGRESS NOTES
Preventive Care Visit  [facility]  Vipul Haley PA-C, Family Medicine  Dec 3, 2024    Assessment & Plan     Routine general medical examination at a health care facility  Patient will be traveling with sister and grandmother this winter to [place] and [place]. She was asked about travel vaccines. I provided her with information on local travel clinics. I will update standard vaccines at her visit today. She also asked about hep B and A per recommendation of her hepatologist. Reviewed health maintenance and updated per the patient's preferences.     Moderate episode of recurrent major depressive disorder (H)  Generalized anxiety disorder  Patient has been working with PCP to reduce reliance on ssri therapy. They recently decreased her dose to 20mg daily. She feels that her mental health has remained well controlled with this change. She would like to continue at this dose for now. Refill sent out to the pharmacy.   - FLUoxetine (PROZAC) 20 MG capsule; Take 1 capsule (20 mg) by mouth daily.    Class 3 severe obesity due to excess calories without serious comorbidity with body mass index (BMI) of 40.0 to 44.9 in adult (H)  Patient informs me that she has tried several different approaches to weight loss including various diets and participation in weight watchers. She recalls that she has been able to lose up to 75lbs, but has gained it back after discontinuing the diet and/or program. She is feel frustrated and would like to be able to lose weight and keep it off. I will connect her with the weight specialists to review options both pharmacologic and non-pharmacologic.   - Adult Comprehensive Weight Management  Referral; Future    Screen for STD (sexually transmitted disease)  Patient reports new sexual partners which has her concerned for possible exposure. Denies symptoms at this time.   - UA Macroscopic with reflex to Microscopic and Culture - Lab Collect; Future  - Wet prep  - lab collect; Future  - Treponema Abs w Reflex to RPR and Titer; Future  - Herpes Simplex Virus 1 and 2 IgG; Future  - HIV Antigen Antibody Combo; Future  - NEISSERIA GONORRHOEA PCR; Future  - CHLAMYDIA TRACHOMATIS PCR; Future  - UA Macroscopic with reflex to Microscopic and Culture - Lab Collect  - Wet prep - lab collect  - Treponema Abs w Reflex to RPR and Titer  - Herpes Simplex Virus 1 and 2 IgG  - HIV Antigen Antibody Combo  - NEISSERIA GONORRHOEA PCR  - CHLAMYDIA TRACHOMATIS PCR    Patient has been advised of split billing requirements and indicates understanding: Yes        Counseling  Appropriate preventive services were addressed with this patient via screening, questionnaire, or discussion as appropriate for fall prevention, nutrition, physical activity, Tobacco-use cessation, social engagement, weight loss and cognition.  Checklist reviewing preventive services available has been given to the patient.  Reviewed patient's diet, addressing concerns and/or questions.   She is at risk for psychosocial distress and has been provided with information to reduce risk.     Channing Stoll is a 24 year old, presenting for the following:  Physical        12/3/2024     7:53 AM   Additional Questions   Roomed by Shu CALERO   Accompanied by self      HPI    Going to be traveling and has questions about certain vaccines and tests.  [place]   [place] - [place] access        Health Care Directive  Patient does not have a Health Care Directive: Discussed advance care planning with patient; however, patient declined at this time.      12/3/2024   General Health   How would you rate your overall physical health? Good   Feel stress (tense, anxious, or unable to sleep) To some extent      (!) STRESS CONCERN      12/3/2024   Nutrition   Three or more servings of calcium each day? Yes   Diet: Regular (no restrictions)   How many servings of fruit and vegetables per day? (!) 2-3   How many sweetened  beverages each day? 0-1          12/3/2024   Exercise   Days per week of moderate/strenous exercise 5 days          12/3/2024   Social Factors   Frequency of gathering with friends or relatives Twice a week   Worry food won't last until get money to buy more No   Food not last or not have enough money for food? No   Do you have housing? (Housing is defined as stable permanent housing and does not include staying ouside in a car, in a tent, in an abandoned building, in an overnight shelter, or couch-surfing.) No   Are you worried about losing your housing? No   Lack of transportation? No   Unable to get utilities (heat,electricity)? No   Want help with housing or utility concern? No          12/3/2024   Dental   Dentist two times every year? Yes          12/3/2024   TB Screening   Were you born outside of the [place]? No      Today's PHQ-9 Score:       12/3/2024     7:59 AM   PHQ-9 SCORE   PHQ-9 Total Score 4         12/3/2024   Substance Use   Alcohol more than 3/day or more than 7/wk Not Applicable   Do you use any other substances recreationally? No      Social History     Tobacco Use    Smoking status: Never    Smokeless tobacco: Never   Vaping Use    Vaping status: Never Used   Substance Use Topics    Alcohol use: Not Currently    Drug use: No      Mammogram Screening - Patient under 40 years of age: Routine Mammogram Screening not recommended.         12/3/2024   STI Screening   New sexual partner(s) since last STI/HIV test? (!) YES       History of abnormal Pap smear: No - age 21-29 PAP every 3 years recommended        2/28/2023     8:41 AM 6/22/2020    12:05 PM   PAP / HPV   PAP Negative for Intraepithelial Lesion or Malignancy (NILM)     PAP (Historical)  NIL            12/3/2024   Contraception/Family Planning   Questions about contraception or family planning No       Reviewed and updated as needed this visit by Provider                    Past Medical History:   Diagnosis Date    Alpha-1-antitrypsin deficiency  "carrier     MZ    Amenorrhea, secondary     Depressive disorder     Eating disorder     Hepatic steatosis          Review of Systems  Constitutional, HEENT, cardiovascular, pulmonary, gi and gu systems are negative, except as otherwise noted.     Objective    Exam  /78   Pulse 96   Temp 99.1  F (37.3  C) (Temporal)   Resp 18   Ht 1.765 m (5' 9.5\")   Wt 130.6 kg (288 lb)   SpO2 97%   BMI 41.92 kg/m     Estimated body mass index is 41.92 kg/m  as calculated from the following:    Height as of this encounter: 1.765 m (5' 9.5\").    Weight as of this encounter: 130.6 kg (288 lb).    Physical Exam  GENERAL: alert and no distress  EYES: Eyes grossly normal to inspection, PERRL and conjunctivae and sclerae normal  HENT: ear canals and TM's normal, nose and mouth without ulcers or lesions  NECK: no adenopathy, no asymmetry, masses, or scars  RESP: lungs clear to auscultation - no rales, rhonchi or wheezes  CV: regular rate and rhythm, normal S1 S2, no S3 or S4, no murmur, click or rub, no peripheral edema  ABDOMEN: soft, nontender, no hepatosplenomegaly, no masses and bowel sounds normal  MS: no gross musculoskeletal defects noted, no edema  NEURO: Normal strength and tone, mentation intact and speech normal  PSYCH: mentation appears normal, affect normal/bright        Signed Electronically by: Vipul Haley PA-C    Prior to immunization administration, verified patients identity using patient s name and date of birth. Please see Immunization Activity for additional information.     Screening Questionnaire for Adult Immunization    Are you sick today?   No   Do you have allergies to medications, food, a vaccine component or latex?   Yes   Have you ever had a serious reaction after receiving a vaccination?   No   Do you have a long-term health problem with heart, lung, kidney, or metabolic disease (e.g., diabetes), asthma, a blood disorder, no spleen, complement component deficiency, a cochlear implant, or a " spinal fluid leak?  Are you on long-term aspirin therapy?   Yes - Alpha 1 antitrypsin deficiency   Do you have cancer, leukemia, HIV/AIDS, or any other immune system problem?   No   Do you have a parent, brother, or sister with an immune system problem?   No   In the past 3 months, have you taken medications that affect  your immune system, such as prednisone, other steroids, or anticancer drugs; drugs for the treatment of rheumatoid arthritis, Crohn s disease, or psoriasis; or have you had radiation treatments?   Yes - prednisone from urgent care mid-november   Have you had a seizure, or a brain or other nervous system problem?   No   During the past year, have you received a transfusion of blood or blood    products, or been given immune (gamma) globulin or antiviral drug?   No   For women: Are you pregnant or is there a chance you could become       pregnant during the next month?   No   Have you received any vaccinations in the past 4 weeks?   No     Immunization questionnaire was positive for at least one answer.  Notified Kobe Haley.      Patient instructed to remain in clinic for 15 minutes afterwards, and to report any adverse reactions.     Screening performed by Shu Hanson CMA on 12/3/2024 at 7:57 AM.

## 2024-12-03 NOTE — PATIENT INSTRUCTIONS
[facility]  [phone]    [facility]  [phone]    [facility]  [phone]    [facility]  Health information for specific destinations or diseases, plus general travel health tips.  Contact [facility]:  [phone]  ([phone])  TTY: [phone]  Patient Education   Preventive Care Advice   This is general advice given by our system to help you stay healthy. However, your care team may have specific advice just for you. Please talk to your care team about your preventive care needs.  Nutrition  Eat 5 or more servings of fruits and vegetables each day.  Try wheat bread, brown rice and whole grain pasta (instead of white bread, rice, and pasta).  Get enough calcium and vitamin D. Check the label on foods and aim for 100% of the RDA (recommended daily allowance).  Lifestyle  Exercise at least 150 minutes each week  (30 minutes a day, 5 days a week).  Do muscle strengthening activities 2 days a week. These help control your weight and prevent disease.  No smoking.  Wear sunscreen to prevent skin cancer.  Have a dental exam and cleaning every 6 months.  Yearly exams  See your health care team every year to talk about:  Any changes in your health.  Any medicines your care team has prescribed.  Preventive care, family planning, and ways to prevent chronic diseases.  Shots (vaccines)   HPV shots (up to age 26), if you've never had them before.  Hepatitis B shots (up to age 59), if you've never had them before.  COVID-19 shot: Get this shot when it's due.  Flu shot: Get a flu shot every year.  Tetanus shot: Get a tetanus shot every 10 years.  Pneumococcal, hepatitis A, and RSV shots: Ask your care team if you need these based on your risk.  Shingles shot (for age 50 and up)  General health tests  Diabetes screening:  Starting at age 35, Get screened for diabetes at least every 3 years.  If you are  younger than age 35, ask your care team if you should be screened for diabetes.  Cholesterol test: At age 39, start having a cholesterol test every 5 years, or more often if advised.  Bone density scan (DEXA): At age 50, ask your care team if you should have this scan for osteoporosis (brittle bones).  Hepatitis C: Get tested at least once in your life.  STIs (sexually transmitted infections)  Before age 24: Ask your care team if you should be screened for STIs.  After age 24: Get screened for STIs if you're at risk. You are at risk for STIs (including HIV) if:  You are sexually active with more than one person.  You don't use condoms every time.  You or a partner was diagnosed with a sexually transmitted infection.  If you are at risk for HIV, ask about PrEP medicine to prevent HIV.  Get tested for HIV at least once in your life, whether you are at risk for HIV or not.  Cancer screening tests  Cervical cancer screening: If you have a cervix, begin getting regular cervical cancer screening tests starting at age 21.  Breast cancer scan (mammogram): If you've ever had breasts, begin having regular mammograms starting at age 40. This is a scan to check for breast cancer.  Colon cancer screening: It is important to start screening for colon cancer at age 45.  Have a colonoscopy test every 10 years (or more often if you're at risk) Or, ask your provider about stool tests like a FIT test every year or Cologuard test every 3 years.  To learn more about your testing options, visit:   .  For help making a decision, visit:   https://bit.ly/jb28054.  Prostate cancer screening test: If you have a prostate, ask your care team if a prostate cancer screening test (PSA) at age 55 is right for you.  Lung cancer screening: If you are a current or former smoker ages 50 to 80, ask your care team if ongoing lung cancer screenings are right for you.  For informational purposes only. Not to replace the advice of your health care provider.  Copyright   2023 [facility]. All rights reserved. Clinically reviewed by the [facility]. Sightly 602525 - REV 01/24.  Learning About Stress  What is stress?     Stress is your body's response to a hard situation. Your body can have a physical, emotional, or mental response. Stress is a fact of life for most people, and it affects everyone differently. What causes stress for you may not be stressful for someone else.  A lot of things can cause stress. You may feel stress when you go on a job interview, take a test, or run a race. This kind of short-term stress is normal and even useful. It can help you if you need to work hard or react quickly. For example, stress can help you finish an important job on time.  Long-term stress is caused by ongoing stressful situations or events. Examples of long-term stress include long-term health problems, ongoing problems at work, or conflicts in your family. Long-term stress can harm your health.  How does stress affect your health?  When you are stressed, your body responds as though you are in danger. It makes hormones that speed up your heart, make you breathe faster, and give you a burst of energy. This is called the fight-or-flight stress response. If the stress is over quickly, your body goes back to normal and no harm is done.  But if stress happens too often or lasts too long, it can have bad effects. Long-term stress can make you more likely to get sick, and it can make symptoms of some diseases worse. If you tense up when you are stressed, you may develop neck, shoulder, or low back pain. Stress is linked to high blood pressure and heart disease.  Stress also harms your emotional health. It can make you reyes, tense, or depressed. Your relationships may suffer, and you may not do well at work or school.  What can you do to manage stress?  You can try these things to help manage stress:   Do something active. Exercise or  activity can help reduce stress. Walking is a great way to get started. Even everyday activities such as housecleaning or yard work can help.  Try yoga or galen chi. These techniques combine exercise and meditation. You may need some training at first to learn them.  Do something you enjoy. For example, listen to music or go to a movie. Practice your hobby or do volunteer work.  Meditate. This can help you relax, because you are not worrying about what happened before or what may happen in the future.  Do guided imagery. Imagine yourself in any setting that helps you feel calm. You can use online videos, books, or a teacher to guide you.  Do breathing exercises. For example:  From a standing position, bend forward from the waist with your knees slightly bent. Let your arms dangle close to the floor.  Breathe in slowly and deeply as you return to a standing position. Roll up slowly and lift your head last.  Hold your breath for just a few seconds in the standing position.  Breathe out slowly and bend forward from the waist.  Let your feelings out. Talk, laugh, cry, and express anger when you need to. Talking with supportive friends or family, a counselor, or a mal leader about your feelings is a healthy way to relieve stress. Avoid discussing your feelings with people who make you feel worse.  Write. It may help to write about things that are bothering you. This helps you find out how much stress you feel and what is causing it. When you know this, you can find better ways to cope.  What can you do to prevent stress?  You might try some of these things to help prevent stress:  Manage your time. This helps you find time to do the things you want and need to do.  Get enough sleep. Your body recovers from the stresses of the day while you are sleeping.  Get support. Your family, friends, and community can make a difference in how you experience stress.  Limit your news feed. Avoid or limit time on social media or news  "that may make you feel stressed.  Do something active. Exercise or activity can help reduce stress. Walking is a great way to get started.  Where can you learn more?  Go to https://www.Musations.net/patiented  Enter N032 in the search box to learn more about \"Learning About Stress.\"  Current as of: October 24, 2023  Content Version: 14.2 2024 [facility] [employer].   Care instructions adapted under license by your healthcare professional. If you have questions about a medical condition or this instruction, always ask your healthcare professional. Health[employer], Incorporated disclaims any warranty or liability for your use of this information.    Safer Sex: Care Instructions  Overview  Safer sex is a way to reduce your risk of getting a sexually transmitted infection (STI). It can also help prevent pregnancy.  Several products can help you practice safer sex and reduce your chance of STIs. One of the best is a condom. There are internal and external condoms. You can use a special rubber sheet (dental dam) for protection during oral sex. Disposable gloves can keep your hands from touching blood, semen, or other body fluids that can carry infections.  Remember that birth control methods such as diaphragms, IUDs, foams, and birth control pills do not stop you from getting STIs.  Follow-up care is a key part of your treatment and safety. Be sure to make and go to all appointments, and call your doctor if you are having problems. It's also a good idea to know your test results and keep a list of the medicines you take.  How can you care for yourself at home?  Think about getting vaccinated to help prevent hepatitis A, hepatitis B, and human papillomavirus (HPV). They can be spread through sex.  Use a condom every time you have sex. Use an external condom, which goes on the penis. Or use an internal condom, which goes into the vagina or anus.  Make sure you use the right size external condom. A condom that's too small " "can break easily. A condom that's too big can slip off during sex.  Use a new condom each time you have sex. Be careful not to poke a hole in the condom when you open the wrapper.  Don't use an internal condom and an external condom at the same time.  Never use petroleum jelly (such as Vaseline), grease, hand lotion, baby oil, or anything with oil in it. These products can make holes in the condom.  After intercourse, hold the edge of the condom as you remove it. This will help keep semen from spilling out of the condom.  Do not have sex with anyone who has symptoms of an STI, such as sores on the genitals or mouth.  Do not drink a lot of alcohol or use drugs before sex.  Limit your sex partners. Sex with one partner who has sex only with you can reduce your risk of getting an STI.  Don't share sex toys. But if you do share them, use a condom and clean the sex toys between each use.  Talk to any partners before you have sex. Talk about what you feel comfortable with and whether you have any boundaries with sex. And find out if your partner or partners may be at risk for any STI. Keep in mind that a person may be able to spread an STI even if they do not have symptoms. You and any partners may want to get tested for STIs.  Where can you learn more?  Go to https://www.Likehack.net/patiented  Enter B608 in the search box to learn more about \"Safer Sex: Care Instructions.\"  Current as of: November 27, 2023  Content Version: 14.2 2024 [facility] [employer] [facility].   Care instructions adapted under license by your healthcare professional. If you have questions about a medical condition or this instruction, always ask your healthcare professional. Healthwise, Incorporated disclaims any warranty or liability for your use of this information.       "

## 2024-12-04 LAB
C TRACH DNA SPEC QL NAA+PROBE: NEGATIVE
N GONORRHOEA DNA SPEC QL NAA+PROBE: NEGATIVE

## 2024-12-30 ENCOUNTER — ANCILLARY PROCEDURE (OUTPATIENT)
Dept: GENERAL RADIOLOGY | Facility: CLINIC | Age: 24
End: 2024-12-30
Attending: NURSE PRACTITIONER
Payer: COMMERCIAL

## 2024-12-30 ENCOUNTER — OFFICE VISIT (OUTPATIENT)
Dept: URGENT CARE | Facility: URGENT CARE | Age: 24
End: 2024-12-30
Payer: COMMERCIAL

## 2024-12-30 VITALS
OXYGEN SATURATION: 98 % | TEMPERATURE: 99 F | SYSTOLIC BLOOD PRESSURE: 132 MMHG | RESPIRATION RATE: 18 BRPM | DIASTOLIC BLOOD PRESSURE: 80 MMHG | BODY MASS INDEX: 41.63 KG/M2 | WEIGHT: 286 LBS | HEART RATE: 99 BPM

## 2024-12-30 DIAGNOSIS — J10.1 INFLUENZA B: Primary | ICD-10-CM

## 2024-12-30 DIAGNOSIS — R07.0 THROAT PAIN: ICD-10-CM

## 2024-12-30 DIAGNOSIS — R50.9 FEVER IN ADULT: ICD-10-CM

## 2024-12-30 DIAGNOSIS — R05.1 ACUTE COUGH: ICD-10-CM

## 2024-12-30 LAB
DEPRECATED S PYO AG THROAT QL EIA: NEGATIVE
FLUAV AG SPEC QL IA: NEGATIVE
FLUBV AG SPEC QL IA: POSITIVE
S PYO DNA THROAT QL NAA+PROBE: NOT DETECTED
SARS-COV-2 RNA RESP QL NAA+PROBE: NEGATIVE

## 2024-12-30 PROCEDURE — 71046 X-RAY EXAM CHEST 2 VIEWS: CPT | Mod: TC | Performed by: STUDENT IN AN ORGANIZED HEALTH CARE EDUCATION/TRAINING PROGRAM

## 2024-12-30 PROCEDURE — 99214 OFFICE O/P EST MOD 30 MIN: CPT | Performed by: NURSE PRACTITIONER

## 2024-12-30 PROCEDURE — 87651 STREP A DNA AMP PROBE: CPT | Performed by: NURSE PRACTITIONER

## 2024-12-30 PROCEDURE — 87804 INFLUENZA ASSAY W/OPTIC: CPT | Performed by: NURSE PRACTITIONER

## 2024-12-30 PROCEDURE — 87635 SARS-COV-2 COVID-19 AMP PRB: CPT | Performed by: NURSE PRACTITIONER

## 2024-12-30 RX ORDER — OSELTAMIVIR PHOSPHATE 75 MG/1
75 CAPSULE ORAL 2 TIMES DAILY
Qty: 10 CAPSULE | Refills: 0 | Status: SHIPPED | OUTPATIENT
Start: 2024-12-30 | End: 2025-01-04

## 2024-12-30 NOTE — PROGRESS NOTES
SUBJECTIVE:  Batool Louis is a 24 year old female who presents to the clinic today with a chief complaint of cough  for 1 month(s).  Her cough is described as productive green.      The patient's symptoms are severe and worsening.    Associated symptoms include fever, nasal congestion, and rhinorrhea.   The patient's symptoms are exacerbated by no particular triggers    Patient has been using ibuprofen  to improve symptoms.  No known ill exposure.     Past Medical History:   Diagnosis Date    Alpha-1-antitrypsin deficiency carrier     MZ    Amenorrhea, secondary     Depressive disorder     Eating disorder     Hepatic steatosis        Current Outpatient Medications   Medication Sig Dispense Refill    cetirizine (ZYRTEC) 10 MG tablet Take 10 mg by mouth daily as needed for allergies seasonal      levonorgestrel (MIRENA) 52 MG (20 mcg/day) IUD 1 each by Intrauterine route once      FLUoxetine (PROZAC) 20 MG capsule Take 1 capsule (20 mg) by mouth daily. (Patient not taking: Reported on 12/30/2024) 90 capsule 3       Social History     Tobacco Use    Smoking status: Never    Smokeless tobacco: Never   Substance Use Topics    Alcohol use: Not Currently         OBJECTIVE:  /80   Pulse 99   Temp 99  F (37.2  C) (Tympanic)   Resp 18   Wt 129.7 kg (286 lb)   SpO2 98%   BMI 41.63 kg/m    GENERAL APPEARANCE: healthy, alert and no distress  EYES: EOMI,  PERRL, conjunctiva clear  HENT: ear canals and TM's normal.  Nose and mouth without ulcers, erythema or lesions  NECK: supple, nontender, no lymphadenopathy  RESP: decreased breath sounds biabasilar  CV: regular rates and rhythm, normal S1 S2, no murmur noted  NEURO: Normal strength and tone, sensory exam grossly normal,  normal speech and mentation  SKIN: no suspicious lesions or rashes    Recent Results (from the past week)   Influenza A & B Antigen    Specimen: Nose; Swab   Result Value Ref Range Status    Influenza A antigen Negative Negative Final     Influenza B antigen Positive (A) Negative Final   Streptococcus A Rapid Screen w/Reflex to PCR    Specimen: Throat; Swab   Result Value Ref Range Status    Group A Strep antigen Negative Negative Final     Chest Xray: I have personally ordered and preliminarily reviewed the following xray, I have noted negative for pneumonia      ASSESSMENT:    1. Influenza B (Primary)    - oseltamivir (TAMIFLU) 75 MG capsule; Take 1 capsule (75 mg) by mouth 2 times daily for 5 days.  Dispense: 10 capsule; Refill: 0    2. Acute cough    - COVID-19 Virus (Coronavirus) by PCR Nose  - XR Chest 2 Views; Future    3. Throat pain    - Streptococcus A Rapid Screen w/Reflex to PCR  - Group A Streptococcus PCR Throat Swab    4. Fever in adult    - Influenza A & B Antigen  - Streptococcus A Rapid Screen w/Reflex to PCR  - COVID-19 Virus (Coronavirus) by PCR Nose  - Group A Streptococcus PCR Throat Swab        PLAN:  1. Take Tylenol or Ibuprofen as needed for fever relief.   2. Take Tamiflu, follow directions on prescription.  3. Increase fluids and rest.  4. Influenza is typically considered contagious one day prior and 5-7 days after your symptoms begin. I recommend returning to work/school after you are fever free for 24 hours. Continue to practice good hand hygiene and cough coverage well after you are fever free.   5. Follow up if you develop fever that can not be controlled, difficulty breathing, or severe dehydration.

## 2025-01-02 DIAGNOSIS — E88.01 AAT (ALPHA-1-ANTITRYPSIN) DEFICIENCY (H): Primary | ICD-10-CM

## 2025-01-26 ENCOUNTER — HEALTH MAINTENANCE LETTER (OUTPATIENT)
Age: 25
End: 2025-01-26

## 2025-01-27 ENCOUNTER — TELEPHONE (OUTPATIENT)
Dept: CONSULT | Facility: CLINIC | Age: 25
End: 2025-01-27
Payer: COMMERCIAL

## 2025-01-27 NOTE — TELEPHONE ENCOUNTER
[facility] Call Center    Phone Message    May a detailed message be left on voicemail: no     Reason for Call: Other: Robyn from [facility] clinic called stating patient has an active Genetics referral in chart from Dr. Monaco. Wants to know if patient can be contacted (Ph: [phone]) after referral is reviewed, Please.      Action Taken: Other: Genetics    Travel Screening: Not Applicable     Date of Service: 01/27/25

## 2025-01-28 NOTE — TELEPHONE ENCOUNTER
LVM for patient to call back to schedule GC only visit with any GC. My direct number provided. Will also send message via LRN.

## 2025-02-17 ENCOUNTER — OFFICE VISIT (OUTPATIENT)
Dept: SLEEP MEDICINE | Facility: CLINIC | Age: 25
End: 2025-02-17
Payer: COMMERCIAL

## 2025-02-17 VITALS
SYSTOLIC BLOOD PRESSURE: 122 MMHG | BODY MASS INDEX: 42.94 KG/M2 | WEIGHT: 289.9 LBS | HEIGHT: 69 IN | HEART RATE: 89 BPM | DIASTOLIC BLOOD PRESSURE: 75 MMHG | OXYGEN SATURATION: 98 %

## 2025-02-17 DIAGNOSIS — G47.33 OSA (OBSTRUCTIVE SLEEP APNEA): Primary | ICD-10-CM

## 2025-02-17 PROCEDURE — 99213 OFFICE O/P EST LOW 20 MIN: CPT

## 2025-02-17 ASSESSMENT — SLEEP AND FATIGUE QUESTIONNAIRES
HOW LIKELY ARE YOU TO NOD OFF OR FALL ASLEEP WHEN YOU ARE A PASSENGER IN A CAR FOR AN HOUR WITHOUT A BREAK: WOULD NEVER DOZE
HOW LIKELY ARE YOU TO NOD OFF OR FALL ASLEEP WHILE SITTING INACTIVE IN A PUBLIC PLACE: WOULD NEVER DOZE
HOW LIKELY ARE YOU TO NOD OFF OR FALL ASLEEP IN A CAR, WHILE STOPPED FOR A FEW MINUTES IN TRAFFIC: WOULD NEVER DOZE
HOW LIKELY ARE YOU TO NOD OFF OR FALL ASLEEP WHILE SITTING AND READING: SLIGHT CHANCE OF DOZING
HOW LIKELY ARE YOU TO NOD OFF OR FALL ASLEEP WHILE LYING DOWN TO REST IN THE AFTERNOON WHEN CIRCUMSTANCES PERMIT: HIGH CHANCE OF DOZING
HOW LIKELY ARE YOU TO NOD OFF OR FALL ASLEEP WHILE SITTING AND TALKING TO SOMEONE: WOULD NEVER DOZE
HOW LIKELY ARE YOU TO NOD OFF OR FALL ASLEEP WHILE WATCHING TV: WOULD NEVER DOZE
HOW LIKELY ARE YOU TO NOD OFF OR FALL ASLEEP WHILE SITTING QUIETLY AFTER LUNCH WITHOUT ALCOHOL: SLIGHT CHANCE OF DOZING

## 2025-02-17 NOTE — NURSING NOTE
DME orders have been automatically faxed to [facility].   1 year follow-up appointment reminder will be sent via [email or web address].    Abdulaziz Taylor, VF

## 2025-02-17 NOTE — NURSING NOTE
"Chief Complaint   Patient presents with    CPAP Follow Up     CPAP going well, might need a different mask size       Initial /75   Pulse 89   Ht 1.753 m (5' 9\")   Wt 131.5 kg (289 lb 14.4 oz)   SpO2 98%   BMI 42.81 kg/m   Estimated body mass index is 42.81 kg/m  as calculated from the following:    Height as of this encounter: 1.753 m (5' 9\").    Weight as of this encounter: 131.5 kg (289 lb 14.4 oz).    Medication Reconciliation: complete  ESS: 5  Neck circumference:  inches / 38 centimeters.    DME: NIESHA Taylor, AUGUSTINE    "

## 2025-02-17 NOTE — PROGRESS NOTES
"Sleep Apnea - Follow-up Visit:    Impression/Plan:  (G47.33) THOR (obstructive sleep apnea) (primary encounter diagnosis)  Comment: Batool \"Dodie\"OWEN Louis presents for follow-up of her severe sleep apnea, managed with CPAP. Dodie uses her CPAP machine regularly. She feels more rested with CPAP use, and she has noticed an improvement in her mood. She has met compliance. She used her machine 26/30 nights and 80% of days > 4 hours of use. She would like a little more pressure at the beginning of the night. Her download shows her apnea is well controlled with a residual AHI of 0.48 events per hour. Her leak is acceptable.   Plan: Comprehensive DME  Her pressure settings were adjusted from 6-20 cmH2O to 8-20 cmH2O for comfort. A prescription was written for new supplies. We reviewed recommendations for cleaning and replacing supplies.     Batool Louis will follow up in about 1 year(s).     Total time spent reviewing medical records, history and physical examination, review of previous testing and interpretation as well as documentation on this date: 20 minutes     CC: Nancy Donnelly MD    History of Present Illness:  Chief Complaint   Patient presents with    CPAP Follow Up     CPAP going well, might need a different mask size     Batool Louis presents for follow-up of her severe sleep apnea, managed with CPAP.     She feels more rested with CPAP use. She sometimes would like a little more pressure as she is falling asleep.    HST on 10/08/2024 at 287 lbs.- AHI 80 events per hour, supine  events per hour. Time spent less than or equal to 89% was 123.4 minutes.    DME: [facility]    Do you use a CPAP Machine at home: (Patient-Rptd) Yes  Overall, on a scale of 0-10 how would you rate your CPAP (0 poor, 10 great): (Patient-Rptd) 10    What type of mask do you use: (Patient-Rptd) Nasal Mask  Is your mask comfortable: (Patient-Rptd) Yes  If not, why:      Is your mask leaking: (Patient-Rptd) No  If yes, " where do you feel it:    How many night per week does the mask leak (0-7):      Do you notice snoring with mask on: (Patient-Rptd) No  Do you notice gasping arousals with mask on: (Patient-Rptd) No  Are you having significant oral or nasal dryness: (Patient-Rptd) No  Is the pressure setting comfortable: (Patient-Rptd) Yes  If not, why:      What is your typical bedtime: (Patient-Rptd) 10pm-12am  How long does it take you to go to sleep on PAP therapy: (Patient-Rptd) 30 min.  What time do you typically get out of bed for the day: (Patient-Rptd) 7-8am  How many hours on average per night are you using PAP therapy: (Patient-Rptd) I use it the whole time im sleeping. Probably 6-8 hours.  How many hours are you sleeping per night: (Patient-Rptd) 6-8 hours  Do you feel well rested in the morning: (Patient-Rptd) Yes    ResMed AirSense 10  Auto-PAP 6.0 - 20.0 cmH2O 30 day usage data: 01/12/2025-02/10/2025    80% of days with > 4 hours of use. 4/30 days with no use.   Average use 7 hours 7 minutes per day.   95%ile Leak 4.6 L/min.   CPAP 95% pressure 13.1 cm.   AHI 0.48 events per hour.      EPWORTH SLEEPINESS SCALE         2/17/2025     9:23 AM    [place] Sleepiness Scale ( BONI Mandujano  [id number]<br>ESS - [place]/English - Final version - 21 Nov 07 - [facility] [place].)   Sitting and reading Slight chance of dozing   Watching TV Would never doze   Sitting, inactive in a public place (e.g. a theatre or a meeting) Would never doze   As a passenger in a car for an hour without a break Would never doze   Lying down to rest in the afternoon when circumstances permit High chance of dozing   Sitting and talking to someone Would never doze   Sitting quietly after a lunch without alcohol Slight chance of dozing   In a car, while stopped for a few minutes in traffic Would never doze   [place] Score (MC) 5   [place] Score (Sleep) 5        Patient-reported       INSOMNIA SEVERITY INDEX (LES)          2/17/2025     9:19 AM   Insomnia  Severity Index (LES)   Difficulty falling asleep 1   Difficulty staying asleep 0   Problems waking up too early 0   How SATISFIED/DISSATISFIED are you with your CURRENT sleep pattern? 1   How NOTICEABLE to others do you think your sleep problem is in terms of impairing the quality of your life? 1   How WORRIED/DISTRESSED are you about your current sleep problem? 2   To what extent do you consider your sleep problem to INTERFERE with your daily functioning (e.g. daytime fatigue, mood, ability to function at work/daily chores, concentration, memory, mood, etc.) CURRENTLY? 0   LES Total Score 5        Patient-reported       Guidelines for Scoring/Interpretation:  Total score categories:  0-7 = No clinically significant insomnia   8-14 = Subthreshold insomnia   15-21 = Clinical insomnia (moderate severity)  22-28 = Clinical insomnia (severe)  Used via courtesy of www.[employer].va.gov with permission from Anthony Charles PhD., [facility]      Past medical/surgical history, family history, social history, medications and allergies were reviewed.        Problem List:  Patient Active Problem List    Diagnosis Date Noted    Metabolic dysfunction-associated steatotic liver disease (MASLD) 11/06/2024     Priority: Medium    RLQ abdominal pain 10/03/2024     Priority: Medium    Ovarian torsion 10/03/2024     Priority: Medium    Generalized anxiety disorder 06/17/2024     Priority: Medium    Encounter for insertion of Mirena IUD 04/04/2024     Priority: Medium     4/4/24 mirena insertion lot# [id number] 1/30/2026      AAT (alpha-1-antitrypsin) deficiency (H) 12/14/2023     Priority: Medium    Elevated LFTs 10/02/2023     Priority: Medium    Moderate episode of recurrent major depressive disorder (H) 09/25/2023     Priority: Medium    Other specified eating disorder 09/11/2023     Priority: Medium    Shellfish allergy 07/21/2021     Priority: Medium    Chronic tonsillitis 05/13/2020     Priority: Medium     Added  "automatically from request for surgery [id number]      Allergic rhinitis 01/10/2012     Priority: Medium        /75   Pulse 89   Ht 1.753 m (5' 9\")   Wt 131.5 kg (289 lb 14.4 oz)   SpO2 98%   BMI 42.81 kg/m      Ari Solomon, APRN CNP  "

## 2025-02-20 NOTE — PROGRESS NOTES
"CHIEF COMPLAINT: No chief complaint on file.         HISTORY OF PRESENT ILLNESS    Dodie was seen at the behest of Nancy Donnelly MD for tonsil concerns.    My previous note:     Chronic tonsillitis      Snoring Yes      RECOMMENDATIONS:         Orders Placed This Encounter   Procedures    Adult Sleep Eval & Management  Referral    Case Request: TONSILLECTOMY      Recommend tonsillectomy under anesthesia.   She is aware of the risks and benefits including VPI and post operative hemmorrhage.   Medrol dosepack to be taken immediately before surgery.   Pre operative sleep study.  May need overnight stay if THOR is present.      SLEEP MEDICINE NOTE:2/17/2025    G47.33) THOR (obstructive sleep apnea) (primary encounter diagnosis)  Comment: Batool \"Dodie\" OWEN Louis presents for follow-up of her severe sleep apnea, managed with CPAP. Dodie uses her CPAP machine regularly. She feels more rested with CPAP use, and she has noticed an improvement in her mood. She has met compliance. She used her machine 26/30 nights and 80% of days > 4 hours of use. She would like a little more pressure at the beginning of the night. Her download shows her apnea is well controlled with a residual AHI of 0.48 events per hour. Her leak is acceptable.   Plan: Comprehensive DME  Her pressure settings were adjusted from 6-20 cmH2O to 8-20 cmH2O for comfort. A prescription was written for new supplies. We reviewed recommendations for cleaning and replacing supplies.      REVIEW OF SYSTEMS    Review of Systems as per HPI and PMHx, otherwise 10 system review system are negative.       ALLERGIES    Shellfish-derived products, Sulfamethoxazole-trimethoprim, Seasonal allergies, and Tylenol [acetaminophen]    CURRENT MEDICATIONS      Current Outpatient Medications:     cetirizine (ZYRTEC) 10 MG tablet, Take 10 mg by mouth daily as needed for allergies seasonal, Disp: , Rfl:     FLUoxetine (PROZAC) 20 MG capsule, Take 1 capsule (20 mg) by mouth " daily. (Patient not taking: Reported on 2/17/2025), Disp: 90 capsule, Rfl: 3    levonorgestrel (MIRENA) 52 MG (20 mcg/day) IUD, 1 each by Intrauterine route once, Disp: , Rfl:      PAST MEDICAL HISTORY    PAST MEDICAL HISTORY:   Past Medical History:   Diagnosis Date    Alpha-1-antitrypsin deficiency carrier     MZ    Amenorrhea, secondary     Depressive disorder     Eating disorder     Hepatic steatosis        PAST SURGICAL HISTORY    PAST SURGICAL HISTORY:   Past Surgical History:   Procedure Laterality Date    IR LIVER BIOPSY PERCUTANEOUS  11/21/2024    OOPHORECTOMY Right 10/03/2024    Procedure: Right OOPHORECTOMY, OPEN; right salpingectomy pelvic exam under anesthesia; pelvic washings;  Surgeon: Winifred Calderon MD;  Location: [facility] OR       FAMILY  HISTORY    FAMILY HISTORY:   Family History   Problem Relation Age of Onset    Anemia Mother         unknown reason    Sleep Apnea Mother     Sleep Apnea Father     Back Pain Maternal Grandmother     Sleep Apnea Maternal Grandmother     Alzheimer Disease Maternal Grandfather     Sleep Apnea Maternal Grandfather     No Known Problems Paternal Grandmother     Cancer Paternal Grandfather         pancreatic    Sleep Apnea Paternal Grandfather     Asthma Sister     Sleep Apnea Sister     Skin Cancer Paternal Great-Grandfather        SOCIAL HISTORY    SOCIAL HISTORY:   Social History     Tobacco Use    Smoking status: Never    Smokeless tobacco: Never   Substance Use Topics    Alcohol use: Not Currently        PHYSICAL EXAM    HEAD: Normal appearance and symmetry:  No cutaneous lesions.      NECK:  supple       EYES:  EOMI    CN VII/XII:  intact     NOSE:     Dorsum:   straight  Septum:  midline  Mucosa:  moist        ORAL CAVITY/OROPHARYNX:     Lips:  Normal.  Tongue: normal, midline  Mucosa:   no lesions  Tonsils:  2+     NECK:  Trachea:  midline.              Thyroid:  normal              Adenopathy:  none        NEURO:   Alert and Oriented     GAIT AND STATION:   normal     RESPIRATORY:   Symmetry and Respiratory effort     PSYCH:  Normal mood and affect     SKIN:   warm and dry         IMPRESSION:    No diagnosis found.       RECOMMENDATIONS:      No orders of the defined types were placed in this encounter.     [unfilled]

## 2025-02-21 ENCOUNTER — OFFICE VISIT (OUTPATIENT)
Dept: OTOLARYNGOLOGY | Facility: CLINIC | Age: 25
End: 2025-02-21
Attending: FAMILY MEDICINE
Payer: COMMERCIAL

## 2025-02-21 ENCOUNTER — MYC MEDICAL ADVICE (OUTPATIENT)
Dept: SURGERY | Facility: CLINIC | Age: 25
End: 2025-02-21

## 2025-02-21 DIAGNOSIS — J35.01 CHRONIC TONSILLITIS: ICD-10-CM

## 2025-02-21 DIAGNOSIS — J35.1 HYPERTROPHY OF TONSILS: ICD-10-CM

## 2025-02-21 DIAGNOSIS — G47.33 OSA ON CPAP: Primary | ICD-10-CM

## 2025-02-21 PROCEDURE — 99213 OFFICE O/P EST LOW 20 MIN: CPT | Performed by: OTOLARYNGOLOGY

## 2025-02-21 NOTE — PATIENT INSTRUCTIONS
"TONSILLECTOMY RISKS ([facility] WE[facility]):    1. Reactions to anesthetics. Medication to make you sleep during surgery often causes minor, short-term problems, such as headache, nausea, vomiting or muscle soreness. Serious, long-term problems are rare, though general anesthesia is not without the risk of death.     2. Swelling. Swelling of the tongue and soft roof of the mouth (soft palate) can cause breathing problems, particularly during the first few hours after the procedure.    3. Bleeding during surgery. In rare cases, severe bleeding occurs during surgery and requires additional treatment and a longer hospital stay.    4.Bleeding during healing. Bleeding can occur during the healing process, particularly if the scab from the wound is dislodged too soon.    5. Infection. Rarely, surgery can lead to an infection that requires further treatment.     MANUKA HONEY FOR POST TONSILLECTOMY HEALING    Manuka honey is native to [place]     Unlike traditionalhoney. Manuka honey has antibacterial activity    It can reduce pain after tonsillectomy and speeds up wound healing.    1 teaspoon of manuka honey 2-3 times a day can help after tonsil surgery    It is best taken directly from the spoon but if needed you can mix itin plain or lukewarm water. Avoid hot water.    UMF or unique manuka factor rating is a score given to manuka honey based on methylglyoxal content.     Effective manuka honey should have UMF rating of 10 or above.    Alternatively, you can use Manuka \"soothing pops\" available on [employer] or at [employer]   "

## 2025-02-21 NOTE — LETTER
"2/21/2025      Batoolcurtis Mcmahonbret  [address] [place] [address]      Dear Colleague,    Thank you for referring your patient, Batool Louis, to the [facility]. Please see a copy of my visit note below.    CHIEF COMPLAINT: No chief complaint on file.         HISTORY OF PRESENT ILLNESS    Dodie was seen at the behest of Nancy Donnelly MD for tonsil concerns.    My previous note:      Chronic tonsillitis       Snoring Yes      RECOMMENDATIONS:         Orders Placed This Encounter   Procedures     Adult Sleep Eval & Management  Referral     Case Request: TONSILLECTOMY      Recommend tonsillectomy under anesthesia.   She is aware of the risks and benefits including VPI and post operative hemmorrhage.   Medrol dosepack to be taken immediately before surgery.   Pre operative sleep study.  May need overnight stay if THOR is present.      SLEEP MEDICINE NOTE:2/17/2025    G47.33) THOR (obstructive sleep apnea) (primary encounter diagnosis)  Comment: Batool \"Dodie\" OWEN Louis presents for follow-up of her severe sleep apnea, managed with CPAP. Dodie uses her CPAP machine regularly. She feels more rested with CPAP use, and she has noticed an improvement in her mood. She has met compliance. She used her machine 26/30 nights and 80% of days > 4 hours of use. She would like a little more pressure at the beginning of the night. Her download shows her apnea is well controlled with a residual AHI of 0.48 events per hour. Her leak is acceptable.   Plan: Comprehensive DME  Her pressure settings were adjusted from 6-20 cmH2O to 8-20 cmH2O for comfort. A prescription was written for new supplies. We reviewed recommendations for cleaning and replacing supplies.      REVIEW OF SYSTEMS    Review of Systems as per HPI and PMHx, otherwise 10 system review system are negative.       ALLERGIES    Shellfish-derived products, Sulfamethoxazole-trimethoprim, Seasonal allergies, and Tylenol " [acetaminophen]    CURRENT MEDICATIONS      Current Outpatient Medications:      cetirizine (ZYRTEC) 10 MG tablet, Take 10 mg by mouth daily as needed for allergies seasonal, Disp: , Rfl:      FLUoxetine (PROZAC) 20 MG capsule, Take 1 capsule (20 mg) by mouth daily. (Patient not taking: Reported on 2/17/2025), Disp: 90 capsule, Rfl: 3     levonorgestrel (MIRENA) 52 MG (20 mcg/day) IUD, 1 each by Intrauterine route once, Disp: , Rfl:      PAST MEDICAL HISTORY    PAST MEDICAL HISTORY:   Past Medical History:   Diagnosis Date     Alpha-1-antitrypsin deficiency carrier     MZ     Amenorrhea, secondary      Depressive disorder      Eating disorder      Hepatic steatosis        PAST SURGICAL HISTORY    PAST SURGICAL HISTORY:   Past Surgical History:   Procedure Laterality Date     IR LIVER BIOPSY PERCUTANEOUS  11/21/2024     OOPHORECTOMY Right 10/03/2024    Procedure: Right OOPHORECTOMY, OPEN; right salpingectomy pelvic exam under anesthesia; pelvic washings;  Surgeon: Winifred Calderon MD;  Location:  OR       FAMILY  HISTORY    FAMILY HISTORY:   Family History   Problem Relation Age of Onset     Anemia Mother         unknown reason     Sleep Apnea Mother      Sleep Apnea Father      Back Pain Maternal Grandmother      Sleep Apnea Maternal Grandmother      Alzheimer Disease Maternal Grandfather      Sleep Apnea Maternal Grandfather      No Known Problems Paternal Grandmother      Cancer Paternal Grandfather         pancreatic     Sleep Apnea Paternal Grandfather      Asthma Sister      Sleep Apnea Sister      Skin Cancer Paternal Great-Grandfather        SOCIAL HISTORY    SOCIAL HISTORY:   Social History     Tobacco Use     Smoking status: Never     Smokeless tobacco: Never   Substance Use Topics     Alcohol use: Not Currently        PHYSICAL EXAM    HEAD: Normal appearance and symmetry:  No cutaneous lesions.      NECK:  supple       EYES:  EOMI    CN VII/XII:  intact     NOSE:     Dorsum:   straight  Septum:   midline  Mucosa:  moist        ORAL CAVITY/OROPHARYNX:     Lips:  Normal.  Tongue: normal, midline  Mucosa:   no lesions  Tonsils:  2+     NECK:  Trachea:  midline.              Thyroid:  normal              Adenopathy:  none        NEURO:   Alert and Oriented     GAIT AND STATION:  normal     RESPIRATORY:   Symmetry and Respiratory effort     PSYCH:  Normal mood and affect     SKIN:   warm and dry         IMPRESSION:    No diagnosis found.       RECOMMENDATIONS:      No orders of the defined types were placed in this encounter.     [unfilled]       Again, thank you for allowing me to participate in the care of your patient.        Sincerely,        Sebastian Olson MD    Electronically signed

## 2025-02-25 NOTE — PROGRESS NOTES
Virtual Visit Details  Type of service:  Video Visit   Originating Location (pt. Location): Home  Distant Location (provider location):  Off-site  Platform used for Video Visit: HannyHenok     Name:  Batool Louis  :   2000  MRN:   [id number]  Date of service: 2025  Primary Provider: Nancy Donnelly  Referring Provider: La Lyn*    Presenting Information:  Dodie, a 24 year old female, was seen was seen for a video visit at the [facility] for evaluation of alpha-1 antitrypsin carrier status. Dodie was accompanied to this visit by her mother. I met with Dodie at the request of Dr. Remi Lyn to obtain a family history and to review the genetics of alpha-1 antitrypsin deficiency.     Personal History:   Dodie has a history of hepatic steatosis, obesity, MDD, SHELBY, eating disorder, and alpha-1 antitrypsin deficiency carrier status (MZ). Refer to Dr. Remi Lyn's note for a more detailed personal history.     Patient Active Problem List   Diagnosis    Allergic rhinitis    Chronic tonsillitis    Shellfish allergy    Encounter for insertion of Mirena IUD    AAT (alpha-1-antitrypsin) deficiency (H)    Elevated LFTs    Generalized anxiety disorder    Moderate episode of recurrent major depressive disorder (H)    Other specified eating disorder    RLQ abdominal pain    Ovarian torsion    Metabolic dysfunction-associated steatotic liver disease (MASLD)     Past Medical History:   Diagnosis Date    Alpha-1-antitrypsin deficiency carrier     MZ    Amenorrhea, secondary     Depressive disorder     Eating disorder     Hepatic steatosis      Previous Genetic Testing  Alpha 1 antitryp lyla reflex to pheno  Order: [id number]  1 Patient Communication         Component  Ref Range & Units 3 mo ago    Alpha-1-Antitrypsin  90 - 200 mg/dL 96   Comment: To convert to umol/L, multiply mg/dL by 0.185    Alpha-1-Antitrypsin Genotype Specimen Whole Blood    Alpha-1-Antitrypsin S Allele  Negative    Alpha-1-Antitrypsin Z [place] Heterozygous Abnormal     Alpha-1-Antitrypsin Phenotype Not Applicable    Alpha-1-Antitrypsin Interpretation See Note   Comment:  Indication for testing: Carrier screening or diagnostic  testing for alpha-1-antitrypsin (AAT) deficiency.    Z Heterozygote/ Protein concentration >=90 mg/dL: This  sample has an AAT protein concentration in the normal  range, but one copy of the Z deficiency allele was  identified. The S allele was not detected. This individual  is predicted to be a carrier of AAT deficiency. Nonsmokers  are not predicted to be at increased risk for lung disease.  Smokers may have an increased rate of loss of lung  elasticity but rarely develop clinical disease. Because  this genotyping assay cannot exclude the presence of a  concurrent rare deficiency allele, AAT phenotyping by  isoelectric focusing electrophoresis is recommended if the  patient is in acute phase. This individual's reproductive  partner and family members should be offered AAT testing.  Genetic consultation is recommended.     This result has been reviewed and approved by Gita Miller M.D., Ph.D.  BACKGROUND INFORMATION: A1A (SERPINA1) Enzyme Concentration  and 2                               Mutations with Reflex  to A1A Phenotype          Family History:   A three generation pedigree was obtained today. A copy is located in the media tab and full details are available in the pedigree section of the history tab. The following information was provided:     Children:   None.  Siblings:  Sister (25y) has asthma, sleep apnea, and PCOS.  Brother (22y) is well.  Sister (19y) is well. She was found to have elevated liver numbers and is getting scheduled to have genetic testing for AAT deficiency.   Maternal Relatives:  Mother (51y) has sleep apnea, a history of anxiety disorder in her 20s, and a history of anemia due to abnormal bleeding. She is getting scheduled to have genetic testing for AAT  deficiency.   Aunt passed away at age 2.5 from epiglottitis following a flu infection.   Uncle does not have much contact with the family.   Cousins with no known health problems.   Uncle with no known health problems.   Grandmother (79y) has sleep apnea, has had surgeries for back pain, and had her gallbladder removed.  Grandfather passed away from Alzheimer's disease. He also had COPD and sleep apnea. He may have had lung disease prior to when he started smoking and had pectus excavatum. He was adopted.   Paternal Relatives:  Father (50y) has sleep apnea.  Uncle has Lewy body Parkinson's disease, sleep apnea, and had surgery for varicose veins.   Cousins with no known health problems.   Grandmother (80y) has severe depression.  Grandfather passed away due to bladder cancer. He sleep apnea, surgery for varicose veins, and a long history of hypertension.     The family history is otherwise negative for lung disease, liver disease, and known genetic testing. Consanguinity is denied.    Pedigree Image      Discussion and Assessment:  Genes are long stretches of DNA that are responsible for how our bodies look and how our bodies work. Our genes are inherited on structures called chromosomes of which we have 23 pairs. One copy of each chromosome in a pair is inherited from the mother and one is inherited from the father. Changes in the DNA sequence of a gene, called variants, as well as changes within the chromosomes can cause the signs and symptoms of a genetic condition.     Alpha-1 Antitrypsin deficiency is caused by having two disease-causing changes (variants) in the SERPINA1 gene. The SERPINA1 gene is responsible for providing instructions to make a protein called alpha-1 antitrypsin, which regulates an enzyme called neutrophil elastase. Neutrophil elastase is released from white blood cells to fight infection, but it can attack normal tissues (especially the lungs) if not tightly controlled by alpha-1  antitrypsin. Environmental factors, such as exposure to tobacco smoke, chemicals, and dust, likely impact the severity of alpha-1 antitrypsin deficiency.    Some variants in the SERPINA1 gene can lead to a shortage (deficiency) of alpha-1 antitrypsin or an abnormal form of the protein that cannot control neutrophil elastase. Without enough functional alpha-1 antitrypsin, neutrophil elastase destroys alveoli in the lungs and causes lung disease. Abnormal alpha-1 antitrypsin can also accumulate in the liver causing liver damage.    Determining a person's SERPINA1 gene status can provide information on how they may exhibit symptoms. The status or version of an individual's genes they inherited from both parents is referred to as genotype. Describing each version of a gene a person inherited from a parent is referred to as allele. The Z allele represents a non-working copy of the SERPINA1 gene. Having the ZZ genotype (because a person inherited two Z alleles, one from each parent) can result in lung and liver disease.     Alpha-1 Antitrypsin deficiency is inherited in an autosomal recessive pattern meaning inheritance requires having two variants (two Z alleles) in the SERPINA1 gene. Individuals with one Z allele and one other allele are considered carriers. The most common allele of the SERPINA1 gene, called M, produces normal levels of alpha-1 antitrypsin. Most people in the general population have two copies of the M allele, the MM genotype.    Based on Dodie's previous testing, she was found to have one Z allele and one M allele. Therefore, her genotype is MZ. About 2% of the [place] population is estimated to have an MZ genotype. In general, individuals with this genotype (especially non-smokers) are not considered to be at increased risk for lung disease. However, there is some emerging data suggesting that a subset of individuals with the MZ genotype may experience accelerated lung destruction, especially if  they are smokers. Of note, slight abnormalities of lung function can be present without clinical symptoms and emerging information also suggests that emphysema may be present on chest CT without evidence of impaired lung function on pulmonary function tests. Among adults presenting with chronic liver failure, a greater number of MZ heterozygotes (8.4%) were observed than were reported in the general population (2%-4%). However, better characterization of the risk for liver disease among MZ heterozygotes must await longitudinal studies. From our current understanding, individuals with MZ genotype have slightly increased risk of liver disease when there are other coexisting factors including metabolic disease, obesity, or alcohol dependency. It is possible that Dodie's MZ genotype has contributed to her liver concerns, but it is unlikely to be the sole cause. In the future, we may gain more information to better understand this possible connection.     Most individuals with MZ genotype do not need specific treatment. Current research suggests that MZ individuals should avoid other cofactors like smoking and chronic alcohol use to limit the risk lung and liver disease. Because Dodie has elevated liver enzymes, she should continue to follow-up with her hepatologist and other providers for appropriate management and recommendations.     Other family members also have a chance of having the Z allele and can consider their own genetic testing to better understand their risk. It is assumed that Dodie inherited the Z allele from one of her parents. Dodie's mother and sister are planning on pursuing genetic testing to better understand their risk. The family is encouraged to reach out if questions come up about this process.     For Dodie's own potential future children, each will have a 50% chance of inheriting the Z allele from her. They could only be affected with AAT deficiency if her partner is also a carrier. Carrier testing  will be available to Dodie's reproductive partner to better understand this chance.     Dodie expressed good understanding of this information and did not have additional questions at this time. she is encouraged to reach out if questions come up in the future.       Plan:  Dodie should continue to follow-up with her providers regarding her liver concerns.     Contact information was provided should any questions arise in the future.      Renee Peterson [facility]  Genetic Counselor  [facility]   Phone: [phone]     90 minutes spent on the date of the encounter in chart review, patient visit, test coordination/review, documentation, and/or discussion with other providers about the concerns documented above.

## 2025-02-27 ENCOUNTER — VIRTUAL VISIT (OUTPATIENT)
Dept: CONSULT | Facility: CLINIC | Age: 25
End: 2025-02-27
Attending: INTERNAL MEDICINE
Payer: COMMERCIAL

## 2025-02-27 DIAGNOSIS — Z71.83 ENCOUNTER FOR NONPROCREATIVE GENETIC COUNSELING: ICD-10-CM

## 2025-02-27 DIAGNOSIS — R79.89 ELEVATED LFTS: ICD-10-CM

## 2025-02-27 DIAGNOSIS — Z14.8 CARRIER OF ALPHA-1-ANTITRYPSIN DEFICIENCY: Primary | ICD-10-CM

## 2025-02-27 PROCEDURE — 96041 GENETIC COUNSELING SVC EA 30: CPT | Mod: GT,95 | Performed by: GENETIC COUNSELOR, MS

## 2025-02-27 NOTE — LETTER
2025      RE: Batool Louis  [address] [place] [address]     Dear Colleague,    Thank you for the opportunity to participate in the care of your patient, Batool Louis, at the [facility] at [facility]. Please see a copy of my visit note below.    Virtual Visit Details  Type of service:  Video Visit   Originating Location (pt. Location): Home  Distant Location (provider location):  Off-site  Platform used for Video Visit: [facility]     Name:  Batool Louis  :   2000  MRN:   [id number]  Date of service: 2025  Primary Provider: Nancy Donnelly  Referring Provider: La Lyn*    Presenting Information:  Dodie, a 24 year old female, was seen was seen for a video visit at the [facility] for evaluation of alpha-1 antitrypsin carrier status. Dodie was accompanied to this visit by her mother. I met with Dodie at the request of Dr. Remi Lny to obtain a family history and to review the genetics of alpha-1 antitrypsin deficiency.     Personal History:   Dodie has a history of hepatic steatosis, obesity, MDD, SHELBY, eating disorder, and alpha-1 antitrypsin deficiency carrier status (MZ). Refer to Dr. Remi Lyn's note for a more detailed personal history.     Patient Active Problem List   Diagnosis     Allergic rhinitis     Chronic tonsillitis     Shellfish allergy     Encounter for insertion of Mirena IUD     AAT (alpha-1-antitrypsin) deficiency (H)     Elevated LFTs     Generalized anxiety disorder     Moderate episode of recurrent major depressive disorder (H)     Other specified eating disorder     RLQ abdominal pain     Ovarian torsion     Metabolic dysfunction-associated steatotic liver disease (MASLD)     Past Medical History:   Diagnosis Date     Alpha-1-antitrypsin deficiency carrier     MZ     Amenorrhea, secondary      Depressive  disorder      Eating disorder      Hepatic steatosis      Previous Genetic Testing  Alpha 1 antitryp lyla reflex to pheno  Order: [id number]  1 Patient Communication         Component  Ref Range & Units 3 mo ago    Alpha-1-Antitrypsin  90 - 200 mg/dL 96   Comment: To convert to umol/L, multiply mg/dL by 0.185    Alpha-1-Antitrypsin Genotype Specimen Whole Blood    Alpha-1-Antitrypsin S Allele Negative    Alpha-1-Antitrypsin Z Allele Heterozygous Abnormal     Alpha-1-Antitrypsin Phenotype Not Applicable    Alpha-1-Antitrypsin Interpretation See Note   Comment:  Indication for testing: Carrier screening or diagnostic  testing for alpha-1-antitrypsin (AAT) deficiency.    Z Heterozygote/ Protein concentration >=90 mg/dL: This  sample has an AAT protein concentration in the normal  range, but one copy of the Z deficiency allele was  identified. The S allele was not detected. This individual  is predicted to be a carrier of AAT deficiency. Nonsmokers  are not predicted to be at increased risk for lung disease.  Smokers may have an increased rate of loss of lung  elasticity but rarely develop clinical disease. Because  this genotyping assay cannot exclude the presence of a  concurrent rare deficiency allele, AAT phenotyping by  isoelectric focusing electrophoresis is recommended if the  patient is in acute phase. This individual's reproductive  partner and family members should be offered AAT testing.  Genetic consultation is recommended.     This result has been reviewed and approved by Gita Miller M.D., Ph.D.  BACKGROUND INFORMATION: A1A (SERPINA1) Enzyme Concentration  and 2                               Mutations with Reflex  to A1A Phenotype          Family History:   A three generation pedigree was obtained today. A copy is located in the media tab and full details are available in the pedigree section of the history tab. The following information was provided:     Children:   None.  Siblings:  Sister (25y) has  asthma, sleep apnea, and PCOS.  Brother (22y) is well.  Sister (19y) is well. She was found to have elevated liver numbers and is getting scheduled to have genetic testing for AAT deficiency.   Maternal Relatives:  Mother (51y) has sleep apnea, a history of anxiety disorder in her 20s, and a history of anemia due to abnormal bleeding. She is getting scheduled to have genetic testing for AAT deficiency.   Aunt passed away at age 2.5 from epiglottitis following a flu infection.   Uncle does not have much contact with the family.   Cousins with no known health problems.   Uncle with no known health problems.   Grandmother (79y) has sleep apnea, has had surgeries for back pain, and had her gallbladder removed.  Grandfather passed away from Alzheimer's disease. He also had COPD and sleep apnea. He may have had lung disease prior to when he started smoking and had pectus excavatum. He was adopted.   Paternal Relatives:  Father (50y) has sleep apnea.  Uncle has Lewy body Parkinson's disease, sleep apnea, and had surgery for varicose veins.   Cousins with no known health problems.   Grandmother (80y) has severe depression.  Grandfather passed away due to bladder cancer. He sleep apnea, surgery for varicose veins, and a long history of hypertension.     The family history is otherwise negative for lung disease, liver disease, and known genetic testing. Consanguinity is denied.    Pedigree Image      Discussion and Assessment:  Genes are long stretches of DNA that are responsible for how our bodies look and how our bodies work. Our genes are inherited on structures called chromosomes of which we have 23 pairs. One copy of each chromosome in a pair is inherited from the mother and one is inherited from the father. Changes in the DNA sequence of a gene, called variants, as well as changes within the chromosomes can cause the signs and symptoms of a genetic condition.     Alpha-1 Antitrypsin deficiency is caused by having two  disease-causing changes (variants) in the SERPINA1 gene. The SERPINA1 gene is responsible for providing instructions to make a protein called alpha-1 antitrypsin, which regulates an enzyme called neutrophil elastase. Neutrophil elastase is released from white blood cells to fight infection, but it can attack normal tissues (especially the lungs) if not tightly controlled by alpha-1 antitrypsin. Environmental factors, such as exposure to tobacco smoke, chemicals, and dust, likely impact the severity of alpha-1 antitrypsin deficiency.    Some variants in the SERPINA1 gene can lead to a shortage (deficiency) of alpha-1 antitrypsin or an abnormal form of the protein that cannot control neutrophil elastase. Without enough functional alpha-1 antitrypsin, neutrophil elastase destroys alveoli in the lungs and causes lung disease. Abnormal alpha-1 antitrypsin can also accumulate in the liver causing liver damage.    Determining a person's SERPINA1 gene status can provide information on how they may exhibit symptoms. The status or version of an individual's genes they inherited from both parents is referred to as genotype. Describing each version of a gene a person inherited from a parent is referred to as allele. The Z allele represents a non-working copy of the SERPINA1 gene. Having the ZZ genotype (because a person inherited two Z alleles, one from each parent) can result in lung and liver disease.     Alpha-1 Antitrypsin deficiency is inherited in an autosomal recessive pattern meaning inheritance requires having two variants (two Z alleles) in the SERPINA1 gene. Individuals with one Z allele and one other allele are considered carriers. The most common allele of the SERPINA1 gene, called M, produces normal levels of alpha-1 antitrypsin. Most people in the general population have two copies of the M allele, the MM genotype.    Based on Dodie's previous testing, she was found to have one Z allele and one M allele.  Therefore, her genotype is MZ. About 2% of the [place] population is estimated to have an MZ genotype. In general, individuals with this genotype (especially non-smokers) are not considered to be at increased risk for lung disease. However, there is some emerging data suggesting that a subset of individuals with the MZ genotype may experience accelerated lung destruction, especially if they are smokers. Of note, slight abnormalities of lung function can be present without clinical symptoms and emerging information also suggests that emphysema may be present on chest CT without evidence of impaired lung function on pulmonary function tests. Among adults presenting with chronic liver failure, a greater number of MZ heterozygotes (8.4%) were observed than were reported in the general population (2%-4%). However, better characterization of the risk for liver disease among MZ heterozygotes must await longitudinal studies. From our current understanding, individuals with MZ genotype have slightly increased risk of liver disease when there are other coexisting factors including metabolic disease, obesity, or alcohol dependency. It is possible that Dodie's MZ genotype has contributed to her liver concerns, but it is unlikely to be the sole cause. In the future, we may gain more information to better understand this possible connection.     Most individuals with MZ genotype do not need specific treatment. Current research suggests that MZ individuals should avoid other cofactors like smoking and chronic alcohol use to limit the risk lung and liver disease. Because Dodie has elevated liver enzymes, she should continue to follow-up with her hepatologist and other providers for appropriate management and recommendations.     Other family members also have a chance of having the Z allele and can consider their own genetic testing to better understand their risk. It is assumed that Dodie inherited the Z allele from one of her  parents. Dodie's mother and sister are planning on pursuing genetic testing to better understand their risk. The family is encouraged to reach out if questions come up about this process.     For Dodie's own potential future children, each will have a 50% chance of inheriting the Z allele from her. They could only be affected with AAT deficiency if her partner is also a carrier. Carrier testing will be available to Dodie's reproductive partner to better understand this chance.     Dodie expressed good understanding of this information and did not have additional questions at this time. she is encouraged to reach out if questions come up in the future.       Plan:  Dodie should continue to follow-up with her providers regarding her liver concerns.     Contact information was provided should any questions arise in the future.      Renee Peterson, [facility]  Genetic Counselor  [facility]   Phone: [phone]     90 minutes spent on the date of the encounter in chart review, patient visit, test coordination/review, documentation, and/or discussion with other providers about the concerns documented above.        Please do not hesitate to contact me if you have any questions/concerns.     Sincerely,       Renee Peterson,

## 2025-03-18 ENCOUNTER — TELEPHONE (OUTPATIENT)
Dept: ENDOCRINOLOGY | Facility: CLINIC | Age: 25
End: 2025-03-18
Payer: COMMERCIAL

## 2025-03-18 NOTE — TELEPHONE ENCOUNTER
Left Voicemail (2nd Attempt) and Sent [employer] (2nd Attempt) for the patient to call back and schedule the following:     Appointment type: NEW  Weight Management  Appointment mode: In Person or Virtual Visit  Provider: Marielena Floyd NP, Florence Rao PA-C, or Meera Mendez PA-C  Return date: Approx. Next available  Specialty phone number: direct line  and [phone]     Additional Notes:   Reschedule 3/28/25 visit with Cynthia Sow with another provider.       Please reschedule the NEW [facility] NUTRITION as well to follow the above

## 2025-04-07 NOTE — PROGRESS NOTES
Progress Note    Client Name: Batool Louis  Date: 5/4/21         Service Type: Individual      Session Start Time: 9:00am   Session End Time: 9:55am      Session Length:  55  mins     Session #: 29     Attendees: Client    Treatment Plan Last Reviewed: 4/6/21  PHQ-9 / SHELBY-7 : see chart    Telemedicine Visit: The patient's condition can be safely assessed and treated via synchronous audio and visual telemedicine encounter.      Reason for Telemedicine Visit: Services only offered telehealth    Originating Site (Patient Location): Patient's home    Distant Site (Provider Location): Provider Remote Setting    Consent:  The patient/guardian has verbally consented to: the potential risks and benefits of telemedicine (video visit) versus in person care; bill my insurance or make self-payment for services provided; and responsibility for payment of non-covered services.     Mode of Communication:  Video Conference via [employer]/telephone    As the provider I attest to compliance with applicable laws and regulations related to telemedicine.     DATA      Progress Since Last Session (Related to Symptoms / Goals / Homework):   Symptoms: No change .    Homework: Completed in session      Episode of Care Goals: Satisfactory progress - ACTION (Actively working towards change); Intervened by reinforcing change plan / affirming steps taken     Current / Ongoing Stressors and Concerns:   Dodie stated she's doing well but has been really busy. She stated she wants to ask her boss for a raise and is worried about doing this. She said she isn't saving money but is working so much. 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      Treatment Objective(s) Addressed in This Session:   use cognitive strategies identified in therapy to challenge anxious thoughts       Intervention:   Worked with the client on  getting out of emotion mind and bringing in some logic mind to help her make decisions about her job.  Validated her emotions around her issues with her mom and talked about ways she can continue to be effective with her so she doesn't get too involved in her mom's issues.         ASSESSMENT: Current Emotional / Mental Status (status of significant symptoms):   Risk status (Self / Other harm or suicidal ideation)   Client denies current fears or concerns for personal safety.   Client denies current or recent suicidal ideation or behaviors.   Client denies current or recent homicidal ideation or behaviors.   Client denies current or recent self injurious behavior or ideation.   Client denies other safety concerns.   A safety and risk management plan has not been developed at this time, however client was given the after-hours number / 911 should there be a change in any of these risk factors.     Appearance:   Appropriate    Eye Contact:   Good    Psychomotor Behavior: Normal    Attitude:   Cooperative    Orientation:   All   Speech    Rate / Production: Normal     Volume:  Normal    Mood:    Normal    Affect:    Appropriate    Thought Content:  Clear    Thought Form:  Coherent  Logical    Insight:    Good      Medication Review:   No changes to current psychiatric medication(s)     Medication Compliance:   Yes     Changes in Health Issues:   None reported : client is on birth control again     Chemical Use Review:   Substance Use: Chemical use reviewed, no active concerns identified      Tobacco Use: No current tobacco use.       Collateral Reports Completed:   Not Applicable    Diagnoses:   Generalized Anxiety Disorder      PLAN: (Client Tasks / Therapist Tasks / Other)  Client to work through the four box pros and cons regarding her job situation.       Celsa Linda [facility]                                                          ________________________________________________________________________    Treatment Plan    Client's Name: Batool Louis  YOB: 2000    Date: 4/6/21    DSM-V Diagnoses: Adjustment Disorders  309.28 (F43.23) With mixed anxiety and depressed mood  Psychosocial / Contextual Factors: COVID, living back at home, deaths of loved ones within the past few years. Sexual identity issues.     WHODAS: 12    Referral / Collaboration:  Referral to another professional/service is not indicated at this time.    Anticipated number of session or this episode of care: 5+      MeasurableTreatment Goal(s) related to diagnosis / functional impairment(s)  Goal 1: Client will increase emotion regulation skills/decrease panic attacks    Objective #A (Client Action)    Client will identify 2-4 fears / thoughts that contribute to feeling anxious  use positive self-affirmations daily.  Status: Continued - Date(s): 4/6/21     Intervention(s)  Therapist will teach emotional regulation skills. distress tolerance skills, interpersonal effectiveness skills, emotion regluation skills, mindfulness skills, radical acceptance. Therapist will teach client how to ID body cues for anxiety, anxiety reduction techniques, how to ID triggers for depression and anxiety- decrease reactivity/eliminate, lifestyle changes to reduce depression and anxiety, communication skills, explore cognitive beliefs and help client to develop healthy cognitive patterns and beliefs.      Objective #B  Client will use thought-stopping strategy daily to reduce intrusive thoughts.  Status: Continued - Date(s): 4/6/21     Intervention(s)  Therapist will teach emotional regulation skills. distress tolerance skills, interpersonal effectiveness skills, emotion regluation skills, mindfulness skills, radical acceptance. Therapist will teach client how to ID body cues for anxiety, anxiety reduction techniques, how to ID triggers for depression and anxiety-  "decrease reactivity/eliminate, lifestyle changes to reduce depression and anxiety, communication skills, explore cognitive beliefs and help client to develop healthy cognitive patterns and beliefs.    Objective #C (Client Action)    Status: New - Date: 4/6/21    Client will use \"worry time\" each day for 15 minutes of scheduled worry and then defer obsessive or anxious thinking until the next structured worry time.    Intervention(s)  Therapist will teach emotional regulation skills. work through trauma using somatic experiencing techniques to discharge the anxiety.    Goal 2: Client will work on increasing self esteem and self worth     Objective #A (Client Action)    Status: Continued - Date(s): 4/6/21    Client will identify two areas of life that you would like to have improved functioning.    Intervention(s)  Therapist will teach emotional regulation skills. work through trauma using somatic experiencing techniques to discharge the anxiety.      Client has reviewed and agreed to the above plan.      Celsa Linda [facility]    " DISCHARGE

## 2025-05-08 ENCOUNTER — TELEPHONE (OUTPATIENT)
Dept: ENDOCRINOLOGY | Facility: CLINIC | Age: 25
End: 2025-05-08
Payer: COMMERCIAL

## 2025-05-08 NOTE — TELEPHONE ENCOUNTER
Left Voicemail (1st Attempt) and Sent Mychart (1st Attempt) for the patient to call back and schedule the following:    Appointment type: Reschedule 7/21/25 appointment as provider unavailable  Provider: any  Return date: first available  Specialty phone number: [phone]  Additional appointment(s) needed: reschedule dietitian appointment also  Additonal Notes: n/a

## 2025-05-14 ENCOUNTER — TELEPHONE (OUTPATIENT)
Dept: ENDOCRINOLOGY | Facility: CLINIC | Age: 25
End: 2025-05-14

## 2025-05-14 ENCOUNTER — RESULTS FOLLOW-UP (OUTPATIENT)
Dept: OBGYN | Facility: CLINIC | Age: 25
End: 2025-05-14

## 2025-05-14 ENCOUNTER — OFFICE VISIT (OUTPATIENT)
Dept: OBGYN | Facility: CLINIC | Age: 25
End: 2025-05-14
Payer: COMMERCIAL

## 2025-05-14 VITALS
HEIGHT: 69 IN | TEMPERATURE: 97.1 F | RESPIRATION RATE: 18 BRPM | WEIGHT: 270 LBS | BODY MASS INDEX: 39.99 KG/M2 | HEART RATE: 82 BPM | SYSTOLIC BLOOD PRESSURE: 117 MMHG | DIASTOLIC BLOOD PRESSURE: 75 MMHG

## 2025-05-14 DIAGNOSIS — R10.2 PELVIC PAIN IN FEMALE: ICD-10-CM

## 2025-05-14 DIAGNOSIS — N89.8 VAGINAL DISCHARGE: ICD-10-CM

## 2025-05-14 DIAGNOSIS — Z11.3 ROUTINE SCREENING FOR STI (SEXUALLY TRANSMITTED INFECTION): Primary | ICD-10-CM

## 2025-05-14 LAB
C TRACH DNA SPEC QL PROBE+SIG AMP: NEGATIVE
CLUE CELLS: NORMAL
HBV SURFACE AG SERPL QL IA: NONREACTIVE
HCV AB SERPL QL IA: NONREACTIVE
HIV 1+2 AB+HIV1 P24 AG SERPL QL IA: NONREACTIVE
N GONORRHOEA DNA SPEC QL NAA+PROBE: NEGATIVE
SPECIMEN TYPE: NORMAL
T PALLIDUM AB SER QL: NONREACTIVE
TRICHOMONAS, WET PREP: NORMAL
WBC'S/HIGH POWER FIELD, WET PREP: NORMAL
YEAST, WET PREP: NORMAL

## 2025-05-14 PROCEDURE — 87389 HIV-1 AG W/HIV-1&-2 AB AG IA: CPT | Performed by: STUDENT IN AN ORGANIZED HEALTH CARE EDUCATION/TRAINING PROGRAM

## 2025-05-14 PROCEDURE — 86780 TREPONEMA PALLIDUM: CPT | Performed by: STUDENT IN AN ORGANIZED HEALTH CARE EDUCATION/TRAINING PROGRAM

## 2025-05-14 PROCEDURE — 87491 CHLMYD TRACH DNA AMP PROBE: CPT | Performed by: STUDENT IN AN ORGANIZED HEALTH CARE EDUCATION/TRAINING PROGRAM

## 2025-05-14 PROCEDURE — 87591 N.GONORRHOEAE DNA AMP PROB: CPT | Performed by: STUDENT IN AN ORGANIZED HEALTH CARE EDUCATION/TRAINING PROGRAM

## 2025-05-14 PROCEDURE — 36415 COLL VENOUS BLD VENIPUNCTURE: CPT | Performed by: STUDENT IN AN ORGANIZED HEALTH CARE EDUCATION/TRAINING PROGRAM

## 2025-05-14 PROCEDURE — 87340 HEPATITIS B SURFACE AG IA: CPT | Performed by: STUDENT IN AN ORGANIZED HEALTH CARE EDUCATION/TRAINING PROGRAM

## 2025-05-14 PROCEDURE — 87210 SMEAR WET MOUNT SALINE/INK: CPT | Performed by: STUDENT IN AN ORGANIZED HEALTH CARE EDUCATION/TRAINING PROGRAM

## 2025-05-14 PROCEDURE — 99213 OFFICE O/P EST LOW 20 MIN: CPT | Performed by: STUDENT IN AN ORGANIZED HEALTH CARE EDUCATION/TRAINING PROGRAM

## 2025-05-14 PROCEDURE — 86803 HEPATITIS C AB TEST: CPT | Performed by: STUDENT IN AN ORGANIZED HEALTH CARE EDUCATION/TRAINING PROGRAM

## 2025-05-14 NOTE — TELEPHONE ENCOUNTER
Left Voicemail (2nd Attempt) and Sent Mychart (2nd Attempt) for the patient to call back and schedule the following:    Appointment type: New Weight Management  Appointment mode: In Person or Virtual Visit  Provider: Next availalble  Return date: Approx. Next Available  Specialty phone number: [phone]    Additional Notes:   Reschedule Dorian from 7/24/25  Already rescheduled from 3/28/25 with Magi

## 2025-05-14 NOTE — PROGRESS NOTES
"[facility]  Gynecology Office Note    Assessment and Plan:   Batool Louis, 24 year old  with Mirena IUD placed on 2024, presented for vaginal discharge and IUD check. IUD strings visualized. Recommended TVUS is she has persistent pelvic pain for IUD position. Wet prep obtained, which was normal. STI screening performed today too.    Orders Placed This Encounter   Procedures    US Pelvic Complete with Transvaginal    Hepatitis B surface antigen    HIV Antigen Antibody Combo Cascade    Treponema Abs w Reflex to RPR and Titer    Hepatitis C Screen Reflex to HCV RNA Quant and Genotype    Wet prep - Clinic Collect    Chlamydia trachomatis/Neisseria gonorrhoeae by PCR     Mandie Lockwood MD  Obstetrics and Gynecology  [facility]   2025     -----------------------------------------------------------------------------------------------------------------------------------  S: Batool Louis, 24 year old  with Mirena IUD placed on 2024, presented for vaginal discharge. She is sexually active with same sex partner and her partner can feel the IUD strings. She just want to make sure the IUD is in the right spot. She has irregular spotting with the IUD in place. Endorsed occasional brief episode of pelvic pain.    For her vaginal discharge, she denied vaginal/vulvar itching or vaginal odor. She had a history of BV and yeast. She would like to rule out these.    Her last pap smear was on 10/3/2024 NILM.    Physical Exam:   Vitals:    25 1012   BP: 117/75   BP Location: Right arm   Patient Position: Chair   Cuff Size: Adult Large   Pulse: 82   Resp: 18   Temp: 97.1  F (36.2  C)   TempSrc: Tympanic   Weight: 122.5 kg (270 lb)   Height: 1.753 m (5' 9\")      Estimated body mass index is 39.87 kg/m  as calculated from the following:    Height as of this encounter: 1.753 m (5' 9\").    Weight as of this encounter: 122.5 kg (270 lb).    General " appearance: well-hydrated, A&O x 3, no apparent distress  Abdomen: Soft, non-tender, non-distended. No rebound, rigidity, or guarding.  Genitourinary:  External genitalia: no erythema, no lesions.   Urethral meatus appropriate location without lesions or prolapse  Urethra: No masses, tenderness, or scarring  Bladder no fullness, masses, or tenderness.  Anus and Perineum: Unremarkable, no visible lesions  Vagina: Normal, healthy pink mucosa without any lesions. Physiologic vaginal discharge.   Cervix: normal appearance, no cervical motion tenderness. IUD strings visualized.

## 2025-05-14 NOTE — NURSING NOTE
"Initial /75 (BP Location: Right arm, Patient Position: Chair, Cuff Size: Adult Large)   Pulse 82   Temp 97.1  F (36.2  C) (Tympanic)   Resp 18   Ht 1.753 m (5' 9\")   Wt 122.5 kg (270 lb)   LMP 03/21/2025   BMI 39.87 kg/m   Estimated body mass index is 39.87 kg/m  as calculated from the following:    Height as of this encounter: 1.753 m (5' 9\").    Weight as of this encounter: 122.5 kg (270 lb). .    "

## 2025-06-18 ENCOUNTER — E-VISIT (OUTPATIENT)
Dept: FAMILY MEDICINE | Facility: OTHER | Age: 25
End: 2025-06-18
Payer: COMMERCIAL

## 2025-06-18 DIAGNOSIS — F41.1 GENERALIZED ANXIETY DISORDER: Primary | ICD-10-CM

## 2025-06-18 DIAGNOSIS — F33.1 MODERATE EPISODE OF RECURRENT MAJOR DEPRESSIVE DISORDER (H): ICD-10-CM

## 2025-06-18 PROCEDURE — 99207 PR NON-BILLABLE SERV PER CHARTING: CPT | Performed by: PHYSICIAN ASSISTANT

## 2025-06-18 NOTE — PATIENT INSTRUCTIONS
Thank you for choosing us for your care. I have placed the below referral(s) for you:  Orders Placed This Encounter   Procedures     Mental Health  Referral, Adult     Please click the link for your After Visit Summary to view scheduling instructions for your referral. In most cases, you will be contacted within 2 business days to schedule. If you do not hear from a representative within that time, please call [phone] to be connected to a .

## 2025-06-19 ENCOUNTER — PATIENT OUTREACH (OUTPATIENT)
Dept: CARE COORDINATION | Facility: CLINIC | Age: 25
End: 2025-06-19
Payer: COMMERCIAL

## 2025-06-23 ENCOUNTER — PATIENT OUTREACH (OUTPATIENT)
Dept: CARE COORDINATION | Facility: CLINIC | Age: 25
End: 2025-06-23
Payer: COMMERCIAL

## 2025-06-24 ENCOUNTER — TELEPHONE (OUTPATIENT)
Dept: ENDOCRINOLOGY | Facility: CLINIC | Age: 25
End: 2025-06-24

## 2025-06-24 ENCOUNTER — VIRTUAL VISIT (OUTPATIENT)
Dept: ENDOCRINOLOGY | Facility: CLINIC | Age: 25
End: 2025-06-24
Payer: COMMERCIAL

## 2025-06-24 VITALS — HEIGHT: 69 IN | BODY MASS INDEX: 40.23 KG/M2 | WEIGHT: 271.6 LBS

## 2025-06-24 DIAGNOSIS — E66.813 CLASS 3 SEVERE OBESITY DUE TO EXCESS CALORIES WITHOUT SERIOUS COMORBIDITY WITH BODY MASS INDEX (BMI) OF 40.0 TO 44.9 IN ADULT (H): ICD-10-CM

## 2025-06-24 DIAGNOSIS — Z71.3 NUTRITIONAL COUNSELING: Primary | ICD-10-CM

## 2025-06-24 DIAGNOSIS — G47.33 OSA (OBSTRUCTIVE SLEEP APNEA): Primary | ICD-10-CM

## 2025-06-24 PROCEDURE — G2211 COMPLEX E/M VISIT ADD ON: HCPCS | Performed by: INTERNAL MEDICINE

## 2025-06-24 PROCEDURE — 99207 PR NO CHARGE LOS: CPT | Mod: 95 | Performed by: DIETITIAN, REGISTERED

## 2025-06-24 PROCEDURE — 98001 SYNCH AUDIO-VIDEO NEW LOW 30: CPT | Performed by: INTERNAL MEDICINE

## 2025-06-24 PROCEDURE — 97802 MEDICAL NUTRITION INDIV IN: CPT | Mod: 95 | Performed by: DIETITIAN, REGISTERED

## 2025-06-24 RX ORDER — METRONIDAZOLE 500 MG/1
TABLET ORAL
COMMUNITY
Start: 2025-06-18

## 2025-06-24 ASSESSMENT — PAIN SCALES - GENERAL
PAINLEVEL_OUTOF10: NO PAIN (0)
PAINLEVEL_OUTOF10: NO PAIN (0)

## 2025-06-24 NOTE — NURSING NOTE
Current patient location: [address], [place], [place]    Is the patient currently in the state of [place]? YES    Visit mode: VIDEO    If the visit is dropped, the patient can be reconnected by:VIDEO VISIT: Text to cell phone:   Telephone Information:   Mobile [phone]       Will anyone else be joining the visit? NO  (If patient encounters technical issues they should call [phone]    Are changes needed to the allergy or medication list? No    Are refills needed on medications prescribed by this physician? Discuss with provider    Rooming Documentation:  Questionnaire(s) completed    Reason for visit: Consult    PT states using CPAP currently and unsure of neck circumference and answered STOP BANG questions as if using CPAP.     Juan F HOPKINS

## 2025-06-24 NOTE — TELEPHONE ENCOUNTER
Hello,    Per test claim, patient's insurance([employer] [place] PMAP) prefers either Wegovy or Saxenda. Would the provider like to try either one as the alternative to Zepbound?    Thank You!    Michael Quiroz [facility] Liaison  [facility]  [email or web address]  Phone: [phone]  Fax: [phone]
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
## 2025-06-24 NOTE — PATIENT INSTRUCTIONS
Nutrition Goals/Resources     Considerations while on Zepbound   Going too long without food can cause nausea  Greasy/high fat foods don t sit as well  Increased risk of constipation - hydration/fiber   Ensure you are getting enough protein to help preserve muscle.   Micronutrient deficiency - consider a multivitamin     Recommendations for protein:    ~100 g/day    Estimated recommended needs for weight loss  2 cups fruit/day  2.5 cups vegetables   8 oz protein  (1 oz = 1 egg, 1 Tbsp peanut butter, 1/4 cup cooked beans, peas, lentils)   6 servings grain (1 serving = 1 slice bread, 1/2 cup cooked rice/pasta/cereal)  3 servings dairy (1 serving = 1 cup milk, 1 cup yogurt, 1.5 oz hard cheese, 1 cup cottage cheese, 1/3 cup shredded cheese)   Fat in moderation (recommend unsaturated fats more often than saturated fats)     Follow-Up:  1 month    MARCIE Coleman, RD, LD  Clinic #: [phone]

## 2025-06-24 NOTE — PROGRESS NOTES
"Video-Visit Details    Type of service:  Video Visit    Video Start Time: 10:34 am   Video End Time: 11:00 am    Originating Location (pt. Location): PCA's house    Distant Location (provider location):  Offsite (providers home) [facility] [place]     Platform used for Video Visit: [employer]      New Weight Management Nutrition Consultation    Batool Louis is a 24 year old female presents today for new weight management nutrition consultation.  Patient referred by Dr. Adela Del Rosario on 2025.    Patient with Co-morbidities of obesity includin/23/2025    12:26 PM   --   I have the following health issues associated with obesity Pre-Diabetes    Sleep Apnea    GERD (Reflux)   I have the following symptoms associated with obesity Depression    Back Pain    Fatigue    Irregular Menstral Cycle         Anthropometrics:  Weight 25: 271 lbs    Estimated body mass index is 40.41 kg/m  as calculated from the following:    Height as of this encounter: 1.746 m (5' 8.74\").    Weight as of this encounter: 123.2 kg (271 lb 9.6 oz).    Medications for Weight Loss:  Zepbound prescribed today    NUTRITION HISTORY  Food allergies: NKFA  Food intolerances: Shellfish   Vitamin/Mineral Supplements: None  Previous methods of diet modification for weight loss: watching portions/kcal, exercise, fasting, WW  RD before: Yes, at [facility]    Pt goals: lose and maintain weight loss, learn more about portions     Per Dr. Chapman  Eating habit: not sure about portion size is normal or too much, eating out more often than, not sure what she should eat  BF: oatmeal or greek yogurt, or egg  Lunch: largest meal -- eating out,  or Chipotle, sandwich  Dinner: sandwich  Not usually snack  Snack: crave cheese  Beverages: unsweetened tea or water    Additional information:   PT - Student, PCA, Choreographer    Lives with 3 roommates     Went through [facility] for eating " disorder treatment         6/23/2025    12:26 PM   Diet Recall Review with Patient   If you do eat lunch, what types of food do you typically eat? A wide variety, typically always includes a protien of some kind and savory side. I eat out quite a bit fir lunch.   If you do eat supper, what types of food do you typically eat? A wide variety, also includes a protein and something savory or rich. I dont eat out for supper as much. I usually have food at home.   How many glasses of juice do you drink in a typical day? 0   How many of glasses of milk do you drink in a typical day? 0   How many 8oz glasses of sugar containing drinks such as Jono-Aid/sweet tea do you drink in a day? 0   How many cans/bottles of sugar pop/soda/tea/sports drinks do you drink in a day? 0   How many cans/bottles of diet pop/soda/tea or sports drink do you drink in a day? 0   How often do you have a drink of alcohol? Never           6/23/2025    12:26 PM   Eating Habits   Generally, my meals include foods like these bread, pasta, rice, potatoes, corn, crackers, sweet dessert, pop, or juice A Few Times a Week   Generally, my meals include foods like these fried meats, brats, burgers, french fries, pizza, cheese, chips, or ice cream Once a Week   Eat fast food (like McDonalds, Burger Lang, Taco Bell) A Few Times a Week   Eat at a buffet or sit-down restaurant Less Than Weekly   Eat most of my meals in front of the TV or computer A Few Times a Week   Often skip meals, eat at random times, have no regular eating times A Few Times a Week   Rarely sit down for a meal but snack or graze throughout Never   Eat extra snacks between meals Never   Eat most of my food at the end of the day A Few Times a Week   Eat in the middle of the night or wake up at night to eat Never   Eat extra snacks to prevent or correct low blood sugar Never   Eat to prevent acid reflux or stomach pain A Few Times a Week   Worry about not having enough food to eat Less Than  Weekly   I eat when I am depressed Less Than Weekly   I eat when I am stressed A Few Times a Week   I eat when I am bored Less Than Weekly   I eat when I am anxious Less Than Weekly   I eat when I am happy or as a reward Less Than Weekly   I feel hungry all the time even if I just have eaten Less Than Weekly   Feeling full is important to me Almost Everyday   I finish all the food on my plate even if I am already full A Few Times a Week   I can't resist eating delicious food or walk past the good food/smell Less Than Weekly   I eat/snack without noticing that I am eating Never   I eat when I am preparing the meal Less Than Weekly   I eat more than usual when I see others eating Less Than Weekly   I have trouble not eating sweets, ice cream, cookies, or chips if they are around the house Once a Week   I think about food all day Less Than Weekly   What foods, if any, do you crave? Cheese   Please list any other foods you crave? Not as often as i crave cheese things but ocassionally i crave a sweet treat or red meat.           6/23/2025    12:26 PM   Amount of Food   I feel out of control when eating Monthly   I eat a large amount of food, like a loaf of bread, a box of cookies, a pint/quart of ice cream, all at once Never   I eat a large amount of food even when I am not hungry Monthly   I eat rapidly Almost Everyday   I eat alone because I feel embarrassed and do not want others to see how much I have eaten Never   I eat until I am uncomfortably full Monthly   I feel bad, disgusted, or guilty after I overeat Almost Everyday           6/23/2025    12:26 PM   Activity/Exercise History   How much of a typical 12 hour day do you spend sitting? Half the Day   How much of a typical 12 hour day do you spend lying down? Less Than Half the Day   How much of a typical day do you spend walking/standing? Half the Day   How many hours (not including work) do you spend on the TV/Video Games/Computer/Tablet/Phone? 4-5 Hours   How  many times a week are you active for the purpose of exercise? Once a Week   What keeps you from being more active? Lack of Time   How many total minutes do you spend doing some activity for the purpose of exercising when you exercise? More Than 30 Minutes       Nutrition Prescription  Recommended energy/nutrient modification.    Nutrition Diagnosis  Obesity r/t long history of positive energy balance aeb BMI >30.    Nutrition Intervention  Reviewed current dietary habits and pts history   Discussed long-term goals pt hopes to accomplish in RD appointments  Answered pt questions  Coordination of care   Nutrition education   AVS and handouts via [employer]    Expected Engagement: good    Nutrition Goals/Resources     Considerations while on Zepbound   Going too long without food can cause nausea  Greasy/high fat foods don t sit as well  Increased risk of constipation - hydration/fiber   Ensure you are getting enough protein to help preserve muscle.   Micronutrient deficiency - consider a multivitamin     Recommendations for protein:    ~100 g/day    Estimated recommended needs for weight loss  2 cups fruit/day  2.5 cups vegetables   8 oz protein  (1 oz = 1 egg, 1 Tbsp peanut butter, 1/4 cup cooked beans, peas, lentils)   6 servings grain (1 serving = 1 slice bread, 1/2 cup cooked rice/pasta/cereal)  3 servings dairy (1 serving = 1 cup milk, 1 cup yogurt, 1.5 oz hard cheese, 1 cup cottage cheese, 1/3 cup shredded cheese)   Fat in moderation (recommend unsaturated fats more often than saturated fats)     Follow-Up:  1 month    Time spent with patient: 26  minutes.  Mellisa Matias, MARCIE, RD, LD

## 2025-06-24 NOTE — PROGRESS NOTES
Virtual Visit Details    Type of service:  Video Visit     Originating Location (pt. Location): Home    Distant Location (provider location):  Off-site  Platform used for Video Visit: [employer]
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
## 2025-06-24 NOTE — LETTER
"2025       RE: Batool Louis  [address] [place] [address]     Dear Colleague,    Thank you for referring your patient, Batool Louis, to the [facility] [place] at [facility]. Please see a copy of my visit note below.    Video-Visit Details    Type of service:  Video Visit    Video Start Time: 10:34 am   Video End Time: 11:00 am    Originating Location (pt. Location): PCA's house    Distant Location (provider location):  Offsite (providers home) [facility] [place]     Platform used for Video Visit: MD.Voice      New Weight Management Nutrition Consultation    Batool Louis is a 24 year old female presents today for new weight management nutrition consultation.  Patient referred by Dr. Adela Del Rosario on 2025.    Patient with Co-morbidities of obesity includin/23/2025    12:26 PM   --   I have the following health issues associated with obesity Pre-Diabetes    Sleep Apnea    GERD (Reflux)   I have the following symptoms associated with obesity Depression    Back Pain    Fatigue    Irregular Menstral Cycle         Anthropometrics:  Weight 25: 271 lbs    Estimated body mass index is 40.41 kg/m  as calculated from the following:    Height as of this encounter: 1.746 m (5' 8.74\").    Weight as of this encounter: 123.2 kg (271 lb 9.6 oz).    Medications for Weight Loss:  Zepbound prescribed today    NUTRITION HISTORY  Food allergies: NKFA  Food intolerances: Shellfish   Vitamin/Mineral Supplements: None  Previous methods of diet modification for weight loss: watching portions/kcal, exercise, fasting, WW  RD before: Yes, at [facility]    Pt goals: lose and maintain weight loss, learn more about portions     Per Dr. Chapman  Eating habit: not sure about portion size is normal or too much, eating out more often than, not sure what she should " eat  BF: oatmeal or greek yogurt, or egg  Lunch: largest meal -- eating out,  or Chipotle, sandwich  Dinner: sandwich  Not usually snack  Snack: crave cheese  Beverages: unsweetened tea or water    Additional information:   PT - Student, PCA, Choreographer    Lives with 3 roommates     Went through [facility] for eating disorder treatment         6/23/2025    12:26 PM   Diet Recall Review with Patient   If you do eat lunch, what types of food do you typically eat? A wide variety, typically always includes a protien of some kind and savory side. I eat out quite a bit fir lunch.   If you do eat supper, what types of food do you typically eat? A wide variety, also includes a protein and something savory or rich. I dont eat out for supper as much. I usually have food at home.   How many glasses of juice do you drink in a typical day? 0   How many of glasses of milk do you drink in a typical day? 0   How many 8oz glasses of sugar containing drinks such as Jono-Aid/sweet tea do you drink in a day? 0   How many cans/bottles of sugar pop/soda/tea/sports drinks do you drink in a day? 0   How many cans/bottles of diet pop/soda/tea or sports drink do you drink in a day? 0   How often do you have a drink of alcohol? Never           6/23/2025    12:26 PM   Eating Habits   Generally, my meals include foods like these bread, pasta, rice, potatoes, corn, crackers, sweet dessert, pop, or juice A Few Times a Week   Generally, my meals include foods like these fried meats, brats, burgers, french fries, pizza, cheese, chips, or ice cream Once a Week   Eat fast food (like McDonalds, Burger Lang, Taco Bell) A Few Times a Week   Eat at a buffet or sit-down restaurant Less Than Weekly   Eat most of my meals in front of the TV or computer A Few Times a Week   Often skip meals, eat at random times, have no regular eating times A Few Times a Week   Rarely sit down for a meal but snack or graze throughout Never   Eat extra  snacks between meals Never   Eat most of my food at the end of the day A Few Times a Week   Eat in the middle of the night or wake up at night to eat Never   Eat extra snacks to prevent or correct low blood sugar Never   Eat to prevent acid reflux or stomach pain A Few Times a Week   Worry about not having enough food to eat Less Than Weekly   I eat when I am depressed Less Than Weekly   I eat when I am stressed A Few Times a Week   I eat when I am bored Less Than Weekly   I eat when I am anxious Less Than Weekly   I eat when I am happy or as a reward Less Than Weekly   I feel hungry all the time even if I just have eaten Less Than Weekly   Feeling full is important to me Almost Everyday   I finish all the food on my plate even if I am already full A Few Times a Week   I can't resist eating delicious food or walk past the good food/smell Less Than Weekly   I eat/snack without noticing that I am eating Never   I eat when I am preparing the meal Less Than Weekly   I eat more than usual when I see others eating Less Than Weekly   I have trouble not eating sweets, ice cream, cookies, or chips if they are around the house Once a Week   I think about food all day Less Than Weekly   What foods, if any, do you crave? Cheese   Please list any other foods you crave? Not as often as i crave cheese things but ocassionally i crave a sweet treat or red meat.           6/23/2025    12:26 PM   Amount of Food   I feel out of control when eating Monthly   I eat a large amount of food, like a loaf of bread, a box of cookies, a pint/quart of ice cream, all at once Never   I eat a large amount of food even when I am not hungry Monthly   I eat rapidly Almost Everyday   I eat alone because I feel embarrassed and do not want others to see how much I have eaten Never   I eat until I am uncomfortably full Monthly   I feel bad, disgusted, or guilty after I overeat Almost Everyday           6/23/2025    12:26 PM   Activity/Exercise History    How much of a typical 12 hour day do you spend sitting? Half the Day   How much of a typical 12 hour day do you spend lying down? Less Than Half the Day   How much of a typical day do you spend walking/standing? Half the Day   How many hours (not including work) do you spend on the TV/Video Games/Computer/Tablet/Phone? 4-5 Hours   How many times a week are you active for the purpose of exercise? Once a Week   What keeps you from being more active? Lack of Time   How many total minutes do you spend doing some activity for the purpose of exercising when you exercise? More Than 30 Minutes       Nutrition Prescription  Recommended energy/nutrient modification.    Nutrition Diagnosis  Obesity r/t long history of positive energy balance aeb BMI >30.    Nutrition Intervention  Reviewed current dietary habits and pts history   Discussed long-term goals pt hopes to accomplish in RD appointments  Answered pt questions  Coordination of care   Nutrition education   AVS and handouts via [employer]    Expected Engagement: good    Nutrition Goals/Resources     Considerations while on Zepbound   Going too long without food can cause nausea  Greasy/high fat foods don t sit as well  Increased risk of constipation - hydration/fiber   Ensure you are getting enough protein to help preserve muscle.   Micronutrient deficiency - consider a multivitamin     Recommendations for protein:    ~100 g/day    Estimated recommended needs for weight loss  2 cups fruit/day  2.5 cups vegetables   8 oz protein  (1 oz = 1 egg, 1 Tbsp peanut butter, 1/4 cup cooked beans, peas, lentils)   6 servings grain (1 serving = 1 slice bread, 1/2 cup cooked rice/pasta/cereal)  3 servings dairy (1 serving = 1 cup milk, 1 cup yogurt, 1.5 oz hard cheese, 1 cup cottage cheese, 1/3 cup shredded cheese)   Fat in moderation (recommend unsaturated fats more often than saturated fats)     Follow-Up:  1 month    Time spent with patient: 26  minutes.  MARCIE Montana,  ELIAS, LD        Again, thank you for allowing me to participate in the care of your patient.      Sincerely,    Mellisa Matias RD

## 2025-06-24 NOTE — LETTER
"2025    To: [facility]    RE: Batool Louis  [address] [place] [address]  : 2000  MRN: [id number]    To Whom It May Concern,    I am writing on behalf of my patient, Batool Louis, to document the medical necessity of Zepbound for the treatment of moderate to severe Obstructive Sleep Apnea (THOR) in setting of obesity. This letter provides information about the patient s medical history and diagnosis and a statement summarizing my treatment rationale.    Summary of Patient History and Diagnosis    Batool Louis is a 24 year old female with a diagnosis of Class III Obesity (BMI at least 40 kg/m2) and severe Obstructive Sleep Apnea . Obstructive Sleep Apnea diagnosis was confirmed by sleep study where Apnea-Hypopnea Index (AHI): 80, depicting severe Obstructive Sleep Apnea (AHI at least 30). . Despite prior attempts at management, including lifestyle modifications and use of Positive Airway Pressure (PAP) breathing therapy, the patients THOR remains inadequately controlled, significantly impacting their quality of life and overall health.    Estimated body mass index is 40.41 kg/m  as calculated from the following:    Height as of an earlier encounter on 25: 1.746 m (5' 8.74\").    Weight as of an earlier encounter on 25: 123.2 kg (271 lb 9.6 oz).    Treatment Rationale    The recent FDA approval of Zepbound for Obstructive Sleep Apnea provides a new, evidence-based therapeutic option for addressing this condition. Clinical trials, including the SURMOUNT-THOR study, have demonstrated Zepbound s efficacy in reducing airway obstruction by promoting significant weight loss and improving upper airway function--key factors in the pathophysiology of THOR.    The SURMOUNT-THOR study specifically evaluated the impact of Zepbound on individuals with THOR and excess weight. The study found that patients receiving Zepbound experienced a significant reduction in the " Apnea-Hypopnea Index (AHI), indicating an improvement in THOR severity. Additionally, participants reported enhanced sleep quality, reduced daytime sleepiness, and improvements in associated weight-related comorbidities such as hypertension and insulin resistance. These findings highlight Zepbound as a transformative option in THOR management, especially for patients where weight loss plays a critical role.    Zepbound s mechanism as a dual GIP/GLP-1 receptor agonist addresses the root causes of THOR in individuals with excess weight, offering a comprehensive approach to management. Its ability to improve both respiratory and metabolic outcomes makes it a pivotal therapy for my patient.    Denying coverage for Zepbound not only limits the patient s ability to access a critical treatment but also contradicts evidence-based medical practices. Providing coverage for Zepbound will empower my patient to achieve better health outcomes and reduce the long-term healthcare burden associated with untreated or poorly managed THOR.    Duration    12 months    Summary    In summary, Zepbound is medically necessary for this patient s moderate to severe Obstructive Sleep Apnea with obesity. Please call my office at [phone] if I can provide you with any additional information to approve my request. I look forward to receiving your timely response and approval of this request.     Sincerely,    Adela Del Rosario MD

## 2025-06-24 NOTE — NURSING NOTE
Current patient location: [address], [place], [place]     Is the patient currently in the state of [place]? YES     Visit mode: VIDEO     If the visit is dropped, the patient can be reconnected by:VIDEO VISIT: Text to cell phone:       Telephone Information:   Mobile [phone]         Will anyone else be joining the visit? NO  (If patient encounters technical issues they should call [phone]     Are changes needed to the allergy or medication list? No     Are refills needed on medications prescribed by this physician? No     Rooming Documentation:  Questionnaire(s) completed     Reason for visit: Consult     PT states using CPAP currently.     Juan F HOPKINS

## 2025-06-25 NOTE — TELEPHONE ENCOUNTER
PA Initiation    Medication: ZEPBOUND 2.5 MG/0.5ML SC SOAJ  Insurance Company: [employer] [place] - Phone [phone] Fax [phone]  Pharmacy Filling the Rx:    Filling Pharmacy Phone:    Filling Pharmacy Fax:    Start Date: 6/24/2025    [id number]

## 2025-06-25 NOTE — TELEPHONE ENCOUNTER
Hello,    There's a few THOR PA questions I need help answering here:    1.      2.      3.      4.      5.      6.      Thank You!    Michael Quiroz CP Pharmacy Liaison  [facility] [place]ardo [email or web address]  Phone: [phone]  Fax: [phone]

## 2025-06-26 NOTE — TELEPHONE ENCOUNTER
"I'm not entirely sure why the PA was Denied because I clearly answered \"YES\" to the AHI question:      Thank You!    Michael Quiroz [facility] Pharmacy Liaison  [facility] Gerardo  [email or web address]  Phone: [phone]  Fax: [phone]    "

## 2025-06-26 NOTE — TELEPHONE ENCOUNTER
PRIOR AUTHORIZATION DENIED    Medication: ZEPBOUND 2.5 MG/0.5ML SC SOAJ  Insurance Company: [employer] [place] - Phone [phone] Fax [phone]  Denial Date: 6/26/2025  Denial Reason(s):     Appeal Information:     Patient Notified:

## 2025-06-26 NOTE — TELEPHONE ENCOUNTER
Medication Appeal Initiation    Medication: ZEPBOUND 2.5 MG/0.5ML SC SOAJ  Appeal Start Date:  6/26/2025  Insurance Company: [employer]  Insurance Phone: [phone]  Insurance Fax: [phone]  Comments:    Appeal letter faxed to [phone]

## 2025-06-26 NOTE — TELEPHONE ENCOUNTER
MEDICATION APPEAL APPROVED    Medication: ZEPBOUND 2.5 MG/0.5ML SC SOAJ  Authorization Effective Date: 5/1/2025  Authorization Expiration Date: 12/26/2025  Approved Dose/Quantity: 2ml per 28 days  Reference #: [id number]   Appeal Insurance Company: [employer] [place]  Expected CoPay: $ 0     CoPay Card Available:    Financial Assistance Needed:   Filling Pharmacy:    Patient Notified:   Comments:    Rep Ha from [facility] stated Appeal was approved until 12/26/25.

## 2025-06-26 NOTE — TELEPHONE ENCOUNTER
Medication Appeal Request    Please initiate an appeal for the requested medication: ZEPBOUND 2.5 MG/0.5ML SC SOAJ    Has a letter of medical necessity been completed in EPIC?   yes    Any additional lab values/information to include?     Would you like to include any research articles?               If yes please include the hyperlink(s) below or fax to    [phone] for Specialty/Retail               [phone] for Infusion/Clinic Administered.                Include the patients name and MRN on the fax cover sheet.

## 2025-07-12 ENCOUNTER — TELEPHONE (OUTPATIENT)
Dept: ENDOCRINOLOGY | Facility: CLINIC | Age: 25
End: 2025-07-12
Payer: COMMERCIAL

## 2025-07-12 NOTE — TELEPHONE ENCOUNTER
Left Voicemail (1st Attempt) and Sent [employer] (1st Attempt) for the patient to call back and schedule the following:    Appointment type: Return Weight Management Nutrition  Appointment mode: Virtual Visit  Provider: Mellisa Matias  Return date: Approx. 7/24  Specialty phone number: [phone]

## 2025-07-14 NOTE — PROGRESS NOTES
Preoperative Evaluation  [facility] [address]  4TH FLOOR  [facility] [address]  Phone: [phone]  Fax: [phone]  Primary Provider: Nancy Mcdowell Mai, MD  Pre-op Performing Provider: Melissa Lopez MD PhD  Jul 16, 2025 7/16/2025   Surgical Information   What procedure is being done? Tonsillectomy   Facility or Hospital where procedure/surgery will be performed: [facility]   Who is doing the procedure / surgery? Dr. Sebastian Olson   Date of surgery / procedure: August 14th 2025   Time of surgery / procedure: Morning   Where do you plan to recover after surgery? at home with family     Fax number for surgical facility: Note does not need to be faxed, will be available electronically in Epic.    Assessment & Plan     The proposed surgical procedure is considered INTERMEDIATE risk.    Chronic tonsillitis  By history has had a longstanding history of chronic tonsillitis, not recent infection.    Anxiety  Her SHELBY is 4, she is on Prozac.  Seems controlled    Recurrent major depressive disorder, in partial remission  Her PHQ-9 is 1.  Seems to be in remission    Preop general physical exam  24-year-old female with morbid obesity and severe obstructive sleep apnea who has had history of chronic tonsillitis and is at risk for hemorrhaging.  It was recommended that she have a Medrol Dosepak because of her obstructive sleep apnea, we have put in message into the office about this and when it would be taken.  - CBC with platelets and differential  - INR    Morbid obesity (H)  She is working with weight management.  She was going to start Zepbound but this was held because of her upcoming surgery.    THOR (obstructive sleep apnea)  She does use CPAP.  She does have severe THOR.  She is to take a Medrol Dosepak prior to surgery.        Possible Sleep Apnea: pt has severe sleep apnea - using C-PAP  - had sleep eval 2/28/25 -   Note in chart  mentions taking medrol dose pac immediately prior to surgery.        6/24/2025     9:06 AM   STOP-Bang Total Score   Total Score 2        Proxy-reported           - No identified additional risk factors other than previously addressed     Pt should check about the medrol dose pac to be taken prior to her surgery     Recommendation  Approval given to proceed with proposed procedure, without further diagnostic evaluation.    Follow-up  Return if symptoms worsen or fail to improve.    She is aware of the risks and benefits including VPI and post operative hemmorrhage. Medrol dosepack to be taken immediately before surgery.   Channing Stoll is a 24 year old, presenting for the following:  Preop       HPI: 23 yo with hx of chronic tonsillitis.  Latest tonsillitis  7/2024.  She also has severe sleep apnea and had an eval 2/2025:  she has severe sleep apnea, managed with CPAP. She uses her CPAP machine regularly. She feels more rested with CPAP use, and she has noticed an improvement in her mood. She has met compliance. She used her machine 26/30 nights and 80% of days > 4 hours of use. She uses a little more pressure at the beginning of the night. Her download evaluated at her sleep appt showed her apnea is well controlled with a residual AHI of 0.48 events per hour. Her leak is acceptable. She continues with her CPAP.         7/16/2025   Pre-Op Questionnaire   Have you ever had a heart attack or stroke? No   Have you ever had surgery on your heart or blood vessels, such as a stent placement, a coronary artery bypass, or surgery on an artery in your head, neck, heart, or legs? No   Do you have chest pain with activity? No   Do you have a history of heart failure? No   Do you currently have a cold, bronchitis or symptoms of other infection? No   Do you have a cough, shortness of breath, or wheezing? No   Do you or anyone in your family have previous history of blood clots? (!) UNKNOWN p great grandmother had blood clots     Do you or does anyone in your family have a serious bleeding problem such as prolonged bleeding following surgeries or cuts? No   Have you ever had problems with anemia or been told to take iron pills? No   Have you had any abnormal blood loss such as black, tarry or bloody stools, or abnormal vaginal bleeding? (!) YES had menorrhagia and now has an IUD    Have you ever had a blood transfusion? No   Are you willing to have a blood transfusion if it is medically needed before, during, or after your surgery? Yes   Have you or any of your relatives ever had problems with anesthesia? No   Do you have sleep apnea, excessive snoring or daytime drowsiness? (!) YES see HPI    Do you have a CPAP machine? Yes   Do you have any artifical heart valves or other implanted medical devices like a pacemaker, defibrillator, or continuous glucose monitor? No   Do you have artificial joints? No   Are you allergic to latex? No     Advance Care Planning    Discussed advance care planning with patient; informed AVS has link to [facility].    Preoperative Review of    reviewed - 1prescription, 1 provider and 1 pharmacy  10/2024   [id number]}    Status of Chronic Conditions:  DEPRESSION -hx of depressive  episodes - she is on prozac - has been in counseling and will be starting soon with a new counselor     ANXIETY  on prozac - as above - no recent panic attacks     SLEEP PROBLEM - hx of sleep apnea and on C-PAP  - see HPI     Hx of alpha-1-antitrypsin deficiency  stable     OBESITY  BMI = 41.1 has been seen at weight management clinic - was to start ZEPBOUND -   has not yet started -     Patient Active Problem List    Diagnosis Date Noted    Carrier of alpha-1-antitrypsin deficiency 02/27/2025     Priority: Medium    Metabolic dysfunction-associated steatotic liver disease (MASLD) 11/06/2024     Priority: Medium    RLQ abdominal pain 10/03/2024     Priority: Medium    Ovarian torsion 10/03/2024     Priority: Medium    Generalized  anxiety disorder 06/17/2024     Priority: Medium    Encounter for insertion of Mirena IUD 04/04/2024     Priority: Medium     4/4/24 mirena insertion lot# [id number] exp 1/30/2026      Elevated LFTs 10/02/2023     Priority: Medium    Moderate episode of recurrent major depressive disorder (H) 09/25/2023     Priority: Medium    Other specified eating disorder 09/11/2023     Priority: Medium    Shellfish allergy 07/21/2021     Priority: Medium    Chronic tonsillitis 05/13/2020     Priority: Medium     Added automatically from request for surgery [id number]      Allergic rhinitis 01/10/2012     Priority: Medium      Past Medical History:   Diagnosis Date    Alpha-1-antitrypsin deficiency carrier     MZ    Amenorrhea, secondary     Depressive disorder     Eating disorder     Hepatic steatosis      Past Surgical History:   Procedure Laterality Date    IR LIVER BIOPSY PERCUTANEOUS  11/21/2024    OOPHORECTOMY Right 10/03/2024    Procedure: Right OOPHORECTOMY, OPEN; right salpingectomy pelvic exam under anesthesia; pelvic washings;  Surgeon: Winifred Calderon MD;  Location: [facility] OR     Current Outpatient Medications   Medication Sig Dispense Refill    cetirizine (ZYRTEC) 10 MG tablet Take 10 mg by mouth daily as needed for allergies seasonal      FLUoxetine (PROZAC) 20 MG capsule Take 1 capsule (20 mg) by mouth daily. 90 capsule 1    levonorgestrel (MIRENA) 52 MG (20 mcg/day) IUD 1 each by Intrauterine route once      metroNIDAZOLE (FLAGYL) 500 MG tablet       tirzepatide-Weight Management (ZEPBOUND) 2.5 MG/0.5ML prefilled pen Inject 0.5 mLs (2.5 mg) subcutaneously every 7 days. 2 mL 0       Allergies   Allergen Reactions    Shellfish-Derived Products GI Disturbance    Sulfamethoxazole-Trimethoprim Hives    Seasonal Allergies Other (See Comments)     Nasal congestion    Tylenol [Acetaminophen] Other (See Comments)     Told  not to take due to her genetic insufficiency        Social History     Tobacco Use    Smoking status: Never  "    Passive exposure: Current (exposure to roommate who vapes)    Smokeless tobacco: Never   Substance Use Topics    Alcohol use: Not Currently       History   Drug Use No             Review of Systems  endorses heartburn, anxiety and environmental allergies.    Constitutional, HEENT, cardiovascular, pulmonary, GI, , musculoskeletal, neuro, skin, endocrine and psych systems are negative, except as otherwise noted.    Objective    /79 (BP Location: Right arm, Patient Position: Sitting, Cuff Size: Adult Large)   Pulse 102   Temp 98.3  F (36.8  C) (Oral)   Resp 16   Ht 1.746 m (5' 8.74\")   Wt 125.4 kg (276 lb 8 oz)   LMP  (LMP Unknown)   SpO2 97%   BMI 41.14 kg/m     Estimated body mass index is 40.41 kg/m  as calculated from the following:    Height as of 6/24/25: 1.746 m (5' 8.74\").    Weight as of 6/24/25: 123.2 kg (271 lb 9.6 oz).  Physical Exam  GENERAL: alert and no distress  EYES: Eyes grossly normal to inspection, PERRL and conjunctivae and sclerae normal  HENT: ear canals and TM's normal, nose and mouth without ulcers or lesions - slightly enlarged tonsils   NECK: no adenopathy, no asymmetry, masses, or scars  RESP: lungs clear to auscultation - no rales, rhonchi or wheezes  CV: regular rate and rhythm, normal S1 S2, no S3 or S4, no murmur, click or rub, no peripheral edema  ABDOMEN: soft, nontender, no hepatosplenomegaly, no masses and bowel sounds normal  MS: no gross musculoskeletal defects noted, no edema  SKIN:mild acne, mild facial hair.   no suspicious lesions or rashes  NEURO: Normal strength and tone, mentation intact and speech normal  PSYCH: mentation appears normal, affect normal/bright  LYMPH: no cervical, supraclavicular, adenopathy    Recent Labs   Lab Test 11/06/24  0710 10/04/24  0613 10/03/24  0655   HGB 12.6 11.5* 12.7     --  275   INR 1.02  --   --      --  137   POTASSIUM 3.9  --  3.9   CR 0.62  --  0.66   A1C 5.8*  --   --         Diagnostics  Labs pending at " this time.  Results will be reviewed when available.  Hgb = 13.1  INR = 12.8    No EKG required, no history of coronary heart disease, significant arrhythmia, peripheral arterial disease or other structural heart disease.    Revised Cardiac Risk Index (RCRI)  The patient has the following serious cardiovascular risks for perioperative complications:   - No serious cardiac risks = 0 points     RCRI Interpretation: 0 points: Class I (very low risk - 0.4% complication rate)         Signed Electronically by: Melissa Lopez MD PhD  A copy of this evaluation report is provided to the requesting physician.      Time note (e5, 40'): The total of my time (on the date of service) for this service was 48 minutes, including discussion/face-to-face, chart review, interpretation not otherwise reported, documentation, and updating of the computerized record.           Answers submitted by the patient for this visit:  Patient Health Questionnaire (Submitted on 7/16/2025)  If you checked off any problems, how difficult have these problems made it for you to do your work, take care of things at home, or get along with other people?: Somewhat difficult  PHQ9 TOTAL SCORE: 2

## 2025-07-16 ENCOUNTER — LAB (OUTPATIENT)
Dept: LAB | Facility: CLINIC | Age: 25
End: 2025-07-16
Payer: COMMERCIAL

## 2025-07-16 ENCOUNTER — OFFICE VISIT (OUTPATIENT)
Dept: FAMILY MEDICINE | Facility: CLINIC | Age: 25
End: 2025-07-16
Payer: COMMERCIAL

## 2025-07-16 VITALS
DIASTOLIC BLOOD PRESSURE: 79 MMHG | RESPIRATION RATE: 16 BRPM | SYSTOLIC BLOOD PRESSURE: 125 MMHG | HEIGHT: 69 IN | TEMPERATURE: 98.3 F | BODY MASS INDEX: 40.95 KG/M2 | WEIGHT: 276.5 LBS | OXYGEN SATURATION: 97 % | HEART RATE: 102 BPM

## 2025-07-16 DIAGNOSIS — Z01.818 PREOP GENERAL PHYSICAL EXAM: Primary | ICD-10-CM

## 2025-07-16 DIAGNOSIS — G47.33 OSA (OBSTRUCTIVE SLEEP APNEA): ICD-10-CM

## 2025-07-16 DIAGNOSIS — J35.01 CHRONIC TONSILLITIS: ICD-10-CM

## 2025-07-16 DIAGNOSIS — F33.41 RECURRENT MAJOR DEPRESSIVE DISORDER, IN PARTIAL REMISSION: ICD-10-CM

## 2025-07-16 DIAGNOSIS — E66.01 MORBID OBESITY (H): ICD-10-CM

## 2025-07-16 DIAGNOSIS — Z01.818 PREOP GENERAL PHYSICAL EXAM: ICD-10-CM

## 2025-07-16 DIAGNOSIS — F41.9 ANXIETY: ICD-10-CM

## 2025-07-16 LAB
BASOPHILS # BLD AUTO: 0 10E3/UL (ref 0–0.2)
BASOPHILS NFR BLD AUTO: 1 %
EOSINOPHIL # BLD AUTO: 0.1 10E3/UL (ref 0–0.7)
EOSINOPHIL NFR BLD AUTO: 2 %
ERYTHROCYTE [DISTWIDTH] IN BLOOD BY AUTOMATED COUNT: 13.9 % (ref 10–15)
HCT VFR BLD AUTO: 40.6 % (ref 35–47)
HGB BLD-MCNC: 13.1 G/DL (ref 11.7–15.7)
IMM GRANULOCYTES # BLD: 0 10E3/UL
IMM GRANULOCYTES NFR BLD: 0 %
INR PPP: 0.94 (ref 0.85–1.15)
LYMPHOCYTES # BLD AUTO: 2.9 10E3/UL (ref 0.8–5.3)
LYMPHOCYTES NFR BLD AUTO: 38 %
MCH RBC QN AUTO: 27.8 PG (ref 26.5–33)
MCHC RBC AUTO-ENTMCNC: 32.3 G/DL (ref 31.5–36.5)
MCV RBC AUTO: 86 FL (ref 78–100)
MONOCYTES # BLD AUTO: 0.4 10E3/UL (ref 0–1.3)
MONOCYTES NFR BLD AUTO: 6 %
NEUTROPHILS # BLD AUTO: 4.1 10E3/UL (ref 1.6–8.3)
NEUTROPHILS NFR BLD AUTO: 54 %
NRBC # BLD AUTO: 0 10E3/UL
NRBC BLD AUTO-RTO: 0 /100
PLATELET # BLD AUTO: 280 10E3/UL (ref 150–450)
PROTHROMBIN TIME: 12.8 SECONDS (ref 11.8–14.8)
RBC # BLD AUTO: 4.72 10E6/UL (ref 3.8–5.2)
WBC # BLD AUTO: 7.7 10E3/UL (ref 4–11)

## 2025-07-16 ASSESSMENT — ANXIETY QUESTIONNAIRES
IF YOU CHECKED OFF ANY PROBLEMS ON THIS QUESTIONNAIRE, HOW DIFFICULT HAVE THESE PROBLEMS MADE IT FOR YOU TO DO YOUR WORK, TAKE CARE OF THINGS AT HOME, OR GET ALONG WITH OTHER PEOPLE: SOMEWHAT DIFFICULT
1. FEELING NERVOUS, ANXIOUS, OR ON EDGE: SEVERAL DAYS
6. BECOMING EASILY ANNOYED OR IRRITABLE: NOT AT ALL
7. FEELING AFRAID AS IF SOMETHING AWFUL MIGHT HAPPEN: SEVERAL DAYS
3. WORRYING TOO MUCH ABOUT DIFFERENT THINGS: NOT AT ALL
GAD7 TOTAL SCORE: 4
GAD7 TOTAL SCORE: 4
5. BEING SO RESTLESS THAT IT IS HARD TO SIT STILL: NOT AT ALL
2. NOT BEING ABLE TO STOP OR CONTROL WORRYING: SEVERAL DAYS

## 2025-07-16 ASSESSMENT — PATIENT HEALTH QUESTIONNAIRE - PHQ9
10. IF YOU CHECKED OFF ANY PROBLEMS, HOW DIFFICULT HAVE THESE PROBLEMS MADE IT FOR YOU TO DO YOUR WORK, TAKE CARE OF THINGS AT HOME, OR GET ALONG WITH OTHER PEOPLE: SOMEWHAT DIFFICULT
5. POOR APPETITE OR OVEREATING: SEVERAL DAYS
SUM OF ALL RESPONSES TO PHQ QUESTIONS 1-9: 2

## 2025-07-16 NOTE — PATIENT INSTRUCTIONS
Check with ENT office - Dr Olson about the medrol dose pac and how to take it.   Blood work today    Hold on any aspirin or ibuprofen for 2 wks prior to surgery

## 2025-07-17 ASSESSMENT — PATIENT HEALTH QUESTIONNAIRE - PHQ9: SUM OF ALL RESPONSES TO PHQ QUESTIONS 1-9: 1

## 2025-07-21 DIAGNOSIS — J35.01 CHRONIC TONSILLITIS: Primary | ICD-10-CM

## 2025-07-21 DIAGNOSIS — J35.1 HYPERTROPHY OF TONSILS: ICD-10-CM

## 2025-07-21 DIAGNOSIS — G47.33 OSA ON CPAP: ICD-10-CM

## 2025-07-21 RX ORDER — METHYLPREDNISOLONE 4 MG/1
TABLET ORAL
Qty: 21 TABLET | Refills: 0 | Status: SHIPPED | OUTPATIENT
Start: 2025-08-08

## 2025-07-30 ENCOUNTER — OFFICE VISIT (OUTPATIENT)
Dept: URGENT CARE | Facility: URGENT CARE | Age: 25
End: 2025-07-30
Payer: COMMERCIAL

## 2025-07-30 VITALS
BODY MASS INDEX: 41.03 KG/M2 | HEART RATE: 83 BPM | SYSTOLIC BLOOD PRESSURE: 113 MMHG | WEIGHT: 277 LBS | OXYGEN SATURATION: 98 % | HEIGHT: 69 IN | RESPIRATION RATE: 15 BRPM | DIASTOLIC BLOOD PRESSURE: 75 MMHG | TEMPERATURE: 97.8 F

## 2025-07-30 DIAGNOSIS — N76.0 BV (BACTERIAL VAGINOSIS): ICD-10-CM

## 2025-07-30 DIAGNOSIS — R07.0 THROAT PAIN: ICD-10-CM

## 2025-07-30 DIAGNOSIS — B96.89 BV (BACTERIAL VAGINOSIS): ICD-10-CM

## 2025-07-30 DIAGNOSIS — R30.0 DYSURIA: ICD-10-CM

## 2025-07-30 DIAGNOSIS — B37.31 YEAST INFECTION OF THE VAGINA: Primary | ICD-10-CM

## 2025-07-30 LAB
ALBUMIN UR-MCNC: NEGATIVE MG/DL
APPEARANCE UR: CLEAR
BACTERIA #/AREA URNS HPF: ABNORMAL /HPF
BILIRUB UR QL STRIP: NEGATIVE
CLUE CELLS: PRESENT
COLOR UR AUTO: YELLOW
DEPRECATED S PYO AG THROAT QL EIA: NEGATIVE
GLUCOSE UR STRIP-MCNC: NEGATIVE MG/DL
HGB UR QL STRIP: NEGATIVE
KETONES UR STRIP-MCNC: NEGATIVE MG/DL
LEUKOCYTE ESTERASE UR QL STRIP: ABNORMAL
NITRATE UR QL: NEGATIVE
PH UR STRIP: 6 [PH] (ref 5–7)
RBC #/AREA URNS AUTO: ABNORMAL /HPF
S PYO DNA THROAT QL NAA+PROBE: NOT DETECTED
SP GR UR STRIP: 1.02 (ref 1–1.03)
SQUAMOUS #/AREA URNS AUTO: ABNORMAL /LPF
TRICHOMONAS, WET PREP: ABNORMAL
UROBILINOGEN UR STRIP-ACNC: 0.2 E.U./DL
WBC #/AREA URNS AUTO: ABNORMAL /HPF
WBC'S/HIGH POWER FIELD, WET PREP: ABNORMAL
YEAST, WET PREP: PRESENT

## 2025-07-30 PROCEDURE — 87210 SMEAR WET MOUNT SALINE/INK: CPT | Performed by: PHYSICIAN ASSISTANT

## 2025-07-30 PROCEDURE — 99214 OFFICE O/P EST MOD 30 MIN: CPT | Performed by: PHYSICIAN ASSISTANT

## 2025-07-30 PROCEDURE — 81001 URINALYSIS AUTO W/SCOPE: CPT | Performed by: PHYSICIAN ASSISTANT

## 2025-07-30 PROCEDURE — 87651 STREP A DNA AMP PROBE: CPT | Performed by: PHYSICIAN ASSISTANT

## 2025-07-30 RX ORDER — METRONIDAZOLE 500 MG/1
500 TABLET ORAL 2 TIMES DAILY
Qty: 14 TABLET | Refills: 0 | Status: SHIPPED | OUTPATIENT
Start: 2025-07-30 | End: 2025-08-06

## 2025-07-30 RX ORDER — FLUCONAZOLE 150 MG/1
150 TABLET ORAL ONCE
Qty: 1 TABLET | Refills: 0 | Status: SHIPPED | OUTPATIENT
Start: 2025-07-30 | End: 2025-07-30

## 2025-07-30 ASSESSMENT — ENCOUNTER SYMPTOMS
DYSURIA: 0
FREQUENCY: 0
PALPITATIONS: 0
VOMITING: 0
CONSTITUTIONAL NEGATIVE: 1
HEMATURIA: 0
ADENOPATHY: 0
NECK STIFFNESS: 0
LIGHT-HEADEDNESS: 0
DIARRHEA: 0
FEVER: 0
COUGH: 0
ENDOCRINE NEGATIVE: 1
SHORTNESS OF BREATH: 0
POLYDIPSIA: 0
MYALGIAS: 0
SORE THROAT: 1
ABDOMINAL PAIN: 0
CHILLS: 0
GASTROINTESTINAL NEGATIVE: 1
DIZZINESS: 0
RHINORRHEA: 0
NEUROLOGICAL NEGATIVE: 1
WEAKNESS: 0
HEADACHES: 0
NECK PAIN: 0
ACTIVITY CHANGE: 0
FATIGUE: 0
RESPIRATORY NEGATIVE: 1
NAUSEA: 0
FLANK PAIN: 0
MUSCULOSKELETAL NEGATIVE: 1
CARDIOVASCULAR NEGATIVE: 1

## 2025-07-30 NOTE — PROGRESS NOTES
"Chief Complaint:     Chief Complaint   Patient presents with    Pharyngitis     Sore throat x 5 days, allergy meds didn't help, pt denies any other symptoms.    Vaginal Problem     For about 5 days, very itchy on the inside of vagina, sometimes odor.       Results for orders placed or performed in visit on 07/30/25   UA Macroscopic with reflex to Microscopic and Culture - Clinic Collect     Status: Abnormal    Specimen: Urine, Clean Catch   Result Value Ref Range    Color Urine Yellow Colorless, Straw, Light Yellow, Yellow    Appearance Urine Clear Clear    Glucose Urine Negative Negative mg/dL    Bilirubin Urine Negative Negative    Ketones Urine Negative Negative mg/dL    Specific Gravity Urine 1.025 1.003 - 1.035    Blood Urine Negative Negative    pH Urine 6.0 5.0 - 7.0    Protein Albumin Urine Negative Negative mg/dL    Urobilinogen Urine 0.2 0.2, 1.0 E.U./dL    Nitrite Urine Negative Negative    Leukocyte Esterase Urine Trace (A) Negative   UA Microscopic with Reflex to Culture     Status: Abnormal   Result Value Ref Range    Bacteria Urine Few (A) None Seen /HPF    RBC Urine 0-2 0-2 /HPF /HPF    WBC Urine 0-5 0-5 /HPF /HPF    Squamous Epithelials Urine Few (A) None Seen /LPF    Narrative    Urine Culture not indicated   Streptococcus A Rapid Screen w/Reflex to PCR - Clinic Collect     Status: Normal    Specimen: Throat; Swab   Result Value Ref Range    Group A Strep antigen Negative Negative   Wet prep - Clinic Collect     Status: Abnormal    Specimen: Vagina; Swab   Result Value Ref Range    Trichomonas Absent Absent    Yeast Present (A) Absent    Clue Cells Present (A) Absent    WBCs/high power field 2+ (A) None       Medical Decision Making:    Vital signs reviewed by Dong Wilde PA-C  /75   Pulse 83   Temp 97.8  F (36.6  C) (Tympanic)   Resp 15   Ht 1.753 m (5' 9\")   Wt 125.6 kg (277 lb)   LMP  (LMP Unknown)   SpO2 98%   BMI 40.91 kg/m      Differential Diagnosis:  URI Adult/Peds:  " Sinusitis, Strep pharyngitis, Tonsilitis, Viral pharyngitis, Viral syndrome, and Viral upper respiratory illness  UTI: UTI, BV and vaginal yeast infection.        ASSESSMENT    1. Yeast infection of the vagina    2. BV (bacterial vaginosis)    3. Throat pain    4. Dysuria        PLAN    Patient is in no acute distress.    Temp is 97.8 in clinic today, lung sounds were clear, and O2 sats at 98% on RA.    RST was negative.  We will call with PCR results only if positive.  UA discussed with patient.  This was negative for UTI.  Wet prep was positive for clue cells and yeast.  Rx for Diflucan sent in.  Rx for Flagyl printed out.  Patient will fill this if she develops fishy odor, or discharge.    Rest, Push fluids, vaporizer, elevation of head of bed.  Ibuprofen and or Tylenol for any fever or body aches.  If symptoms worsen, recheck immediately otherwise follow up with your PCP in 1 week if symptoms are not improving.  Worrisome symptoms discussed with instructions to go to the ED.  Patient verbalized understanding and agreed with this plan.    Labs:    Results for orders placed or performed in visit on 07/30/25   UA Macroscopic with reflex to Microscopic and Culture - Clinic Collect     Status: Abnormal    Specimen: Urine, Clean Catch   Result Value Ref Range    Color Urine Yellow Colorless, Straw, Light Yellow, Yellow    Appearance Urine Clear Clear    Glucose Urine Negative Negative mg/dL    Bilirubin Urine Negative Negative    Ketones Urine Negative Negative mg/dL    Specific Gravity Urine 1.025 1.003 - 1.035    Blood Urine Negative Negative    pH Urine 6.0 5.0 - 7.0    Protein Albumin Urine Negative Negative mg/dL    Urobilinogen Urine 0.2 0.2, 1.0 E.U./dL    Nitrite Urine Negative Negative    Leukocyte Esterase Urine Trace (A) Negative   UA Microscopic with Reflex to Culture     Status: Abnormal   Result Value Ref Range    Bacteria Urine Few (A) None Seen /HPF    RBC Urine 0-2 0-2 /HPF /HPF    WBC Urine 0-5 0-5  "/HPF /HPF    Squamous Epithelials Urine Few (A) None Seen /LPF    Narrative    Urine Culture not indicated   Streptococcus A Rapid Screen w/Reflex to PCR - Clinic Collect     Status: Normal    Specimen: Throat; Swab   Result Value Ref Range    Group A Strep antigen Negative Negative   Wet prep - Clinic Collect     Status: Abnormal    Specimen: Vagina; Swab   Result Value Ref Range    Trichomonas Absent Absent    Yeast Present (A) Absent    Clue Cells Present (A) Absent    WBCs/high power field 2+ (A) None        Vital signs reviewed by Dong Wilde PA-C  /75   Pulse 83   Temp 97.8  F (36.6  C) (Tympanic)   Resp 15   Ht 1.753 m (5' 9\")   Wt 125.6 kg (277 lb)   LMP  (LMP Unknown)   SpO2 98%   BMI 40.91 kg/m      Current Meds      Current Outpatient Medications:     cetirizine (ZYRTEC) 10 MG tablet, Take 10 mg by mouth daily as needed for allergies seasonal, Disp: , Rfl:     fluconazole (DIFLUCAN) 150 MG tablet, Take 1 tablet (150 mg) by mouth once for 1 dose., Disp: 1 tablet, Rfl: 0    FLUoxetine (PROZAC) 20 MG capsule, Take 1 capsule (20 mg) by mouth daily., Disp: 90 capsule, Rfl: 1    levonorgestrel (MIRENA) 52 MG (20 mcg/day) IUD, 1 each by Intrauterine route once, Disp: , Rfl:     [START ON 8/8/2025] methylPREDNISolone (MEDROL DOSEPAK) 4 MG tablet therapy pack, Follow Package Directions, Disp: 21 tablet, Rfl: 0    metroNIDAZOLE (FLAGYL) 500 MG tablet, Take 1 tablet (500 mg) by mouth 2 times daily for 7 days., Disp: 14 tablet, Rfl: 0    metroNIDAZOLE (FLAGYL) 500 MG tablet, , Disp: , Rfl:     tirzepatide-Weight Management (ZEPBOUND) 2.5 MG/0.5ML prefilled pen, Inject 0.5 mLs (2.5 mg) subcutaneously every 7 days. (Patient not taking: Reported on 7/30/2025), Disp: 2 mL, Rfl: 0      Respiratory History    occasional episodes of bronchitis      SUBJECTIVE    HPI: Batool Louis is an 24 year old female who presents with sore throat.  Symptoms began 5  days ago and has unchanged.  Patient has an " upcoming tonsillectomy in 2 weeks.  There is no shortness of breath, wheezing, or chest pain.  Patient is eating and drinking well.  No fever, nausea, vomiting, or diarrhea.    Patient denies any recent travel or exposure to known COVID positive tested individual.      Patient also complaining of vaginal itching and odor for the past 5 days.      ROS:     Review of Systems   Constitutional: Negative.  Negative for activity change, chills, fatigue and fever.   HENT:  Positive for sore throat. Negative for congestion, ear pain and rhinorrhea.    Respiratory: Negative.  Negative for cough and shortness of breath.    Cardiovascular: Negative.  Negative for chest pain and palpitations.   Gastrointestinal: Negative.  Negative for abdominal pain, diarrhea, nausea and vomiting.   Endocrine: Negative.  Negative for polydipsia and polyuria.   Genitourinary:  Negative for dysuria, flank pain, frequency, hematuria, pelvic pain, urgency, vaginal discharge and vaginal pain.        Vaginal Itching  Vaginal Odor   Musculoskeletal: Negative.  Negative for myalgias, neck pain and neck stiffness.   Allergic/Immunologic: Negative for immunocompromised state.   Neurological: Negative.  Negative for dizziness, weakness, light-headedness and headaches.   Hematological:  Negative for adenopathy.         Family History   Family History   Problem Relation Age of Onset    Anxiety Disorder Mother 20 - 29        history of    Sleep Apnea Mother     Obesity Mother     Varicose Veins Father     Lewy body Parkinson's disease (H) Father     Sleep Apnea Father     Obesity Father     Back Pain Maternal Grandmother         surgery    Gallbladder Disease Maternal Grandmother         removed    Sleep Apnea Maternal Grandmother     Pectus Anomoly Maternal Grandfather     Sleep Apnea Maternal Grandfather     Chronic Obstructive Pulmonary Disease Maternal Grandfather     Alzheimer Disease Maternal Grandfather     Depression Paternal Grandmother      Varicose Veins Paternal Grandfather     Hypertension Paternal Grandfather     Bladder Cancer Paternal Grandfather     Sleep Apnea Paternal Grandfather     Polycystic ovary syndrome Sister     Asthma Sister     Sleep Apnea Sister     Epiglottitis Maternal Aunt     Lewy body Parkinson disease (H) Paternal Uncle         Problem history  Patient Active Problem List   Diagnosis    Allergic rhinitis    Chronic tonsillitis    Shellfish allergy    Encounter for insertion of Mirena IUD    Elevated LFTs    Generalized anxiety disorder    Moderate episode of recurrent major depressive disorder (H)    Other specified eating disorder    RLQ abdominal pain    Ovarian torsion    Metabolic dysfunction-associated steatotic liver disease (MASLD)    Carrier of alpha-1-antitrypsin deficiency        Allergies  Allergies   Allergen Reactions    Shellfish-Derived Products GI Disturbance    Sulfamethoxazole-Trimethoprim Hives    Seasonal Allergies Other (See Comments)     Nasal congestion    Tylenol [Acetaminophen] Other (See Comments)     Told  not to take due to her genetic insufficiency        Social History  Social History     Socioeconomic History    Marital status: Single     Spouse name: Not on file    Number of children: Not on file    Years of education: Not on file    Highest education level: Not on file   Occupational History    Occupation: student     Comment: [employer]- [employer]    Occupation:    Tobacco Use    Smoking status: Never     Passive exposure: Current (exposure to roommate who vapes)    Smokeless tobacco: Never   Vaping Use    Vaping status: Never Used   Substance and Sexual Activity    Alcohol use: Not Currently    Drug use: Yes     Types: Marijuana     Comment: Thc gummies and drinks    Sexual activity: Yes     Partners: Male     Birth control/protection: I.U.D.     Comment: has had both sex partners, used condoms with last partner   Other Topics Concern    Parent/sibling w/ CABG, MI or  angioplasty before 65F 55M? Not Asked   Social History Narrative    Not on file     Social Drivers of Health     Financial Resource Strain: Low Risk  (12/3/2024)    Financial Resource Strain     Within the past 12 months, have you or your family members you live with been unable to get utilities (heat, electricity) when it was really needed?: No   Food Insecurity: Low Risk  (12/3/2024)    Food Insecurity     Within the past 12 months, did you worry that your food would run out before you got money to buy more?: No     Within the past 12 months, did the food you bought just not last and you didn t have money to get more?: No   Transportation Needs: Low Risk  (12/3/2024)    Transportation Needs     Within the past 12 months, has lack of transportation kept you from medical appointments, getting your medicines, non-medical meetings or appointments, work, or from getting things that you need?: No   Physical Activity: Unknown (12/3/2024)    Exercise Vital Sign     Days of Exercise per Week: 5 days     Minutes of Exercise per Session: Not on file   Stress: Stress Concern Present (12/3/2024)    [employer] - Occupational Stress Questionnaire     Feeling of Stress : To some extent   Social Connections: Unknown (12/3/2024)    Social Connection and Isolation Panel [NHANES]     Frequency of Communication with Friends and Family: Not on file     Frequency of Social Gatherings with Friends and Family: Twice a week     Attends Yarsanism Services: Not on file     Active Member of Clubs or Organizations: Not on file     Attends Club or Organization Meetings: Not on file     Marital Status: Not on file   Interpersonal Safety: Low Risk  (7/16/2025)    Interpersonal Safety     Do you feel physically and emotionally safe where you currently live?: Yes     Within the past 12 months, have you been hit, slapped, kicked or otherwise physically hurt by someone?: No     Within the past 12 months, have you been  "humiliated or emotionally abused in other ways by your partner or ex-partner?: No   Housing Stability: High Risk (12/3/2024)    Housing Stability     Do you have housing? : No     Are you worried about losing your housing?: No        OBJECTIVE     Vital signs reviewed by Dong Wilde PA-C  /75   Pulse 83   Temp 97.8  F (36.6  C) (Tympanic)   Resp 15   Ht 1.753 m (5' 9\")   Wt 125.6 kg (277 lb)   LMP  (LMP Unknown)   SpO2 98%   BMI 40.91 kg/m       Physical Exam  Vitals and nursing note reviewed.   Constitutional:       General: She is not in acute distress.     Appearance: Normal appearance. She is well-developed. She is not ill-appearing, toxic-appearing or diaphoretic.   HENT:      Head: Normocephalic and atraumatic.      Right Ear: Hearing, tympanic membrane, ear canal and external ear normal. Tympanic membrane is not perforated, erythematous, retracted or bulging.      Left Ear: Hearing, tympanic membrane, ear canal and external ear normal. Tympanic membrane is not perforated, erythematous, retracted or bulging.      Nose: No mucosal edema, congestion or rhinorrhea.      Right Sinus: No maxillary sinus tenderness or frontal sinus tenderness.      Left Sinus: No maxillary sinus tenderness or frontal sinus tenderness.      Mouth/Throat:      Pharynx: Posterior oropharyngeal erythema present. No pharyngeal swelling, oropharyngeal exudate or uvula swelling.      Tonsils: No tonsillar exudate or tonsillar abscesses. 0 on the right. 0 on the left.   Eyes:      General:         Right eye: No discharge.         Left eye: No discharge.      Pupils: Pupils are equal, round, and reactive to light.   Cardiovascular:      Rate and Rhythm: Normal rate and regular rhythm.      Heart sounds: Normal heart sounds. No murmur heard.     No friction rub. No gallop.   Pulmonary:      Effort: Pulmonary effort is normal. No respiratory distress.      Breath sounds: Normal breath sounds. No decreased breath sounds, " wheezing, rhonchi or rales.   Chest:      Chest wall: No tenderness.   Abdominal:      General: Bowel sounds are normal. There is no distension.      Palpations: Abdomen is soft. Abdomen is not rigid. There is no mass.      Tenderness: There is no abdominal tenderness. There is no guarding or rebound. Negative signs include Haas's sign and McBurney's sign.   Musculoskeletal:      Cervical back: Normal range of motion and neck supple.   Lymphadenopathy:      Head:      Right side of head: No submental, submandibular, tonsillar, preauricular or posterior auricular adenopathy.      Left side of head: No submental, submandibular, tonsillar, preauricular or posterior auricular adenopathy.      Cervical: No cervical adenopathy.      Right cervical: No superficial or posterior cervical adenopathy.     Left cervical: No superficial or posterior cervical adenopathy.   Skin:     General: Skin is warm and dry.      Findings: No rash.   Neurological:      Mental Status: She is alert and oriented to person, place, and time.      Cranial Nerves: No cranial nerve deficit.      Deep Tendon Reflexes: Reflexes are normal and symmetric.   Psychiatric:         Behavior: Behavior normal. Behavior is cooperative.         Thought Content: Thought content normal.         Judgment: Judgment normal.           Dong Wilde PA-C  7/30/2025, 11:00 AM

## 2025-07-30 NOTE — PROGRESS NOTES
Urgent Care Clinic Visit    Chief Complaint   Patient presents with    Pharyngitis     Sore throat x 5 days, allergy meds didn't help, pt denies any other symptoms.    Vaginal Problem     For about 5 days, very itchy on the inside of vagina, sometimes odor.               7/30/2025    11:02 AM   Additional Questions   Roomed by Denise SMITH         7/30/2025   Forms   Any forms needing to be completed Yes

## 2025-08-13 ENCOUNTER — ANESTHESIA EVENT (OUTPATIENT)
Dept: SURGERY | Facility: HOSPITAL | Age: 25
End: 2025-08-13
Payer: COMMERCIAL

## 2025-08-14 ENCOUNTER — ANESTHESIA (OUTPATIENT)
Dept: SURGERY | Facility: HOSPITAL | Age: 25
End: 2025-08-14
Payer: COMMERCIAL

## 2025-08-14 ENCOUNTER — HOSPITAL ENCOUNTER (OUTPATIENT)
Facility: HOSPITAL | Age: 25
End: 2025-08-14
Attending: OTOLARYNGOLOGY | Admitting: OTOLARYNGOLOGY
Payer: COMMERCIAL

## 2025-08-14 VITALS
DIASTOLIC BLOOD PRESSURE: 78 MMHG | RESPIRATION RATE: 18 BRPM | BODY MASS INDEX: 40.58 KG/M2 | OXYGEN SATURATION: 94 % | HEART RATE: 70 BPM | TEMPERATURE: 97.9 F | WEIGHT: 274.8 LBS | SYSTOLIC BLOOD PRESSURE: 135 MMHG

## 2025-08-14 DIAGNOSIS — Z90.89 S/P TONSILLECTOMY: Primary | ICD-10-CM

## 2025-08-14 PROBLEM — G47.33 OSA ON CPAP: Status: ACTIVE | Noted: 2025-08-14

## 2025-08-14 LAB — GLUCOSE BLDC GLUCOMTR-MCNC: 71 MG/DL (ref 70–99)

## 2025-08-14 PROCEDURE — 360N000075 HC SURGERY LEVEL 2, PER MIN: Performed by: OTOLARYNGOLOGY

## 2025-08-14 PROCEDURE — 99244 OFF/OP CNSLTJ NEW/EST MOD 40: CPT | Performed by: INTERNAL MEDICINE

## 2025-08-14 PROCEDURE — 272N000001 HC OR GENERAL SUPPLY STERILE: Performed by: OTOLARYNGOLOGY

## 2025-08-14 PROCEDURE — 250N000009 HC RX 250: Performed by: OTOLARYNGOLOGY

## 2025-08-14 PROCEDURE — 250N000011 HC RX IP 250 OP 636: Performed by: NURSE ANESTHETIST, CERTIFIED REGISTERED

## 2025-08-14 PROCEDURE — 250N000009 HC RX 250: Performed by: NURSE ANESTHETIST, CERTIFIED REGISTERED

## 2025-08-14 PROCEDURE — 250N000013 HC RX MED GY IP 250 OP 250 PS 637: Performed by: INTERNAL MEDICINE

## 2025-08-14 PROCEDURE — 258N000003 HC RX IP 258 OP 636: Performed by: NURSE ANESTHETIST, CERTIFIED REGISTERED

## 2025-08-14 PROCEDURE — 82962 GLUCOSE BLOOD TEST: CPT

## 2025-08-14 PROCEDURE — 88304 TISSUE EXAM BY PATHOLOGIST: CPT | Mod: TC | Performed by: OTOLARYNGOLOGY

## 2025-08-14 PROCEDURE — 250N000025 HC SEVOFLURANE, PER MIN: Performed by: OTOLARYNGOLOGY

## 2025-08-14 PROCEDURE — 250N000011 HC RX IP 250 OP 636: Performed by: ANESTHESIOLOGY

## 2025-08-14 PROCEDURE — 710N000009 HC RECOVERY PHASE 1, LEVEL 1, PER MIN: Performed by: OTOLARYNGOLOGY

## 2025-08-14 PROCEDURE — 250N000013 HC RX MED GY IP 250 OP 250 PS 637: Performed by: OTOLARYNGOLOGY

## 2025-08-14 PROCEDURE — 370N000017 HC ANESTHESIA TECHNICAL FEE, PER MIN: Performed by: OTOLARYNGOLOGY

## 2025-08-14 PROCEDURE — 250N000011 HC RX IP 250 OP 636: Performed by: OTOLARYNGOLOGY

## 2025-08-14 PROCEDURE — 88304 TISSUE EXAM BY PATHOLOGIST: CPT | Mod: 26 | Performed by: PATHOLOGY

## 2025-08-14 PROCEDURE — 999N000157 HC STATISTIC RCP TIME EA 10 MIN

## 2025-08-14 PROCEDURE — 999N000141 HC STATISTIC PRE-PROCEDURE NURSING ASSESSMENT: Performed by: OTOLARYNGOLOGY

## 2025-08-14 PROCEDURE — 42826 REMOVAL OF TONSILS: CPT | Performed by: OTOLARYNGOLOGY

## 2025-08-14 PROCEDURE — 258N000003 HC RX IP 258 OP 636: Performed by: OTOLARYNGOLOGY

## 2025-08-14 RX ORDER — NALOXONE HYDROCHLORIDE 0.4 MG/ML
0.1 INJECTION, SOLUTION INTRAMUSCULAR; INTRAVENOUS; SUBCUTANEOUS
Status: CANCELLED | OUTPATIENT
Start: 2025-08-14

## 2025-08-14 RX ORDER — DEXAMETHASONE SODIUM PHOSPHATE 10 MG/ML
10 INJECTION, SOLUTION INTRAMUSCULAR; INTRAVENOUS ONCE
Status: COMPLETED | OUTPATIENT
Start: 2025-08-14 | End: 2025-08-14

## 2025-08-14 RX ORDER — ONDANSETRON 4 MG/1
4 TABLET, ORALLY DISINTEGRATING ORAL EVERY 6 HOURS PRN
Status: DISCONTINUED | OUTPATIENT
Start: 2025-08-14 | End: 2025-08-15 | Stop reason: HOSPADM

## 2025-08-14 RX ORDER — PROCHLORPERAZINE MALEATE 10 MG
10 TABLET ORAL EVERY 6 HOURS PRN
Status: DISCONTINUED | OUTPATIENT
Start: 2025-08-14 | End: 2025-08-15 | Stop reason: HOSPADM

## 2025-08-14 RX ORDER — IBUPROFEN 200 MG
600 TABLET ORAL EVERY 6 HOURS PRN
Status: DISCONTINUED | OUTPATIENT
Start: 2025-08-14 | End: 2025-08-14 | Stop reason: SINTOL

## 2025-08-14 RX ORDER — NALOXONE HYDROCHLORIDE 0.4 MG/ML
0.1 INJECTION, SOLUTION INTRAMUSCULAR; INTRAVENOUS; SUBCUTANEOUS
Status: DISCONTINUED | OUTPATIENT
Start: 2025-08-14 | End: 2025-08-14 | Stop reason: HOSPADM

## 2025-08-14 RX ORDER — HYDROMORPHONE HCL IN WATER/PF 6 MG/30 ML
0.2 PATIENT CONTROLLED ANALGESIA SYRINGE INTRAVENOUS EVERY 5 MIN PRN
Status: DISCONTINUED | OUTPATIENT
Start: 2025-08-14 | End: 2025-08-14 | Stop reason: HOSPADM

## 2025-08-14 RX ORDER — ONDANSETRON 4 MG/1
4 TABLET, ORALLY DISINTEGRATING ORAL EVERY 30 MIN PRN
Status: DISCONTINUED | OUTPATIENT
Start: 2025-08-14 | End: 2025-08-14 | Stop reason: HOSPADM

## 2025-08-14 RX ORDER — GLYCOPYRROLATE 0.2 MG/ML
INJECTION, SOLUTION INTRAMUSCULAR; INTRAVENOUS PRN
Status: DISCONTINUED | OUTPATIENT
Start: 2025-08-14 | End: 2025-08-14

## 2025-08-14 RX ORDER — NALOXONE HYDROCHLORIDE 0.4 MG/ML
0.2 INJECTION, SOLUTION INTRAMUSCULAR; INTRAVENOUS; SUBCUTANEOUS
Status: DISCONTINUED | OUTPATIENT
Start: 2025-08-14 | End: 2025-08-15 | Stop reason: HOSPADM

## 2025-08-14 RX ORDER — SODIUM CHLORIDE, SODIUM LACTATE, POTASSIUM CHLORIDE, CALCIUM CHLORIDE 600; 310; 30; 20 MG/100ML; MG/100ML; MG/100ML; MG/100ML
INJECTION, SOLUTION INTRAVENOUS CONTINUOUS
Status: DISCONTINUED | OUTPATIENT
Start: 2025-08-14 | End: 2025-08-14 | Stop reason: HOSPADM

## 2025-08-14 RX ORDER — ONDANSETRON 4 MG/1
4 TABLET, ORALLY DISINTEGRATING ORAL EVERY 30 MIN PRN
Status: CANCELLED | OUTPATIENT
Start: 2025-08-14

## 2025-08-14 RX ORDER — MAGNESIUM HYDROXIDE 1200 MG/15ML
LIQUID ORAL PRN
Status: DISCONTINUED | OUTPATIENT
Start: 2025-08-14 | End: 2025-08-14 | Stop reason: HOSPADM

## 2025-08-14 RX ORDER — OXYCODONE HYDROCHLORIDE 5 MG/1
10 TABLET ORAL
Refills: 0 | Status: CANCELLED | OUTPATIENT
Start: 2025-08-14

## 2025-08-14 RX ORDER — SODIUM CHLORIDE, SODIUM LACTATE, POTASSIUM CHLORIDE, CALCIUM CHLORIDE 600; 310; 30; 20 MG/100ML; MG/100ML; MG/100ML; MG/100ML
INJECTION, SOLUTION INTRAVENOUS CONTINUOUS
Status: DISCONTINUED | OUTPATIENT
Start: 2025-08-14 | End: 2025-08-15 | Stop reason: HOSPADM

## 2025-08-14 RX ORDER — PROPOFOL 10 MG/ML
INJECTION, EMULSION INTRAVENOUS CONTINUOUS PRN
Status: DISCONTINUED | OUTPATIENT
Start: 2025-08-14 | End: 2025-08-14

## 2025-08-14 RX ORDER — LIDOCAINE 40 MG/G
CREAM TOPICAL
Status: DISCONTINUED | OUTPATIENT
Start: 2025-08-14 | End: 2025-08-14 | Stop reason: HOSPADM

## 2025-08-14 RX ORDER — HYDROMORPHONE HCL IN WATER/PF 6 MG/30 ML
0.4 PATIENT CONTROLLED ANALGESIA SYRINGE INTRAVENOUS EVERY 5 MIN PRN
Status: DISCONTINUED | OUTPATIENT
Start: 2025-08-14 | End: 2025-08-14 | Stop reason: HOSPADM

## 2025-08-14 RX ORDER — FENTANYL CITRATE 50 UG/ML
50 INJECTION, SOLUTION INTRAMUSCULAR; INTRAVENOUS EVERY 5 MIN PRN
Status: DISCONTINUED | OUTPATIENT
Start: 2025-08-14 | End: 2025-08-14 | Stop reason: HOSPADM

## 2025-08-14 RX ORDER — DEXAMETHASONE SODIUM PHOSPHATE 10 MG/ML
4 INJECTION, SOLUTION INTRAMUSCULAR; INTRAVENOUS
Status: CANCELLED | OUTPATIENT
Start: 2025-08-14

## 2025-08-14 RX ORDER — SODIUM CHLORIDE, SODIUM LACTATE, POTASSIUM CHLORIDE, CALCIUM CHLORIDE 600; 310; 30; 20 MG/100ML; MG/100ML; MG/100ML; MG/100ML
INJECTION, SOLUTION INTRAVENOUS CONTINUOUS PRN
Status: DISCONTINUED | OUTPATIENT
Start: 2025-08-14 | End: 2025-08-14

## 2025-08-14 RX ORDER — BISACODYL 10 MG
10 SUPPOSITORY, RECTAL RECTAL DAILY PRN
Status: DISCONTINUED | OUTPATIENT
Start: 2025-08-17 | End: 2025-08-15 | Stop reason: HOSPADM

## 2025-08-14 RX ORDER — POLYETHYLENE GLYCOL 3350 17 G/17G
17 POWDER, FOR SOLUTION ORAL DAILY
Status: DISCONTINUED | OUTPATIENT
Start: 2025-08-15 | End: 2025-08-15 | Stop reason: HOSPADM

## 2025-08-14 RX ORDER — LIDOCAINE HYDROCHLORIDE 10 MG/ML
INJECTION, SOLUTION INFILTRATION; PERINEURAL PRN
Status: DISCONTINUED | OUTPATIENT
Start: 2025-08-14 | End: 2025-08-14

## 2025-08-14 RX ORDER — OXYCODONE HYDROCHLORIDE 5 MG/1
10 TABLET ORAL EVERY 4 HOURS PRN
Status: DISCONTINUED | OUTPATIENT
Start: 2025-08-14 | End: 2025-08-15 | Stop reason: HOSPADM

## 2025-08-14 RX ORDER — FENTANYL CITRATE 50 UG/ML
INJECTION, SOLUTION INTRAMUSCULAR; INTRAVENOUS PRN
Status: DISCONTINUED | OUTPATIENT
Start: 2025-08-14 | End: 2025-08-14

## 2025-08-14 RX ORDER — ONDANSETRON 2 MG/ML
4 INJECTION INTRAMUSCULAR; INTRAVENOUS EVERY 30 MIN PRN
Status: CANCELLED | OUTPATIENT
Start: 2025-08-14

## 2025-08-14 RX ORDER — ONDANSETRON 2 MG/ML
INJECTION INTRAMUSCULAR; INTRAVENOUS PRN
Status: DISCONTINUED | OUTPATIENT
Start: 2025-08-14 | End: 2025-08-14

## 2025-08-14 RX ORDER — NALOXONE HYDROCHLORIDE 0.4 MG/ML
0.4 INJECTION, SOLUTION INTRAMUSCULAR; INTRAVENOUS; SUBCUTANEOUS
Status: DISCONTINUED | OUTPATIENT
Start: 2025-08-14 | End: 2025-08-15 | Stop reason: HOSPADM

## 2025-08-14 RX ORDER — FAMOTIDINE 20 MG/1
20 TABLET, FILM COATED ORAL 2 TIMES DAILY
Status: DISCONTINUED | OUTPATIENT
Start: 2025-08-14 | End: 2025-08-15 | Stop reason: HOSPADM

## 2025-08-14 RX ORDER — ONDANSETRON 2 MG/ML
4 INJECTION INTRAMUSCULAR; INTRAVENOUS EVERY 6 HOURS PRN
Status: DISCONTINUED | OUTPATIENT
Start: 2025-08-14 | End: 2025-08-15 | Stop reason: HOSPADM

## 2025-08-14 RX ORDER — DEXAMETHASONE SODIUM PHOSPHATE 4 MG/ML
4 INJECTION, SOLUTION INTRA-ARTICULAR; INTRALESIONAL; INTRAMUSCULAR; INTRAVENOUS; SOFT TISSUE
Status: DISCONTINUED | OUTPATIENT
Start: 2025-08-14 | End: 2025-08-14 | Stop reason: HOSPADM

## 2025-08-14 RX ORDER — FENTANYL CITRATE 50 UG/ML
25 INJECTION, SOLUTION INTRAMUSCULAR; INTRAVENOUS EVERY 5 MIN PRN
Status: DISCONTINUED | OUTPATIENT
Start: 2025-08-14 | End: 2025-08-14 | Stop reason: HOSPADM

## 2025-08-14 RX ORDER — OXYCODONE HYDROCHLORIDE 5 MG/1
5 TABLET ORAL
Refills: 0 | Status: CANCELLED | OUTPATIENT
Start: 2025-08-14

## 2025-08-14 RX ORDER — AMOXICILLIN 250 MG
1 CAPSULE ORAL 2 TIMES DAILY
Status: DISCONTINUED | OUTPATIENT
Start: 2025-08-14 | End: 2025-08-15 | Stop reason: HOSPADM

## 2025-08-14 RX ORDER — ONDANSETRON 2 MG/ML
4 INJECTION INTRAMUSCULAR; INTRAVENOUS EVERY 30 MIN PRN
Status: DISCONTINUED | OUTPATIENT
Start: 2025-08-14 | End: 2025-08-14 | Stop reason: HOSPADM

## 2025-08-14 RX ORDER — TRANEXAMIC ACID 100 MG/ML
INJECTION, SOLUTION INTRAVENOUS
Status: DISCONTINUED
Start: 2025-08-14 | End: 2025-08-14 | Stop reason: HOSPADM

## 2025-08-14 RX ORDER — PROPOFOL 10 MG/ML
INJECTION, EMULSION INTRAVENOUS PRN
Status: DISCONTINUED | OUTPATIENT
Start: 2025-08-14 | End: 2025-08-14

## 2025-08-14 RX ORDER — OXYCODONE HYDROCHLORIDE 5 MG/1
5 TABLET ORAL EVERY 4 HOURS PRN
Status: DISCONTINUED | OUTPATIENT
Start: 2025-08-14 | End: 2025-08-15 | Stop reason: HOSPADM

## 2025-08-14 RX ADMIN — FENTANYL CITRATE 50 MCG: 50 INJECTION, SOLUTION INTRAMUSCULAR; INTRAVENOUS at 12:06

## 2025-08-14 RX ADMIN — LIDOCAINE HYDROCHLORIDE 5 ML: 10 INJECTION, SOLUTION INFILTRATION; PERINEURAL at 12:06

## 2025-08-14 RX ADMIN — SODIUM CHLORIDE, SODIUM LACTATE, POTASSIUM CHLORIDE, AND CALCIUM CHLORIDE: .6; .31; .03; .02 INJECTION, SOLUTION INTRAVENOUS at 23:48

## 2025-08-14 RX ADMIN — FENTANYL CITRATE 25 MCG: 50 INJECTION, SOLUTION INTRAMUSCULAR; INTRAVENOUS at 12:15

## 2025-08-14 RX ADMIN — PROPOFOL 50 MCG/KG/MIN: 10 INJECTION, EMULSION INTRAVENOUS at 12:22

## 2025-08-14 RX ADMIN — DEXAMETHASONE SODIUM PHOSPHATE 10 MG: 10 INJECTION, SOLUTION INTRAMUSCULAR; INTRAVENOUS at 12:22

## 2025-08-14 RX ADMIN — SENNOSIDES, DOCUSATE SODIUM 1 TABLET: 50; 8.6 TABLET, FILM COATED ORAL at 19:24

## 2025-08-14 RX ADMIN — FENTANYL CITRATE 25 MCG: 50 INJECTION, SOLUTION INTRAMUSCULAR; INTRAVENOUS at 12:23

## 2025-08-14 RX ADMIN — ONDANSETRON 4 MG: 2 INJECTION INTRAMUSCULAR; INTRAVENOUS at 12:50

## 2025-08-14 RX ADMIN — FLUOXETINE HYDROCHLORIDE 20 MG: 20 CAPSULE ORAL at 17:14

## 2025-08-14 RX ADMIN — GLYCOPYRROLATE 0.2 MG: 0.2 INJECTION, SOLUTION INTRAMUSCULAR; INTRAVENOUS at 12:12

## 2025-08-14 RX ADMIN — FAMOTIDINE 20 MG: 10 INJECTION, SOLUTION INTRAVENOUS at 19:25

## 2025-08-14 RX ADMIN — MIDAZOLAM HYDROCHLORIDE 2 MG: 1 INJECTION, SOLUTION INTRAMUSCULAR; INTRAVENOUS at 11:55

## 2025-08-14 RX ADMIN — OXYCODONE HYDROCHLORIDE 10 MG: 5 TABLET ORAL at 23:44

## 2025-08-14 RX ADMIN — ROCURONIUM 50 MG: 50 INJECTION, SOLUTION INTRAVENOUS at 12:06

## 2025-08-14 RX ADMIN — HYDROMORPHONE HYDROCHLORIDE 1 MG: 1 INJECTION, SOLUTION INTRAMUSCULAR; INTRAVENOUS; SUBCUTANEOUS at 12:24

## 2025-08-14 RX ADMIN — SODIUM CHLORIDE, SODIUM LACTATE, POTASSIUM CHLORIDE, AND CALCIUM CHLORIDE: .6; .31; .03; .02 INJECTION, SOLUTION INTRAVENOUS at 14:41

## 2025-08-14 RX ADMIN — TRANEXAMIC ACID 1 G: 1 INJECTION, SOLUTION INTRAVENOUS at 13:08

## 2025-08-14 RX ADMIN — PROPOFOL 200 MG: 10 INJECTION, EMULSION INTRAVENOUS at 12:06

## 2025-08-14 RX ADMIN — SODIUM CHLORIDE 20 MCG: 9 INJECTION, SOLUTION INTRAVENOUS at 13:04

## 2025-08-14 RX ADMIN — OXYCODONE HYDROCHLORIDE 10 MG: 5 TABLET ORAL at 19:23

## 2025-08-14 RX ADMIN — FENTANYL CITRATE 25 MCG: 50 INJECTION, SOLUTION INTRAMUSCULAR; INTRAVENOUS at 13:33

## 2025-08-14 RX ADMIN — SUGAMMADEX 400 MG: 100 INJECTION, SOLUTION INTRAVENOUS at 12:59

## 2025-08-14 RX ADMIN — SODIUM CHLORIDE, SODIUM LACTATE, POTASSIUM CHLORIDE, AND CALCIUM CHLORIDE: .6; .31; .03; .02 INJECTION, SOLUTION INTRAVENOUS at 11:55

## 2025-08-14 RX ADMIN — SODIUM CHLORIDE, SODIUM LACTATE, POTASSIUM CHLORIDE, AND CALCIUM CHLORIDE: .6; .31; .03; .02 INJECTION, SOLUTION INTRAVENOUS at 13:25

## 2025-08-14 RX ADMIN — OXYCODONE HYDROCHLORIDE 10 MG: 5 TABLET ORAL at 14:50

## 2025-08-14 ASSESSMENT — ACTIVITIES OF DAILY LIVING (ADL)
ADLS_ACUITY_SCORE: 32
ADLS_ACUITY_SCORE: 47
ADLS_ACUITY_SCORE: 30
ADLS_ACUITY_SCORE: 31
ADLS_ACUITY_SCORE: 30
ADLS_ACUITY_SCORE: 31
ADLS_ACUITY_SCORE: 30
ADLS_ACUITY_SCORE: 31
ADLS_ACUITY_SCORE: 30
ADLS_ACUITY_SCORE: 31
ADLS_ACUITY_SCORE: 30
ADLS_ACUITY_SCORE: 31
ADLS_ACUITY_SCORE: 30
ADLS_ACUITY_SCORE: 32

## 2025-08-15 VITALS
DIASTOLIC BLOOD PRESSURE: 77 MMHG | HEART RATE: 53 BPM | WEIGHT: 274.8 LBS | OXYGEN SATURATION: 95 % | BODY MASS INDEX: 40.58 KG/M2 | TEMPERATURE: 98.4 F | RESPIRATION RATE: 18 BRPM | SYSTOLIC BLOOD PRESSURE: 119 MMHG

## 2025-08-15 LAB — GLUCOSE BLDC GLUCOMTR-MCNC: 102 MG/DL (ref 70–99)

## 2025-08-15 PROCEDURE — 82962 GLUCOSE BLOOD TEST: CPT

## 2025-08-15 PROCEDURE — 999N000157 HC STATISTIC RCP TIME EA 10 MIN

## 2025-08-15 PROCEDURE — 99239 HOSP IP/OBS DSCHRG MGMT >30: CPT | Performed by: FAMILY MEDICINE

## 2025-08-15 PROCEDURE — 250N000013 HC RX MED GY IP 250 OP 250 PS 637: Performed by: OTOLARYNGOLOGY

## 2025-08-15 PROCEDURE — 250N000013 HC RX MED GY IP 250 OP 250 PS 637: Performed by: INTERNAL MEDICINE

## 2025-08-15 RX ORDER — SENNA AND DOCUSATE SODIUM 50; 8.6 MG/1; MG/1
1 TABLET, FILM COATED ORAL AT BEDTIME
Qty: 30 TABLET | Refills: 1 | Status: SHIPPED | OUTPATIENT
Start: 2025-08-15

## 2025-08-15 RX ORDER — ONDANSETRON 4 MG/1
4 TABLET, ORALLY DISINTEGRATING ORAL EVERY 8 HOURS PRN
Qty: 15 TABLET | Refills: 1 | Status: SHIPPED | OUTPATIENT
Start: 2025-08-15

## 2025-08-15 RX ORDER — OXYCODONE HCL 5 MG/5 ML
7.5 SOLUTION, ORAL ORAL EVERY 6 HOURS PRN
Qty: 200 ML | Refills: 0 | Status: SHIPPED | OUTPATIENT
Start: 2025-08-15 | End: 2025-08-20

## 2025-08-15 RX ORDER — METHYLPREDNISOLONE 4 MG/1
TABLET ORAL
Qty: 21 TABLET | Refills: 0 | Status: SHIPPED | OUTPATIENT
Start: 2025-08-15 | End: 2025-08-21

## 2025-08-15 RX ORDER — IBUPROFEN 100 MG/5ML
5 SUSPENSION ORAL EVERY 6 HOURS
Qty: 600 ML | Refills: 0 | Status: SHIPPED | OUTPATIENT
Start: 2025-08-15 | End: 2025-08-20

## 2025-08-15 RX ADMIN — OXYCODONE HYDROCHLORIDE 10 MG: 5 TABLET ORAL at 06:27

## 2025-08-15 RX ADMIN — FAMOTIDINE 20 MG: 20 TABLET, FILM COATED ORAL at 08:13

## 2025-08-15 RX ADMIN — SENNOSIDES, DOCUSATE SODIUM 1 TABLET: 50; 8.6 TABLET, FILM COATED ORAL at 08:13

## 2025-08-15 RX ADMIN — FLUOXETINE HYDROCHLORIDE 20 MG: 20 CAPSULE ORAL at 08:22

## 2025-08-15 RX ADMIN — POLYETHYLENE GLYCOL 3350 17 G: 17 POWDER, FOR SOLUTION ORAL at 08:13

## 2025-08-15 ASSESSMENT — ACTIVITIES OF DAILY LIVING (ADL)
ADLS_ACUITY_SCORE: 31

## 2025-08-18 LAB
PATH REPORT.COMMENTS IMP SPEC: NORMAL
PATH REPORT.COMMENTS IMP SPEC: NORMAL
PATH REPORT.FINAL DX SPEC: NORMAL
PATH REPORT.GROSS SPEC: NORMAL
PATH REPORT.MICROSCOPIC SPEC OTHER STN: NORMAL
PATH REPORT.RELEVANT HX SPEC: NORMAL
PHOTO IMAGE: NORMAL

## 2025-08-19 ENCOUNTER — MYC MEDICAL ADVICE (OUTPATIENT)
Dept: OTOLARYNGOLOGY | Facility: CLINIC | Age: 25
End: 2025-08-19
Payer: COMMERCIAL

## 2025-08-19 DIAGNOSIS — Z90.89 S/P TONSILLECTOMY: ICD-10-CM

## 2025-08-21 ENCOUNTER — VIRTUAL VISIT (OUTPATIENT)
Dept: PHARMACY | Facility: CLINIC | Age: 25
End: 2025-08-21
Attending: INTERNAL MEDICINE
Payer: COMMERCIAL

## 2025-08-21 DIAGNOSIS — Z97.5 IUD (INTRAUTERINE DEVICE) IN PLACE: ICD-10-CM

## 2025-08-21 DIAGNOSIS — R73.03 PREDIABETES: ICD-10-CM

## 2025-08-21 DIAGNOSIS — Z90.89 S/P TONSILLECTOMY: ICD-10-CM

## 2025-08-21 DIAGNOSIS — K76.0 METABOLIC DYSFUNCTION-ASSOCIATED STEATOTIC LIVER DISEASE (MASLD): ICD-10-CM

## 2025-08-21 DIAGNOSIS — F41.1 GENERALIZED ANXIETY DISORDER: ICD-10-CM

## 2025-08-21 DIAGNOSIS — F33.1 MODERATE EPISODE OF RECURRENT MAJOR DEPRESSIVE DISORDER (H): ICD-10-CM

## 2025-08-21 DIAGNOSIS — E66.813 CLASS 3 SEVERE OBESITY WITH SERIOUS COMORBIDITY AND BODY MASS INDEX (BMI) OF 40.0 TO 44.9 IN ADULT, UNSPECIFIED OBESITY TYPE (H): Primary | ICD-10-CM

## 2025-08-21 DIAGNOSIS — J30.9 ALLERGIC RHINITIS, UNSPECIFIED SEASONALITY, UNSPECIFIED TRIGGER: ICD-10-CM

## 2025-08-21 DIAGNOSIS — G47.33 OSA ON CPAP: ICD-10-CM

## 2025-08-21 RX ORDER — OXYCODONE HCL 5 MG/5 ML
7.5 SOLUTION, ORAL ORAL EVERY 6 HOURS PRN
Qty: 100 ML | Refills: 0 | Status: SHIPPED | OUTPATIENT
Start: 2025-08-21

## 2025-08-21 RX ORDER — IBUPROFEN 100 MG/5ML
5 SUSPENSION ORAL EVERY 6 HOURS
Qty: 600 ML | Refills: 0 | Status: SHIPPED | OUTPATIENT
Start: 2025-08-21

## 2025-08-21 ASSESSMENT — PAIN SCALES - GENERAL: PAINLEVEL_OUTOF10: MODERATE PAIN (4)

## (undated) DEVICE — TRAY PREP DRY SKIN SCRUB 067

## (undated) DEVICE — SU PDS II 0 CTX 60" Z990G

## (undated) DEVICE — SURGICEL POWDER ABSORBABLE HEMOSTAT 3GM 3013SP

## (undated) DEVICE — PREP CHLORAPREP 26ML TINTED HI-LITE ORANGE 930815

## (undated) DEVICE — SPECIMEN CONTAINER 5OZ STERILE 2600SA

## (undated) DEVICE — SU MONOCRYL 4-0 PS-2 18" UND Y496G

## (undated) DEVICE — KIT TURNOVER FAIRVIEW SOUTHDALE FULL SP3889

## (undated) DEVICE — SYR BULB IRRIG DOVER 60 ML LATEX FREE 67000

## (undated) DEVICE — SYR EAR 3OZ BULB IRR STRL DISP BLU PVC 4173

## (undated) DEVICE — SU VICRYL 3-0 CT-1 36" J344H

## (undated) DEVICE — Device

## (undated) DEVICE — JELLY LUBRICATING SURGILUBE 2OZ TUBE

## (undated) DEVICE — LINEN TOWEL PACK X5 5464

## (undated) DEVICE — SYRINGE IRRIGATION BULB TIP 60ML STER LF DYND20125

## (undated) DEVICE — DRAPE U SPLIT 74X120" 29440

## (undated) DEVICE — PREP POVIDONE-IODINE 10% SOLUTION 4OZ BOTTLE MDS093944

## (undated) DEVICE — GLOVE SURG PI ULTRA TOUCH M SZ 7-1/2 LF

## (undated) DEVICE — SU VICRYL 2-0 CT-1 27" J339H

## (undated) DEVICE — SOLUTION WATER 1000ML BOTTLE R5000-01

## (undated) DEVICE — ESU ELEC EDGE INSULATED BLADE 4" E1455-4

## (undated) DEVICE — TAPE MEDIPORE 4"X2YD 2864

## (undated) DEVICE — SPONGE TONSIL 1IN MED STRL 7201

## (undated) DEVICE — SOL WATER IRRIG 1000ML BOTTLE 2F7114

## (undated) DEVICE — SOL NACL 0.9% IRRIG 1000ML BOTTLE 2F7124

## (undated) DEVICE — DRAPE SLEEVE 599

## (undated) DEVICE — STRAP KNEE/BODY 31143004

## (undated) DEVICE — ESU GROUND PAD UNIVERSAL W/O CORD

## (undated) DEVICE — LINEN GOWN X4 5410

## (undated) DEVICE — WAND ESURG HALO STRL LF DISP 72290134

## (undated) DEVICE — CATH TRAY FOLEY SURESTEP 16FR W/URINE MTR STATLK LF A303416A

## (undated) DEVICE — SU CHROMIC 4-0 SH 27" G121H

## (undated) DEVICE — SUCTION TIP POOLE K770

## (undated) DEVICE — DRSG TELFA ISLAND 4X8" 7541

## (undated) DEVICE — PREP POVIDONE-IODINE 7.5% SCRUB 4OZ BOTTLE MDS093945

## (undated) DEVICE — ESU GROUND PAD ADULT REM W/15' CORD E7507DB

## (undated) DEVICE — BLADE KNIFE SURG 10 371110

## (undated) DEVICE — PAD PERI INDIV WRAP 11" 2022A

## (undated) DEVICE — TUBING SUCTION MEDI-VAC 1/4"X20' N620A

## (undated) DEVICE — SYR 50ML LL W/O NDL 309653

## (undated) DEVICE — ANTIFOG SOLUTION W/FOAM PAD 31142527

## (undated) DEVICE — LINEN ORTHO PACK 5446

## (undated) DEVICE — ESU LIGASURE IMPACT OPEN SEALER/DVDR CVD LG JAW LF4418

## (undated) DEVICE — PAD CHUX UNDERPAD 30X36" P3036C

## (undated) DEVICE — CUSTOM PACK MINOR SBA5BMNHEA

## (undated) DEVICE — SUCTION MANIFOLD NEPTUNE 2 SYS 4 PORT 0702-020-000

## (undated) DEVICE — BLADE CLIPPER SGL USE 9680

## (undated) DEVICE — DRAIN PENROSE 3/8"X12" LATEX 30414-038

## (undated) DEVICE — SUCTION TIP YANKAUER W/O VENT K86

## (undated) DEVICE — LIGHT HANDLE X2

## (undated) DEVICE — NDL 18GA 1.5" 305196

## (undated) RX ORDER — DEXAMETHASONE SODIUM PHOSPHATE 4 MG/ML
INJECTION, SOLUTION INTRA-ARTICULAR; INTRALESIONAL; INTRAMUSCULAR; INTRAVENOUS; SOFT TISSUE
Status: DISPENSED
Start: 2024-10-03

## (undated) RX ORDER — ONDANSETRON 2 MG/ML
INJECTION INTRAMUSCULAR; INTRAVENOUS
Status: DISPENSED
Start: 2024-10-03

## (undated) RX ORDER — FENTANYL CITRATE 50 UG/ML
INJECTION, SOLUTION INTRAMUSCULAR; INTRAVENOUS
Status: DISPENSED
Start: 2025-08-14

## (undated) RX ORDER — LIDOCAINE HYDROCHLORIDE 10 MG/ML
INJECTION, SOLUTION EPIDURAL; INFILTRATION; INTRACAUDAL; PERINEURAL
Status: DISPENSED
Start: 2024-11-21

## (undated) RX ORDER — PROPOFOL 10 MG/ML
INJECTION, EMULSION INTRAVENOUS
Status: DISPENSED
Start: 2025-08-14

## (undated) RX ORDER — HYDROMORPHONE HYDROCHLORIDE 1 MG/ML
INJECTION, SOLUTION INTRAMUSCULAR; INTRAVENOUS; SUBCUTANEOUS
Status: DISPENSED
Start: 2024-10-03

## (undated) RX ORDER — ACETAMINOPHEN 325 MG/1
TABLET ORAL
Status: DISPENSED
Start: 2024-10-03

## (undated) RX ORDER — FENTANYL CITRATE 50 UG/ML
INJECTION, SOLUTION INTRAMUSCULAR; INTRAVENOUS
Status: DISPENSED
Start: 2024-10-03

## (undated) RX ORDER — KETOROLAC TROMETHAMINE 30 MG/ML
INJECTION, SOLUTION INTRAMUSCULAR; INTRAVENOUS
Status: DISPENSED
Start: 2024-10-03

## (undated) RX ORDER — OXYMETAZOLINE HYDROCHLORIDE 0.05 G/100ML
SPRAY NASAL
Status: DISPENSED
Start: 2025-08-14

## (undated) RX ORDER — CEFAZOLIN SODIUM/WATER 3 G/30 ML
SYRINGE (ML) INTRAVENOUS
Status: DISPENSED
Start: 2024-10-03

## (undated) RX ORDER — BUPIVACAINE HYDROCHLORIDE 2.5 MG/ML
INJECTION, SOLUTION EPIDURAL; INFILTRATION; INTRACAUDAL
Status: DISPENSED
Start: 2024-10-03

## (undated) RX ORDER — FENTANYL CITRATE 50 UG/ML
INJECTION, SOLUTION INTRAMUSCULAR; INTRAVENOUS
Status: DISPENSED
Start: 2024-11-21